# Patient Record
Sex: FEMALE | Race: WHITE | Employment: FULL TIME | URBAN - METROPOLITAN AREA
[De-identification: names, ages, dates, MRNs, and addresses within clinical notes are randomized per-mention and may not be internally consistent; named-entity substitution may affect disease eponyms.]

---

## 2017-01-01 ENCOUNTER — PRE VISIT (OUTPATIENT)
Dept: UROLOGY | Facility: CLINIC | Age: 62
End: 2017-01-01

## 2017-01-01 ENCOUNTER — HOSPITAL ENCOUNTER (EMERGENCY)
Facility: CLINIC | Age: 62
Discharge: HOME | End: 2017-06-14
Attending: EMERGENCY MEDICINE | Admitting: EMERGENCY MEDICINE
Payer: COMMERCIAL

## 2017-01-01 ENCOUNTER — APPOINTMENT (OUTPATIENT)
Dept: GENERAL RADIOLOGY | Facility: CLINIC | Age: 62
DRG: 389 | End: 2017-01-01
Attending: OBSTETRICS & GYNECOLOGY
Payer: MEDICARE

## 2017-01-01 ENCOUNTER — HOSPITAL ENCOUNTER (OUTPATIENT)
Facility: AMBULATORY SURGERY CENTER | Age: 62
End: 2017-02-27
Attending: UROLOGY

## 2017-01-01 ENCOUNTER — ONCOLOGY VISIT (OUTPATIENT)
Dept: ONCOLOGY | Facility: CLINIC | Age: 62
End: 2017-01-01
Attending: OBSTETRICS & GYNECOLOGY
Payer: COMMERCIAL

## 2017-01-01 ENCOUNTER — TELEPHONE (OUTPATIENT)
Dept: UROLOGY | Facility: CLINIC | Age: 62
End: 2017-01-01

## 2017-01-01 ENCOUNTER — ONCOLOGY VISIT (OUTPATIENT)
Dept: ONCOLOGY | Facility: CLINIC | Age: 62
End: 2017-01-01
Payer: COMMERCIAL

## 2017-01-01 ENCOUNTER — CARE COORDINATION (OUTPATIENT)
Dept: UROLOGY | Facility: CLINIC | Age: 62
End: 2017-01-01

## 2017-01-01 ENCOUNTER — TELEPHONE (OUTPATIENT)
Dept: EMERGENCY MEDICINE | Facility: CLINIC | Age: 62
End: 2017-01-01

## 2017-01-01 ENCOUNTER — OFFICE VISIT (OUTPATIENT)
Dept: ANESTHESIOLOGY | Facility: CLINIC | Age: 62
End: 2017-01-01

## 2017-01-01 ENCOUNTER — TELEPHONE (OUTPATIENT)
Dept: ONCOLOGY | Facility: CLINIC | Age: 62
End: 2017-01-01

## 2017-01-01 ENCOUNTER — CARE COORDINATION (OUTPATIENT)
Dept: ONCOLOGY | Facility: CLINIC | Age: 62
End: 2017-01-01

## 2017-01-01 ENCOUNTER — APPOINTMENT (OUTPATIENT)
Dept: MRI IMAGING | Facility: CLINIC | Age: 62
DRG: 394 | End: 2017-01-01
Payer: MEDICARE

## 2017-01-01 ENCOUNTER — CARE COORDINATION (OUTPATIENT)
Dept: CARE COORDINATION | Facility: CLINIC | Age: 62
End: 2017-01-01

## 2017-01-01 ENCOUNTER — CARE COORDINATION (OUTPATIENT)
Dept: CARDIOLOGY | Facility: CLINIC | Age: 62
End: 2017-01-01

## 2017-01-01 ENCOUNTER — HOSPITAL ENCOUNTER (OUTPATIENT)
Facility: AMBULATORY SURGERY CENTER | Age: 62
End: 2017-04-20
Attending: ANESTHESIOLOGY

## 2017-01-01 ENCOUNTER — OFFICE VISIT (OUTPATIENT)
Dept: NEPHROLOGY | Facility: CLINIC | Age: 62
End: 2017-01-01
Attending: INTERNAL MEDICINE
Payer: COMMERCIAL

## 2017-01-01 ENCOUNTER — APPOINTMENT (OUTPATIENT)
Dept: GENERAL RADIOLOGY | Facility: CLINIC | Age: 62
DRG: 374 | End: 2017-01-01
Attending: INTERNAL MEDICINE
Payer: MEDICARE

## 2017-01-01 ENCOUNTER — HOSPITAL ENCOUNTER (OUTPATIENT)
Facility: AMBULATORY SURGERY CENTER | Age: 62
End: 2017-05-18
Attending: ANESTHESIOLOGY

## 2017-01-01 ENCOUNTER — ONCOLOGY VISIT (OUTPATIENT)
Dept: ONCOLOGY | Facility: CLINIC | Age: 62
End: 2017-01-01
Attending: NURSE PRACTITIONER
Payer: COMMERCIAL

## 2017-01-01 ENCOUNTER — INFUSION THERAPY VISIT (OUTPATIENT)
Dept: ONCOLOGY | Facility: CLINIC | Age: 62
DRG: 389 | End: 2017-01-01
Attending: OBSTETRICS & GYNECOLOGY
Payer: MEDICARE

## 2017-01-01 ENCOUNTER — APPOINTMENT (OUTPATIENT)
Dept: LAB | Facility: CLINIC | Age: 62
End: 2017-01-01
Attending: OBSTETRICS & GYNECOLOGY
Payer: COMMERCIAL

## 2017-01-01 ENCOUNTER — ANESTHESIA EVENT (OUTPATIENT)
Dept: SURGERY | Facility: AMBULATORY SURGERY CENTER | Age: 62
End: 2017-01-01

## 2017-01-01 ENCOUNTER — APPOINTMENT (OUTPATIENT)
Dept: GENERAL RADIOLOGY | Facility: CLINIC | Age: 62
DRG: 374 | End: 2017-01-01
Payer: MEDICARE

## 2017-01-01 ENCOUNTER — TRANSFERRED RECORDS (OUTPATIENT)
Dept: HEALTH INFORMATION MANAGEMENT | Facility: CLINIC | Age: 62
End: 2017-01-01

## 2017-01-01 ENCOUNTER — TELEPHONE (OUTPATIENT)
Dept: PALLIATIVE CARE | Facility: CLINIC | Age: 62
End: 2017-01-01

## 2017-01-01 ENCOUNTER — RESEARCH ENCOUNTER (OUTPATIENT)
Dept: ONCOLOGY | Facility: CLINIC | Age: 62
End: 2017-01-01

## 2017-01-01 ENCOUNTER — INFUSION THERAPY VISIT (OUTPATIENT)
Dept: INFUSION THERAPY | Facility: CLINIC | Age: 62
End: 2017-01-01
Payer: COMMERCIAL

## 2017-01-01 ENCOUNTER — APPOINTMENT (OUTPATIENT)
Dept: CT IMAGING | Facility: CLINIC | Age: 62
DRG: 394 | End: 2017-01-01
Payer: MEDICARE

## 2017-01-01 ENCOUNTER — ALLIED HEALTH/NURSE VISIT (OUTPATIENT)
Dept: ONCOLOGY | Facility: CLINIC | Age: 62
End: 2017-01-01
Payer: COMMERCIAL

## 2017-01-01 ENCOUNTER — ANESTHESIA (OUTPATIENT)
Dept: SURGERY | Facility: AMBULATORY SURGERY CENTER | Age: 62
End: 2017-01-01

## 2017-01-01 ENCOUNTER — APPOINTMENT (OUTPATIENT)
Dept: LAB | Facility: CLINIC | Age: 62
DRG: 389 | End: 2017-01-01
Attending: OBSTETRICS & GYNECOLOGY
Payer: MEDICARE

## 2017-01-01 ENCOUNTER — TELEPHONE (OUTPATIENT)
Dept: OTHER | Facility: CLINIC | Age: 62
End: 2017-01-01

## 2017-01-01 ENCOUNTER — SURGERY (OUTPATIENT)
Age: 62
End: 2017-01-01

## 2017-01-01 ENCOUNTER — APPOINTMENT (OUTPATIENT)
Dept: CT IMAGING | Facility: CLINIC | Age: 62
End: 2017-01-01
Attending: EMERGENCY MEDICINE
Payer: COMMERCIAL

## 2017-01-01 ENCOUNTER — TELEPHONE (OUTPATIENT)
Dept: NURSING | Facility: CLINIC | Age: 62
End: 2017-01-01

## 2017-01-01 ENCOUNTER — HOSPITAL ENCOUNTER (INPATIENT)
Facility: CLINIC | Age: 62
LOS: 2 days | Discharge: HOME | DRG: 394 | End: 2017-01-29
Attending: EMERGENCY MEDICINE | Admitting: OBSTETRICS & GYNECOLOGY
Payer: MEDICARE

## 2017-01-01 ENCOUNTER — HOSPITAL ENCOUNTER (INPATIENT)
Facility: CLINIC | Age: 62
LOS: 5 days | Discharge: HOME | DRG: 389 | End: 2017-01-15
Attending: OBSTETRICS & GYNECOLOGY | Admitting: OBSTETRICS & GYNECOLOGY
Payer: MEDICARE

## 2017-01-01 ENCOUNTER — HOSPITAL ENCOUNTER (OUTPATIENT)
Facility: AMBULATORY SURGERY CENTER | Age: 62
End: 2017-06-12
Attending: UROLOGY

## 2017-01-01 ENCOUNTER — TELEPHONE (OUTPATIENT)
Dept: ANESTHESIOLOGY | Facility: CLINIC | Age: 62
End: 2017-01-01

## 2017-01-01 ENCOUNTER — HOSPITAL ENCOUNTER (OUTPATIENT)
Dept: EDUCATION SERVICES | Facility: CLINIC | Age: 62
Discharge: HOME | End: 2017-06-18
Attending: OBSTETRICS & GYNECOLOGY | Admitting: OBSTETRICS & GYNECOLOGY
Payer: COMMERCIAL

## 2017-01-01 ENCOUNTER — MYC REFILL (OUTPATIENT)
Dept: ONCOLOGY | Facility: CLINIC | Age: 62
End: 2017-01-01

## 2017-01-01 ENCOUNTER — ANESTHESIA (OUTPATIENT)
Dept: SURGERY | Facility: CLINIC | Age: 62
DRG: 374 | End: 2017-01-01
Payer: MEDICARE

## 2017-01-01 ENCOUNTER — PRE VISIT (OUTPATIENT)
Dept: ANESTHESIOLOGY | Facility: CLINIC | Age: 62
End: 2017-01-01

## 2017-01-01 ENCOUNTER — APPOINTMENT (OUTPATIENT)
Dept: CT IMAGING | Facility: CLINIC | Age: 62
DRG: 374 | End: 2017-01-01
Attending: NURSE PRACTITIONER
Payer: MEDICARE

## 2017-01-01 ENCOUNTER — DOCUMENTATION ONLY (OUTPATIENT)
Dept: OTHER | Facility: CLINIC | Age: 62
End: 2017-01-01

## 2017-01-01 ENCOUNTER — APPOINTMENT (OUTPATIENT)
Dept: ULTRASOUND IMAGING | Facility: CLINIC | Age: 62
DRG: 394 | End: 2017-01-01
Attending: EMERGENCY MEDICINE
Payer: MEDICARE

## 2017-01-01 ENCOUNTER — ALLIED HEALTH/NURSE VISIT (OUTPATIENT)
Dept: ONCOLOGY | Facility: CLINIC | Age: 62
End: 2017-01-01

## 2017-01-01 ENCOUNTER — ALLIED HEALTH/NURSE VISIT (OUTPATIENT)
Dept: UROLOGY | Facility: CLINIC | Age: 62
End: 2017-01-01

## 2017-01-01 ENCOUNTER — MYC MEDICAL ADVICE (OUTPATIENT)
Dept: ANESTHESIOLOGY | Facility: CLINIC | Age: 62
End: 2017-01-01

## 2017-01-01 ENCOUNTER — ANESTHESIA EVENT (OUTPATIENT)
Dept: SURGERY | Facility: CLINIC | Age: 62
DRG: 374 | End: 2017-01-01
Payer: MEDICARE

## 2017-01-01 ENCOUNTER — RADIANT APPOINTMENT (OUTPATIENT)
Dept: GENERAL RADIOLOGY | Facility: CLINIC | Age: 62
End: 2017-01-01
Attending: NURSE PRACTITIONER
Payer: COMMERCIAL

## 2017-01-01 ENCOUNTER — APPOINTMENT (OUTPATIENT)
Dept: GENERAL RADIOLOGY | Facility: CLINIC | Age: 62
DRG: 374 | End: 2017-01-01
Attending: OBSTETRICS & GYNECOLOGY
Payer: MEDICARE

## 2017-01-01 ENCOUNTER — APPOINTMENT (OUTPATIENT)
Dept: INTERVENTIONAL RADIOLOGY/VASCULAR | Facility: CLINIC | Age: 62
End: 2017-01-01
Payer: COMMERCIAL

## 2017-01-01 ENCOUNTER — HOSPITAL ENCOUNTER (OUTPATIENT)
Facility: AMBULATORY SURGERY CENTER | Age: 62
End: 2017-07-24
Attending: UROLOGY

## 2017-01-01 ENCOUNTER — APPOINTMENT (OUTPATIENT)
Dept: ULTRASOUND IMAGING | Facility: CLINIC | Age: 62
DRG: 374 | End: 2017-01-01
Payer: MEDICARE

## 2017-01-01 ENCOUNTER — OFFICE VISIT (OUTPATIENT)
Dept: UROLOGY | Facility: CLINIC | Age: 62
End: 2017-01-01

## 2017-01-01 ENCOUNTER — NURSE TRIAGE (OUTPATIENT)
Dept: NURSING | Facility: CLINIC | Age: 62
End: 2017-01-01

## 2017-01-01 ENCOUNTER — APPOINTMENT (OUTPATIENT)
Dept: GENERAL RADIOLOGY | Facility: CLINIC | Age: 62
End: 2017-01-01
Attending: EMERGENCY MEDICINE
Payer: COMMERCIAL

## 2017-01-01 ENCOUNTER — HOSPITAL ENCOUNTER (INPATIENT)
Dept: EDUCATION SERVICES | Facility: CLINIC | Age: 62
DRG: 374 | End: 2017-08-11
Payer: MEDICARE

## 2017-01-01 ENCOUNTER — HOSPITAL ENCOUNTER (INPATIENT)
Facility: CLINIC | Age: 62
LOS: 3 days | Discharge: HOME | DRG: 389 | End: 2017-03-03
Attending: OBSTETRICS & GYNECOLOGY | Admitting: OBSTETRICS & GYNECOLOGY
Payer: MEDICARE

## 2017-01-01 ENCOUNTER — DOCUMENTATION ONLY (OUTPATIENT)
Dept: PHYSICAL THERAPY | Facility: CLINIC | Age: 62
End: 2017-01-01

## 2017-01-01 ENCOUNTER — OFFICE VISIT (OUTPATIENT)
Dept: UROLOGY | Facility: CLINIC | Age: 62
End: 2017-01-01
Payer: COMMERCIAL

## 2017-01-01 ENCOUNTER — INFUSION THERAPY VISIT (OUTPATIENT)
Dept: INFUSION THERAPY | Facility: CLINIC | Age: 62
End: 2017-01-01
Attending: NURSE PRACTITIONER
Payer: COMMERCIAL

## 2017-01-01 ENCOUNTER — HOSPITAL ENCOUNTER (INPATIENT)
Facility: CLINIC | Age: 62
LOS: 10 days | Discharge: HOSPICE | DRG: 374 | End: 2017-08-11
Attending: EMERGENCY MEDICINE | Admitting: OBSTETRICS & GYNECOLOGY
Payer: MEDICARE

## 2017-01-01 ENCOUNTER — HOSPITAL ENCOUNTER (EMERGENCY)
Facility: CLINIC | Age: 62
Discharge: HOME | End: 2017-03-15
Attending: EMERGENCY MEDICINE | Admitting: EMERGENCY MEDICINE
Payer: COMMERCIAL

## 2017-01-01 VITALS
SYSTOLIC BLOOD PRESSURE: 134 MMHG | WEIGHT: 115.8 LBS | HEART RATE: 95 BPM | TEMPERATURE: 99.1 F | RESPIRATION RATE: 18 BRPM | DIASTOLIC BLOOD PRESSURE: 77 MMHG | OXYGEN SATURATION: 97 % | BODY MASS INDEX: 18.7 KG/M2

## 2017-01-01 VITALS
TEMPERATURE: 97.1 F | DIASTOLIC BLOOD PRESSURE: 65 MMHG | WEIGHT: 119.7 LBS | BODY MASS INDEX: 19.24 KG/M2 | OXYGEN SATURATION: 97 % | HEART RATE: 108 BPM | HEIGHT: 66 IN | RESPIRATION RATE: 16 BRPM | SYSTOLIC BLOOD PRESSURE: 124 MMHG

## 2017-01-01 VITALS
WEIGHT: 126.3 LBS | DIASTOLIC BLOOD PRESSURE: 72 MMHG | SYSTOLIC BLOOD PRESSURE: 136 MMHG | OXYGEN SATURATION: 100 % | TEMPERATURE: 98.9 F | HEART RATE: 90 BPM | BODY MASS INDEX: 20.39 KG/M2 | RESPIRATION RATE: 16 BRPM

## 2017-01-01 VITALS
RESPIRATION RATE: 18 BRPM | TEMPERATURE: 98.1 F | SYSTOLIC BLOOD PRESSURE: 146 MMHG | WEIGHT: 125.5 LBS | BODY MASS INDEX: 20.27 KG/M2 | DIASTOLIC BLOOD PRESSURE: 84 MMHG | HEART RATE: 101 BPM | OXYGEN SATURATION: 99 %

## 2017-01-01 VITALS
HEART RATE: 91 BPM | TEMPERATURE: 99 F | RESPIRATION RATE: 18 BRPM | DIASTOLIC BLOOD PRESSURE: 68 MMHG | SYSTOLIC BLOOD PRESSURE: 114 MMHG | OXYGEN SATURATION: 93 %

## 2017-01-01 VITALS
DIASTOLIC BLOOD PRESSURE: 64 MMHG | SYSTOLIC BLOOD PRESSURE: 158 MMHG | OXYGEN SATURATION: 100 % | HEART RATE: 101 BPM | BODY MASS INDEX: 20.72 KG/M2 | TEMPERATURE: 98 F | HEIGHT: 66 IN | WEIGHT: 128.9 LBS | RESPIRATION RATE: 16 BRPM

## 2017-01-01 VITALS
DIASTOLIC BLOOD PRESSURE: 65 MMHG | RESPIRATION RATE: 16 BRPM | BODY MASS INDEX: 19.04 KG/M2 | WEIGHT: 118.5 LBS | HEART RATE: 89 BPM | OXYGEN SATURATION: 94 % | TEMPERATURE: 97.7 F | HEIGHT: 66 IN | SYSTOLIC BLOOD PRESSURE: 125 MMHG

## 2017-01-01 VITALS
DIASTOLIC BLOOD PRESSURE: 68 MMHG | WEIGHT: 130.29 LBS | RESPIRATION RATE: 16 BRPM | HEART RATE: 95 BPM | OXYGEN SATURATION: 100 % | BODY MASS INDEX: 20.94 KG/M2 | SYSTOLIC BLOOD PRESSURE: 135 MMHG | TEMPERATURE: 98.4 F | HEIGHT: 66 IN

## 2017-01-01 VITALS
BODY MASS INDEX: 20.17 KG/M2 | DIASTOLIC BLOOD PRESSURE: 92 MMHG | TEMPERATURE: 98.2 F | HEIGHT: 66 IN | RESPIRATION RATE: 16 BRPM | SYSTOLIC BLOOD PRESSURE: 150 MMHG | WEIGHT: 125.5 LBS | OXYGEN SATURATION: 100 %

## 2017-01-01 VITALS
RESPIRATION RATE: 18 BRPM | DIASTOLIC BLOOD PRESSURE: 85 MMHG | HEIGHT: 66 IN | OXYGEN SATURATION: 97 % | TEMPERATURE: 98.6 F | HEART RATE: 105 BPM | SYSTOLIC BLOOD PRESSURE: 142 MMHG | WEIGHT: 110 LBS | BODY MASS INDEX: 17.68 KG/M2

## 2017-01-01 VITALS — SYSTOLIC BLOOD PRESSURE: 120 MMHG | DIASTOLIC BLOOD PRESSURE: 63 MMHG | HEART RATE: 109 BPM

## 2017-01-01 VITALS
HEART RATE: 107 BPM | BODY MASS INDEX: 18.63 KG/M2 | HEIGHT: 66 IN | DIASTOLIC BLOOD PRESSURE: 84 MMHG | WEIGHT: 115.9 LBS | OXYGEN SATURATION: 100 % | TEMPERATURE: 98.8 F | SYSTOLIC BLOOD PRESSURE: 130 MMHG

## 2017-01-01 VITALS
DIASTOLIC BLOOD PRESSURE: 83 MMHG | BODY MASS INDEX: 20.03 KG/M2 | HEIGHT: 66 IN | SYSTOLIC BLOOD PRESSURE: 147 MMHG | HEART RATE: 88 BPM | OXYGEN SATURATION: 98 % | RESPIRATION RATE: 16 BRPM | WEIGHT: 124.6 LBS | TEMPERATURE: 98.2 F

## 2017-01-01 VITALS
TEMPERATURE: 98.5 F | HEART RATE: 89 BPM | BODY MASS INDEX: 19.06 KG/M2 | DIASTOLIC BLOOD PRESSURE: 66 MMHG | SYSTOLIC BLOOD PRESSURE: 115 MMHG | HEIGHT: 66 IN | RESPIRATION RATE: 16 BRPM | WEIGHT: 118.6 LBS | OXYGEN SATURATION: 100 %

## 2017-01-01 VITALS
BODY MASS INDEX: 18.32 KG/M2 | HEART RATE: 95 BPM | WEIGHT: 114 LBS | DIASTOLIC BLOOD PRESSURE: 85 MMHG | HEIGHT: 66 IN | TEMPERATURE: 98.6 F | SYSTOLIC BLOOD PRESSURE: 155 MMHG | RESPIRATION RATE: 16 BRPM | OXYGEN SATURATION: 100 %

## 2017-01-01 VITALS
BODY MASS INDEX: 18.48 KG/M2 | WEIGHT: 115 LBS | TEMPERATURE: 98.6 F | OXYGEN SATURATION: 99 % | HEIGHT: 66 IN | DIASTOLIC BLOOD PRESSURE: 80 MMHG | HEART RATE: 100 BPM | RESPIRATION RATE: 18 BRPM | SYSTOLIC BLOOD PRESSURE: 141 MMHG

## 2017-01-01 VITALS
OXYGEN SATURATION: 97 % | TEMPERATURE: 99.6 F | RESPIRATION RATE: 16 BRPM | DIASTOLIC BLOOD PRESSURE: 55 MMHG | SYSTOLIC BLOOD PRESSURE: 118 MMHG | HEART RATE: 115 BPM

## 2017-01-01 VITALS
DIASTOLIC BLOOD PRESSURE: 63 MMHG | HEART RATE: 104 BPM | SYSTOLIC BLOOD PRESSURE: 123 MMHG | OXYGEN SATURATION: 100 % | TEMPERATURE: 98.9 F | BODY MASS INDEX: 18.67 KG/M2 | RESPIRATION RATE: 16 BRPM | WEIGHT: 116.18 LBS | HEIGHT: 66 IN

## 2017-01-01 VITALS
SYSTOLIC BLOOD PRESSURE: 146 MMHG | DIASTOLIC BLOOD PRESSURE: 84 MMHG | HEART RATE: 101 BPM | RESPIRATION RATE: 18 BRPM | WEIGHT: 125.5 LBS | OXYGEN SATURATION: 99 % | BODY MASS INDEX: 20.27 KG/M2 | TEMPERATURE: 98.1 F

## 2017-01-01 VITALS
BODY MASS INDEX: 18.63 KG/M2 | TEMPERATURE: 97.4 F | DIASTOLIC BLOOD PRESSURE: 74 MMHG | WEIGHT: 115.9 LBS | HEIGHT: 66 IN | SYSTOLIC BLOOD PRESSURE: 136 MMHG | RESPIRATION RATE: 14 BRPM | OXYGEN SATURATION: 100 %

## 2017-01-01 VITALS
SYSTOLIC BLOOD PRESSURE: 135 MMHG | OXYGEN SATURATION: 98 % | RESPIRATION RATE: 18 BRPM | DIASTOLIC BLOOD PRESSURE: 65 MMHG | TEMPERATURE: 99.8 F

## 2017-01-01 VITALS
HEIGHT: 66 IN | DIASTOLIC BLOOD PRESSURE: 70 MMHG | HEART RATE: 112 BPM | BODY MASS INDEX: 20.63 KG/M2 | TEMPERATURE: 98.5 F | WEIGHT: 128.4 LBS | SYSTOLIC BLOOD PRESSURE: 125 MMHG | OXYGEN SATURATION: 100 % | RESPIRATION RATE: 16 BRPM

## 2017-01-01 VITALS
OXYGEN SATURATION: 97 % | DIASTOLIC BLOOD PRESSURE: 72 MMHG | RESPIRATION RATE: 16 BRPM | TEMPERATURE: 97.5 F | HEART RATE: 95 BPM | SYSTOLIC BLOOD PRESSURE: 125 MMHG

## 2017-01-01 VITALS
DIASTOLIC BLOOD PRESSURE: 75 MMHG | HEIGHT: 66 IN | HEART RATE: 97 BPM | SYSTOLIC BLOOD PRESSURE: 138 MMHG | BODY MASS INDEX: 18.77 KG/M2 | WEIGHT: 116.8 LBS

## 2017-01-01 VITALS
HEART RATE: 93 BPM | RESPIRATION RATE: 18 BRPM | SYSTOLIC BLOOD PRESSURE: 125 MMHG | DIASTOLIC BLOOD PRESSURE: 68 MMHG | TEMPERATURE: 99.2 F

## 2017-01-01 VITALS — SYSTOLIC BLOOD PRESSURE: 115 MMHG | HEART RATE: 113 BPM | DIASTOLIC BLOOD PRESSURE: 39 MMHG

## 2017-01-01 VITALS
HEIGHT: 66 IN | HEART RATE: 100 BPM | SYSTOLIC BLOOD PRESSURE: 121 MMHG | BODY MASS INDEX: 18.96 KG/M2 | OXYGEN SATURATION: 100 % | WEIGHT: 118 LBS | DIASTOLIC BLOOD PRESSURE: 70 MMHG

## 2017-01-01 VITALS
HEART RATE: 106 BPM | HEIGHT: 66 IN | DIASTOLIC BLOOD PRESSURE: 68 MMHG | OXYGEN SATURATION: 97 % | BODY MASS INDEX: 17.36 KG/M2 | TEMPERATURE: 98.9 F | WEIGHT: 108 LBS | SYSTOLIC BLOOD PRESSURE: 119 MMHG | RESPIRATION RATE: 20 BRPM

## 2017-01-01 VITALS
RESPIRATION RATE: 20 BRPM | HEIGHT: 66 IN | OXYGEN SATURATION: 99 % | DIASTOLIC BLOOD PRESSURE: 84 MMHG | HEART RATE: 109 BPM | SYSTOLIC BLOOD PRESSURE: 141 MMHG | TEMPERATURE: 98.4 F | BODY MASS INDEX: 18.48 KG/M2 | WEIGHT: 115 LBS

## 2017-01-01 VITALS
HEART RATE: 98 BPM | OXYGEN SATURATION: 99 % | BODY MASS INDEX: 18.8 KG/M2 | HEIGHT: 66 IN | SYSTOLIC BLOOD PRESSURE: 124 MMHG | RESPIRATION RATE: 20 BRPM | DIASTOLIC BLOOD PRESSURE: 71 MMHG | TEMPERATURE: 98.5 F | WEIGHT: 117 LBS

## 2017-01-01 VITALS
DIASTOLIC BLOOD PRESSURE: 74 MMHG | HEART RATE: 93 BPM | RESPIRATION RATE: 18 BRPM | SYSTOLIC BLOOD PRESSURE: 133 MMHG | TEMPERATURE: 99.3 F

## 2017-01-01 VITALS
WEIGHT: 118.3 LBS | SYSTOLIC BLOOD PRESSURE: 129 MMHG | DIASTOLIC BLOOD PRESSURE: 68 MMHG | OXYGEN SATURATION: 98 % | RESPIRATION RATE: 16 BRPM | HEART RATE: 93 BPM | BODY MASS INDEX: 19.1 KG/M2 | TEMPERATURE: 98.9 F

## 2017-01-01 VITALS
BODY MASS INDEX: 18.89 KG/M2 | DIASTOLIC BLOOD PRESSURE: 74 MMHG | WEIGHT: 117 LBS | OXYGEN SATURATION: 100 % | TEMPERATURE: 96.4 F | SYSTOLIC BLOOD PRESSURE: 136 MMHG | RESPIRATION RATE: 14 BRPM

## 2017-01-01 VITALS
DIASTOLIC BLOOD PRESSURE: 87 MMHG | OXYGEN SATURATION: 99 % | SYSTOLIC BLOOD PRESSURE: 147 MMHG | HEART RATE: 115 BPM | TEMPERATURE: 98.1 F

## 2017-01-01 VITALS
BODY MASS INDEX: 20.67 KG/M2 | SYSTOLIC BLOOD PRESSURE: 141 MMHG | DIASTOLIC BLOOD PRESSURE: 89 MMHG | WEIGHT: 128.6 LBS | HEART RATE: 98 BPM | HEIGHT: 66 IN

## 2017-01-01 VITALS
HEART RATE: 106 BPM | BODY MASS INDEX: 18.48 KG/M2 | HEIGHT: 66 IN | SYSTOLIC BLOOD PRESSURE: 113 MMHG | OXYGEN SATURATION: 100 % | TEMPERATURE: 98.7 F | DIASTOLIC BLOOD PRESSURE: 67 MMHG | RESPIRATION RATE: 18 BRPM | WEIGHT: 115 LBS

## 2017-01-01 VITALS
DIASTOLIC BLOOD PRESSURE: 74 MMHG | SYSTOLIC BLOOD PRESSURE: 133 MMHG | RESPIRATION RATE: 18 BRPM | HEART RATE: 93 BPM | TEMPERATURE: 99.3 F

## 2017-01-01 VITALS
TEMPERATURE: 98.6 F | DIASTOLIC BLOOD PRESSURE: 76 MMHG | RESPIRATION RATE: 14 BRPM | HEART RATE: 108 BPM | SYSTOLIC BLOOD PRESSURE: 133 MMHG | OXYGEN SATURATION: 100 %

## 2017-01-01 DIAGNOSIS — T45.1X5A CHEMOTHERAPY-INDUCED NEUTROPENIA (H): ICD-10-CM

## 2017-01-01 DIAGNOSIS — G62.0 CHEMOTHERAPY-INDUCED PERIPHERAL NEUROPATHY (H): ICD-10-CM

## 2017-01-01 DIAGNOSIS — D50.8 OTHER IRON DEFICIENCY ANEMIA: ICD-10-CM

## 2017-01-01 DIAGNOSIS — D50.8 OTHER IRON DEFICIENCY ANEMIA: Primary | ICD-10-CM

## 2017-01-01 DIAGNOSIS — C48.2 PRIMARY PERITONEAL ADENOCARCINOMA (H): ICD-10-CM

## 2017-01-01 DIAGNOSIS — Z85.43 HISTORY OF OVARIAN CANCER: ICD-10-CM

## 2017-01-01 DIAGNOSIS — C56.2 OVARIAN CANCER, LEFT (H): ICD-10-CM

## 2017-01-01 DIAGNOSIS — E86.0 DEHYDRATION: Primary | ICD-10-CM

## 2017-01-01 DIAGNOSIS — C56.1 OVARIAN CANCER, RIGHT (H): ICD-10-CM

## 2017-01-01 DIAGNOSIS — E83.42 HYPOMAGNESEMIA: ICD-10-CM

## 2017-01-01 DIAGNOSIS — G89.3 CANCER ASSOCIATED PAIN: ICD-10-CM

## 2017-01-01 DIAGNOSIS — D64.89 ANEMIA DUE TO OTHER CAUSE: ICD-10-CM

## 2017-01-01 DIAGNOSIS — N12 PYELONEPHRITIS: Primary | ICD-10-CM

## 2017-01-01 DIAGNOSIS — C56.9 MALIGNANT NEOPLASM OF OVARY, UNSPECIFIED LATERALITY (H): ICD-10-CM

## 2017-01-01 DIAGNOSIS — N13.30 HYDRONEPHROSIS, UNSPECIFIED HYDRONEPHROSIS TYPE: Primary | ICD-10-CM

## 2017-01-01 DIAGNOSIS — G89.3 CANCER ASSOCIATED PAIN: Primary | ICD-10-CM

## 2017-01-01 DIAGNOSIS — D70.1 CHEMOTHERAPY-INDUCED NEUTROPENIA (H): ICD-10-CM

## 2017-01-01 DIAGNOSIS — C56.2 OVARIAN CANCER, LEFT (H): Primary | ICD-10-CM

## 2017-01-01 DIAGNOSIS — D64.89 ANEMIA DUE TO OTHER CAUSE: Primary | ICD-10-CM

## 2017-01-01 DIAGNOSIS — R35.0 URINARY FREQUENCY: Primary | ICD-10-CM

## 2017-01-01 DIAGNOSIS — C56.1 OVARIAN CANCER, RIGHT (H): Primary | ICD-10-CM

## 2017-01-01 DIAGNOSIS — T78.40XD SENSITIVITY TO MEDICATION, SUBSEQUENT ENCOUNTER: ICD-10-CM

## 2017-01-01 DIAGNOSIS — R00.2 HEART PALPITATIONS: ICD-10-CM

## 2017-01-01 DIAGNOSIS — Z79.899 ENCOUNTER FOR LONG-TERM CURRENT USE OF MEDICATION: ICD-10-CM

## 2017-01-01 DIAGNOSIS — R00.2 PALPITATIONS: ICD-10-CM

## 2017-01-01 DIAGNOSIS — R30.0 DYSURIA: Primary | ICD-10-CM

## 2017-01-01 DIAGNOSIS — T45.1X5A ANEMIA ASSOCIATED WITH CHEMOTHERAPY: ICD-10-CM

## 2017-01-01 DIAGNOSIS — R50.9 FEBRILE: ICD-10-CM

## 2017-01-01 DIAGNOSIS — F43.22 ADJUSTMENT DISORDER WITH ANXIOUS MOOD: ICD-10-CM

## 2017-01-01 DIAGNOSIS — G89.3 CANCER RELATED PAIN: ICD-10-CM

## 2017-01-01 DIAGNOSIS — Z71.81 SPIRITUAL OR RELIGIOUS COUNSELING: Primary | ICD-10-CM

## 2017-01-01 DIAGNOSIS — R10.84 ABDOMINAL PAIN, GENERALIZED: Primary | ICD-10-CM

## 2017-01-01 DIAGNOSIS — R53.0 NEOPLASTIC MALIGNANT RELATED FATIGUE: Primary | ICD-10-CM

## 2017-01-01 DIAGNOSIS — Z71.9 VISIT FOR COUNSELING: Primary | ICD-10-CM

## 2017-01-01 DIAGNOSIS — Z51.11 ENCOUNTER FOR ANTINEOPLASTIC CHEMOTHERAPY: ICD-10-CM

## 2017-01-01 DIAGNOSIS — N13.30 HYDRONEPHROSIS: ICD-10-CM

## 2017-01-01 DIAGNOSIS — N39.0 URINARY TRACT INFECTION: Primary | ICD-10-CM

## 2017-01-01 DIAGNOSIS — D70.1 CHEMOTHERAPY-INDUCED NEUTROPENIA (H): Primary | ICD-10-CM

## 2017-01-01 DIAGNOSIS — R10.9 FLANK PAIN: Primary | ICD-10-CM

## 2017-01-01 DIAGNOSIS — R50.9 TEMPERATURE ELEVATED: Primary | ICD-10-CM

## 2017-01-01 DIAGNOSIS — R82.90 ABNORMAL URINE FINDINGS: ICD-10-CM

## 2017-01-01 DIAGNOSIS — E87.6 HYPOKALEMIA: ICD-10-CM

## 2017-01-01 DIAGNOSIS — C48.2 PRIMARY PERITONEAL ADENOCARCINOMA (H): Primary | ICD-10-CM

## 2017-01-01 DIAGNOSIS — K59.00 CONSTIPATION, UNSPECIFIED CONSTIPATION TYPE: ICD-10-CM

## 2017-01-01 DIAGNOSIS — D64.9 ANEMIA: ICD-10-CM

## 2017-01-01 DIAGNOSIS — R10.9 VISCERAL ABDOMINAL PAIN: ICD-10-CM

## 2017-01-01 DIAGNOSIS — K56.609 SMALL BOWEL OBSTRUCTION (H): Primary | ICD-10-CM

## 2017-01-01 DIAGNOSIS — R10.9 FLANK PAIN: ICD-10-CM

## 2017-01-01 DIAGNOSIS — D64.81 ANEMIA ASSOCIATED WITH CHEMOTHERAPY: ICD-10-CM

## 2017-01-01 DIAGNOSIS — R35.0 URINARY FREQUENCY: ICD-10-CM

## 2017-01-01 DIAGNOSIS — R00.2 HEART PALPITATIONS: Primary | ICD-10-CM

## 2017-01-01 DIAGNOSIS — R10.9 RIGHT FLANK PAIN: ICD-10-CM

## 2017-01-01 DIAGNOSIS — R80.9 PROTEIN IN URINE: ICD-10-CM

## 2017-01-01 DIAGNOSIS — T45.1X5A CHEMOTHERAPY-INDUCED NEUTROPENIA (H): Primary | ICD-10-CM

## 2017-01-01 DIAGNOSIS — K92.2 GASTROINTESTINAL HEMORRHAGE, UNSPECIFIED GASTROINTESTINAL HEMORRHAGE TYPE: ICD-10-CM

## 2017-01-01 DIAGNOSIS — T83.84XS PAIN DUE TO URETERAL STENT, SEQUELA: Primary | ICD-10-CM

## 2017-01-01 DIAGNOSIS — R10.84 ABDOMINAL PAIN, GENERALIZED: ICD-10-CM

## 2017-01-01 DIAGNOSIS — R42 DIZZINESS: ICD-10-CM

## 2017-01-01 DIAGNOSIS — T83.84XD PAIN DUE TO URETERAL STENT, SUBSEQUENT ENCOUNTER: Primary | ICD-10-CM

## 2017-01-01 DIAGNOSIS — C56.9 MALIGNANT NEOPLASM OF OVARY (H): ICD-10-CM

## 2017-01-01 DIAGNOSIS — R63.0 LOSS OF APPETITE: ICD-10-CM

## 2017-01-01 DIAGNOSIS — K59.09 OTHER CONSTIPATION: ICD-10-CM

## 2017-01-01 DIAGNOSIS — R11.0 NAUSEA: ICD-10-CM

## 2017-01-01 DIAGNOSIS — I95.1 ORTHOSTATIC HYPOTENSION: ICD-10-CM

## 2017-01-01 DIAGNOSIS — T45.1X5A CHEMOTHERAPY-INDUCED PERIPHERAL NEUROPATHY (H): ICD-10-CM

## 2017-01-01 DIAGNOSIS — G89.3 CANCER RELATED PAIN: Primary | ICD-10-CM

## 2017-01-01 DIAGNOSIS — R11.2 NAUSEA WITH VOMITING: ICD-10-CM

## 2017-01-01 DIAGNOSIS — T83.84XS PAIN DUE TO URETERAL STENT, SEQUELA: ICD-10-CM

## 2017-01-01 DIAGNOSIS — R80.9 PROTEIN IN URINE: Primary | ICD-10-CM

## 2017-01-01 DIAGNOSIS — N13.1 HYDRONEPHROSIS DUE TO OBSTRUCTION OF URETER: Primary | ICD-10-CM

## 2017-01-01 DIAGNOSIS — N20.0 KIDNEY STONES: Primary | ICD-10-CM

## 2017-01-01 DIAGNOSIS — Z00.6 PATIENT IN CANCER RELATED RESEARCH STUDY: ICD-10-CM

## 2017-01-01 DIAGNOSIS — E86.0 DEHYDRATION: ICD-10-CM

## 2017-01-01 DIAGNOSIS — N13.30 HYDRONEPHROSIS, LEFT: ICD-10-CM

## 2017-01-01 DIAGNOSIS — Z71.89 ACP (ADVANCE CARE PLANNING): Chronic | ICD-10-CM

## 2017-01-01 DIAGNOSIS — R82.90 ABNORMAL FINDING ON URINALYSIS: ICD-10-CM

## 2017-01-01 DIAGNOSIS — R30.0 DYSURIA: ICD-10-CM

## 2017-01-01 DIAGNOSIS — N13.30 HYDRONEPHROSIS, UNSPECIFIED HYDRONEPHROSIS TYPE: ICD-10-CM

## 2017-01-01 LAB
ABO + RH BLD: NORMAL
ALBUMIN SERPL-MCNC: 2.6 G/DL (ref 3.4–5)
ALBUMIN SERPL-MCNC: 3.1 G/DL (ref 3.4–5)
ALBUMIN SERPL-MCNC: 3.4 G/DL (ref 3.4–5)
ALBUMIN SERPL-MCNC: 3.5 G/DL (ref 3.4–5)
ALBUMIN SERPL-MCNC: 3.6 G/DL (ref 3.4–5)
ALBUMIN SERPL-MCNC: 3.7 G/DL (ref 3.4–5)
ALBUMIN SERPL-MCNC: 3.8 G/DL (ref 3.4–5)
ALBUMIN SERPL-MCNC: 3.9 G/DL (ref 3.4–5)
ALBUMIN UR-MCNC: 100 MG/DL
ALBUMIN UR-MCNC: 30 MG/DL
ALBUMIN UR-MCNC: >499 MG/DL
ALBUMIN UR-MCNC: ABNORMAL MG/DL
ALBUMIN UR-MCNC: NEGATIVE MG/DL
ALP SERPL-CCNC: 43 U/L (ref 40–150)
ALP SERPL-CCNC: 45 U/L (ref 40–150)
ALP SERPL-CCNC: 48 U/L (ref 40–150)
ALP SERPL-CCNC: 52 U/L (ref 40–150)
ALP SERPL-CCNC: 53 U/L (ref 40–150)
ALP SERPL-CCNC: 54 U/L (ref 40–150)
ALP SERPL-CCNC: 56 U/L (ref 40–150)
ALP SERPL-CCNC: 58 U/L (ref 40–150)
ALP SERPL-CCNC: 58 U/L (ref 40–150)
ALP SERPL-CCNC: 62 U/L (ref 40–150)
ALP SERPL-CCNC: 63 U/L (ref 40–150)
ALP SERPL-CCNC: 65 U/L (ref 40–150)
ALT SERPL W P-5'-P-CCNC: 10 U/L (ref 0–50)
ALT SERPL W P-5'-P-CCNC: 12 U/L (ref 0–50)
ALT SERPL W P-5'-P-CCNC: 13 U/L (ref 0–50)
ALT SERPL W P-5'-P-CCNC: 13 U/L (ref 0–50)
ALT SERPL W P-5'-P-CCNC: 14 U/L (ref 0–50)
ALT SERPL W P-5'-P-CCNC: 16 U/L (ref 0–50)
ALT SERPL W P-5'-P-CCNC: 17 U/L (ref 0–50)
ALT SERPL W P-5'-P-CCNC: 17 U/L (ref 0–50)
ALT SERPL W P-5'-P-CCNC: 18 U/L (ref 0–50)
ALT SERPL W P-5'-P-CCNC: 21 U/L (ref 0–50)
ALT SERPL W P-5'-P-CCNC: 25 U/L (ref 0–50)
ALT SERPL W P-5'-P-CCNC: 38 U/L (ref 0–50)
AMYLASE SERPL-CCNC: 34 U/L (ref 30–110)
AMYLASE SERPL-CCNC: 43 U/L (ref 30–110)
AMYLASE SERPL-CCNC: 48 U/L (ref 30–110)
AMYLASE SERPL-CCNC: 52 U/L (ref 30–110)
AMYLASE SERPL-CCNC: 55 U/L (ref 30–110)
AMYLASE SERPL-CCNC: 58 U/L (ref 30–110)
ANION GAP SERPL CALCULATED.3IONS-SCNC: 10 MMOL/L (ref 3–14)
ANION GAP SERPL CALCULATED.3IONS-SCNC: 11 MMOL/L (ref 3–14)
ANION GAP SERPL CALCULATED.3IONS-SCNC: 12 MMOL/L (ref 3–14)
ANION GAP SERPL CALCULATED.3IONS-SCNC: 14 MMOL/L (ref 3–14)
ANION GAP SERPL CALCULATED.3IONS-SCNC: 5 MMOL/L (ref 3–14)
ANION GAP SERPL CALCULATED.3IONS-SCNC: 7 MMOL/L (ref 3–14)
ANION GAP SERPL CALCULATED.3IONS-SCNC: 8 MMOL/L (ref 3–14)
ANION GAP SERPL CALCULATED.3IONS-SCNC: 9 MMOL/L (ref 3–14)
APPEARANCE UR: ABNORMAL
APPEARANCE UR: CLEAR
APTT PPP: 25 SEC (ref 22–37)
APTT PPP: 26 SEC (ref 22–37)
APTT PPP: 39 SEC (ref 22–37)
APTT PPP: 40 SEC (ref 22–37)
AST SERPL W P-5'-P-CCNC: 10 U/L (ref 0–45)
AST SERPL W P-5'-P-CCNC: 13 U/L (ref 0–45)
AST SERPL W P-5'-P-CCNC: 14 U/L (ref 0–45)
AST SERPL W P-5'-P-CCNC: 15 U/L (ref 0–45)
AST SERPL W P-5'-P-CCNC: 16 U/L (ref 0–45)
AST SERPL W P-5'-P-CCNC: 17 U/L (ref 0–45)
AST SERPL W P-5'-P-CCNC: 23 U/L (ref 0–45)
AST SERPL W P-5'-P-CCNC: 8 U/L (ref 0–45)
AST SERPL W P-5'-P-CCNC: 9 U/L (ref 0–45)
BACTERIA #/AREA URNS HPF: ABNORMAL /HPF
BACTERIA SPEC CULT: ABNORMAL
BACTERIA SPEC CULT: NO GROWTH
BACTERIA SPEC CULT: NORMAL
BASOPHILS # BLD AUTO: 0 10*3/UL
BASOPHILS # BLD AUTO: 0 10E9/L (ref 0–0.2)
BASOPHILS NFR BLD AUTO: 0.1 %
BASOPHILS NFR BLD AUTO: 0.2 %
BASOPHILS NFR BLD AUTO: 0.3 %
BASOPHILS NFR BLD AUTO: 0.4 %
BASOPHILS NFR BLD AUTO: 0.5 %
BASOPHILS NFR BLD AUTO: 0.7 %
BASOPHILS NFR BLD AUTO: 0.9 %
BASOPHILS NFR BLD AUTO: 1.4 %
BASOPHILS NFR BLD AUTO: ABNORMAL %
BILIRUB DIRECT SERPL-MCNC: 0.1 MG/DL (ref 0–0.2)
BILIRUB SERPL-MCNC: 0.2 MG/DL (ref 0.2–1.3)
BILIRUB SERPL-MCNC: 0.3 MG/DL (ref 0.2–1.3)
BILIRUB SERPL-MCNC: 0.4 MG/DL (ref 0.2–1.3)
BILIRUB SERPL-MCNC: 0.7 MG/DL (ref 0.2–1.3)
BILIRUB SERPL-MCNC: 0.9 MG/DL (ref 0.2–1.3)
BILIRUB UR QL STRIP: ABNORMAL
BILIRUB UR QL STRIP: NEGATIVE
BLD GP AB SCN SERPL QL: NORMAL
BLD PROD TYP BPU: NORMAL
BLD UNIT ID BPU: 0
BLOOD BANK CMNT PATIENT-IMP: NORMAL
BLOOD PRODUCT CODE: NORMAL
BPU ID: NORMAL
BUN SERPL-MCNC: 12 MG/DL (ref 7–30)
BUN SERPL-MCNC: 13 MG/DL (ref 7–30)
BUN SERPL-MCNC: 14 MG/DL (ref 7–30)
BUN SERPL-MCNC: 15 MG/DL (ref 7–30)
BUN SERPL-MCNC: 16 MG/DL (ref 7–30)
BUN SERPL-MCNC: 17 MG/DL (ref 7–30)
BUN SERPL-MCNC: 18 MG/DL (ref 7–30)
BUN SERPL-MCNC: 20 MG/DL (ref 7–30)
BUN SERPL-MCNC: 21 MG/DL (ref 7–30)
BUN SERPL-MCNC: 22 MG/DL (ref 7–30)
BUN SERPL-MCNC: 22 MG/DL (ref 7–30)
BUN SERPL-MCNC: 23 MG/DL (ref 7–30)
BUN SERPL-MCNC: 25 MG/DL (ref 7–30)
BUN SERPL-MCNC: 28 MG/DL (ref 7–30)
BUN SERPL-MCNC: 35 MG/DL (ref 7–30)
CALCIUM SERPL-MCNC: 8.1 MG/DL (ref 8.5–10.1)
CALCIUM SERPL-MCNC: 8.3 MG/DL (ref 8.5–10.1)
CALCIUM SERPL-MCNC: 8.4 MG/DL (ref 8.5–10.1)
CALCIUM SERPL-MCNC: 8.6 MG/DL (ref 8.5–10.1)
CALCIUM SERPL-MCNC: 8.8 MG/DL (ref 8.5–10.1)
CALCIUM SERPL-MCNC: 8.9 MG/DL (ref 8.5–10.1)
CALCIUM SERPL-MCNC: 9.1 MG/DL (ref 8.5–10.1)
CALCIUM SERPL-MCNC: 9.1 MG/DL (ref 8.5–10.1)
CALCIUM SERPL-MCNC: 9.2 MG/DL (ref 8.5–10.1)
CALCIUM SERPL-MCNC: 9.3 MG/DL (ref 8.5–10.1)
CALCIUM SERPL-MCNC: 9.3 MG/DL (ref 8.5–10.1)
CALCIUM SERPL-MCNC: 9.4 MG/DL (ref 8.5–10.1)
CALCIUM SERPL-MCNC: 9.5 MG/DL (ref 8.5–10.1)
CALCIUM SERPL-MCNC: 9.6 MG/DL (ref 8.5–10.1)
CANCER AG125 SERPL-ACNC: 165 U/ML (ref 0–30)
CANCER AG125 SERPL-ACNC: 243 U/ML (ref 0–30)
CANCER AG125 SERPL-ACNC: 285 U/ML (ref 0–30)
CANCER AG125 SERPL-ACNC: 381 U/ML (ref 0–30)
CANCER AG125 SERPL-ACNC: 498 U/ML (ref 0–30)
CANCER AG125 SERPL-ACNC: 743 U/ML (ref 0–30)
CANCER AG125 SERPL-ACNC: 831 U/ML (ref 0–30)
CHLORIDE SERPL-SCNC: 100 MMOL/L (ref 94–109)
CHLORIDE SERPL-SCNC: 102 MMOL/L (ref 94–109)
CHLORIDE SERPL-SCNC: 102 MMOL/L (ref 94–109)
CHLORIDE SERPL-SCNC: 103 MMOL/L (ref 94–109)
CHLORIDE SERPL-SCNC: 104 MMOL/L (ref 94–109)
CHLORIDE SERPL-SCNC: 104 MMOL/L (ref 94–109)
CHLORIDE SERPL-SCNC: 105 MMOL/L (ref 94–109)
CHLORIDE SERPL-SCNC: 106 MMOL/L (ref 94–109)
CHLORIDE SERPL-SCNC: 106 MMOL/L (ref 94–109)
CHLORIDE SERPL-SCNC: 108 MMOL/L (ref 94–109)
CHLORIDE SERPL-SCNC: 110 MMOL/L (ref 94–109)
CHLORIDE SERPL-SCNC: 110 MMOL/L (ref 94–109)
CHLORIDE SERPL-SCNC: 111 MMOL/L (ref 94–109)
CHLORIDE SERPL-SCNC: 93 MMOL/L (ref 94–109)
CHLORIDE SERPL-SCNC: 95 MMOL/L (ref 94–109)
CHLORIDE SERPL-SCNC: 97 MMOL/L (ref 94–109)
CHLORIDE SERPL-SCNC: 98 MMOL/L (ref 94–109)
CHLORIDE SERPL-SCNC: 99 MMOL/L (ref 94–109)
CHLORIDE SERPL-SCNC: 99 MMOL/L (ref 94–109)
CO2 BLDCOV-SCNC: 26 MMOL/L (ref 21–28)
CO2 SERPL-SCNC: 22 MMOL/L (ref 20–32)
CO2 SERPL-SCNC: 22 MMOL/L (ref 20–32)
CO2 SERPL-SCNC: 24 MMOL/L (ref 20–32)
CO2 SERPL-SCNC: 26 MMOL/L (ref 20–32)
CO2 SERPL-SCNC: 27 MMOL/L (ref 20–32)
CO2 SERPL-SCNC: 28 MMOL/L (ref 20–32)
CO2 SERPL-SCNC: 29 MMOL/L (ref 20–32)
CO2 SERPL-SCNC: 30 MMOL/L (ref 20–32)
CO2 SERPL-SCNC: 30 MMOL/L (ref 20–32)
COLONOSCOPY: NORMAL
COLOR UR AUTO: ABNORMAL
COLOR UR AUTO: YELLOW
COPATH REPORT: NORMAL
COPATH REPORT: NORMAL
CREAT SERPL-MCNC: 0.59 MG/DL (ref 0.52–1.04)
CREAT SERPL-MCNC: 0.6 MG/DL (ref 0.52–1.04)
CREAT SERPL-MCNC: 0.6 MG/DL (ref 0.52–1.04)
CREAT SERPL-MCNC: 0.62 MG/DL (ref 0.52–1.04)
CREAT SERPL-MCNC: 0.64 MG/DL (ref 0.52–1.04)
CREAT SERPL-MCNC: 0.65 MG/DL (ref 0.52–1.04)
CREAT SERPL-MCNC: 0.67 MG/DL (ref 0.52–1.04)
CREAT SERPL-MCNC: 0.73 MG/DL (ref 0.52–1.04)
CREAT SERPL-MCNC: 0.74 MG/DL (ref 0.52–1.04)
CREAT SERPL-MCNC: 0.75 MG/DL (ref 0.52–1.04)
CREAT SERPL-MCNC: 0.75 MG/DL (ref 0.52–1.04)
CREAT SERPL-MCNC: 0.77 MG/DL (ref 0.52–1.04)
CREAT SERPL-MCNC: 0.78 MG/DL (ref 0.52–1.04)
CREAT SERPL-MCNC: 0.79 MG/DL (ref 0.52–1.04)
CREAT SERPL-MCNC: 0.81 MG/DL (ref 0.52–1.04)
CREAT SERPL-MCNC: 0.82 MG/DL (ref 0.52–1.04)
CREAT SERPL-MCNC: 0.82 MG/DL (ref 0.52–1.04)
CREAT SERPL-MCNC: 0.83 MG/DL (ref 0.52–1.04)
CREAT SERPL-MCNC: 0.84 MG/DL (ref 0.52–1.04)
CREAT SERPL-MCNC: 0.86 MG/DL (ref 0.52–1.04)
CREAT SERPL-MCNC: 0.92 MG/DL (ref 0.52–1.04)
CREAT SERPL-MCNC: 0.92 MG/DL (ref 0.52–1.04)
CREAT SERPL-MCNC: 0.94 MG/DL (ref 0.52–1.04)
CREAT SERPL-MCNC: 0.95 MG/DL (ref 0.52–1.04)
CREAT SERPL-MCNC: 0.96 MG/DL (ref 0.52–1.04)
CREAT SERPL-MCNC: 1.04 MG/DL (ref 0.52–1.04)
CREAT SERPL-MCNC: 1.08 MG/DL (ref 0.52–1.04)
CREAT UR-MCNC: 59 MG/DL
CRP SERPL-MCNC: 33 MG/L (ref 0–8)
DEPRECATED CALCIDIOL+CALCIFEROL SERPL-MC: 39 UG/L (ref 20–75)
DIFFERENTIAL METHOD BLD: ABNORMAL
EOSINOPHIL # BLD AUTO: 0 10E9/L (ref 0–0.7)
EOSINOPHIL # BLD AUTO: 0.1 10*3/UL
EOSINOPHIL # BLD AUTO: 0.1 10E9/L (ref 0–0.7)
EOSINOPHIL NFR BLD AUTO: 0 %
EOSINOPHIL NFR BLD AUTO: 0.1 %
EOSINOPHIL NFR BLD AUTO: 0.1 %
EOSINOPHIL NFR BLD AUTO: 0.2 %
EOSINOPHIL NFR BLD AUTO: 0.3 %
EOSINOPHIL NFR BLD AUTO: 0.4 %
EOSINOPHIL NFR BLD AUTO: 0.5 %
EOSINOPHIL NFR BLD AUTO: 0.7 %
EOSINOPHIL NFR BLD AUTO: 0.8 %
EOSINOPHIL NFR BLD AUTO: 0.8 %
EOSINOPHIL NFR BLD AUTO: 0.9 %
EOSINOPHIL NFR BLD AUTO: 1 %
EOSINOPHIL NFR BLD AUTO: 1.2 %
EOSINOPHIL NFR BLD AUTO: 1.2 %
EOSINOPHIL NFR BLD AUTO: 1.3 %
EOSINOPHIL NFR BLD AUTO: 1.3 %
EOSINOPHIL NFR BLD AUTO: 1.4 %
EOSINOPHIL NFR BLD AUTO: 1.5 %
EOSINOPHIL NFR BLD AUTO: ABNORMAL %
ERYTHROCYTE [DISTWIDTH] IN BLOOD BY AUTOMATED COUNT: 13.6 % (ref 10–15)
ERYTHROCYTE [DISTWIDTH] IN BLOOD BY AUTOMATED COUNT: 13.6 % (ref 10–15)
ERYTHROCYTE [DISTWIDTH] IN BLOOD BY AUTOMATED COUNT: 14 % (ref 10–15)
ERYTHROCYTE [DISTWIDTH] IN BLOOD BY AUTOMATED COUNT: 14.2 % (ref 10–15)
ERYTHROCYTE [DISTWIDTH] IN BLOOD BY AUTOMATED COUNT: 14.2 % (ref 10–15)
ERYTHROCYTE [DISTWIDTH] IN BLOOD BY AUTOMATED COUNT: 14.7 % (ref 10–15)
ERYTHROCYTE [DISTWIDTH] IN BLOOD BY AUTOMATED COUNT: 14.8 % (ref 10–15)
ERYTHROCYTE [DISTWIDTH] IN BLOOD BY AUTOMATED COUNT: 15 % (ref 10–15)
ERYTHROCYTE [DISTWIDTH] IN BLOOD BY AUTOMATED COUNT: 15.6 % (ref 10–15)
ERYTHROCYTE [DISTWIDTH] IN BLOOD BY AUTOMATED COUNT: 16 % (ref 10–15)
ERYTHROCYTE [DISTWIDTH] IN BLOOD BY AUTOMATED COUNT: 16 % (ref 11.7–14.7)
ERYTHROCYTE [DISTWIDTH] IN BLOOD BY AUTOMATED COUNT: 16.2 % (ref 10–15)
ERYTHROCYTE [DISTWIDTH] IN BLOOD BY AUTOMATED COUNT: 16.3 % (ref 10–15)
ERYTHROCYTE [DISTWIDTH] IN BLOOD BY AUTOMATED COUNT: 16.4 % (ref 10–15)
ERYTHROCYTE [DISTWIDTH] IN BLOOD BY AUTOMATED COUNT: 16.4 % (ref 10–15)
ERYTHROCYTE [DISTWIDTH] IN BLOOD BY AUTOMATED COUNT: 16.6 % (ref 10–15)
ERYTHROCYTE [DISTWIDTH] IN BLOOD BY AUTOMATED COUNT: 16.6 % (ref 10–15)
ERYTHROCYTE [DISTWIDTH] IN BLOOD BY AUTOMATED COUNT: 16.8 % (ref 10–15)
ERYTHROCYTE [DISTWIDTH] IN BLOOD BY AUTOMATED COUNT: 16.9 % (ref 10–15)
ERYTHROCYTE [DISTWIDTH] IN BLOOD BY AUTOMATED COUNT: 17 % (ref 10–15)
ERYTHROCYTE [DISTWIDTH] IN BLOOD BY AUTOMATED COUNT: 17.2 % (ref 10–15)
ERYTHROCYTE [DISTWIDTH] IN BLOOD BY AUTOMATED COUNT: 17.5 % (ref 10–15)
ERYTHROCYTE [DISTWIDTH] IN BLOOD BY AUTOMATED COUNT: 17.5 % (ref 10–15)
ERYTHROCYTE [DISTWIDTH] IN BLOOD BY AUTOMATED COUNT: 17.6 % (ref 10–15)
ERYTHROCYTE [DISTWIDTH] IN BLOOD BY AUTOMATED COUNT: 20.3 % (ref 10–15)
ERYTHROCYTE [DISTWIDTH] IN BLOOD BY AUTOMATED COUNT: ABNORMAL %
FERRITIN SERPL-MCNC: 16 NG/ML (ref 8–252)
GFR SERPL CREATININE-BSD FRML MDRD: 51 ML/MIN/1.7M2
GFR SERPL CREATININE-BSD FRML MDRD: 54 ML/MIN/1.7M2
GFR SERPL CREATININE-BSD FRML MDRD: 59 ML/MIN/1.7M2
GFR SERPL CREATININE-BSD FRML MDRD: 59 ML/MIN/1.7M2
GFR SERPL CREATININE-BSD FRML MDRD: 60 ML/MIN/1.7M2
GFR SERPL CREATININE-BSD FRML MDRD: 62 ML/MIN/1.7M2
GFR SERPL CREATININE-BSD FRML MDRD: 62 ML/MIN/1.7M2
GFR SERPL CREATININE-BSD FRML MDRD: 67 ML/MIN/1.7M2
GFR SERPL CREATININE-BSD FRML MDRD: 68 ML/MIN/1.7M2
GFR SERPL CREATININE-BSD FRML MDRD: 69 ML/MIN/1.7M2
GFR SERPL CREATININE-BSD FRML MDRD: 71 ML/MIN/1.7M2
GFR SERPL CREATININE-BSD FRML MDRD: 74 ML/MIN/1.7M2
GFR SERPL CREATININE-BSD FRML MDRD: 74 ML/MIN/1.7M2
GFR SERPL CREATININE-BSD FRML MDRD: 76 ML/MIN/1.7M2
GFR SERPL CREATININE-BSD FRML MDRD: 79 ML/MIN/1.7M2
GFR SERPL CREATININE-BSD FRML MDRD: 80 ML/MIN/1.7M2
GFR SERPL CREATININE-BSD FRML MDRD: 89 ML/MIN/1.7M2
GFR SERPL CREATININE-BSD FRML MDRD: ABNORMAL ML/MIN/1.7M2
GGT SERPL-CCNC: 14 U/L (ref 0–40)
GGT SERPL-CCNC: 16 U/L (ref 0–40)
GGT SERPL-CCNC: 17 U/L (ref 0–40)
GGT SERPL-CCNC: 18 U/L (ref 0–40)
GGT SERPL-CCNC: 19 U/L (ref 0–40)
GGT SERPL-CCNC: 25 U/L (ref 0–40)
GLUCOSE BLDC GLUCOMTR-MCNC: 104 MG/DL (ref 70–99)
GLUCOSE BLDC GLUCOMTR-MCNC: 127 MG/DL (ref 70–99)
GLUCOSE BLDC GLUCOMTR-MCNC: 71 MG/DL (ref 70–99)
GLUCOSE BLDC GLUCOMTR-MCNC: 87 MG/DL (ref 70–99)
GLUCOSE SERPL-MCNC: 102 MG/DL (ref 70–99)
GLUCOSE SERPL-MCNC: 104 MG/DL (ref 70–99)
GLUCOSE SERPL-MCNC: 104 MG/DL (ref 70–99)
GLUCOSE SERPL-MCNC: 105 MG/DL (ref 70–99)
GLUCOSE SERPL-MCNC: 109 MG/DL (ref 70–99)
GLUCOSE SERPL-MCNC: 109 MG/DL (ref 70–99)
GLUCOSE SERPL-MCNC: 110 MG/DL (ref 70–99)
GLUCOSE SERPL-MCNC: 112 MG/DL (ref 70–99)
GLUCOSE SERPL-MCNC: 113 MG/DL (ref 70–99)
GLUCOSE SERPL-MCNC: 115 MG/DL (ref 70–99)
GLUCOSE SERPL-MCNC: 119 MG/DL (ref 70–99)
GLUCOSE SERPL-MCNC: 120 MG/DL (ref 70–99)
GLUCOSE SERPL-MCNC: 123 MG/DL (ref 70–99)
GLUCOSE SERPL-MCNC: 67 MG/DL (ref 70–99)
GLUCOSE SERPL-MCNC: 68 MG/DL (ref 70–99)
GLUCOSE SERPL-MCNC: 76 MG/DL (ref 70–99)
GLUCOSE SERPL-MCNC: 76 MG/DL (ref 70–99)
GLUCOSE SERPL-MCNC: 88 MG/DL (ref 70–99)
GLUCOSE SERPL-MCNC: 89 MG/DL (ref 70–99)
GLUCOSE SERPL-MCNC: 93 MG/DL (ref 70–99)
GLUCOSE SERPL-MCNC: 94 MG/DL (ref 70–99)
GLUCOSE SERPL-MCNC: 95 MG/DL (ref 70–99)
GLUCOSE SERPL-MCNC: 96 MG/DL (ref 70–99)
GLUCOSE SERPL-MCNC: 99 MG/DL (ref 70–99)
GLUCOSE UR STRIP-MCNC: ABNORMAL MG/DL
GLUCOSE UR STRIP-MCNC: NEGATIVE MG/DL
HCT VFR BLD AUTO: 21.3 % (ref 35–47)
HCT VFR BLD AUTO: 22.2 % (ref 35–47)
HCT VFR BLD AUTO: 23.6 % (ref 35–47)
HCT VFR BLD AUTO: 23.6 % (ref 35–47)
HCT VFR BLD AUTO: 24.8 % (ref 35–47)
HCT VFR BLD AUTO: 25.7 % (ref 35–47)
HCT VFR BLD AUTO: 25.7 % (ref 35–47)
HCT VFR BLD AUTO: 26 %
HCT VFR BLD AUTO: 26.4 % (ref 35–47)
HCT VFR BLD AUTO: 26.9 % (ref 35–47)
HCT VFR BLD AUTO: 27 % (ref 35–47)
HCT VFR BLD AUTO: 27.5 % (ref 35–47)
HCT VFR BLD AUTO: 28 % (ref 35–47)
HCT VFR BLD AUTO: 28 % (ref 35–47)
HCT VFR BLD AUTO: 28.2 % (ref 35–47)
HCT VFR BLD AUTO: 28.2 % (ref 35–47)
HCT VFR BLD AUTO: 28.5 % (ref 35–47)
HCT VFR BLD AUTO: 29 % (ref 35–47)
HCT VFR BLD AUTO: 29.1 % (ref 35–47)
HCT VFR BLD AUTO: 29.6 % (ref 35–47)
HCT VFR BLD AUTO: 30 % (ref 35–47)
HCT VFR BLD AUTO: 30.7 % (ref 35–47)
HCT VFR BLD AUTO: 31 % (ref 35–47)
HCT VFR BLD AUTO: 31.7 % (ref 35–47)
HCT VFR BLD AUTO: 34.4 % (ref 35–47)
HCT VFR BLD AUTO: 34.6 % (ref 35–47)
HCT VFR BLD AUTO: 34.7 %
HCT VFR BLD AUTO: 34.7 % (ref 35–47)
HCT VFR BLD AUTO: 34.8 % (ref 35–47)
HCT VFR BLD AUTO: 36.5 % (ref 35–47)
HEMOGLOBIN: 11.3 G/DL (ref 11.7–15.7)
HEMOGLOBIN: 8.5 G/DL (ref 11.7–15.7)
HGB BLD-MCNC: 10.3 G/DL (ref 11.7–15.7)
HGB BLD-MCNC: 10.3 G/DL (ref 11.7–15.7)
HGB BLD-MCNC: 11.1 G/DL (ref 11.7–15.7)
HGB BLD-MCNC: 11.4 G/DL (ref 11.7–15.7)
HGB BLD-MCNC: 11.4 G/DL (ref 11.7–15.7)
HGB BLD-MCNC: 11.5 G/DL (ref 11.7–15.7)
HGB BLD-MCNC: 11.6 G/DL (ref 11.7–15.7)
HGB BLD-MCNC: 7.2 G/DL (ref 11.7–15.7)
HGB BLD-MCNC: 7.4 G/DL (ref 11.7–15.7)
HGB BLD-MCNC: 7.6 G/DL (ref 11.7–15.7)
HGB BLD-MCNC: 7.7 G/DL (ref 11.7–15.7)
HGB BLD-MCNC: 8.2 G/DL (ref 11.7–15.7)
HGB BLD-MCNC: 8.2 G/DL (ref 11.7–15.7)
HGB BLD-MCNC: 8.3 G/DL (ref 11.7–15.7)
HGB BLD-MCNC: 8.3 G/DL (ref 11.7–15.7)
HGB BLD-MCNC: 8.4 G/DL (ref 11.7–15.7)
HGB BLD-MCNC: 8.6 G/DL (ref 11.7–15.7)
HGB BLD-MCNC: 9 G/DL (ref 11.7–15.7)
HGB BLD-MCNC: 9.1 G/DL (ref 11.7–15.7)
HGB BLD-MCNC: 9.5 G/DL (ref 11.7–15.7)
HGB BLD-MCNC: 9.8 G/DL (ref 11.7–15.7)
HGB BLD-MCNC: 9.8 G/DL (ref 11.7–15.7)
HGB BLD-MCNC: 9.9 G/DL (ref 11.7–15.7)
HGB UR QL STRIP: ABNORMAL
IMM GRANULOCYTES # BLD: 0 10E9/L (ref 0–0.4)
IMM GRANULOCYTES # BLD: 0.1 10E9/L (ref 0–0.4)
IMM GRANULOCYTES NFR BLD: 0.1 %
IMM GRANULOCYTES NFR BLD: 0.2 %
IMM GRANULOCYTES NFR BLD: 0.3 %
IMM GRANULOCYTES NFR BLD: 0.4 %
IMM GRANULOCYTES NFR BLD: 0.6 %
IMM GRANULOCYTES NFR BLD: 0.7 %
INR PPP: 0.87 (ref 0.86–1.14)
INR PPP: 0.9 (ref 0.86–1.14)
INR PPP: 0.98 (ref 0.86–1.14)
INR PPP: 0.98 (ref 0.86–1.14)
INR PPP: 0.99 (ref 0.86–1.14)
INR PPP: 1.02 (ref 0.86–1.14)
INR PPP: 1.12 (ref 0.86–1.14)
INR PPP: 1.14 (ref 0.86–1.14)
INTERPRETATION ECG - MUSE: NORMAL
IRON SATN MFR SERPL: 17 % (ref 15–46)
IRON SERPL-MCNC: 58 UG/DL (ref 35–180)
KETONES UR STRIP-MCNC: 20 MG/DL
KETONES UR STRIP-MCNC: 40 MG/DL
KETONES UR STRIP-MCNC: 40 MG/DL
KETONES UR STRIP-MCNC: ABNORMAL MG/DL
KETONES UR STRIP-MCNC: NEGATIVE MG/DL
LACTATE BLD-SCNC: 0.6 MMOL/L (ref 0.7–2.1)
LACTATE BLD-SCNC: 0.6 MMOL/L (ref 0.7–2.1)
LACTATE BLD-SCNC: 0.7 MMOL/L (ref 0.7–2.1)
LACTATE BLD-SCNC: 1.2 MMOL/L (ref 0.7–2.1)
LACTATE BLD-SCNC: 1.2 MMOL/L (ref 0.7–2.1)
LDH SERPL L TO P-CCNC: 168 U/L (ref 81–234)
LDH SERPL L TO P-CCNC: 190 U/L (ref 81–234)
LDH SERPL L TO P-CCNC: 215 U/L (ref 81–234)
LDH SERPL L TO P-CCNC: 223 U/L (ref 81–234)
LDH SERPL L TO P-CCNC: 224 U/L (ref 81–234)
LDH SERPL L TO P-CCNC: 233 U/L (ref 81–234)
LEUKOCYTE ESTERASE UR QL STRIP: ABNORMAL
LIPASE SERPL-CCNC: 105 U/L (ref 73–393)
LIPASE SERPL-CCNC: 148 U/L (ref 73–393)
LIPASE SERPL-CCNC: 80 U/L (ref 73–393)
LYMPHOCYTES # BLD AUTO: 0.4 10E9/L (ref 0.8–5.3)
LYMPHOCYTES # BLD AUTO: 0.6 10E9/L (ref 0.8–5.3)
LYMPHOCYTES # BLD AUTO: 0.7 10E9/L (ref 0.8–5.3)
LYMPHOCYTES # BLD AUTO: 0.8 10*3/UL
LYMPHOCYTES # BLD AUTO: 0.8 10E9/L (ref 0.8–5.3)
LYMPHOCYTES # BLD AUTO: 0.9 10E9/L (ref 0.8–5.3)
LYMPHOCYTES # BLD AUTO: 1 10E9/L (ref 0.8–5.3)
LYMPHOCYTES # BLD AUTO: 1.1 10E9/L (ref 0.8–5.3)
LYMPHOCYTES # BLD AUTO: 1.2 10E9/L (ref 0.8–5.3)
LYMPHOCYTES NFR BLD AUTO: 10.3 %
LYMPHOCYTES NFR BLD AUTO: 10.8 %
LYMPHOCYTES NFR BLD AUTO: 11.8 %
LYMPHOCYTES NFR BLD AUTO: 11.9 %
LYMPHOCYTES NFR BLD AUTO: 12.7 %
LYMPHOCYTES NFR BLD AUTO: 12.8 %
LYMPHOCYTES NFR BLD AUTO: 12.9 %
LYMPHOCYTES NFR BLD AUTO: 13.5 %
LYMPHOCYTES NFR BLD AUTO: 13.7 %
LYMPHOCYTES NFR BLD AUTO: 14 %
LYMPHOCYTES NFR BLD AUTO: 14.7 %
LYMPHOCYTES NFR BLD AUTO: 15.1 %
LYMPHOCYTES NFR BLD AUTO: 15.3 %
LYMPHOCYTES NFR BLD AUTO: 15.8 %
LYMPHOCYTES NFR BLD AUTO: 17.3 %
LYMPHOCYTES NFR BLD AUTO: 17.6 %
LYMPHOCYTES NFR BLD AUTO: 20.1 %
LYMPHOCYTES NFR BLD AUTO: 27.8 %
LYMPHOCYTES NFR BLD AUTO: 4.7 %
LYMPHOCYTES NFR BLD AUTO: 6.5 %
LYMPHOCYTES NFR BLD AUTO: 7.4 %
LYMPHOCYTES NFR BLD AUTO: 8.2 %
LYMPHOCYTES NFR BLD AUTO: 8.2 %
LYMPHOCYTES NFR BLD AUTO: 8.3 %
LYMPHOCYTES NFR BLD AUTO: ABNORMAL %
Lab: ABNORMAL
Lab: ABNORMAL
Lab: NORMAL
MAGNESIUM SERPL-MCNC: 1.5 MG/DL (ref 1.6–2.3)
MAGNESIUM SERPL-MCNC: 1.6 MG/DL (ref 1.6–2.3)
MAGNESIUM SERPL-MCNC: 1.7 MG/DL (ref 1.6–2.3)
MAGNESIUM SERPL-MCNC: 1.8 MG/DL (ref 1.6–2.3)
MAGNESIUM SERPL-MCNC: 1.9 MG/DL (ref 1.6–2.3)
MAGNESIUM SERPL-MCNC: 2 MG/DL (ref 1.6–2.3)
MAGNESIUM SERPL-MCNC: 2.1 MG/DL (ref 1.6–2.3)
MAGNESIUM SERPL-MCNC: 2.2 MG/DL (ref 1.6–2.3)
MCH RBC QN AUTO: 28.5 PG (ref 26.5–33)
MCH RBC QN AUTO: 29.2 PG (ref 26.5–33)
MCH RBC QN AUTO: 29.3 PG (ref 26.5–33)
MCH RBC QN AUTO: 29.6 PG (ref 26.5–33)
MCH RBC QN AUTO: 29.6 PG (ref 26.5–33)
MCH RBC QN AUTO: 29.7 PG (ref 26.5–33)
MCH RBC QN AUTO: 29.7 PG (ref 26.5–33)
MCH RBC QN AUTO: 30.4 PG (ref 26.5–33)
MCH RBC QN AUTO: 30.5 PG (ref 26.5–33)
MCH RBC QN AUTO: 30.5 PG (ref 26.5–33)
MCH RBC QN AUTO: 30.7 PG (ref 26.5–33)
MCH RBC QN AUTO: 32.1 PG (ref 26.5–33)
MCH RBC QN AUTO: 32.2 PG (ref 26.5–33)
MCH RBC QN AUTO: 32.2 PG (ref 26.5–33)
MCH RBC QN AUTO: 32.6 PG (ref 26.5–33)
MCH RBC QN AUTO: 32.9 PG (ref 26.5–33)
MCH RBC QN AUTO: 33 PG (ref 26.5–33)
MCH RBC QN AUTO: 33 PG (ref 26.5–33)
MCH RBC QN AUTO: 33.3 PG
MCH RBC QN AUTO: 33.6 PG (ref 26.5–33)
MCH RBC QN AUTO: 33.6 PG (ref 26.5–33)
MCH RBC QN AUTO: 33.9 PG (ref 26.5–33)
MCH RBC QN AUTO: 34 PG (ref 26.5–33)
MCH RBC QN AUTO: 34.4 PG (ref 26.5–33)
MCH RBC QN AUTO: 34.6 PG (ref 26.5–33)
MCH RBC QN AUTO: 34.6 PG (ref 26.5–33)
MCH RBC QN AUTO: 35 PG (ref 26.5–33)
MCH RBC QN AUTO: 35.5 PG (ref 26.5–33)
MCH RBC QN AUTO: 35.6 PG (ref 26.5–33)
MCH RBC QN AUTO: ABNORMAL PG
MCHC RBC AUTO-ENTMCNC: 25.9 G/DL (ref 31.5–36.5)
MCHC RBC AUTO-ENTMCNC: 30.6 G/DL (ref 31.5–36.5)
MCHC RBC AUTO-ENTMCNC: 31.6 G/DL (ref 31.5–36.5)
MCHC RBC AUTO-ENTMCNC: 31.8 G/DL (ref 31.5–36.5)
MCHC RBC AUTO-ENTMCNC: 31.9 G/DL (ref 31.5–36.5)
MCHC RBC AUTO-ENTMCNC: 32.1 G/DL (ref 31.5–36.5)
MCHC RBC AUTO-ENTMCNC: 32.2 G/DL (ref 31.5–36.5)
MCHC RBC AUTO-ENTMCNC: 32.3 G/DL (ref 31.5–36.5)
MCHC RBC AUTO-ENTMCNC: 32.6 G/DL
MCHC RBC AUTO-ENTMCNC: 32.6 G/DL (ref 31.5–36.5)
MCHC RBC AUTO-ENTMCNC: 32.6 G/DL (ref 31.5–36.5)
MCHC RBC AUTO-ENTMCNC: 32.7 G/DL (ref 31.5–36.5)
MCHC RBC AUTO-ENTMCNC: 32.8 G/DL (ref 31.5–36.5)
MCHC RBC AUTO-ENTMCNC: 32.9 G/DL (ref 31.5–36.5)
MCHC RBC AUTO-ENTMCNC: 33.1 G/DL (ref 31.5–36.5)
MCHC RBC AUTO-ENTMCNC: 33.1 G/DL (ref 31.5–36.5)
MCHC RBC AUTO-ENTMCNC: 33.3 G/DL (ref 31.5–36.5)
MCHC RBC AUTO-ENTMCNC: 33.4 G/DL (ref 31.5–36.5)
MCHC RBC AUTO-ENTMCNC: 33.4 G/DL (ref 31.5–36.5)
MCHC RBC AUTO-ENTMCNC: 33.5 G/DL (ref 31.5–36.5)
MCHC RBC AUTO-ENTMCNC: 33.6 G/DL (ref 31.5–36.5)
MCHC RBC AUTO-ENTMCNC: 33.7 G/DL (ref 31.5–36.5)
MCHC RBC AUTO-ENTMCNC: 33.8 G/DL (ref 31.5–36.5)
MCHC RBC AUTO-ENTMCNC: 33.8 G/DL (ref 31.5–36.5)
MCHC RBC AUTO-ENTMCNC: 34.3 G/DL (ref 31.5–36.5)
MCHC RBC AUTO-ENTMCNC: ABNORMAL G/DL
MCV RBC AUTO: 100 FL (ref 78–100)
MCV RBC AUTO: 100 FL (ref 78–100)
MCV RBC AUTO: 101 FL (ref 78–100)
MCV RBC AUTO: 101 FL (ref 78–100)
MCV RBC AUTO: 102 FL (ref 78–100)
MCV RBC AUTO: 102.4 FL (ref 80.2–98.8)
MCV RBC AUTO: 103 FL (ref 78–100)
MCV RBC AUTO: 103 FL (ref 78–100)
MCV RBC AUTO: 104 FL (ref 78–100)
MCV RBC AUTO: 105 FL (ref 78–100)
MCV RBC AUTO: 105 FL (ref 78–100)
MCV RBC AUTO: 106 FL (ref 78–100)
MCV RBC AUTO: 106 FL (ref 78–100)
MCV RBC AUTO: 107 FL (ref 78–100)
MCV RBC AUTO: 118 FL (ref 78–100)
MCV RBC AUTO: 90 FL (ref 78–100)
MCV RBC AUTO: 91 FL (ref 78–100)
MCV RBC AUTO: 92 FL (ref 78–100)
MCV RBC AUTO: 93 FL (ref 78–100)
MCV RBC AUTO: 93 FL (ref 78–100)
MCV RBC AUTO: 94 FL (ref 78–100)
MCV RBC AUTO: 95 FL (ref 78–100)
MCV RBC AUTO: 96 FL (ref 78–100)
MCV RBC AUTO: 97 FL (ref 78–100)
MCV RBC AUTO: 97 FL (ref 78–100)
MCV RBC AUTO: ABNORMAL FL
MICRO REPORT STATUS: ABNORMAL
MICRO REPORT STATUS: NORMAL
MICROORGANISM SPEC CULT: ABNORMAL
MONOCYTES # BLD AUTO: 0.2 10E9/L (ref 0–1.3)
MONOCYTES # BLD AUTO: 0.2 10E9/L (ref 0–1.3)
MONOCYTES # BLD AUTO: 0.3 10E9/L (ref 0–1.3)
MONOCYTES # BLD AUTO: 0.4 10E9/L (ref 0–1.3)
MONOCYTES # BLD AUTO: 0.5 10E9/L (ref 0–1.3)
MONOCYTES # BLD AUTO: 0.6 10*3/UL
MONOCYTES # BLD AUTO: 0.6 10E9/L (ref 0–1.3)
MONOCYTES # BLD AUTO: 0.6 10E9/L (ref 0–1.3)
MONOCYTES # BLD AUTO: 0.7 10E9/L (ref 0–1.3)
MONOCYTES # BLD AUTO: 0.8 10E9/L (ref 0–1.3)
MONOCYTES # BLD AUTO: 0.8 10E9/L (ref 0–1.3)
MONOCYTES # BLD AUTO: 0.9 10E9/L (ref 0–1.3)
MONOCYTES # BLD AUTO: 1.1 10E9/L (ref 0–1.3)
MONOCYTES # BLD AUTO: 1.1 10E9/L (ref 0–1.3)
MONOCYTES # BLD AUTO: 1.2 10E9/L (ref 0–1.3)
MONOCYTES NFR BLD AUTO: 10 %
MONOCYTES NFR BLD AUTO: 10.1 %
MONOCYTES NFR BLD AUTO: 10.1 %
MONOCYTES NFR BLD AUTO: 11 %
MONOCYTES NFR BLD AUTO: 11.3 %
MONOCYTES NFR BLD AUTO: 11.6 %
MONOCYTES NFR BLD AUTO: 11.6 %
MONOCYTES NFR BLD AUTO: 11.8 %
MONOCYTES NFR BLD AUTO: 12.2 %
MONOCYTES NFR BLD AUTO: 12.8 %
MONOCYTES NFR BLD AUTO: 13.2 %
MONOCYTES NFR BLD AUTO: 16.8 %
MONOCYTES NFR BLD AUTO: 2.7 %
MONOCYTES NFR BLD AUTO: 21 %
MONOCYTES NFR BLD AUTO: 3.8 %
MONOCYTES NFR BLD AUTO: 4.4 %
MONOCYTES NFR BLD AUTO: 6.7 %
MONOCYTES NFR BLD AUTO: 7.3 %
MONOCYTES NFR BLD AUTO: 8.2 %
MONOCYTES NFR BLD AUTO: 8.3 %
MONOCYTES NFR BLD AUTO: 8.4 %
MONOCYTES NFR BLD AUTO: 8.8 %
MONOCYTES NFR BLD AUTO: 8.9 %
MONOCYTES NFR BLD AUTO: 9.2 %
MONOCYTES NFR BLD AUTO: ABNORMAL %
MUCOUS THREADS #/AREA URNS LPF: PRESENT /LPF
NEUTROPHILS # BLD AUTO: 1.4 10E9/L (ref 1.6–8.3)
NEUTROPHILS # BLD AUTO: 10.5 10E9/L (ref 1.6–8.3)
NEUTROPHILS # BLD AUTO: 11.1 10E9/L (ref 1.6–8.3)
NEUTROPHILS # BLD AUTO: 2.9 10E9/L (ref 1.6–8.3)
NEUTROPHILS # BLD AUTO: 3 10E9/L (ref 1.6–8.3)
NEUTROPHILS # BLD AUTO: 3.1 10*3/UL
NEUTROPHILS # BLD AUTO: 3.3 10E9/L (ref 1.6–8.3)
NEUTROPHILS # BLD AUTO: 3.5 10*3/UL
NEUTROPHILS # BLD AUTO: 3.6 10E9/L (ref 1.6–8.3)
NEUTROPHILS # BLD AUTO: 3.9 10E9/L (ref 1.6–8.3)
NEUTROPHILS # BLD AUTO: 4 10E9/L (ref 1.6–8.3)
NEUTROPHILS # BLD AUTO: 4.1 10E9/L (ref 1.6–8.3)
NEUTROPHILS # BLD AUTO: 4.1 10E9/L (ref 1.6–8.3)
NEUTROPHILS # BLD AUTO: 4.2 10E9/L (ref 1.6–8.3)
NEUTROPHILS # BLD AUTO: 4.3 10E9/L (ref 1.6–8.3)
NEUTROPHILS # BLD AUTO: 4.8 10E9/L (ref 1.6–8.3)
NEUTROPHILS # BLD AUTO: 4.9 10E9/L (ref 1.6–8.3)
NEUTROPHILS # BLD AUTO: 5.1 10E9/L (ref 1.6–8.3)
NEUTROPHILS # BLD AUTO: 5.1 10E9/L (ref 1.6–8.3)
NEUTROPHILS # BLD AUTO: 5.6 10E9/L (ref 1.6–8.3)
NEUTROPHILS # BLD AUTO: 5.9 10E9/L (ref 1.6–8.3)
NEUTROPHILS # BLD AUTO: 6.2 10E9/L (ref 1.6–8.3)
NEUTROPHILS # BLD AUTO: 6.4 10E9/L (ref 1.6–8.3)
NEUTROPHILS # BLD AUTO: 6.5 10E9/L (ref 1.6–8.3)
NEUTROPHILS # BLD AUTO: 9.5 10E9/L (ref 1.6–8.3)
NEUTROPHILS NFR BLD AUTO: 48.1 %
NEUTROPHILS NFR BLD AUTO: 65.9 %
NEUTROPHILS NFR BLD AUTO: 67.4 %
NEUTROPHILS NFR BLD AUTO: 67.7 %
NEUTROPHILS NFR BLD AUTO: 70.5 %
NEUTROPHILS NFR BLD AUTO: 71.3 %
NEUTROPHILS NFR BLD AUTO: 73.8 %
NEUTROPHILS NFR BLD AUTO: 74.2 %
NEUTROPHILS NFR BLD AUTO: 74.3 %
NEUTROPHILS NFR BLD AUTO: 74.7 %
NEUTROPHILS NFR BLD AUTO: 75.6 %
NEUTROPHILS NFR BLD AUTO: 75.6 %
NEUTROPHILS NFR BLD AUTO: 76.8 %
NEUTROPHILS NFR BLD AUTO: 77 %
NEUTROPHILS NFR BLD AUTO: 77.3 %
NEUTROPHILS NFR BLD AUTO: 77.4 %
NEUTROPHILS NFR BLD AUTO: 78.2 %
NEUTROPHILS NFR BLD AUTO: 79.5 %
NEUTROPHILS NFR BLD AUTO: 82.9 %
NEUTROPHILS NFR BLD AUTO: 83.7 %
NEUTROPHILS NFR BLD AUTO: 85 %
NEUTROPHILS NFR BLD AUTO: 86.4 %
NEUTROPHILS NFR BLD AUTO: 86.6 %
NEUTROPHILS NFR BLD AUTO: 88.6 %
NEUTROPHILS NFR BLD AUTO: ABNORMAL %
NITRATE UR QL: ABNORMAL
NITRATE UR QL: NEGATIVE
NON-SQ EPI CELLS #/AREA URNS LPF: ABNORMAL /LPF
NRBC # BLD AUTO: 0 10*3/UL
NRBC BLD AUTO-RTO: 0 /100
NUM BPU REQUESTED: 1
NUM BPU REQUESTED: 1
PCO2 BLDV: 42 MM HG (ref 40–50)
PH BLDV: 7.4 PH (ref 7.32–7.43)
PH UR STRIP: 5 PH (ref 5–7)
PH UR STRIP: 5 PH (ref 5–7)
PH UR STRIP: 6 PH (ref 5–7)
PH UR STRIP: 6 PH (ref 5–7)
PH UR STRIP: 6.5 PH (ref 5–7)
PH UR STRIP: 6.5 PH (ref 5–7)
PH UR STRIP: 7 PH (ref 5–7)
PH UR STRIP: 7.5 PH (ref 5–7)
PH UR STRIP: ABNORMAL PH (ref 5–7)
PHOSPHATE SERPL-MCNC: 3.5 MG/DL (ref 2.5–4.5)
PHOSPHATE SERPL-MCNC: 3.8 MG/DL (ref 2.5–4.5)
PLATELET # BLD AUTO: 126 10E9/L (ref 150–450)
PLATELET # BLD AUTO: 167 10E9/L (ref 150–450)
PLATELET # BLD AUTO: 177 10E9/L (ref 150–450)
PLATELET # BLD AUTO: 179 10E9/L (ref 150–450)
PLATELET # BLD AUTO: 207 10E9/L (ref 150–450)
PLATELET # BLD AUTO: 216 10E9/L (ref 150–450)
PLATELET # BLD AUTO: 239 10E9/L (ref 150–450)
PLATELET # BLD AUTO: 264 10E9/L (ref 150–450)
PLATELET # BLD AUTO: 268 10E9/L (ref 150–450)
PLATELET # BLD AUTO: 272 10E9/L (ref 150–450)
PLATELET # BLD AUTO: 276 10E9/L (ref 150–450)
PLATELET # BLD AUTO: 284 10E9/L (ref 150–450)
PLATELET # BLD AUTO: 287 10E9/L (ref 150–450)
PLATELET # BLD AUTO: 303 10E9/L (ref 150–450)
PLATELET # BLD AUTO: 314 10E9/L (ref 150–450)
PLATELET # BLD AUTO: 328 10E9/L (ref 150–450)
PLATELET # BLD AUTO: 339 10E9/L (ref 150–450)
PLATELET # BLD AUTO: 345 10E9/L (ref 150–450)
PLATELET # BLD AUTO: 350 10E9/L (ref 150–450)
PLATELET # BLD AUTO: 415 10E9/L (ref 150–450)
PLATELET # BLD AUTO: 419 10E9/L (ref 150–450)
PLATELET # BLD AUTO: 425 10E9/L (ref 150–450)
PLATELET # BLD AUTO: 448 10E9/L (ref 150–450)
PLATELET # BLD AUTO: 449 10E9/L (ref 150–450)
PLATELET # BLD AUTO: 464 10E9/L (ref 150–450)
PLATELET # BLD AUTO: 469 10E9/L (ref 150–450)
PLATELET # BLD AUTO: 471 10E9/L (ref 150–450)
PLATELET # BLD AUTO: 556 10E9/L (ref 150–450)
PLATELET COUNT - QUEST: 286 10^9/L (ref 150–450)
PLATELET COUNT - QUEST: 416 10^9/L (ref 150–450)
PMV BLD: ABNORMAL FL
PO2 BLDV: 34 MM HG (ref 25–47)
POTASSIUM SERPL-SCNC: 2.9 MMOL/L (ref 3.4–5.3)
POTASSIUM SERPL-SCNC: 2.9 MMOL/L (ref 3.4–5.3)
POTASSIUM SERPL-SCNC: 3 MMOL/L (ref 3.4–5.3)
POTASSIUM SERPL-SCNC: 3.2 MMOL/L (ref 3.4–5.3)
POTASSIUM SERPL-SCNC: 3.2 MMOL/L (ref 3.4–5.3)
POTASSIUM SERPL-SCNC: 3.3 MMOL/L (ref 3.4–5.3)
POTASSIUM SERPL-SCNC: 3.4 MMOL/L (ref 3.4–5.3)
POTASSIUM SERPL-SCNC: 3.5 MMOL/L (ref 3.4–5.3)
POTASSIUM SERPL-SCNC: 3.6 MMOL/L (ref 3.4–5.3)
POTASSIUM SERPL-SCNC: 3.7 MMOL/L (ref 3.4–5.3)
POTASSIUM SERPL-SCNC: 3.8 MMOL/L (ref 3.4–5.3)
POTASSIUM SERPL-SCNC: 3.8 MMOL/L (ref 3.4–5.3)
POTASSIUM SERPL-SCNC: 3.9 MMOL/L (ref 3.4–5.3)
POTASSIUM SERPL-SCNC: 3.9 MMOL/L (ref 3.4–5.3)
POTASSIUM SERPL-SCNC: 4 MMOL/L (ref 3.4–5.3)
POTASSIUM SERPL-SCNC: 4.2 MMOL/L (ref 3.4–5.3)
PROCALCITONIN SERPL-MCNC: 0.26 NG/ML
PROT SERPL-MCNC: 6.2 G/DL (ref 6.8–8.8)
PROT SERPL-MCNC: 6.6 G/DL (ref 6.8–8.8)
PROT SERPL-MCNC: 6.6 G/DL (ref 6.8–8.8)
PROT SERPL-MCNC: 6.7 G/DL (ref 6.8–8.8)
PROT SERPL-MCNC: 6.8 G/DL (ref 6.8–8.8)
PROT SERPL-MCNC: 6.9 G/DL (ref 6.8–8.8)
PROT SERPL-MCNC: 7 G/DL (ref 6.8–8.8)
PROT SERPL-MCNC: 7 G/DL (ref 6.8–8.8)
PROT SERPL-MCNC: 7.1 G/DL (ref 6.8–8.8)
PROT SERPL-MCNC: 7.2 G/DL (ref 6.8–8.8)
PROT SERPL-MCNC: 7.3 G/DL (ref 6.8–8.8)
PROT UR-MCNC: 0.52 G/L
PROT/CREAT 24H UR: 0.88 G/G CR (ref 0–0.2)
PTH-INTACT SERPL-MCNC: 22 PG/ML (ref 12–72)
RBC # BLD AUTO: 2.08 10E12/L (ref 3.8–5.2)
RBC # BLD AUTO: 2.08 10E12/L (ref 3.8–5.2)
RBC # BLD AUTO: 2.36 10E12/L (ref 3.8–5.2)
RBC # BLD AUTO: 2.55 10E12/L (ref 3.8–5.2)
RBC # BLD AUTO: 2.55 10E12/L (ref 3.8–5.2)
RBC # BLD AUTO: 2.56 10E12/L (ref 3.8–5.2)
RBC # BLD AUTO: 2.62 10E12/L (ref 3.8–5.2)
RBC # BLD AUTO: 2.69 10E12/L (ref 3.8–5.2)
RBC # BLD AUTO: 2.7 10E12/L (ref 3.8–5.2)
RBC # BLD AUTO: 2.76 10E12/L (ref 3.8–5.2)
RBC # BLD AUTO: 2.82 10E12/L (ref 3.8–5.2)
RBC # BLD AUTO: 2.83 10E12/L (ref 3.8–5.2)
RBC # BLD AUTO: 2.83 10E12/L (ref 3.8–5.2)
RBC # BLD AUTO: 2.92 10E12/L (ref 3.8–5.2)
RBC # BLD AUTO: 2.98 10E12/L (ref 3.8–5.2)
RBC # BLD AUTO: 3.03 10E12/L (ref 3.8–5.2)
RBC # BLD AUTO: 3.04 10E12/L (ref 3.8–5.2)
RBC # BLD AUTO: 3.07 10E12/L (ref 3.8–5.2)
RBC # BLD AUTO: 3.07 10E12/L (ref 3.8–5.2)
RBC # BLD AUTO: 3.08 10E12/L (ref 3.8–5.2)
RBC # BLD AUTO: 3.12 10E12/L (ref 3.8–5.2)
RBC # BLD AUTO: 3.12 10E12/L (ref 3.8–5.2)
RBC # BLD AUTO: 3.27 10E12/L (ref 3.8–5.2)
RBC # BLD AUTO: 3.29 10E12/L (ref 3.8–5.2)
RBC # BLD AUTO: 3.33 10E12/L (ref 3.8–5.2)
RBC # BLD AUTO: 3.35 10E12/L (ref 3.8–5.2)
RBC # BLD AUTO: 3.39 10^12/L (ref 3.75–5.13)
RBC # BLD AUTO: 3.45 10E12/L (ref 3.8–5.2)
RBC # BLD AUTO: 3.45 10E12/L (ref 3.8–5.2)
RBC # BLD AUTO: ABNORMAL 10^12/L
RBC #/AREA URNS AUTO: 13 /HPF (ref 0–2)
RBC #/AREA URNS AUTO: 152 /HPF (ref 0–2)
RBC #/AREA URNS AUTO: 175 /HPF (ref 0–2)
RBC #/AREA URNS AUTO: 19 /HPF (ref 0–2)
RBC #/AREA URNS AUTO: 27 /HPF (ref 0–2)
RBC #/AREA URNS AUTO: 62 /HPF (ref 0–2)
RBC #/AREA URNS AUTO: 64 /HPF (ref 0–2)
RBC #/AREA URNS AUTO: 7 /HPF (ref 0–2)
RBC #/AREA URNS AUTO: 72 /HPF (ref 0–2)
RBC #/AREA URNS AUTO: 72 /HPF (ref 0–2)
RBC #/AREA URNS AUTO: >100 /HPF (ref 0–2)
RBC #/AREA URNS AUTO: >182 /HPF (ref 0–2)
SAO2 % BLDV FROM PO2: 65 %
SODIUM SERPL-SCNC: 131 MMOL/L (ref 133–144)
SODIUM SERPL-SCNC: 132 MMOL/L (ref 133–144)
SODIUM SERPL-SCNC: 133 MMOL/L (ref 133–144)
SODIUM SERPL-SCNC: 134 MMOL/L (ref 133–144)
SODIUM SERPL-SCNC: 135 MMOL/L (ref 133–144)
SODIUM SERPL-SCNC: 135 MMOL/L (ref 133–144)
SODIUM SERPL-SCNC: 136 MMOL/L (ref 133–144)
SODIUM SERPL-SCNC: 137 MMOL/L (ref 133–144)
SODIUM SERPL-SCNC: 138 MMOL/L (ref 133–144)
SODIUM SERPL-SCNC: 139 MMOL/L (ref 133–144)
SODIUM SERPL-SCNC: 140 MMOL/L (ref 133–144)
SODIUM SERPL-SCNC: 141 MMOL/L (ref 133–144)
SODIUM SERPL-SCNC: 142 MMOL/L (ref 133–144)
SODIUM SERPL-SCNC: 143 MMOL/L (ref 133–144)
SODIUM SERPL-SCNC: 144 MMOL/L (ref 133–144)
SODIUM SERPL-SCNC: 144 MMOL/L (ref 133–144)
SP GR UR STRIP: 1.01 (ref 1–1.03)
SP GR UR STRIP: 1.02 (ref 1–1.03)
SP GR UR STRIP: ABNORMAL (ref 1–1.03)
SPECIMEN EXP DATE BLD: NORMAL
SPECIMEN SOURCE: ABNORMAL
SPECIMEN SOURCE: NORMAL
SQUAMOUS #/AREA URNS AUTO: <1 /HPF (ref 0–1)
TIBC SERPL-MCNC: 348 UG/DL (ref 240–430)
TRANS CELLS #/AREA URNS HPF: <1 /HPF (ref 0–1)
TRANS CELLS #/AREA URNS HPF: ABNORMAL /HPF
TRANSFUSION STATUS PATIENT QL: NORMAL
TSH SERPL DL<=0.005 MIU/L-ACNC: 0.48 MU/L (ref 0.4–4)
UPPER GI ENDOSCOPY: NORMAL
URN SPEC COLLECT METH UR: ABNORMAL
UROBILINOGEN UR STRIP-MCNC: 0 MG/DL (ref 0–2)
UROBILINOGEN UR STRIP-MCNC: ABNORMAL MG/DL (ref 0–2)
UROBILINOGEN UR STRIP-MCNC: NORMAL MG/DL (ref 0–2)
VIT B12 SERPL-MCNC: 380 PG/ML (ref 193–986)
VIT B12 SERPL-MCNC: 398 PG/ML (ref 193–986)
WBC # BLD AUTO: 10.1 10E9/L (ref 4–11)
WBC # BLD AUTO: 10.7 10E9/L (ref 4–11)
WBC # BLD AUTO: 12.4 10E9/L (ref 4–11)
WBC # BLD AUTO: 12.7 10E9/L (ref 4–11)
WBC # BLD AUTO: 13.4 10E9/L (ref 4–11)
WBC # BLD AUTO: 2.9 10E9/L (ref 4–11)
WBC # BLD AUTO: 4.2 10E9/L (ref 4–11)
WBC # BLD AUTO: 4.5 10E9/L (ref 4–11)
WBC # BLD AUTO: 4.5 10^9/L
WBC # BLD AUTO: 4.8 10^9/L
WBC # BLD AUTO: 5.1 10E9/L (ref 4–11)
WBC # BLD AUTO: 5.3 10E9/L (ref 4–11)
WBC # BLD AUTO: 5.4 10E9/L (ref 4–11)
WBC # BLD AUTO: 5.4 10E9/L (ref 4–11)
WBC # BLD AUTO: 5.5 10E9/L (ref 4–11)
WBC # BLD AUTO: 5.8 10E9/L (ref 4–11)
WBC # BLD AUTO: 5.9 10E9/L (ref 4–11)
WBC # BLD AUTO: 6.2 10E9/L (ref 4–11)
WBC # BLD AUTO: 6.5 10E9/L (ref 4–11)
WBC # BLD AUTO: 6.6 10E9/L (ref 4–11)
WBC # BLD AUTO: 6.6 10E9/L (ref 4–11)
WBC # BLD AUTO: 6.9 10E9/L (ref 4–11)
WBC # BLD AUTO: 7 10E9/L (ref 4–11)
WBC # BLD AUTO: 7.1 10E9/L (ref 4–11)
WBC # BLD AUTO: 7.5 10E9/L (ref 4–11)
WBC # BLD AUTO: 7.7 10E9/L (ref 4–11)
WBC # BLD AUTO: 7.8 10E9/L (ref 4–11)
WBC # BLD AUTO: 8.2 10E9/L (ref 4–11)
WBC #/AREA URNS AUTO: 11 /HPF (ref 0–2)
WBC #/AREA URNS AUTO: 12 /HPF (ref 0–2)
WBC #/AREA URNS AUTO: 14 /HPF (ref 0–2)
WBC #/AREA URNS AUTO: 15 /HPF (ref 0–2)
WBC #/AREA URNS AUTO: 18 /HPF (ref 0–2)
WBC #/AREA URNS AUTO: 21 /HPF (ref 0–2)
WBC #/AREA URNS AUTO: 25 /HPF (ref 0–2)
WBC #/AREA URNS AUTO: 46 /HPF (ref 0–2)
WBC #/AREA URNS AUTO: 60 /HPF (ref 0–2)
WBC #/AREA URNS AUTO: 82 /HPF (ref 0–2)
WBC #/AREA URNS AUTO: 85 /HPF (ref 0–2)
WBC #/AREA URNS AUTO: 98 /HPF (ref 0–2)
WBC #/AREA URNS AUTO: >182 /HPF (ref 0–2)
WBC #/AREA URNS AUTO: ABNORMAL /HPF (ref 0–2)

## 2017-01-01 PROCEDURE — 25000128 H RX IP 250 OP 636: Performed by: STUDENT IN AN ORGANIZED HEALTH CARE EDUCATION/TRAINING PROGRAM

## 2017-01-01 PROCEDURE — 36592 COLLECT BLOOD FROM PICC: CPT | Performed by: STUDENT IN AN ORGANIZED HEALTH CARE EDUCATION/TRAINING PROGRAM

## 2017-01-01 PROCEDURE — A9270 NON-COVERED ITEM OR SERVICE: HCPCS | Mod: GY | Performed by: EMERGENCY MEDICINE

## 2017-01-01 PROCEDURE — 25800025 ZZH RX 258: Performed by: STUDENT IN AN ORGANIZED HEALTH CARE EDUCATION/TRAINING PROGRAM

## 2017-01-01 PROCEDURE — 99214 OFFICE O/P EST MOD 30 MIN: CPT | Mod: ZP | Performed by: OBSTETRICS & GYNECOLOGY

## 2017-01-01 PROCEDURE — 36592 COLLECT BLOOD FROM PICC: CPT | Performed by: OBSTETRICS & GYNECOLOGY

## 2017-01-01 PROCEDURE — 85610 PROTHROMBIN TIME: CPT | Performed by: PHYSICIAN ASSISTANT

## 2017-01-01 PROCEDURE — 25000128 H RX IP 250 OP 636: Performed by: OBSTETRICS & GYNECOLOGY

## 2017-01-01 PROCEDURE — 85025 COMPLETE CBC W/AUTO DIFF WBC: CPT | Performed by: OBSTETRICS & GYNECOLOGY

## 2017-01-01 PROCEDURE — 99207 ZZC NO CHARGE LOS: CPT

## 2017-01-01 PROCEDURE — 25000125 ZZHC RX 250: Performed by: OBSTETRICS & GYNECOLOGY

## 2017-01-01 PROCEDURE — 93010 ELECTROCARDIOGRAM REPORT: CPT | Performed by: INTERNAL MEDICINE

## 2017-01-01 PROCEDURE — 12000008 ZZH R&B INTERMEDIATE UMMC

## 2017-01-01 PROCEDURE — 99212 OFFICE O/P EST SF 10 MIN: CPT | Mod: ZF

## 2017-01-01 PROCEDURE — 25000132 ZZH RX MED GY IP 250 OP 250 PS 637: Mod: GY | Performed by: STUDENT IN AN ORGANIZED HEALTH CARE EDUCATION/TRAINING PROGRAM

## 2017-01-01 PROCEDURE — 99357 ZZC PROLONGED SERV,INPATIENT,EA ADD 1/2: CPT | Performed by: NURSE PRACTITIONER

## 2017-01-01 PROCEDURE — 99152 MOD SED SAME PHYS/QHP 5/>YRS: CPT

## 2017-01-01 PROCEDURE — 25000132 ZZH RX MED GY IP 250 OP 250 PS 637: Performed by: STUDENT IN AN ORGANIZED HEALTH CARE EDUCATION/TRAINING PROGRAM

## 2017-01-01 PROCEDURE — 99233 SBSQ HOSP IP/OBS HIGH 50: CPT | Mod: GC | Performed by: OBSTETRICS & GYNECOLOGY

## 2017-01-01 PROCEDURE — 99356 ZZC PROLONGED SERV,INPATIENT,1ST HR: CPT | Performed by: NURSE PRACTITIONER

## 2017-01-01 PROCEDURE — 96375 TX/PRO/DX INJ NEW DRUG ADDON: CPT | Performed by: INTERNAL MEDICINE

## 2017-01-01 PROCEDURE — 27210912 ZZH NEEDLE CR8

## 2017-01-01 PROCEDURE — 25000128 H RX IP 250 OP 636: Performed by: NURSE PRACTITIONER

## 2017-01-01 PROCEDURE — 80053 COMPREHEN METABOLIC PANEL: CPT | Performed by: OBSTETRICS & GYNECOLOGY

## 2017-01-01 PROCEDURE — 25000125 ZZHC RX 250: Performed by: STUDENT IN AN ORGANIZED HEALTH CARE EDUCATION/TRAINING PROGRAM

## 2017-01-01 PROCEDURE — 86304 IMMUNOASSAY TUMOR CA 125: CPT | Performed by: NURSE PRACTITIONER

## 2017-01-01 PROCEDURE — 25000132 ZZH RX MED GY IP 250 OP 250 PS 637: Mod: GY | Performed by: OBSTETRICS & GYNECOLOGY

## 2017-01-01 PROCEDURE — 36591 DRAW BLOOD OFF VENOUS DEVICE: CPT

## 2017-01-01 PROCEDURE — 80048 BASIC METABOLIC PNL TOTAL CA: CPT | Performed by: OBSTETRICS & GYNECOLOGY

## 2017-01-01 PROCEDURE — 84132 ASSAY OF SERUM POTASSIUM: CPT | Performed by: OBSTETRICS & GYNECOLOGY

## 2017-01-01 PROCEDURE — 99215 OFFICE O/P EST HI 40 MIN: CPT | Mod: ZP | Performed by: OBSTETRICS & GYNECOLOGY

## 2017-01-01 PROCEDURE — 85025 COMPLETE CBC W/AUTO DIFF WBC: CPT | Performed by: NURSE PRACTITIONER

## 2017-01-01 PROCEDURE — 96367 TX/PROPH/DG ADDL SEQ IV INF: CPT | Performed by: INTERNAL MEDICINE

## 2017-01-01 PROCEDURE — 83615 LACTATE (LD) (LDH) ENZYME: CPT | Performed by: OBSTETRICS & GYNECOLOGY

## 2017-01-01 PROCEDURE — 99207 ZZC NO CHARGE NURSE ONLY: CPT

## 2017-01-01 PROCEDURE — 99221 1ST HOSP IP/OBS SF/LOW 40: CPT | Mod: AI | Performed by: OBSTETRICS & GYNECOLOGY

## 2017-01-01 PROCEDURE — S0028 INJECTION, FAMOTIDINE, 20 MG: HCPCS | Performed by: OBSTETRICS & GYNECOLOGY

## 2017-01-01 PROCEDURE — 82977 ASSAY OF GGT: CPT | Performed by: OBSTETRICS & GYNECOLOGY

## 2017-01-01 PROCEDURE — 99212 OFFICE O/P EST SF 10 MIN: CPT

## 2017-01-01 PROCEDURE — 25000132 ZZH RX MED GY IP 250 OP 250 PS 637: Performed by: OBSTETRICS & GYNECOLOGY

## 2017-01-01 PROCEDURE — 25000128 H RX IP 250 OP 636: Performed by: EMERGENCY MEDICINE

## 2017-01-01 PROCEDURE — 99285 EMERGENCY DEPT VISIT HI MDM: CPT | Mod: Z6 | Performed by: EMERGENCY MEDICINE

## 2017-01-01 PROCEDURE — 36415 COLL VENOUS BLD VENIPUNCTURE: CPT

## 2017-01-01 PROCEDURE — 0W9G3ZZ DRAINAGE OF PERITONEAL CAVITY, PERCUTANEOUS APPROACH: ICD-10-PCS | Performed by: OBSTETRICS & GYNECOLOGY

## 2017-01-01 PROCEDURE — 87040 BLOOD CULTURE FOR BACTERIA: CPT | Mod: 59 | Performed by: OBSTETRICS & GYNECOLOGY

## 2017-01-01 PROCEDURE — 25000132 ZZH RX MED GY IP 250 OP 250 PS 637: Performed by: NURSE PRACTITIONER

## 2017-01-01 PROCEDURE — 74020 XR ABDOMEN 2 VW: CPT

## 2017-01-01 PROCEDURE — 85730 THROMBOPLASTIN TIME PARTIAL: CPT | Performed by: EMERGENCY MEDICINE

## 2017-01-01 PROCEDURE — 36000053 ZZH SURGERY LEVEL 2 EA 15 ADDTL MIN - UMMC: Performed by: INTERNAL MEDICINE

## 2017-01-01 PROCEDURE — 80076 HEPATIC FUNCTION PANEL: CPT | Performed by: EMERGENCY MEDICINE

## 2017-01-01 PROCEDURE — 99212 OFFICE O/P EST SF 10 MIN: CPT | Mod: ZF,25

## 2017-01-01 PROCEDURE — 25000132 ZZH RX MED GY IP 250 OP 250 PS 637: Performed by: EMERGENCY MEDICINE

## 2017-01-01 PROCEDURE — 27210995 ZZH RX 272: Performed by: INTERNAL MEDICINE

## 2017-01-01 PROCEDURE — 99233 SBSQ HOSP IP/OBS HIGH 50: CPT | Performed by: NURSE PRACTITIONER

## 2017-01-01 PROCEDURE — 71000015 ZZH RECOVERY PHASE 1 LEVEL 2 EA ADDTL HR: Performed by: INTERNAL MEDICINE

## 2017-01-01 PROCEDURE — 25000125 ZZHC RX 250

## 2017-01-01 PROCEDURE — 36000051 ZZH SURGERY LEVEL 2 1ST 30 MIN - UMMC: Performed by: INTERNAL MEDICINE

## 2017-01-01 PROCEDURE — S0028 INJECTION, FAMOTIDINE, 20 MG: HCPCS | Performed by: INTERNAL MEDICINE

## 2017-01-01 PROCEDURE — 85027 COMPLETE CBC AUTOMATED: CPT | Performed by: OBSTETRICS & GYNECOLOGY

## 2017-01-01 PROCEDURE — 85018 HEMOGLOBIN: CPT | Performed by: NURSE PRACTITIONER

## 2017-01-01 PROCEDURE — 80048 BASIC METABOLIC PNL TOTAL CA: CPT | Performed by: STUDENT IN AN ORGANIZED HEALTH CARE EDUCATION/TRAINING PROGRAM

## 2017-01-01 PROCEDURE — 83540 ASSAY OF IRON: CPT | Performed by: NURSE PRACTITIONER

## 2017-01-01 PROCEDURE — P9016 RBC LEUKOCYTES REDUCED: HCPCS | Performed by: OBSTETRICS & GYNECOLOGY

## 2017-01-01 PROCEDURE — 83550 IRON BINDING TEST: CPT | Performed by: NURSE PRACTITIONER

## 2017-01-01 PROCEDURE — 25000128 H RX IP 250 OP 636

## 2017-01-01 PROCEDURE — 25000128 H RX IP 250 OP 636: Performed by: RADIOLOGY

## 2017-01-01 PROCEDURE — 27210995 ZZH RX 272: Performed by: RADIOLOGY

## 2017-01-01 PROCEDURE — A9270 NON-COVERED ITEM OR SERVICE: HCPCS | Mod: GY | Performed by: STUDENT IN AN ORGANIZED HEALTH CARE EDUCATION/TRAINING PROGRAM

## 2017-01-01 PROCEDURE — 36430 TRANSFUSION BLD/BLD COMPNT: CPT | Performed by: OBSTETRICS & GYNECOLOGY

## 2017-01-01 PROCEDURE — 93971 EXTREMITY STUDY: CPT | Mod: RT

## 2017-01-01 PROCEDURE — 96366 THER/PROPH/DIAG IV INF ADDON: CPT

## 2017-01-01 PROCEDURE — 85610 PROTHROMBIN TIME: CPT | Performed by: STUDENT IN AN ORGANIZED HEALTH CARE EDUCATION/TRAINING PROGRAM

## 2017-01-01 PROCEDURE — 81001 URINALYSIS AUTO W/SCOPE: CPT | Performed by: OBSTETRICS & GYNECOLOGY

## 2017-01-01 PROCEDURE — 25000125 ZZHC RX 250: Mod: ZF | Performed by: OBSTETRICS & GYNECOLOGY

## 2017-01-01 PROCEDURE — 36415 COLL VENOUS BLD VENIPUNCTURE: CPT | Performed by: EMERGENCY MEDICINE

## 2017-01-01 PROCEDURE — 74176 CT ABD & PELVIS W/O CONTRAST: CPT

## 2017-01-01 PROCEDURE — 99232 SBSQ HOSP IP/OBS MODERATE 35: CPT | Mod: GC | Performed by: OBSTETRICS & GYNECOLOGY

## 2017-01-01 PROCEDURE — 96375 TX/PRO/DX INJ NEW DRUG ADDON: CPT | Performed by: EMERGENCY MEDICINE

## 2017-01-01 PROCEDURE — 87088 URINE BACTERIA CULTURE: CPT

## 2017-01-01 PROCEDURE — 82150 ASSAY OF AMYLASE: CPT | Performed by: OBSTETRICS & GYNECOLOGY

## 2017-01-01 PROCEDURE — 85025 COMPLETE CBC W/AUTO DIFF WBC: CPT | Performed by: EMERGENCY MEDICINE

## 2017-01-01 PROCEDURE — 12000001 ZZH R&B MED SURG/OB UMMC

## 2017-01-01 PROCEDURE — 88305 TISSUE EXAM BY PATHOLOGIST: CPT | Performed by: INTERNAL MEDICINE

## 2017-01-01 PROCEDURE — 99231 SBSQ HOSP IP/OBS SF/LOW 25: CPT | Mod: GC | Performed by: OBSTETRICS & GYNECOLOGY

## 2017-01-01 PROCEDURE — 84132 ASSAY OF SERUM POTASSIUM: CPT | Performed by: NURSE PRACTITIONER

## 2017-01-01 PROCEDURE — 99207 ZZC CDG-CORRECTLY CODED, REVIEWED AND AGREE: CPT | Performed by: NURSE PRACTITIONER

## 2017-01-01 PROCEDURE — 86140 C-REACTIVE PROTEIN: CPT | Performed by: EMERGENCY MEDICINE

## 2017-01-01 PROCEDURE — 00000146 ZZHCL STATISTIC GLUCOSE BY METER IP

## 2017-01-01 PROCEDURE — 80048 BASIC METABOLIC PNL TOTAL CA: CPT | Performed by: NURSE PRACTITIONER

## 2017-01-01 PROCEDURE — 25000128 H RX IP 250 OP 636: Performed by: ANESTHESIOLOGY

## 2017-01-01 PROCEDURE — A9270 NON-COVERED ITEM OR SERVICE: HCPCS | Mod: GY | Performed by: OBSTETRICS & GYNECOLOGY

## 2017-01-01 PROCEDURE — 99213 OFFICE O/P EST LOW 20 MIN: CPT | Performed by: UROLOGY

## 2017-01-01 PROCEDURE — 86304 IMMUNOASSAY TUMOR CA 125: CPT | Performed by: OBSTETRICS & GYNECOLOGY

## 2017-01-01 PROCEDURE — 85730 THROMBOPLASTIN TIME PARTIAL: CPT | Performed by: OBSTETRICS & GYNECOLOGY

## 2017-01-01 PROCEDURE — 27210794 ZZH OR GENERAL SUPPLY STERILE: Performed by: INTERNAL MEDICINE

## 2017-01-01 PROCEDURE — 83735 ASSAY OF MAGNESIUM: CPT | Performed by: OBSTETRICS & GYNECOLOGY

## 2017-01-01 PROCEDURE — 99214 OFFICE O/P EST MOD 30 MIN: CPT | Mod: 25 | Performed by: NURSE PRACTITIONER

## 2017-01-01 PROCEDURE — 82565 ASSAY OF CREATININE: CPT | Performed by: OBSTETRICS & GYNECOLOGY

## 2017-01-01 PROCEDURE — 99285 EMERGENCY DEPT VISIT HI MDM: CPT | Mod: 25

## 2017-01-01 PROCEDURE — 71000014 ZZH RECOVERY PHASE 1 LEVEL 2 FIRST HR: Performed by: INTERNAL MEDICINE

## 2017-01-01 PROCEDURE — 82306 VITAMIN D 25 HYDROXY: CPT | Performed by: NURSE PRACTITIONER

## 2017-01-01 PROCEDURE — 25800025 ZZH RX 258: Performed by: OBSTETRICS & GYNECOLOGY

## 2017-01-01 PROCEDURE — 80053 COMPREHEN METABOLIC PANEL: CPT | Performed by: NURSE PRACTITIONER

## 2017-01-01 PROCEDURE — 84100 ASSAY OF PHOSPHORUS: CPT | Performed by: OBSTETRICS & GYNECOLOGY

## 2017-01-01 PROCEDURE — 83735 ASSAY OF MAGNESIUM: CPT | Performed by: STUDENT IN AN ORGANIZED HEALTH CARE EDUCATION/TRAINING PROGRAM

## 2017-01-01 PROCEDURE — 85610 PROTHROMBIN TIME: CPT | Performed by: OBSTETRICS & GYNECOLOGY

## 2017-01-01 PROCEDURE — 85018 HEMOGLOBIN: CPT | Performed by: STUDENT IN AN ORGANIZED HEALTH CARE EDUCATION/TRAINING PROGRAM

## 2017-01-01 PROCEDURE — 25000132 ZZH RX MED GY IP 250 OP 250 PS 637: Mod: GY | Performed by: EMERGENCY MEDICINE

## 2017-01-01 PROCEDURE — 87086 URINE CULTURE/COLONY COUNT: CPT | Performed by: UROLOGY

## 2017-01-01 PROCEDURE — 87088 URINE BACTERIA CULTURE: CPT | Performed by: EMERGENCY MEDICINE

## 2017-01-01 PROCEDURE — 84100 ASSAY OF PHOSPHORUS: CPT | Performed by: NURSE PRACTITIONER

## 2017-01-01 PROCEDURE — 25000125 ZZHC RX 250: Performed by: NURSE ANESTHETIST, CERTIFIED REGISTERED

## 2017-01-01 PROCEDURE — 40000164 ZZH STATISTIC PHYSICIAN TLC VISIT: Performed by: INTERNAL MEDICINE

## 2017-01-01 PROCEDURE — 81001 URINALYSIS AUTO W/SCOPE: CPT | Mod: 91 | Performed by: RADIOLOGY

## 2017-01-01 PROCEDURE — 96375 TX/PRO/DX INJ NEW DRUG ADDON: CPT | Performed by: NURSE PRACTITIONER

## 2017-01-01 PROCEDURE — G0378 HOSPITAL OBSERVATION PER HR: HCPCS

## 2017-01-01 PROCEDURE — 96365 THER/PROPH/DIAG IV INF INIT: CPT

## 2017-01-01 PROCEDURE — 83605 ASSAY OF LACTIC ACID: CPT | Performed by: EMERGENCY MEDICINE

## 2017-01-01 PROCEDURE — 83690 ASSAY OF LIPASE: CPT | Performed by: EMERGENCY MEDICINE

## 2017-01-01 PROCEDURE — 86900 BLOOD TYPING SEROLOGIC ABO: CPT | Performed by: NURSE PRACTITIONER

## 2017-01-01 PROCEDURE — C1769 GUIDE WIRE: HCPCS

## 2017-01-01 PROCEDURE — 25500064 ZZH RX 255 OP 636: Performed by: OBSTETRICS & GYNECOLOGY

## 2017-01-01 PROCEDURE — 86901 BLOOD TYPING SEROLOGIC RH(D): CPT | Performed by: OBSTETRICS & GYNECOLOGY

## 2017-01-01 PROCEDURE — 36430 TRANSFUSION BLD/BLD COMPNT: CPT | Performed by: INTERNAL MEDICINE

## 2017-01-01 PROCEDURE — 0DBN8ZX EXCISION OF SIGMOID COLON, VIA NATURAL OR ARTIFICIAL OPENING ENDOSCOPIC, DIAGNOSTIC: ICD-10-PCS | Performed by: INTERNAL MEDICINE

## 2017-01-01 PROCEDURE — 87086 URINE CULTURE/COLONY COUNT: CPT | Performed by: OBSTETRICS & GYNECOLOGY

## 2017-01-01 PROCEDURE — 74000 XR ABDOMEN PORT F1 VW: CPT

## 2017-01-01 PROCEDURE — 99214 OFFICE O/P EST MOD 30 MIN: CPT | Performed by: NURSE PRACTITIONER

## 2017-01-01 PROCEDURE — 84443 ASSAY THYROID STIM HORMONE: CPT | Performed by: OBSTETRICS & GYNECOLOGY

## 2017-01-01 PROCEDURE — 81001 URINALYSIS AUTO W/SCOPE: CPT | Performed by: EMERGENCY MEDICINE

## 2017-01-01 PROCEDURE — 84156 ASSAY OF PROTEIN URINE: CPT | Performed by: OBSTETRICS & GYNECOLOGY

## 2017-01-01 PROCEDURE — 96413 CHEMO IV INFUSION 1 HR: CPT | Performed by: INTERNAL MEDICINE

## 2017-01-01 PROCEDURE — 99212 OFFICE O/P EST SF 10 MIN: CPT | Mod: ZP | Performed by: NURSE PRACTITIONER

## 2017-01-01 PROCEDURE — 85610 PROTHROMBIN TIME: CPT | Performed by: EMERGENCY MEDICINE

## 2017-01-01 PROCEDURE — C9399 UNCLASSIFIED DRUGS OR BIOLOG: HCPCS | Performed by: NURSE ANESTHETIST, CERTIFIED REGISTERED

## 2017-01-01 PROCEDURE — 25000125 ZZHC RX 250: Performed by: NURSE PRACTITIONER

## 2017-01-01 PROCEDURE — 37000009 ZZH ANESTHESIA TECHNICAL FEE, EACH ADDTL 15 MIN: Performed by: INTERNAL MEDICINE

## 2017-01-01 PROCEDURE — 88172 CYTP DX EVAL FNA 1ST EA SITE: CPT | Performed by: OBSTETRICS & GYNECOLOGY

## 2017-01-01 PROCEDURE — 87186 SC STD MICRODIL/AGAR DIL: CPT

## 2017-01-01 PROCEDURE — 96361 HYDRATE IV INFUSION ADD-ON: CPT

## 2017-01-01 PROCEDURE — 86900 BLOOD TYPING SEROLOGIC ABO: CPT | Performed by: INTERNAL MEDICINE

## 2017-01-01 PROCEDURE — C1729 CATH, DRAINAGE: HCPCS

## 2017-01-01 PROCEDURE — 45380 COLONOSCOPY AND BIOPSY: CPT | Performed by: INTERNAL MEDICINE

## 2017-01-01 PROCEDURE — 86900 BLOOD TYPING SEROLOGIC ABO: CPT | Performed by: OBSTETRICS & GYNECOLOGY

## 2017-01-01 PROCEDURE — 25000128 H RX IP 250 OP 636: Mod: ZF | Performed by: NURSE PRACTITIONER

## 2017-01-01 PROCEDURE — 83970 ASSAY OF PARATHORMONE: CPT | Performed by: NURSE PRACTITIONER

## 2017-01-01 PROCEDURE — 36592 COLLECT BLOOD FROM PICC: CPT | Performed by: NURSE PRACTITIONER

## 2017-01-01 PROCEDURE — 25000128 H RX IP 250 OP 636: Performed by: NURSE ANESTHETIST, CERTIFIED REGISTERED

## 2017-01-01 PROCEDURE — 99212 OFFICE O/P EST SF 10 MIN: CPT | Mod: 25

## 2017-01-01 PROCEDURE — 93005 ELECTROCARDIOGRAM TRACING: CPT

## 2017-01-01 PROCEDURE — P9016 RBC LEUKOCYTES REDUCED: HCPCS | Performed by: INTERNAL MEDICINE

## 2017-01-01 PROCEDURE — 0D9670Z DRAINAGE OF STOMACH WITH DRAINAGE DEVICE, VIA NATURAL OR ARTIFICIAL OPENING: ICD-10-PCS | Performed by: OBSTETRICS & GYNECOLOGY

## 2017-01-01 PROCEDURE — 99285 EMERGENCY DEPT VISIT HI MDM: CPT | Mod: 25 | Performed by: EMERGENCY MEDICINE

## 2017-01-01 PROCEDURE — 25000125 ZZHC RX 250: Performed by: INTERNAL MEDICINE

## 2017-01-01 PROCEDURE — 99207 ZZC CDG-CODE CATEGORY CHANGED: CPT | Performed by: INTERNAL MEDICINE

## 2017-01-01 PROCEDURE — 86850 RBC ANTIBODY SCREEN: CPT | Performed by: INTERNAL MEDICINE

## 2017-01-01 PROCEDURE — 96361 HYDRATE IV INFUSION ADD-ON: CPT | Performed by: EMERGENCY MEDICINE

## 2017-01-01 PROCEDURE — 87086 URINE CULTURE/COLONY COUNT: CPT

## 2017-01-01 PROCEDURE — 81003 URINALYSIS AUTO W/O SCOPE: CPT | Performed by: OBSTETRICS & GYNECOLOGY

## 2017-01-01 PROCEDURE — 25000128 H RX IP 250 OP 636: Mod: ZF | Performed by: OBSTETRICS & GYNECOLOGY

## 2017-01-01 PROCEDURE — 99214 OFFICE O/P EST MOD 30 MIN: CPT | Mod: 25 | Performed by: OBSTETRICS & GYNECOLOGY

## 2017-01-01 PROCEDURE — 84145 PROCALCITONIN (PCT): CPT | Performed by: EMERGENCY MEDICINE

## 2017-01-01 PROCEDURE — 96367 TX/PROPH/DG ADDL SEQ IV INF: CPT | Performed by: OBSTETRICS & GYNECOLOGY

## 2017-01-01 PROCEDURE — 86850 RBC ANTIBODY SCREEN: CPT | Performed by: NURSE PRACTITIONER

## 2017-01-01 PROCEDURE — 00000155 ZZHCL STATISTIC H-CELL BLOCK W/STAIN: Performed by: OBSTETRICS & GYNECOLOGY

## 2017-01-01 PROCEDURE — 40000940 XR ABDOMEN PORT F1 VW

## 2017-01-01 PROCEDURE — 50432 PLMT NEPHROSTOMY CATHETER: CPT | Mod: RT

## 2017-01-01 PROCEDURE — 40000986 XR CHEST PORT 1 VW

## 2017-01-01 PROCEDURE — 96413 CHEMO IV INFUSION 1 HR: CPT | Performed by: OBSTETRICS & GYNECOLOGY

## 2017-01-01 PROCEDURE — S0077 INJECTION, CLINDAMYCIN PHOSP: HCPCS | Performed by: NURSE PRACTITIONER

## 2017-01-01 PROCEDURE — 36415 COLL VENOUS BLD VENIPUNCTURE: CPT | Performed by: OBSTETRICS & GYNECOLOGY

## 2017-01-01 PROCEDURE — 86923 COMPATIBILITY TEST ELECTRIC: CPT | Performed by: OBSTETRICS & GYNECOLOGY

## 2017-01-01 PROCEDURE — 43235 EGD DIAGNOSTIC BRUSH WASH: CPT | Performed by: INTERNAL MEDICINE

## 2017-01-01 PROCEDURE — 80053 COMPREHEN METABOLIC PANEL: CPT | Performed by: EMERGENCY MEDICINE

## 2017-01-01 PROCEDURE — 96374 THER/PROPH/DIAG INJ IV PUSH: CPT

## 2017-01-01 PROCEDURE — S0028 INJECTION, FAMOTIDINE, 20 MG: HCPCS | Performed by: NURSE PRACTITIONER

## 2017-01-01 PROCEDURE — 99222 1ST HOSP IP/OBS MODERATE 55: CPT | Mod: AI | Performed by: OBSTETRICS & GYNECOLOGY

## 2017-01-01 PROCEDURE — 96375 TX/PRO/DX INJ NEW DRUG ADDON: CPT | Mod: 59 | Performed by: INTERNAL MEDICINE

## 2017-01-01 PROCEDURE — 25800025 ZZH RX 258: Performed by: NURSE PRACTITIONER

## 2017-01-01 PROCEDURE — 87186 SC STD MICRODIL/AGAR DIL: CPT | Performed by: EMERGENCY MEDICINE

## 2017-01-01 PROCEDURE — 82728 ASSAY OF FERRITIN: CPT | Performed by: NURSE PRACTITIONER

## 2017-01-01 PROCEDURE — 83605 ASSAY OF LACTIC ACID: CPT | Performed by: OBSTETRICS & GYNECOLOGY

## 2017-01-01 PROCEDURE — 87077 CULTURE AEROBIC IDENTIFY: CPT | Performed by: EMERGENCY MEDICINE

## 2017-01-01 PROCEDURE — 25000125 ZZHC RX 250: Performed by: RADIOLOGY

## 2017-01-01 PROCEDURE — 86901 BLOOD TYPING SEROLOGIC RH(D): CPT | Performed by: NURSE PRACTITIONER

## 2017-01-01 PROCEDURE — 40000277 XR SURGERY CARM FLUORO LESS THAN 5 MIN W STILLS: Mod: TC

## 2017-01-01 PROCEDURE — 25000566 ZZH SEVOFLURANE, EA 15 MIN: Performed by: INTERNAL MEDICINE

## 2017-01-01 PROCEDURE — 88173 CYTOPATH EVAL FNA REPORT: CPT | Performed by: OBSTETRICS & GYNECOLOGY

## 2017-01-01 PROCEDURE — 36415 COLL VENOUS BLD VENIPUNCTURE: CPT | Performed by: NURSE PRACTITIONER

## 2017-01-01 PROCEDURE — 96375 TX/PRO/DX INJ NEW DRUG ADDON: CPT | Mod: 59 | Performed by: OBSTETRICS & GYNECOLOGY

## 2017-01-01 PROCEDURE — 85027 COMPLETE CBC AUTOMATED: CPT | Performed by: EMERGENCY MEDICINE

## 2017-01-01 PROCEDURE — 86850 RBC ANTIBODY SCREEN: CPT | Performed by: OBSTETRICS & GYNECOLOGY

## 2017-01-01 PROCEDURE — 82607 VITAMIN B-12: CPT | Performed by: NURSE PRACTITIONER

## 2017-01-01 PROCEDURE — 86901 BLOOD TYPING SEROLOGIC RH(D): CPT | Performed by: INTERNAL MEDICINE

## 2017-01-01 PROCEDURE — 93010 ELECTROCARDIOGRAM REPORT: CPT | Mod: 76 | Performed by: INTERNAL MEDICINE

## 2017-01-01 PROCEDURE — 87800 DETECT AGNT MULT DNA DIREC: CPT | Performed by: EMERGENCY MEDICINE

## 2017-01-01 PROCEDURE — 27210732 ZZH ACCESSORY CR1

## 2017-01-01 PROCEDURE — 99284 EMERGENCY DEPT VISIT MOD MDM: CPT | Mod: 25 | Performed by: EMERGENCY MEDICINE

## 2017-01-01 PROCEDURE — 99223 1ST HOSP IP/OBS HIGH 75: CPT | Performed by: NURSE PRACTITIONER

## 2017-01-01 PROCEDURE — 96368 THER/DIAG CONCURRENT INF: CPT | Performed by: OBSTETRICS & GYNECOLOGY

## 2017-01-01 PROCEDURE — 96376 TX/PRO/DX INJ SAME DRUG ADON: CPT | Performed by: EMERGENCY MEDICINE

## 2017-01-01 PROCEDURE — 27210905 ZZH KIT CR7

## 2017-01-01 PROCEDURE — 83735 ASSAY OF MAGNESIUM: CPT | Performed by: NURSE PRACTITIONER

## 2017-01-01 PROCEDURE — 36430 TRANSFUSION BLD/BLD COMPNT: CPT

## 2017-01-01 PROCEDURE — 83605 ASSAY OF LACTIC ACID: CPT

## 2017-01-01 PROCEDURE — 82803 BLOOD GASES ANY COMBINATION: CPT

## 2017-01-01 PROCEDURE — 85027 COMPLETE CBC AUTOMATED: CPT | Performed by: STUDENT IN AN ORGANIZED HEALTH CARE EDUCATION/TRAINING PROGRAM

## 2017-01-01 PROCEDURE — 36415 COLL VENOUS BLD VENIPUNCTURE: CPT | Performed by: INTERNAL MEDICINE

## 2017-01-01 PROCEDURE — 96374 THER/PROPH/DIAG INJ IV PUSH: CPT | Performed by: EMERGENCY MEDICINE

## 2017-01-01 PROCEDURE — 25000125 ZZHC RX 250: Performed by: EMERGENCY MEDICINE

## 2017-01-01 PROCEDURE — 49082 ABD PARACENTESIS: CPT | Performed by: STUDENT IN AN ORGANIZED HEALTH CARE EDUCATION/TRAINING PROGRAM

## 2017-01-01 PROCEDURE — 87086 URINE CULTURE/COLONY COUNT: CPT | Performed by: EMERGENCY MEDICINE

## 2017-01-01 PROCEDURE — 96375 TX/PRO/DX INJ NEW DRUG ADDON: CPT

## 2017-01-01 PROCEDURE — 96360 HYDRATION IV INFUSION INIT: CPT

## 2017-01-01 PROCEDURE — 81001 URINALYSIS AUTO W/SCOPE: CPT | Performed by: UROLOGY

## 2017-01-01 PROCEDURE — 80048 BASIC METABOLIC PNL TOTAL CA: CPT | Performed by: EMERGENCY MEDICINE

## 2017-01-01 PROCEDURE — G0500 MOD SEDAT ENDO SERVICE >5YRS: HCPCS | Performed by: INTERNAL MEDICINE

## 2017-01-01 PROCEDURE — 88342 IMHCHEM/IMCYTCHM 1ST ANTB: CPT | Performed by: OBSTETRICS & GYNECOLOGY

## 2017-01-01 PROCEDURE — 36592 COLLECT BLOOD FROM PICC: CPT | Performed by: PHYSICIAN ASSISTANT

## 2017-01-01 PROCEDURE — 96413 CHEMO IV INFUSION 1 HR: CPT | Performed by: NURSE PRACTITIONER

## 2017-01-01 PROCEDURE — 88305 TISSUE EXAM BY PATHOLOGIST: CPT | Performed by: OBSTETRICS & GYNECOLOGY

## 2017-01-01 PROCEDURE — 99215 OFFICE O/P EST HI 40 MIN: CPT | Performed by: HOSPITALIST

## 2017-01-01 PROCEDURE — 40000170 ZZH STATISTIC PRE-PROCEDURE ASSESSMENT II: Performed by: INTERNAL MEDICINE

## 2017-01-01 PROCEDURE — 96365 THER/PROPH/DIAG IV INF INIT: CPT | Performed by: EMERGENCY MEDICINE

## 2017-01-01 PROCEDURE — 37000008 ZZH ANESTHESIA TECHNICAL FEE, 1ST 30 MIN: Performed by: INTERNAL MEDICINE

## 2017-01-01 PROCEDURE — 96367 TX/PROPH/DG ADDL SEQ IV INF: CPT | Performed by: NURSE PRACTITIONER

## 2017-01-01 PROCEDURE — 99207 ZZC NO CHARGE LOS: CPT | Performed by: INTERNAL MEDICINE

## 2017-01-01 PROCEDURE — 87040 BLOOD CULTURE FOR BACTERIA: CPT | Performed by: EMERGENCY MEDICINE

## 2017-01-01 PROCEDURE — 0DH63UZ INSERTION OF FEEDING DEVICE INTO STOMACH, PERCUTANEOUS APPROACH: ICD-10-PCS | Performed by: INTERNAL MEDICINE

## 2017-01-01 PROCEDURE — 45378 DIAGNOSTIC COLONOSCOPY: CPT | Performed by: INTERNAL MEDICINE

## 2017-01-01 PROCEDURE — 83735 ASSAY OF MAGNESIUM: CPT | Performed by: EMERGENCY MEDICINE

## 2017-01-01 PROCEDURE — 99222 1ST HOSP IP/OBS MODERATE 55: CPT | Performed by: INTERNAL MEDICINE

## 2017-01-01 PROCEDURE — A9585 GADOBUTROL INJECTION: HCPCS | Performed by: OBSTETRICS & GYNECOLOGY

## 2017-01-01 PROCEDURE — 70553 MRI BRAIN STEM W/O & W/DYE: CPT

## 2017-01-01 PROCEDURE — 96375 TX/PRO/DX INJ NEW DRUG ADDON: CPT | Performed by: OBSTETRICS & GYNECOLOGY

## 2017-01-01 DEVICE — STENT URETERAL DBL PIGTAIL INLAY 6FRX26CM 778626
Type: IMPLANTABLE DEVICE | Status: NON-FUNCTIONAL
Removed: 2017-06-12

## 2017-01-01 DEVICE — IMPLANTABLE DEVICE: Type: IMPLANTABLE DEVICE | Site: URETER | Status: FUNCTIONAL

## 2017-01-01 DEVICE — STENT URETERAL DBL PIGTAIL INLAY 6FRX28CM 778628
Type: IMPLANTABLE DEVICE | Status: NON-FUNCTIONAL
Removed: 2017-06-12

## 2017-01-01 RX ORDER — SODIUM CHLORIDE, SODIUM LACTATE, POTASSIUM CHLORIDE, CALCIUM CHLORIDE 600; 310; 30; 20 MG/100ML; MG/100ML; MG/100ML; MG/100ML
INJECTION, SOLUTION INTRAVENOUS CONTINUOUS
Status: DISCONTINUED | OUTPATIENT
Start: 2017-01-01 | End: 2017-01-01 | Stop reason: HOSPADM

## 2017-01-01 RX ORDER — DIPHENHYDRAMINE HCL 25 MG
25 CAPSULE ORAL ONCE
Status: CANCELLED
Start: 2017-01-01

## 2017-01-01 RX ORDER — DIPHENHYDRAMINE HYDROCHLORIDE 50 MG/ML
50 INJECTION INTRAMUSCULAR; INTRAVENOUS
Status: CANCELLED
Start: 2017-01-01

## 2017-01-01 RX ORDER — ONDANSETRON 2 MG/ML
8 INJECTION INTRAMUSCULAR; INTRAVENOUS EVERY 6 HOURS PRN
Status: CANCELLED
Start: 2017-01-01

## 2017-01-01 RX ORDER — AMOXICILLIN 250 MG
3 CAPSULE ORAL 2 TIMES DAILY
Status: DISCONTINUED | OUTPATIENT
Start: 2017-01-01 | End: 2017-01-01

## 2017-01-01 RX ORDER — MEPERIDINE HYDROCHLORIDE 50 MG/ML
25 INJECTION INTRAMUSCULAR; INTRAVENOUS; SUBCUTANEOUS EVERY 30 MIN PRN
Status: CANCELLED | OUTPATIENT
Start: 2017-01-01

## 2017-01-01 RX ORDER — SODIUM CHLORIDE 9 MG/ML
1000 INJECTION, SOLUTION INTRAVENOUS CONTINUOUS PRN
Status: CANCELLED
Start: 2017-01-01

## 2017-01-01 RX ORDER — LORAZEPAM 1 MG/1
1 TABLET ORAL EVERY 6 HOURS PRN
Qty: 30 TABLET | Refills: 2 | Status: SHIPPED | OUTPATIENT
Start: 2017-01-01 | End: 2017-09-25

## 2017-01-01 RX ORDER — MORPHINE SULFATE 4 MG/ML
5-10 INJECTION, SOLUTION INTRAMUSCULAR; INTRAVENOUS
Status: DISCONTINUED | OUTPATIENT
Start: 2017-01-01 | End: 2017-01-01

## 2017-01-01 RX ORDER — HYDROMORPHONE HYDROCHLORIDE 1 MG/ML
.3-.5 INJECTION, SOLUTION INTRAMUSCULAR; INTRAVENOUS; SUBCUTANEOUS EVERY 4 HOURS PRN
Status: CANCELLED
Start: 2017-01-01

## 2017-01-01 RX ORDER — CLINDAMYCIN PHOSPHATE 900 MG/50ML
900 INJECTION, SOLUTION INTRAVENOUS SEE ADMIN INSTRUCTIONS
Status: DISCONTINUED | OUTPATIENT
Start: 2017-01-01 | End: 2017-01-01 | Stop reason: HOSPADM

## 2017-01-01 RX ORDER — EPINEPHRINE 1 MG/ML
0.3 INJECTION INTRAMUSCULAR; INTRAVENOUS; SUBCUTANEOUS EVERY 5 MIN PRN
Status: CANCELLED | OUTPATIENT
Start: 2017-01-01

## 2017-01-01 RX ORDER — POTASSIUM CHLORIDE 750 MG/1
20-40 TABLET, EXTENDED RELEASE ORAL
Status: DISCONTINUED | OUTPATIENT
Start: 2017-01-01 | End: 2017-01-01 | Stop reason: HOSPADM

## 2017-01-01 RX ORDER — HEPARIN SODIUM (PORCINE) LOCK FLUSH IV SOLN 100 UNIT/ML 100 UNIT/ML
SOLUTION INTRAVENOUS
Status: COMPLETED
Start: 2017-01-01 | End: 2017-01-01

## 2017-01-01 RX ORDER — NALOXONE HYDROCHLORIDE 0.4 MG/ML
.1-.4 INJECTION, SOLUTION INTRAMUSCULAR; INTRAVENOUS; SUBCUTANEOUS
Status: DISCONTINUED | OUTPATIENT
Start: 2017-01-01 | End: 2017-01-01 | Stop reason: HOSPADM

## 2017-01-01 RX ORDER — HEPARIN SODIUM (PORCINE) LOCK FLUSH IV SOLN 100 UNIT/ML 100 UNIT/ML
5 SOLUTION INTRAVENOUS
Status: DISCONTINUED | OUTPATIENT
Start: 2017-01-01 | End: 2017-01-01 | Stop reason: HOSPADM

## 2017-01-01 RX ORDER — MEPERIDINE HYDROCHLORIDE 25 MG/ML
25 INJECTION INTRAMUSCULAR; INTRAVENOUS; SUBCUTANEOUS EVERY 30 MIN PRN
Status: CANCELLED | OUTPATIENT
Start: 2017-01-01

## 2017-01-01 RX ORDER — MORPHINE SULFATE 2 MG/ML
2-4 INJECTION, SOLUTION INTRAMUSCULAR; INTRAVENOUS EVERY 5 MIN PRN
Status: DISCONTINUED | OUTPATIENT
Start: 2017-01-01 | End: 2017-01-01 | Stop reason: HOSPADM

## 2017-01-01 RX ORDER — SENNOSIDES 8.6 MG
4 TABLET ORAL 2 TIMES DAILY
Status: DISCONTINUED | OUTPATIENT
Start: 2017-01-01 | End: 2017-01-01

## 2017-01-01 RX ORDER — FENTANYL CITRATE 50 UG/ML
25-50 INJECTION, SOLUTION INTRAMUSCULAR; INTRAVENOUS
Status: CANCELLED | OUTPATIENT
Start: 2017-01-01

## 2017-01-01 RX ORDER — ONDANSETRON 2 MG/ML
INJECTION INTRAMUSCULAR; INTRAVENOUS PRN
Status: DISCONTINUED | OUTPATIENT
Start: 2017-01-01 | End: 2017-01-01

## 2017-01-01 RX ORDER — DIPHENHYDRAMINE HCL 25 MG
25 CAPSULE ORAL ONCE
Status: COMPLETED | OUTPATIENT
Start: 2017-01-01 | End: 2017-01-01

## 2017-01-01 RX ORDER — PROPOFOL 10 MG/ML
INJECTION, EMULSION INTRAVENOUS PRN
Status: DISCONTINUED | OUTPATIENT
Start: 2017-01-01 | End: 2017-01-01

## 2017-01-01 RX ORDER — LABETALOL HYDROCHLORIDE 5 MG/ML
10 INJECTION, SOLUTION INTRAVENOUS ONCE
Status: DISCONTINUED | OUTPATIENT
Start: 2017-01-01 | End: 2017-01-01

## 2017-01-01 RX ORDER — LORAZEPAM 2 MG/ML
1 INJECTION INTRAMUSCULAR EVERY 6 HOURS PRN
Status: CANCELLED
Start: 2017-01-01

## 2017-01-01 RX ORDER — HEPARIN SODIUM,PORCINE 10 UNIT/ML
5-10 VIAL (ML) INTRAVENOUS EVERY 24 HOURS
Status: DISCONTINUED | OUTPATIENT
Start: 2017-01-01 | End: 2017-01-01 | Stop reason: HOSPADM

## 2017-01-01 RX ORDER — LIDOCAINE 40 MG/G
CREAM TOPICAL
Status: DISCONTINUED | OUTPATIENT
Start: 2017-01-01 | End: 2017-01-01 | Stop reason: HOSPADM

## 2017-01-01 RX ORDER — HEPARIN SODIUM (PORCINE) LOCK FLUSH IV SOLN 100 UNIT/ML 100 UNIT/ML
5 SOLUTION INTRAVENOUS ONCE
Status: COMPLETED | OUTPATIENT
Start: 2017-01-01 | End: 2017-01-01

## 2017-01-01 RX ORDER — ONDANSETRON 8 MG/1
8 TABLET, ORALLY DISINTEGRATING ORAL EVERY 8 HOURS PRN
COMMUNITY

## 2017-01-01 RX ORDER — SODIUM CHLORIDE, SODIUM LACTATE, POTASSIUM CHLORIDE, CALCIUM CHLORIDE 600; 310; 30; 20 MG/100ML; MG/100ML; MG/100ML; MG/100ML
INJECTION, SOLUTION INTRAVENOUS CONTINUOUS
Status: CANCELLED | OUTPATIENT
Start: 2017-01-01

## 2017-01-01 RX ORDER — METHADONE HYDROCHLORIDE 5 MG/5ML
2.5 SOLUTION ORAL AT BEDTIME
Status: DISCONTINUED | OUTPATIENT
Start: 2017-01-01 | End: 2017-01-01

## 2017-01-01 RX ORDER — HYDROMORPHONE HYDROCHLORIDE 2 MG/1
2-4 TABLET ORAL EVERY 4 HOURS PRN
Status: DISCONTINUED | OUTPATIENT
Start: 2017-01-01 | End: 2017-01-01 | Stop reason: HOSPADM

## 2017-01-01 RX ORDER — HEPARIN SODIUM (PORCINE) LOCK FLUSH IV SOLN 100 UNIT/ML 100 UNIT/ML
5 SOLUTION INTRAVENOUS
Status: CANCELLED | OUTPATIENT
Start: 2017-01-01

## 2017-01-01 RX ORDER — ONDANSETRON 4 MG/1
4 TABLET, ORALLY DISINTEGRATING ORAL EVERY 30 MIN PRN
Status: DISCONTINUED | OUTPATIENT
Start: 2017-01-01 | End: 2017-01-01 | Stop reason: HOSPADM

## 2017-01-01 RX ORDER — POTASSIUM CHLORIDE 1.5 G/1.58G
40 POWDER, FOR SOLUTION ORAL ONCE
Status: COMPLETED | OUTPATIENT
Start: 2017-01-01 | End: 2017-01-01

## 2017-01-01 RX ORDER — ONDANSETRON 4 MG/1
4 TABLET, ORALLY DISINTEGRATING ORAL
COMMUNITY
Start: 2017-01-01 | End: 2017-01-01

## 2017-01-01 RX ORDER — POTASSIUM CL/LIDO/0.9 % NACL 10MEQ/0.1L
10 INTRAVENOUS SOLUTION, PIGGYBACK (ML) INTRAVENOUS
Status: DISCONTINUED | OUTPATIENT
Start: 2017-01-01 | End: 2017-01-01 | Stop reason: HOSPADM

## 2017-01-01 RX ORDER — MORPHINE SULFATE 20 MG/ML
7.5-15 SOLUTION ORAL
Status: DISCONTINUED | OUTPATIENT
Start: 2017-01-01 | End: 2017-01-01 | Stop reason: HOSPADM

## 2017-01-01 RX ORDER — METHYLPREDNISOLONE SODIUM SUCCINATE 125 MG/2ML
125 INJECTION, POWDER, LYOPHILIZED, FOR SOLUTION INTRAMUSCULAR; INTRAVENOUS
Status: CANCELLED
Start: 2017-01-01

## 2017-01-01 RX ORDER — ACETAMINOPHEN 500 MG
1000 TABLET ORAL EVERY 6 HOURS PRN
Status: DISCONTINUED | OUTPATIENT
Start: 2017-01-01 | End: 2017-01-01

## 2017-01-01 RX ORDER — ACETAMINOPHEN 325 MG/1
650 TABLET ORAL EVERY 4 HOURS PRN
Status: DISCONTINUED | OUTPATIENT
Start: 2017-01-01 | End: 2017-01-01 | Stop reason: HOSPADM

## 2017-01-01 RX ORDER — ONDANSETRON 2 MG/ML
4 INJECTION INTRAMUSCULAR; INTRAVENOUS EVERY 30 MIN PRN
Status: DISCONTINUED | OUTPATIENT
Start: 2017-01-01 | End: 2017-01-01 | Stop reason: HOSPADM

## 2017-01-01 RX ORDER — AMOXICILLIN 250 MG
2-3 CAPSULE ORAL 2 TIMES DAILY
Status: DISCONTINUED | OUTPATIENT
Start: 2017-01-01 | End: 2017-01-01 | Stop reason: HOSPADM

## 2017-01-01 RX ORDER — TOLTERODINE 4 MG/1
4 CAPSULE, EXTENDED RELEASE ORAL DAILY
Status: DISCONTINUED | OUTPATIENT
Start: 2017-01-01 | End: 2017-01-01 | Stop reason: HOSPADM

## 2017-01-01 RX ORDER — MAGNESIUM SULFATE HEPTAHYDRATE 40 MG/ML
4 INJECTION, SOLUTION INTRAVENOUS EVERY 4 HOURS PRN
Status: CANCELLED | OUTPATIENT
Start: 2017-01-01

## 2017-01-01 RX ORDER — ALBUTEROL SULFATE 0.83 MG/ML
2.5 SOLUTION RESPIRATORY (INHALATION)
Status: CANCELLED | OUTPATIENT
Start: 2017-01-01

## 2017-01-01 RX ORDER — PALONOSETRON 0.05 MG/ML
0.25 INJECTION, SOLUTION INTRAVENOUS ONCE
Status: COMPLETED | OUTPATIENT
Start: 2017-01-01 | End: 2017-01-01

## 2017-01-01 RX ORDER — CLINDAMYCIN PHOSPHATE 900 MG/50ML
900 INJECTION, SOLUTION INTRAVENOUS SEE ADMIN INSTRUCTIONS
Status: CANCELLED | OUTPATIENT
Start: 2017-01-01

## 2017-01-01 RX ORDER — LORAZEPAM 2 MG/ML
1 INJECTION INTRAMUSCULAR EVERY 4 HOURS PRN
Status: DISCONTINUED | OUTPATIENT
Start: 2017-01-01 | End: 2017-01-01 | Stop reason: HOSPADM

## 2017-01-01 RX ORDER — AMOXICILLIN 250 MG
2 CAPSULE ORAL 2 TIMES DAILY
Status: DISCONTINUED | OUTPATIENT
Start: 2017-01-01 | End: 2017-01-01

## 2017-01-01 RX ORDER — SIMETHICONE 80 MG
80 TABLET,CHEWABLE ORAL 4 TIMES DAILY PRN
Status: DISCONTINUED | OUTPATIENT
Start: 2017-01-01 | End: 2017-01-01 | Stop reason: HOSPADM

## 2017-01-01 RX ORDER — ALBUTEROL SULFATE 90 UG/1
1-2 AEROSOL, METERED RESPIRATORY (INHALATION)
Status: CANCELLED
Start: 2017-01-01

## 2017-01-01 RX ORDER — AMOXICILLIN 250 MG
1-2 CAPSULE ORAL 2 TIMES DAILY
Status: DISCONTINUED | OUTPATIENT
Start: 2017-01-01 | End: 2017-01-01 | Stop reason: HOSPADM

## 2017-01-01 RX ORDER — HYDROMORPHONE HYDROCHLORIDE 4 MG/1
4 TABLET ORAL EVERY 4 HOURS PRN
Qty: 35 TABLET | Refills: 0 | Status: SHIPPED | OUTPATIENT
Start: 2017-01-01 | End: 2017-01-01

## 2017-01-01 RX ORDER — NALOXONE HYDROCHLORIDE 0.4 MG/ML
.1-.4 INJECTION, SOLUTION INTRAMUSCULAR; INTRAVENOUS; SUBCUTANEOUS
Status: CANCELLED | OUTPATIENT
Start: 2017-01-01 | End: 2017-01-01

## 2017-01-01 RX ORDER — HYDROMORPHONE HYDROCHLORIDE 1 MG/ML
.3-.5 INJECTION, SOLUTION INTRAMUSCULAR; INTRAVENOUS; SUBCUTANEOUS EVERY 4 HOURS PRN
Status: DISCONTINUED | OUTPATIENT
Start: 2017-01-01 | End: 2017-01-01 | Stop reason: HOSPADM

## 2017-01-01 RX ORDER — IBUPROFEN 400 MG/1
400 TABLET, FILM COATED ORAL EVERY 6 HOURS PRN
Status: DISCONTINUED | OUTPATIENT
Start: 2017-01-01 | End: 2017-01-01 | Stop reason: HOSPADM

## 2017-01-01 RX ORDER — SODIUM CHLORIDE 9 MG/ML
1000 INJECTION, SOLUTION INTRAVENOUS CONTINUOUS
Status: DISCONTINUED | OUTPATIENT
Start: 2017-01-01 | End: 2017-01-01 | Stop reason: HOSPADM

## 2017-01-01 RX ORDER — TOLTERODINE 4 MG/1
4 CAPSULE, EXTENDED RELEASE ORAL DAILY
Status: DISCONTINUED | OUTPATIENT
Start: 2017-01-01 | End: 2017-01-01

## 2017-01-01 RX ORDER — BISACODYL 10 MG
10 SUPPOSITORY, RECTAL RECTAL DAILY
Status: DISCONTINUED | OUTPATIENT
Start: 2017-01-01 | End: 2017-01-01 | Stop reason: HOSPADM

## 2017-01-01 RX ORDER — GADOBUTROL 604.72 MG/ML
7.5 INJECTION INTRAVENOUS ONCE
Status: COMPLETED | OUTPATIENT
Start: 2017-01-01 | End: 2017-01-01

## 2017-01-01 RX ORDER — ESMOLOL HYDROCHLORIDE 10 MG/ML
INJECTION INTRAVENOUS PRN
Status: DISCONTINUED | OUTPATIENT
Start: 2017-01-01 | End: 2017-01-01

## 2017-01-01 RX ORDER — LIDOCAINE HYDROCHLORIDE 20 MG/ML
INJECTION, SOLUTION INFILTRATION; PERINEURAL PRN
Status: DISCONTINUED | OUTPATIENT
Start: 2017-01-01 | End: 2017-01-01

## 2017-01-01 RX ORDER — KETAMINE HYDROCHLORIDE 10 MG/ML
INJECTION, SOLUTION INTRAMUSCULAR; INTRAVENOUS PRN
Status: DISCONTINUED | OUTPATIENT
Start: 2017-01-01 | End: 2017-01-01

## 2017-01-01 RX ORDER — ACETAMINOPHEN 500 MG
1000 TABLET ORAL EVERY 8 HOURS
Status: DISCONTINUED | OUTPATIENT
Start: 2017-01-01 | End: 2017-01-01 | Stop reason: HOSPADM

## 2017-01-01 RX ORDER — AMLODIPINE BESYLATE 2.5 MG/1
2.5 TABLET ORAL DAILY
Status: DISCONTINUED | OUTPATIENT
Start: 2017-01-01 | End: 2017-01-01 | Stop reason: HOSPADM

## 2017-01-01 RX ORDER — MORPHINE SULFATE 4 MG/ML
5 INJECTION, SOLUTION INTRAMUSCULAR; INTRAVENOUS
Status: DISCONTINUED | OUTPATIENT
Start: 2017-01-01 | End: 2017-01-01

## 2017-01-01 RX ORDER — ACETAMINOPHEN 325 MG/1
975 TABLET ORAL ONCE
Status: DISCONTINUED | OUTPATIENT
Start: 2017-01-01 | End: 2017-01-01 | Stop reason: HOSPADM

## 2017-01-01 RX ORDER — PROCHLORPERAZINE 25 MG
25 SUPPOSITORY, RECTAL RECTAL EVERY 12 HOURS PRN
Status: DISCONTINUED | OUTPATIENT
Start: 2017-01-01 | End: 2017-01-01 | Stop reason: HOSPADM

## 2017-01-01 RX ORDER — FENTANYL CITRATE 50 UG/ML
50-100 INJECTION, SOLUTION INTRAMUSCULAR; INTRAVENOUS
Status: COMPLETED | OUTPATIENT
Start: 2017-01-01 | End: 2017-01-01

## 2017-01-01 RX ORDER — TAMSULOSIN HYDROCHLORIDE 0.4 MG/1
0.4 CAPSULE ORAL DAILY
Status: DISCONTINUED | OUTPATIENT
Start: 2017-01-01 | End: 2017-01-01

## 2017-01-01 RX ORDER — METHYLPHENIDATE HYDROCHLORIDE 5 MG/1
5 TABLET ORAL 2 TIMES DAILY
Qty: 60 TABLET | Refills: 0 | Status: SHIPPED | OUTPATIENT
Start: 2017-01-01

## 2017-01-01 RX ORDER — PROPOFOL 10 MG/ML
INJECTION, EMULSION INTRAVENOUS CONTINUOUS PRN
Status: DISCONTINUED | OUTPATIENT
Start: 2017-01-01 | End: 2017-01-01

## 2017-01-01 RX ORDER — LORATADINE 10 MG/1
10 TABLET ORAL DAILY
Status: DISCONTINUED | OUTPATIENT
Start: 2017-01-01 | End: 2017-01-01 | Stop reason: HOSPADM

## 2017-01-01 RX ORDER — ONDANSETRON 2 MG/ML
4 INJECTION INTRAMUSCULAR; INTRAVENOUS EVERY 30 MIN PRN
Status: CANCELLED | OUTPATIENT
Start: 2017-01-01

## 2017-01-01 RX ORDER — ACETAMINOPHEN 325 MG/1
650 TABLET ORAL EVERY 6 HOURS
Status: DISCONTINUED | OUTPATIENT
Start: 2017-01-01 | End: 2017-01-01

## 2017-01-01 RX ORDER — MEPERIDINE HYDROCHLORIDE 25 MG/ML
12.5 INJECTION INTRAMUSCULAR; INTRAVENOUS; SUBCUTANEOUS
Status: DISCONTINUED | OUTPATIENT
Start: 2017-01-01 | End: 2017-01-01 | Stop reason: HOSPADM

## 2017-01-01 RX ORDER — ONDANSETRON 4 MG/1
4 TABLET, ORALLY DISINTEGRATING ORAL EVERY 6 HOURS PRN
Status: DISCONTINUED | OUTPATIENT
Start: 2017-01-01 | End: 2017-01-01 | Stop reason: HOSPADM

## 2017-01-01 RX ORDER — CYCLOSPORINE 0.5 MG/ML
1 EMULSION OPHTHALMIC 2 TIMES DAILY
Status: DISCONTINUED | OUTPATIENT
Start: 2017-01-01 | End: 2017-01-01 | Stop reason: HOSPADM

## 2017-01-01 RX ORDER — POLYETHYLENE GLYCOL 3350 17 G/17G
17 POWDER, FOR SOLUTION ORAL DAILY
Status: DISCONTINUED | OUTPATIENT
Start: 2017-01-01 | End: 2017-01-01

## 2017-01-01 RX ORDER — MORPHINE SULFATE 20 MG/ML
7.5-15 SOLUTION ORAL
Qty: 45 ML | Refills: 0 | Status: SHIPPED | OUTPATIENT
Start: 2017-01-01

## 2017-01-01 RX ORDER — GABAPENTIN 300 MG/1
300 CAPSULE ORAL AT BEDTIME
Qty: 30 CAPSULE | Refills: 0 | Status: SHIPPED | OUTPATIENT
Start: 2017-01-01 | End: 2017-01-01

## 2017-01-01 RX ORDER — EPINEPHRINE 0.3 MG/.3ML
0.3 INJECTION SUBCUTANEOUS EVERY 5 MIN PRN
Status: CANCELLED | OUTPATIENT
Start: 2017-01-01

## 2017-01-01 RX ORDER — ONDANSETRON 2 MG/ML
4 INJECTION INTRAMUSCULAR; INTRAVENOUS ONCE
Status: COMPLETED | OUTPATIENT
Start: 2017-01-01 | End: 2017-01-01

## 2017-01-01 RX ORDER — PALONOSETRON 0.05 MG/ML
0.25 INJECTION, SOLUTION INTRAVENOUS ONCE
Status: CANCELLED
Start: 2017-01-01 | End: 2017-01-01

## 2017-01-01 RX ORDER — TAMSULOSIN HYDROCHLORIDE 0.4 MG/1
0.4 CAPSULE ORAL DAILY
Status: DISCONTINUED | OUTPATIENT
Start: 2017-01-01 | End: 2017-01-01 | Stop reason: HOSPADM

## 2017-01-01 RX ORDER — CLINDAMYCIN PHOSPHATE 900 MG/50ML
900 INJECTION, SOLUTION INTRAVENOUS
Status: CANCELLED | OUTPATIENT
Start: 2017-01-01

## 2017-01-01 RX ORDER — LIDOCAINE HYDROCHLORIDE 10 MG/ML
INJECTION, SOLUTION EPIDURAL; INFILTRATION; INTRACAUDAL; PERINEURAL PRN
Status: DISCONTINUED | OUTPATIENT
Start: 2017-01-01 | End: 2017-01-01 | Stop reason: HOSPADM

## 2017-01-01 RX ORDER — KETOROLAC TROMETHAMINE 30 MG/ML
30 INJECTION, SOLUTION INTRAMUSCULAR; INTRAVENOUS ONCE
Status: COMPLETED | OUTPATIENT
Start: 2017-01-01 | End: 2017-01-01

## 2017-01-01 RX ORDER — SODIUM CHLORIDE, SODIUM LACTATE, POTASSIUM CHLORIDE, CALCIUM CHLORIDE 600; 310; 30; 20 MG/100ML; MG/100ML; MG/100ML; MG/100ML
500 INJECTION, SOLUTION INTRAVENOUS CONTINUOUS
Status: DISCONTINUED | OUTPATIENT
Start: 2017-01-01 | End: 2017-01-01 | Stop reason: HOSPADM

## 2017-01-01 RX ORDER — ONDANSETRON 4 MG/1
4 TABLET, ORALLY DISINTEGRATING ORAL EVERY 30 MIN PRN
Status: CANCELLED | OUTPATIENT
Start: 2017-01-01

## 2017-01-01 RX ORDER — MAGNESIUM CARB/ALUMINUM HYDROX 105-160MG
30 TABLET,CHEWABLE ORAL ONCE
Status: DISCONTINUED | OUTPATIENT
Start: 2017-01-01 | End: 2017-01-01 | Stop reason: HOSPADM

## 2017-01-01 RX ORDER — FENTANYL CITRATE 50 UG/ML
25-50 INJECTION, SOLUTION INTRAMUSCULAR; INTRAVENOUS
Status: DISCONTINUED | OUTPATIENT
Start: 2017-01-01 | End: 2017-01-01 | Stop reason: HOSPADM

## 2017-01-01 RX ORDER — POTASSIUM CHLORIDE 7.45 MG/ML
10 INJECTION INTRAVENOUS
Status: DISCONTINUED | OUTPATIENT
Start: 2017-01-01 | End: 2017-01-01 | Stop reason: HOSPADM

## 2017-01-01 RX ORDER — GRANULES FOR ORAL 3 G/1
3 POWDER ORAL ONCE
Qty: 1 PACKET | Refills: 0 | Status: SHIPPED | OUTPATIENT
Start: 2017-01-01 | End: 2017-01-01

## 2017-01-01 RX ORDER — ACETAMINOPHEN 325 MG/1
650 TABLET ORAL ONCE
Status: CANCELLED | OUTPATIENT
Start: 2017-01-01 | End: 2017-01-01

## 2017-01-01 RX ORDER — PROCHLORPERAZINE MALEATE 5 MG
5-10 TABLET ORAL EVERY 6 HOURS PRN
Status: DISCONTINUED | OUTPATIENT
Start: 2017-01-01 | End: 2017-01-01 | Stop reason: HOSPADM

## 2017-01-01 RX ORDER — OXYCODONE HYDROCHLORIDE 5 MG/1
5-10 TABLET ORAL
Qty: 15 TABLET | Refills: 0 | Status: SHIPPED | OUTPATIENT
Start: 2017-01-01 | End: 2017-01-01

## 2017-01-01 RX ORDER — TOLTERODINE 4 MG/1
4 CAPSULE, EXTENDED RELEASE ORAL ONCE
Status: DISCONTINUED | OUTPATIENT
Start: 2017-01-01 | End: 2017-01-01 | Stop reason: HOSPADM

## 2017-01-01 RX ORDER — FENTANYL CITRATE 50 UG/ML
INJECTION, SOLUTION INTRAMUSCULAR; INTRAVENOUS PRN
Status: DISCONTINUED | OUTPATIENT
Start: 2017-01-01 | End: 2017-01-01 | Stop reason: HOSPADM

## 2017-01-01 RX ORDER — CLINDAMYCIN PHOSPHATE 900 MG/50ML
900 INJECTION, SOLUTION INTRAVENOUS
Status: DISCONTINUED | OUTPATIENT
Start: 2017-01-01 | End: 2017-01-01 | Stop reason: HOSPADM

## 2017-01-01 RX ORDER — METHYLPHENIDATE HYDROCHLORIDE 5 MG/1
TABLET ORAL
Qty: 30 TABLET | Refills: 0 | Status: SHIPPED | OUTPATIENT
Start: 2017-01-01 | End: 2017-01-01

## 2017-01-01 RX ORDER — OXYCODONE HYDROCHLORIDE 5 MG/1
5 TABLET ORAL EVERY 8 HOURS PRN
Status: DISCONTINUED | OUTPATIENT
Start: 2017-01-01 | End: 2017-01-01

## 2017-01-01 RX ORDER — MAGNESIUM CARB/ALUMINUM HYDROX 105-160MG
296 TABLET,CHEWABLE ORAL ONCE
Status: COMPLETED | OUTPATIENT
Start: 2017-01-01 | End: 2017-01-01

## 2017-01-01 RX ORDER — GABAPENTIN 300 MG/1
300 CAPSULE ORAL ONCE
Status: COMPLETED | OUTPATIENT
Start: 2017-01-01 | End: 2017-01-01

## 2017-01-01 RX ORDER — FENTANYL CITRATE 50 UG/ML
25-50 INJECTION, SOLUTION INTRAMUSCULAR; INTRAVENOUS EVERY 5 MIN PRN
Status: DISCONTINUED | OUTPATIENT
Start: 2017-01-01 | End: 2017-01-01 | Stop reason: HOSPADM

## 2017-01-01 RX ORDER — MORPHINE SULFATE 15 MG/1
15 TABLET, FILM COATED, EXTENDED RELEASE ORAL EVERY 12 HOURS
Qty: 60 TABLET | Refills: 0 | Status: SHIPPED | OUTPATIENT
Start: 2017-01-01 | End: 2017-01-01

## 2017-01-01 RX ORDER — ONDANSETRON 8 MG/1
8 TABLET, ORALLY DISINTEGRATING ORAL EVERY 8 HOURS PRN
Qty: 60 TABLET | Refills: 3 | Status: SHIPPED | OUTPATIENT
Start: 2017-01-01 | End: 2017-01-01

## 2017-01-01 RX ORDER — HYDROMORPHONE HCL/0.9% NACL/PF 0.2MG/0.2
.2-.4 SYRINGE (ML) INTRAVENOUS
Status: DISCONTINUED | OUTPATIENT
Start: 2017-01-01 | End: 2017-01-01

## 2017-01-01 RX ORDER — ONDANSETRON 8 MG/1
8 TABLET, FILM COATED ORAL EVERY 8 HOURS PRN
Status: DISCONTINUED | OUTPATIENT
Start: 2017-01-01 | End: 2017-01-01 | Stop reason: HOSPADM

## 2017-01-01 RX ORDER — OXYCODONE HYDROCHLORIDE 5 MG/1
10 TABLET ORAL ONCE
Status: DISCONTINUED | OUTPATIENT
Start: 2017-01-01 | End: 2017-01-01 | Stop reason: HOSPADM

## 2017-01-01 RX ORDER — OXYCODONE HYDROCHLORIDE 5 MG/1
5 TABLET ORAL ONCE
Status: DISCONTINUED | OUTPATIENT
Start: 2017-01-01 | End: 2017-01-01 | Stop reason: HOSPADM

## 2017-01-01 RX ORDER — IBUPROFEN 600 MG/1
600 TABLET, FILM COATED ORAL EVERY 6 HOURS
Qty: 30 TABLET | Refills: 0 | Status: SHIPPED | OUTPATIENT
Start: 2017-01-01

## 2017-01-01 RX ORDER — LORAZEPAM 2 MG/ML
0.5 INJECTION INTRAMUSCULAR EVERY 6 HOURS PRN
Status: DISCONTINUED | OUTPATIENT
Start: 2017-01-01 | End: 2017-01-01

## 2017-01-01 RX ORDER — METOCLOPRAMIDE 10 MG/1
10 TABLET ORAL EVERY 6 HOURS PRN
Status: DISCONTINUED | OUTPATIENT
Start: 2017-01-01 | End: 2017-01-01 | Stop reason: HOSPADM

## 2017-01-01 RX ORDER — PROMETHAZINE HYDROCHLORIDE 25 MG/1
25 SUPPOSITORY RECTAL
COMMUNITY
Start: 2017-01-01 | End: 2017-01-01

## 2017-01-01 RX ORDER — LIDOCAINE 40 MG/G
CREAM TOPICAL
Status: DISCONTINUED | OUTPATIENT
Start: 2017-01-01 | End: 2017-01-01

## 2017-01-01 RX ORDER — ACETAMINOPHEN 325 MG/1
975 TABLET ORAL ONCE
Status: COMPLETED | OUTPATIENT
Start: 2017-01-01 | End: 2017-01-01

## 2017-01-01 RX ORDER — BISACODYL 10 MG
10 SUPPOSITORY, RECTAL RECTAL DAILY PRN
Status: DISCONTINUED | OUTPATIENT
Start: 2017-01-01 | End: 2017-01-01 | Stop reason: HOSPADM

## 2017-01-01 RX ORDER — IOPAMIDOL 408 MG/ML
INJECTION, SOLUTION INTRAVASCULAR PRN
Status: DISCONTINUED | OUTPATIENT
Start: 2017-01-01 | End: 2017-01-01 | Stop reason: HOSPADM

## 2017-01-01 RX ORDER — FAMOTIDINE 20 MG/1
20 TABLET, FILM COATED ORAL 2 TIMES DAILY
Status: DISCONTINUED | OUTPATIENT
Start: 2017-01-01 | End: 2017-01-01 | Stop reason: HOSPADM

## 2017-01-01 RX ORDER — LORAZEPAM 0.5 MG/1
0.5 TABLET ORAL EVERY 4 HOURS PRN
Status: DISCONTINUED | OUTPATIENT
Start: 2017-01-01 | End: 2017-01-01 | Stop reason: HOSPADM

## 2017-01-01 RX ORDER — IODIXANOL 320 MG/ML
50 INJECTION, SOLUTION INTRAVASCULAR ONCE
Status: COMPLETED | OUTPATIENT
Start: 2017-01-01 | End: 2017-01-01

## 2017-01-01 RX ORDER — IBUPROFEN 600 MG/1
600 TABLET, FILM COATED ORAL EVERY 6 HOURS
Qty: 30 TABLET | Refills: 0 | Status: SHIPPED | OUTPATIENT
Start: 2017-01-01 | End: 2017-01-01

## 2017-01-01 RX ORDER — CITALOPRAM HYDROBROMIDE 10 MG/1
10 TABLET ORAL DAILY
Qty: 90 TABLET | Refills: 1 | Status: SHIPPED | OUTPATIENT
Start: 2017-01-01 | End: 2017-01-01

## 2017-01-01 RX ORDER — POTASSIUM CHLORIDE 29.8 MG/ML
20 INJECTION INTRAVENOUS
Status: DISCONTINUED | OUTPATIENT
Start: 2017-01-01 | End: 2017-01-01 | Stop reason: HOSPADM

## 2017-01-01 RX ORDER — ACETAMINOPHEN 325 MG/1
650 TABLET ORAL ONCE
Status: DISCONTINUED | OUTPATIENT
Start: 2017-01-01 | End: 2017-01-01 | Stop reason: HOSPADM

## 2017-01-01 RX ORDER — GABAPENTIN 100 MG/1
CAPSULE ORAL
Qty: 90 CAPSULE | Refills: 1 | Status: ON HOLD | OUTPATIENT
Start: 2017-01-01 | End: 2017-01-01

## 2017-01-01 RX ORDER — DEXAMETHASONE SODIUM PHOSPHATE 4 MG/ML
4 INJECTION, SOLUTION INTRA-ARTICULAR; INTRALESIONAL; INTRAMUSCULAR; INTRAVENOUS; SOFT TISSUE EVERY 10 MIN PRN
Status: DISCONTINUED | OUTPATIENT
Start: 2017-01-01 | End: 2017-01-01 | Stop reason: HOSPADM

## 2017-01-01 RX ORDER — BUPIVACAINE HYDROCHLORIDE 2.5 MG/ML
INJECTION, SOLUTION EPIDURAL; INFILTRATION; INTRACAUDAL PRN
Status: DISCONTINUED | OUTPATIENT
Start: 2017-01-01 | End: 2017-01-01 | Stop reason: HOSPADM

## 2017-01-01 RX ORDER — NITROFURANTOIN 25; 75 MG/1; MG/1
100 CAPSULE ORAL 2 TIMES DAILY
Qty: 20 CAPSULE | Refills: 0 | Status: SHIPPED | OUTPATIENT
Start: 2017-01-01 | End: 2017-01-01

## 2017-01-01 RX ORDER — POTASSIUM CHLORIDE 7.45 MG/ML
10 INJECTION INTRAVENOUS
Status: CANCELLED | OUTPATIENT
Start: 2017-01-01

## 2017-01-01 RX ORDER — ONDANSETRON 2 MG/ML
4 INJECTION INTRAMUSCULAR; INTRAVENOUS EVERY 6 HOURS PRN
Status: DISCONTINUED | OUTPATIENT
Start: 2017-01-01 | End: 2017-01-01 | Stop reason: HOSPADM

## 2017-01-01 RX ORDER — TRAMADOL HYDROCHLORIDE 50 MG/1
50-100 TABLET ORAL EVERY 6 HOURS PRN
Status: DISCONTINUED | OUTPATIENT
Start: 2017-01-01 | End: 2017-01-01 | Stop reason: HOSPADM

## 2017-01-01 RX ORDER — DEXTROSE MONOHYDRATE 25 G/50ML
25-50 INJECTION, SOLUTION INTRAVENOUS
Status: DISCONTINUED | OUTPATIENT
Start: 2017-01-01 | End: 2017-01-01 | Stop reason: HOSPADM

## 2017-01-01 RX ORDER — DIAZEPAM 10 MG/2ML
5-10 INJECTION, SOLUTION INTRAMUSCULAR; INTRAVENOUS EVERY 4 HOURS PRN
Status: DISCONTINUED | OUTPATIENT
Start: 2017-01-01 | End: 2017-01-01

## 2017-01-01 RX ORDER — SCOLOPAMINE TRANSDERMAL SYSTEM 1 MG/1
1 PATCH, EXTENDED RELEASE TRANSDERMAL ONCE
Status: COMPLETED | OUTPATIENT
Start: 2017-01-01 | End: 2017-01-01

## 2017-01-01 RX ORDER — BUPRENORPHINE 5 UG/H
1 PATCH TRANSDERMAL
Qty: 4 PATCH | Refills: 3 | Status: ON HOLD | OUTPATIENT
Start: 2017-01-01 | End: 2017-01-01

## 2017-01-01 RX ORDER — SODIUM CHLORIDE 9 MG/ML
INJECTION, SOLUTION INTRAVENOUS CONTINUOUS
Status: DISCONTINUED | OUTPATIENT
Start: 2017-01-01 | End: 2017-01-01

## 2017-01-01 RX ORDER — METHYLPREDNISOLONE 16 MG/1
32 TABLET ORAL
COMMUNITY
End: 2017-01-01

## 2017-01-01 RX ORDER — NICOTINE POLACRILEX 4 MG
15-30 LOZENGE BUCCAL
Status: DISCONTINUED | OUTPATIENT
Start: 2017-01-01 | End: 2017-01-01 | Stop reason: HOSPADM

## 2017-01-01 RX ORDER — TRAMADOL HYDROCHLORIDE 50 MG/1
25-50 TABLET ORAL
COMMUNITY
End: 2017-01-01

## 2017-01-01 RX ORDER — ACETAMINOPHEN 500 MG
500 TABLET ORAL EVERY 6 HOURS PRN
Status: DISCONTINUED | OUTPATIENT
Start: 2017-01-01 | End: 2017-01-01

## 2017-01-01 RX ORDER — HEPARIN SODIUM,PORCINE 10 UNIT/ML
5-10 VIAL (ML) INTRAVENOUS
Status: DISCONTINUED | OUTPATIENT
Start: 2017-01-01 | End: 2017-01-01 | Stop reason: HOSPADM

## 2017-01-01 RX ORDER — FENTANYL CITRATE 50 UG/ML
50 INJECTION, SOLUTION INTRAMUSCULAR; INTRAVENOUS ONCE
Status: DISCONTINUED | OUTPATIENT
Start: 2017-01-01 | End: 2017-01-01 | Stop reason: HOSPADM

## 2017-01-01 RX ORDER — HYDROMORPHONE HYDROCHLORIDE 4 MG/1
4 TABLET ORAL
Status: DISCONTINUED | OUTPATIENT
Start: 2017-01-01 | End: 2017-01-01

## 2017-01-01 RX ORDER — CEFPODOXIME PROXETIL 200 MG/1
200 TABLET, FILM COATED ORAL 2 TIMES DAILY
Qty: 14 TABLET | Refills: 0 | Status: SHIPPED | OUTPATIENT
Start: 2017-01-01 | End: 2017-01-01

## 2017-01-01 RX ORDER — FLUMAZENIL 0.1 MG/ML
0.2 INJECTION, SOLUTION INTRAVENOUS
Status: DISCONTINUED | OUTPATIENT
Start: 2017-01-01 | End: 2017-01-01 | Stop reason: HOSPADM

## 2017-01-01 RX ORDER — POTASSIUM CHLORIDE 29.8 MG/ML
20 INJECTION INTRAVENOUS
Status: CANCELLED | OUTPATIENT
Start: 2017-01-01

## 2017-01-01 RX ORDER — POTASSIUM CHLORIDE 1.5 G/1.58G
20-40 POWDER, FOR SOLUTION ORAL
Status: DISCONTINUED | OUTPATIENT
Start: 2017-01-01 | End: 2017-01-01 | Stop reason: HOSPADM

## 2017-01-01 RX ORDER — MEGESTROL ACETATE 40 MG/ML
400 SUSPENSION ORAL 2 TIMES DAILY
Qty: 600 ML | Refills: 1 | Status: SHIPPED | OUTPATIENT
Start: 2017-01-01 | End: 2017-01-01

## 2017-01-01 RX ORDER — ACETAMINOPHEN 500 MG
1000 TABLET ORAL 3 TIMES DAILY
Status: DISCONTINUED | OUTPATIENT
Start: 2017-01-01 | End: 2017-01-01

## 2017-01-01 RX ORDER — KETOROLAC TROMETHAMINE 30 MG/ML
30 INJECTION, SOLUTION INTRAMUSCULAR; INTRAVENOUS EVERY 6 HOURS PRN
Status: DISCONTINUED | OUTPATIENT
Start: 2017-01-01 | End: 2017-01-01 | Stop reason: HOSPADM

## 2017-01-01 RX ORDER — METHADONE HYDROCHLORIDE 5 MG/5ML
2.5 SOLUTION ORAL 2 TIMES DAILY
Status: DISCONTINUED | OUTPATIENT
Start: 2017-01-01 | End: 2017-01-01 | Stop reason: HOSPADM

## 2017-01-01 RX ORDER — PROMETHAZINE HYDROCHLORIDE 25 MG/1
25 SUPPOSITORY RECTAL EVERY 4 HOURS PRN
Status: DISCONTINUED | OUTPATIENT
Start: 2017-01-01 | End: 2017-01-01 | Stop reason: HOSPADM

## 2017-01-01 RX ORDER — NALOXONE HYDROCHLORIDE 0.4 MG/ML
.1-.4 INJECTION, SOLUTION INTRAMUSCULAR; INTRAVENOUS; SUBCUTANEOUS
Status: DISCONTINUED | OUTPATIENT
Start: 2017-01-01 | End: 2017-01-01

## 2017-01-01 RX ORDER — LORATADINE 10 MG/1
10 TABLET ORAL ONCE
Status: COMPLETED | OUTPATIENT
Start: 2017-01-01 | End: 2017-01-01

## 2017-01-01 RX ORDER — DEXAMETHASONE SODIUM PHOSPHATE 4 MG/ML
INJECTION, SOLUTION INTRA-ARTICULAR; INTRALESIONAL; INTRAMUSCULAR; INTRAVENOUS; SOFT TISSUE PRN
Status: DISCONTINUED | OUTPATIENT
Start: 2017-01-01 | End: 2017-01-01

## 2017-01-01 RX ORDER — ONDANSETRON 8 MG/1
8 TABLET, ORALLY DISINTEGRATING ORAL EVERY 8 HOURS PRN
Qty: 30 TABLET | Refills: 3 | Status: SHIPPED | OUTPATIENT
Start: 2017-01-01 | End: 2017-01-01

## 2017-01-01 RX ORDER — POLYETHYLENE GLYCOL 3350 17 G/17G
17 POWDER, FOR SOLUTION ORAL 2 TIMES DAILY
Status: DISCONTINUED | OUTPATIENT
Start: 2017-01-01 | End: 2017-01-01

## 2017-01-01 RX ORDER — OXYCODONE HYDROCHLORIDE 5 MG/1
5-10 TABLET ORAL
Status: ON HOLD | COMMUNITY
End: 2017-01-01

## 2017-01-01 RX ORDER — LEVOFLOXACIN 500 MG/1
500 TABLET, FILM COATED ORAL
Status: ON HOLD | COMMUNITY
Start: 2017-01-01 | End: 2017-01-01

## 2017-01-01 RX ORDER — KETOROLAC TROMETHAMINE 30 MG/ML
15 INJECTION, SOLUTION INTRAMUSCULAR; INTRAVENOUS EVERY 6 HOURS PRN
Status: DISCONTINUED | OUTPATIENT
Start: 2017-01-01 | End: 2017-01-01

## 2017-01-01 RX ORDER — DEXAMETHASONE SODIUM PHOSPHATE 4 MG/ML
2 INJECTION, SOLUTION INTRA-ARTICULAR; INTRALESIONAL; INTRAMUSCULAR; INTRAVENOUS; SOFT TISSUE DAILY
Status: DISCONTINUED | OUTPATIENT
Start: 2017-01-01 | End: 2017-01-01 | Stop reason: HOSPADM

## 2017-01-01 RX ORDER — ALBUTEROL SULFATE 0.83 MG/ML
2.5 SOLUTION RESPIRATORY (INHALATION) EVERY 4 HOURS PRN
Status: DISCONTINUED | OUTPATIENT
Start: 2017-01-01 | End: 2017-01-01 | Stop reason: HOSPADM

## 2017-01-01 RX ORDER — LORAZEPAM 0.5 MG/1
0.5 TABLET ORAL EVERY 4 HOURS PRN
Qty: 30 TABLET | Refills: 3 | Status: SHIPPED | OUTPATIENT
Start: 2017-01-01 | End: 2017-01-01

## 2017-01-01 RX ORDER — ONDANSETRON 8 MG/1
8 TABLET, ORALLY DISINTEGRATING ORAL EVERY 8 HOURS PRN
Status: DISCONTINUED | OUTPATIENT
Start: 2017-01-01 | End: 2017-01-01

## 2017-01-01 RX ORDER — CIPROFLOXACIN 500 MG/1
500 TABLET, FILM COATED ORAL
COMMUNITY
End: 2017-01-01

## 2017-01-01 RX ORDER — FENTANYL CITRATE 50 UG/ML
INJECTION, SOLUTION INTRAMUSCULAR; INTRAVENOUS PRN
Status: DISCONTINUED | OUTPATIENT
Start: 2017-01-01 | End: 2017-01-01

## 2017-01-01 RX ORDER — OXYCODONE HYDROCHLORIDE 5 MG/1
5-10 TABLET ORAL
Status: DISCONTINUED | OUTPATIENT
Start: 2017-01-01 | End: 2017-01-01

## 2017-01-01 RX ORDER — CEFAZOLIN SODIUM 1 G/3ML
INJECTION, POWDER, FOR SOLUTION INTRAMUSCULAR; INTRAVENOUS PRN
Status: DISCONTINUED | OUTPATIENT
Start: 2017-01-01 | End: 2017-01-01

## 2017-01-01 RX ORDER — ACETAMINOPHEN 10 MG/ML
1000 INJECTION, SOLUTION INTRAVENOUS ONCE
Status: COMPLETED | OUTPATIENT
Start: 2017-01-01 | End: 2017-01-01

## 2017-01-01 RX ORDER — BUPRENORPHINE 5 UG/H
1 PATCH TRANSDERMAL
Qty: 4 PATCH | Refills: 1 | Status: SHIPPED | OUTPATIENT
Start: 2017-01-01 | End: 2017-01-01

## 2017-01-01 RX ORDER — PROCHLORPERAZINE MALEATE 10 MG
10 TABLET ORAL EVERY 6 HOURS PRN
Qty: 30 TABLET | Refills: 3 | Status: SHIPPED | OUTPATIENT
Start: 2017-01-01 | End: 2017-01-01

## 2017-01-01 RX ORDER — DEXAMETHASONE SODIUM PHOSPHATE 10 MG/ML
INJECTION, SOLUTION INTRAMUSCULAR; INTRAVENOUS PRN
Status: DISCONTINUED | OUTPATIENT
Start: 2017-01-01 | End: 2017-01-01 | Stop reason: HOSPADM

## 2017-01-01 RX ORDER — MORPHINE SULFATE 4 MG/ML
2.5-5 INJECTION, SOLUTION INTRAMUSCULAR; INTRAVENOUS
Status: DISCONTINUED | OUTPATIENT
Start: 2017-01-01 | End: 2017-01-01 | Stop reason: HOSPADM

## 2017-01-01 RX ORDER — HEPARIN SODIUM (PORCINE) LOCK FLUSH IV SOLN 100 UNIT/ML 100 UNIT/ML
500 SOLUTION INTRAVENOUS EVERY 8 HOURS
Status: DISCONTINUED | OUTPATIENT
Start: 2017-01-01 | End: 2017-01-01 | Stop reason: HOSPADM

## 2017-01-01 RX ORDER — LORAZEPAM 1 MG/1
1 TABLET ORAL EVERY 6 HOURS PRN
Status: DISCONTINUED | OUTPATIENT
Start: 2017-01-01 | End: 2017-01-01

## 2017-01-01 RX ORDER — CEFTRIAXONE 1 G/1
1 INJECTION, POWDER, FOR SOLUTION INTRAMUSCULAR; INTRAVENOUS ONCE
Status: COMPLETED | OUTPATIENT
Start: 2017-01-01 | End: 2017-01-01

## 2017-01-01 RX ORDER — GABAPENTIN 300 MG/1
300 CAPSULE ORAL AT BEDTIME
Qty: 30 CAPSULE | Refills: 0 | Status: SHIPPED | OUTPATIENT
Start: 2017-01-01

## 2017-01-01 RX ORDER — POTASSIUM CHLORIDE 20MEQ/15ML
40 LIQUID (ML) ORAL ONCE
Status: COMPLETED | OUTPATIENT
Start: 2017-01-01 | End: 2017-01-01

## 2017-01-01 RX ORDER — MAGNESIUM HYDROXIDE 1200 MG/15ML
LIQUID ORAL PRN
Status: DISCONTINUED | OUTPATIENT
Start: 2017-01-01 | End: 2017-01-01 | Stop reason: HOSPADM

## 2017-01-01 RX ORDER — HYDROMORPHONE HYDROCHLORIDE 2 MG/1
2-4 TABLET ORAL
Status: DISCONTINUED | OUTPATIENT
Start: 2017-01-01 | End: 2017-01-01

## 2017-01-01 RX ORDER — OXYCODONE HYDROCHLORIDE 5 MG/1
5-10 TABLET ORAL
Qty: 30 TABLET | Refills: 0 | Status: ON HOLD | OUTPATIENT
Start: 2017-01-01 | End: 2017-01-01

## 2017-01-01 RX ORDER — KETOROLAC TROMETHAMINE 30 MG/ML
15 INJECTION, SOLUTION INTRAMUSCULAR; INTRAVENOUS ONCE
Status: COMPLETED | OUTPATIENT
Start: 2017-01-01 | End: 2017-01-01

## 2017-01-01 RX ORDER — SODIUM CHLORIDE, SODIUM LACTATE, POTASSIUM CHLORIDE, CALCIUM CHLORIDE 600; 310; 30; 20 MG/100ML; MG/100ML; MG/100ML; MG/100ML
INJECTION, SOLUTION INTRAVENOUS CONTINUOUS
Status: DISCONTINUED | OUTPATIENT
Start: 2017-01-01 | End: 2017-01-01

## 2017-01-01 RX ORDER — OXYCODONE HYDROCHLORIDE 5 MG/1
5-10 TABLET ORAL
Status: DISCONTINUED | OUTPATIENT
Start: 2017-01-01 | End: 2017-01-01 | Stop reason: HOSPADM

## 2017-01-01 RX ORDER — METHYLPHENIDATE HYDROCHLORIDE 5 MG/1
5 TABLET ORAL 2 TIMES DAILY
Qty: 60 TABLET | Refills: 0 | Status: SHIPPED | OUTPATIENT
Start: 2017-01-01 | End: 2017-01-01

## 2017-01-01 RX ORDER — DEXAMETHASONE SODIUM PHOSPHATE 4 MG/ML
2 INJECTION, SOLUTION INTRA-ARTICULAR; INTRALESIONAL; INTRAMUSCULAR; INTRAVENOUS; SOFT TISSUE ONCE
Status: COMPLETED | OUTPATIENT
Start: 2017-01-01 | End: 2017-01-01

## 2017-01-01 RX ORDER — MORPHINE SULFATE 10 MG/5ML
1 SOLUTION ORAL
Qty: 100 ML | Refills: 0 | Status: ON HOLD | OUTPATIENT
Start: 2017-01-01 | End: 2017-01-01

## 2017-01-01 RX ORDER — POLYETHYLENE GLYCOL 3350 17 G/17G
17 POWDER, FOR SOLUTION ORAL 2 TIMES DAILY
Status: DISCONTINUED | OUTPATIENT
Start: 2017-01-01 | End: 2017-01-01 | Stop reason: HOSPADM

## 2017-01-01 RX ORDER — BISACODYL 10 MG
10 SUPPOSITORY, RECTAL RECTAL ONCE
Status: DISCONTINUED | OUTPATIENT
Start: 2017-01-01 | End: 2017-01-01

## 2017-01-01 RX ORDER — ACETAMINOPHEN 10 MG/ML
1000 INJECTION, SOLUTION INTRAVENOUS EVERY 6 HOURS PRN
Status: DISCONTINUED | OUTPATIENT
Start: 2017-01-01 | End: 2017-01-01

## 2017-01-01 RX ORDER — SODIUM CHLORIDE, SODIUM LACTATE, POTASSIUM CHLORIDE, CALCIUM CHLORIDE 600; 310; 30; 20 MG/100ML; MG/100ML; MG/100ML; MG/100ML
INJECTION, SOLUTION INTRAVENOUS CONTINUOUS PRN
Status: DISCONTINUED | OUTPATIENT
Start: 2017-01-01 | End: 2017-01-01

## 2017-01-01 RX ORDER — PROCHLORPERAZINE MALEATE 10 MG
10 TABLET ORAL EVERY 6 HOURS PRN
Qty: 30 TABLET | Refills: 2 | Status: SHIPPED | OUTPATIENT
Start: 2017-01-01 | End: 2017-01-01

## 2017-01-01 RX ORDER — METHADONE HYDROCHLORIDE 5 MG/5ML
2.5 SOLUTION ORAL 2 TIMES DAILY
Qty: 100 ML | Refills: 0 | Status: SHIPPED | OUTPATIENT
Start: 2017-01-01

## 2017-01-01 RX ORDER — DIAZEPAM 10 MG/2ML
5-10 INJECTION, SOLUTION INTRAMUSCULAR; INTRAVENOUS EVERY 4 HOURS PRN
Status: DISCONTINUED | OUTPATIENT
Start: 2017-01-01 | End: 2017-01-01 | Stop reason: HOSPADM

## 2017-01-01 RX ORDER — ONDANSETRON 2 MG/ML
INJECTION INTRAMUSCULAR; INTRAVENOUS
Status: DISCONTINUED
Start: 2017-01-01 | End: 2017-01-01 | Stop reason: WASHOUT

## 2017-01-01 RX ORDER — FERROUS SULFATE 325(65) MG
325 TABLET ORAL
COMMUNITY
End: 2017-01-01

## 2017-01-01 RX ORDER — METOCLOPRAMIDE HYDROCHLORIDE 5 MG/ML
10 INJECTION INTRAMUSCULAR; INTRAVENOUS EVERY 6 HOURS PRN
Status: DISCONTINUED | OUTPATIENT
Start: 2017-01-01 | End: 2017-01-01 | Stop reason: HOSPADM

## 2017-01-01 RX ORDER — ACETAMINOPHEN 500 MG
1000 TABLET ORAL 3 TIMES DAILY
Qty: 100 TABLET | Refills: 0 | Status: ON HOLD | COMMUNITY
Start: 2017-01-01 | End: 2017-01-01

## 2017-01-01 RX ORDER — SODIUM CHLORIDE 9 MG/ML
INJECTION, SOLUTION INTRAVENOUS CONTINUOUS
Status: DISCONTINUED | OUTPATIENT
Start: 2017-01-01 | End: 2017-01-01 | Stop reason: HOSPADM

## 2017-01-01 RX ORDER — HYDROMORPHONE HYDROCHLORIDE 1 MG/ML
.3-.5 INJECTION, SOLUTION INTRAMUSCULAR; INTRAVENOUS; SUBCUTANEOUS
Status: DISCONTINUED | OUTPATIENT
Start: 2017-01-01 | End: 2017-01-01

## 2017-01-01 RX ORDER — HYDRALAZINE HYDROCHLORIDE 20 MG/ML
5 INJECTION INTRAMUSCULAR; INTRAVENOUS ONCE
Status: COMPLETED | OUTPATIENT
Start: 2017-01-01 | End: 2017-01-01

## 2017-01-01 RX ORDER — HYDROMORPHONE HYDROCHLORIDE 1 MG/ML
0.3 INJECTION, SOLUTION INTRAMUSCULAR; INTRAVENOUS; SUBCUTANEOUS
Status: DISCONTINUED | OUTPATIENT
Start: 2017-01-01 | End: 2017-01-01

## 2017-01-01 RX ORDER — HYDROMORPHONE HYDROCHLORIDE 2 MG/1
2 TABLET ORAL
Status: ON HOLD | COMMUNITY
Start: 2017-01-01 | End: 2017-01-01

## 2017-01-01 RX ORDER — CLINDAMYCIN PHOSPHATE 900 MG/50ML
900 INJECTION, SOLUTION INTRAVENOUS
Status: COMPLETED | OUTPATIENT
Start: 2017-01-01 | End: 2017-01-01

## 2017-01-01 RX ORDER — POTASSIUM CHLORIDE 750 MG/1
20-40 TABLET, EXTENDED RELEASE ORAL
Status: CANCELLED | OUTPATIENT
Start: 2017-01-01

## 2017-01-01 RX ORDER — LORAZEPAM 2 MG/ML
1 INJECTION INTRAMUSCULAR
Status: DISCONTINUED | OUTPATIENT
Start: 2017-01-01 | End: 2017-01-01

## 2017-01-01 RX ORDER — METHYLPHENIDATE HYDROCHLORIDE 5 MG/1
5 TABLET ORAL 2 TIMES DAILY
Status: DISCONTINUED | OUTPATIENT
Start: 2017-01-01 | End: 2017-01-01 | Stop reason: HOSPADM

## 2017-01-01 RX ORDER — AMLODIPINE BESYLATE 2.5 MG/1
2.5 TABLET ORAL DAILY
Qty: 30 TABLET | Status: ON HOLD | COMMUNITY
Start: 2017-01-01 | End: 2017-01-01

## 2017-01-01 RX ORDER — GABAPENTIN 300 MG/1
300 CAPSULE ORAL ONCE
Status: DISCONTINUED | OUTPATIENT
Start: 2017-01-01 | End: 2017-01-01 | Stop reason: HOSPADM

## 2017-01-01 RX ORDER — BISACODYL 10 MG
10 SUPPOSITORY, RECTAL RECTAL DAILY PRN
Qty: 10 SUPPOSITORY | Refills: 0 | Status: SHIPPED | OUTPATIENT
Start: 2017-01-01 | End: 2017-01-01

## 2017-01-01 RX ORDER — ONDANSETRON 8 MG/1
8 TABLET, ORALLY DISINTEGRATING ORAL EVERY 8 HOURS PRN
Qty: 90 TABLET | Refills: 3 | Status: SHIPPED | OUTPATIENT
Start: 2017-01-01 | End: 2017-01-01

## 2017-01-01 RX ORDER — GABAPENTIN 100 MG/1
100 CAPSULE ORAL 3 TIMES DAILY
Status: DISCONTINUED | OUTPATIENT
Start: 2017-01-01 | End: 2017-01-01

## 2017-01-01 RX ORDER — PROCHLORPERAZINE MALEATE 5 MG
10 TABLET ORAL EVERY 6 HOURS PRN
Status: DISCONTINUED | OUTPATIENT
Start: 2017-01-01 | End: 2017-01-01 | Stop reason: HOSPADM

## 2017-01-01 RX ORDER — MORPHINE SULFATE 10 MG/5ML
2-4 SOLUTION ORAL
Qty: 100 ML | Refills: 0 | COMMUNITY
Start: 2017-01-01 | End: 2017-01-01

## 2017-01-01 RX ORDER — POTASSIUM CHLORIDE 1.5 G/1.58G
20-40 POWDER, FOR SOLUTION ORAL
Status: CANCELLED | OUTPATIENT
Start: 2017-01-01

## 2017-01-01 RX ORDER — BUPRENORPHINE 5 UG/H
1 PATCH TRANSDERMAL
Status: DISCONTINUED | OUTPATIENT
Start: 2017-01-01 | End: 2017-01-01

## 2017-01-01 RX ORDER — DRONABINOL 2.5 MG/1
2.5-5 CAPSULE ORAL
COMMUNITY
End: 2017-01-01

## 2017-01-01 RX ORDER — FENTANYL CITRATE 50 UG/ML
50 INJECTION, SOLUTION INTRAMUSCULAR; INTRAVENOUS ONCE
Status: COMPLETED | OUTPATIENT
Start: 2017-01-01 | End: 2017-01-01

## 2017-01-01 RX ORDER — DEXAMETHASONE 2 MG/1
2 TABLET ORAL
Status: DISCONTINUED | OUTPATIENT
Start: 2017-01-01 | End: 2017-01-01

## 2017-01-01 RX ORDER — POLYETHYLENE GLYCOL 3350 17 G/17G
17 POWDER, FOR SOLUTION ORAL DAILY PRN
Status: DISCONTINUED | OUTPATIENT
Start: 2017-01-01 | End: 2017-01-01

## 2017-01-01 RX ORDER — TOLTERODINE 4 MG/1
CAPSULE, EXTENDED RELEASE ORAL
Qty: 30 CAPSULE | Refills: 3 | Status: SHIPPED | OUTPATIENT
Start: 2017-01-01

## 2017-01-01 RX ORDER — IBUPROFEN 600 MG/1
600 TABLET, FILM COATED ORAL EVERY 6 HOURS
Status: DISCONTINUED | OUTPATIENT
Start: 2017-01-01 | End: 2017-01-01 | Stop reason: HOSPADM

## 2017-01-01 RX ORDER — MAGNESIUM SULFATE HEPTAHYDRATE 40 MG/ML
4 INJECTION, SOLUTION INTRAVENOUS EVERY 4 HOURS PRN
Status: DISCONTINUED | OUTPATIENT
Start: 2017-01-01 | End: 2017-01-01 | Stop reason: HOSPADM

## 2017-01-01 RX ORDER — LIDOCAINE HYDROCHLORIDE 10 MG/ML
INJECTION, SOLUTION EPIDURAL; INFILTRATION; INTRACAUDAL; PERINEURAL
Status: COMPLETED
Start: 2017-01-01 | End: 2017-01-01

## 2017-01-01 RX ORDER — LORAZEPAM 1 MG/1
1 TABLET ORAL EVERY 6 HOURS PRN
Qty: 30 TABLET | Refills: 3 | OUTPATIENT
Start: 2017-01-01

## 2017-01-01 RX ORDER — ACETAMINOPHEN 500 MG
1000 TABLET ORAL EVERY 6 HOURS PRN
Status: DISCONTINUED | OUTPATIENT
Start: 2017-01-01 | End: 2017-01-01 | Stop reason: HOSPADM

## 2017-01-01 RX ORDER — LORAZEPAM 2 MG/ML
0.5 INJECTION INTRAMUSCULAR ONCE
Status: COMPLETED | OUTPATIENT
Start: 2017-01-01 | End: 2017-01-01

## 2017-01-01 RX ORDER — GABAPENTIN 300 MG/1
300 CAPSULE ORAL AT BEDTIME
Status: DISCONTINUED | OUTPATIENT
Start: 2017-01-01 | End: 2017-01-01 | Stop reason: HOSPADM

## 2017-01-01 RX ADMIN — CYCLOSPORINE 1 DROP: 0.5 EMULSION OPHTHALMIC at 08:18

## 2017-01-01 RX ADMIN — BENZOCAINE: 220 SPRAY, METERED PERIODONTAL at 17:11

## 2017-01-01 RX ADMIN — Medication 40 MEQ: at 02:05

## 2017-01-01 RX ADMIN — SODIUM CHLORIDE: 9 INJECTION, SOLUTION INTRAVENOUS at 22:48

## 2017-01-01 RX ADMIN — Medication 2.5 MG: at 09:20

## 2017-01-01 RX ADMIN — FENTANYL CITRATE 25 MCG: 50 INJECTION, SOLUTION INTRAMUSCULAR; INTRAVENOUS at 12:18

## 2017-01-01 RX ADMIN — PROPOFOL 30 MG: 10 INJECTION, EMULSION INTRAVENOUS at 12:18

## 2017-01-01 RX ADMIN — MORPHINE SULFATE 2.5 MG: 4 INJECTION, SOLUTION INTRAMUSCULAR; INTRAVENOUS at 05:08

## 2017-01-01 RX ADMIN — ONDANSETRON 4 MG: 2 INJECTION INTRAMUSCULAR; INTRAVENOUS at 12:32

## 2017-01-01 RX ADMIN — ONDANSETRON 4 MG: 2 INJECTION INTRAMUSCULAR; INTRAVENOUS at 15:26

## 2017-01-01 RX ADMIN — OXYCODONE HYDROCHLORIDE 5 MG: 5 TABLET ORAL at 20:10

## 2017-01-01 RX ADMIN — SENNOSIDES AND DOCUSATE SODIUM 3 TABLET: 8.6; 5 TABLET ORAL at 08:21

## 2017-01-01 RX ADMIN — ENOXAPARIN SODIUM 40 MG: 40 INJECTION SUBCUTANEOUS at 20:46

## 2017-01-01 RX ADMIN — POTASSIUM CHLORIDE 20 MEQ: 29.8 INJECTION, SOLUTION INTRAVENOUS at 01:28

## 2017-01-01 RX ADMIN — ACETAMINOPHEN 1000 MG: 500 TABLET, FILM COATED ORAL at 09:23

## 2017-01-01 RX ADMIN — MORPHINE SULFATE 2.5 MG: 4 INJECTION, SOLUTION INTRAMUSCULAR; INTRAVENOUS at 05:44

## 2017-01-01 RX ADMIN — HYDRALAZINE HYDROCHLORIDE 5 MG: 20 INJECTION INTRAMUSCULAR; INTRAVENOUS at 23:56

## 2017-01-01 RX ADMIN — BENZOCAINE: 220 SPRAY, METERED PERIODONTAL at 08:45

## 2017-01-01 RX ADMIN — SODIUM CHLORIDE 1000 ML: 9 INJECTION, SOLUTION INTRAVENOUS at 14:39

## 2017-01-01 RX ADMIN — AMLODIPINE BESYLATE 2.5 MG: 2.5 TABLET ORAL at 20:36

## 2017-01-01 RX ADMIN — PROCHLORPERAZINE EDISYLATE 5 MG: 5 INJECTION INTRAMUSCULAR; INTRAVENOUS at 14:10

## 2017-01-01 RX ADMIN — SENNOSIDES AND DOCUSATE SODIUM 2 TABLET: 8.6; 5 TABLET ORAL at 07:57

## 2017-01-01 RX ADMIN — SODIUM CHLORIDE, PRESERVATIVE FREE 5 ML: 5 INJECTION INTRAVENOUS at 20:55

## 2017-01-01 RX ADMIN — LORAZEPAM 0.5 MG: 2 INJECTION INTRAMUSCULAR; INTRAVENOUS at 16:28

## 2017-01-01 RX ADMIN — CYCLOSPORINE 1 DROP: 0.5 EMULSION OPHTHALMIC at 21:05

## 2017-01-01 RX ADMIN — CYCLOSPORINE 1 DROP: 0.5 EMULSION OPHTHALMIC at 09:32

## 2017-01-01 RX ADMIN — Medication 250 ML: at 09:34

## 2017-01-01 RX ADMIN — Medication 1 LOZENGE: at 19:11

## 2017-01-01 RX ADMIN — LORATADINE 10 MG: 10 TABLET ORAL at 22:04

## 2017-01-01 RX ADMIN — TAMSULOSIN HYDROCHLORIDE 0.4 MG: 0.4 CAPSULE ORAL at 19:37

## 2017-01-01 RX ADMIN — SODIUM CHLORIDE: 9 INJECTION, SOLUTION INTRAVENOUS at 16:27

## 2017-01-01 RX ADMIN — CYCLOSPORINE 1 DROP: 0.5 EMULSION OPHTHALMIC at 09:24

## 2017-01-01 RX ADMIN — PROPOFOL 200 MCG/KG/MIN: 10 INJECTION, EMULSION INTRAVENOUS at 13:57

## 2017-01-01 RX ADMIN — MORPHINE SULFATE 5 MG: 4 INJECTION, SOLUTION INTRAMUSCULAR; INTRAVENOUS at 11:24

## 2017-01-01 RX ADMIN — OXYCODONE HYDROCHLORIDE 5 MG: 5 TABLET ORAL at 02:26

## 2017-01-01 RX ADMIN — Medication 50 MG: at 02:25

## 2017-01-01 RX ADMIN — SODIUM CHLORIDE, POTASSIUM CHLORIDE, SODIUM LACTATE AND CALCIUM CHLORIDE: 600; 310; 30; 20 INJECTION, SOLUTION INTRAVENOUS at 05:41

## 2017-01-01 RX ADMIN — Medication 1 LOZENGE: at 18:09

## 2017-01-01 RX ADMIN — KETAMINE HYDROCHLORIDE 15 MG: 10 INJECTION, SOLUTION INTRAMUSCULAR; INTRAVENOUS at 10:10

## 2017-01-01 RX ADMIN — HEPARIN SODIUM (PORCINE) LOCK FLUSH IV SOLN 100 UNIT/ML 5 ML: 100 SOLUTION at 13:28

## 2017-01-01 RX ADMIN — PROPOFOL 100 MCG/KG/MIN: 10 INJECTION, EMULSION INTRAVENOUS at 09:52

## 2017-01-01 RX ADMIN — HYDROMORPHONE HYDROCHLORIDE 0.2 MG: 10 INJECTION, SOLUTION INTRAMUSCULAR; INTRAVENOUS; SUBCUTANEOUS at 00:41

## 2017-01-01 RX ADMIN — PROCHLORPERAZINE EDISYLATE 10 MG: 5 INJECTION INTRAMUSCULAR; INTRAVENOUS at 08:20

## 2017-01-01 RX ADMIN — SENNOSIDES 4 TABLET: 8.6 TABLET, FILM COATED ORAL at 21:04

## 2017-01-01 RX ADMIN — PROCHLORPERAZINE EDISYLATE 10 MG: 5 INJECTION INTRAMUSCULAR; INTRAVENOUS at 11:53

## 2017-01-01 RX ADMIN — SODIUM CHLORIDE: 9 INJECTION, SOLUTION INTRAVENOUS at 06:30

## 2017-01-01 RX ADMIN — FENTANYL CITRATE 25 MCG: 50 INJECTION, SOLUTION INTRAMUSCULAR; INTRAVENOUS at 12:47

## 2017-01-01 RX ADMIN — RANITIDINE HYDROCHLORIDE 50 MG: 25 INJECTION INTRAMUSCULAR; INTRAVENOUS at 20:00

## 2017-01-01 RX ADMIN — LIDOCAINE HYDROCHLORIDE 40 MG: 20 INJECTION, SOLUTION INFILTRATION; PERINEURAL at 10:41

## 2017-01-01 RX ADMIN — PROCHLORPERAZINE EDISYLATE 10 MG: 5 INJECTION INTRAMUSCULAR; INTRAVENOUS at 19:36

## 2017-01-01 RX ADMIN — HYDROMORPHONE HYDROCHLORIDE 0.3 MG: 10 INJECTION, SOLUTION INTRAMUSCULAR; INTRAVENOUS; SUBCUTANEOUS at 02:08

## 2017-01-01 RX ADMIN — SODIUM CHLORIDE, PRESERVATIVE FREE 5 ML: 5 INJECTION INTRAVENOUS at 07:49

## 2017-01-01 RX ADMIN — CYCLOSPORINE 1 DROP: 0.5 EMULSION OPHTHALMIC at 20:08

## 2017-01-01 RX ADMIN — BUPIVACAINE HYDROCHLORIDE 14 ML: 2.5 INJECTION, SOLUTION EPIDURAL; INFILTRATION; INTRACAUDAL at 10:49

## 2017-01-01 RX ADMIN — SODIUM CHLORIDE 1000 ML: 9 INJECTION, SOLUTION INTRAVENOUS at 15:54

## 2017-01-01 RX ADMIN — TOPICAL ANESTHETIC 2 EACH: 200 SPRAY DENTAL; PERIODONTAL at 06:38

## 2017-01-01 RX ADMIN — ACETAMINOPHEN 1000 MG: 10 INJECTION, SOLUTION INTRAVENOUS at 02:08

## 2017-01-01 RX ADMIN — ROCURONIUM BROMIDE 25 MG: 10 INJECTION INTRAVENOUS at 10:40

## 2017-01-01 RX ADMIN — FAMOTIDINE 20 MG: 10 INJECTION, SOLUTION INTRAVENOUS at 22:08

## 2017-01-01 RX ADMIN — HEPARIN SODIUM (PORCINE) LOCK FLUSH IV SOLN 100 UNIT/ML 5 ML: 100 SOLUTION at 13:32

## 2017-01-01 RX ADMIN — Medication 100 MG: at 09:13

## 2017-01-01 RX ADMIN — SODIUM CHLORIDE: 9 INJECTION, SOLUTION INTRAVENOUS at 13:30

## 2017-01-01 RX ADMIN — Medication 1 LOZENGE: at 21:59

## 2017-01-01 RX ADMIN — ACETAMINOPHEN 325 MG: 325 SOLUTION ORAL at 20:11

## 2017-01-01 RX ADMIN — SODIUM CHLORIDE, PRESERVATIVE FREE 5 ML: 5 INJECTION INTRAVENOUS at 09:21

## 2017-01-01 RX ADMIN — METOCLOPRAMIDE 10 MG: 5 INJECTION, SOLUTION INTRAMUSCULAR; INTRAVENOUS at 00:35

## 2017-01-01 RX ADMIN — ACETAMINOPHEN TAB 500 MG 1000 MG: 500 TAB at 15:29

## 2017-01-01 RX ADMIN — Medication 25 MG: at 10:13

## 2017-01-01 RX ADMIN — SODIUM CHLORIDE, PRESERVATIVE FREE 5 ML: 5 INJECTION INTRAVENOUS at 20:25

## 2017-01-01 RX ADMIN — TAMSULOSIN HYDROCHLORIDE 0.4 MG: 0.4 CAPSULE ORAL at 19:20

## 2017-01-01 RX ADMIN — ACETAMINOPHEN 1000 MG: 10 INJECTION, SOLUTION INTRAVENOUS at 06:30

## 2017-01-01 RX ADMIN — DEXTROSE AND SODIUM CHLORIDE: 5; 900 INJECTION, SOLUTION INTRAVENOUS at 06:53

## 2017-01-01 RX ADMIN — SUGAMMADEX 120 MG: 100 INJECTION, SOLUTION INTRAVENOUS at 11:23

## 2017-01-01 RX ADMIN — AMLODIPINE BESYLATE 2.5 MG: 2.5 TABLET ORAL at 20:52

## 2017-01-01 RX ADMIN — Medication 2.5 MG: at 08:03

## 2017-01-01 RX ADMIN — SENNOSIDES 4 TABLET: 8.6 TABLET, FILM COATED ORAL at 20:04

## 2017-01-01 RX ADMIN — AMLODIPINE BESYLATE 2.5 MG: 2.5 TABLET ORAL at 20:05

## 2017-01-01 RX ADMIN — TOLTERODINE 4 MG: 4 CAPSULE, EXTENDED RELEASE ORAL at 20:05

## 2017-01-01 RX ADMIN — ONDANSETRON 4 MG: 2 INJECTION INTRAMUSCULAR; INTRAVENOUS at 15:13

## 2017-01-01 RX ADMIN — AMLODIPINE BESYLATE 2.5 MG: 2.5 TABLET ORAL at 20:57

## 2017-01-01 RX ADMIN — MORPHINE SULFATE 2.5 MG: 4 INJECTION, SOLUTION INTRAMUSCULAR; INTRAVENOUS at 12:14

## 2017-01-01 RX ADMIN — Medication 2.5 MG: at 08:19

## 2017-01-01 RX ADMIN — POTASSIUM CHLORIDE 20 MEQ: 400 INJECTION, SOLUTION INTRAVENOUS at 17:32

## 2017-01-01 RX ADMIN — ACETAMINOPHEN 1000 MG: 500 TABLET, FILM COATED ORAL at 14:04

## 2017-01-01 RX ADMIN — ACETAMINOPHEN 1000 MG: 500 TABLET, FILM COATED ORAL at 17:55

## 2017-01-01 RX ADMIN — PROCHLORPERAZINE EDISYLATE 10 MG: 5 INJECTION INTRAMUSCULAR; INTRAVENOUS at 04:10

## 2017-01-01 RX ADMIN — LORAZEPAM 1 MG: 2 INJECTION INTRAMUSCULAR; INTRAVENOUS at 21:28

## 2017-01-01 RX ADMIN — MORPHINE SULFATE 2.5 MG: 4 INJECTION, SOLUTION INTRAMUSCULAR; INTRAVENOUS at 17:42

## 2017-01-01 RX ADMIN — Medication 1 LOZENGE: at 11:03

## 2017-01-01 RX ADMIN — BENZOCAINE 1 ML: 220 SPRAY, METERED PERIODONTAL at 14:51

## 2017-01-01 RX ADMIN — CYCLOSPORINE 1 DROP: 0.5 EMULSION OPHTHALMIC at 20:52

## 2017-01-01 RX ADMIN — HYDROMORPHONE HYDROCHLORIDE 2 MG: 2 TABLET ORAL at 05:33

## 2017-01-01 RX ADMIN — CYCLOSPORINE 1 DROP: 0.5 EMULSION OPHTHALMIC at 19:20

## 2017-01-01 RX ADMIN — LORAZEPAM 1 MG: 1 TABLET ORAL at 20:55

## 2017-01-01 RX ADMIN — ENOXAPARIN SODIUM 40 MG: 40 INJECTION SUBCUTANEOUS at 19:11

## 2017-01-01 RX ADMIN — Medication 1 LOZENGE: at 15:41

## 2017-01-01 RX ADMIN — Medication 25 MG: at 14:36

## 2017-01-01 RX ADMIN — LORAZEPAM 0.5 MG: 0.5 TABLET ORAL at 21:29

## 2017-01-01 RX ADMIN — METHYLPHENIDATE HYDROCHLORIDE 5 MG: 5 TABLET ORAL at 08:19

## 2017-01-01 RX ADMIN — GABAPENTIN 300 MG: 300 CAPSULE ORAL at 21:27

## 2017-01-01 RX ADMIN — BENZOCAINE: 220 SPRAY, METERED PERIODONTAL at 08:55

## 2017-01-01 RX ADMIN — DEXAMETHASONE SODIUM PHOSPHATE 2 MG: 4 INJECTION, SOLUTION INTRAMUSCULAR; INTRAVENOUS at 10:19

## 2017-01-01 RX ADMIN — ONDANSETRON 4 MG: 2 INJECTION INTRAMUSCULAR; INTRAVENOUS at 10:07

## 2017-01-01 RX ADMIN — SODIUM CHLORIDE, PRESERVATIVE FREE 5 ML: 5 INJECTION INTRAVENOUS at 11:18

## 2017-01-01 RX ADMIN — SODIUM CHLORIDE, POTASSIUM CHLORIDE, SODIUM LACTATE AND CALCIUM CHLORIDE: 600; 310; 30; 20 INJECTION, SOLUTION INTRAVENOUS at 14:50

## 2017-01-01 RX ADMIN — HYDROMORPHONE HYDROCHLORIDE 0.2 MG: 10 INJECTION, SOLUTION INTRAMUSCULAR; INTRAVENOUS; SUBCUTANEOUS at 19:36

## 2017-01-01 RX ADMIN — ACETAMINOPHEN 1000 MG: 500 TABLET, FILM COATED ORAL at 19:37

## 2017-01-01 RX ADMIN — SENNOSIDES AND DOCUSATE SODIUM 3 TABLET: 8.6; 5 TABLET ORAL at 11:30

## 2017-01-01 RX ADMIN — FAMOTIDINE 20 MG: 10 INJECTION, SOLUTION INTRAVENOUS at 09:20

## 2017-01-01 RX ADMIN — CYCLOSPORINE 1 DROP: 0.5 EMULSION OPHTHALMIC at 08:10

## 2017-01-01 RX ADMIN — PROPOFOL 30 MG: 10 INJECTION, EMULSION INTRAVENOUS at 10:10

## 2017-01-01 RX ADMIN — MORPHINE SULFATE 5 MG: 4 INJECTION, SOLUTION INTRAMUSCULAR; INTRAVENOUS at 23:33

## 2017-01-01 RX ADMIN — FAMOTIDINE 20 MG: 10 INJECTION, SOLUTION INTRAVENOUS at 21:58

## 2017-01-01 RX ADMIN — LIDOCAINE HYDROCHLORIDE 5 ML: 10 INJECTION, SOLUTION EPIDURAL; INFILTRATION; INTRACAUDAL; PERINEURAL at 09:21

## 2017-01-01 RX ADMIN — SODIUM CHLORIDE 1000 ML: 9 INJECTION, SOLUTION INTRAVENOUS at 12:05

## 2017-01-01 RX ADMIN — Medication 1 LOZENGE: at 09:38

## 2017-01-01 RX ADMIN — Medication 2.5 MG: at 08:20

## 2017-01-01 RX ADMIN — POTASSIUM CHLORIDE 10 MEQ: 7.46 INJECTION, SOLUTION INTRAVENOUS at 10:12

## 2017-01-01 RX ADMIN — PROPOFOL 40 MG: 10 INJECTION, EMULSION INTRAVENOUS at 10:34

## 2017-01-01 RX ADMIN — Medication 2.5 MG: at 22:23

## 2017-01-01 RX ADMIN — SODIUM CHLORIDE, PRESERVATIVE FREE 5 ML: 5 INJECTION INTRAVENOUS at 09:32

## 2017-01-01 RX ADMIN — ONDANSETRON 4 MG: 2 INJECTION INTRAMUSCULAR; INTRAVENOUS at 17:56

## 2017-01-01 RX ADMIN — FAMOTIDINE 20 MG: 10 INJECTION INTRAVENOUS at 10:01

## 2017-01-01 RX ADMIN — POTASSIUM CHLORIDE 10 MEQ: 7.46 INJECTION, SOLUTION INTRAVENOUS at 08:44

## 2017-01-01 RX ADMIN — KETOROLAC TROMETHAMINE 30 MG: 30 INJECTION, SOLUTION INTRAMUSCULAR at 16:22

## 2017-01-01 RX ADMIN — FAMOTIDINE 20 MG: 10 INJECTION INTRAVENOUS at 19:21

## 2017-01-01 RX ADMIN — BENZOCAINE: 220 SPRAY, METERED PERIODONTAL at 22:06

## 2017-01-01 RX ADMIN — TAMSULOSIN HYDROCHLORIDE 0.4 MG: 0.4 CAPSULE ORAL at 21:05

## 2017-01-01 RX ADMIN — SENNOSIDES AND DOCUSATE SODIUM 2 TABLET: 8.6; 5 TABLET ORAL at 08:52

## 2017-01-01 RX ADMIN — MAGNESIUM CITRATE 296 ML: 1.75 LIQUID ORAL at 13:57

## 2017-01-01 RX ADMIN — GABAPENTIN 300 MG: 300 CAPSULE ORAL at 21:26

## 2017-01-01 RX ADMIN — SODIUM CHLORIDE 1000 ML: 9 INJECTION, SOLUTION INTRAVENOUS at 15:23

## 2017-01-01 RX ADMIN — ENOXAPARIN SODIUM 40 MG: 40 INJECTION SUBCUTANEOUS at 21:58

## 2017-01-01 RX ADMIN — PROCHLORPERAZINE EDISYLATE 10 MG: 5 INJECTION INTRAMUSCULAR; INTRAVENOUS at 15:49

## 2017-01-01 RX ADMIN — Medication 2.5 MG: at 12:30

## 2017-01-01 RX ADMIN — SODIUM CHLORIDE, PRESERVATIVE FREE 5 ML: 5 INJECTION INTRAVENOUS at 21:12

## 2017-01-01 RX ADMIN — TAMSULOSIN HYDROCHLORIDE 0.4 MG: 0.4 CAPSULE ORAL at 07:51

## 2017-01-01 RX ADMIN — SODIUM CHLORIDE, PRESERVATIVE FREE 20 ML: 5 INJECTION INTRAVENOUS at 22:41

## 2017-01-01 RX ADMIN — LORAZEPAM 1 MG: 2 INJECTION INTRAMUSCULAR; INTRAVENOUS at 17:53

## 2017-01-01 RX ADMIN — Medication 1 LOZENGE: at 05:52

## 2017-01-01 RX ADMIN — PROCHLORPERAZINE EDISYLATE 5 MG: 5 INJECTION INTRAMUSCULAR; INTRAVENOUS at 16:31

## 2017-01-01 RX ADMIN — LORAZEPAM 0.5 MG: 0.5 TABLET ORAL at 00:12

## 2017-01-01 RX ADMIN — CYCLOSPORINE 1 DROP: 0.5 EMULSION OPHTHALMIC at 20:00

## 2017-01-01 RX ADMIN — CLINDAMYCIN PHOSPHATE 900 MG: 900 INJECTION, SOLUTION INTRAVENOUS at 14:00

## 2017-01-01 RX ADMIN — RANITIDINE 75 MG: 75 TABLET, FILM COATED ORAL at 20:11

## 2017-01-01 RX ADMIN — KETAMINE HYDROCHLORIDE 25 MG: 10 INJECTION, SOLUTION INTRAMUSCULAR; INTRAVENOUS at 10:02

## 2017-01-01 RX ADMIN — Medication 2.5 MG: at 09:04

## 2017-01-01 RX ADMIN — LIDOCAINE HYDROCHLORIDE 8 ML: 10 INJECTION, SOLUTION EPIDURAL; INFILTRATION; INTRACAUDAL; PERINEURAL at 12:33

## 2017-01-01 RX ADMIN — HYDROMORPHONE HYDROCHLORIDE 4 MG: 2 TABLET ORAL at 17:36

## 2017-01-01 RX ADMIN — CYCLOSPORINE 1 DROP: 0.5 EMULSION OPHTHALMIC at 20:02

## 2017-01-01 RX ADMIN — CYCLOSPORINE 1 DROP: 0.5 EMULSION OPHTHALMIC at 08:20

## 2017-01-01 RX ADMIN — HEPARIN SODIUM (PORCINE) LOCK FLUSH IV SOLN 100 UNIT/ML 5 ML: 100 SOLUTION at 11:10

## 2017-01-01 RX ADMIN — Medication 2.5 MG: at 21:27

## 2017-01-01 RX ADMIN — MORPHINE SULFATE 5 MG: 4 INJECTION, SOLUTION INTRAMUSCULAR; INTRAVENOUS at 19:27

## 2017-01-01 RX ADMIN — TOLTERODINE 4 MG: 4 CAPSULE, EXTENDED RELEASE ORAL at 20:11

## 2017-01-01 RX ADMIN — FAMOTIDINE 20 MG: 10 INJECTION INTRAVENOUS at 19:05

## 2017-01-01 RX ADMIN — ONDANSETRON 4 MG: 4 TABLET, ORALLY DISINTEGRATING ORAL at 11:06

## 2017-01-01 RX ADMIN — RANITIDINE 75 MG: 75 TABLET, FILM COATED ORAL at 20:02

## 2017-01-01 RX ADMIN — PROCHLORPERAZINE EDISYLATE 5 MG: 5 INJECTION INTRAMUSCULAR; INTRAVENOUS at 23:46

## 2017-01-01 RX ADMIN — SODIUM CHLORIDE: 9 INJECTION, SOLUTION INTRAVENOUS at 01:39

## 2017-01-01 RX ADMIN — DEXAMETHASONE SODIUM PHOSPHATE 10 MG: 10 INJECTION, SOLUTION INTRAMUSCULAR; INTRAVENOUS at 10:50

## 2017-01-01 RX ADMIN — BENZOCAINE 1 ML: 220 SPRAY, METERED PERIODONTAL at 11:00

## 2017-01-01 RX ADMIN — SODIUM CHLORIDE, POTASSIUM CHLORIDE, SODIUM LACTATE AND CALCIUM CHLORIDE 1000 ML: 600; 310; 30; 20 INJECTION, SOLUTION INTRAVENOUS at 13:33

## 2017-01-01 RX ADMIN — SODIUM CHLORIDE, PRESERVATIVE FREE 5 ML: 5 INJECTION INTRAVENOUS at 20:22

## 2017-01-01 RX ADMIN — BENZOCAINE 1 SPRAY: 220 SPRAY, METERED PERIODONTAL at 07:27

## 2017-01-01 RX ADMIN — PROCHLORPERAZINE EDISYLATE 5 MG: 5 INJECTION INTRAMUSCULAR; INTRAVENOUS at 11:38

## 2017-01-01 RX ADMIN — LIDOCAINE HYDROCHLORIDE 1 ML: 10 INJECTION, SOLUTION EPIDURAL; INFILTRATION; INTRACAUDAL; PERINEURAL at 09:29

## 2017-01-01 RX ADMIN — SODIUM CHLORIDE, SODIUM LACTATE, POTASSIUM CHLORIDE, CALCIUM CHLORIDE: 600; 310; 30; 20 INJECTION, SOLUTION INTRAVENOUS at 10:31

## 2017-01-01 RX ADMIN — POTASSIUM CHLORIDE 10 MEQ: 7.46 INJECTION, SOLUTION INTRAVENOUS at 12:23

## 2017-01-01 RX ADMIN — POTASSIUM CHLORIDE 10 MEQ: 7.46 INJECTION, SOLUTION INTRAVENOUS at 12:45

## 2017-01-01 RX ADMIN — LIDOCAINE HYDROCHLORIDE 4 ML: 10 INJECTION, SOLUTION EPIDURAL; INFILTRATION; INTRACAUDAL; PERINEURAL at 10:45

## 2017-01-01 RX ADMIN — HYDROMORPHONE HYDROCHLORIDE 0.2 MG: 10 INJECTION, SOLUTION INTRAMUSCULAR; INTRAVENOUS; SUBCUTANEOUS at 01:08

## 2017-01-01 RX ADMIN — FENTANYL CITRATE 50 MCG: 50 INJECTION INTRAMUSCULAR; INTRAVENOUS at 12:33

## 2017-01-01 RX ADMIN — POTASSIUM CHLORIDE 20 MEQ: 29.8 INJECTION, SOLUTION INTRAVENOUS at 03:04

## 2017-01-01 RX ADMIN — ONDANSETRON 4 MG: 2 INJECTION INTRAMUSCULAR; INTRAVENOUS at 10:31

## 2017-01-01 RX ADMIN — FENTANYL CITRATE 50 MCG: 50 INJECTION INTRAMUSCULAR; INTRAVENOUS at 12:20

## 2017-01-01 RX ADMIN — LORAZEPAM 0.5 MG: 2 INJECTION INTRAMUSCULAR; INTRAVENOUS at 22:58

## 2017-01-01 RX ADMIN — PROCHLORPERAZINE EDISYLATE 5 MG: 5 INJECTION INTRAMUSCULAR; INTRAVENOUS at 13:38

## 2017-01-01 RX ADMIN — CYCLOSPORINE 1 DROP: 0.5 EMULSION OPHTHALMIC at 19:04

## 2017-01-01 RX ADMIN — Medication 250 ML: at 14:43

## 2017-01-01 RX ADMIN — PANTOPRAZOLE SODIUM 40 MG: 40 INJECTION, POWDER, FOR SOLUTION INTRAVENOUS at 07:59

## 2017-01-01 RX ADMIN — SODIUM CHLORIDE, PRESERVATIVE FREE 5 ML: 5 INJECTION INTRAVENOUS at 17:32

## 2017-01-01 RX ADMIN — AMLODIPINE BESYLATE 2.5 MG: 2.5 TABLET ORAL at 08:18

## 2017-01-01 RX ADMIN — PROPOFOL 80 MG: 10 INJECTION, EMULSION INTRAVENOUS at 10:40

## 2017-01-01 RX ADMIN — AMLODIPINE BESYLATE 2.5 MG: 2.5 TABLET ORAL at 20:14

## 2017-01-01 RX ADMIN — LORAZEPAM 1 MG: 2 INJECTION INTRAMUSCULAR; INTRAVENOUS at 06:31

## 2017-01-01 RX ADMIN — Medication 1 LOZENGE: at 14:53

## 2017-01-01 RX ADMIN — SODIUM CHLORIDE: 9 INJECTION, SOLUTION INTRAVENOUS at 18:44

## 2017-01-01 RX ADMIN — SENNOSIDES 4 TABLET: 8.6 TABLET, FILM COATED ORAL at 08:30

## 2017-01-01 RX ADMIN — Medication 2.5 MG: at 14:28

## 2017-01-01 RX ADMIN — Medication 30 MG: at 13:54

## 2017-01-01 RX ADMIN — Medication 250 ML: at 10:13

## 2017-01-01 RX ADMIN — CYCLOSPORINE 1 DROP: 0.5 EMULSION OPHTHALMIC at 21:27

## 2017-01-01 RX ADMIN — BENZOCAINE 1 ML: 220 SPRAY, METERED PERIODONTAL at 22:10

## 2017-01-01 RX ADMIN — PROCHLORPERAZINE EDISYLATE 5 MG: 5 INJECTION INTRAMUSCULAR; INTRAVENOUS at 05:40

## 2017-01-01 RX ADMIN — ENOXAPARIN SODIUM 40 MG: 40 INJECTION SUBCUTANEOUS at 20:18

## 2017-01-01 RX ADMIN — LORAZEPAM 1 MG: 2 INJECTION INTRAMUSCULAR; INTRAVENOUS at 23:09

## 2017-01-01 RX ADMIN — MORPHINE SULFATE 15 MG: 100 SOLUTION ORAL at 20:30

## 2017-01-01 RX ADMIN — TOLTERODINE 4 MG: 4 CAPSULE, EXTENDED RELEASE ORAL at 07:52

## 2017-01-01 RX ADMIN — BENZOCAINE: 220 SPRAY, METERED PERIODONTAL at 10:12

## 2017-01-01 RX ADMIN — Medication 25 MG: at 14:39

## 2017-01-01 RX ADMIN — DEXTROSE AND SODIUM CHLORIDE: 5; 900 INJECTION, SOLUTION INTRAVENOUS at 08:52

## 2017-01-01 RX ADMIN — POTASSIUM CHLORIDE 20 MEQ: 29.8 INJECTION, SOLUTION INTRAVENOUS at 12:03

## 2017-01-01 RX ADMIN — HEPARIN SODIUM (PORCINE) LOCK FLUSH IV SOLN 100 UNIT/ML 5 ML: 100 SOLUTION at 09:28

## 2017-01-01 RX ADMIN — DIAZEPAM 5 MG: 5 INJECTION, SOLUTION INTRAMUSCULAR; INTRAVENOUS at 14:09

## 2017-01-01 RX ADMIN — BENZOCAINE: 220 SPRAY, METERED PERIODONTAL at 05:53

## 2017-01-01 RX ADMIN — PROCHLORPERAZINE EDISYLATE 5 MG: 5 INJECTION INTRAMUSCULAR; INTRAVENOUS at 12:14

## 2017-01-01 RX ADMIN — ACETAMINOPHEN 650 MG: 325 TABLET, FILM COATED ORAL at 12:21

## 2017-01-01 RX ADMIN — BENZOCAINE: 220 SPRAY, METERED PERIODONTAL at 18:09

## 2017-01-01 RX ADMIN — CYCLOSPORINE 1 DROP: 0.5 EMULSION OPHTHALMIC at 08:52

## 2017-01-01 RX ADMIN — ACETAMINOPHEN TAB 500 MG 1000 MG: 500 TAB at 07:57

## 2017-01-01 RX ADMIN — AMLODIPINE BESYLATE 2.5 MG: 2.5 TABLET ORAL at 02:29

## 2017-01-01 RX ADMIN — LORATADINE 10 MG: 10 TABLET ORAL at 21:58

## 2017-01-01 RX ADMIN — LIDOCAINE HYDROCHLORIDE 40 MG: 20 INJECTION, SOLUTION INFILTRATION; PERINEURAL at 13:54

## 2017-01-01 RX ADMIN — PROCHLORPERAZINE EDISYLATE 10 MG: 5 INJECTION INTRAMUSCULAR; INTRAVENOUS at 00:04

## 2017-01-01 RX ADMIN — LORAZEPAM 1 MG: 2 INJECTION INTRAMUSCULAR; INTRAVENOUS at 22:58

## 2017-01-01 RX ADMIN — HYDROMORPHONE HYDROCHLORIDE 0.5 MG: 10 INJECTION, SOLUTION INTRAMUSCULAR; INTRAVENOUS; SUBCUTANEOUS at 09:14

## 2017-01-01 RX ADMIN — POTASSIUM CHLORIDE 10 MEQ: 7.46 INJECTION, SOLUTION INTRAVENOUS at 11:04

## 2017-01-01 RX ADMIN — PROCHLORPERAZINE EDISYLATE 5 MG: 5 INJECTION INTRAMUSCULAR; INTRAVENOUS at 21:04

## 2017-01-01 RX ADMIN — PROCHLORPERAZINE EDISYLATE 5 MG: 5 INJECTION INTRAMUSCULAR; INTRAVENOUS at 10:44

## 2017-01-01 RX ADMIN — POTASSIUM CHLORIDE 10 MEQ: 7.46 INJECTION, SOLUTION INTRAVENOUS at 11:24

## 2017-01-01 RX ADMIN — ONDANSETRON 4 MG: 2 INJECTION INTRAMUSCULAR; INTRAVENOUS at 12:05

## 2017-01-01 RX ADMIN — TOPICAL ANESTHETIC 1 EACH: 200 SPRAY DENTAL; PERIODONTAL at 05:29

## 2017-01-01 RX ADMIN — SODIUM CHLORIDE 500 ML: 9 INJECTION, SOLUTION INTRAVENOUS at 07:00

## 2017-01-01 RX ADMIN — HYDROMORPHONE HYDROCHLORIDE 1 MG: 1 INJECTION, SOLUTION INTRAMUSCULAR; INTRAVENOUS; SUBCUTANEOUS at 14:38

## 2017-01-01 RX ADMIN — DOCUSATE SODIUM 286 ML: 50 LIQUID ORAL at 12:31

## 2017-01-01 RX ADMIN — POTASSIUM CHLORIDE 10 MEQ: 7.46 INJECTION, SOLUTION INTRAVENOUS at 12:29

## 2017-01-01 RX ADMIN — PROCHLORPERAZINE EDISYLATE 5 MG: 5 INJECTION INTRAMUSCULAR; INTRAVENOUS at 07:18

## 2017-01-01 RX ADMIN — SODIUM CHLORIDE, POTASSIUM CHLORIDE, SODIUM LACTATE AND CALCIUM CHLORIDE 125 ML: 600; 310; 30; 20 INJECTION, SOLUTION INTRAVENOUS at 21:05

## 2017-01-01 RX ADMIN — POLYETHYLENE GLYCOL 3350 17 G: 17 POWDER, FOR SOLUTION ORAL at 18:11

## 2017-01-01 RX ADMIN — MENTHOL 1 PATCH: 205.5 PATCH TOPICAL at 20:21

## 2017-01-01 RX ADMIN — PROPOFOL 20 MG: 10 INJECTION, EMULSION INTRAVENOUS at 12:13

## 2017-01-01 RX ADMIN — FAMOTIDINE 20 MG: 20 TABLET ORAL at 09:26

## 2017-01-01 RX ADMIN — CYCLOSPORINE 1 DROP: 0.5 EMULSION OPHTHALMIC at 08:03

## 2017-01-01 RX ADMIN — ENOXAPARIN SODIUM 40 MG: 40 INJECTION SUBCUTANEOUS at 20:00

## 2017-01-01 RX ADMIN — MORPHINE SULFATE 2.5 MG: 4 INJECTION, SOLUTION INTRAMUSCULAR; INTRAVENOUS at 01:42

## 2017-01-01 RX ADMIN — CYCLOSPORINE 1 DROP: 0.5 EMULSION OPHTHALMIC at 10:04

## 2017-01-01 RX ADMIN — ACETAMINOPHEN 1000 MG: 500 TABLET, FILM COATED ORAL at 18:12

## 2017-01-01 RX ADMIN — BENZOCAINE: 220 SPRAY, METERED PERIODONTAL at 01:17

## 2017-01-01 RX ADMIN — MORPHINE SULFATE 2.5 MG: 4 INJECTION, SOLUTION INTRAMUSCULAR; INTRAVENOUS at 18:30

## 2017-01-01 RX ADMIN — MORPHINE SULFATE 15 MG: 100 SOLUTION ORAL at 16:23

## 2017-01-01 RX ADMIN — ACETAMINOPHEN 1000 MG: 500 TABLET, FILM COATED ORAL at 13:47

## 2017-01-01 RX ADMIN — FENTANYL CITRATE 50 MCG: 50 INJECTION INTRAMUSCULAR; INTRAVENOUS at 10:01

## 2017-01-01 RX ADMIN — Medication 1 LOZENGE: at 04:37

## 2017-01-01 RX ADMIN — Medication 10 MG: at 14:18

## 2017-01-01 RX ADMIN — POLYETHYLENE GLYCOL 3350 17 G: 17 POWDER, FOR SOLUTION ORAL at 21:05

## 2017-01-01 RX ADMIN — SODIUM CHLORIDE, PRESERVATIVE FREE 5 ML: 5 INJECTION INTRAVENOUS at 09:35

## 2017-01-01 RX ADMIN — PROCHLORPERAZINE EDISYLATE 5 MG: 5 INJECTION INTRAMUSCULAR; INTRAVENOUS at 19:32

## 2017-01-01 RX ADMIN — SODIUM CHLORIDE, PRESERVATIVE FREE 5 ML: 5 INJECTION INTRAVENOUS at 21:33

## 2017-01-01 RX ADMIN — SODIUM CHLORIDE, PRESERVATIVE FREE 5 ML: 5 INJECTION INTRAVENOUS at 09:09

## 2017-01-01 RX ADMIN — ONDANSETRON 4 MG: 2 INJECTION INTRAMUSCULAR; INTRAVENOUS at 10:44

## 2017-01-01 RX ADMIN — ONDANSETRON 4 MG: 2 INJECTION INTRAMUSCULAR; INTRAVENOUS at 14:03

## 2017-01-01 RX ADMIN — ACETAMINOPHEN TAB 500 MG 1000 MG: 500 TAB at 07:51

## 2017-01-01 RX ADMIN — ESMOLOL HYDROCHLORIDE 20 MG: 10 INJECTION, SOLUTION INTRAVENOUS at 11:34

## 2017-01-01 RX ADMIN — SODIUM CHLORIDE, SODIUM LACTATE, POTASSIUM CHLORIDE, CALCIUM CHLORIDE: 600; 310; 30; 20 INJECTION, SOLUTION INTRAVENOUS at 09:47

## 2017-01-01 RX ADMIN — CEFTRIAXONE 1 G: 1 INJECTION, POWDER, FOR SOLUTION INTRAMUSCULAR; INTRAVENOUS at 09:25

## 2017-01-01 RX ADMIN — SODIUM CHLORIDE, POTASSIUM CHLORIDE, SODIUM LACTATE AND CALCIUM CHLORIDE: 600; 310; 30; 20 INJECTION, SOLUTION INTRAVENOUS at 10:39

## 2017-01-01 RX ADMIN — DEXTROSE AND SODIUM CHLORIDE: 5; 900 INJECTION, SOLUTION INTRAVENOUS at 09:31

## 2017-01-01 RX ADMIN — FENTANYL CITRATE 50 MCG: 50 INJECTION INTRAMUSCULAR; INTRAVENOUS at 12:05

## 2017-01-01 RX ADMIN — PROPOFOL 50 MG: 10 INJECTION, EMULSION INTRAVENOUS at 13:59

## 2017-01-01 RX ADMIN — GABAPENTIN 300 MG: 300 CAPSULE ORAL at 22:23

## 2017-01-01 RX ADMIN — LORAZEPAM 1 MG: 2 INJECTION INTRAMUSCULAR; INTRAVENOUS at 02:33

## 2017-01-01 RX ADMIN — HYDROMORPHONE HYDROCHLORIDE 0.5 MG: 10 INJECTION, SOLUTION INTRAMUSCULAR; INTRAVENOUS; SUBCUTANEOUS at 22:55

## 2017-01-01 RX ADMIN — SCOLOPAMINE TRANSDERMAL SYSTEM 1 PATCH: 1 PATCH, EXTENDED RELEASE TRANSDERMAL at 10:50

## 2017-01-01 RX ADMIN — LORAZEPAM 0.5 MG: 2 INJECTION INTRAMUSCULAR; INTRAVENOUS at 23:53

## 2017-01-01 RX ADMIN — CYCLOSPORINE 1 DROP: 0.5 EMULSION OPHTHALMIC at 20:14

## 2017-01-01 RX ADMIN — CYCLOSPORINE 1 DROP: 0.5 EMULSION OPHTHALMIC at 19:56

## 2017-01-01 RX ADMIN — CLINDAMYCIN PHOSPHATE 900 MG: 18 INJECTION, SOLUTION INTRAVENOUS at 12:05

## 2017-01-01 RX ADMIN — PROPOFOL 100 MCG/KG/MIN: 10 INJECTION, EMULSION INTRAVENOUS at 12:08

## 2017-01-01 RX ADMIN — PROPOFOL 150 MG: 10 INJECTION, EMULSION INTRAVENOUS at 13:54

## 2017-01-01 RX ADMIN — Medication 1 LOZENGE: at 00:06

## 2017-01-01 RX ADMIN — RANITIDINE 75 MG: 75 TABLET, FILM COATED ORAL at 21:05

## 2017-01-01 RX ADMIN — HEPARIN SODIUM (PORCINE) LOCK FLUSH IV SOLN 100 UNIT/ML 5 ML: 100 SOLUTION at 11:30

## 2017-01-01 RX ADMIN — SODIUM CHLORIDE: 9 INJECTION, SOLUTION INTRAVENOUS at 17:50

## 2017-01-01 RX ADMIN — SODIUM CHLORIDE: 9 INJECTION, SOLUTION INTRAVENOUS at 06:35

## 2017-01-01 RX ADMIN — Medication 2.5 MG: at 21:18

## 2017-01-01 RX ADMIN — Medication 2.5 MG: at 20:46

## 2017-01-01 RX ADMIN — ACETAMINOPHEN 1000 MG: 500 TABLET, FILM COATED ORAL at 09:04

## 2017-01-01 RX ADMIN — PROCHLORPERAZINE EDISYLATE 5 MG: 5 INJECTION INTRAMUSCULAR; INTRAVENOUS at 18:30

## 2017-01-01 RX ADMIN — ACETAMINOPHEN 1000 MG: 500 TABLET, FILM COATED ORAL at 08:21

## 2017-01-01 RX ADMIN — LIDOCAINE HYDROCHLORIDE 9 ML: 10 INJECTION, SOLUTION EPIDURAL; INFILTRATION; INTRACAUDAL; PERINEURAL at 11:02

## 2017-01-01 RX ADMIN — POTASSIUM CHLORIDE 10 MEQ: 7.46 INJECTION, SOLUTION INTRAVENOUS at 07:33

## 2017-01-01 RX ADMIN — SODIUM CHLORIDE, POTASSIUM CHLORIDE, SODIUM LACTATE AND CALCIUM CHLORIDE: 600; 310; 30; 20 INJECTION, SOLUTION INTRAVENOUS at 00:00

## 2017-01-01 RX ADMIN — DEXAMETHASONE SODIUM PHOSPHATE 2 MG: 4 INJECTION, SOLUTION INTRAMUSCULAR; INTRAVENOUS at 07:58

## 2017-01-01 RX ADMIN — ACETAMINOPHEN 975 MG: 325 TABLET ORAL at 12:53

## 2017-01-01 RX ADMIN — FAMOTIDINE 20 MG: 10 INJECTION, SOLUTION INTRAVENOUS at 22:10

## 2017-01-01 RX ADMIN — LORAZEPAM 1 MG: 2 INJECTION INTRAMUSCULAR; INTRAVENOUS at 02:02

## 2017-01-01 RX ADMIN — MORPHINE SULFATE 4 MG: 4 INJECTION, SOLUTION INTRAMUSCULAR; INTRAVENOUS at 13:58

## 2017-01-01 RX ADMIN — Medication 250 ML: at 09:58

## 2017-01-01 RX ADMIN — HYDROMORPHONE HYDROCHLORIDE 1 MG: 1 INJECTION, SOLUTION INTRAMUSCULAR; INTRAVENOUS; SUBCUTANEOUS at 13:53

## 2017-01-01 RX ADMIN — Medication 2.5 MG: at 08:49

## 2017-01-01 RX ADMIN — IODIXANOL 10 ML: 320 INJECTION, SOLUTION INTRAVASCULAR at 12:26

## 2017-01-01 RX ADMIN — SODIUM CHLORIDE, SODIUM LACTATE, POTASSIUM CHLORIDE, CALCIUM CHLORIDE 500 ML: 600; 310; 30; 20 INJECTION, SOLUTION INTRAVENOUS at 10:25

## 2017-01-01 RX ADMIN — MAGNESIUM HYDROXIDE 30 ML: 400 SUSPENSION ORAL at 13:56

## 2017-01-01 RX ADMIN — ONDANSETRON 4 MG: 2 INJECTION INTRAMUSCULAR; INTRAVENOUS at 12:08

## 2017-01-01 RX ADMIN — POTASSIUM CHLORIDE 10 MEQ: 7.46 INJECTION, SOLUTION INTRAVENOUS at 13:45

## 2017-01-01 RX ADMIN — TAMSULOSIN HYDROCHLORIDE 0.4 MG: 0.4 CAPSULE ORAL at 20:11

## 2017-01-01 RX ADMIN — Medication 2.5 MG: at 08:14

## 2017-01-01 RX ADMIN — CYCLOSPORINE 1 DROP: 0.5 EMULSION OPHTHALMIC at 20:57

## 2017-01-01 RX ADMIN — GABAPENTIN 300 MG: 300 CAPSULE ORAL at 10:49

## 2017-01-01 RX ADMIN — Medication 2.5 MG: at 21:59

## 2017-01-01 RX ADMIN — SODIUM CHLORIDE: 9 INJECTION, SOLUTION INTRAVENOUS at 19:18

## 2017-01-01 RX ADMIN — TOPICAL ANESTHETIC 2 EACH: 200 SPRAY DENTAL; PERIODONTAL at 22:27

## 2017-01-01 RX ADMIN — POLYETHYLENE GLYCOL 3350 17 G: 17 POWDER, FOR SOLUTION ORAL at 09:19

## 2017-01-01 RX ADMIN — ACETAMINOPHEN 650 MG: 325 SOLUTION ORAL at 17:41

## 2017-01-01 RX ADMIN — AMLODIPINE BESYLATE 2.5 MG: 2.5 TABLET ORAL at 21:18

## 2017-01-01 RX ADMIN — ENOXAPARIN SODIUM 40 MG: 40 INJECTION SUBCUTANEOUS at 08:14

## 2017-01-01 RX ADMIN — BENZOCAINE 1 ML: 220 SPRAY, METERED PERIODONTAL at 19:08

## 2017-01-01 RX ADMIN — Medication 1 MG: at 17:20

## 2017-01-01 RX ADMIN — MORPHINE SULFATE 5 MG: 4 INJECTION, SOLUTION INTRAMUSCULAR; INTRAVENOUS at 02:48

## 2017-01-01 RX ADMIN — POTASSIUM CHLORIDE 10 MEQ: 7.46 INJECTION, SOLUTION INTRAVENOUS at 09:11

## 2017-01-01 RX ADMIN — HYDROMORPHONE HYDROCHLORIDE 2 MG: 2 TABLET ORAL at 11:18

## 2017-01-01 RX ADMIN — Medication 2.5 MG: at 20:52

## 2017-01-01 RX ADMIN — MORPHINE SULFATE 2.5 MG: 4 INJECTION, SOLUTION INTRAMUSCULAR; INTRAVENOUS at 07:03

## 2017-01-01 RX ADMIN — PALONOSETRON 0.25 MG: 0.05 INJECTION, SOLUTION INTRAVENOUS at 10:23

## 2017-01-01 RX ADMIN — CYCLOSPORINE 1 DROP: 0.5 EMULSION OPHTHALMIC at 08:13

## 2017-01-01 RX ADMIN — BUPIVACAINE HYDROCHLORIDE 19 ML: 2.5 INJECTION, SOLUTION EPIDURAL; INFILTRATION; INTRACAUDAL at 11:01

## 2017-01-01 RX ADMIN — PANTOPRAZOLE SODIUM 40 MG: 40 INJECTION, POWDER, FOR SOLUTION INTRAVENOUS at 08:45

## 2017-01-01 RX ADMIN — CYCLOSPORINE 1 DROP: 0.5 EMULSION OPHTHALMIC at 08:19

## 2017-01-01 RX ADMIN — TOPICAL ANESTHETIC 2 EACH: 200 SPRAY DENTAL; PERIODONTAL at 10:32

## 2017-01-01 RX ADMIN — BENZOCAINE: 220 SPRAY, METERED PERIODONTAL at 13:44

## 2017-01-01 RX ADMIN — LIDOCAINE HYDROCHLORIDE 100 MG: 20 INJECTION, SOLUTION INFILTRATION; PERINEURAL at 12:05

## 2017-01-01 RX ADMIN — MIDAZOLAM 1 MG: 1 INJECTION INTRAMUSCULAR; INTRAVENOUS at 12:32

## 2017-01-01 RX ADMIN — BENZOCAINE: 220 SPRAY, METERED PERIODONTAL at 19:11

## 2017-01-01 RX ADMIN — PROCHLORPERAZINE EDISYLATE 5 MG: 5 INJECTION INTRAMUSCULAR; INTRAVENOUS at 08:11

## 2017-01-01 RX ADMIN — DEXAMETHASONE SODIUM PHOSPHATE 4 MG: 4 INJECTION, SOLUTION INTRA-ARTICULAR; INTRALESIONAL; INTRAMUSCULAR; INTRAVENOUS; SOFT TISSUE at 12:08

## 2017-01-01 RX ADMIN — POLYETHYLENE GLYCOL 3350 17 G: 17 POWDER, FOR SOLUTION ORAL at 08:16

## 2017-01-01 RX ADMIN — KETAMINE HCL-NACL SOLN PREF SY 50 MG/5ML-0.9% (10MG/ML) 10 MG: 10 SOLUTION PREFILLED SYRINGE at 10:36

## 2017-01-01 RX ADMIN — Medication 1 MG: at 10:44

## 2017-01-01 RX ADMIN — TOPICAL ANESTHETIC 2 EACH: 200 SPRAY DENTAL; PERIODONTAL at 13:52

## 2017-01-01 RX ADMIN — MIDAZOLAM 1 MG: 1 INJECTION INTRAMUSCULAR; INTRAVENOUS at 12:04

## 2017-01-01 RX ADMIN — LORAZEPAM 1 MG: 1 TABLET ORAL at 21:26

## 2017-01-01 RX ADMIN — TOLTERODINE 4 MG: 4 CAPSULE, EXTENDED RELEASE ORAL at 07:57

## 2017-01-01 RX ADMIN — METHYLPHENIDATE HYDROCHLORIDE 5 MG: 5 TABLET ORAL at 09:21

## 2017-01-01 RX ADMIN — FAMOTIDINE 20 MG: 10 INJECTION, SOLUTION INTRAVENOUS at 12:03

## 2017-01-01 RX ADMIN — MORPHINE SULFATE 2.5 MG: 4 INJECTION, SOLUTION INTRAMUSCULAR; INTRAVENOUS at 18:34

## 2017-01-01 RX ADMIN — Medication 1 MG: at 21:26

## 2017-01-01 RX ADMIN — POTASSIUM CHLORIDE 20 MEQ: 29.8 INJECTION, SOLUTION INTRAVENOUS at 08:40

## 2017-01-01 RX ADMIN — BENZOCAINE: 220 SPRAY, METERED PERIODONTAL at 22:53

## 2017-01-01 RX ADMIN — RANITIDINE HYDROCHLORIDE 50 MG: 25 INJECTION INTRAMUSCULAR; INTRAVENOUS at 08:02

## 2017-01-01 RX ADMIN — DEXAMETHASONE SODIUM PHOSPHATE 2 MG: 4 INJECTION, SOLUTION INTRAMUSCULAR; INTRAVENOUS at 07:59

## 2017-01-01 RX ADMIN — CYCLOSPORINE 1 DROP: 0.5 EMULSION OPHTHALMIC at 19:36

## 2017-01-01 RX ADMIN — Medication 2.5 MG: at 20:12

## 2017-01-01 RX ADMIN — TOLTERODINE 4 MG: 4 CAPSULE, EXTENDED RELEASE ORAL at 21:03

## 2017-01-01 RX ADMIN — SODIUM CHLORIDE, PRESERVATIVE FREE 5 ML: 5 INJECTION INTRAVENOUS at 21:06

## 2017-01-01 RX ADMIN — SODIUM CHLORIDE: 9 INJECTION, SOLUTION INTRAVENOUS at 12:28

## 2017-01-01 RX ADMIN — SODIUM CHLORIDE, POTASSIUM CHLORIDE, SODIUM LACTATE AND CALCIUM CHLORIDE 100 ML: 600; 310; 30; 20 INJECTION, SOLUTION INTRAVENOUS at 04:00

## 2017-01-01 RX ADMIN — SODIUM CHLORIDE, SODIUM LACTATE, POTASSIUM CHLORIDE, CALCIUM CHLORIDE: 600; 310; 30; 20 INJECTION, SOLUTION INTRAVENOUS at 11:30

## 2017-01-01 RX ADMIN — POTASSIUM CHLORIDE 40 MEQ: 1.5 POWDER, FOR SOLUTION ORAL at 10:01

## 2017-01-01 RX ADMIN — ACETAMINOPHEN 500 MG: 500 TABLET, FILM COATED ORAL at 04:42

## 2017-01-01 RX ADMIN — PALONOSETRON 0.25 MG: 0.05 INJECTION, SOLUTION INTRAVENOUS at 10:32

## 2017-01-01 RX ADMIN — HYDROMORPHONE HYDROCHLORIDE 1 MG: 1 INJECTION, SOLUTION INTRAMUSCULAR; INTRAVENOUS; SUBCUTANEOUS at 07:40

## 2017-01-01 RX ADMIN — METHYLNALTREXONE BROMIDE 8 MG: 12 INJECTION, SOLUTION SUBCUTANEOUS at 14:09

## 2017-01-01 RX ADMIN — DEXTROSE AND SODIUM CHLORIDE: 5; 900 INJECTION, SOLUTION INTRAVENOUS at 15:40

## 2017-01-01 RX ADMIN — ENOXAPARIN SODIUM 40 MG: 40 INJECTION SUBCUTANEOUS at 21:19

## 2017-01-01 RX ADMIN — LORATADINE 10 MG: 10 TABLET ORAL at 21:30

## 2017-01-01 RX ADMIN — Medication: at 18:09

## 2017-01-01 RX ADMIN — PROPOFOL 50 MG: 10 INJECTION, EMULSION INTRAVENOUS at 12:08

## 2017-01-01 RX ADMIN — LORAZEPAM 0.5 MG: 0.5 TABLET ORAL at 06:07

## 2017-01-01 RX ADMIN — SODIUM CHLORIDE, PRESERVATIVE FREE 5 ML: 5 INJECTION INTRAVENOUS at 22:08

## 2017-01-01 RX ADMIN — Medication 1 LOZENGE: at 16:54

## 2017-01-01 RX ADMIN — TOPICAL ANESTHETIC 2 EACH: 200 SPRAY DENTAL; PERIODONTAL at 20:59

## 2017-01-01 RX ADMIN — HYDROMORPHONE HYDROCHLORIDE 1 MG: 1 INJECTION, SOLUTION INTRAMUSCULAR; INTRAVENOUS; SUBCUTANEOUS at 10:15

## 2017-01-01 RX ADMIN — AMLODIPINE BESYLATE 2.5 MG: 2.5 TABLET ORAL at 09:07

## 2017-01-01 RX ADMIN — CEFAZOLIN 2 G: 1 INJECTION, POWDER, FOR SOLUTION INTRAMUSCULAR; INTRAVENOUS at 10:48

## 2017-01-01 RX ADMIN — MORPHINE SULFATE 2.5 MG: 4 INJECTION, SOLUTION INTRAMUSCULAR; INTRAVENOUS at 21:33

## 2017-01-01 RX ADMIN — Medication 1 LOZENGE: at 15:35

## 2017-01-01 RX ADMIN — Medication 25 MG: at 09:35

## 2017-01-01 RX ADMIN — LORAZEPAM 1 MG: 2 INJECTION INTRAMUSCULAR; INTRAVENOUS at 08:52

## 2017-01-01 RX ADMIN — AMLODIPINE BESYLATE 2.5 MG: 2.5 TABLET ORAL at 07:37

## 2017-01-01 RX ADMIN — PROCHLORPERAZINE EDISYLATE 10 MG: 5 INJECTION INTRAMUSCULAR; INTRAVENOUS at 10:17

## 2017-01-01 RX ADMIN — CLINDAMYCIN PHOSPHATE 900 MG: 900 INJECTION, SOLUTION INTRAVENOUS at 09:47

## 2017-01-01 RX ADMIN — ACETAMINOPHEN 1000 MG: 10 INJECTION, SOLUTION INTRAVENOUS at 13:44

## 2017-01-01 RX ADMIN — Medication 2.5 MG: at 12:45

## 2017-01-01 RX ADMIN — ENOXAPARIN SODIUM 40 MG: 40 INJECTION SUBCUTANEOUS at 19:04

## 2017-01-01 RX ADMIN — RANITIDINE 75 MG: 75 TABLET, FILM COATED ORAL at 19:37

## 2017-01-01 RX ADMIN — POTASSIUM CHLORIDE 10 MEQ: 7.46 INJECTION, SOLUTION INTRAVENOUS at 10:30

## 2017-01-01 RX ADMIN — POTASSIUM CHLORIDE 10 MEQ: 7.46 INJECTION, SOLUTION INTRAVENOUS at 12:04

## 2017-01-01 RX ADMIN — SODIUM CHLORIDE: 9 INJECTION, SOLUTION INTRAVENOUS at 02:52

## 2017-01-01 RX ADMIN — GABAPENTIN 100 MG: 100 CAPSULE ORAL at 20:11

## 2017-01-01 RX ADMIN — HYDROMORPHONE HYDROCHLORIDE 1 MG: 1 INJECTION, SOLUTION INTRAMUSCULAR; INTRAVENOUS; SUBCUTANEOUS at 21:37

## 2017-01-01 RX ADMIN — ENOXAPARIN SODIUM 40 MG: 40 INJECTION SUBCUTANEOUS at 22:04

## 2017-01-01 RX ADMIN — BENZOCAINE: 220 SPRAY, METERED PERIODONTAL at 16:54

## 2017-01-01 RX ADMIN — ENOXAPARIN SODIUM 40 MG: 40 INJECTION SUBCUTANEOUS at 20:06

## 2017-01-01 RX ADMIN — SODIUM CHLORIDE, PRESERVATIVE FREE 5 ML: 5 INJECTION INTRAVENOUS at 10:51

## 2017-01-01 RX ADMIN — POLYETHYLENE GLYCOL 3350, SODIUM SULFATE ANHYDROUS, SODIUM BICARBONATE, SODIUM CHLORIDE, POTASSIUM CHLORIDE 4000 ML: 236; 22.74; 6.74; 5.86; 2.97 POWDER, FOR SOLUTION ORAL at 18:36

## 2017-01-01 RX ADMIN — TOLTERODINE 4 MG: 4 CAPSULE, EXTENDED RELEASE ORAL at 21:04

## 2017-01-01 RX ADMIN — MAGNESIUM CITRATE 296 ML: 1.75 LIQUID ORAL at 18:10

## 2017-01-01 RX ADMIN — SODIUM CHLORIDE, PRESERVATIVE FREE 500 UNITS: 5 INJECTION INTRAVENOUS at 18:28

## 2017-01-01 RX ADMIN — SODIUM CHLORIDE, PRESERVATIVE FREE 5 ML: 5 INJECTION INTRAVENOUS at 13:40

## 2017-01-01 RX ADMIN — DEXTROSE AND SODIUM CHLORIDE: 5; 900 INJECTION, SOLUTION INTRAVENOUS at 14:05

## 2017-01-01 RX ADMIN — Medication 1 LOZENGE: at 17:11

## 2017-01-01 RX ADMIN — ONDANSETRON 4 MG: 2 INJECTION INTRAMUSCULAR; INTRAVENOUS at 20:12

## 2017-01-01 RX ADMIN — KETAMINE HCL-NACL SOLN PREF SY 50 MG/5ML-0.9% (10MG/ML) 10 MG: 10 SOLUTION PREFILLED SYRINGE at 10:40

## 2017-01-01 RX ADMIN — ACETAMINOPHEN 1000 MG: 325 SOLUTION ORAL at 10:11

## 2017-01-01 RX ADMIN — TAMSULOSIN HYDROCHLORIDE 0.4 MG: 0.4 CAPSULE ORAL at 07:56

## 2017-01-01 RX ADMIN — HEPARIN SODIUM (PORCINE) LOCK FLUSH IV SOLN 100 UNIT/ML 5 ML: 100 SOLUTION at 12:20

## 2017-01-01 RX ADMIN — RANITIDINE 75 MG: 75 TABLET, FILM COATED ORAL at 20:57

## 2017-01-01 RX ADMIN — SODIUM CHLORIDE 2000 ML: 9 INJECTION, SOLUTION INTRAVENOUS at 08:43

## 2017-01-01 RX ADMIN — METHYLNALTREXONE BROMIDE 8 MG: 12 INJECTION, SOLUTION SUBCUTANEOUS at 09:07

## 2017-01-01 RX ADMIN — POTASSIUM CHLORIDE 20 MEQ: 29.8 INJECTION, SOLUTION INTRAVENOUS at 07:03

## 2017-01-01 RX ADMIN — SENNOSIDES AND DOCUSATE SODIUM 3 TABLET: 8.6; 5 TABLET ORAL at 09:04

## 2017-01-01 RX ADMIN — FAMOTIDINE 20 MG: 10 INJECTION, SOLUTION INTRAVENOUS at 09:38

## 2017-01-01 RX ADMIN — Medication 1 LOZENGE: at 14:18

## 2017-01-01 RX ADMIN — Medication 25 MG: at 09:58

## 2017-01-01 RX ADMIN — SODIUM CHLORIDE, PRESERVATIVE FREE 5 ML: 5 INJECTION INTRAVENOUS at 12:32

## 2017-01-01 RX ADMIN — DEXTROSE AND SODIUM CHLORIDE: 5; 900 INJECTION, SOLUTION INTRAVENOUS at 20:30

## 2017-01-01 RX ADMIN — ACETAMINOPHEN 1000 MG: 500 TABLET, FILM COATED ORAL at 15:09

## 2017-01-01 RX ADMIN — Medication 1 LOZENGE: at 10:12

## 2017-01-01 RX ADMIN — SODIUM CHLORIDE: 9 INJECTION, SOLUTION INTRAVENOUS at 22:54

## 2017-01-01 RX ADMIN — LORATADINE 10 MG: 10 TABLET ORAL at 22:08

## 2017-01-01 RX ADMIN — POLYETHYLENE GLYCOL 3350, SODIUM SULFATE ANHYDROUS, SODIUM BICARBONATE, SODIUM CHLORIDE, POTASSIUM CHLORIDE 2000 ML: 236; 22.74; 6.74; 5.86; 2.97 POWDER, FOR SOLUTION ORAL at 09:01

## 2017-01-01 RX ADMIN — ENOXAPARIN SODIUM 40 MG: 40 INJECTION SUBCUTANEOUS at 22:10

## 2017-01-01 RX ADMIN — ACETAMINOPHEN TAB 500 MG 1000 MG: 500 TAB at 02:55

## 2017-01-01 RX ADMIN — MORPHINE SULFATE 5 MG: 4 INJECTION, SOLUTION INTRAMUSCULAR; INTRAVENOUS at 11:20

## 2017-01-01 RX ADMIN — HEPARIN SODIUM (PORCINE) LOCK FLUSH IV SOLN 100 UNIT/ML 5 ML: 100 SOLUTION at 16:29

## 2017-01-01 RX ADMIN — HEPARIN SODIUM (PORCINE) LOCK FLUSH IV SOLN 100 UNIT/ML 5 ML: 100 SOLUTION at 08:27

## 2017-01-01 RX ADMIN — SODIUM CHLORIDE: 9 INJECTION, SOLUTION INTRAVENOUS at 10:15

## 2017-01-01 RX ADMIN — DEXTROSE AND SODIUM CHLORIDE: 5; 900 INJECTION, SOLUTION INTRAVENOUS at 07:04

## 2017-01-01 RX ADMIN — POTASSIUM CHLORIDE 20 MEQ: 400 INJECTION, SOLUTION INTRAVENOUS at 19:10

## 2017-01-01 RX ADMIN — TOLTERODINE 4 MG: 4 CAPSULE, EXTENDED RELEASE ORAL at 19:37

## 2017-01-01 RX ADMIN — RANITIDINE HYDROCHLORIDE 50 MG: 25 INJECTION INTRAMUSCULAR; INTRAVENOUS at 10:39

## 2017-01-01 RX ADMIN — MORPHINE SULFATE 5 MG: 4 INJECTION, SOLUTION INTRAMUSCULAR; INTRAVENOUS at 05:41

## 2017-01-01 RX ADMIN — KETOROLAC TROMETHAMINE 15 MG: 30 INJECTION, SOLUTION INTRAMUSCULAR at 19:56

## 2017-01-01 RX ADMIN — ACETAMINOPHEN 1000 MG: 500 TABLET, FILM COATED ORAL at 21:04

## 2017-01-01 RX ADMIN — Medication 1 LOZENGE: at 19:16

## 2017-01-01 RX ADMIN — TAMSULOSIN HYDROCHLORIDE 0.4 MG: 0.4 CAPSULE ORAL at 21:03

## 2017-01-01 RX ADMIN — METHYLPHENIDATE HYDROCHLORIDE 5 MG: 5 TABLET ORAL at 12:14

## 2017-01-01 RX ADMIN — SODIUM CHLORIDE, POTASSIUM CHLORIDE, SODIUM LACTATE AND CALCIUM CHLORIDE: 600; 310; 30; 20 INJECTION, SOLUTION INTRAVENOUS at 21:31

## 2017-01-01 RX ADMIN — RANITIDINE HYDROCHLORIDE 50 MG: 25 INJECTION INTRAMUSCULAR; INTRAVENOUS at 09:11

## 2017-01-01 RX ADMIN — LORAZEPAM 0.5 MG: 0.5 TABLET ORAL at 17:55

## 2017-01-01 RX ADMIN — SODIUM CHLORIDE, PRESERVATIVE FREE 5 ML: 5 INJECTION INTRAVENOUS at 15:46

## 2017-01-01 RX ADMIN — ENOXAPARIN SODIUM 40 MG: 40 INJECTION SUBCUTANEOUS at 21:03

## 2017-01-01 RX ADMIN — SODIUM CHLORIDE, PRESERVATIVE FREE 5 ML: 5 INJECTION INTRAVENOUS at 20:40

## 2017-01-01 RX ADMIN — MORPHINE SULFATE 2.5 MG: 4 INJECTION, SOLUTION INTRAMUSCULAR; INTRAVENOUS at 22:03

## 2017-01-01 RX ADMIN — BENZOCAINE: 220 SPRAY, METERED PERIODONTAL at 14:18

## 2017-01-01 RX ADMIN — Medication 250 ML: at 14:37

## 2017-01-01 RX ADMIN — METHYLPHENIDATE HYDROCHLORIDE 5 MG: 5 TABLET ORAL at 13:42

## 2017-01-01 RX ADMIN — FENTANYL CITRATE 50 MCG: 50 INJECTION INTRAMUSCULAR; INTRAVENOUS at 11:38

## 2017-01-01 RX ADMIN — HYDROMORPHONE HYDROCHLORIDE 1 MG: 1 INJECTION, SOLUTION INTRAMUSCULAR; INTRAVENOUS; SUBCUTANEOUS at 16:10

## 2017-01-01 RX ADMIN — SIMETHICONE 133 MG: 63.3; 3.7 SOLUTION/ DROPS ORAL at 11:06

## 2017-01-01 RX ADMIN — FENTANYL CITRATE 50 MCG: 50 INJECTION, SOLUTION INTRAMUSCULAR; INTRAVENOUS at 10:50

## 2017-01-01 RX ADMIN — CYCLOSPORINE 1 DROP: 0.5 EMULSION OPHTHALMIC at 09:21

## 2017-01-01 RX ADMIN — TOLTERODINE 4 MG: 4 CAPSULE, EXTENDED RELEASE ORAL at 09:32

## 2017-01-01 RX ADMIN — CYCLOSPORINE 1 DROP: 0.5 EMULSION OPHTHALMIC at 09:07

## 2017-01-01 RX ADMIN — AMLODIPINE BESYLATE 2.5 MG: 2.5 TABLET ORAL at 21:05

## 2017-01-01 RX ADMIN — TAMSULOSIN HYDROCHLORIDE 0.4 MG: 0.4 CAPSULE ORAL at 20:03

## 2017-01-01 RX ADMIN — SODIUM CHLORIDE 500 ML: 9 INJECTION, SOLUTION INTRAVENOUS at 00:00

## 2017-01-01 RX ADMIN — GADOBUTROL 7.5 ML: 604.72 INJECTION INTRAVENOUS at 15:12

## 2017-01-01 RX ADMIN — HYDROMORPHONE HYDROCHLORIDE 0.5 MG: 10 INJECTION, SOLUTION INTRAMUSCULAR; INTRAVENOUS; SUBCUTANEOUS at 22:53

## 2017-01-01 RX ADMIN — RANITIDINE 75 MG: 75 TABLET, FILM COATED ORAL at 20:04

## 2017-01-01 RX ADMIN — KETAMINE HYDROCHLORIDE 25 MG: 10 INJECTION, SOLUTION INTRAMUSCULAR; INTRAVENOUS at 13:53

## 2017-01-01 RX ADMIN — PROCHLORPERAZINE EDISYLATE 5 MG: 5 INJECTION INTRAMUSCULAR; INTRAVENOUS at 22:59

## 2017-01-01 RX ADMIN — CLINDAMYCIN PHOSPHATE 900 MG: 900 INJECTION, SOLUTION INTRAVENOUS at 12:03

## 2017-01-01 RX ADMIN — POTASSIUM CHLORIDE 10 MEQ: 7.46 INJECTION, SOLUTION INTRAVENOUS at 10:25

## 2017-01-01 RX ADMIN — LORAZEPAM 1 MG: 2 INJECTION INTRAMUSCULAR; INTRAVENOUS at 18:26

## 2017-01-01 RX ADMIN — LORATADINE 10 MG: 10 TABLET ORAL at 20:31

## 2017-01-01 RX ADMIN — ENOXAPARIN SODIUM 40 MG: 40 INJECTION SUBCUTANEOUS at 19:21

## 2017-01-01 RX ADMIN — Medication 1 MG: at 21:27

## 2017-01-01 RX ADMIN — ACETAMINOPHEN 1000 MG: 500 TABLET, FILM COATED ORAL at 08:49

## 2017-01-01 RX ADMIN — TAMSULOSIN HYDROCHLORIDE 0.4 MG: 0.4 CAPSULE ORAL at 09:31

## 2017-01-01 RX ADMIN — SODIUM CHLORIDE, SODIUM LACTATE, POTASSIUM CHLORIDE, CALCIUM CHLORIDE: 600; 310; 30; 20 INJECTION, SOLUTION INTRAVENOUS at 13:49

## 2017-01-01 RX ADMIN — GABAPENTIN 300 MG: 300 CAPSULE ORAL at 20:48

## 2017-01-01 RX ADMIN — SODIUM CHLORIDE, PRESERVATIVE FREE 5 ML: 5 INJECTION INTRAVENOUS at 09:51

## 2017-01-01 RX ADMIN — RANITIDINE HYDROCHLORIDE 50 MG: 25 INJECTION INTRAMUSCULAR; INTRAVENOUS at 21:15

## 2017-01-01 RX ADMIN — Medication 1 LOZENGE: at 08:20

## 2017-01-01 RX ADMIN — WATER 100 ML: 100 IRRIGANT IRRIGATION at 11:07

## 2017-01-01 RX ADMIN — ACETAMINOPHEN 1000 MG: 500 TABLET, FILM COATED ORAL at 00:08

## 2017-01-01 RX ADMIN — ACETAMINOPHEN 1000 MG: 500 TABLET, FILM COATED ORAL at 04:25

## 2017-01-01 RX ADMIN — DEXTROSE AND SODIUM CHLORIDE: 5; 900 INJECTION, SOLUTION INTRAVENOUS at 21:13

## 2017-01-01 RX ADMIN — HYDROMORPHONE HYDROCHLORIDE 2 MG: 2 TABLET ORAL at 15:24

## 2017-01-01 RX ADMIN — MAGNESIUM HYDROXIDE 30 ML: 400 SUSPENSION ORAL at 18:53

## 2017-01-01 RX ADMIN — ACETAMINOPHEN 1000 MG: 325 SOLUTION ORAL at 15:41

## 2017-01-01 RX ADMIN — HEPARIN SODIUM (PORCINE) LOCK FLUSH IV SOLN 100 UNIT/ML 5 ML: 100 SOLUTION at 17:27

## 2017-01-01 RX ADMIN — MORPHINE SULFATE 5 MG: 4 INJECTION, SOLUTION INTRAMUSCULAR; INTRAVENOUS at 18:06

## 2017-01-01 RX ADMIN — POTASSIUM CHLORIDE: 149 INJECTION, SOLUTION, CONCENTRATE INTRAVENOUS at 13:21

## 2017-01-01 RX ADMIN — ACETAMINOPHEN 1000 MG: 500 TABLET, FILM COATED ORAL at 12:18

## 2017-01-01 RX ADMIN — POLYETHYLENE GLYCOL 3350 17 G: 17 POWDER, FOR SOLUTION ORAL at 09:06

## 2017-01-01 RX ADMIN — Medication 2.5 MG: at 08:24

## 2017-01-01 RX ADMIN — Medication 2.5 MG: at 21:26

## 2017-01-01 RX ADMIN — ENOXAPARIN SODIUM 40 MG: 40 INJECTION SUBCUTANEOUS at 21:30

## 2017-01-01 RX ADMIN — ESMOLOL HYDROCHLORIDE 20 MG: 10 INJECTION, SOLUTION INTRAVENOUS at 11:40

## 2017-01-01 RX ADMIN — MORPHINE SULFATE 2.5 MG: 4 INJECTION, SOLUTION INTRAMUSCULAR; INTRAVENOUS at 13:39

## 2017-01-01 RX ADMIN — MORPHINE SULFATE 5 MG: 4 INJECTION, SOLUTION INTRAMUSCULAR; INTRAVENOUS at 16:31

## 2017-01-01 RX ADMIN — HYDROMORPHONE HYDROCHLORIDE 0.3 MG: 1 INJECTION, SOLUTION INTRAMUSCULAR; INTRAVENOUS; SUBCUTANEOUS at 16:23

## 2017-01-01 RX ADMIN — SODIUM CHLORIDE, POTASSIUM CHLORIDE, SODIUM LACTATE AND CALCIUM CHLORIDE: 600; 310; 30; 20 INJECTION, SOLUTION INTRAVENOUS at 14:03

## 2017-01-01 RX ADMIN — SENNOSIDES AND DOCUSATE SODIUM 3 TABLET: 8.6; 5 TABLET ORAL at 20:54

## 2017-01-01 RX ADMIN — FAMOTIDINE 20 MG: 10 INJECTION, SOLUTION INTRAVENOUS at 09:08

## 2017-01-01 RX ADMIN — HYDROMORPHONE HYDROCHLORIDE 0.2 MG: 10 INJECTION, SOLUTION INTRAMUSCULAR; INTRAVENOUS; SUBCUTANEOUS at 23:05

## 2017-01-01 RX ADMIN — SODIUM CHLORIDE: 9 INJECTION, SOLUTION INTRAVENOUS at 22:04

## 2017-01-01 RX ADMIN — POTASSIUM CHLORIDE 10 MEQ: 7.46 INJECTION, SOLUTION INTRAVENOUS at 11:30

## 2017-01-01 RX ADMIN — ONDANSETRON 4 MG: 2 INJECTION INTRAMUSCULAR; INTRAVENOUS at 23:05

## 2017-01-01 RX ADMIN — ONDANSETRON 4 MG: 2 INJECTION INTRAMUSCULAR; INTRAVENOUS at 08:43

## 2017-01-01 RX ADMIN — PROCHLORPERAZINE EDISYLATE 5 MG: 5 INJECTION INTRAMUSCULAR; INTRAVENOUS at 05:13

## 2017-01-01 RX ADMIN — POLYETHYLENE GLYCOL 3350 17 G: 17 POWDER, FOR SOLUTION ORAL at 20:54

## 2017-01-01 RX ADMIN — DEXAMETHASONE SODIUM PHOSPHATE 2 MG: 4 INJECTION, SOLUTION INTRAMUSCULAR; INTRAVENOUS at 07:48

## 2017-01-01 RX ADMIN — PROCHLORPERAZINE EDISYLATE 5 MG: 5 INJECTION INTRAMUSCULAR; INTRAVENOUS at 20:15

## 2017-01-01 RX ADMIN — Medication 2.5 MG: at 20:00

## 2017-01-01 RX ADMIN — ONDANSETRON 4 MG: 2 INJECTION INTRAMUSCULAR; INTRAVENOUS at 02:21

## 2017-01-01 RX ADMIN — POTASSIUM CHLORIDE 20 MEQ: 29.8 INJECTION, SOLUTION INTRAVENOUS at 10:59

## 2017-01-01 RX ADMIN — CYCLOSPORINE 1 DROP: 0.5 EMULSION OPHTHALMIC at 08:34

## 2017-01-01 RX ADMIN — CYCLOSPORINE 1 DROP: 0.5 EMULSION OPHTHALMIC at 09:14

## 2017-01-01 RX ADMIN — PANTOPRAZOLE SODIUM 40 MG: 40 INJECTION, POWDER, FOR SOLUTION INTRAVENOUS at 10:19

## 2017-01-01 RX ADMIN — SODIUM CHLORIDE, PRESERVATIVE FREE 5 ML: 5 INJECTION INTRAVENOUS at 16:49

## 2017-01-01 RX ADMIN — FAMOTIDINE 20 MG: 10 INJECTION INTRAVENOUS at 07:06

## 2017-01-01 RX ADMIN — ACETAMINOPHEN 1000 MG: 500 TABLET, FILM COATED ORAL at 20:03

## 2017-01-01 RX ADMIN — HYDROMORPHONE HYDROCHLORIDE 2 MG: 2 TABLET ORAL at 11:05

## 2017-01-01 RX ADMIN — ENOXAPARIN SODIUM 40 MG: 40 INJECTION SUBCUTANEOUS at 19:56

## 2017-01-01 RX ADMIN — PROPOFOL 50 MG: 10 INJECTION, EMULSION INTRAVENOUS at 09:52

## 2017-01-01 RX ADMIN — MORPHINE SULFATE 5 MG: 4 INJECTION, SOLUTION INTRAMUSCULAR; INTRAVENOUS at 20:46

## 2017-01-01 RX ADMIN — AMLODIPINE BESYLATE 2.5 MG: 2.5 TABLET ORAL at 20:15

## 2017-01-01 RX ADMIN — DEXTROSE AND SODIUM CHLORIDE: 5; 900 INJECTION, SOLUTION INTRAVENOUS at 05:08

## 2017-01-01 RX ADMIN — TAMSULOSIN HYDROCHLORIDE 0.4 MG: 0.4 CAPSULE ORAL at 20:02

## 2017-01-01 RX ADMIN — MORPHINE SULFATE 2.5 MG: 4 INJECTION, SOLUTION INTRAMUSCULAR; INTRAVENOUS at 09:23

## 2017-01-01 RX ADMIN — Medication 1 LOZENGE: at 22:10

## 2017-01-01 RX ADMIN — DEXAMETHASONE SODIUM PHOSPHATE 10 MG: 10 INJECTION, SOLUTION INTRAMUSCULAR; INTRAVENOUS at 10:46

## 2017-01-01 RX ADMIN — DEXAMETHASONE SODIUM PHOSPHATE 4 MG: 4 INJECTION, SOLUTION INTRA-ARTICULAR; INTRALESIONAL; INTRAMUSCULAR; INTRAVENOUS; SOFT TISSUE at 10:44

## 2017-01-01 RX ADMIN — POLYETHYLENE GLYCOL 3350 17 G: 17 POWDER, FOR SOLUTION ORAL at 08:19

## 2017-01-01 RX ADMIN — FAMOTIDINE 20 MG: 10 INJECTION, SOLUTION INTRAVENOUS at 21:30

## 2017-01-01 RX ADMIN — AMLODIPINE BESYLATE 2.5 MG: 2.5 TABLET ORAL at 20:02

## 2017-01-01 RX ADMIN — DEXAMETHASONE SODIUM PHOSPHATE 4 MG: 4 INJECTION, SOLUTION INTRA-ARTICULAR; INTRALESIONAL; INTRAMUSCULAR; INTRAVENOUS; SOFT TISSUE at 14:03

## 2017-01-01 RX ADMIN — TOLTERODINE 4 MG: 4 CAPSULE, EXTENDED RELEASE ORAL at 19:20

## 2017-01-01 RX ADMIN — HYDROMORPHONE HYDROCHLORIDE 2 MG: 2 TABLET ORAL at 13:54

## 2017-01-01 RX ADMIN — KETAMINE HCL-NACL SOLN PREF SY 50 MG/5ML-0.9% (10MG/ML) 20 MG: 10 SOLUTION PREFILLED SYRINGE at 11:03

## 2017-01-01 RX ADMIN — MIDAZOLAM HYDROCHLORIDE 0.5 MG: 1 INJECTION, SOLUTION INTRAMUSCULAR; INTRAVENOUS at 10:34

## 2017-01-01 RX ADMIN — SODIUM CHLORIDE, PRESERVATIVE FREE 5 ML: 5 INJECTION INTRAVENOUS at 19:45

## 2017-01-01 RX ADMIN — HYDROMORPHONE HYDROCHLORIDE 4 MG: 2 TABLET ORAL at 20:05

## 2017-01-01 RX ADMIN — ONDANSETRON 8 MG: 8 TABLET, ORALLY DISINTEGRATING ORAL at 08:01

## 2017-01-01 RX ADMIN — SODIUM CHLORIDE, POTASSIUM CHLORIDE, SODIUM LACTATE AND CALCIUM CHLORIDE 250 ML: 600; 310; 30; 20 INJECTION, SOLUTION INTRAVENOUS at 22:59

## 2017-01-01 RX ADMIN — FENTANYL CITRATE 25 MCG: 50 INJECTION, SOLUTION INTRAMUSCULAR; INTRAVENOUS at 10:40

## 2017-01-01 RX ADMIN — Medication 100 ML: at 09:48

## 2017-01-01 RX ADMIN — ACETAMINOPHEN 1000 MG: 10 INJECTION, SOLUTION INTRAVENOUS at 09:38

## 2017-01-01 RX ADMIN — Medication 25 MG: at 09:40

## 2017-01-01 RX ADMIN — SODIUM CHLORIDE, PRESERVATIVE FREE 5 ML: 5 INJECTION INTRAVENOUS at 15:42

## 2017-01-01 RX ADMIN — PANTOPRAZOLE SODIUM 40 MG: 40 INJECTION, POWDER, FOR SOLUTION INTRAVENOUS at 08:53

## 2017-01-01 RX ADMIN — CYCLOSPORINE 1 DROP: 0.5 EMULSION OPHTHALMIC at 20:10

## 2017-01-01 RX ADMIN — POLYETHYLENE GLYCOL 3350 17 G: 17 POWDER, FOR SOLUTION ORAL at 20:00

## 2017-01-01 RX ADMIN — ENOXAPARIN SODIUM 40 MG: 40 INJECTION SUBCUTANEOUS at 21:05

## 2017-01-01 RX ADMIN — FENTANYL CITRATE 100 MCG: 50 INJECTION INTRAMUSCULAR; INTRAVENOUS at 08:43

## 2017-01-01 RX ADMIN — DICLOFENAC SODIUM 4 G: 10 GEL TOPICAL at 10:21

## 2017-01-01 RX ADMIN — MORPHINE SULFATE 2.5 MG: 4 INJECTION, SOLUTION INTRAMUSCULAR; INTRAVENOUS at 21:51

## 2017-01-01 RX ADMIN — PROPOFOL 30 MG: 10 INJECTION, EMULSION INTRAVENOUS at 13:20

## 2017-01-01 RX ADMIN — FENTANYL CITRATE 50 MCG: 50 INJECTION, SOLUTION INTRAMUSCULAR; INTRAVENOUS at 12:04

## 2017-01-01 RX ADMIN — PROCHLORPERAZINE EDISYLATE 5 MG: 5 INJECTION INTRAMUSCULAR; INTRAVENOUS at 13:29

## 2017-01-01 RX ADMIN — Medication 1 LOZENGE: at 20:22

## 2017-01-01 RX ADMIN — METHYLPHENIDATE HYDROCHLORIDE 5 MG: 5 TABLET ORAL at 08:03

## 2017-01-01 RX ADMIN — POLYETHYLENE GLYCOL 3350 17 G: 17 POWDER, FOR SOLUTION ORAL at 08:18

## 2017-01-01 RX ADMIN — POLYETHYLENE GLYCOL 3350 17 G: 17 POWDER, FOR SOLUTION ORAL at 20:03

## 2017-01-01 RX ADMIN — Medication 1 LOZENGE: at 18:25

## 2017-01-01 RX ADMIN — CYCLOSPORINE 1 DROP: 0.5 EMULSION OPHTHALMIC at 09:02

## 2017-01-01 RX ADMIN — HEPARIN SODIUM (PORCINE) LOCK FLUSH IV SOLN 100 UNIT/ML 5 ML: 100 SOLUTION at 20:25

## 2017-01-01 RX ADMIN — CYCLOSPORINE 1 DROP: 0.5 EMULSION OPHTHALMIC at 20:12

## 2017-01-01 RX ADMIN — ONDANSETRON 4 MG: 2 INJECTION INTRAMUSCULAR; INTRAVENOUS at 06:22

## 2017-01-01 RX ADMIN — TOPICAL ANESTHETIC 2 EACH: 200 SPRAY DENTAL; PERIODONTAL at 08:52

## 2017-01-01 RX ADMIN — RANITIDINE HYDROCHLORIDE 50 MG: 25 INJECTION INTRAMUSCULAR; INTRAVENOUS at 08:24

## 2017-01-01 RX ADMIN — FENTANYL CITRATE 25 MCG: 50 INJECTION, SOLUTION INTRAMUSCULAR; INTRAVENOUS at 14:18

## 2017-01-01 RX ADMIN — BENZOCAINE: 220 SPRAY, METERED PERIODONTAL at 14:22

## 2017-01-01 RX ADMIN — Medication 2.5 MG: at 08:11

## 2017-01-01 ASSESSMENT — ENCOUNTER SYMPTOMS
CONSTIPATION: 1
SHORTNESS OF BREATH: 0
POLYPHAGIA: 0
WEIGHT LOSS: 1
NERVOUS/ANXIOUS: 0
WEAKNESS: 0
RECTAL BLEEDING: 0
MUSCLE WEAKNESS: 1
SHORTNESS OF BREATH: 0
HEMATURIA: 0
DIARRHEA: 0
DIAPHORESIS: 0
BOWEL INCONTINENCE: 1
NAUSEA: 1
COUGH: 0
ALTERED TEMPERATURE REGULATION: 0
SWOLLEN GLANDS: 0
PALPITATIONS: 1
INCREASED ENERGY: 0
SHORTNESS OF BREATH: 0
HALLUCINATIONS: 0
NIGHT SWEATS: 0
INCREASED ENERGY: 0
ORTHOPNEA: 0
FEVER: 0
SYNCOPE: 0
HYPERTENSION: 1
HOT FLASHES: 0
HEMATURIA: 1
ALTERED TEMPERATURE REGULATION: 0
RECTAL PAIN: 0
LEG PAIN: 0
DYSURIA: 0
PANIC: 0
CONFUSION: 0
VOMITING: 0
EXERCISE INTOLERANCE: 0
SYNCOPE: 0
BLOATING: 0
TACHYCARDIA: 1
NAUSEA: 1
ABDOMINAL PAIN: 1
CHILLS: 0
HEADACHES: 0
SHORTNESS OF BREATH: 0
BOWEL INCONTINENCE: 0
BACK PAIN: 1
CONSTIPATION: 1
COUGH: 0
LIGHT-HEADEDNESS: 0
INCREASED ENERGY: 0
COUGH: 0
SWOLLEN GLANDS: 0
POSTURAL DYSPNEA: 0
BOWEL INCONTINENCE: 1
WEIGHT GAIN: 0
FLANK PAIN: 1
BRUISES/BLEEDS EASILY: 0
POLYPHAGIA: 0
SEIZURES: 0
WEIGHT GAIN: 0
COLOR CHANGE: 0
DYSURIA: 0
SEIZURES: 0
HEMATURIA: 1
BLOOD IN STOOL: 0
LOSS OF CONSCIOUSNESS: 0
CHILLS: 0
SLEEP DISTURBANCES DUE TO BREATHING: 0
WEIGHT LOSS: 0
SEIZURES: 0
CHILLS: 0
NAUSEA: 1
LIGHT-HEADEDNESS: 0
BLOOD IN STOOL: 1
FLANK PAIN: 1
CONSTIPATION: 1
HEADACHES: 0
DYSURIA: 0
DECREASED LIBIDO: 0
LOSS OF CONSCIOUSNESS: 0
BLOATING: 0
LEG SWELLING: 0
HEARTBURN: 0
BRUISES/BLEEDS EASILY: 0
PANIC: 0
NAUSEA: 1
BACK PAIN: 0
WEIGHT GAIN: 0
HYPERTENSION: 1
INSOMNIA: 1
EYE REDNESS: 0
ABDOMINAL PAIN: 1
COUGH DISTURBING SLEEP: 0
BLOATING: 1
FATIGUE: 1
NECK PAIN: 0
HALLUCINATIONS: 0
HYPOTENSION: 0
HEARTBURN: 0
DOUBLE VISION: 0
POLYPHAGIA: 0
STIFFNESS: 0
NIGHT SWEATS: 0
ABDOMINAL DISTENTION: 1
RECTAL BLEEDING: 0
DIARRHEA: 0
DYSPHORIC MOOD: 0
SWOLLEN GLANDS: 0
HYPOTENSION: 0
ORTHOPNEA: 0
WHEEZING: 0
PARALYSIS: 0
TINGLING: 0
INCREASED ENERGY: 0
FATIGUE: 1
SORE THROAT: 0
JAUNDICE: 0
FLANK PAIN: 1
MEMORY LOSS: 1
ABDOMINAL DISTENTION: 1
ARTHRALGIAS: 0
MUSCLE CRAMPS: 0
ORTHOPNEA: 0
MYALGIAS: 0
CONSTIPATION: 1
VOMITING: 0
RECTAL BLEEDING: 0
DIZZINESS: 0
ALTERED TEMPERATURE REGULATION: 0
CONSTIPATION: 1
RECTAL PAIN: 0
NAUSEA: 1
ADENOPATHY: 0
EYE WATERING: 0
ALTERED TEMPERATURE REGULATION: 0
DIFFICULTY URINATING: 0
FATIGUE: 1
HEARTBURN: 0
LEG SWELLING: 0
LEG SWELLING: 0
BOWEL INCONTINENCE: 0
PARALYSIS: 0
CHILLS: 0
COLOR CHANGE: 0
DEPRESSION: 0
HOARSE VOICE: 0
BOWEL INCONTINENCE: 0
NAUSEA: 1
HOT FLASHES: 0
CHILLS: 0
ABDOMINAL PAIN: 0
RECTAL BLEEDING: 0
HALLUCINATIONS: 0
POLYDIPSIA: 0
SYNCOPE: 0
LIGHT-HEADEDNESS: 1
RECTAL PAIN: 0
RECTAL BLEEDING: 0
CHILLS: 0
DECREASED CONCENTRATION: 0
DIFFICULTY URINATING: 0
DIFFICULTY URINATING: 0
HEMATURIA: 0
VOICE CHANGE: 0
HEADACHES: 0
TACHYCARDIA: 1
EYE REDNESS: 0
BRUISES/BLEEDS EASILY: 0
POLYDIPSIA: 0
HEARTBURN: 0
HYPERTENSION: 1
TACHYCARDIA: 1
WEIGHT GAIN: 0
VOMITING: 0
DYSURIA: 0
DECREASED APPETITE: 0
DIFFICULTY URINATING: 0
DIARRHEA: 0
NAUSEA: 1
NIGHT SWEATS: 0
SWOLLEN GLANDS: 0
MEMORY LOSS: 1
DIARRHEA: 0
NAUSEA: 1
LOSS OF CONSCIOUSNESS: 0
FLANK PAIN: 1
SNORES LOUDLY: 0
SYNCOPE: 0
SLEEP DISTURBANCES DUE TO BREATHING: 0
HEMATURIA: 0
DIZZINESS: 1
CONSTIPATION: 1
HEARTBURN: 0
WEIGHT GAIN: 0
HYPOTENSION: 0
DECREASED APPETITE: 0
EYE PAIN: 0
RECTAL BLEEDING: 0
RECTAL BLEEDING: 0
TINGLING: 0
HALLUCINATIONS: 0
FLANK PAIN: 1
HEMOPTYSIS: 0
RESPIRATORY PAIN: 0
FLANK PAIN: 1
WEIGHT LOSS: 0
PALPITATIONS: 1
BLOOD IN STOOL: 0
VOMITING: 0
FLANK PAIN: 1
NIGHT SWEATS: 0
PALPITATIONS: 1
PARALYSIS: 0
CONSTIPATION: 1
BACK PAIN: 0
DYSURIA: 0
ABDOMINAL PAIN: 1
DISTURBANCES IN COORDINATION: 0
DYSURIA: 0
ABDOMINAL PAIN: 0
LEG SWELLING: 0
VOMITING: 1
CHILLS: 0
EYE REDNESS: 0
POLYPHAGIA: 0
EXERCISE INTOLERANCE: 1
RECTAL BLEEDING: 0
DECREASED APPETITE: 0
MUSCLE WEAKNESS: 0
BLOOD IN STOOL: 0
LIGHT-HEADEDNESS: 0
DIFFICULTY URINATING: 0
DECREASED CONCENTRATION: 0
FEVER: 1
DIARRHEA: 0
HEMATURIA: 1
WEIGHT GAIN: 0
HEMATURIA: 0
BLOATING: 1
NAUSEA: 1
ABDOMINAL PAIN: 1
FREQUENCY: 0
FEVER: 0
DIZZINESS: 0
HEADACHES: 0
CONSTIPATION: 1
NECK PAIN: 0
POLYDIPSIA: 0
POLYDIPSIA: 0
BLOOD IN STOOL: 0
CLAUDICATION: 0
BLOOD IN STOOL: 0
DYSURIA: 0
LEG SWELLING: 0
WEIGHT LOSS: 1
LIGHT-HEADEDNESS: 1
BREAST MASS: 0
VOMITING: 0
SLEEP DISTURBANCES DUE TO BREATHING: 0
DIFFICULTY URINATING: 0
HYPERTENSION: 1
BLOOD IN STOOL: 0
TACHYCARDIA: 1
LIGHT-HEADEDNESS: 1
DYSURIA: 0
APPETITE CHANGE: 1
TREMORS: 1
POOR WOUND HEALING: 0
NECK STIFFNESS: 0
BOWEL INCONTINENCE: 0
BRUISES/BLEEDS EASILY: 0
VOMITING: 0
EYE PAIN: 0
DECREASED APPETITE: 1
VOMITING: 1
DYSURIA: 0
DIFFICULTY URINATING: 0
TINGLING: 1
JOINT SWELLING: 0
JOINT SWELLING: 0
INSOMNIA: 1
HEMATURIA: 1
EXERCISE INTOLERANCE: 1
POLYDIPSIA: 0
DYSPNEA ON EXERTION: 0
DIARRHEA: 0
HEARTBURN: 0
PALPITATIONS: 0
ABDOMINAL PAIN: 1
BLOATING: 1
SMELL DISTURBANCE: 0
DIARRHEA: 0
LEG SWELLING: 0
POLYDIPSIA: 0
DYSURIA: 0
MEMORY LOSS: 1
PALPITATIONS: 1
SYNCOPE: 0
FEVER: 0
RECTAL PAIN: 0
POLYPHAGIA: 0
DECREASED APPETITE: 1
ALTERED TEMPERATURE REGULATION: 0
NIGHT SWEATS: 0
BLOATING: 0
ORTHOPNEA: 0
JAUNDICE: 0
VOMITING: 0
WEIGHT LOSS: 1
TACHYCARDIA: 1
HEMATURIA: 0
LEG PAIN: 0
HYPERTENSION: 1
NUMBNESS: 0
SPEECH CHANGE: 0
TASTE DISTURBANCE: 0
SYNCOPE: 0
CHILLS: 0
NUMBNESS: 0
BOWEL INCONTINENCE: 1
HEARTBURN: 0
JAUNDICE: 0
STIFFNESS: 0
BLOATING: 0
NECK MASS: 0
ABDOMINAL PAIN: 1
VOMITING: 0
BRUISES/BLEEDS EASILY: 0
FREQUENCY: 0
WEIGHT LOSS: 0
BOWEL INCONTINENCE: 0
FATIGUE: 0
WEAKNESS: 0
RECTAL PAIN: 0
TACHYCARDIA: 1
FEVER: 0
WEIGHT LOSS: 1
WEAKNESS: 0
RECTAL PAIN: 0
ARTHRALGIAS: 0
LEG PAIN: 0
FREQUENCY: 0
CLAUDICATION: 0
NECK PAIN: 0
LIGHT-HEADEDNESS: 1
ALTERED TEMPERATURE REGULATION: 1
CONSTIPATION: 1
ORTHOPNEA: 0
NERVOUS/ANXIOUS: 0
WEAKNESS: 0
BREAST PAIN: 0
JAUNDICE: 0
DIFFICULTY URINATING: 0
POLYDIPSIA: 0
FEVER: 0
EXERCISE INTOLERANCE: 1
NUMBNESS: 0
DIZZINESS: 1
NECK STIFFNESS: 0
HEARTBURN: 0
WEIGHT GAIN: 0
JAUNDICE: 0
POLYPHAGIA: 0
ABDOMINAL PAIN: 1
NIGHT SWEATS: 0
JAUNDICE: 0
ORTHOPNEA: 0
FLANK PAIN: 1
FLANK PAIN: 1
DIAPHORESIS: 0
HEADACHES: 0
EXERCISE INTOLERANCE: 0
SLEEP DISTURBANCES DUE TO BREATHING: 0
BACK PAIN: 1
NAUSEA: 1
DISTURBANCES IN COORDINATION: 0
CLAUDICATION: 0
HALLUCINATIONS: 0
JAUNDICE: 0
FATIGUE: 1
EXERCISE INTOLERANCE: 1
SINUS PAIN: 0
DECREASED APPETITE: 0
HEARTBURN: 0
CONFUSION: 0
SLEEP DISTURBANCES DUE TO BREATHING: 0
LEG PAIN: 0
DECREASED LIBIDO: 0
FEVER: 0
LEG PAIN: 0
SLEEP DISTURBANCES DUE TO BREATHING: 0
HYPOTENSION: 0
BLOOD IN STOOL: 0
NIGHT SWEATS: 0
SKIN CHANGES: 0
PALPITATIONS: 1
JAUNDICE: 0
FEVER: 0
FATIGUE: 1
CLAUDICATION: 0
ABDOMINAL PAIN: 1
JAUNDICE: 0
POLYPHAGIA: 0
SPUTUM PRODUCTION: 0
SHORTNESS OF BREATH: 0
VOMITING: 1
NUMBNESS: 0
NERVOUS/ANXIOUS: 0
NAIL CHANGES: 0
TREMORS: 0
CONSTIPATION: 1
INCREASED ENERGY: 0
BLOOD IN STOOL: 0
HYPOTENSION: 0
SORE THROAT: 0
EYE IRRITATION: 0
ARTHRALGIAS: 0
TROUBLE SWALLOWING: 0
INCREASED ENERGY: 0
DECREASED APPETITE: 1
HYPOTENSION: 0
PALPITATIONS: 1
ABDOMINAL PAIN: 1
NAUSEA: 1
FEVER: 0
FLANK PAIN: 1
BLOOD IN STOOL: 0
HEMATURIA: 1
FEVER: 0
COLOR CHANGE: 0
ABDOMINAL PAIN: 0
BOWEL INCONTINENCE: 0
LEG PAIN: 0
DEPRESSION: 0
HALLUCINATIONS: 0
MUSCLE CRAMPS: 0
DISTURBANCES IN COORDINATION: 0
DIARRHEA: 0
RECTAL BLEEDING: 0
POLYDIPSIA: 0
FEVER: 0
DYSURIA: 0
SPEECH CHANGE: 0
TROUBLE SWALLOWING: 0
CHILLS: 0
BLOATING: 0
FATIGUE: 1
DIFFICULTY URINATING: 0
BLOOD IN STOOL: 0
BLOATING: 1
CHILLS: 0
MYALGIAS: 0
SINUS CONGESTION: 0
ALTERED TEMPERATURE REGULATION: 0
RECTAL PAIN: 0
DIARRHEA: 0
HALLUCINATIONS: 0
RECTAL PAIN: 0
TREMORS: 0
HEADACHES: 0
ABDOMINAL PAIN: 1
DIARRHEA: 0
ARTHRALGIAS: 0
HYPERTENSION: 1
INCREASED ENERGY: 0
SPEECH CHANGE: 0
CONSTIPATION: 1
RECTAL PAIN: 0
DIFFICULTY URINATING: 0
CLAUDICATION: 0
CLAUDICATION: 0

## 2017-01-01 ASSESSMENT — PAIN DESCRIPTION - DESCRIPTORS
DESCRIPTORS: ACHING
DESCRIPTORS: ACHING
DESCRIPTORS: SHARP;ACHING
DESCRIPTORS: ACHING;CONSTANT
DESCRIPTORS: SORE
DESCRIPTORS: SHARP;ACHING
DESCRIPTORS: ACHING
DESCRIPTORS: RADIATING;SHARP;SHOOTING
DESCRIPTORS: SORE
DESCRIPTORS: ACHING;CONSTANT
DESCRIPTORS: ACHING;CONSTANT
DESCRIPTORS: ACHING
DESCRIPTORS: CONSTANT;ACHING
DESCRIPTORS: ACHING
DESCRIPTORS: ACHING
DESCRIPTORS: SHARP;ACHING
DESCRIPTORS: ACHING
DESCRIPTORS: SHARP;ACHING
DESCRIPTORS: ACHING
DESCRIPTORS: SORE
DESCRIPTORS: CONSTANT;SHARP
DESCRIPTORS: ACHING;CONSTANT
DESCRIPTORS: CONSTANT;CRAMPING;DISCOMFORT
DESCRIPTORS: SHARP
DESCRIPTORS: SORE
DESCRIPTORS: ACHING
DESCRIPTORS: ACHING
DESCRIPTORS: ACHING;CRAMPING
DESCRIPTORS: ACHING
DESCRIPTORS: SHARP
DESCRIPTORS: SHARP;ACHING;CONSTANT
DESCRIPTORS: ACHING
DESCRIPTORS: RADIATING;SHARP
DESCRIPTORS: DISCOMFORT
DESCRIPTORS: ACHING
DESCRIPTORS: ACHING
DESCRIPTORS: SORE
DESCRIPTORS: ACHING
DESCRIPTORS: SORE
DESCRIPTORS: SHARP;ACHING
DESCRIPTORS: SORE
DESCRIPTORS: ACHING;SHARP;CONSTANT
DESCRIPTORS: SHARP;CONSTANT
DESCRIPTORS: ACHING
DESCRIPTORS: SHARP;ACHING
DESCRIPTORS: DISCOMFORT;PRESSURE
DESCRIPTORS: DISCOMFORT
DESCRIPTORS: CRAMPING;DISCOMFORT;SHARP
DESCRIPTORS: SHARP;SHOOTING
DESCRIPTORS: ACHING;CONSTANT
DESCRIPTORS: ACHING;CONSTANT
DESCRIPTORS: SHARP
DESCRIPTORS: ACHING;SHARP
DESCRIPTORS: SORE
DESCRIPTORS: SHARP;ACHING
DESCRIPTORS: SORE
DESCRIPTORS: ACHING
DESCRIPTORS: ACHING
DESCRIPTORS: SHARP;RADIATING;SHOOTING
DESCRIPTORS: ACHING;CRAMPING
DESCRIPTORS: ACHING;CONSTANT
DESCRIPTORS: ACHING
DESCRIPTORS: ACHING
DESCRIPTORS: CONSTANT;SHARP
DESCRIPTORS: CONSTANT
DESCRIPTORS: ACHING;CRAMPING
DESCRIPTORS: SHARP
DESCRIPTORS: SHARP
DESCRIPTORS: ACHING
DESCRIPTORS: SHARP
DESCRIPTORS: ACHING
DESCRIPTORS: DISCOMFORT

## 2017-01-01 ASSESSMENT — ACTIVITIES OF DAILY LIVING (ADL)
FALL_HISTORY_WITHIN_LAST_SIX_MONTHS: NO
BATHING: 0-->INDEPENDENT
RETIRED_EATING: 0-->INDEPENDENT
RETIRED_COMMUNICATION: 0-->UNDERSTANDS/COMMUNICATES WITHOUT DIFFICULTY
RETIRED_COMMUNICATION: 0-->UNDERSTANDS/COMMUNICATES WITHOUT DIFFICULTY
AMBULATION: 0-->INDEPENDENT
TRANSFERRING: 0-->INDEPENDENT
BATHING: 0-->INDEPENDENT
SWALLOWING: 0-->SWALLOWS FOODS/LIQUIDS WITHOUT DIFFICULTY
DRESS: 0-->INDEPENDENT
COGNITION: 0 - NO COGNITION ISSUES REPORTED
FALL_HISTORY_WITHIN_LAST_SIX_MONTHS: NO
COGNITION: 0 - NO COGNITION ISSUES REPORTED
TOILETING: 0-->INDEPENDENT
SWALLOWING: 0-->SWALLOWS FOODS/LIQUIDS WITHOUT DIFFICULTY
DRESS: 0-->INDEPENDENT
TRANSFERRING: 0-->INDEPENDENT
RETIRED_EATING: 0-->INDEPENDENT
TOILETING: 0-->INDEPENDENT
AMBULATION: 0-->INDEPENDENT

## 2017-01-01 ASSESSMENT — PAIN SCALES - GENERAL
PAINLEVEL: MODERATE PAIN (4)
PAINLEVEL: MODERATE PAIN (5)
PAINLEVEL: MODERATE PAIN (4)
PAINLEVEL: MODERATE PAIN (4)
PAINLEVEL: MODERATE PAIN (5)
PAINLEVEL: SEVERE PAIN (6)
PAINLEVEL: MILD PAIN (3)
PAINLEVEL: NO PAIN (0)
PAINLEVEL: MODERATE PAIN (5)
PAINLEVEL: MODERATE PAIN (4)
PAINLEVEL: SEVERE PAIN (7)
PAINLEVEL: SEVERE PAIN (6)
PAINLEVEL: SEVERE PAIN (7)
PAINLEVEL: SEVERE PAIN (6)
PAINLEVEL: NO PAIN (0)

## 2017-01-01 ASSESSMENT — ANXIETY QUESTIONNAIRES
GAD7 TOTAL SCORE: 2
GAD7 TOTAL SCORE: 1
7. FEELING AFRAID AS IF SOMETHING AWFUL MIGHT HAPPEN: 0 = NOT AT ALL
GAD7 TOTAL SCORE: 2
GAD7 TOTAL SCORE: 1
7. FEELING AFRAID AS IF SOMETHING AWFUL MIGHT HAPPEN: 0 = NOT AT ALL
GAD7 TOTAL SCORE: 2
GAD7 TOTAL SCORE: 1

## 2017-01-05 NOTE — PROGRESS NOTES
Gyn Oncology Follow-up Visit    Date of Visit: 01/05/2017    CC: Margaux Guzmán is a 61 year old female with a history of primary peritoneal cancer presenting today for disease management and start of Tesaro trial.    HPI:    Marshall comes to the clinic feeling well today.  Feeling ready to start chemo today.  Eating and drinking well; no difficulty with bladder or bowel.  No fevers, chills, sore throat, cough, chest pain, or SOB.  Has been taking steroids to help with left flank pain after seeing Palliative; thinks it is helping.   Questions regarding chronic clot in right jugular vein - was told she should have intermittent ultrasounds to confirm stability.      Brief Oncology History:  The patient is a 58 year old  female who initially presented for evaluation of pelvic and abdominal pain. On exam, she had mild right adnexal tenderness and slightly enlarged right ovary freely mobile and smooth. This prompted an abdominal ultrasound, which was normal, and a pelvic US that showed anechoic right ovarian cyst measuring 4.3 x 3.8 x 4.0 cm. Her  was elevated at 74 on 10/8/13. We reviewed the possible diagnosis including ovarian cancer versus benign ovarian cyst. Approach to surgery, alternatives to surgery and plan for possible extended open surgical staging based on the operative findings was discussed. In light of the size of the ovary we are offering an initial approach with minimally invasive surgery. After discussion of risks and benefits, consent was obtained.  11/7/13 Diagnostic laparoscopy converted to exploratory laparotomy, bilateral salpingo-oophorectomy, total abdominal hysterectomy, omentectomy, staging biopsies, bilateral pelvic and periaortic lymph node dissection and washings. Stage IIIB  12/4/13: Cycle #1 IV/IP  1/2/14: Cycle #2 IV/IP PCP.  - 14  1/23/14: Cycle 3 IV/IP.  - 7  2/11/14:  - 7. CT C/A/P Impression:  1. Multiple bilateral pulmonary nodules as described in full  report measuring up to 3 mm along the right major fissure. These are indeterminate given lack of comparison and followup is recommended.  2. No definite evidence for recurrent or metastatic disease in the abdomen or the pelvis. There is a rounded hypodense focus abutting the left external iliac artery and the adjacent sigmoid colon which measures 1 cm, this could represent a fluid-filled sigmoid diverticulum or a hypodense node, further attention to this area on subsequent examinations is recommended.  3. There is a 7 mm nonobstructing stone in the inferior collecting system of the right kidney.  2/12/14-3/26/14: Cycle #4-6 IV/IP CA-125 7, 7, 7.  4/21/14: CT C/A/P Impression:   1. Unchanged indeterminate pulmonary nodules. Recommend continued surveillance.   2. No definite evidence of metastatic ovarian cancer in the abdomen or pelvis. Small amount of subtle increased thickening and fluid is noted along the right lymph node dissection, nonspecific, but possibly a tiny developing lymphocele.   3. 6 mm nonobstructing stone in the right kidney.  Plan surveillance for ovarian cancer every 3 months with physical and .   Indeterminate pulmonary nodule: These were present before the surgery and there was not change with the chemotherapy. Highly unlikely to be a metastatic disease. Will repeat a CT in 3 months to see any change     5/22/14:  6. JOSEMANUEL.  5/17/14; ED visit [facility]. CT abd/pel noted possible chronic partial small bowel obstruction. Colonscopy scheduled for June 6, 2014 locally. Abdominal pain started 5/17/14 and noted pressure without bowel movement and went to ED that night due to increasing pain. In patient 2 day hospital with clear liquids/IV. Mild nausea without vomiting. BM subsequently occurred and pt was discharged. No pain since. Continues with fullness in abdomen. Diet is bland for the most part.  8/25/14:  -5. Notes increased abdominal girth and bloating x  2-3 weeks with urine urgency. Worried about recurrence. Is just starting to return to normal exercise and activities. No constipation, diarrhea, pain, vaginal or rectal bleeding, cough or dyspnea. CT to follow indeterminate pulmonary nodules today.   CT cap IMPRESSION:   1. No CT scan findings of the chest, abdomen, or pelvis to indicate metastatic disease.  2. 2-5 mm pulmonary nodules, stable since 2/11/2014.  3. Nonobstructing 6 mm stone in the right kidney.  12/3/14:  8.. Pt started zoloft for anxiety. Worries about cancer recurrence.   3/2/15: CA 34  3/5/15: CT C/A/P: Impression:  1. Multiple new small peritoneal and retroperitoneal nodules are suspicious for metastases. Suspicious new left common iliac and central small bowel mesenteric nodes. No abdominal ascites.  2. Multiple pulmonary nodules are stable with no new nodules.  3. 9 mm nonobstructing right lower pole renal calculus.    5/20/15: CT c/a/p showed nodularity throughout the abdomen and pelvis worrisome for malignancy which has increased in size     5/28/15: CT abdomen and pelvis showed stable inumerabble peritoneal nodules scattered throughout the abdomen and pelvis consistent with metastases.     8/4/15 (approximatly): Left ureteral stent was placed.    8/12/15:  from [place] [id number]    8/18/15: CT chest Impression showed slight increase in mild, subtle nodularity along the diaphragm which is nonspecific but may represent metastatic disease.   8/18/15: CT abdomen and pelvis Impression showed interval progression of peritoneal metastatic disease.     8/25/15:  on 3/2/15 of 34 prompted CT c/a/p, which showed multiple new small peritoneal and retroperitoneal nodules are suspicious for metastases. Suspicious new left common iliac and central small bowel mesenteric nodes. She participated in [facility] study involving treatment with on clinical trial with vaccine [id number]. She is here today because the [place] trail failed and the pt  "pregressed. Her most recent CT c/a/p on 8/25 showed progression of peritoneal metastatic disease. And her most recent  from [place] on 8/12/15 was 303.    Plan: Carbo/Doxil x 6 cycles.with imaging after 3 cycles.     9/3/15: Cycle #1 Carbo/Doxil.  244.  9/24/15: right IJ thrombus; started on Lovenox.   9/26/15: Present to [place] ED. \"presented to the ED this morning with what appears to be a mild drug rash after administering her lovenox this morning. This writer discussed situation with Gyn Onc Fellow Jeanne Moon as well as [facility] Heme/Onc service. Per Heme, a rash of this nature is extremely uncommon with administration of Lovenox. Given the mild nature of this rash and acute need for anticoagulation, recommended the patient continue with Lovenox injections and treat with Benadryl as needed. Advised patient be given precautions to return to the ED immediately if she develops reactive respiratory symptoms. Alternatively patient could be switched to alternative formulation of low molecular weight heparin if she was strongly resistant to continuation of Lovenox.\"    10/1/15: Cycle #2 Carbo/Doxil.  175.    10/28/15: gyn onc visit: pt complains of worsening constipation and tenderness around her right ribs. She is taking senna for her constipation with minimal improvement. She admits that the swelling around her neck after her blood clot has decreased. She also admits to feeling a nodules on her left abdomen. She states her appetite is good but she has not been eating fruits and vegetables. She denies any chemo side effects besides fatigue.     10/28/15:  158  11/23/2015:  133  CT c/a/p  IMPRESSION: In this patient with a clinical history of ovarian cancer  there is mixed response to therapy:   1. Stable to slightly decreased peritoneal nodularity since  8/18/2015.  2. No significant change regarding the large subdiaphragmatic  peritoneal deposit since 9/24/2015.  3. Enlarging soft tissue nodule " overlying the abdominal musculature  concerning for metastasis since March of 2015.   4. Unchanged, indeterminate hypodensity near the gallbladder fossa  since 8/18/2015, however progressively increased in size since  3/5/2015.  5. Bilateral nephroureteral stents. No significant hydronephrosis.  6. Bilateral pulmonary nodules. Continued followup recommended.    12/23/15: Cycle 5 carboplatin/Doxil/Avastin.  96. To receive Avastin today despite proteinuria per Dr. Aguilar and nephrology.  1/21/16: Cycle 6 carboplatin/Doxil/Avastin, delayed one week secondary to neutropenia.  62.  1/28/16:  63.    2/2/16: Patient had right upper extremity doppler u/s done in [place] due to swollen glands and pain. 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 Pt is currently on lovenox.    2/2/16: US soft tissue neck  Conclusion:  1. Morphologically normal not pathologically enlarged two right carotid chain lymph nodes.  2. Chronic right jugular vein thrombosis, described in detail on  venous doppler exam.     2/2/16: Thyroid Ultrasound  Right lobe: 5.5 x 1.9 x 1.2 cm  Left lobe: 5.0 x 1.5 . 0.9 cm  Both lobes have normal background echotexture.    Conclusion:  1. 3 benign - appearing subcentimeter hypoechoic right mid thyroid nodules.  2. Borderline thyromegaly.    2/2/16: Right upper extremity venous duplex doppler evaluation  Conclusion:  1.) chronic non compressible echogenic right jugular vein thrombus, now 4.3 cm in length.     2/22/16:   IMPRESSION:    1. Ovarian cancer with peritoneal carcinomatosis.  2. Given the increased sensitivity of PET/CT, comparison with prior CT examinations is difficult. In general the abdominal/pelvic metastatic lesions appear to be decreased in size.  3. Persistent right internal jugular DVT, as evidenced by 5 cm filling  defect with inferior border at the central  venous catheter.  4. Bilateral hydronephrosis without overt caliectasis. Some distal  migration of the bilateral double-J ureteral stents, although the  proximal coiled portions continue to be in the renal pelves.    2/24/16: C7 carboplatin/Doxil/Avastin.  56.  3/9/16: Hgb 6.6, worked up for transfusion reaction (negative). Bleed possibly secondary to   3/23/16: C8 carboplatin/Doxil/Avastin.  44.  4/2016: Hospitalized in [place] x2 weeks for hematuria leading to anemia after ureteral stent exchange  4/20/16: C9D1 carboplatin/Doxil/Avastin. Deferred one week secondary to acute UTI.  35.  4/28/16: C9D1 deferred due to continued bacteruria and ANC 1.3.  5/4/16: C9D1 carboplatin/Doxil/Avastin. Breast lump noted, diagnostic mammogram ordered.  6/1/16: C10D1 carboplatin/Doxil/Avastin.  50.  6/28/16: C11D1 carboplatin/Doxil/Avastin. Avastin held due to bleeding. Carbo/Doxil deferred one week secondary to neutropenia.  43.  7/7/16: C12D1 carboplatin/Doxil. Avastin held due to bleeding.  7/18/16: Bilateral ureteral stent exchange  7/20/16-7/29/16: Hospitalized with urosepsis and blood loss anemia, started on Zosyn and discharged on amoxicillin  9/29/16: C1D1 Gemzar.  85.  10/21/16: C2D1 Gemzar.  106.   11/11/16: C3D1 Gemzar.  pending.    12/12/16: CT CAP  IMPRESSION: In this patient with a history of metastatic ovarian  cancer:  1. Progression of disease as evidenced by a slowly enlarging  mesenteric and omental soft tissue foci and slowly enlarging  periesophageal and pericardiophrenic lymph nodes, as detailed above.  2. Stable appearance of bilateral ureteral stents without  hydronephrosis.  3. Patient was premedicated for a pre-existing IV contrast allergy.  Despite this, she had itchiness on her face poststudy. She should no  longer receive IV contrast in the future.         Date Value Ref Range Status   12/15/2016 145* 0 - 30 U/mL Final     Comment:     Assay  Method:  Chemiluminescence using Siemens Centaur XP   12/12/2016 132* 0 - 30 U/mL Final     Comment:     Assay Method:  Chemiluminescence using Siemens Centaur XP   11/11/2016 111* 0 - 30 U/mL Final     Comment:     Assay Method:  Chemiluminescence using Siemens Centaur XP   10/21/2016 106* 0 - 30 U/mL Final     Comment:     Assay Method:  Chemiluminescence using Siemens Centaur XP   09/29/2016 85* 0 - 30 U/mL Final     Comment:     Assay Method:  Chemiluminescence using Siemens Centaur XP   09/15/2016 70* 0 - 30 U/mL Final     Comment:     Assay Method:  Chemiluminescence using Siemens Centaur XP   08/12/2016 38* 0 - 30 U/mL Final     Comment:     Assay Method:  Chemiluminescence using Siemens Centaur XP   06/28/2016 43* 0 - 30 U/mL Final   06/01/2016 50* 0 - 30 U/mL Final   05/12/2016 48* 0 - 30 U/mL Final     Answers for HPI/ROS submitted by the patient on 1/3/2017   General Symptoms: No  Skin Symptoms: No  HENT Symptoms: No  EYE SYMPTOMS: No  HEART SYMPTOMS: No  LUNG SYMPTOMS: No  INTESTINAL SYMPTOMS: Yes  URINARY SYMPTOMS: Yes  GYNECOLOGIC SYMPTOMS: No  BREAST SYMPTOMS: No  SKELETAL SYMPTOMS: No  BLOOD SYMPTOMS: No  NERVOUS SYSTEM SYMPTOMS: No  MENTAL HEALTH SYMPTOMS: No  Heart burn or indigestion: No  Nausea: Yes  Vomiting: No  Abdominal pain: Yes  Bloating: No  Constipation: Yes  Diarrhea: No  Blood in stool: No  Black stools: No  Rectal or Anal pain: No  Fecal incontinence: No  Rectal bleeding: No  Yellowing of skin or eyes: No  Vomit with blood: No  Change in stools: No  Hemorrhoids: No  Trouble holding urine or incontinence: No  Pain or burning: No  Trouble starting or stopping: No  Increased frequency of urination: No  Blood in urine: No  Decreased frequency of urination: No  Frequent nighttime urination: No  Flank pain: Yes  Difficulty emptying bladder: No          Past Medical History   Diagnosis Date     Hyperlipidemia      Osteoarthritis      Osteopenia      Polymyalgia rheumatica (H)      Ovarian  cancer (H)      Primary cancer of peritoneum (H)      Hydronephrosis      Jugular vein thrombosis, right      Persistent right internal jugular DVT     Livedo annularis      History of blood transfusion      MULTIPLE     PONV (postoperative nausea and vomiting)      Anemia      Past Surgical History   Procedure Laterality Date     Appendectomy        section       Open reduction internal fixation toe(s)  9/3/13     Fifth digit, left foot, with screw fixation      Lymph node biopsy       Left posterior chain, beign     Breast surgery       biopsy negative     Laparoscopic salpingo-oophorectomy  2013     Procedure: LAPAROSCOPIC SALPINGO-OOPHORECTOMY;  Diagnostic Laparoscopy, Exploratory Laparotomy, Bilateral Salpingo-Oophorectomy,  Hysterectomy, Omentectomy, Pelvic Washings, Peritoneal staging and biopsies, Pelvic and Periaortic Lymph node Dissection;  Surgeon: Cheri Aguilar MD;  Location: [facility] OR     Hysterectomy total abdominal, bilateral salpingo-oophorectomy, node dissection, combined  2013     Procedure: COMBINED HYSTERECTOMY TOTAL ABDOMINAL, SALPINGO-OOPHORECTOMY, NODE DISSECTION;;  Surgeon: Cheri Aguilar MD;  Location: [facility] OR     Laparotomy, staging, combined  2013     Procedure: COMBINED LAPAROTOMY, STAGING;;  Surgeon: Cheri Aguilar MD;  Location: [facility] OR     Remove port peritoneal  2014     Procedure: REMOVE PORT PERITONEAL;  Surgeon: Cheri Aguilar MD;  Location: [facility] OR     Combined cystoscopy, retrogrades, exchange stent ureter(s) Bilateral 2016     Procedure: COMBINED CYSTOSCOPY, RETROGRADES, EXCHANGE STENT URETER(S);  Surgeon: Carmela Alvarez MD;  Location: [facility] OR     Combined cystoscopy, retrogrades, exchange stent ureter(s)  2016     @ [facility]     Combined cystoscopy, retrogrades, exchange stent ureter(s) Bilateral 10/17/2016     Procedure: COMBINED CYSTOSCOPY, RETROGRADES, EXCHANGE STENT URETER(S);  Surgeon: Carmela Alvarez  MD Naila;  Location: [facility] OR     Combined cystoscopy, retrogrades, exchange stent ureter(s) Bilateral 12/7/2016     Procedure: COMBINED CYSTOSCOPY, RETROGRADES, EXCHANGE STENT URETER(S);  Surgeon: Carmela Alvarez MD;  Location: [facility] OR     Family History   Problem Relation Age of Onset     Arthritis Father      DIABETES       Uncle     Hypertension Mother      Hypertension Sister      Myocardial Infarction Father      secondary to anesthesia     HEART DISEASE       unknown valve replacement     Colon Cancer Father      Other - See Comments Mother      cognitive decline     Bladder Cancer Sister      Skin Cancer Brother      Skin Cancer Mother      Social History     Social History     Marital Status:      Spouse Name: N/A     Number of Children: N/A     Years of Education: N/A     Social History Main Topics     Smoking status: Former Smoker     Smokeless tobacco: Never Used      Comment: Quit over 20 years ago     Alcohol Use: No     Drug Use: No     Sexual Activity: No     Other Topics Concern     Not on file     Social History Narrative         Current Outpatient Prescriptions   Medication     enoxaparin (LOVENOX) 40 MG/0.4ML injection     dexamethasone (DECADRON) 4 MG tablet     tamsulosin (FLOMAX) 0.4 MG capsule     LORazepam (ATIVAN) 1 MG tablet     tolterodine (DETROL LA) 4 MG 24 hr capsule     ACETAMINOPHEN PO     ferrous sulfate (IRON) 325 (65 FE) MG tablet     Multiple Vitamins-Minerals (ONE DAILY MULTIVITAMIN WOMEN) TABS     traMADol (ULTRAM) 50 MG tablet     polyethylene glycol (MIRALAX/GLYCOLAX) powder     cycloSPORINE (RESTASIS) 0.05 % ophthalmic emulsion     Ranitidine HCl (ZANTAC PO)     MELATONIN PO     EPINEPHrine (EPIPEN) 0.3 MG/0.3ML injection     senna-docusate (SENOKOT-S;PERICOLACE) 8.6-50 MG per tablet     Pyridoxine HCl (VITAMIN B6 PO)     LORazepam (ATIVAN) 0.5 MG tablet     prochlorperazine (COMPAZINE) 10 MG tablet     study - niraparib (IDS# [id number]) 100 mg capsule      "ondansetron (ZOFRAN) 8 MG tablet     prochlorperazine (COMPAZINE) 10 MG tablet     No current facility-administered medications for this visit.        Allergies   Allergen Reactions     Contrast Dye Itching and Swelling     Itching/tingling of mouth and nose with sensation of swelling after receiving IV contrast for CT.  This occurred despite steroid premedication. Therefore the patient should not receive intravenous contrast in the future.      Benadryl Allergy Other (See Comments)     Stay awake, restless, \"uncomfortable\", \"feel like I need to run away\"      Enoxaparin Sodium Other (See Comments)     Pt is taking this now.     Amoxicillin Rash     Diphenhydramine Anxiety     Pollen Extract Rash     Sulfa Drugs Rash       Physical Exam:    /68 mmHg  Pulse 95  Temp(Src) 98.4  F (36.9  C) (Oral)  Resp 16  Ht 1.676 m (5' 5.98\")  Wt 59.1 kg (130 lb 4.7 oz)  BMI 21.04 kg/m2  SpO2 100%    General: Alert non-toxic appearing female in no acute distress  HEENT: Normocephalic, atraumatic;  PERRLA; no scleral icterus; oropharynx pink without lesions; neck supple without lymphadenopathy  Pulmonary: Lungs clear to auscultation, no increased work of breathing noted  Cardiac: regular rate and rhythm, S1S2, grade II murmur, consistent with prior exams     Performance Status -0    Assessment/Plan:  1) Primary peritoneal cancer: Start consent for Tesaro trial (parp-inhibitor)-niraparib today. Eligibility reviewed again and plan to proceed.    2) Reviewed signs and symptoms for when she should contact the clinic or seek additional care, including but not limited to fever, chills, inability to keep down food or fluids, nausea and vomiting not controlled with antiemetics, and diarrhea leading to dehydration.     3) Nausea: Related to pain vs chemotherapy vs disease. Continue with Zofran and Ativan as this is helpful for her.    4) Genetic counseling: Has been done, negative for mutations in the following: BRCA1, BRCA2, " EPCAM, MLH1, MSH2, MSH6, PMS2, PTEN and TP53.     5) Health maintenance issues discussed include to follow up with PCP for non-gynecologic issues.  Will order neck ultrasound to assess for stability of right jugular vein clot. Continue on prophylactic lovenox at 40 SC daily due to history of prior bleeding on higher dose.     6) Marshall verbalizes understanding of and agreement with plan.    Of a 30 minute appointment, more than 50% was spent in counseling the patient.      Jia Mccollum MD    Department of Ob/Gyn and Women's Health  Division of Gynecologic Oncology  [facility]  [phone]      I have reviewed the above note and agree with the scribe's notation as written.  Of a 30 minute appointment, more than 50% was spent in counseling the patient.      I, Yanet Martinez MD, am serving as a scribe to document services personally performed by Jia Mccollum MD, based upon my observations and the provider's statements to me. All documentation has been reviewed by the aforementioned doctor prior to being entered into the official medical record.

## 2017-01-05 NOTE — TELEPHONE ENCOUNTER
Attempted to naty patient to check in regarding medication changes, but was unable to reach her as her phone wouldn't ring. Will try her again tomorrow.

## 2017-01-05 NOTE — PROGRESS NOTES
Patient was seen today for C1D1 for the Tesaro/Quadra Niraparib study.  Patient has no new complaints at this time.  Patient was dispensed bottle #[id number] along with study diary.  Patient had all study procedures done per protocol.  All upcoming appointments made per protocol.  All questions answered.  Patient voiced understanding on how to take medication and document on diary.      Aurelia Worley RN     Pager [phone]

## 2017-01-05 NOTE — NURSING NOTE
"Margaux Guzmán is a 61 year old female who presents for:  Chief Complaint   Patient presents with     Port Draw     Labs drawn by RN from port. VS taken.      Oncology Clinic Visit     Ovarian cancer, right and left        Initial Vitals:  /68 mmHg  Pulse 95  Temp(Src) 98.4  F (36.9  C) (Oral)  Resp 16  Ht 1.676 m (5' 5.98\")  Wt 59.1 kg (130 lb 4.7 oz)  BMI 21.04 kg/m2  SpO2 100% Estimated body mass index is 21.04 kg/(m^2) as calculated from the following:    Height as of this encounter: 1.676 m (5' 5.98\").    Weight as of this encounter: 59.1 kg (130 lb 4.7 oz).. Body surface area is 1.66 meters squared. BP completed using cuff size: regular  Moderate Pain (5) No LMP recorded. Patient has had a hysterectomy. Allergies and medications reviewed.     Medications: MEDICATION REFILLS NEEDED TODAY.  Pharmacy name entered into [employer]:    [id number] - [place], [place] - [address] AT [facility] [address]  [id number] - [place] - [address]  [facility] [place], [place] [address] [facility] [place], [place] [id number]  [place], [place] - [address] [place] [address] [id number] - [place], [place] - [address]    Comments: Lovenox    7 minutes for nursing intake (face to face time)   Angie Peña LPN          "

## 2017-01-05 NOTE — NURSING NOTE
Chief Complaint   Patient presents with     Port Draw     Labs drawn by RN from port. VS taken.      ETELVINA PERRY RN

## 2017-01-05 NOTE — Clinical Note
1/5/2017       RE: Margaux Guzmán  [id number] [facility] [address]     Dear Colleague,    Thank you for referring your patient, Margaux Guzmán, to the [facility]. Please see a copy of my visit note below.    Gyn Oncology Follow-up Visit    Date of Visit: 01/05/2017    CC: Margaux Guzmán is a 61 year old female with a history of primary peritoneal cancer presenting today for disease management and start of Tesaro trial.    HPI:    Marshall comes to the clinic feeling well today.  Feeling ready to start chemo today.  Eating and drinking well; no difficulty with bladder or bowel.  No fevers, chills, sore throat, cough, chest pain, or SOB.  Has been taking steroids to help with left flank pain after seeing Palliative; thinks it is helping.   Questions regarding chronic clot in right jugular vein - was told she should have intermittent ultrasounds to confirm stability.      Brief Oncology History:  The patient is a 58 year old  female who initially presented for evaluation of pelvic and abdominal pain. On exam, she had mild right adnexal tenderness and slightly enlarged right ovary freely mobile and smooth. This prompted an abdominal ultrasound, which was normal, and a pelvic US that showed anechoic right ovarian cyst measuring 4.3 x 3.8 x 4.0 cm. Her  was elevated at 74 on 10/8/13. We reviewed the possible diagnosis including ovarian cancer versus benign ovarian cyst. Approach to surgery, alternatives to surgery and plan for possible extended open surgical staging based on the operative findings was discussed. In light of the size of the ovary we are offering an initial approach with minimally invasive surgery. After discussion of risks and benefits, consent was obtained.  11/7/13 Diagnostic laparoscopy converted to exploratory laparotomy, bilateral salpingo-oophorectomy, total abdominal hysterectomy, omentectomy, staging biopsies, bilateral pelvic and periaortic lymph node  dissection and washings. Stage IIIB  12/4/13: Cycle #1 IV/IP  1/2/14: Cycle #2 IV/IP PCP.  - 14  1/23/14: Cycle 3 IV/IP.  - 7  2/11/14:  - 7. CT C/A/P Impression:  1. Multiple bilateral pulmonary nodules as described in full report measuring up to 3 mm along the right major fissure. These are indeterminate given lack of comparison and followup is recommended.  2. No definite evidence for recurrent or metastatic disease in the abdomen or the pelvis. There is a rounded hypodense focus abutting the left external iliac artery and the adjacent sigmoid colon which measures 1 cm, this could represent a fluid-filled sigmoid diverticulum or a hypodense node, further attention to this area on subsequent examinations is recommended.  3. There is a 7 mm nonobstructing stone in the inferior collecting system of the right kidney.  2/12/14-3/26/14: Cycle #4-6 IV/IP CA-125 7, 7, 7.  4/21/14: CT C/A/P Impression:   1. Unchanged indeterminate pulmonary nodules. Recommend continued surveillance.   2. No definite evidence of metastatic ovarian cancer in the abdomen or pelvis. Small amount of subtle increased thickening and fluid is noted along the right lymph node dissection, nonspecific, but possibly a tiny developing lymphocele.   3. 6 mm nonobstructing stone in the right kidney.  Plan surveillance for ovarian cancer every 3 months with physical and .   Indeterminate pulmonary nodule: These were present before the surgery and there was not change with the chemotherapy. Highly unlikely to be a metastatic disease. Will repeat a CT in 3 months to see any change     5/22/14:  6. JOSEMANUEL.  5/17/14; ED visit [facility]. CT abd/pel noted possible chronic partial small bowel obstruction. Colonscopy scheduled for June 6, 2014 locally. Abdominal pain started 5/17/14 and noted pressure without bowel movement and went to ED that night due to increasing pain. In patient 2 day hospital with clear  liquids/IV. Mild nausea without vomiting. BM subsequently occurred and pt was discharged. No pain since. Continues with fullness in abdomen. Diet is bland for the most part.  8/25/14:  -5. Notes increased abdominal girth and bloating x 2-3 weeks with urine urgency. Worried about recurrence. Is just starting to return to normal exercise and activities. No constipation, diarrhea, pain, vaginal or rectal bleeding, cough or dyspnea. CT to follow indeterminate pulmonary nodules today.   CT cap IMPRESSION:   1. No CT scan findings of the chest, abdomen, or pelvis to indicate metastatic disease.  2. 2-5 mm pulmonary nodules, stable since 2/11/2014.  3. Nonobstructing 6 mm stone in the right kidney.  12/3/14:  8.. Pt started zoloft for anxiety. Worries about cancer recurrence.   3/2/15: CA 34  3/5/15: CT C/A/P: Impression:  1. Multiple new small peritoneal and retroperitoneal nodules are suspicious for metastases. Suspicious new left common iliac and central small bowel mesenteric nodes. No abdominal ascites.  2. Multiple pulmonary nodules are stable with no new nodules.  3. 9 mm nonobstructing right lower pole renal calculus.    5/20/15: CT c/a/p showed nodularity throughout the abdomen and pelvis worrisome for malignancy which has increased in size     5/28/15: CT abdomen and pelvis showed stable inumerabble peritoneal nodules scattered throughout the abdomen and pelvis consistent with metastases.     8/4/15 (approximatly): Left ureteral stent was placed.    8/12/15:  from [place] [id number]    8/18/15: CT chest Impression showed slight increase in mild, subtle nodularity along the diaphragm which is nonspecific but may represent metastatic disease.   8/18/15: CT abdomen and pelvis Impression showed interval progression of peritoneal metastatic disease.     8/25/15:  on 3/2/15 of 34 prompted CT c/a/p, which showed multiple new small peritoneal and retroperitoneal nodules are suspicious for metastases.  "Suspicious new left common iliac and central small bowel mesenteric nodes. She participated in [facility] study involving treatment with on clinical trial with vaccine MR--[id number]. She is here today because the [place] trail failed and the pt pregressed. Her most recent CT c/a/p on 8/25 showed progression of peritoneal metastatic disease. And her most recent  from [place] on 8/12/15 was 303.    Plan: Carbo/Doxil x 6 cycles.with imaging after 3 cycles.     9/3/15: Cycle #1 Carbo/Doxil.  244.  9/24/15: right IJ thrombus; started on Lovenox.   9/26/15: Present to [place] ED. \"presented to the ED this morning with what appears to be a mild drug rash after administering her lovenox this morning. This writer discussed situation with Gyn Onc Fellow Jeanne Moon as well as [facility] Heme/Onc service. Per Heme, a rash of this nature is extremely uncommon with administration of Lovenox. Given the mild nature of this rash and acute need for anticoagulation, recommended the patient continue with Lovenox injections and treat with Benadryl as needed. Advised patient be given precautions to return to the ED immediately if she develops reactive respiratory symptoms. Alternatively patient could be switched to alternative formulation of low molecular weight heparin if she was strongly resistant to continuation of Lovenox.\"    10/1/15: Cycle #2 Carbo/Doxil.  175.    10/28/15: gyn onc visit: pt complains of worsening constipation and tenderness around her right ribs. She is taking senna for her constipation with minimal improvement. She admits that the swelling around her neck after her blood clot has decreased. She also admits to feeling a nodules on her left abdomen. She states her appetite is good but she has not been eating fruits and vegetables. She denies any chemo side effects besides fatigue.     10/28/15:  158  11/23/2015:  133  CT c/a/p  IMPRESSION: In this patient with a clinical history of ovarian " cancer  there is mixed response to therapy:   1. Stable to slightly decreased peritoneal nodularity since  8/18/2015.  2. No significant change regarding the large subdiaphragmatic  peritoneal deposit since 9/24/2015.  3. Enlarging soft tissue nodule overlying the abdominal musculature  concerning for metastasis since March of 2015.   4. Unchanged, indeterminate hypodensity near the gallbladder fossa  since 8/18/2015, however progressively increased in size since  3/5/2015.  5. Bilateral nephroureteral stents. No significant hydronephrosis.  6. Bilateral pulmonary nodules. Continued followup recommended.    12/23/15: Cycle 5 carboplatin/Doxil/Avastin.  96. To receive Avastin today despite proteinuria per Dr. Aguilar and nephrology.  1/21/16: Cycle 6 carboplatin/Doxil/Avastin, delayed one week secondary to neutropenia.  62.  1/28/16:  63.    2/2/16: Patient had right upper extremity doppler u/s done in [place] due to swollen glands and pain. 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 Pt is currently on lovenox.    2/2/16: US soft tissue neck  Conclusion:  1. Morphologically normal not pathologically enlarged two right carotid chain lymph nodes.  2. Chronic right jugular vein thrombosis, described in detail on  venous doppler exam.     2/2/16: Thyroid Ultrasound  Right lobe: 5.5 x 1.9 x 1.2 cm  Left lobe: 5.0 x 1.5 . 0.9 cm  Both lobes have normal background echotexture.    Conclusion:  1. 3 benign - appearing subcentimeter hypoechoic right mid thyroid nodules.  2. Borderline thyromegaly.    2/2/16: Right upper extremity venous duplex doppler evaluation  Conclusion:  1.) chronic non compressible echogenic right jugular vein thrombus, now 4.3 cm in length.     2/22/16:   IMPRESSION:    1. Ovarian cancer with peritoneal carcinomatosis.  2. Given the increased sensitivity of PET/CT,  comparison with prior CT examinations is difficult. In general the abdominal/pelvic metastatic lesions appear to be decreased in size.  3. Persistent right internal jugular DVT, as evidenced by 5 cm filling  defect with inferior border at the central venous catheter.  4. Bilateral hydronephrosis without overt caliectasis. Some distal  migration of the bilateral double-J ureteral stents, although the  proximal coiled portions continue to be in the renal pelves.    2/24/16: C7 carboplatin/Doxil/Avastin.  56.  3/9/16: Hgb 6.6, worked up for transfusion reaction (negative). Bleed possibly secondary to   3/23/16: C8 carboplatin/Doxil/Avastin.  44.  4/2016: Hospitalized in [place] x2 weeks for hematuria leading to anemia after ureteral stent exchange  4/20/16: C9D1 carboplatin/Doxil/Avastin. Deferred one week secondary to acute UTI.  35.  4/28/16: C9D1 deferred due to continued bacteruria and ANC 1.3.  5/4/16: C9D1 carboplatin/Doxil/Avastin. Breast lump noted, diagnostic mammogram ordered.  6/1/16: C10D1 carboplatin/Doxil/Avastin.  50.  6/28/16: C11D1 carboplatin/Doxil/Avastin. Avastin held due to bleeding. Carbo/Doxil deferred one week secondary to neutropenia.  43.  7/7/16: C12D1 carboplatin/Doxil. Avastin held due to bleeding.  7/18/16: Bilateral ureteral stent exchange  7/20/16-7/29/16: Hospitalized with urosepsis and blood loss anemia, started on Zosyn and discharged on amoxicillin  9/29/16: C1D1 Gemzar.  85.  10/21/16: C2D1 Gemzar.  106.   11/11/16: C3D1 Gemzar.  pending.    12/12/16: CT CAP  IMPRESSION: In this patient with a history of metastatic ovarian  cancer:  1. Progression of disease as evidenced by a slowly enlarging  mesenteric and omental soft tissue foci and slowly enlarging  periesophageal and pericardiophrenic lymph nodes, as detailed above.  2. Stable appearance of bilateral ureteral stents without  hydronephrosis.  3. Patient was premedicated for a  pre-existing IV contrast allergy.  Despite this, she had itchiness on her face poststudy. She should no  longer receive IV contrast in the future.         Date Value Ref Range Status   12/15/2016 145* 0 - 30 U/mL Final     Comment:     Assay Method:  Chemiluminescence using Siemens Centaur XP   12/12/2016 132* 0 - 30 U/mL Final     Comment:     Assay Method:  Chemiluminescence using Siemens Centaur XP   11/11/2016 111* 0 - 30 U/mL Final     Comment:     Assay Method:  Chemiluminescence using Siemens Centaur XP   10/21/2016 106* 0 - 30 U/mL Final     Comment:     Assay Method:  Chemiluminescence using Siemens Centaur XP   09/29/2016 85* 0 - 30 U/mL Final     Comment:     Assay Method:  Chemiluminescence using Siemens Centaur XP   09/15/2016 70* 0 - 30 U/mL Final     Comment:     Assay Method:  Chemiluminescence using Siemens Centaur XP   08/12/2016 38* 0 - 30 U/mL Final     Comment:     Assay Method:  Chemiluminescence using Siemens Centaur XP   06/28/2016 43* 0 - 30 U/mL Final   06/01/2016 50* 0 - 30 U/mL Final   05/12/2016 48* 0 - 30 U/mL Final     Answers for HPI/ROS submitted by the patient on 1/3/2017   General Symptoms: No  Skin Symptoms: No  HENT Symptoms: No  EYE SYMPTOMS: No  HEART SYMPTOMS: No  LUNG SYMPTOMS: No  INTESTINAL SYMPTOMS: Yes  URINARY SYMPTOMS: Yes  GYNECOLOGIC SYMPTOMS: No  BREAST SYMPTOMS: No  SKELETAL SYMPTOMS: No  BLOOD SYMPTOMS: No  NERVOUS SYSTEM SYMPTOMS: No  MENTAL HEALTH SYMPTOMS: No  Heart burn or indigestion: No  Nausea: Yes  Vomiting: No  Abdominal pain: Yes  Bloating: No  Constipation: Yes  Diarrhea: No  Blood in stool: No  Black stools: No  Rectal or Anal pain: No  Fecal incontinence: No  Rectal bleeding: No  Yellowing of skin or eyes: No  Vomit with blood: No  Change in stools: No  Hemorrhoids: No  Trouble holding urine or incontinence: No  Pain or burning: No  Trouble starting or stopping: No  Increased frequency of urination: No  Blood in urine: No  Decreased frequency of  urination: No  Frequent nighttime urination: No  Flank pain: Yes  Difficulty emptying bladder: No          Past Medical History   Diagnosis Date     Hyperlipidemia      Osteoarthritis      Osteopenia      Polymyalgia rheumatica (H)      Ovarian cancer (H)      Primary cancer of peritoneum (H)      Hydronephrosis      Jugular vein thrombosis, right      Persistent right internal jugular DVT     Livedo annularis      History of blood transfusion      MULTIPLE     PONV (postoperative nausea and vomiting)      Anemia      Past Surgical History   Procedure Laterality Date     Appendectomy        section       Open reduction internal fixation toe(s)  9/3/13     Fifth digit, left foot, with screw fixation      Lymph node biopsy       Left posterior chain, beign     Breast surgery       biopsy negative     Laparoscopic salpingo-oophorectomy  2013     Procedure: LAPAROSCOPIC SALPINGO-OOPHORECTOMY;  Diagnostic Laparoscopy, Exploratory Laparotomy, Bilateral Salpingo-Oophorectomy,  Hysterectomy, Omentectomy, Pelvic Washings, Peritoneal staging and biopsies, Pelvic and Periaortic Lymph node Dissection;  Surgeon: Cheri Aguilar MD;  Location: [facility] OR     Hysterectomy total abdominal, bilateral salpingo-oophorectomy, node dissection, combined  2013     Procedure: COMBINED HYSTERECTOMY TOTAL ABDOMINAL, SALPINGO-OOPHORECTOMY, NODE DISSECTION;;  Surgeon: Cheri Aguilar MD;  Location: [facility] OR     Laparotomy, staging, combined  2013     Procedure: COMBINED LAPAROTOMY, STAGING;;  Surgeon: Cheri Aguilar MD;  Location: [facility] OR     Remove port peritoneal  2014     Procedure: REMOVE PORT PERITONEAL;  Surgeon: Cheri Aguilar MD;  Location: [facility] OR     Combined cystoscopy, retrogrades, exchange stent ureter(s) Bilateral 2016     Procedure: COMBINED CYSTOSCOPY, RETROGRADES, EXCHANGE STENT URETER(S);  Surgeon: Carmela Alvarez MD;  Location: [facility] OR     Combined cystoscopy, retrogrades,  exchange stent ureter(s)  4/2016     @ [facility]     Combined cystoscopy, retrogrades, exchange stent ureter(s) Bilateral 10/17/2016     Procedure: COMBINED CYSTOSCOPY, RETROGRADES, EXCHANGE STENT URETER(S);  Surgeon: Carmela Alvarez MD;  Location: [facility] OR     Combined cystoscopy, retrogrades, exchange stent ureter(s) Bilateral 12/7/2016     Procedure: COMBINED CYSTOSCOPY, RETROGRADES, EXCHANGE STENT URETER(S);  Surgeon: Carmela Alvarez MD;  Location: [facility] OR     Family History   Problem Relation Age of Onset     Arthritis Father      DIABETES       Uncle     Hypertension Mother      Hypertension Sister      Myocardial Infarction Father      secondary to anesthesia     HEART DISEASE       unknown valve replacement     Colon Cancer Father      Other - See Comments Mother      cognitive decline     Bladder Cancer Sister      Skin Cancer Brother      Skin Cancer Mother      Social History     Social History     Marital Status:      Spouse Name: N/A     Number of Children: N/A     Years of Education: N/A     Social History Main Topics     Smoking status: Former Smoker     Smokeless tobacco: Never Used      Comment: Quit over 20 years ago     Alcohol Use: No     Drug Use: No     Sexual Activity: No     Other Topics Concern     Not on file     Social History Narrative         Current Outpatient Prescriptions   Medication     enoxaparin (LOVENOX) 40 MG/0.4ML injection     dexamethasone (DECADRON) 4 MG tablet     tamsulosin (FLOMAX) 0.4 MG capsule     LORazepam (ATIVAN) 1 MG tablet     tolterodine (DETROL LA) 4 MG 24 hr capsule     ACETAMINOPHEN PO     ferrous sulfate (IRON) 325 (65 FE) MG tablet     Multiple Vitamins-Minerals (ONE DAILY MULTIVITAMIN WOMEN) TABS     traMADol (ULTRAM) 50 MG tablet     polyethylene glycol (MIRALAX/GLYCOLAX) powder     cycloSPORINE (RESTASIS) 0.05 % ophthalmic emulsion     Ranitidine HCl (ZANTAC PO)     MELATONIN PO     EPINEPHrine (EPIPEN) 0.3 MG/0.3ML  "injection     senna-docusate (SENOKOT-S;PERICOLACE) 8.6-50 MG per tablet     Pyridoxine HCl (VITAMIN B6 PO)     LORazepam (ATIVAN) 0.5 MG tablet     prochlorperazine (COMPAZINE) 10 MG tablet     study - niraparib (IDS# 4759) 100 mg capsule     ondansetron (ZOFRAN) 8 MG tablet     prochlorperazine (COMPAZINE) 10 MG tablet     No current facility-administered medications for this visit.        Allergies   Allergen Reactions     Contrast Dye Itching and Swelling     Itching/tingling of mouth and nose with sensation of swelling after receiving IV contrast for CT.  This occurred despite steroid premedication. Therefore the patient should not receive intravenous contrast in the future.      Benadryl Allergy Other (See Comments)     Stay awake, restless, \"uncomfortable\", \"feel like I need to run away\"      Enoxaparin Sodium Other (See Comments)     Pt is taking this now.     Amoxicillin Rash     Diphenhydramine Anxiety     Pollen Extract Rash     Sulfa Drugs Rash       Physical Exam:    /68 mmHg  Pulse 95  Temp(Src) 98.4  F (36.9  C) (Oral)  Resp 16  Ht 1.676 m (5' 5.98\")  Wt 59.1 kg (130 lb 4.7 oz)  BMI 21.04 kg/m2  SpO2 100%    General: Alert non-toxic appearing female in no acute distress  HEENT: Normocephalic, atraumatic;  PERRLA; no scleral icterus; oropharynx pink without lesions; neck supple without lymphadenopathy  Pulmonary: Lungs clear to auscultation, no increased work of breathing noted  Cardiac: regular rate and rhythm, S1S2, grade II murmur, consistent with prior exams     Performance Status -0    Assessment/Plan:  1) Primary peritoneal cancer: Start consent for Tesaro trial (parp-inhibitor)-niraparib today. Eligibility reviewed again and plan to proceed.    2) Reviewed signs and symptoms for when she should contact the clinic or seek additional care, including but not limited to fever, chills, inability to keep down food or fluids, nausea and vomiting not controlled with antiemetics, and " diarrhea leading to dehydration.     3) Nausea: Related to pain vs chemotherapy vs disease. Continue with Zofran and Ativan as this is helpful for her.    4) Genetic counseling: Has been done, negative for mutations in the following: BRCA1, BRCA2, EPCAM, MLH1, MSH2, MSH6, PMS2, PTEN and TP53.     5) Health maintenance issues discussed include to follow up with PCP for non-gynecologic issues.  Will order neck ultrasound to assess for stability of right jugular vein clot. Continue on prophylactic lovenox at 40 SC daily due to history of prior bleeding on higher dose.     6) Marshall verbalizes understanding of and agreement with plan.    Of a 30 minute appointment, more than 50% was spent in counseling the patient.      Jia Mccollum MD    Department of Ob/Gyn and Women's Health  Division of Gynecologic Oncology  [facility]  [phone]      I have reviewed the above note and agree with the scribe's notation as written.  Of a 30 minute appointment, more than 50% was spent in counseling the patient.      I, Yanet Martinez MD, am serving as a scribe to document services personally performed by Jia Mccollum MD, based upon my observations and the provider's statements to me. All documentation has been reviewed by the aforementioned doctor prior to being entered into the official medical record.     Again, thank you for allowing me to participate in the care of your patient.      Sincerely,    Jia Mccollum MD

## 2017-01-06 NOTE — TELEPHONE ENCOUNTER
Called patient and advised of below recommendations. She was okay with this plan and will call me when she gets her refill of dexamethasone for taper instructions.

## 2017-01-06 NOTE — TELEPHONE ENCOUNTER
Discussed with MD. Kimball to lorazepam qHS for help with sleep and to continue dexamethasone 2-4mg daily. Patient will need a taper of dexamethasone after 3 weeks and will ask patient to call when she runs out of what she has before she goes to  the refill that is on file to advise about tapering schedule.

## 2017-01-06 NOTE — TELEPHONE ENCOUNTER
"Called patient to check in regarding steroid. She tells me that for the first 3 days she tried 4mg at a time it went really well. She states that her pain was so improved she was able to go back to yoga and be more active. She reports that unfortunately the medication cause insomnia even when she took it first thing is the AM so she backed down to 2mg. When she went down to 2mg she reports her pain was still better then before, but did come back more. She tells me that she is not sure if the pain \"shot back up\" because of the decreased dose or because of doing more activity. She states she did re-start 4mg again. She states that as long as she is resting or doing less activity her pain is much improved. She also reports that she started a new trial med for Dr. Mccollum and she has read that it also causes insomnia - which she notes she has suffered from even before the dexamethasone.     She tells me that overall she would like to continue the 4mg dexamethasone at this time and asks if it would be okay for her to try taking lorazepam consistently at bedtime for help sleeping.  "

## 2017-01-10 PROBLEM — K56.609 SMALL BOWEL OBSTRUCTION (H): Status: ACTIVE | Noted: 2017-01-01

## 2017-01-10 NOTE — PROGRESS NOTES
Gynecology Oncology Progress Note  01/11/2017    HD# 2 admitted with SBO    Disease: Recurrent Stage IIIB peritoneal cancer      24 hour events:   - Admitted for above stated reason  - NGT placed, underwent multiple XRays to confirm position.    - Episode of emesis x1 of 100 cc  - Holding Tesaro   - Hypertension overnight requiring 5 mg IV hydralazine x1    Subjective: Patient is feeling improved since last NGT adjustment.  Denies current abdominal pain.  Denies flatus passage.  Voiding spontaneously. Ambulating without issue.    Objective: HR 100s  Filed Vitals:    01/10/17 2250 01/10/17 2325 01/11/17 0026 01/11/17 0434   BP: 187/95 173/77 154/84 151/73   Temp:    98.8  F (37.1  C)   TempSrc:    Oral   Resp:    18   SpO2: 98%   95%       General: NAD, appears comfortable in bed  CV: RRR, no m/r/g  Resp: CTAB, no wheezes  Abdomen: soft, non-tender, mildly distended, well healed incision noted, hyperactive bowel sounds noted  Extremities: warm, well-perfused, nontender, no edema    24 hour: IN - 1375 ml IVF // OUT - 1360 ml UOP, 200 ml NGT  Since MN: IN - 100 ml IVF // OUT - 400 ml UOP, 1000 ml NGT    Lines/Drains: NGT, right port-a-cath    New labs/imaging-  CBC, BMP, Mag, Phos    Assessment: Margaux Guzmán is a 61 year old with recurrent primary peritoneal cancer, on Tesaro trial who is HD#2 admitted as [facility]ANCO from OSH for small bowel obstruction.     Active Problem list:  1. Recurrent peritoneal cancer   2. Small bowel obstruction  3. Bilateral hydronephrosis s/p ureteral stents    Plan:    Disease: Recurrent primary peritoneal cancer, currently on Tesaro trial (holding due to acute issues).   FEN: NPO, NGT in place. NS @ 75 cc/hr.   Pain: PRN IV dilaudid.  Heme: Chronic right internal jugular vein DVT, stable on recent imaging in 1/2017.  No acute issues.   CV: No issues.   Resp: No issues.   GI: SBO with NGT in place.  S/p multiple AXRs with malpositioning of NGT.  Most recently pulled back, encouraged by  increasing output.  May need XR this morning to confirm.  Consider clamp trial with decreasing output.  PPI. PRN zofran, compazine.   : Bilateral ureteral stents in place for hydronephrosis. On decadron per palliative, currently being tapered (4 >2 mg). Home flomax, detrol held, plan to restart when tolerating PO intake.   MSK: No issues.   Neuro/Psych: Anxiety- home ativan ordered.   Endocrine: No issues.   ID: Leukocytosis to 16 on admission, likely reactive/due to SBO.  Afebrile.   PPx: SCDs, PPI, lovenox.  Disposition: Pending improvement in SBO.     Nicole Blanca MD  OBGYN PGY-2  Pager [phone]      Provider Disclosure:   I agree with above History, Review of Systems, Physical exam and Plan. I have reviewed the content of the documentation and have edited it as needed. I have personally performed the services documented here and the documentation accurately represents those services and the decisions I have made.     Electronically signed by:   Dayan Marcano MD   Gynecologic Oncology   [facility]

## 2017-01-10 NOTE — DISCHARGE SUMMARY
Gynecologic Oncology Discharge Summary    Margaux Guzmán  [id number]    Admit Date: 1/10/2017  Discharge Date: 1/15/2017  Admitting Provider: Dr Marcano  Discharge Provider: Dr. Marcano    Admission Dx:   -Abdominal pain, nausea/vomiting  -Small bowel obstruction   -Recurrent Stage IIIB peritoneal cancer  -Bilateral hydronephrosis, ureteral stents in place  -Chronic stent/flank pain    Discharge Dx:  - Same,     Patient Active Problem List   Diagnosis     Primary peritoneal adenocarcinoma (H)     Recurrent cold sores     Pulmonary nodules     Encounter for long-term current use of medication     DVT (deep venous thrombosis) (H)     Family history of peritoneal cancer     Protein in urine     Ovarian cancer, left (H)     Ovarian cancer, right (H)     Chemotherapy-induced neutropenia (H)     Anemia     Pain due to ureteral stent (H)     Sterile pyuria     Pyelonephritis     Small bowel obstruction (H)       Procedures: NGT placement, NGT removal    Prior to Admission Medications:  Prescriptions prior to admission   Medication Sig Dispense Refill Last Dose     enoxaparin (LOVENOX) 40 MG/0.4ML injection Inject 0.4 mLs (40 mg) Subcutaneous daily 30 Syringe 3 1/10/2017 at Unknown time     LORazepam (ATIVAN) 0.5 MG tablet Take 1 tablet (0.5 mg) by mouth every 4 hours as needed (Anxiety, Nausea/Vomiting or Sleep) 30 tablet 3 1/9/2017 at Unknown time     prochlorperazine (COMPAZINE) 10 MG tablet Take 1 tablet (10 mg) by mouth every 6 hours as needed (Nausea/Vomiting) 30 tablet 3 1/9/2017 at Unknown time     study - niraparib (IDS# 4759) 100 mg capsule Take 3 capsules (300 mg) by mouth every morning Take at the same time each day, preferably morning. Swallow whole and do NOT chew capsules. Food and water is permissible. First dose will be administered on site. 84 capsule 0 1/9/2017 at Unknown time     dexamethasone (DECADRON) 4 MG tablet Take 1 tablet (4 mg) by mouth daily (with breakfast) 14 tablet 1 1/10/2017 at Unknown  time     tamsulosin (FLOMAX) 0.4 MG capsule Take 1 capsule (0.4 mg) by mouth daily 60 capsule 6 1/9/2017 at Unknown time     LORazepam (ATIVAN) 1 MG tablet Take 1 tablet (1 mg) by mouth every 6 hours as needed (nausea/vomiting, anxiety or sleep) 30 tablet 3 1/9/2017 at Unknown time     tolterodine (DETROL LA) 4 MG 24 hr capsule Take 1 capsule (4 mg) by mouth daily 30 capsule 3 1/9/2017 at Unknown time     ondansetron (ZOFRAN) 8 MG tablet Take 1 tablet (8 mg) by mouth every 8 hours as needed for nausea 30 tablet 2 1/10/2017 at Unknown time     ACETAMINOPHEN PO Take 500 mg by mouth every 6 hours as needed for pain   Past Week at Unknown time     ferrous sulfate (IRON) 325 (65 FE) MG tablet Take 325 mg by mouth 2 times daily   1/9/2017 at Unknown time     Multiple Vitamins-Minerals (ONE DAILY MULTIVITAMIN WOMEN) TABS Take 1 tablet by mouth every morning    1/9/2017 at Unknown time     traMADol (ULTRAM) 50 MG tablet Take 0.5-1 tablets (25-50 mg) by mouth every 6 hours as needed for moderate pain (Patient taking differently: Take 50 mg by mouth every 6 hours as needed for moderate pain ) 60 tablet 1 Past Month at Unknown time     polyethylene glycol (MIRALAX/GLYCOLAX) powder Take 1 capful by mouth daily as needed    Past Week at Unknown time     cycloSPORINE (RESTASIS) 0.05 % ophthalmic emulsion Place 1 drop into both eyes 2 times daily    1/9/2017 at Unknown time     Ranitidine HCl (ZANTAC PO) Take 75 mg by mouth daily    1/9/2017 at Unknown time     MELATONIN PO Take 1 mg by mouth At Bedtime    Past Week at Unknown time     EPINEPHrine (EPIPEN) 0.3 MG/0.3ML injection Inject 0.3 mLs (0.3 mg) into the muscle once as needed for anaphylaxis 1 each 0 Past Month at Unknown time     senna-docusate (SENOKOT-S;PERICOLACE) 8.6-50 MG per tablet Take 1-2 tablets by mouth 2 times daily 7 tablet  1/9/2017 at Unknown time     Pyridoxine HCl (VITAMIN B6 PO) Take 1 tablet by mouth At Bedtime    1/9/2017 at Unknown time      prochlorperazine (COMPAZINE) 10 MG tablet Take 1 tablet (10 mg) by mouth every 6 hours as needed (nausea/vomiting) 30 tablet 2 1/9/2017 at Unknown time       Discharge Medications:     Review of your medicines      CONTINUE these medicines which may have CHANGED, or have new prescriptions. If we are uncertain of the size of tablets/capsules you have at home, strength may be listed as something that might have changed.       Dose / Directions    traMADol 50 MG tablet   Commonly known as:  ULTRAM   This may have changed:  how much to take   Used for:  Flank pain        Dose:  25-50 mg   Take 0.5-1 tablets (25-50 mg) by mouth every 6 hours as needed for moderate pain   Quantity:  60 tablet   Refills:  1         CONTINUE these medicines which have NOT CHANGED       Dose / Directions    ACETAMINOPHEN PO        Dose:  500 mg   Take 500 mg by mouth every 6 hours as needed for pain   Refills:  0       cycloSPORINE 0.05 % ophthalmic emulsion   Commonly known as:  RESTASIS        Dose:  1 drop   Place 1 drop into both eyes 2 times daily   Refills:  0       dexamethasone 4 MG tablet   Commonly known as:  DECADRON   Used for:  Cancer associated pain        Dose:  4 mg   Take 1 tablet (4 mg) by mouth daily (with breakfast)   Quantity:  14 tablet   Refills:  1       enoxaparin 40 MG/0.4ML injection   Commonly known as:  LOVENOX   Indication:  0.4 ML   Used for:  Ovarian cancer, left (H), Ovarian cancer, right (H)        Dose:  40 mg   Inject 0.4 mLs (40 mg) Subcutaneous daily   Quantity:  30 Syringe   Refills:  3       EPINEPHrine 0.3 MG/0.3ML injection   Used for:  Preventative health care, Primary peritoneal adenocarcinoma (H)        Dose:  0.3 mg   Inject 0.3 mLs (0.3 mg) into the muscle once as needed for anaphylaxis   Quantity:  1 each   Refills:  0       ferrous sulfate 325 (65 FE) MG tablet   Commonly known as:  IRON        Dose:  325 mg   Take 325 mg by mouth 2 times daily   Refills:  0       * LORazepam 1 MG tablet    Commonly known as:  ATIVAN   Used for:  Primary peritoneal adenocarcinoma (H)        Dose:  1 mg   Take 1 tablet (1 mg) by mouth every 6 hours as needed (nausea/vomiting, anxiety or sleep)   Quantity:  30 tablet   Refills:  3       * LORazepam 0.5 MG tablet   Commonly known as:  ATIVAN   Used for:  Anemia due to other cause, Chemotherapy-induced neutropenia (H), Ovarian cancer, left (H), Ovarian cancer, right (H), Primary peritoneal adenocarcinoma (H)        Dose:  0.5 mg   Take 1 tablet (0.5 mg) by mouth every 4 hours as needed (Anxiety, Nausea/Vomiting or Sleep)   Quantity:  30 tablet   Refills:  3       MELATONIN PO        Dose:  1 mg   Take 1 mg by mouth At Bedtime   Refills:  0       ondansetron 8 MG tablet   Commonly known as:  ZOFRAN   Used for:  Nausea, Primary peritoneal adenocarcinoma (H)        Dose:  8 mg   Take 1 tablet (8 mg) by mouth every 8 hours as needed for nausea   Quantity:  30 tablet   Refills:  2       ONE DAILY MULTIVITAMIN WOMEN Tabs        Dose:  1 tablet   Take 1 tablet by mouth every morning   Refills:  0       polyethylene glycol powder   Commonly known as:  MIRALAX/GLYCOLAX        Dose:  1 capful   Take 1 capful by mouth daily as needed   Refills:  0       * prochlorperazine 10 MG tablet   Commonly known as:  COMPAZINE   Used for:  Primary peritoneal adenocarcinoma (H)        Dose:  10 mg   Take 1 tablet (10 mg) by mouth every 6 hours as needed (nausea/vomiting)   Quantity:  30 tablet   Refills:  2       * prochlorperazine 10 MG tablet   Commonly known as:  COMPAZINE   Used for:  Anemia due to other cause, Chemotherapy-induced neutropenia (H), Ovarian cancer, left (H), Ovarian cancer, right (H), Primary peritoneal adenocarcinoma (H)        Dose:  10 mg   Take 1 tablet (10 mg) by mouth every 6 hours as needed (Nausea/Vomiting)   Quantity:  30 tablet   Refills:  3       senna-docusate 8.6-50 MG per tablet   Commonly known as:  SENOKOT-S;PERICOLACE   Used for:  Primary peritoneal  adenocarcinoma (H)        Dose:  1-2 tablet   Take 1-2 tablets by mouth 2 times daily   Quantity:  7 tablet   Refills:  0       study - niraparib 100 mg capsule   Commonly known as:  IDS# [id number]   Used for:  Anemia due to other cause, Chemotherapy-induced neutropenia (H), Ovarian cancer, left (H), Ovarian cancer, right (H), Primary peritoneal adenocarcinoma (H)        Dose:  300 mg   Take 3 capsules (300 mg) by mouth every morning Take at the same time each day, preferably morning. Swallow whole and do NOT chew capsules. Food and water is permissible. First dose will be administered on site.   Quantity:  84 capsule   Refills:  0       tamsulosin 0.4 MG capsule   Commonly known as:  FLOMAX   Used for:  Primary peritoneal adenocarcinoma (H)        Dose:  0.4 mg   Take 1 capsule (0.4 mg) by mouth daily   Quantity:  60 capsule   Refills:  6       tolterodine 4 MG 24 hr capsule   Commonly known as:  DETROL LA   Used for:  Hydronephrosis, unspecified hydronephrosis type        Dose:  4 mg   Take 1 capsule (4 mg) by mouth daily   Quantity:  30 capsule   Refills:  3       VITAMIN B6 PO        Dose:  1 tablet   Take 1 tablet by mouth At Bedtime   Refills:  0       ZANTAC PO        Dose:  75 mg   Take 75 mg by mouth daily   Refills:  0       * Notice:  This list has 4 medication(s) that are the same as other medications prescribed for you. Read the directions carefully, and ask your doctor or other care provider to review them with you.        Consultations:   Palliative medicine, nutrition    Brief History of Illness:  Margaux Guzmán is a 61 year old female with recurrent Stage IIIB primary peritoneal cancer currently on Tesaro trial. She presented to outside hospital given small bowel obstruction. She had 4 days of worsening abdominal pain with associated nausea and vomiting. She estimates that she has vomited a total of 3-4 times with last time being on admission with a large amount. She has associated nausea. CT shows  multiple dilated loops of bowel with transition point at outside hospital. She states that she has had loose stools the day prior to admit after using laxatives due to constipation. She rates the pain at 7 out of 10. This pain is different than the pain she has due to her stents that she had in her L flank over her right flank. She otherwise denies any fever, chills, chest pain, shortness of breath.    Hospital Course:  Dz:   - Recurrent Stage IIIB peritoneal cancer currently Tesaro study. Her study drug was held during her admission with plans to discuss further care plans with Dr. Mccollum at follow-up appointment on 1/19/17.  FEN:   - She was maintained on IVF until HD#4, when her diet was slowly advanced.  By discharge, she was tolerating a regular diet without nausea and vomiting and able to maintain her hydration without IVF supplementation.  Pain:   - Her pain was initially controlled on PRN IV dilaudid. Once tolerating PO pain meds, she was transitioned to a PO pain regimen. Palliative medicine was consulted due to chronic flank/stent pain and recommended PO tramadol, decadron taper, and PO dilaudid.  Her pain was well controlled on this and she was discharged home with these medications.  CV:   - She was noted to be hypertensive during this admission. She was mildly tachycardic on HD#2, and an EKG was wnl. Her vital signs were stable while in house and she had no acute CV issues. She was instructed to follow-up with her PCP regarding hypertension.  PULM:   - She has no history of pulmonary issues. Her O2 sats were greater than 94% on RA. She had no acute pulmonary issues while in house.  HEME:   - Her Hgb was stable during admission.  She had no other acute heme issues while in house.  GI:   - She was made NPO on admission and a NGT was placed. On HD#4, her NGT was removed and her diet was advanced to clear liquids and then advanced slowly as tolerated.  At the time of discharge, she was tolerating a regular  diet without nausea and vomiting.  She will be discharged with a bowel regimen to prevent constipation in the postoperative period.  She had no acute GI issues while in house.  :    -  The patient was voiding spontaneously without difficulty.  The patient has a history of bilateral hydronephrosis with bilateral ureteral stents in place.  Her home flomax and detrol were resumed when the patient began tolerating PO intake. She had no acute  issues while in house.  ID:   - On HD#2, patient was evaluated for SIRS given her WBC of 13.4 and mild tachycardia. Her WBCs however were improved from 16 on 1/10/17 at OSH. A lactate was 0.6. The patient was AF during her hospitalization.   ENDO:   - TSH for evaluation of tachycardia was 0.48, wnl.   PSYCH/NEURO:   - no issues  PPX:    -  She was given SCDs, PPI and lovenox during her hospital course.  She tolerated these prophylactic interventions without incident.  They were discontinued at the time of her discharge.      Discharge Instructions and Follow up:  Ms. Margaux Guzmán was discharged from the hospital with follow up for recurrent peritoneal cancer.    Discharge Diet: Regular  Discharge Activity: Activity as tolerated  Discharge Follow up: Follow-up with primary care provider in one week for hypertension,  1/19/17 with Dr Mccollum    Discharge Disposition:  Discharged to home    Discharge Staff: Dr. Krys Aguirre MD  OB/GYN PGY2  1/15/2017         Provider Disclosure:   I agree with above History, Review of Systems, Physical exam and Plan. I have reviewed the content of the documentation and have edited it as needed. I have personally performed the services documented here and the documentation accurately represents those services and the decisions I have made.     Electronically signed by:   Dayan Marcano MD   Gynecologic Oncology   [facility]

## 2017-01-10 NOTE — TELEPHONE ENCOUNTER
Call Type: Triage Call    Presenting Problem: Margaux says she is on a oncology clinical trial  that started 4 days ago. Now she has severe nausea and flank pain.  Pain is worse starting tonight. Pain is worse on the left, but is  spreading. BP is 158/89. She took a Oxycodone about 15 minutes ago.  She doesn't know if she should go in to the ER or if this is to be  expected with the trial medication. On call paged for Dr. Jia Mccollum at 0020 to call patient at [phone]  Triage Note:  Guideline Title: Cancer, Diagnosed - Adult  Recommended Disposition: Call Provider within 4 Hours  Original Inclination: Wanted to speak with a nurse  Override Disposition:  Intended Action: Call PCP/HCP  Physician Contacted: No  New onset unbearable pain ?  YES  Signs of dehydration ? NO  New or worsening signs and symptoms that may indicate shock ? NO  Sudden onset of severe breathing difficulty ? NO  Unconscious now or within last 6 hours ? NO  Vomiting red, bloody or coffee-ground material, more than streaks of blood or  scant amount (not following nosebleed within past day) ? NO  Chest pain or pressure that may radiate OR be associated with shortness of breath,  nausea or vomiting, diaphoresis, palpitations, lasting 5 minute or more now or  within last hour. ? NO  New neurologic signs, including weakness, numbness, altered mental status,  coordination difficulties, difficulty speaking of any duration that have not been  previously evaluated. ? NO  Bloody diarrhea AND has not been evaluated ? NO  New or worsening breathing problems that have not been evaluated OR without a  treatment plan ? NO  Physician Instructions:  Care Advice: See another provider immediately if unable to talk with or see  your provider within this timeframe. Consider the closest urgent care or ED  if an office or clinic is not available. Another adult should drive.  Rest with your feet up or lie down as much as possible. Cover to keep warm.  Also  consider taking a nonprescription pain or fever lowering medication  following package information.  You might need to take a second dose before  you get some relief.

## 2017-01-10 NOTE — H&P
[facility] History and Physical    Margaux Guzmán MRN# [id number]   Age: 61 year old YOB: 1955     Date of Admission:  1/10/2017    Primary care provider: Sophia Garcia             Chief Complaint:   Abdominal pain, vomiting         History of Present Illness:   Margaux Guzmán is a 61 year old female with primary peritoneal cancer (please see history below) on Tesaro trial who presents as BLANCO from outside hospital given small bowel obstruction. She has had 4 days of worsening abdominal pain with associated nausea and vomiting last night. She estimates that she has vomited a total of 3-4 times with last time being on admission here with a large amount. She has associated nausea. CT shows multiple dilated loops of bowel with transition point at outside hospital. Please see read from scan below.     She states that she has had loose stools the day prior to admit after using laxatives due to constipation. She rates the pain at 7 out of 10. This pain is different than the pain she has due to her stents that she had in her L flank over her right flank. She otherwise denies any fever, chills, chest pain, shortness of breath. She denies remembering when she last passed flatus.              Cancer Treatment History:   Brief Oncology History:  The patient is a 58 year old  female who initially presented for evaluation of pelvic and abdominal pain. On exam, she had mild right adnexal tenderness and slightly enlarged right ovary freely mobile and smooth. This prompted an abdominal ultrasound, which was normal, and a pelvic US that showed anechoic right ovarian cyst measuring 4.3 x 3.8 x 4.0 cm. Her  was elevated at 74 on 10/8/13. We reviewed the possible diagnosis including ovarian cancer versus benign ovarian cyst. Approach to surgery, alternatives to surgery and plan for possible extended open surgical staging based on the operative findings was discussed. In light of the size of  the ovary we are offering an initial approach with minimally invasive surgery. After discussion of risks and benefits, consent was obtained.  11/7/13 Diagnostic laparoscopy converted to exploratory laparotomy, bilateral salpingo-oophorectomy, total abdominal hysterectomy, omentectomy, staging biopsies, bilateral pelvic and periaortic lymph node dissection and washings. Stage IIIB  12/4/13: Cycle #1 IV/IP  1/2/14: Cycle #2 IV/IP PCP.  - 14  1/23/14: Cycle 3 IV/IP.  - 7  2/11/14:  - 7. CT C/A/P Impression:  1. Multiple bilateral pulmonary nodules as described in full report measuring up to 3 mm along the right major fissure. These are indeterminate given lack of comparison and followup is recommended.  2. No definite evidence for recurrent or metastatic disease in the abdomen or the pelvis. There is a rounded hypodense focus abutting the left external iliac artery and the adjacent sigmoid colon which measures 1 cm, this could represent a fluid-filled sigmoid diverticulum or a hypodense node, further attention to this area on subsequent examinations is recommended.  3. There is a 7 mm nonobstructing stone in the inferior collecting system of the right kidney.  2/12/14-3/26/14: Cycle #4-6 IV/IP CA-125 7, 7, 7.  4/21/14: CT C/A/P Impression:   1. Unchanged indeterminate pulmonary nodules. Recommend continued surveillance.   2. No definite evidence of metastatic ovarian cancer in the abdomen or pelvis. Small amount of subtle increased thickening and fluid is noted along the right lymph node dissection, nonspecific, but possibly a tiny developing lymphocele.   3. 6 mm nonobstructing stone in the right kidney.  Plan surveillance for ovarian cancer every 3 months with physical and .   Indeterminate pulmonary nodule: These were present before the surgery and there was not change with the chemotherapy. Highly unlikely to be a metastatic disease. Will repeat a CT in 3 months to see any change     5/22/14:   6. JOSEMANUEL.  5/17/14; ED visit [facility]. CT abd/pel noted possible chronic partial small bowel obstruction. Colonscopy scheduled for June 6, 2014 locally. Abdominal pain started 5/17/14 and noted pressure without bowel movement and went to ED that night due to increasing pain. In patient 2 day hospital with clear liquids/IV. Mild nausea without vomiting. BM subsequently occurred and pt was discharged. No pain since. Continues with fullness in abdomen. Diet is bland for the most part.  8/25/14:  -5. Notes increased abdominal girth and bloating x 2-3 weeks with urine urgency. Worried about recurrence. Is just starting to return to normal exercise and activities. No constipation, diarrhea, pain, vaginal or rectal bleeding, cough or dyspnea. CT to follow indeterminate pulmonary nodules today.   CT cap IMPRESSION:   1. No CT scan findings of the chest, abdomen, or pelvis to indicate metastatic disease.  2. 2-5 mm pulmonary nodules, stable since 2/11/2014.  3. Nonobstructing 6 mm stone in the right kidney.  12/3/14:  8.. Pt started zoloft for anxiety. Worries about cancer recurrence.   3/2/15: CA 34  3/5/15: CT C/A/P: Impression:  1. Multiple new small peritoneal and retroperitoneal nodules are suspicious for metastases. Suspicious new left common iliac and central small bowel mesenteric nodes. No abdominal ascites.  2. Multiple pulmonary nodules are stable with no new nodules.  3. 9 mm nonobstructing right lower pole renal calculus.    5/20/15: CT c/a/p showed nodularity throughout the abdomen and pelvis worrisome for malignancy which has increased in size     5/28/15: CT abdomen and pelvis showed stable inumerabble peritoneal nodules scattered throughout the abdomen and pelvis consistent with metastases.     8/4/15 (approximatly): Left ureteral stent was placed.    8/12/15:  from [place] [id number]    8/18/15: CT chest Impression showed slight increase in mild, subtle nodularity  "along the diaphragm which is nonspecific but may represent metastatic disease.   8/18/15: CT abdomen and pelvis Impression showed interval progression of peritoneal metastatic disease.     8/25/15:  on 3/2/15 of 34 prompted CT c/a/p, which showed multiple new small peritoneal and retroperitoneal nodules are suspicious for metastases. Suspicious new left common iliac and central small bowel mesenteric nodes. She participated in [facility] study involving treatment with on clinical trial with vaccine [id number]. She is here today because the [place] trail failed and the pt pregressed. Her most recent CT c/a/p on 8/25 showed progression of peritoneal metastatic disease. And her most recent  from [place] on 8/12/15 was 303.    Plan: Carbo/Doxil x 6 cycles.with imaging after 3 cycles.     9/3/15: Cycle #1 Carbo/Doxil.  244.  9/24/15: right IJ thrombus; started on Lovenox.   9/26/15: Present to [place] ED. \"presented to the ED this morning with what appears to be a mild drug rash after administering her lovenox this morning. This writer discussed situation with Gyn Onc Fellow Jeanne Moon as well as [facility] Heme/Onc service. Per Heme, a rash of this nature is extremely uncommon with administration of Lovenox. Given the mild nature of this rash and acute need for anticoagulation, recommended the patient continue with Lovenox injections and treat with Benadryl as needed. Advised patient be given precautions to return to the ED immediately if she develops reactive respiratory symptoms. Alternatively patient could be switched to alternative formulation of low molecular weight heparin if she was strongly resistant to continuation of Lovenox.\"    10/1/15: Cycle #2 Carbo/Doxil.  175.    10/28/15: gyn onc visit: pt complains of worsening constipation and tenderness around her right ribs. She is taking senna for her constipation with minimal improvement. She admits that the swelling around her neck after her " blood clot has decreased. She also admits to feeling a nodules on her left abdomen. She states her appetite is good but she has not been eating fruits and vegetables. She denies any chemo side effects besides fatigue.     10/28/15:  158  11/23/2015:  133  CT c/a/p  IMPRESSION: In this patient with a clinical history of ovarian cancer  there is mixed response to therapy:   1. Stable to slightly decreased peritoneal nodularity since  8/18/2015.  2. No significant change regarding the large subdiaphragmatic  peritoneal deposit since 9/24/2015.  3. Enlarging soft tissue nodule overlying the abdominal musculature  concerning for metastasis since March of 2015.   4. Unchanged, indeterminate hypodensity near the gallbladder fossa  since 8/18/2015, however progressively increased in size since  3/5/2015.  5. Bilateral nephroureteral stents. No significant hydronephrosis.  6. Bilateral pulmonary nodules. Continued followup recommended.    12/23/15: Cycle 5 carboplatin/Doxil/Avastin.  96. To receive Avastin today despite proteinuria per Dr. Aguilar and nephrology.  1/21/16: Cycle 6 carboplatin/Doxil/Avastin, delayed one week secondary to neutropenia.  62.  1/28/16:  63.    2/2/16: Patient had right upper extremity doppler u/s done in [place] due to swollen glands and pain. 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 Pt is currently on lovenox.    2/2/16: US soft tissue neck  Conclusion:  1. Morphologically normal not pathologically enlarged two right carotid chain lymph nodes.  2. Chronic right jugular vein thrombosis, described in detail on  venous doppler exam.     2/2/16: Thyroid Ultrasound  Right lobe: 5.5 x 1.9 x 1.2 cm  Left lobe: 5.0 x 1.5 . 0.9 cm  Both lobes have normal background echotexture.    Conclusion:  1. 3 benign - appearing subcentimeter hypoechoic right mid  thyroid nodules.  2. Borderline thyromegaly.    2/2/16: Right upper extremity venous duplex doppler evaluation  Conclusion:  1.) chronic non compressible echogenic right jugular vein thrombus, now 4.3 cm in length.     2/22/16:   IMPRESSION:    1. Ovarian cancer with peritoneal carcinomatosis.  2. Given the increased sensitivity of PET/CT, comparison with prior CT examinations is difficult. In general the abdominal/pelvic metastatic lesions appear to be decreased in size.  3. Persistent right internal jugular DVT, as evidenced by 5 cm filling  defect with inferior border at the central venous catheter.  4. Bilateral hydronephrosis without overt caliectasis. Some distal  migration of the bilateral double-J ureteral stents, although the  proximal coiled portions continue to be in the renal pelves.    2/24/16: C7 carboplatin/Doxil/Avastin.  56.  3/9/16: Hgb 6.6, worked up for transfusion reaction (negative). Bleed possibly secondary to   3/23/16: C8 carboplatin/Doxil/Avastin.  44.  4/2016: Hospitalized in [place] x2 weeks for hematuria leading to anemia after ureteral stent exchange  4/20/16: C9D1 carboplatin/Doxil/Avastin. Deferred one week secondary to acute UTI.  35.  4/28/16: C9D1 deferred due to continued bacteruria and ANC 1.3.  5/4/16: C9D1 carboplatin/Doxil/Avastin. Breast lump noted, diagnostic mammogram ordered.  6/1/16: C10D1 carboplatin/Doxil/Avastin.  50.  6/28/16: C11D1 carboplatin/Doxil/Avastin. Avastin held due to bleeding. Carbo/Doxil deferred one week secondary to neutropenia.  43.  7/7/16: C12D1 carboplatin/Doxil. Avastin held due to bleeding.  7/18/16: Bilateral ureteral stent exchange  7/20/16-7/29/16: Hospitalized with urosepsis and blood loss anemia, started on Zosyn and discharged on amoxicillin  9/29/16: C1D1 Gemzar.  85.  10/21/16: C2D1 Gemzar.  106.    11/11/16: C3D1 Gemzar.  111    12/12/16: CT CAP  IMPRESSION: In this patient with a history  of metastatic ovarian  cancer:  1. Progression of disease as evidenced by a slowly enlarging  mesenteric and omental soft tissue foci and slowly enlarging  periesophageal and pericardiophrenic lymph nodes, as detailed above.  2. Stable appearance of bilateral ureteral stents without  hydronephrosis.  3. Patient was premedicated for a pre-existing IV contrast allergy.  Despite this, she had itchiness on her face poststudy. She should no  longer receive IV contrast in the future.     16:  165  16: Tesaro trial (parp-inhibitor)-niraparib trial    1/10/16: Small bowel obstruction seen on CT- admitted for conservative management         Past Medical History:     Past Medical History   Diagnosis Date     Hyperlipidemia      Osteoarthritis      Osteopenia      Polymyalgia rheumatica (H)      Ovarian cancer (H)      Primary cancer of peritoneum (H)      Hydronephrosis      Jugular vein thrombosis, right      Persistent right internal jugular DVT     Livedo annularis      History of blood transfusion      MULTIPLE     PONV (postoperative nausea and vomiting)      Anemia             Past Surgical History:      Past Surgical History   Procedure Laterality Date     Appendectomy        section       Open reduction internal fixation toe(s)  9/3/13     Fifth digit, left foot, with screw fixation      Lymph node biopsy       Left posterior chain, beign     Breast surgery       biopsy negative     Laparoscopic salpingo-oophorectomy  2013     Procedure: LAPAROSCOPIC SALPINGO-OOPHORECTOMY;  Diagnostic Laparoscopy, Exploratory Laparotomy, Bilateral Salpingo-Oophorectomy,  Hysterectomy, Omentectomy, Pelvic Washings, Peritoneal staging and biopsies, Pelvic and Periaortic Lymph node Dissection;  Surgeon: Cheri Aguilar MD;  Location: [facility] OR     Hysterectomy total abdominal, bilateral salpingo-oophorectomy, node dissection, combined  2013     Procedure: COMBINED HYSTERECTOMY TOTAL ABDOMINAL,  SALPINGO-OOPHORECTOMY, NODE DISSECTION;;  Surgeon: Cheri Aguilar MD;  Location: [facility] OR     Laparotomy, staging, combined  11/7/2013     Procedure: COMBINED LAPAROTOMY, STAGING;;  Surgeon: Cheri Aguilar MD;  Location: [facility] OR     Remove port peritoneal  5/5/2014     Procedure: REMOVE PORT PERITONEAL;  Surgeon: Cheri Aguilar MD;  Location: [facility] OR     Combined cystoscopy, retrogrades, exchange stent ureter(s) Bilateral 7/18/2016     Procedure: COMBINED CYSTOSCOPY, RETROGRADES, EXCHANGE STENT URETER(S);  Surgeon: Carmela Alvarez MD;  Location: [facility] OR     Combined cystoscopy, retrogrades, exchange stent ureter(s)  4/2016     @ [facility]     Combined cystoscopy, retrogrades, exchange stent ureter(s) Bilateral 10/17/2016     Procedure: COMBINED CYSTOSCOPY, RETROGRADES, EXCHANGE STENT URETER(S);  Surgeon: Carmela Alvarez MD;  Location: [facility] OR     Combined cystoscopy, retrogrades, exchange stent ureter(s) Bilateral 12/7/2016     Procedure: COMBINED CYSTOSCOPY, RETROGRADES, EXCHANGE STENT URETER(S);  Surgeon: Carmela Alvarez MD;  Location: [facility] OR            Social History:     Social History   Substance Use Topics     Smoking status: Former Smoker     Smokeless tobacco: Former User      Comment: Quit over 20 years ago     Alcohol Use: No            Family History:     Family History   Problem Relation Age of Onset     Arthritis Father      DIABETES       Uncle     Hypertension Mother      Hypertension Sister      Myocardial Infarction Father      secondary to anesthesia     HEART DISEASE       unknown valve replacement     Colon Cancer Father      Other - See Comments Mother      cognitive decline     Bladder Cancer Sister      Skin Cancer Brother      Skin Cancer Mother             Immunizations:     Immunization History   Administered Date(s) Administered     Influenza Vaccine IM 3yrs+ 4 Valent IIV4 10/28/2015            Allergies:     Allergies   Allergen Reactions     Contrast Dye  "Itching and Swelling     Itching/tingling of mouth and nose with sensation of swelling after receiving IV contrast for CT.  This occurred despite steroid premedication. Therefore the patient should not receive intravenous contrast in the future.      Benadryl Allergy Other (See Comments)     Stay awake, restless, \"uncomfortable\", \"feel like I need to run away\"      Enoxaparin Sodium Other (See Comments)     Pt is taking this now.     Amoxicillin Rash     Diphenhydramine Anxiety     Pollen Extract Rash     Sulfa Drugs Rash            Medications:     No current facility-administered medications for this encounter.     Current Outpatient Prescriptions   Medication Sig     enoxaparin (LOVENOX) 40 MG/0.4ML injection Inject 0.4 mLs (40 mg) Subcutaneous daily     LORazepam (ATIVAN) 0.5 MG tablet Take 1 tablet (0.5 mg) by mouth every 4 hours as needed (Anxiety, Nausea/Vomiting or Sleep)     prochlorperazine (COMPAZINE) 10 MG tablet Take 1 tablet (10 mg) by mouth every 6 hours as needed (Nausea/Vomiting)     study - niraparib (IDS# 4759) 100 mg capsule Take 3 capsules (300 mg) by mouth every morning Take at the same time each day, preferably morning. Swallow whole and do NOT chew capsules. Food and water is permissible. First dose will be administered on site.     dexamethasone (DECADRON) 4 MG tablet Take 1 tablet (4 mg) by mouth daily (with breakfast)     tamsulosin (FLOMAX) 0.4 MG capsule Take 1 capsule (0.4 mg) by mouth daily     LORazepam (ATIVAN) 1 MG tablet Take 1 tablet (1 mg) by mouth every 6 hours as needed (nausea/vomiting, anxiety or sleep)     tolterodine (DETROL LA) 4 MG 24 hr capsule Take 1 capsule (4 mg) by mouth daily     ondansetron (ZOFRAN) 8 MG tablet Take 1 tablet (8 mg) by mouth every 8 hours as needed for nausea     ACETAMINOPHEN PO Take 500 mg by mouth every 6 hours as needed for pain     ferrous sulfate (IRON) 325 (65 FE) MG tablet Take 325 mg by mouth 2 times daily     Multiple Vitamins-Minerals " (ONE DAILY MULTIVITAMIN WOMEN) TABS Take 1 tablet by mouth every morning      traMADol (ULTRAM) 50 MG tablet Take 0.5-1 tablets (25-50 mg) by mouth every 6 hours as needed for moderate pain (Patient taking differently: Take 50 mg by mouth every 6 hours as needed for moderate pain )     polyethylene glycol (MIRALAX/GLYCOLAX) powder Take 1 capful by mouth daily as needed      cycloSPORINE (RESTASIS) 0.05 % ophthalmic emulsion Place 1 drop into both eyes 2 times daily      Ranitidine HCl (ZANTAC PO) Take 75 mg by mouth daily      MELATONIN PO Take 1 mg by mouth At Bedtime      EPINEPHrine (EPIPEN) 0.3 MG/0.3ML injection Inject 0.3 mLs (0.3 mg) into the muscle once as needed for anaphylaxis     senna-docusate (SENOKOT-S;PERICOLACE) 8.6-50 MG per tablet Take 1-2 tablets by mouth 2 times daily     Pyridoxine HCl (VITAMIN B6 PO) Take 1 tablet by mouth At Bedtime      prochlorperazine (COMPAZINE) 10 MG tablet Take 1 tablet (10 mg) by mouth every 6 hours as needed (nausea/vomiting)            Review of Systems:   See HPI         Physical Exam:   O:  Filed Vitals:    01/10/17 0725   BP: 149/56   Temp: 97.2  F (36.2  C)   TempSrc: Oral   Resp: 18   SpO2: 97%     Gen:  Appears mildly uncomfortable with emesis bag clutched in hand  CV:  RRR, no m/g/r appreciated  Pulm:  CTAB, no wheezes appreciated  Abd:  Soft, appropriately tender, mildly distended and full to palpation in the lower left quadrant, bowel sounds active  Extremities: Non-tender, no edema    CT abdomen and pelvis without contrast- 1/10 at OSH  Multiple dilated fluid-filled small bowel loops with decompressed distal small bowel loops, consistent with obstruction. Transition point is suspected in the pelvis. No pneumatosis or free intraperitoneal gas. Bilateral ureteral stents appear appropriately positioned. Mild dilation of b/l renal collecting systems L>R.    Labs:- Please see Care Everywhere for full lab values  Na 133, Cr 1.07  Albumin 4.4  ALT, AST wnl    WBC:  16   Hgb 12.5  Platelet 357          Assessment and Plan:   Assessment:   Margaux Guzmán is a 61 year old female with primary peritoneal cancer with recurrence on Tesaro trial who presents as BLANCO from outside hospital given small bowel obstruction. She has had 4 days of worsening abdominal pain with associated nausea and vomiting.     Plan:  Dz: Recurrent primary peritoneal cancer- holding her Tesaro clinical drug at the moment due to her nausea and vomiting. Will reassess need for tomorrow  FEN: NPO with IVF NS 100ml/hr. NGT in place  Pain: IV dilaudid bumps  Heme: Hgb 12.5- no acute issues. 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 Patient asymptomatic. Nothing further to do.   CV: NI  Pulm: NI  GI: NGT placed for conservative management of SBO. Zofran and compazine ordered  : Bilateral ureteral stents in place. Patient was started on decadron 4mg to be tapered today to 2mg for pain due to her stents. Will start at tapered dose 2mg today. Holding her flomax and detrol as well given her NPO status. Can add when patient is tolerating PO  ID: Leukocytosis of 16 most likely due to SBO  Endocrine: NI  Psych/Neuro: Ativan 0.5mg IV Q6 PRN   PPX: Lovenox 40mg daily, PPI while NGT is in place  Dispo:  Pending improvement with conservative management for her SBO. Will obtain AM BMP, CBC, Mag, and Phos    Tarah Shaun Mcclain MD  1/10/2017 6:53 AM       Provider Disclosure:   I agree with above History, Review of Systems, Physical exam and Plan. I have reviewed the content of the documentation and have edited it as needed. I have personally performed the services documented here and the documentation accurately represents those services and the decisions I have made.     Electronically signed by:   Dayan Marcano MD   Gynecologic Oncology   [facility]

## 2017-01-10 NOTE — IP AVS SNAPSHOT
MRN:[id number]                      After Visit Summary   1/10/2017    Margaux Guzmán    MRN: [id number]           Thank you!     Thank you for choosing [facility] for your care. Our goal is always to provide you with excellent care. Hearing back from our patients is one way we can continue to improve our services. Please take a few minutes to complete the written survey that you may receive in the mail after you visit with us. Thank you!        Patient Information     Date Of Birth          1955        About your hospital stay     You were admitted on:  January 10, 2017 You last received care in the:  [facility] [address] [facility]    You were discharged on:  January 15, 2017        Reason for your hospital stay       You were in the hospital for management of a small bowel obstruction.                  Who to Call     For medical emergencies, please call 911.  For non-urgent questions about your medical care, please call your primary care provider or clinic, [phone]          Attending Provider     Provider    Dayan Marcano MD       Primary Care Provider Office Phone # Fax #    Sophia Garcia PA-C [phone]       [facility] MEDICAL [facility] [address] [place] [address]         When to contact your care team       GENERAL  PATIENT INSTRUCTIONS      FOLLOW-UP:    Call Doctor if you have:  Temperature greater than 100.4  Persistent nausea and vomiting  Severe uncontrolled pain  Redness, tenderness, or signs of infection (pain, swelling, redness, odor or green/yellow discharge around the site)  Difficulty breathing, headache or visual disturbances  Hives  Persistent dizziness or light-headedness  Extreme fatigue  Any other questions or concerns you may have after discharge    In an emergency, call 911 or go to an Emergency Department at a nearby hospital         DIET:  There are no dietary restrictions.  You may eat any foods that you can tolerate.  It is a good idea to eat  a high fiber diet and take in plenty of fluids to prevent constipation.  If you do become constipated you may want to take a mild laxative or take ducolax tablets on a daily basis until your bowel habits are regular.  Constipation can be very uncomfortable, along with straining.      MEDICATIONS:  Try to take narcotic medications and anti-inflammatory medications, such as tylenol, ibuprofen, naprosyn, etc., with food.  This will minimize stomach upset from the medication.  Should you develop nausea and vomiting from the pain medication, or develop a rash, please discontinue the medication and contact your physician.  You should not drive, make important decisions, or operate machinery when taking narcotic pain medication.    OTHER:  For continued constipation. The patient should continue to take stool softeners (for example, Senokot-S) and consume adequate amounts of water.  If the patient remains constipated or unable to pass stool, please try one or all of the following measures:  1.  Milk of Magnesia 30cc twice a day as needed by mouth  2.  Metamucil 2 tablespoons in 12 ounces of fluid  3.  Dulcolax oral or suppositories  4.  Prunes or prune juice  5.  Miralax daily    QUESTIONS:  Please feel free to call your physician or the hospital  if you have any questions, and they will be glad to assist you.                  After Care Instructions     Diet       Follow this diet upon discharge: Regular Diet Adult                  Follow-up Appointments     Adult [facility] Follow-up and recommended labs and tests       Follow-up with Dr. Mccollum on 1/19 at 3:00 pm.    Follow up with primary care provider, Sophia Garcia, within 7 days for hypertension follow up.     Appointments on [place] and/or [facility] (with [facility] or [facility] provider or service). Call [phone] if you haven't heard regarding these appointments within 7 days of discharge.                  Your next 10 appointments already scheduled      Jan 19, 2017  2:30 PM   [facility] Draw with  [facility]RAW   [facility] ([facility])    [address]  2nd Floor  [place] [facility] [address]   [phone]            Jan 19, 2017  3:00 PM   (Arrive by 2:45 PM)   Return Visit with Jia Mccollum MD   [facility] ([facility])    [address]  2nd Floor  [place] [facility] [address]   [phone]            Jan 20, 2017 11:15 AM   (Arrive by 11:00 AM)   Return Visit with Carmela Alvarez MD   [facility] ([facility])    [address]  4th Floor  [place] [facility] [address]   [phone]            Jan 26, 2017  9:30 AM   [facility] Lab Draw with  [facility] DRAW   [facility] Draw ([facility])    [address]  2nd [facility] [address]   [phone]            Feb 02, 2017  9:15 AM   [facility] Lab Draw with  [facility] LAB DRAW   [facility] Draw ([facility])    [address]  [address]nd [facility] [address]   [phone]            Feb 02, 2017  9:40 AM   (Arrive by 9:25 AM)   Return Visit with Jia Mccollum MD   [facility] ([facility])    [address]  2nd Floor  [place] [address]   [phone]            Apr 12, 2017 10:00 AM   Lab with [facility]   [facility] Lab ([facility])    [address] [place] [address]  1st Floor  [facility] [address]   [phone]            Apr 12, 2017 10:20 AM   (Arrive by 9:50 AM)   Return Visit with Mackenzie Ca MD   [facility] Nephrology ([facility])    [address] [place] [address]  3rd Floor  [facility] [address]   [phone]              Pending Results     No orders found from 1/9/2017 to 1/11/2017.            Admission  Information        Provider Department Dept Phone    1/10/2017 Dayan Marcano MD [facility] [phone]      Your Vitals Were     Blood Pressure Pulse Temperature Respirations Weight Pulse Oximetry    136/72 mmHg 90 98.9  F (37.2  C) (Oral) 16 57.289 kg (126 lb 4.8 oz) 100%      MyChart Information     [facility] gives you secure access to your electronic health record. If you see a primary care provider, you can also send messages to your care team and make appointments. If you have questions, please call your primary care clinic.  If you do not have a primary care provider, please call [phone] and they will assist you.        Care EveryWhere ID     This is your Care EveryWhere ID. This could be used by other organizations to access your [facility] medical records  [id number]           Review of your medicines      CONTINUE these medicines which may have CHANGED, or have new prescriptions. If we are uncertain of the size of tablets/capsules you have at home, strength may be listed as something that might have changed.        Dose / Directions    traMADol 50 MG tablet   Commonly known as:  ULTRAM   This may have changed:  how much to take   Used for:  Flank pain        Dose:  25-50 mg   Take 0.5-1 tablets (25-50 mg) by mouth every 6 hours as needed for moderate pain   Quantity:  60 tablet   Refills:  1         CONTINUE these medicines which have NOT CHANGED        Dose / Directions    ACETAMINOPHEN PO        Dose:  500 mg   Take 500 mg by mouth every 6 hours as needed for pain   Refills:  0       cycloSPORINE 0.05 % ophthalmic emulsion   Commonly known as:  RESTASIS        Dose:  1 drop   Place 1 drop into both eyes 2 times daily   Refills:  0       dexamethasone 4 MG tablet   Commonly known as:  DECADRON   Used for:  Cancer associated pain        Dose:  4 mg   Take 1 tablet (4 mg) by mouth daily (with breakfast)   Quantity:  14 tablet   Refills:  1       enoxaparin 40 MG/0.4ML injection   Commonly known as:  LOVENOX    Indication:  0.4 ML   Used for:  Ovarian cancer, left (H), Ovarian cancer, right (H)        Dose:  40 mg   Inject 0.4 mLs (40 mg) Subcutaneous daily   Quantity:  30 Syringe   Refills:  3       EPINEPHrine 0.3 MG/0.3ML injection   Used for:  Preventative health care, Primary peritoneal adenocarcinoma (H)        Dose:  0.3 mg   Inject 0.3 mLs (0.3 mg) into the muscle once as needed for anaphylaxis   Quantity:  1 each   Refills:  0       ferrous sulfate 325 (65 FE) MG tablet   Commonly known as:  IRON        Dose:  325 mg   Take 325 mg by mouth 2 times daily   Refills:  0       * LORazepam 1 MG tablet   Commonly known as:  ATIVAN   Used for:  Primary peritoneal adenocarcinoma (H)        Dose:  1 mg   Take 1 tablet (1 mg) by mouth every 6 hours as needed (nausea/vomiting, anxiety or sleep)   Quantity:  30 tablet   Refills:  3       * LORazepam 0.5 MG tablet   Commonly known as:  ATIVAN   Used for:  Anemia due to other cause, Chemotherapy-induced neutropenia (H), Ovarian cancer, left (H), Ovarian cancer, right (H), Primary peritoneal adenocarcinoma (H)        Dose:  0.5 mg   Take 1 tablet (0.5 mg) by mouth every 4 hours as needed (Anxiety, Nausea/Vomiting or Sleep)   Quantity:  30 tablet   Refills:  3       MELATONIN PO        Dose:  1 mg   Take 1 mg by mouth At Bedtime   Refills:  0       ondansetron 8 MG tablet   Commonly known as:  ZOFRAN   Used for:  Nausea, Primary peritoneal adenocarcinoma (H)        Dose:  8 mg   Take 1 tablet (8 mg) by mouth every 8 hours as needed for nausea   Quantity:  30 tablet   Refills:  2       ONE DAILY MULTIVITAMIN WOMEN Tabs        Dose:  1 tablet   Take 1 tablet by mouth every morning   Refills:  0       polyethylene glycol powder   Commonly known as:  MIRALAX/GLYCOLAX        Dose:  1 capful   Take 1 capful by mouth daily as needed   Refills:  0       * prochlorperazine 10 MG tablet   Commonly known as:  COMPAZINE   Used for:  Primary peritoneal adenocarcinoma (H)        Dose:  10 mg    Take 1 tablet (10 mg) by mouth every 6 hours as needed (nausea/vomiting)   Quantity:  30 tablet   Refills:  2       * prochlorperazine 10 MG tablet   Commonly known as:  COMPAZINE   Used for:  Anemia due to other cause, Chemotherapy-induced neutropenia (H), Ovarian cancer, left (H), Ovarian cancer, right (H), Primary peritoneal adenocarcinoma (H)        Dose:  10 mg   Take 1 tablet (10 mg) by mouth every 6 hours as needed (Nausea/Vomiting)   Quantity:  30 tablet   Refills:  3       senna-docusate 8.6-50 MG per tablet   Commonly known as:  SENOKOT-S;PERICOLACE   Used for:  Primary peritoneal adenocarcinoma (H)        Dose:  1-2 tablet   Take 1-2 tablets by mouth 2 times daily   Quantity:  7 tablet   Refills:  0       study - niraparib 100 mg capsule   Commonly known as:  IDS# 4759   Used for:  Anemia due to other cause, Chemotherapy-induced neutropenia (H), Ovarian cancer, left (H), Ovarian cancer, right (H), Primary peritoneal adenocarcinoma (H)        Dose:  300 mg   Take 3 capsules (300 mg) by mouth every morning Take at the same time each day, preferably morning. Swallow whole and do NOT chew capsules. Food and water is permissible. First dose will be administered on site.   Quantity:  84 capsule   Refills:  0       tamsulosin 0.4 MG capsule   Commonly known as:  FLOMAX   Used for:  Primary peritoneal adenocarcinoma (H)        Dose:  0.4 mg   Take 1 capsule (0.4 mg) by mouth daily   Quantity:  60 capsule   Refills:  6       tolterodine 4 MG 24 hr capsule   Commonly known as:  DETROL LA   Used for:  Hydronephrosis, unspecified hydronephrosis type        Dose:  4 mg   Take 1 capsule (4 mg) by mouth daily   Quantity:  30 capsule   Refills:  3       VITAMIN B6 PO        Dose:  1 tablet   Take 1 tablet by mouth At Bedtime   Refills:  0       ZANTAC PO        Dose:  75 mg   Take 75 mg by mouth daily   Refills:  0       * Notice:  This list has 4 medication(s) that are the same as other medications prescribed for you.  Read the directions carefully, and ask your doctor or other care provider to review them with you.             Protect others around you: Learn how to safely use, store and throw away your medicines at www.disposemymeds.org.             Medication List: This is a list of all your medications and when to take them. Check marks below indicate your daily home schedule. Keep this list as a reference.      Medications           Morning Afternoon Evening Bedtime As Needed    ACETAMINOPHEN PO   Take 500 mg by mouth every 6 hours as needed for pain   Last time this was given:  1,000 mg on 1/15/2017  7:51 AM                                   cycloSPORINE 0.05 % ophthalmic emulsion   Commonly known as:  RESTASIS   Place 1 drop into both eyes 2 times daily                                      dexamethasone 4 MG tablet   Commonly known as:  DECADRON   Take 1 tablet (4 mg) by mouth daily (with breakfast)                                   enoxaparin 40 MG/0.4ML injection   Commonly known as:  LOVENOX   Inject 0.4 mLs (40 mg) Subcutaneous daily   Last time this was given:  40 mg on 1/14/2017 10:10 PM                                   EPINEPHrine 0.3 MG/0.3ML injection   Inject 0.3 mLs (0.3 mg) into the muscle once as needed for anaphylaxis                                   ferrous sulfate 325 (65 FE) MG tablet   Commonly known as:  IRON   Take 325 mg by mouth 2 times daily                                      * LORazepam 1 MG tablet   Commonly known as:  ATIVAN   Take 1 tablet (1 mg) by mouth every 6 hours as needed (nausea/vomiting, anxiety or sleep)   Last time this was given:  0.5 mg on 1/15/2017 12:12 AM                                   * LORazepam 0.5 MG tablet   Commonly known as:  ATIVAN   Take 1 tablet (0.5 mg) by mouth every 4 hours as needed (Anxiety, Nausea/Vomiting or Sleep)   Last time this was given:  0.5 mg on 1/15/2017 12:12 AM                                   MELATONIN PO   Take 1 mg by mouth At Bedtime                                    ondansetron 8 MG tablet   Commonly known as:  ZOFRAN   Take 1 tablet (8 mg) by mouth every 8 hours as needed for nausea                                   ONE DAILY MULTIVITAMIN WOMEN Tabs   Take 1 tablet by mouth every morning                                   polyethylene glycol powder   Commonly known as:  MIRALAX/GLYCOLAX   Take 1 capful by mouth daily as needed                                   * prochlorperazine 10 MG tablet   Commonly known as:  COMPAZINE   Take 1 tablet (10 mg) by mouth every 6 hours as needed (nausea/vomiting)                                   * prochlorperazine 10 MG tablet   Commonly known as:  COMPAZINE   Take 1 tablet (10 mg) by mouth every 6 hours as needed (Nausea/Vomiting)                                   senna-docusate 8.6-50 MG per tablet   Commonly known as:  SENOKOT-S;PERICOLACE   Take 1-2 tablets by mouth 2 times daily   Last time this was given:  3 tablets on 1/14/2017  8:54 PM                                      study - niraparib 100 mg capsule   Commonly known as:  IDS# 4759   Take 3 capsules (300 mg) by mouth every morning Take at the same time each day, preferably morning. Swallow whole and do NOT chew capsules. Food and water is permissible. First dose will be administered on site.                                   tamsulosin 0.4 MG capsule   Commonly known as:  FLOMAX   Take 1 capsule (0.4 mg) by mouth daily   Last time this was given:  0.4 mg on 1/15/2017  7:51 AM                                   tolterodine 4 MG 24 hr capsule   Commonly known as:  DETROL LA   Take 1 capsule (4 mg) by mouth daily   Last time this was given:  4 mg on 1/15/2017  7:52 AM                                   traMADol 50 MG tablet   Commonly known as:  ULTRAM   Take 0.5-1 tablets (25-50 mg) by mouth every 6 hours as needed for moderate pain                                   VITAMIN B6 PO   Take 1 tablet by mouth At Bedtime                                   ZANTAC PO    Take 75 mg by mouth daily                                   * Notice:  This list has 4 medication(s) that are the same as other medications prescribed for you. Read the directions carefully, and ask your doctor or other care provider to review them with you.

## 2017-01-10 NOTE — IP AVS SNAPSHOT
[address] [address]    Phone:  [phone]                                       After Visit Summary   1/10/2017    Margaux Guzmán    MRN: [id number]           After Visit Summary Signature Page     I have received my discharge instructions, and my questions have been answered. I have discussed any challenges I see with this plan with the nurse or doctor.    ..........................................................................................................................................  Patient/Patient Representative Signature      ..........................................................................................................................................  Patient Representative Print Name and Relationship to Patient    ..................................................               ................................................  Date                                            Time    ..........................................................................................................................................  Reviewed by Signature/Title    ...................................................              ..............................................  Date                                                            Time

## 2017-01-10 NOTE — PLAN OF CARE
Problem: Goal Outcome Summary  Goal: Goal Outcome Summary  Outcome: Therapy, progress toward functional goals is gradual  Pt. Admitted from ED @ 0730. Distended abd., Hypo BS, - flatus, last BM 2 days ago with abd pain diff. From flank pain due to stents. Pt. Had 600cc. Emesis upon arriving on floor. NG placed without spontaneous return of gastric juices. Xray showed NG in need of repositioning. Repeat Xray done @ 1330,awaiting results. Still no return. Pt states that other then discomfort from the tube, she feels much better. Cepastat roman. And Hurricaine spray for discomfort with some relief. IV fluids per PC. 1 L bolus given for low output. Weak, up with assist. On fall precautions.

## 2017-01-10 NOTE — TELEPHONE ENCOUNTER
Called patient at home phone number after she called RN line.  Patient has primary peritoneal cancer and started Tesaro trial 4 days ago.  She called today with concerning regarding worsening left flank pain radiating into lower abdominal pain and back. She notes that the pain is similar to flank pain that she has had due to stents in the past.  Stents are currently in place bilaterally. She has been taking roxicodone which somewhat helps, but the pain is unbearable. Pain has been slowly worsening over 4 days, currently in a 7/10.  ROS is positive for nausea and had one episode of NBNB emesis today.  Denies fevers and chills, has been monitoring temperature.  Reports frequent urinary, episode of incontinence 4 days ago.   Denies chest pain, SOB, diarrhea, constipation, hematuria. She sounds uncomfortable over the phone.  Her HR is in the 90s on home blood pressure cuff. BPs are at higher than baseline 150s to 170s, whereas she normally runs in the 130s.      The patient googled these symptoms and wants to know if they are normal with the trial medication.  I recommended that she come to the ED to be evaluated given worsening pain and my inability to fully evaluate her over the phone.  The patient expressed concerns regarding transportation, as she lives 1.5 hours away and does not have a ride.  Discussed calling an ambulance given concerning symptoms, will likely be evaluated in local hospital.  Instructed patient that they can contact [facility] for possible transfer if needed.  Patient was also instructed to call Dr. Mccollum's clinic to update staff regarding outcome of visit.  I informed patient that I will send this documentation to Dr. Mccollum as well.     Nicole Blanca MD   OB/GYN PGY-2

## 2017-01-11 NOTE — PROGRESS NOTES
"Gyn/Onc Interim Progress Note:     S: Patient seen at bedside for PM rounds.  She notes feeling significantly improved since NGT was placed.  Denies abdominal pain and nausea.  No passage of flatus, with last bowel movement yesterday.      O:   Vital signs:  Temp: 99.2  F (37.3  C) Temp src: Oral BP: 170/85 mmHg   Heart Rate: 103 Resp: 16 SpO2: 96 % O2 Device: None (Room air)        Estimated body mass index is 21.04 kg/(m^2) as calculated from the following:    Height as of 1/5/17: 1.676 m (5' 5.98\").    Weight as of 1/5/17: 59.1 kg (130 lb 4.7 oz).      Gen: A&O, NAD  Abd: soft, non-tender, mildly distended, +BS noted  Ext: warm, well-perfused, non-tender     NGT minimal output, about 10 cc bilious material in canister    A/P: Margaux Guzmán is a 61 year old with recurrent primary peritoneal cancer, HD#1 admitted SBO.   - Continue management with NGT to LIS, IVFs.  Already noting symptomatic improvement. Has had multiple XRays to confirm placement and was most recently pulled back.  Consider repeat XR to confirm location if output does not increase.     To be discussed with Dr. Patricia, Gyn/Onc fellow    Nicole Blanca MD   OB/GYN PGY-2  "

## 2017-01-11 NOTE — PLAN OF CARE
Problem: Goal Outcome Summary  Goal: Goal Outcome Summary  Outcome: No Change  Slightly hypertensive in 160s/80s, below notify parameters. OVSS. Pain managed with prn IV Dilaudid and Tylenol (now discontinued). NG to LIS with brown output. Benzocaine spray given with relief. Denied nausea. Bowel sounds present. No gas, no stool. Pt complaining of gum discomfort - oragel ordered. Voiding spontaneously. Up with SBA. IV fluids through the chest port. Continue with plan of care.     Addendum: Triggered SIRS protocol. VS done q30min. Awaiting lactic acid. Pt reported feeling heart palpitations (not new, but increasing in frequency today). MD notified - EKG ordered.

## 2017-01-11 NOTE — PROGRESS NOTES
Brief Progress Note    S: Patient feeling better regarding pain however starting to feel generalized malaise, which she attributes to not eating.     O:  /74 mmHg  Temp(Src) 99.3  F (37.4  C) (Oral)  Resp 17  SpO2 96%    Lactate 0.6  TSH 0.48      A/P:  Patient triggered SIRS criteria due to pulse and WBCs. She is afebrile with improving pain. Stable pulse since yesterday.  Lactate ordered per protocol, normal. EKG normal and TSH wnl. AM CBC and BMP pending. Will continue to monitor vitals.     Lou Coleman  01/11/2017  4:53 PM

## 2017-01-11 NOTE — PROGRESS NOTES
Was notified by Dr. Mcclain that the patient was admitted to the [facility] this morning for small bowel obstruction.  Patient is currently enrolled in the Tesaro/Quadra Niraparib study.  Dr. Mccollum was notified and will be in contact with Dr. Mcclain with dosing instructions.  Per Dr. Mccollum study drug will be held at this time.  EMELI report filled out and will be faxed to [facility].  Patient status will continue to be monitored.      Aurelia Worley RN     Pager [phone]

## 2017-01-11 NOTE — PROVIDER NOTIFICATION
Notified Resident at 2250 PM regarding change in condition and changes in vital signs.      Spoke with: Nicole Blanca    Orders were obtained.    Comments: Labetalol 10mg ordered for elevated BP

## 2017-01-11 NOTE — PLAN OF CARE
Problem: Goal Outcome Summary  Goal: Goal Outcome Summary  Outcome: No Change  BP elevated and tachy, MDs aware, labetalol ordered. NG to LIS, Lozenge and hurricane spray for comfort. Dilaudid x1 for pain. Up with SBA. Adequate urine output. NPO. Denies gas and BM. Port infusing NS @100cc/hr. Emesis x1. Continue to monitor.

## 2017-01-11 NOTE — PLAN OF CARE
Problem: Goal Outcome Summary  Goal: Goal Outcome Summary  Outcome: No Change  Abd xray done 2x to verify NG placement, NG ultimately pulled back to 69cm, 1L out from [id number] with pt reporting she's feeling much better. Not passing gas, no BM. IV tylenol given for sinus HA, Dilaudid for flank pain. Voided 1x, decent amount. Hypertensive, BP decreased after 1x dose of hyralazine. Up with SBA.  PLAN: Monitor ROBF, another AXR this morning to verify NG placement.

## 2017-01-11 NOTE — PROGRESS NOTES
Gynecology Oncology Progress Note  01/12/2017      HD#3 admitted with SBO    Disease: Recurrent Stage IIIB peritoneal cancer      24 hour events:   - Continued NGT output (2800 cc/24 hr)  - Triggered SIRS protocol due to mild tachycardia and leukocytosis, lactic acid normal and EKG with sinus tachycardia  - Hypertension improved    Subjective: Patient is feeling well, hungry this morning.  Denies significant abdominal pain, but has baseline flank pain  Notes nausea yesterday after walking, but denies any this morning.  Has not passed flatus, but feels close.  Voiding spontaneously. Ambulated hallways multiple times yesterday.  Denies chest pain, SOB, calf tenderness.    Objective:   Filed Vitals:    01/11/17 1519 01/11/17 2000 01/12/17 0034 01/12/17 0354   BP: 159/74 157/70 152/79 147/76   Pulse:   104 101   Temp: 99.3  F (37.4  C) 99.6  F (37.6  C) 99  F (37.2  C) 99.3  F (37.4  C)   TempSrc: Oral Oral Oral Oral   Resp: 17 18 18 18   SpO2: 96% 98% 97% 96%       General: NAD, appears comfortable in bed  CV: RRR, grade 2 systolic murmur  Resp: CTAB, no wheezes, port-a-cath c/d/i  Abdomen: soft, non-tender, minimally distended, well healed incision noted, high pitched tinkling bowel sounds noted  Extremities: warm, well-perfused, nontender, no edema    24 hour: IN - 2194 ml IVF // OUT - 1550 ml UOP, 2800 ml NGT  Since MN: IN - 30 ml IVF // OUT - 275 ml  ml NGT    Lines/Drains: NGT, right port-a-cath    New labs/imaging-  Results for LILIANA MCKEON (MRN [id number]) as of 1/12/2017 05:44   Ref. Range 1/12/2017 04:41   Sodium Latest Ref Range: 133-144 mmol/L 140   Potassium Latest Ref Range: 3.4-5.3 mmol/L 3.4   Chloride Latest Ref Range:  mmol/L 102   Carbon Dioxide Latest Ref Range: 20-32 mmol/L 26   Urea Nitrogen Latest Ref Range: 7-30 mg/dL 17   Creatinine Latest Ref Range: 0.52-1.04 mg/dL 0.84   GFR Estimate Latest Ref Range: >60 mL/min/1.7m2 68   GFR Estimate If Black Latest Ref Range: >60  mL/min/1.7m2 83   Calcium Latest Ref Range: 8.5-10.1 mg/dL 8.3 (L)   Anion Gap Latest Ref Range: 3-14 mmol/L 12   Magnesium Latest Ref Range: 1.6-2.3 mg/dL 1.8   Glucose Latest Ref Range: 70-99 mg/dL 76   WBC Latest Ref Range: 4.0-11.0 10e9/L 12.7 (H)   Hemoglobin Latest Ref Range: 11.7-15.7 g/dL 11.1 (L)   Hematocrit Latest Ref Range: 35.0-47.0 % 34.6 (L)   Platelet Count Latest Ref Range: 150-450 10e9/L 216   RBC Count Latest Ref Range: 3.8-5.2 10e12/L 3.27 (L)   MCV Latest Ref Range:  fl 106 (H)   MCH Latest Ref Range: 26.5-33.0 pg 33.9 (H)   MCHC Latest Ref Range: 31.5-36.5 g/dL 32.1   RDW Latest Ref Range: 10.0-15.0 % 14.0       Assessment: Margaux Guzmán is a 61 year old with recurrent primary peritoneal cancer, on Tesaro trial who is HD#3 admitted as BLANCO from OSH for small bowel obstruction.     Active Problem list:  1. Recurrent peritoneal cancer    2. Small bowel obstruction  3. Bilateral hydronephrosis s/p ureteral stents    Plan:    Disease: Recurrent primary peritoneal cancer, currently on Tesaro trial (holding due to acute issues).    FEN: NPO, NGT in place. NS @ 75 cc/hr.    Pain: PRN IV dilaudid.  Heme: Chronic right internal jugular vein DVT, stable on recent imaging in 1/2017.  No acute issues.    CV: Hypertension, will follow-up as outpatient for likely chronic issue.   Resp: No issues.    GI: SBO with NGT in place.  Continued output, will do clamp trials when output decreases. IV PPI, home pepcid. PRN zofran, compazine.    : Bilateral ureteral stents in place for hydronephrosis. On decadron per palliative, currently being tapered (4 >2 mg). Home flomax, detrol held, plan to restart when tolerating PO intake.    MSK: No issues.    Neuro/Psych: Anxiety- home ativan ordered.    Endocrine: No issues.    ID: Leukocytosis, likely reactive/due to SBO and improving.  Lactic acid wnl. Afebrile.    PPx: SCDs, PPI, lovenox.  Disposition: Pending improvement in SBO.     Nicole Blanca MD  OBGYN  PGY-2  Pager [phone]    Provider Disclosure:   I agree with above History, Review of Systems, Physical exam and Plan. I have reviewed the content of the documentation and have edited it as needed. I have personally performed the services documented here and the documentation accurately represents those services and the decisions I have made.     Electronically signed by:   Dayan Marcano MD   Gynecologic Oncology   [facility]

## 2017-01-11 NOTE — PLAN OF CARE
Problem: Goal Outcome Summary  Goal: Goal Outcome Summary  Outcome: No Change  BP elevated and tachy, MDs aware. Denies pain and nausea. NG to LIS, Lozenge and hurricane spray for comfort. Up with SBA. Adequate urine output. NPO. Denies gas and BM. Port infusing NS @100cc/hr. Continue to monitor.

## 2017-01-12 NOTE — PROGRESS NOTES
"Gyn/Onc Interim Progress Note:     Patient seen at bedside for PM rounds.  She notes feeling well without significant abdominal pain or nausea.  No flatus passage, but walked several times in the hallways to help.  Voiding spontaneously.     Vital signs:  Temp: 99.6  F (37.6  C) Temp src: Oral BP: 157/70 mmHg   Heart Rate: 99 Resp: 18 SpO2: 98 % O2 Device: None (Room air)        Estimated body mass index is 21.04 kg/(m^2) as calculated from the following:    Height as of 1/5/17: 1.676 m (5' 5.98\").    Weight as of 1/5/17: 59.1 kg (130 lb 4.7 oz).    Gen: A&O, NAD  Abd: soft, mildly distended, non-tender  Ext: warm, no edema     cc/8 hr // 2800 cc/24 hr    A/P: Margaux Guzmán is a 61 year old with recurrent primary peritoneal cancer, HD#1 admitted SBO.  Continue management with NGT to LIS, IVFs.     Nicole Blanca MD   OB/GYN PGY-2       "

## 2017-01-12 NOTE — PROGRESS NOTES
Gynecology Oncology Progress Note  01/13/2017    HD#4 admitted with small bowel obstruction    Disease: Recurrent Stage IIIB peritoneal cancer      24 hour events:   - S/p palliative care consult who recommended 2 mg decadron, PRN tramadol solution, and lower dose of PRN IV dilaudid for pain control  - Code status changed to DNR/DNI  - Triggered sepsis protocol due to leukocytosis and tachycardia to 102, lactate normal  - NGT with continued output    Subjective: Patient is feeling okay, but notes that her spirits are lower than yesterday.  Denies abdominal pain.  Chronic flank pain improved last night after tramadol.   Passed flatus once yesterday.  Voiding spontaneously. Ambulating in hallways.  Denies SOB, chest pain.    Objective:   Filed Vitals:    01/12/17 2250 01/12/17 2256 01/12/17 2319 01/13/17 0348   BP: 152/78 141/71 152/74 160/79   Pulse:    97   Temp: 99.2  F (37.3  C)  99.2  F (37.3  C) 98.8  F (37.1  C)   TempSrc: Oral  Oral Oral   Resp: 16  16 16   Weight:       SpO2: 98% 99% 97% 97%       General: NAD, appears comfortable in bed  CV: RRR, grade 2 systolic murmur  Resp: CTAB, no wheezes, port-a-cath c/d/i  Abdomen: soft, non-tender, minimally distended, well healed incision noted, high pitched tinkling bowel sounds noted  Extremities: warm, well-perfused, nontender, no edema    24 hour: IN - 1830 ml IVF // OUT - 1125 ml UOP, 0 ml stool, 1000 ml NGT   Since MN: not recorded    Lines/Drains: NGT, right port-a-cath    New labs/imaging-  AM BMP    Assessment: Margaux Guzmán is a 61 year old with recurrent primary peritoneal cancer, on Tesaro trial who is HD#4 admitted as BLANCO from OSH for small bowel obstruction.     Active Problem list:  1. Recurrent peritoneal cancer    2. Small bowel obstruction  3. Bilateral hydronephrosis s/p ureteral stents    Plan:    Disease: Recurrent primary peritoneal cancer, currently on Tesaro trial (holding due to acute issues).    FEN: NPO, NGT in place. NS @ 75 cc/hr.     Pain: Daily decadron and PRN IV dilaudid, tramadol per palliative recs.  Heme: Chronic right internal jugular vein DVT, stable on recent imaging in 1/2017.  No acute issues.    CV: Hypertension, improved.  Will follow-up as outpatient for likely chronic issue.    Resp: No issues.    GI: SBO with NGT in place.  Continued output, will do clamp trials today given overall decreased output, resolution of symptoms.  Plan for clamp trial x24 with with every 6 hour residuals. IV PPI, home pepcid. PRN zofran, compazine.    : Bilateral ureteral stents in place for hydronephrosis. On decadron per palliative, currently being tapered (4 >2 mg). Home flomax, detrol held, plan to restart when tolerating PO intake.    MSK: No issues.    Neuro/Psych: Anxiety- home ativan ordered, will change to PO when tolerating.    Endocrine: No issues.    ID: Leukocytosis, likely reactive/due to SBO and improving. Afebrile, no acute issues.    PPx: SCDs, PPI, lovenox.  Consults: Palliative care  Disposition: Pending improvement in SBO.     Nicole Blanca MD  OBGYN PGY-2  Pager [phone]    Provider Disclosure:   I agree with above History, Review of Systems, Physical exam and Plan. I have reviewed the content of the documentation and have edited it as needed. I have personally performed the services documented here and the documentation accurately represents those services and the decisions I have made.     Electronically signed by:   Hector Sapp MD   Gynecologic Oncology   [facility]

## 2017-01-12 NOTE — PLAN OF CARE
"Problem: Goal Outcome Summary  Goal: Goal Outcome Summary  Outcome: Therapy, progress toward functional goals is gradual  AVSS. NG output 250cc overnight, denies nausea, abdomen non-distended/flat, + bowel sounds, no gas or BM yet but pt feels \"it is close\". NPO. Throat spray and lozenges for throat discomfort. Has chronic L flank and back pain but declined dilaudid, states it gives her heart palpitations. Voided 1x, adequate amount. Up with SBA.  PLAN: await ROBF        "

## 2017-01-12 NOTE — CONSULTS
[facility]  Palliative Care Consultation         Margaux Guzmán MRN# [id number]   Age: 61 year old YOB: 1955   Date of Admission: 1/10/2017    Reason for consult: Pain management       Requesting physician/service: Gyn Onc       Recommendations        Pain Control:  - Recommend trial of tramadol solution  mg q6 hours prn (she has tried tramadol 50mg dose in past without side effects but with inadequate pain control so want to try option for higher range)  - Recommend decreasing dose of prn hydromorphone to 0.3 mg IV q3 hours prn  - Restart dexamethasone 2 mg daily starting tomorrow AM (can be given IV if still not taking PO well); may need to go back up to 4mg  - pt interested in nonpharm strategies for symptom coping; will ask SW team to see her for this. Will also ask for massage therapy while here (though wont be available until next tuesday)    Bowel obstruction:  Ongoing NG tube decompression  Once resolving, would need to re-start aggressive bowle regimen. Per CT imaging she does have large stool burden despite taking senna 1 BID and miralax daily. Likely will need to increase to BID miralax and senna 2-3 tabs BID once able to tolerate.     Goals of Care: see goals section below for discussion  Changed Code Status from FULL to DNR/DNI per patient request.  Will ask Palliative Care SW to help patient update Healthcare Directive    POLST Not discussed but should complete before discharge    Thank you for allowing us to participate in the care of Ms. Guzmán. Please do not hesitate to contact with questions.     Will continue to follow.            Disease Process/es & Symptoms     Peritoneal Carcinomatosis  Small Bowel Obstruction with nausea and vomiting  Hydronephrosis with bilateral ureteral stents causing pain     Support/Coping   (We typically ask each of our patients the following questions:)    Are you Lutheran? Spiritual? Not so much? Considers  "herself spiritual but not Synagogue, sees Nature as spiritual place  What are your concerns, medical and non-medical, that are not being addressed? Pain   Who are your \"go-to\" people when you need support? Friends, Nature    Plan for psychosocial/spiritual support/follow-up from palliative team: Spiritual Care offered but declined by patient.      Decision-Making & Goals of Care     Discussion/counseling today about prognosis/goals of care/decisions:   Discussed disease understanding - she knows she has a terminal illness and today she expressed concern about what this bowel obstruction means. She worries now that she is \"in the last 3-6 months\" of life because she recalls being told that once you start having bowel obstructions that they keep happening and it means time is short. She says she is not sure why she got the obstruction but worries it is the cancer and would like to talk more with the gyn/onc team about that. She is preparing mentally for short prognosis. We did not talk today in detail about what might happen or treatment options if the bowel obstruction recurs.     Discussed goals of care at the end of life. Marshall would very much like to spend as much time in nature surrounded by trees, birds, etc and would not want to spend her life incapacitated in a hospital at the end of life.  She has concerns about where she will get her care adn who will help her once she is unable to care for herself because she is very private and keeps to herself and does not have much of a support network.     We talked abotu code status adn pt was very clear she wanted to be DNR/DNI and in fact she thought that's what she already was and wanted us to change the code status order (right away), which is what we did.     Patient has a completed health care directive available in the chart (Y/N): yes but would like to update it  Health care agent (only if patient has an available, complete HCD): daughter Mayela  Code Status: Do " not resusitate / Do not intubate     Patient has decision-making capacity (Y/N): Yes    Coordination of Care     Findings & plan of care discussed with: primary team Gyn Onc  Follow-up plan from palliative team: Will continue to follow  Thank you for involving us in the patient's care.       Patient seen and discussed with Dr. Elen Barrera.    Semaj Caputo MD  Internal Medicine PGY-3  [phone]    Patient seen and evaluated with Dr. Caputo.   Agree with assessment and recommendations.    Total time spent was 60 minutes,  >50% of time was spent counseling and/or coordination of care regarding symptoms, goals and support.    Elen Barrera  Palliative Care   Pager [phone]          Chief Complaint     Pain          History of Present Illness     History obtained from: Patient, Chart, and Medical Team    Margaux Guzmán is a 61 year old woman with past medical history significant for primary peritoneal cancer currently on a Clinical Trial medication, and hydronephrosis with bilateral ureteral stents who presented with abdominal pain, nausea, and vomiting found to have a small bowel obstruction. She reports that over the past month or so she had been more constipated and had been escalating her bowel regimen at home to try and combat constipation.     On admission, a nasogastric tube was placed, she was made NPO, and started on fluids in an effort to treat SBO conservatively. Per Ms. Guzmán, the NG tube was almost immediately helpful in reducing the amount of nausea and abdominal pain she was experiencing. Unfortunately, with the relief of her symptoms from her SBO, her chronic pain and discomfort from her left stent became more prominent.     She was recently seen by Palliative Care on 12/28/2016 and prescribed dexamethasone 4 mg daily for pain. She states that this was helpful for the first three days but quickly lost its effectiveness. She also began to experience other side effects including feeling  "jittery so she decreased the dose to 2 mg. Unfortunately that caused the pain to increase again and she returned to the prescribed dose of 4 mg prior to admission here though now is interested in backing off again to 2mg.     Ms. Guzmán reports she is very sensitive to medications. In the past she has tried oxycodone for pain but it did not alleviate the pain and made her drowsy. She reports the IV hydromorphone is effective at pain relief but causes her to be too drowsy; she has not tried PO dilaudid. Ibuprofen has worked in the past but due to her anticoagulated status will not take NSAIDs;  Celecoxib was offered but pt reluctant to take this as well. She has tried tramadol in past but only tried 50mg dose and reports not benefit (but also no side effects). She does not recall trying morphine in past.     Further discussion centered around her understanding that \"a bowel obstruction means 3-6 more months\" and the potential implications of worsening cancer. Discussed her belief that life is a natural cycle that through birth and death will continue to go on. Stated she found solace in nature and nature walks and at the end of her life being comfortable and able to watch the birds outside her window would be preferable to a prolonged decline in a hospital.           Past Medical History:   Past Medical History   Diagnosis Date     Hyperlipidemia      Osteoarthritis      Osteopenia      Polymyalgia rheumatica (H)      Ovarian cancer (H)      Primary cancer of peritoneum (H)      Hydronephrosis      Jugular vein thrombosis, right      Persistent right internal jugular DVT     Livedo annularis      History of blood transfusion      MULTIPLE     PONV (postoperative nausea and vomiting)      Anemia               Past Surgical History:   I have reviewed this patient's past surgical history            Social/Spiritual History:     Living situation:Lives alone  Family system: Has daughter who is HCD  Functional status " (needs help with ADLs or IADLs): Independent  Activities/interests: Nature walks, watching birds  History of substance use/abuse: None            Family History:   Family History   Problem Relation Age of Onset     Arthritis Father      DIABETES       Uncle     Hypertension Mother      Hypertension Sister      Myocardial Infarction Father      secondary to anesthesia     HEART DISEASE       unknown valve replacement     Colon Cancer Father      Other - See Comments Mother      cognitive decline     Bladder Cancer Sister      Skin Cancer Brother      Skin Cancer Mother      Family history  and reviewed           Allergies:   All allergies reviewed and addressed           Medications:   I have reviewed this patient's medication profile and medications during this hospitalization  Current Facility-Administered Medications   Medication     acetaminophen (OFIRMEV) infusion 1,000 mg     sodium chloride (PF) 0.9% PF flush 3 mL     benzocaine (ORAJEL MAXIMUM STRENGTH) 20 % gel     loratadine (CLARITIN) tablet 10 mg     famotidine (PEPCID) injection 20 mg     enoxaparin (LOVENOX) injection 40 mg     naloxone (NARCAN) injection 0.1-0.4 mg     0.9% sodium chloride infusion     ondansetron (ZOFRAN-ODT) ODT tab 4 mg    Or     ondansetron (ZOFRAN) injection 4 mg     prochlorperazine (COMPAZINE) injection 5-10 mg    Or     prochlorperazine (COMPAZINE) tablet 5-10 mg    Or     prochlorperazine (COMPAZINE) Suppository 25 mg     pantoprazole (PROTONIX) 40 mg IV push injection     LORazepam (ATIVAN) injection 0.5 mg     benzocaine-menthol (CHLORASEPTIC) 6-10 MG lozenge 1 lozenge     benzocaine (HURRICAINE/TOPEX) 20 % spray              Review of Systems:   The comprehensive review of systems is negative other than noted here and in the HPI.    Palliative Symptom Review (0=no symptom/no concern, 1=mild, 2=moderate, 3=severe):      Pain: 2      Fatigue: 0      Nausea: 1      Constipation: 2      Diarrhea: 0      Depressive Symptoms: 0       Anxiety: 0      Drowsiness: 0      Poor Appetite: 1      Shortness of Breath: 0      Insomnia: 0      Other: 0      Overall (0 good/no concerns, 3 very poor):  1       Physical Exam:  General: AAOx3, NAD  HEENT: Oral mucosa moist and non-erythematous, PERRLA, EOM intact, NG tube in place  CV: RRR, normal S1S2, no murmur, clicks, rubs  Resp: Clear to auscultation bilaterally, no wheezes, rhonchi, unable to elicit tenderness over left flank  Abd: Soft, non-tender, BS+  Extremities: Radial and pedal pulses intact and symmetric, no pedal edema  Neuro: No lateralizing symptoms or focal neurologic deficits  Neuropsych: Alert and oriented, pleasant, mood and affect appropriate for conversation           Data Reviewed:     CMP  Recent Labs  Lab 01/12/17  0441 01/11/17  0617    136   POTASSIUM 3.4 3.5   CHLORIDE 102 100   CO2 26 28   ANIONGAP 12 9   GLC 76 95   BUN 17 18   CR 0.84 0.86   GFRESTIMATED 68 67   GFRESTBLACK 83 81   PURVI 8.3* 8.8   MAG 1.8 2.0   PHOS  --  3.5     CBC  Recent Labs  Lab 01/12/17  0441 01/11/17  0617   WBC 12.7* 13.4*   RBC 3.27* 3.45*   HGB 11.1* 11.6*   HCT 34.6* 36.5   * 106*   MCH 33.9* 33.6*   MCHC 32.1 31.8   RDW 14.0 14.2    272

## 2017-01-12 NOTE — PLAN OF CARE
Problem: Goal Outcome Summary  Goal: Goal Outcome Summary  Outcome: Therapy, progress toward functional goals as expected  AVSS. Flank pain somewhat managed with IV Tylenol and hot packs. NG to LIS. Intermittent nausea managed with Compazine. Voiding spontaneously. Bowel sounds active. Pt reports that she passed gas once this afternoon. Ambulating frequently in hallway. Showered. Continue with plan of care - awaiting return of bowel function

## 2017-01-12 NOTE — PLAN OF CARE
Problem: Goal Outcome Summary  Goal: Goal Outcome Summary  Outcome: No Change  VSS ex continues low grade temps. Pt ambulates independently in hallways. Pt has c/o intermittent abdominal pain; dilaudid effective; reports pain as controlled. Pt also had hurricane spray x2 for throat discomfort and a lozenge. Denies gas but feels like its getting close. Voiding adequate amounts. NG to LIS with large output. Port has NS at 75mL/hr. No PIV.  PLAN: continue pain and symptom management.

## 2017-01-13 NOTE — PROGRESS NOTES
CLINICAL NUTRITION SERVICES  Reason for Assessment: Pt family request  Comments: Patient with recurrent ovarian cancer with small bowel obstruction. Wondering about diet at home after resolution. Please  on low residue diet  Diet History:  Patient has no history of low fiber/residue diet education.   Nutrition Diagnosis:  Food- and nutrition-related knowledge deficit r/t no previous knowledge of low fiber diet as evidenced by patient report  Interventions:  Provided instruction on low fiber diet. Discussed each food group and foods to eat and avoid. Answered patient's questions regarding diet.   Provided handouts : Low Fiber Nutrition Therapy  Goals:   Patient will verbalize at least 5 low fiber foods acceptable on diet and 5 high fiber foods to avoid.  Follow-up:    Patient to ask any further nutrition-related questions before discharge.  In addition, pt may request outpatient RD appointment.  Priscilla Taylor RD, LD  Unit pager: [phone]

## 2017-01-13 NOTE — PLAN OF CARE
Problem: Bowel Obstruction (Adult)  Goal: Signs and Symptoms of Listed Potential Problems Will be Absent or Manageable (Bowel Obstruction)  Signs and symptoms of listed potential problems will be absent or manageable by discharge/transition of care (reference Bowel Obstruction (Adult) CPG).   Outcome: No Change  Baseline L flank/back pain managed with 50mg Tramadol 1x and IV tylenol 1x. NG to LIS, 200cc out overnight. NPO, denies nausea, tolerating clamping after PO med. Abdomen flat, soft, hyper bowel sounds, no gas or BM yet. Voided 1x, brown cloudy urine. Up with SBA. Feeling frustrated with slow ROBF.

## 2017-01-13 NOTE — PROGRESS NOTES
"Palliative Care Inpatient Clinical Social Work Assessment    Patient Information:  Marshall is a 61 year old female diagnosed with and receiving treatment for Peritoneal Carcinomatosis, admitted to the hospital for treatment of a small bowel obstruction. Palliative care is following for pain management and advance care planning. I met with Marshall this afternoon in her room.       Goals/Decision Making/Advance Care Planning   Preferences:  Marshall has been thinking quite a bit about her current living situation.  [place] and [place] the wildlife, solitude and nature available in this setting. She does worry having some sort of crisis/accident at home and not having anyone around to help. At this point, she feels [place]. She will continue to consider other options for the future.     Marshall clearly expressed several times that she would not want CPR/intubation/other life prolonging measures. She expressed her understanding that her cancer will likely continue to progress and that she will eventually die from this illness. She has completed a health care directive, yet she expressed some concern that this directive (which she completed using a computer program) does not specifically address her thoughts/feelings about CPR an intubation. She would like her daughter, Mayela to be appointed as her health care agent, and has another daughter who she is estranged from who she would not like to be included in any medical decision making on her behalf.      Documents: Given these preferences, I encouraged her to complete the [employer] [place] health care directive, which more clearly address preference re: CPR/intubation. I also advised that she clearly state in her new directive that she only wants daughter Mayela involved in communicating medical decisions on her behalf and would not want other family members involved. Provided a " "copy of the [employer] [place] health care directive. Marshall says she feels comfrortable completing on her own. Offered to arrange notary visit if/when desired while she remains in the hospital.      Marshall asked if I could confirm her code status. She was pleased that her wishes are accurately documented as DNR/DNI in her electronic medical record. Facilitated completion of POLST form.  Provider signature obtained.  Original returned to patient.  Copy placed on paper chart.  Copy placed to be sent to [employer] to be scanned into electronic medical record.  Marshall requested 5 additional copies, which were provided.      Concerns/ Decision Making Issues:  Complicated family dynamics with daughter in the setting of serious illness (see above).         Sources of Information   Patient:  JOJO Olivares    Family:       Staff:  JOJO Barrera, Palliative Care    Chart Review:  X   Other:       Intervention (Check all that apply)    x   Assessment of palliative specific issues      x   Introduction of Palliative clinical social work interventions       Adjustment to illness counseling    x   Advanced care planning        Attended/participated in care conference        Behavioral interventions for symptom management    x   Facilitation of processing of thoughts/feelings        Family communication facilitated        Grief counseling    x   Goals of care discussion/facilitation        Life legacy work        Life review facilitation     x   Psychoeducation        Re-framing        Resource referral        Other:       Comments:  I met with Marshall this afternoon in her room. The above interventions were provided. She was engaged, verbal, and receptive. Her affect was bright and she reported her mood was \"good.\"       Plan:  I will continue with assessment and intervention as indicated. Plan to follow up regarding advance care planning next week    EDGARD Vivar, [facility]  Palliative Care Clinical Social Work " Fellow  Pager: [phone]

## 2017-01-13 NOTE — PLAN OF CARE
Problem: Goal Outcome Summary  Goal: Goal Outcome Summary  Outcome: No Change  VSS. C/o's pain mostly in throat, lozenges and throat spray given with some relief. NGT to LIS with large amount of green/brown output. Hypo BS, not passing flatus. Adeq uop. Ambulated in hallways.   P: continue to  Monitor ROBF, pain and gen status and continue with POC.    Addendum 11:19 PM  Pt trigered the sepsis protocol, HR in the 100s, wbc 12.7 today. MD notified.

## 2017-01-13 NOTE — PLAN OF CARE
Problem: Goal Outcome Summary  Goal: Goal Outcome Summary  Outcome: No Change  AVSS. NPO. NG clamp trial started at 8am. Potassium 2.8 this morning - K+ replaced per protocol, K+ recheck scheduled for 16:00. BG 68 with morning labs - hypoglycemia protocol orders placed. Pt received scheduled Dexamethasone and IVF changed to D5NS. Port infusing with good blood return. Bowel sounds active, no flatus or stool yet. Voiding spontaneously. Up independently, ambulating in the halls frequently. Reported some flank pain, declined pain meds. Continue with plan of care - monitor K+ and blood sugars, awaiting ROBF     Addendum: NG residual 15ml after 6.5 hours

## 2017-01-13 NOTE — CONSULTS
[facility]  Palliative Care Daily Progress Note        Recommendations, Patient/Family Counseling & Coordination     Recommendations:  Consider scheduled acetaminophen 1000mg TID once tolerating PO medications.   Continue tramadol solution  mg q6 hours prn pain  Increase bowel regimen once she has return of bowel function, recommend miralax BID and senna 2-3 tablets BID    Brief Discussion:  Discussed with Marshall the possibility of recurrent bowel obstructions in the future and how she may deal with them. Briefly mentioned that if SBOs become a common occurrence, G-tubes can be placed in an effort to manage the obstruction without coming to the hospital. Marshall appeared interested in this and stated that it certainly sounded better than frequent NG tube placements.     Expressed hope that this bowel obstruction was the result of her prior surgeries and adhesions and that this may not be a harbinger of worsening disease and a more limited life span.     POLST should be considered prior to discharge.      Thank you for the opportunity to continue to participate in the care of this patient and family.  Please feel free to contact on-call palliative provider with any emergent needs.  We can be reached via team pager [phone] (answered 8-4:30 Monday-Friday); after-hours answering service ([phone]); Main Pall[facility] - [facility]: [phone].      Patient seen and discussed with Dr. Elen Caputo MD  Internal Medicine PGY-3  [phone]    Patient seen and evaluated with Dr. Caputo.   Agree with assessment and recommendations.    Total time spent was 35 minutes,  >50% of time was spent counseling and/or coordination of care regarding symptoms, support, goals.    Elen Barrera  Palliative Care   Pager [phone]        Assessment      1) Diagnoses & symptoms:        Peritoneal Carcinomatosis  Small Bowel Obstruction with nausea and vomiting  Hydronephrosis with  bilateral ureteral stents causing pain      2)  Psychosocial/Spiritual Needs:   Ongoing. Considers herself a very spiritual person who finds janny in nature and the natural cycle of living things. Interested in updating her HCD although contemplating changing her HC agent. Likely will have daughter Mayela continue to be health care agent. Pending hospital course will ask SW and/or Massage therapy to offer additional support and pain coping strategies.          Further Assessment/Intervention required by palliative team:  Coordinators for health care directive. If still in hospital will ask Massage Therapy to visit.          Interval History:   No acute events overnight. Has not yet had bowel movement and not passed gas yet today. States pain was relatively well controlled overnight with acetaminophen only. Denies any significant nausea or vomiting with NG tube in place. Feels as though her bowels will come back soon as she feels very hungry.              Review of Systems:   Palliative Symptom Review (0=no symptom/no concern, 1=mild, 2=moderate, 3=severe):      Pain: 1      Fatigue: 0      Nausea: 0      Constipation: 3      Diarrhea: 0      Depressive Symptoms: 0      Anxiety: 0      Drowsiness: 0      Poor Appetite: 0      Shortness of Breath: 0      Insomnia: 0      Other: 0      Overall (0 good/no concerns, 3 very poor):  1            Medications:   I have reviewed this patient's medication profile and medications given in past 24 hours.    Of note:  Acetaminophen 1000 mg IV q6 hours prn  Tramadol  mg suspension q6 hours prn      Physical Exam:  General: AAOx3, NAD  HEENT: Oral mucosa moist and non-erythematous, PERRLA, EOM intact, NG in place  CV: RRR, normal S1S2, no murmur, clicks, rubs, no CVA tenderness  Resp: Clear to auscultation bilaterally, no wheezes, rhonchi  Abd: Soft, non-tender, BS+, no masses appreciated  Extremities: Radial and pedal pulses intact and symmetric, no pedal edema  Neuro: No  lateralizing symptoms or focal neurologic deficits             Data Reviewed:   CMP  Recent Labs  Lab 01/13/17  0525 01/12/17  0441 01/11/17  0617    140 136   POTASSIUM 2.9* 3.4 3.5   CHLORIDE 103 102 100   CO2 24 26 28   ANIONGAP 12 12 9   GLC 68* 76 95   BUN 16 17 18   CR 0.79 0.84 0.86   GFRESTIMATED 74 68 67   GFRESTBLACK 89 83 81   PURVI 8.3* 8.3* 8.8   MAG  --  1.8 2.0   PHOS  --   --  3.5     CBC  Recent Labs  Lab 01/12/17  0441 01/11/17  0617   WBC 12.7* 13.4*   RBC 3.27* 3.45*   HGB 11.1* 11.6*   HCT 34.6* 36.5   * 106*   MCH 33.9* 33.6*   MCHC 32.1 31.8   RDW 14.0 14.2    272     INRNo lab results found in last 7 days.

## 2017-01-14 NOTE — PROGRESS NOTES
Gynecology Oncology Progress Note  01/14/2017    HD#5 admitted with small bowel obstruction    Disease: Recurrent Stage IIIB peritoneal cancer    24 hour events:   - S/p palliative care consult who recommended 2 mg decadron, PRN tramadol solution, and lower dose of PRN IV dilaudid for pain control  - NGT removed, patient successfully advanced to full liquids    Subjective: Patient is feeling well this morning. Got some rest overnight. Tolerated liquids last night for dinner without nausea or vomiting. Had a bowel movement and continues to pass flatus. Denies difficulty ambulating or voiding. Has some flank pain which is chronic.     Objective:   Filed Vitals:    01/13/17 1548 01/13/17 1943 01/13/17 2214 01/14/17 0341   BP: 147/71 133/61 139/62 141/73   Pulse: 98 87 86 86   Temp: 98  F (36.7  C) 98.5  F (36.9  C) 98.6  F (37  C) 98.4  F (36.9  C)   TempSrc: Oral Oral Oral Oral   Resp: 18 20 20 20   Weight:       SpO2: 100% 98% 98% 98%       General: NAD, comfortably resting  CV: RRR, grade 2 systolic murmur  Resp: CTAB, no wheezes, port-a-cath c/d/i  Abdomen: soft, non-tender, minimally distended in LUQ, normal bowel sounds, well healed incision noted  Extremities: warm, well-perfused, nontender, no edema    24 hour: IN - 1357 ml IVF, 30 ml PO // OUT - 1400 ml UOP, 200 ml NGT  Since MN: not recorded    Lines/Drains: right port-a-cath    New labs/imaging-  None    Assessment: Margaux Guzmán is a 61 year old with recurrent primary peritoneal cancer, on Tesaro trial who is HD#5 admitted as BLANCO from OSH for small bowel obstruction. Now tolerating full liquids.     Active Problem list:  1. Recurrent peritoneal cancer    2. Small bowel obstruction  3. Bilateral hydronephrosis s/p ureteral stents    Plan:    Disease: Recurrent primary peritoneal cancer, currently on Tesaro trial (holding due to acute issues).    FEN: NG removed, tolerating full liquids. IV fluids discontinued.    Pain: Daily decadron, scheduled  acetaminophen, PRN tramadol per palliative recs.  Heme: Chronic right internal jugular vein DVT, stable on recent imaging in 1/2017.  No acute issues.    CV: Hypertension, improved.  Will follow-up as outpatient for likely chronic issue.    Resp: No issues.    GI: SBO resolving, tolerating full liquids.  Continue home pepcid. PRN zofran, compazine.  Palliative recommendations bowel regimen of senna-s BID and miralax BID.   : Bilateral ureteral stents in place for hydronephrosis. On decadron per palliative, currently being tapered (4 >2 mg). Home flomax, detrol held, plan to restart today as she is tolerating PO intake.    MSK: No issues.    Neuro/Psych: Anxiety- home ativan ordered  Endocrine: No issues.    ID: Leukocytosis, likely reactive/due to SBO and improving. Afebrile, no acute issues.    PPx: SCDs, PPI, lovenox.  Consults: Palliative care  Disposition: Pending improvement in SBO.     Geno Vergara MD  OBGYN PGY-3  Pager [phone]    Provider Disclosure:   I agree with above History, Review of Systems, Physical exam and Plan. I have reviewed the content of the documentation and have edited it as needed. I have personally performed the services documented here and the documentation accurately represents those services and the decisions I have made.     Electronically signed by:   Dayan Marcano MD   Gynecologic Oncology   [facility]

## 2017-01-14 NOTE — PLAN OF CARE
Problem: Goal Outcome Summary  Goal: Goal Outcome Summary  Outcome: Improving  [id number]  Afebrile, VSS. Denied pain, nausea/vomiting. Up ad mack in room and halls. NG pulled per MD order. Tolerated clear liquid diet for dinner, now advanced to full liquid. Voiding without difficulty. Passing gas, LBM 1/8.  Potassium recheck came back at 3.8. No acute events. Notify MD with any concerns and or changes in pt's condition. Continue with plan of care.

## 2017-01-14 NOTE — CONSULTS
"Palliative Cross Cover Note  1/14/2017      Called by RN that patient has poorly controlled pain in b/l flanks. Abd pain has improved markedly with resolution of SBO.   She has had difficulties with pain control 2/2 becoming very \"loopy\" with opioids including oxycodone (little effect on pain), tramadol (good effect on pain, causes marked drowsiness, marissa at 100mg), dilaudid (good effect on pain, drowsy/loopy). Pain control was better with 4mg dexamethasone, but that interfered with her sleep even when taken early in am, no doing reasonably well with 2mg daily. She can't take NSAIDs due to bleeding risk.    Margaux lives by herself, [place] location. Her pain has interfered with her ability to go grocery shopping etc. She is appropriately concerned about her ability to drive while taking pain meds and thus would like to limit their use.     While still admitted she wants to be able to get up and walk as much as possible to support bowel movements. We discuss the option of a fentanyl patch. Considering the doses of opioids she has been given recently, I am certain a 12mcg/hr Fentanyl patch would be safe. However, it might cause drowsiness, which could last up to 8-12 hours. She does not want to risk that at this time.     Current meds:  Tramadol 50-100mg q6h prn  Dexamethasone 2mg daily  Acetaminophen 1000mg tid  Dilaudid 0.3mg IV prn    Recommendations:  - discontinue IV dilaudid  - start dilaudid 0.5-1mg po (use 1mg/ml solution to facilitate dosing)  - keep tramadol available      Gricelda Grullon MD  Palliative Medicine  Pager [phone]    "

## 2017-01-14 NOTE — PLAN OF CARE
Problem: Goal Outcome Summary  Goal: Goal Outcome Summary  Outcome: No Change  VSS. Afebrile. Pt. denies pain and nausea. Pt. c/o constipation, miralax given. Pt. had one BM overnight. Pt. states constipation is resolved. Voiding spontaneously. Pt. tolerating full liquid diet. Up independently in room. Pt. slept comfortably between cares. No acute events overnight. Will continue POC and notify MD with changes.

## 2017-01-14 NOTE — PROGRESS NOTES
Gynecology Oncology Progress Note  01/15/2017    HD#6 admitted with small bowel obstruction    Disease: Recurrent Stage IIIB peritoneal cancer    24 hour events:   - S/p palliative care consult who recommended 2 mg decadron, PRN tramadol tabs, PRN PO dilaudid for pain control  - tolerated mechanical soft diet, advanced to regular    Subjective: Doing well this AM. One small BM yesterday.  Tolerated regular diet this AM. Still having chronic flank pain, however reports she does not like taking narcotic medications that make her drowsy.  Has been ambulation frequently, no issues.    Objective:   Filed Vitals:    01/14/17 0341 01/14/17 0700 01/14/17 0809 01/14/17 1116   BP: 141/73 163/82 147/75 145/57   Pulse: 86 92 90    Temp: 98.4  F (36.9  C) 99.3  F (37.4  C)  97.7  F (36.5  C)   TempSrc: Oral Oral  Oral   Resp: 20 18  18   Weight:   55.792 kg (123 lb)    SpO2: 98% 99%  96%       General: no acute distress, comfortably resting  CV: regular rate and rhythm  Resp: CTAB, port-a-cath c/d/i  Abdomen: soft, non-tender, minimally distended,   Extremities: warm, well-perfused, no edema    24 hour: IN -  720 ml PO // OUT - 825 ml UOP,  Since MN: 240 mL IN // 600 mL urine out    Lines/Drains: right port-a-cath    Assessment: Margaux Guzmán is a 61 year old with recurrent primary peritoneal cancer, on Tesaro trial who is HD#6 admitted as BLANCO from OSH for small bowel obstruction. Now tolerating mechanical soft diet.     Active Problem list:  1. Recurrent peritoneal cancer    2. Small bowel obstruction  3. Bilateral hydronephrosis s/p ureteral stents    Plan:    Disease: Recurrent primary peritoneal cancer, currently on Tesaro trial (holding due to acute issues).      FEN: s/p NG tube, tolerating mechanical soft diet, IV fluids discontinued.      Pain: Daily decadron, scheduled acetaminophen, PRN tramadol and PO dilaudid per palliative recs.    Heme: Chronic right internal jugular vein DVT, stable on recent imaging in  1/2017.  No acute issues.     CV: Hypertension, improved.  Will follow-up as outpatient for likely chronic issue.     Resp: No issues.      GI: SBO resolving, tolerating regular diet this AM.  Continue home pepcid. PRN zofran, compazine.  Palliative recommendations bowel regimen of senna-s BID and miralax BID.     : Bilateral ureteral stents in place for hydronephrosis. On decadron per palliative, currently being tapered (4 >2 mg). On home flomax and detrol.     MSK: No issues.      Neuro/Psych: Anxiety- home ativan ordered    Endocrine: No issues.      ID: Leukocytosis, likely reactive/due to SBO and improving. Afebrile, no acute issues.     PPx: SCDs, lovenox.    Consults: Palliative care, nutrition    Disposition: Pending improvement in SBO > likely home today     Angie Aguirre MD  OBGYN PGY-2  Pager [phone]    HUDSON, Maki Patricia, agree with the resident's note, findings, assessment and plan. Ms. Marion is doing remarkably well this morning and desires home. She continues to have frequent, small bowel movements and flatus. She still feels somewhat 'constipated', but much improved. She doesn't want to advance the bowel regimen b/c she has a long drive home and is worried about incontinence. She is off Tesaro trial drug currently and will f/u with Dr. Mccollum on Thursday about restarting vs other options. Precautions given. Call with worsening symptoms or problems. Patient to be discharged this morning.  Discussed with Dr. Krys Patricia MD  Gynecologic Oncology Fellow  1/15/2017 11:05 AM

## 2017-01-14 NOTE — PLAN OF CARE
"Problem: Goal Outcome Summary  Goal: Goal Outcome Summary  Outcome: Improving  Temp max 99.3. OVSS. Denied nausea. C/O bilateral flank pain, partially relieved with scheduled tylenol, 100 mg prn Tramadol x 1 and 1 mg po prn dilaudid x 1 (pt feels the tylenol doesn't help with the pain and the tramadol helps with the pain but makes her feel \"loopy\". Up ad mack in room and halls. Fair po intake, tolerated full liquid and mechanical soft diet. Voiding without difficulty. LBM 1/13, passing flatus. No acute events. Notify MD with any concerns and or changes in pt's condition. Continue with plan of care.        "

## 2017-01-15 NOTE — PROGRESS NOTES
Discharge to Home:    D: Orders for discharge and outpatient medications written. Pt verbalized readiness to discharge to home this afternoon.  I: Port heparin locked and de-accessed per protocol. Home medications and return to clinic schedule reviewed with patient. Discharge instructions and parameters for calling Health Care Provider reviewed. Patient ambulated off of the unit at 1400.   A: Patient verbalized understanding of all discharge paperwork and discharge medications and was ready for discharge.   P: Patient to follow up as scheduled/documented on discharge paperwork.

## 2017-01-15 NOTE — PLAN OF CARE
Problem: Goal Outcome Summary  Goal: Goal Outcome Summary  Outcome: No Change  VSS. Afebrile. Pt. c/o bilateral flank pain. Pt. denied pain medication overnight, scheduled tylenol given x1 for some relief. Fair appetite. Scheduled senna and miralax given, pt. reported having one small BM in the evening but nothing overnight. Voiding spontaneously. Up independently ambulating in halls. Ativan given for sleep. Pt. slept comfortably between cares. No acute events overnight. Will continue POC and notify MD with changes.

## 2017-01-16 NOTE — PROGRESS NOTES
"[facility]  \"Hello, my name is Tameka Neil , and I am calling from the [facility].  I want to check in and see how you are doing, after leaving the hospital.  You may also receive a call from your Care Coordinator (care team), but I want to make sure you don t have any urgent needs.  I have a couple questions to review with you:     Post-Discharge Outreach                                                    Margaux Guzmán is a 61 year old female     Admit Date: 1/10/2017  Discharge Date: 1/15/2017  Admitting Provider: Dr Marcano  Discharge Provider: Dr. Marcano    Admission Dx:   -Abdominal pain, nausea/vomiting  -Small bowel obstruction    -Recurrent Stage IIIB peritoneal cancer  -Bilateral hydronephrosis, ureteral stents in place  -Chronic stent/flank pain    Discharge Dx:  - Same,       Patient Active Problem List    Diagnosis       Primary peritoneal adenocarcinoma (H)       Recurrent cold sores       Pulmonary nodules       Encounter for long-term current use of medication       DVT (deep venous thrombosis) (H)       Family history of peritoneal cancer       Protein in urine       Ovarian cancer, left (H)       Ovarian cancer, right (H)       Chemotherapy-induced neutropenia (H)       Anemia       Pain due to ureteral stent (H)       Sterile pyuria       Pyelonephritis       Small bowel obstruction (H)                Follow-up Appointments      Adult [facility] Follow-up and recommended labs and tests        Follow-up with Dr. Mccollum on 1/19 at 3:00 pm.     Follow up with primary care provider, Sophia Garcia, within 7 days for hypertension follow up.       Appointments on [place] and/or [facility] (with [facility] or [facility] provider or service). Call [phone] if you haven't heard regarding these appointments within 7 days of discharge.                       Your next 10 appointments already scheduled      Jan 19, 2017  2:30 PM   [employer] Lab Draw with  " [facility]   [facility] ([facility])      [address]  2nd Floor  [place] [address]   [phone]               Jan 19, 2017  3:00 PM   (Arrive by 2:45 PM)   Return Visit with Jia Mccollum MD   [facility] ([facility])      [address] [place] [address]  [address] [facility] [address]   [phone]               Jan 20, 2017 11:15 AM   (Arrive by 11:00 AM)   Return Visit with Carmela Alvarez MD   [facility] ([facility])      [address] [place] [address]  4th Floor  [place] [address]   [phone]               Jan 26, 2017  9:30 AM   [facility] with  [facility]   [facility] ([facility])      [address] [place] [address]  [address] [facility] [address]   [phone]               Feb 02, 2017  9:15 AM   [facility] with  [facility]   [facility] ([facility])      [address] [place] [address]  [address] [facility] [address]   [phone]               Feb 02, 2017  9:40 AM   (Arrive by 9:25 AM)   Return Visit with Jia Mccollum MD   [facility] ([facility])      [address] [place] [address]  [address] [facility] [address]   [phone]               Apr 12, 2017 10:00 AM   Lab with  LAB   [facility] ([facility])      [address] [place] [address]  1st Floor  [place] [address]   [phone]               Apr 12, 2017 10:20 AM   (Arrive by 9:50 AM)   Return Visit with Mackenzie Ca MD   [facility] ([facility])                Care Team:    Patient Care Team       Relationship Specialty Notifications Start End    Sophia Garcia PA-C PCP - General Physician Assistant  10/14/13     Phone: [phone]  Fax: [phone]         [facility] [address] [facility] [address]    Jessica Galvin, RN Continuity Care Coordinator Gyn-Onc Admissions 3/12/15     Comment:  [phone]    Phone: [phone] Pager: [phone]         [facility] Care Coordinator [id number]    Jessica Galvin, RN Continuity Care Coordinator Oncology Admissions 8/26/15     Phone: [phone] Pager: [phone]         [facility] Care Coordinator [id number]    Lizet Truong APRN CNP   Nurse Practitioner - Women's Health  9/28/15     Phone: [phone] Fax: [phone]         [facility] [address] [facility] [address] [facility] [address]    Derrek Gamble MD MD Nephrology  12/16/15     Phone: [phone] Fax: [phone]          [facility] [address] [facility] [address]    Patricia Murcia APRN CNP Nurse Practitioner Nurse Practitioner  1/21/16     Comment:  referred pt to ID    Phone: [phone] Fax: [phone]         [facility] [address] [facility] [address]    Elissa Wheeler MD MD Infectious Diseases  4/29/16     Phone: [phone] Fax: [phone]         [facility] [address] [facility] [facility] [address]    Carmela Alvarez MD MD Urology  5/20/16     Phone: [phone] Fax: [phone]         [facility] [address] [facility] [address]    Keira Rolle, RN Registered Nurse Urology  5/20/16     Sophia Garcia PA-C Referring Physician Physician Assistant  5/20/16     Phone: [phone] Fax: [phone]         [facility] [address] [facility] [address]            Transition of Care Review                                                      Did you have a surgery or procedure during your hospital visit? Yes   If yes, do you have any of the following:     Signs of infection:  No    Pain:  Yes     Pain Scale (0-10) 4/10     Location: flank    Wound/incision concerns? NA    Do you have all of your medications/refills?   Yes    Are you having any side effects or questions about your medication(s)? No    Do you have any new or worsening symptoms?  No    Do you have any future appointments scheduled?   yes             Plan                                                      Thanks for your time.  Your Care Coordinator will follow-up within the next couple days.  In the meantime if you have questions, concerns or problems call your care team.        Tameka Neil

## 2017-01-17 NOTE — TELEPHONE ENCOUNTER
Received VM from patient requesting a taper schedule of her dexamethasone. She states that she does not want to continue on dexamethasone AND tramadol at this time as she feels dexamethasone is increasing her blood pressure and increasing palpations. She states she has been taking both 4mg and 2mg of dexamethasone at a time for a while now and would like specific direction on how to taper off. She states she would rather deal with pain instead of dealing with the dexamethasone.     Will route to MD for instruction.

## 2017-01-18 NOTE — TELEPHONE ENCOUNTER
Left message for her to call us back for instructions.      Ideally would have her decrease to 2mg dexamethasone dosing for 4 days and then stop.      If she is interested in a trial of gabapentin, would recommend starting 300mg at night to mitigate drowsiness.  We could send this prescription to her local pharmacy if she wanted to trial this medicine.

## 2017-01-19 NOTE — NURSING NOTE
Chief Complaint   Patient presents with     Blood Draw     Labs Drawn      Labs drawn peripherally     Lauren Schoen, RN

## 2017-01-19 NOTE — Clinical Note
1/19/2017       RE: Margaux Guzmán  [id number] [facility] [address]     Dear Colleague,    Thank you for referring your patient, Margaux Guzmán, to the [facility]. Please see a copy of my visit note below.    Gyn Oncology Follow-up Visit    Date of Visit: 01/19/2017    CC: Margaux Guzmán is a 61 year old female with a history of primary peritoneal cancer presenting today for disease management and follow up of recent hospitalization for bowel obstructions issues managed conservatively.    HPI:    Marshall comes to the clinic feeling well today. Feeling ready to restart therapy today.  No fevers, chills, sore throat, cough, chest pain, or SOB.  She finally passed a stool after having some severe constipation which provided extreme relief. Her consitpation has been fairly consistent but she had not had a BM for what she thinks was a week. She has been trying to be more on top of her bowel movements. She is going to start drinking Miralax every day in order to have a bowel movement at least once a day. She is otherwise feeling well and is in good spirits. No fevers, chills, sore throat, cough, chest pain, or SOB.        Brief Oncology History:  The patient is a 58 year old  female who initially presented for evaluation of pelvic and abdominal pain. On exam, she had mild right adnexal tenderness and slightly enlarged right ovary freely mobile and smooth. This prompted an abdominal ultrasound, which was normal, and a pelvic US that showed anechoic right ovarian cyst measuring 4.3 x 3.8 x 4.0 cm. Her  was elevated at 74 on 10/8/13. We reviewed the possible diagnosis including ovarian cancer versus benign ovarian cyst. Approach to surgery, alternatives to surgery and plan for possible extended open surgical staging based on the operative findings was discussed. In light of the size of the ovary we are offering an initial approach with minimally invasive surgery. After discussion  of risks and benefits, consent was obtained.  11/7/13 Diagnostic laparoscopy converted to exploratory laparotomy, bilateral salpingo-oophorectomy, total abdominal hysterectomy, omentectomy, staging biopsies, bilateral pelvic and periaortic lymph node dissection and washings. Stage IIIB  12/4/13: Cycle #1 IV/IP  1/2/14: Cycle #2 IV/IP PCP.  - 14  1/23/14: Cycle 3 IV/IP.  - 7  2/11/14:  - 7. CT C/A/P Impression:  1. Multiple bilateral pulmonary nodules as described in full report measuring up to 3 mm along the right major fissure. These are indeterminate given lack of comparison and followup is recommended.  2. No definite evidence for recurrent or metastatic disease in the abdomen or the pelvis. There is a rounded hypodense focus abutting the left external iliac artery and the adjacent sigmoid colon which measures 1 cm, this could represent a fluid-filled sigmoid diverticulum or a hypodense node, further attention to this area on subsequent examinations is recommended.  3. There is a 7 mm nonobstructing stone in the inferior collecting system of the right kidney.  2/12/14-3/26/14: Cycle #4-6 IV/IP CA-125 7, 7, 7.  4/21/14: CT C/A/P Impression:   1. Unchanged indeterminate pulmonary nodules. Recommend continued surveillance.   2. No definite evidence of metastatic ovarian cancer in the abdomen or pelvis. Small amount of subtle increased thickening and fluid is noted along the right lymph node dissection, nonspecific, but possibly a tiny developing lymphocele.   3. 6 mm nonobstructing stone in the right kidney.  Plan surveillance for ovarian cancer every 3 months with physical and .   Indeterminate pulmonary nodule: These were present before the surgery and there was not change with the chemotherapy. Highly unlikely to be a metastatic disease. Will repeat a CT in 3 months to see any change     5/22/14:  6. JOSEMANUEL.  5/17/14; ED visit [facility]. CT abd/pel noted  possible chronic partial small bowel obstruction. Colonscopy scheduled for June 6, 2014 locally. Abdominal pain started 5/17/14 and noted pressure without bowel movement and went to ED that night due to increasing pain. In patient 2 day hospital with clear liquids/IV. Mild nausea without vomiting. BM subsequently occurred and pt was discharged. No pain since. Continues with fullness in abdomen. Diet is bland for the most part.  8/25/14:  -5. Notes increased abdominal girth and bloating x 2-3 weeks with urine urgency. Worried about recurrence. Is just starting to return to normal exercise and activities. No constipation, diarrhea, pain, vaginal or rectal bleeding, cough or dyspnea. CT to follow indeterminate pulmonary nodules today.   CT cap IMPRESSION:   1. No CT scan findings of the chest, abdomen, or pelvis to indicate metastatic disease.  2. 2-5 mm pulmonary nodules, stable since 2/11/2014.  3. Nonobstructing 6 mm stone in the right kidney.  12/3/14:  8.. Pt started zoloft for anxiety. Worries about cancer recurrence.   3/2/15: CA 34  3/5/15: CT C/A/P: Impression:  1. Multiple new small peritoneal and retroperitoneal nodules are suspicious for metastases. Suspicious new left common iliac and central small bowel mesenteric nodes. No abdominal ascites.  2. Multiple pulmonary nodules are stable with no new nodules.  3. 9 mm nonobstructing right lower pole renal calculus.    5/20/15: CT c/a/p showed nodularity throughout the abdomen and pelvis worrisome for malignancy which has increased in size     5/28/15: CT abdomen and pelvis showed stable inumerabble peritoneal nodules scattered throughout the abdomen and pelvis consistent with metastases.     8/4/15 (approximatly): Left ureteral stent was placed.    8/12/15:  from [place] 303    8/18/15: CT chest Impression showed slight increase in mild, subtle nodularity along the diaphragm which is nonspecific but may represent metastatic disease.   8/18/15: CT  "abdomen and pelvis Impression showed interval progression of peritoneal metastatic disease.     8/25/15:  on 3/2/15 of 34 prompted CT c/a/p, which showed multiple new small peritoneal and retroperitoneal nodules are suspicious for metastases. Suspicious new left common iliac and central small bowel mesenteric nodes. She participated in [facility] study involving treatment with on clinical trial with vaccine [id number]. She is here today because the [place] trail failed and the pt pregressed. Her most recent CT c/a/p on 8/25 showed progression of peritoneal metastatic disease. And her most recent  from [place] on 8/12/15 was 303.    Plan: Carbo/Doxil x 6 cycles.with imaging after 3 cycles.     9/3/15: Cycle #1 Carbo/Doxil.  244.  9/24/15: right IJ thrombus; started on Lovenox.   9/26/15: Present to [place] ED. \"presented to the ED this morning with what appears to be a mild drug rash after administering her lovenox this morning. This writer discussed situation with Gyn Onc Fellow Jeanne Moon as well as [facility] Heme/Onc service. Per Heme, a rash of this nature is extremely uncommon with administration of Lovenox. Given the mild nature of this rash and acute need for anticoagulation, recommended the patient continue with Lovenox injections and treat with Benadryl as needed. Advised patient be given precautions to return to the ED immediately if she develops reactive respiratory symptoms. Alternatively patient could be switched to alternative formulation of low molecular weight heparin if she was strongly resistant to continuation of Lovenox.\"    10/1/15: Cycle #2 Carbo/Doxil.  175.    10/28/15: gyn onc visit: pt complains of worsening constipation and tenderness around her right ribs. She is taking senna for her constipation with minimal improvement. She admits that the swelling around her neck after her blood clot has decreased. She also admits to feeling a nodules on her left abdomen. She states " her appetite is good but she has not been eating fruits and vegetables. She denies any chemo side effects besides fatigue.     10/28/15:  158  11/23/2015:  133  CT c/a/p  IMPRESSION: In this patient with a clinical history of ovarian cancer  there is mixed response to therapy:   1. Stable to slightly decreased peritoneal nodularity since  8/18/2015.  2. No significant change regarding the large subdiaphragmatic  peritoneal deposit since 9/24/2015.  3. Enlarging soft tissue nodule overlying the abdominal musculature  concerning for metastasis since March of 2015.   4. Unchanged, indeterminate hypodensity near the gallbladder fossa  since 8/18/2015, however progressively increased in size since  3/5/2015.  5. Bilateral nephroureteral stents. No significant hydronephrosis.  6. Bilateral pulmonary nodules. Continued followup recommended.    12/23/15: Cycle 5 carboplatin/Doxil/Avastin.  96. To receive Avastin today despite proteinuria per Dr. Aguilar and nephrology.  1/21/16: Cycle 6 carboplatin/Doxil/Avastin, delayed one week secondary to neutropenia.  62.  1/28/16:  63.    2/2/16: Patient had right upper extremity doppler u/s done in [place] due to swollen glands and pain. 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 Pt is currently on lovenox.    2/2/16: US soft tissue neck  Conclusion:  1. Morphologically normal not pathologically enlarged two right carotid chain lymph nodes.  2. Chronic right jugular vein thrombosis, described in detail on  venous doppler exam.     2/2/16: Thyroid Ultrasound  Right lobe: 5.5 x 1.9 x 1.2 cm  Left lobe: 5.0 x 1.5 . 0.9 cm  Both lobes have normal background echotexture.    Conclusion:  1. 3 benign - appearing subcentimeter hypoechoic right mid thyroid nodules.  2. Borderline thyromegaly.    2/2/16: Right upper extremity venous duplex  doppler evaluation  Conclusion:  1.) chronic non compressible echogenic right jugular vein thrombus, now 4.3 cm in length.     2/22/16:   IMPRESSION:    1. Ovarian cancer with peritoneal carcinomatosis.  2. Given the increased sensitivity of PET/CT, comparison with prior CT examinations is difficult. In general the abdominal/pelvic metastatic lesions appear to be decreased in size.  3. Persistent right internal jugular DVT, as evidenced by 5 cm filling  defect with inferior border at the central venous catheter.  4. Bilateral hydronephrosis without overt caliectasis. Some distal  migration of the bilateral double-J ureteral stents, although the  proximal coiled portions continue to be in the renal pelves.    2/24/16: C7 carboplatin/Doxil/Avastin.  56.  3/9/16: Hgb 6.6, worked up for transfusion reaction (negative). Bleed possibly secondary to   3/23/16: C8 carboplatin/Doxil/Avastin.  44.  4/2016: Hospitalized in [place] x2 weeks for hematuria leading to anemia after ureteral stent exchange  4/20/16: C9D1 carboplatin/Doxil/Avastin. Deferred one week secondary to acute UTI.  35.  4/28/16: C9D1 deferred due to continued bacteruria and ANC 1.3.  5/4/16: C9D1 carboplatin/Doxil/Avastin. Breast lump noted, diagnostic mammogram ordered.  6/1/16: C10D1 carboplatin/Doxil/Avastin.  50.  6/28/16: C11D1 carboplatin/Doxil/Avastin. Avastin held due to bleeding. Carbo/Doxil deferred one week secondary to neutropenia.  43.  7/7/16: C12D1 carboplatin/Doxil. Avastin held due to bleeding.  7/18/16: Bilateral ureteral stent exchange  7/20/16-7/29/16: Hospitalized with urosepsis and blood loss anemia, started on Zosyn and discharged on amoxicillin  9/29/16: C1D1 Gemzar.  85.  10/21/16: C2D1 Gemzar.  106.   11/11/16: C3D1 Gemzar.  pending.    12/12/16: CT CAP  IMPRESSION: In this patient with a history of metastatic ovarian  cancer:  1. Progression of disease as evidenced by a slowly  enlarging  mesenteric and omental soft tissue foci and slowly enlarging  periesophageal and pericardiophrenic lymph nodes, as detailed above.  2. Stable appearance of bilateral ureteral stents without  hydronephrosis.  3. Patient was premedicated for a pre-existing IV contrast allergy.  Despite this, she had itchiness on her face poststudy. She should no  longer receive IV contrast in the future.      CT AP 1/10/2017: multiple dilated fluid filled small bowel loops with decompressed distal small bowel loops, consistent with obstruction. Transition point is suspected in the pelvis. No pneumatosis or free intraperitoneal gas. Bilateral ureteral stents appear appropriately positioned. There is mild dilation of the bilateral renal collecting systems, left greater than right.       Date Value Ref Range Status   01/05/2017 165* 0 - 30 U/mL Final     Comment:     Assay Method:  Chemiluminescence using Siemens Centaur XP   12/15/2016 145* 0 - 30 U/mL Final     Comment:     Assay Method:  Chemiluminescence using Siemens Centaur XP   12/12/2016 132* 0 - 30 U/mL Final     Comment:     Assay Method:  Chemiluminescence using Siemens Centaur XP   11/11/2016 111* 0 - 30 U/mL Final     Comment:     Assay Method:  Chemiluminescence using Siemens Centaur XP   10/21/2016 106* 0 - 30 U/mL Final     Comment:     Assay Method:  Chemiluminescence using Siemens Centaur XP   09/29/2016 85* 0 - 30 U/mL Final     Comment:     Assay Method:  Chemiluminescence using Siemens Centaur XP   09/15/2016 70* 0 - 30 U/mL Final     Comment:     Assay Method:  Chemiluminescence using Siemens Centaur XP   08/12/2016 38* 0 - 30 U/mL Final     Comment:     Assay Method:  Chemiluminescence using Siemens Centaur XP   06/28/2016 43* 0 - 30 U/mL Final   06/01/2016 50* 0 - 30 U/mL Final     Answers for HPI/ROS submitted by the patient on 1/16/2017   General Symptoms: No  Skin Symptoms: No  HENT Symptoms: No  EYE SYMPTOMS: No  HEART SYMPTOMS: Yes  LUNG  SYMPTOMS: No  INTESTINAL SYMPTOMS: Yes  URINARY SYMPTOMS: Yes  GYNECOLOGIC SYMPTOMS: No  BREAST SYMPTOMS: No  SKELETAL SYMPTOMS: No  BLOOD SYMPTOMS: No  NERVOUS SYSTEM SYMPTOMS: Yes  MENTAL HEALTH SYMPTOMS: No  Chest pain or pressure: No  Fast or irregular heartbeat: Yes  Pain in legs with walking: No  Swelling in feet or ankles: No  Trouble breathing while lying down: No  Fingers or Toes appear blue: No  High blood pressure: Yes  Low blood pressure: No  Fainting: No  Murmurs: No  Chest pain on exertion: No  Chest pain at rest: No  Cramping pain in leg during exercise: No  Pacemaker: No  Varicose veins: No  Edema or swelling: No  Fast heart beat: Yes  Wake up at night with shortness of breath: No  Heart flutters: Yes  Light-headedness: No  Exercise intolerance: No  Heart burn or indigestion: No  Nausea: Yes  Vomiting: Yes  Abdominal pain: Yes  Bloating: Yes  Constipation: Yes  Diarrhea: No  Blood in stool: No  Black stools: No  Rectal or Anal pain: No  Fecal incontinence: Yes  Rectal bleeding: No  Yellowing of skin or eyes: No  Vomit with blood: No  Change in stools: Yes  Hemorrhoids: No  Trouble holding urine or incontinence: Yes  Pain or burning: No  Trouble starting or stopping: No  Increased frequency of urination: Yes  Blood in urine: No  Decreased frequency of urination: No  Frequent nighttime urination: No  Flank pain: Yes  Difficulty emptying bladder: No  Trouble with coordination: No  Dizziness or trouble with balance: No  Fainting or black-out spells: No  Memory loss: Yes  Headache: No  Seizures: No  Speech problems: No  Tingling: Yes  Tremor: Yes  Weakness: No  Difficulty walking: No  Paralysis: No  Numbness: No          Past Medical History   Diagnosis Date     Hyperlipidemia      Osteoarthritis      Osteopenia      Polymyalgia rheumatica (H)      Ovarian cancer (H)      Primary cancer of peritoneum (H)      Hydronephrosis      Jugular vein thrombosis, right      Persistent right internal jugular DVT      Livedo annularis      History of blood transfusion      MULTIPLE     PONV (postoperative nausea and vomiting)      Anemia      Past Surgical History   Procedure Laterality Date     Appendectomy        section       Open reduction internal fixation toe(s)  9/3/13     Fifth digit, left foot, with screw fixation      Lymph node biopsy       Left posterior chain, beign     Breast surgery       biopsy negative     Laparoscopic salpingo-oophorectomy  2013     Procedure: LAPAROSCOPIC SALPINGO-OOPHORECTOMY;  Diagnostic Laparoscopy, Exploratory Laparotomy, Bilateral Salpingo-Oophorectomy,  Hysterectomy, Omentectomy, Pelvic Washings, Peritoneal staging and biopsies, Pelvic and Periaortic Lymph node Dissection;  Surgeon: Cheri Aguilar MD;  Location: [facility] OR     Hysterectomy total abdominal, bilateral salpingo-oophorectomy, node dissection, combined  2013     Procedure: COMBINED HYSTERECTOMY TOTAL ABDOMINAL, SALPINGO-OOPHORECTOMY, NODE DISSECTION;;  Surgeon: Cheri Aguilar MD;  Location: [facility] OR     Laparotomy, staging, combined  2013     Procedure: COMBINED LAPAROTOMY, STAGING;;  Surgeon: Cheri Aguilar MD;  Location: [facility] OR     Remove port peritoneal  2014     Procedure: REMOVE PORT PERITONEAL;  Surgeon: Cheri Aguilar MD;  Location: [facility] OR     Combined cystoscopy, retrogrades, exchange stent ureter(s) Bilateral 2016     Procedure: COMBINED CYSTOSCOPY, RETROGRADES, EXCHANGE STENT URETER(S);  Surgeon: Carmela Alvarez MD;  Location: [facility] OR     Combined cystoscopy, retrogrades, exchange stent ureter(s)  2016     @ [facility]     Combined cystoscopy, retrogrades, exchange stent ureter(s) Bilateral 10/17/2016     Procedure: COMBINED CYSTOSCOPY, RETROGRADES, EXCHANGE STENT URETER(S);  Surgeon: Carmela Alvarez MD;  Location: [facility] OR     Combined cystoscopy, retrogrades, exchange stent ureter(s) Bilateral 2016     Procedure: COMBINED CYSTOSCOPY,  RETROGRADES, EXCHANGE STENT URETER(S);  Surgeon: Carmela Alvarez MD;  Location: [facility] OR     Family History   Problem Relation Age of Onset     Arthritis Father      DIABETES       Uncle     Hypertension Mother      Hypertension Sister      Myocardial Infarction Father      secondary to anesthesia     HEART DISEASE       unknown valve replacement     Colon Cancer Father      Other - See Comments Mother      cognitive decline     Bladder Cancer Sister      Skin Cancer Brother      Skin Cancer Mother      Social History     Social History     Marital Status:      Spouse Name: N/A     Number of Children: N/A     Years of Education: N/A     Social History Main Topics     Smoking status: Former Smoker     Smokeless tobacco: Never Used      Comment: Quit over 20 years ago     Alcohol Use: No     Drug Use: No     Sexual Activity: No     Other Topics Concern     Not on file     Social History Narrative         Current Outpatient Prescriptions   Medication     enoxaparin (LOVENOX) 40 MG/0.4ML injection     LORazepam (ATIVAN) 0.5 MG tablet     dexamethasone (DECADRON) 4 MG tablet     tamsulosin (FLOMAX) 0.4 MG capsule     LORazepam (ATIVAN) 1 MG tablet     tolterodine (DETROL LA) 4 MG 24 hr capsule     ACETAMINOPHEN PO     ferrous sulfate (IRON) 325 (65 FE) MG tablet     Multiple Vitamins-Minerals (ONE DAILY MULTIVITAMIN WOMEN) TABS     polyethylene glycol (MIRALAX/GLYCOLAX) powder     cycloSPORINE (RESTASIS) 0.05 % ophthalmic emulsion     Ranitidine HCl (ZANTAC PO)     MELATONIN PO     EPINEPHrine (EPIPEN) 0.3 MG/0.3ML injection     senna-docusate (SENOKOT-S;PERICOLACE) 8.6-50 MG per tablet     Pyridoxine HCl (VITAMIN B6 PO)     study - niraparib (IDS# 4759) 100 mg capsule     ondansetron (ZOFRAN) 8 MG tablet     prochlorperazine (COMPAZINE) 10 MG tablet     No current facility-administered medications for this visit.        Allergies   Allergen Reactions     Contrast Dye Itching and Swelling      "Itching/tingling of mouth and nose with sensation of swelling after receiving IV contrast for CT.  This occurred despite steroid premedication. Therefore the patient should not receive intravenous contrast in the future.      Benadryl Allergy Other (See Comments)     Stay awake, restless, \"uncomfortable\", \"feel like I need to run away\"      Enoxaparin Hives     3/23/16: Okay to receive heparin flushes in port, tolerates well. Patricia Cortez FNP-C     Enoxaparin Sodium Other (See Comments)     Pt is taking this now.     Amoxicillin Rash     Diphenhydramine Anxiety     No Clinical Screening - See Comments Rash     Pollen Extract Rash     Sulfa Drugs Rash       Physical Exam:    /76 mmHg  Pulse 108  Temp(Src) 98.6  F (37  C) (Oral)  Resp 14  SpO2 100%    General: Alert non-toxic appearing female in no acute distress  HEENT: Normocephalic, atraumatic;  PERRLA; no scleral icterus; oropharynx pink without lesions; neck supple without lymphadenopathy  Pulmonary: Lungs clear to auscultation, no increased work of breathing noted  Cardiac: regular rate and rhythm, S1S2, grade II murmur, consistent with prior exams  Gastrointestinal: Abdomen soft, non-tender. BS normal. No masses, organomegaly     Performance Status -0    Assessment/Plan:  1) Primary peritoneal cancer: Patient to restart (parp-inhibitor)-niraparib today and to resume at same dose as I do not believe partial SBO due to drug. Symptoms completely resolved . Patient to RTC to see me in 2 weeks.    2) Reviewed signs and symptoms for when she should contact the clinic or seek additional care, including but not limited to fever, chills, inability to keep down food or fluids, nausea and vomiting not controlled with antiemetics, and diarrhea leading to dehydration.     3) Nausea: Related to pain vs chemotherapy vs disease. Continue with Zofran and Ativan as this is helpful for her.      Constipation: Patient to regularly take Miralax to promote daily bowel " movements and relieve constipation.    4) Genetic counseling: Has been done, negative for mutations in the following: BRCA1, BRCA2, EPCAM, MLH1, MSH2, MSH6, PMS2, PTEN and TP53.     5) Health maintenance issues discussed include to follow up with PCP for non-gynecologic issues.  Continue on prophylactic lovenox at 40 SC daily due to history of prior bleeding on higher dose - 30 day supply ordered.      6) Marshall verbalizes understanding of and agreement with plan.    Of a 40 minute appointment, more than 50% was spent in counseling the patient.      Jia Mccollum MD    Department of Ob/Gyn and Women's Health  Division of Gynecologic Oncology  [facility]  [phone]      I have reviewed the above note and agree with the scribe's notation as written.        I, Kolton Spencer, am serving as a scribe to document services personally performed by Jia Mccollum MD, based upon my observations and the provider's statements to me. All documentation has been reviewed by the aforementioned doctor prior to being entered into the official medical record.

## 2017-01-19 NOTE — PROGRESS NOTES
Gyn Oncology Follow-up Visit    Date of Visit: 01/19/2017    CC: Margaux Guzmán is a 61 year old female with a history of primary peritoneal cancer presenting today for disease management and follow up of recent hospitalization for bowel obstructions issues managed conservatively.    HPI:    Marshall comes to the clinic feeling well today. Feeling ready to restart therapy today.  No fevers, chills, sore throat, cough, chest pain, or SOB.  She finally passed a stool after having some severe constipation which provided extreme relief. Her consitpation has been fairly consistent but she had not had a BM for what she thinks was a week. She has been trying to be more on top of her bowel movements. She is going to start drinking Miralax every day in order to have a bowel movement at least once a day. She is otherwise feeling well and is in good spirits. No fevers, chills, sore throat, cough, chest pain, or SOB.        Brief Oncology History:  The patient is a 58 year old  female who initially presented for evaluation of pelvic and abdominal pain. On exam, she had mild right adnexal tenderness and slightly enlarged right ovary freely mobile and smooth. This prompted an abdominal ultrasound, which was normal, and a pelvic US that showed anechoic right ovarian cyst measuring 4.3 x 3.8 x 4.0 cm. Her  was elevated at 74 on 10/8/13. We reviewed the possible diagnosis including ovarian cancer versus benign ovarian cyst. Approach to surgery, alternatives to surgery and plan for possible extended open surgical staging based on the operative findings was discussed. In light of the size of the ovary we are offering an initial approach with minimally invasive surgery. After discussion of risks and benefits, consent was obtained.  11/7/13 Diagnostic laparoscopy converted to exploratory laparotomy, bilateral salpingo-oophorectomy, total abdominal hysterectomy, omentectomy, staging biopsies, bilateral pelvic and periaortic lymph  node dissection and washings. Stage IIIB  12/4/13: Cycle #1 IV/IP  1/2/14: Cycle #2 IV/IP PCP.  - 14  1/23/14: Cycle 3 IV/IP.  - 7  2/11/14:  - 7. CT C/A/P Impression:  1. Multiple bilateral pulmonary nodules as described in full report measuring up to 3 mm along the right major fissure. These are indeterminate given lack of comparison and followup is recommended.  2. No definite evidence for recurrent or metastatic disease in the abdomen or the pelvis. There is a rounded hypodense focus abutting the left external iliac artery and the adjacent sigmoid colon which measures 1 cm, this could represent a fluid-filled sigmoid diverticulum or a hypodense node, further attention to this area on subsequent examinations is recommended.  3. There is a 7 mm nonobstructing stone in the inferior collecting system of the right kidney.  2/12/14-3/26/14: Cycle #4-6 IV/IP CA-125 7, 7, 7.  4/21/14: CT C/A/P Impression:   1. Unchanged indeterminate pulmonary nodules. Recommend continued surveillance.   2. No definite evidence of metastatic ovarian cancer in the abdomen or pelvis. Small amount of subtle increased thickening and fluid is noted along the right lymph node dissection, nonspecific, but possibly a tiny developing lymphocele.   3. 6 mm nonobstructing stone in the right kidney.  Plan surveillance for ovarian cancer every 3 months with physical and .   Indeterminate pulmonary nodule: These were present before the surgery and there was not change with the chemotherapy. Highly unlikely to be a metastatic disease. Will repeat a CT in 3 months to see any change     5/22/14:  6. JOSEMANUEL.  5/17/14; ED visit [facility]. CT abd/pel noted possible chronic partial small bowel obstruction. Colonscopy scheduled for June 6, 2014 locally. Abdominal pain started 5/17/14 and noted pressure without bowel movement and went to ED that night due to increasing pain. In patient 2 day hospital with  clear liquids/IV. Mild nausea without vomiting. BM subsequently occurred and pt was discharged. No pain since. Continues with fullness in abdomen. Diet is bland for the most part.  8/25/14:  -5. Notes increased abdominal girth and bloating x 2-3 weeks with urine urgency. Worried about recurrence. Is just starting to return to normal exercise and activities. No constipation, diarrhea, pain, vaginal or rectal bleeding, cough or dyspnea. CT to follow indeterminate pulmonary nodules today.   CT cap IMPRESSION:   1. No CT scan findings of the chest, abdomen, or pelvis to indicate metastatic disease.  2. 2-5 mm pulmonary nodules, stable since 2/11/2014.  3. Nonobstructing 6 mm stone in the right kidney.  12/3/14:  8.. Pt started zoloft for anxiety. Worries about cancer recurrence.   3/2/15: CA 34  3/5/15: CT C/A/P: Impression:  1. Multiple new small peritoneal and retroperitoneal nodules are suspicious for metastases. Suspicious new left common iliac and central small bowel mesenteric nodes. No abdominal ascites.  2. Multiple pulmonary nodules are stable with no new nodules.  3. 9 mm nonobstructing right lower pole renal calculus.    5/20/15: CT c/a/p showed nodularity throughout the abdomen and pelvis worrisome for malignancy which has increased in size     5/28/15: CT abdomen and pelvis showed stable inumerabble peritoneal nodules scattered throughout the abdomen and pelvis consistent with metastases.     8/4/15 (approximatly): Left ureteral stent was placed.    8/12/15:  from [place] [id number]    8/18/15: CT chest Impression showed slight increase in mild, subtle nodularity along the diaphragm which is nonspecific but may represent metastatic disease.   8/18/15: CT abdomen and pelvis Impression showed interval progression of peritoneal metastatic disease.     8/25/15:  on 3/2/15 of 34 prompted CT c/a/p, which showed multiple new small peritoneal and retroperitoneal nodules are suspicious for metastases.  "Suspicious new left common iliac and central small bowel mesenteric nodes. She participated in [facility] study involving treatment with on clinical trial with vaccine [id number]. She is here today because the [place] trail failed and the pt pregressed. Her most recent CT c/a/p on 8/25 showed progression of peritoneal metastatic disease. And her most recent  from [place] on 8/12/15 was 303.    Plan: Carbo/Doxil x 6 cycles.with imaging after 3 cycles.     9/3/15: Cycle #1 Carbo/Doxil.  244.  9/24/15: right IJ thrombus; started on Lovenox.   9/26/15: Present to [place] ED. \"presented to the ED this morning with what appears to be a mild drug rash after administering her lovenox this morning. This writer discussed situation with Gyn Onc Fellow Jeanne Moon as well as [facility] Heme/Onc service. Per Heme, a rash of this nature is extremely uncommon with administration of Lovenox. Given the mild nature of this rash and acute need for anticoagulation, recommended the patient continue with Lovenox injections and treat with Benadryl as needed. Advised patient be given precautions to return to the ED immediately if she develops reactive respiratory symptoms. Alternatively patient could be switched to alternative formulation of low molecular weight heparin if she was strongly resistant to continuation of Lovenox.\"    10/1/15: Cycle #2 Carbo/Doxil.  175.    10/28/15: gyn onc visit: pt complains of worsening constipation and tenderness around her right ribs. She is taking senna for her constipation with minimal improvement. She admits that the swelling around her neck after her blood clot has decreased. She also admits to feeling a nodules on her left abdomen. She states her appetite is good but she has not been eating fruits and vegetables. She denies any chemo side effects besides fatigue.     10/28/15:  158  11/23/2015:  133  CT c/a/p  IMPRESSION: In this patient with a clinical history of ovarian " cancer  there is mixed response to therapy:   1. Stable to slightly decreased peritoneal nodularity since  8/18/2015.  2. No significant change regarding the large subdiaphragmatic  peritoneal deposit since 9/24/2015.  3. Enlarging soft tissue nodule overlying the abdominal musculature  concerning for metastasis since March of 2015.   4. Unchanged, indeterminate hypodensity near the gallbladder fossa  since 8/18/2015, however progressively increased in size since  3/5/2015.  5. Bilateral nephroureteral stents. No significant hydronephrosis.  6. Bilateral pulmonary nodules. Continued followup recommended.    12/23/15: Cycle 5 carboplatin/Doxil/Avastin.  96. To receive Avastin today despite proteinuria per Dr. Aguilar and nephrology.  1/21/16: Cycle 6 carboplatin/Doxil/Avastin, delayed one week secondary to neutropenia.  62.  1/28/16:  63.    2/2/16: Patient had right upper extremity doppler u/s done in [place] due to swollen glands and pain. 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 Pt is currently on lovenox.    2/2/16: US soft tissue neck  Conclusion:  1. Morphologically normal not pathologically enlarged two right carotid chain lymph nodes.  2. Chronic right jugular vein thrombosis, described in detail on  venous doppler exam.     2/2/16: Thyroid Ultrasound  Right lobe: 5.5 x 1.9 x 1.2 cm  Left lobe: 5.0 x 1.5 . 0.9 cm  Both lobes have normal background echotexture.    Conclusion:  1. 3 benign - appearing subcentimeter hypoechoic right mid thyroid nodules.  2. Borderline thyromegaly.    2/2/16: Right upper extremity venous duplex doppler evaluation  Conclusion:  1.) chronic non compressible echogenic right jugular vein thrombus, now 4.3 cm in length.     2/22/16:   IMPRESSION:    1. Ovarian cancer with peritoneal carcinomatosis.  2. Given the increased sensitivity of PET/CT,  comparison with prior CT examinations is difficult. In general the abdominal/pelvic metastatic lesions appear to be decreased in size.  3. Persistent right internal jugular DVT, as evidenced by 5 cm filling  defect with inferior border at the central venous catheter.  4. Bilateral hydronephrosis without overt caliectasis. Some distal  migration of the bilateral double-J ureteral stents, although the  proximal coiled portions continue to be in the renal pelves.    2/24/16: C7 carboplatin/Doxil/Avastin.  56.  3/9/16: Hgb 6.6, worked up for transfusion reaction (negative). Bleed possibly secondary to   3/23/16: C8 carboplatin/Doxil/Avastin.  44.  4/2016: Hospitalized in [place] x2 weeks for hematuria leading to anemia after ureteral stent exchange  4/20/16: C9D1 carboplatin/Doxil/Avastin. Deferred one week secondary to acute UTI.  35.  4/28/16: C9D1 deferred due to continued bacteruria and ANC 1.3.  5/4/16: C9D1 carboplatin/Doxil/Avastin. Breast lump noted, diagnostic mammogram ordered.  6/1/16: C10D1 carboplatin/Doxil/Avastin.  50.  6/28/16: C11D1 carboplatin/Doxil/Avastin. Avastin held due to bleeding. Carbo/Doxil deferred one week secondary to neutropenia.  43.  7/7/16: C12D1 carboplatin/Doxil. Avastin held due to bleeding.  7/18/16: Bilateral ureteral stent exchange  7/20/16-7/29/16: Hospitalized with urosepsis and blood loss anemia, started on Zosyn and discharged on amoxicillin  9/29/16: C1D1 Gemzar.  85.  10/21/16: C2D1 Gemzar.  106.   11/11/16: C3D1 Gemzar.  pending.    12/12/16: CT CAP  IMPRESSION: In this patient with a history of metastatic ovarian  cancer:  1. Progression of disease as evidenced by a slowly enlarging  mesenteric and omental soft tissue foci and slowly enlarging  periesophageal and pericardiophrenic lymph nodes, as detailed above.  2. Stable appearance of bilateral ureteral stents without  hydronephrosis.  3. Patient was premedicated for a  pre-existing IV contrast allergy.  Despite this, she had itchiness on her face poststudy. She should no  longer receive IV contrast in the future.      CT AP 1/10/2017: multiple dilated fluid filled small bowel loops with decompressed distal small bowel loops, consistent with obstruction. Transition point is suspected in the pelvis. No pneumatosis or free intraperitoneal gas. Bilateral ureteral stents appear appropriately positioned. There is mild dilation of the bilateral renal collecting systems, left greater than right.       Date Value Ref Range Status   01/05/2017 165* 0 - 30 U/mL Final     Comment:     Assay Method:  Chemiluminescence using Siemens Centaur XP   12/15/2016 145* 0 - 30 U/mL Final     Comment:     Assay Method:  Chemiluminescence using Siemens Centaur XP   12/12/2016 132* 0 - 30 U/mL Final     Comment:     Assay Method:  Chemiluminescence using Siemens Centaur XP   11/11/2016 111* 0 - 30 U/mL Final     Comment:     Assay Method:  Chemiluminescence using Siemens Centaur XP   10/21/2016 106* 0 - 30 U/mL Final     Comment:     Assay Method:  Chemiluminescence using Siemens Centaur XP   09/29/2016 85* 0 - 30 U/mL Final     Comment:     Assay Method:  Chemiluminescence using Siemens Centaur XP   09/15/2016 70* 0 - 30 U/mL Final     Comment:     Assay Method:  Chemiluminescence using Siemens Centaur XP   08/12/2016 38* 0 - 30 U/mL Final     Comment:     Assay Method:  Chemiluminescence using Siemens Centaur XP   06/28/2016 43* 0 - 30 U/mL Final   06/01/2016 50* 0 - 30 U/mL Final     Answers for HPI/ROS submitted by the patient on 1/16/2017   General Symptoms: No  Skin Symptoms: No  HENT Symptoms: No  EYE SYMPTOMS: No  HEART SYMPTOMS: Yes  LUNG SYMPTOMS: No  INTESTINAL SYMPTOMS: Yes  URINARY SYMPTOMS: Yes  GYNECOLOGIC SYMPTOMS: No  BREAST SYMPTOMS: No  SKELETAL SYMPTOMS: No  BLOOD SYMPTOMS: No  NERVOUS SYSTEM SYMPTOMS: Yes  MENTAL HEALTH SYMPTOMS: No  Chest pain or pressure: No  Fast or irregular  heartbeat: Yes  Pain in legs with walking: No  Swelling in feet or ankles: No  Trouble breathing while lying down: No  Fingers or Toes appear blue: No  High blood pressure: Yes  Low blood pressure: No  Fainting: No  Murmurs: No  Chest pain on exertion: No  Chest pain at rest: No  Cramping pain in leg during exercise: No  Pacemaker: No  Varicose veins: No  Edema or swelling: No  Fast heart beat: Yes  Wake up at night with shortness of breath: No  Heart flutters: Yes  Light-headedness: No  Exercise intolerance: No  Heart burn or indigestion: No  Nausea: Yes  Vomiting: Yes  Abdominal pain: Yes  Bloating: Yes  Constipation: Yes  Diarrhea: No  Blood in stool: No  Black stools: No  Rectal or Anal pain: No  Fecal incontinence: Yes  Rectal bleeding: No  Yellowing of skin or eyes: No  Vomit with blood: No  Change in stools: Yes  Hemorrhoids: No  Trouble holding urine or incontinence: Yes  Pain or burning: No  Trouble starting or stopping: No  Increased frequency of urination: Yes  Blood in urine: No  Decreased frequency of urination: No  Frequent nighttime urination: No  Flank pain: Yes  Difficulty emptying bladder: No  Trouble with coordination: No  Dizziness or trouble with balance: No  Fainting or black-out spells: No  Memory loss: Yes  Headache: No  Seizures: No  Speech problems: No  Tingling: Yes  Tremor: Yes  Weakness: No  Difficulty walking: No  Paralysis: No  Numbness: No          Past Medical History   Diagnosis Date     Hyperlipidemia      Osteoarthritis      Osteopenia      Polymyalgia rheumatica (H)      Ovarian cancer (H)      Primary cancer of peritoneum (H)      Hydronephrosis      Jugular vein thrombosis, right      Persistent right internal jugular DVT     Livedo annularis      History of blood transfusion      MULTIPLE     PONV (postoperative nausea and vomiting)      Anemia      Past Surgical History   Procedure Laterality Date     Appendectomy        section       Open reduction internal  fixation toe(s)  9/3/13     Fifth digit, left foot, with screw fixation      Lymph node biopsy  2008     Left posterior chain, beign     Breast surgery       biopsy negative     Laparoscopic salpingo-oophorectomy  11/7/2013     Procedure: LAPAROSCOPIC SALPINGO-OOPHORECTOMY;  Diagnostic Laparoscopy, Exploratory Laparotomy, Bilateral Salpingo-Oophorectomy,  Hysterectomy, Omentectomy, Pelvic Washings, Peritoneal staging and biopsies, Pelvic and Periaortic Lymph node Dissection;  Surgeon: Cheri Aguilar MD;  Location: [facility] OR     Hysterectomy total abdominal, bilateral salpingo-oophorectomy, node dissection, combined  11/7/2013     Procedure: COMBINED HYSTERECTOMY TOTAL ABDOMINAL, SALPINGO-OOPHORECTOMY, NODE DISSECTION;;  Surgeon: Cheri Aguilar MD;  Location: [facility] OR     Laparotomy, staging, combined  11/7/2013     Procedure: COMBINED LAPAROTOMY, STAGING;;  Surgeon: Cheri Aguilar MD;  Location: [facility] OR     Remove port peritoneal  5/5/2014     Procedure: REMOVE PORT PERITONEAL;  Surgeon: Cheri Aguilar MD;  Location: [facility] OR     Combined cystoscopy, retrogrades, exchange stent ureter(s) Bilateral 7/18/2016     Procedure: COMBINED CYSTOSCOPY, RETROGRADES, EXCHANGE STENT URETER(S);  Surgeon: Carmela Alvarez MD;  Location: [facility] OR     Combined cystoscopy, retrogrades, exchange stent ureter(s)  4/2016     @ [facility]     Combined cystoscopy, retrogrades, exchange stent ureter(s) Bilateral 10/17/2016     Procedure: COMBINED CYSTOSCOPY, RETROGRADES, EXCHANGE STENT URETER(S);  Surgeon: Carmela Alvarez MD;  Location: [facility]     Combined cystoscopy, retrogrades, exchange stent ureter(s) Bilateral 12/7/2016     Procedure: COMBINED CYSTOSCOPY, RETROGRADES, EXCHANGE STENT URETER(S);  Surgeon: Carmela Alvarez MD;  [facility]     Family History   Problem Relation Age of Onset     Arthritis Father      DIABETES       Uncle     Hypertension Mother      Hypertension Sister      Myocardial  Infarction Father      secondary to anesthesia     HEART DISEASE       unknown valve replacement     Colon Cancer Father      Other - See Comments Mother      cognitive decline     Bladder Cancer Sister      Skin Cancer Brother      Skin Cancer Mother      Social History     Social History     Marital Status:      Spouse Name: N/A     Number of Children: N/A     Years of Education: N/A     Social History Main Topics     Smoking status: Former Smoker     Smokeless tobacco: Never Used      Comment: Quit over 20 years ago     Alcohol Use: No     Drug Use: No     Sexual Activity: No     Other Topics Concern     Not on file     Social History Narrative         Current Outpatient Prescriptions   Medication     enoxaparin (LOVENOX) 40 MG/0.4ML injection     LORazepam (ATIVAN) 0.5 MG tablet     dexamethasone (DECADRON) 4 MG tablet     tamsulosin (FLOMAX) 0.4 MG capsule     LORazepam (ATIVAN) 1 MG tablet     tolterodine (DETROL LA) 4 MG 24 hr capsule     ACETAMINOPHEN PO     ferrous sulfate (IRON) 325 (65 FE) MG tablet     Multiple Vitamins-Minerals (ONE DAILY MULTIVITAMIN WOMEN) TABS     polyethylene glycol (MIRALAX/GLYCOLAX) powder     cycloSPORINE (RESTASIS) 0.05 % ophthalmic emulsion     Ranitidine HCl (ZANTAC PO)     MELATONIN PO     EPINEPHrine (EPIPEN) 0.3 MG/0.3ML injection     senna-docusate (SENOKOT-S;PERICOLACE) 8.6-50 MG per tablet     Pyridoxine HCl (VITAMIN B6 PO)     study - niraparib (IDS# [id number]) 100 mg capsule     ondansetron (ZOFRAN) 8 MG tablet     prochlorperazine (COMPAZINE) 10 MG tablet     No current facility-administered medications for this visit.        Allergies   Allergen Reactions     Contrast Dye Itching and Swelling     Itching/tingling of mouth and nose with sensation of swelling after receiving IV contrast for CT.  This occurred despite steroid premedication. Therefore the patient should not receive intravenous contrast in the future.      Benadryl Allergy Other (See Comments)     Stay  "awake, restless, \"uncomfortable\", \"feel like I need to run away\"      Enoxaparin Hives     3/23/16: Okay to receive heparin flushes in port, tolerates well. Patricia Cortez FNP-C     Enoxaparin Sodium Other (See Comments)     Pt is taking this now.     Amoxicillin Rash     Diphenhydramine Anxiety     No Clinical Screening - See Comments Rash     Pollen Extract Rash     Sulfa Drugs Rash       Physical Exam:    /76 mmHg  Pulse 108  Temp(Src) 98.6  F (37  C) (Oral)  Resp 14  SpO2 100%    General: Alert non-toxic appearing female in no acute distress  HEENT: Normocephalic, atraumatic;  PERRLA; no scleral icterus; oropharynx pink without lesions; neck supple without lymphadenopathy  Pulmonary: Lungs clear to auscultation, no increased work of breathing noted  Cardiac: regular rate and rhythm, S1S2, grade II murmur, consistent with prior exams  Gastrointestinal: Abdomen soft, non-tender. BS normal. No masses, organomegaly     Performance Status -0    Assessment/Plan:  1) Primary peritoneal cancer: Patient to restart (parp-inhibitor)-niraparib today and to resume at same dose as I do not believe partial SBO due to drug. Symptoms completely resolved . Patient to RTC to see me in 2 weeks.    2) Reviewed signs and symptoms for when she should contact the clinic or seek additional care, including but not limited to fever, chills, inability to keep down food or fluids, nausea and vomiting not controlled with antiemetics, and diarrhea leading to dehydration.     3) Nausea: Related to pain vs chemotherapy vs disease. Continue with Zofran and Ativan as this is helpful for her.      Constipation: Patient to regularly take Miralax to promote daily bowel movements and relieve constipation.    4) Genetic counseling: Has been done, negative for mutations in the following: BRCA1, BRCA2, EPCAM, MLH1, MSH2, MSH6, PMS2, PTEN and TP53.     5) Health maintenance issues discussed include to follow up with PCP for non-gynecologic " issues.  Continue on prophylactic lovenox at 40 SC daily due to history of prior bleeding on higher dose - 30 day supply ordered.      6) Marshall verbalizes understanding of and agreement with plan.    Of a 40 minute appointment, more than 50% was spent in counseling the patient.      Jia Mccollum MD    Department of Ob/Gyn and Women's Health  Division of Gynecologic Oncology  [facility]  [phone]      I have reviewed the above note and agree with the scribe's notation as written.        I, Kolton Spencer, am serving as a scribe to document services personally performed by Jia Mccollum MD, based upon my observations and the provider's statements to me. All documentation has been reviewed by the aforementioned doctor prior to being entered into the official medical record.

## 2017-01-19 NOTE — NURSING NOTE
"Margaux Guzmán is a 61 year old female who presents for:  Chief Complaint   Patient presents with     Blood Draw     Labs Drawn      Oncology Clinic Visit     Return patient visit- Ovarian cancer, left (H)        Initial Vitals:  /76 mmHg  Pulse 108  Temp(Src) 98.6  F (37  C) (Oral)  Resp 14  SpO2 100% Estimated body mass index is 20.39 kg/(m^2) as calculated from the following:    Height as of 1/5/17: 1.676 m (5' 5.98\").    Weight as of 1/10/17: 57.289 kg (126 lb 4.8 oz).. There is no height or weight on file to calculate BSA. BP completed using cuff size: NA (Not Taken)  Data Unavailable No LMP recorded. Patient has had a hysterectomy. Allergies and medications reviewed.     Medications: Medication refills not needed today.  Pharmacy name entered into [employer]:    [id number] - [place], [place] - [address] AT [address] [facility] [address]  [id number] - [place] - [address]  [facility] - [place], [place] [address] [facility] [place], [place] - [phone]  [place], [place] - [address] [place] [address] [id number] - [place] - [address]    Comments: vitals done in lab    5 minutes for nursing intake (face to face time)   Ember Woodward CMA          "

## 2017-01-19 NOTE — PROGRESS NOTES
Patient here today for C1D15 for the Tesaro/Quadra Niraparib study, as well as follow up from hospitalization for small bowel obstruction.  Patient's small bowel obstruction resulted on 1/15/2017 after being discharged from the hospital.  Patient labs reviewed and Dr. Mccollum approved patient to resume study medication at previous dose of 300 mg daily.  Patient will resume medication tomorrow morning.  Patient has no new issues or complaints at this time.  Patient's next appointments made per protocol.      Aurelia Worley RN     Pager [phone]

## 2017-01-19 NOTE — TELEPHONE ENCOUNTER
Received call from patient returning Dr. Baker's message below. Advised of the below. She tells me that she has already been taking 2mg of dexamethasone for 2 days, today is day 3. So she will take 2mg today and tomorrow and then stop - she notes that she has plenty of the medication left at this time. Patient not interested in gabapentin at this time, but will call back if she changes her mind. She reports her pain is being well controlled with consistent acetaminophen use TID. Patient has palliative contact info for any arising questions/concerns/changes.

## 2017-01-20 NOTE — NURSING NOTE
"Chief Complaint   Patient presents with     RECHECK     Stent exchange follow up       Blood pressure 141/89, pulse 98, height 1.676 m (5' 5.98\"), weight 58.333 kg (128 lb 9.6 oz). Body mass index is 20.77 kg/(m^2).    Patient Active Problem List   Diagnosis     Primary peritoneal adenocarcinoma (H)     Recurrent cold sores     Pulmonary nodules     Encounter for long-term current use of medication     DVT (deep venous thrombosis) (H)     Family history of peritoneal cancer     Protein in urine     Ovarian cancer, left (H)     Ovarian cancer, right (H)     Chemotherapy-induced neutropenia (H)     Anemia     Pain due to ureteral stent (H)     Sterile pyuria     Pyelonephritis     Small bowel obstruction (H)       Allergies   Allergen Reactions     Contrast Dye Itching and Swelling     Itching/tingling of mouth and nose with sensation of swelling after receiving IV contrast for CT.  This occurred despite steroid premedication. Therefore the patient should not receive intravenous contrast in the future.      Benadryl Allergy Other (See Comments)     Stay awake, restless, \"uncomfortable\", \"feel like I need to run away\"      Enoxaparin Hives     3/23/16: Okay to receive heparin flushes in port, tolerates well. Patricia EDGARP-C     Enoxaparin Sodium Other (See Comments)     Pt is taking this now.     Amoxicillin Rash     Diphenhydramine Anxiety     No Clinical Screening - See Comments Rash     Pollen Extract Rash     Sulfa Drugs Rash       Current Outpatient Prescriptions   Medication Sig Dispense Refill     enoxaparin (LOVENOX) 40 MG/0.4ML injection Inject 0.4 mLs (40 mg) Subcutaneous daily 30 Syringe 3     LORazepam (ATIVAN) 0.5 MG tablet Take 1 tablet (0.5 mg) by mouth every 4 hours as needed (Anxiety, Nausea/Vomiting or Sleep) 30 tablet 3     study - niraparib (IDS# 4759) 100 mg capsule Take 3 capsules (300 mg) by mouth every morning Take at the same time each day, preferably morning. Swallow whole and do NOT chew " capsules. Food and water is permissible. First dose will be administered on site. 84 capsule 0     dexamethasone (DECADRON) 4 MG tablet Take 1 tablet (4 mg) by mouth daily (with breakfast) 14 tablet 1     tamsulosin (FLOMAX) 0.4 MG capsule Take 1 capsule (0.4 mg) by mouth daily 60 capsule 6     LORazepam (ATIVAN) 1 MG tablet Take 1 tablet (1 mg) by mouth every 6 hours as needed (nausea/vomiting, anxiety or sleep) 30 tablet 3     tolterodine (DETROL LA) 4 MG 24 hr capsule Take 1 capsule (4 mg) by mouth daily 30 capsule 3     ondansetron (ZOFRAN) 8 MG tablet Take 1 tablet (8 mg) by mouth every 8 hours as needed for nausea 30 tablet 2     ACETAMINOPHEN PO Take 500 mg by mouth every 6 hours as needed for pain       ferrous sulfate (IRON) 325 (65 FE) MG tablet Take 325 mg by mouth 2 times daily       Multiple Vitamins-Minerals (ONE DAILY MULTIVITAMIN WOMEN) TABS Take 1 tablet by mouth every morning        polyethylene glycol (MIRALAX/GLYCOLAX) powder Take 1 capful by mouth daily as needed        cycloSPORINE (RESTASIS) 0.05 % ophthalmic emulsion Place 1 drop into both eyes 2 times daily        Ranitidine HCl (ZANTAC PO) Take 75 mg by mouth daily        MELATONIN PO Take 1 mg by mouth At Bedtime        EPINEPHrine (EPIPEN) 0.3 MG/0.3ML injection Inject 0.3 mLs (0.3 mg) into the muscle once as needed for anaphylaxis 1 each 0     senna-docusate (SENOKOT-S;PERICOLACE) 8.6-50 MG per tablet Take 1-2 tablets by mouth 2 times daily 7 tablet      Pyridoxine HCl (VITAMIN B6 PO) Take 1 tablet by mouth At Bedtime        prochlorperazine (COMPAZINE) 10 MG tablet Take 1 tablet (10 mg) by mouth every 6 hours as needed (nausea/vomiting) 30 tablet 2       Social History   Substance Use Topics     Smoking status: Former Smoker     Smokeless tobacco: Former User      Comment: Quit over 20 years ago     Alcohol Use: EDWARD Valentine  1/20/2017  11:49 AM

## 2017-01-20 NOTE — Clinical Note
2017       RE: Margaux Guzmán  [address] [facility] [address]     Dear Colleague,    Thank you for referring your patient, Margaux Guzmán, to the [facility] UROLOGY AND INST FOR PROSTATE AND UROLOGIC CANCERS at [facility]. Please see a copy of my visit note below.    Office Visit Note      UROLOGIC DIAGNOSES:   Hydronephrosis     CURRENT INTERVENTIONS:   Ureteral stents     HISTORY:   Patient with history of primary peritoneal carcinoma found to have bilateral renal obstruction. Has had multiple stent exchanges, often complicated by hematuria.   Right with poor function. Left side shows some hydronephrosis and patient notes intermittent left flank pain that has improved with upsizing the stents to 8 Uz[place].     We discussed hydronephrosis, flank pain, UTIs, stent exchange.     PAST MEDICAL HISTORY:   Past Medical History   Diagnosis Date     Hyperlipidemia      Osteoarthritis      Osteopenia      Polymyalgia rheumatica (H)      Ovarian cancer (H)      Primary cancer of peritoneum (H)      Hydronephrosis      Jugular vein thrombosis, right      Persistent right internal jugular DVT     Livedo annularis      History of blood transfusion      MULTIPLE     PONV (postoperative nausea and vomiting)      Anemia        PAST SURGICAL HISTORY:   Past Surgical History   Procedure Laterality Date     Appendectomy        section       Open reduction internal fixation toe(s)  9/3/13     Fifth digit, left foot, with screw fixation      Lymph node biopsy       Left posterior chain, beign     Breast surgery       biopsy negative     Laparoscopic salpingo-oophorectomy  2013     Procedure: LAPAROSCOPIC SALPINGO-OOPHORECTOMY;  Diagnostic Laparoscopy, Exploratory Laparotomy, Bilateral Salpingo-Oophorectomy,  Hysterectomy, Omentectomy, Pelvic Washings, Peritoneal staging and biopsies, Pelvic and Periaortic Lymph node Dissection;  Surgeon: Cheri Aguilar,  MD;  Location: [facility] OR     Hysterectomy total abdominal, bilateral salpingo-oophorectomy, node dissection, combined  11/7/2013     Procedure: COMBINED HYSTERECTOMY TOTAL ABDOMINAL, SALPINGO-OOPHORECTOMY, NODE DISSECTION;;  Surgeon: Cheri Aguilar MD;  Location: [facility] OR     Laparotomy, staging, combined  11/7/2013     Procedure: COMBINED LAPAROTOMY, STAGING;;  Surgeon: Cheri Aguilar MD;  Location: [facility] OR     Remove port peritoneal  5/5/2014     Procedure: REMOVE PORT PERITONEAL;  Surgeon: Cheri Aguilar MD;  Location: [facility] OR     Combined cystoscopy, retrogrades, exchange stent ureter(s) Bilateral 7/18/2016     Procedure: COMBINED CYSTOSCOPY, RETROGRADES, EXCHANGE STENT URETER(S);  Surgeon: Carmela Alvarez MD;  Location: [facility] OR     Combined cystoscopy, retrogrades, exchange stent ureter(s)  4/2016     @ [facility]     Combined cystoscopy, retrogrades, exchange stent ureter(s) Bilateral 10/17/2016     Procedure: COMBINED CYSTOSCOPY, RETROGRADES, EXCHANGE STENT URETER(S);  Surgeon: Carmela Alvarez MD;  Location: [facility]     Combined cystoscopy, retrogrades, exchange stent ureter(s) Bilateral 12/7/2016     Procedure: COMBINED CYSTOSCOPY, RETROGRADES, EXCHANGE STENT URETER(S);  Surgeon: Carmela Alvarez MD;  Location: [facility]       FAMILY HISTORY:   Family History   Problem Relation Age of Onset     Arthritis Father      DIABETES       Uncle     Hypertension Mother      Hypertension Sister      Myocardial Infarction Father      secondary to anesthesia     HEART DISEASE       unknown valve replacement     Colon Cancer Father      Other - See Comments Mother      cognitive decline     Bladder Cancer Sister      Skin Cancer Brother      Skin Cancer Mother        SOCIAL HISTORY:   Social History   Substance Use Topics     Smoking status: Former Smoker     Smokeless tobacco: Former User      Comment: Quit over 20 years ago     Alcohol Use: No       Current Outpatient Prescriptions   Medication  "    enoxaparin (LOVENOX) 40 MG/0.4ML injection     LORazepam (ATIVAN) 0.5 MG tablet     study - niraparib (IDS# 4759) 100 mg capsule     dexamethasone (DECADRON) 4 MG tablet     tamsulosin (FLOMAX) 0.4 MG capsule     LORazepam (ATIVAN) 1 MG tablet     tolterodine (DETROL LA) 4 MG 24 hr capsule     ondansetron (ZOFRAN) 8 MG tablet     ACETAMINOPHEN PO     ferrous sulfate (IRON) 325 (65 FE) MG tablet     Multiple Vitamins-Minerals (ONE DAILY MULTIVITAMIN WOMEN) TABS     polyethylene glycol (MIRALAX/GLYCOLAX) powder     cycloSPORINE (RESTASIS) 0.05 % ophthalmic emulsion     Ranitidine HCl (ZANTAC PO)     MELATONIN PO     EPINEPHrine (EPIPEN) 0.3 MG/0.3ML injection     senna-docusate (SENOKOT-S;PERICOLACE) 8.6-50 MG per tablet     Pyridoxine HCl (VITAMIN B6 PO)     prochlorperazine (COMPAZINE) 10 MG tablet     No current facility-administered medications for this visit.         PHYSICAL EXAM:    /89 mmHg  Pulse 98  Ht 1.676 m (5' 5.98\")  Wt 58.333 kg (128 lb 9.6 oz)  BMI 20.77 kg/m2    HEENT: Normocephalic and atraumatic   Cardiac: Not done  Back/Flank: Not done  CNS/PNS: Not done  Respiratory: Normal non-labored breathing  Abdomen: Soft nontender and nondistended  Peripheral Vascular: Not done  Mental Status: Not done    Penis: Not done  Scrotal Skin: Not done  Testicles: Not done  Epididymis: Not done  Digital Rectal Exam:     Cystoscopy: Not done    Imaging: None    Urinalysis: [facility] RESULTS:  Recent Labs   Lab Test  10/28/16   1150   COLOR  Yellow   APPEARANCE  Slightly Cloudy   URINEGLC  Negative   URINEBILI  Negative   URINEKETONE  Negative   SG  1.018   UBLD  Large*   URINEPH  6.0   PROTEIN  100*   NITRITE  Negative   LEUKEST  Moderate*   RBCU  168*   WBCU  8*       PSA:     Post Void Residual:     Other labs: None today      IMPRESSION:  61 y/o F with bilateral hydronephrosis     PLAN:  Plan for stent exchange in 03/2017  Patient will call to confirm date in one month       Total Time: 15 minutes          "                        Total in Consultation: greater than 50%     Again, thank you for allowing me to participate in the care of your patient.      Sincerely,    Carmela Alvarez MD

## 2017-01-21 NOTE — PROGRESS NOTES
Office Visit Note      UROLOGIC DIAGNOSES:   Hydronephrosis     CURRENT INTERVENTIONS:   Ureteral stents     HISTORY:   Patient with history of primary peritoneal carcinoma found to have bilateral renal obstruction. Has had multiple stent exchanges, often complicated by hematuria.   Right with poor function. Left side shows some hydronephrosis and patient notes intermittent left flank pain that has improved with upsizing the stents to 8 [place]arwanda.     We discussed hydronephrosis, flank pain, UTIs, stent exchange.     PAST MEDICAL HISTORY:   Past Medical History   Diagnosis Date     Hyperlipidemia      Osteoarthritis      Osteopenia      Polymyalgia rheumatica (H)      Ovarian cancer (H)      Primary cancer of peritoneum (H)      Hydronephrosis      Jugular vein thrombosis, right      Persistent right internal jugular DVT     Livedo annularis      History of blood transfusion      MULTIPLE     PONV (postoperative nausea and vomiting)      Anemia        PAST SURGICAL HISTORY:   Past Surgical History   Procedure Laterality Date     Appendectomy        section       Open reduction internal fixation toe(s)  9/3/13     Fifth digit, left foot, with screw fixation      Lymph node biopsy       Left posterior chain, beign     Breast surgery       biopsy negative     Laparoscopic salpingo-oophorectomy  2013     Procedure: LAPAROSCOPIC SALPINGO-OOPHORECTOMY;  Diagnostic Laparoscopy, Exploratory Laparotomy, Bilateral Salpingo-Oophorectomy,  Hysterectomy, Omentectomy, Pelvic Washings, Peritoneal staging and biopsies, Pelvic and Periaortic Lymph node Dissection;  Surgeon: Cheri Aguilar MD;  Location: [facility]     Hysterectomy total abdominal, bilateral salpingo-oophorectomy, node dissection, combined  2013     Procedure: COMBINED HYSTERECTOMY TOTAL ABDOMINAL, SALPINGO-OOPHORECTOMY, NODE DISSECTION;;  Surgeon: Cheri Aguilar MD;  Location: [facility]     Laparotomy, staging, combined  2013      Procedure: COMBINED LAPAROTOMY, STAGING;;  Surgeon: Cheri Aguilar MD;  Location: [facility]     Remove port peritoneal  5/5/2014     Procedure: REMOVE PORT PERITONEAL;  Surgeon: Cheri Aguilar MD;  Location: [facility]     Combined cystoscopy, retrogrades, exchange stent ureter(s) Bilateral 7/18/2016     Procedure: COMBINED CYSTOSCOPY, RETROGRADES, EXCHANGE STENT URETER(S);  Surgeon: Carmela Alvarez MD;  Location: [facility]     Combined cystoscopy, retrogrades, exchange stent ureter(s)  4/2016     @ [facility]     Combined cystoscopy, retrogrades, exchange stent ureter(s) Bilateral 10/17/2016     Procedure: COMBINED CYSTOSCOPY, RETROGRADES, EXCHANGE STENT URETER(S);  Surgeon: Carmela Alvarez MD;  Location: [facility]     Combined cystoscopy, retrogrades, exchange stent ureter(s) Bilateral 12/7/2016     Procedure: COMBINED CYSTOSCOPY, RETROGRADES, EXCHANGE STENT URETER(S);  Surgeon: Carmela Alvarez MD;  [facility]       FAMILY HISTORY:   Family History   Problem Relation Age of Onset     Arthritis Father      DIABETES       Uncle     Hypertension Mother      Hypertension Sister      Myocardial Infarction Father      secondary to anesthesia     HEART DISEASE       unknown valve replacement     Colon Cancer Father      Other - See Comments Mother      cognitive decline     Bladder Cancer Sister      Skin Cancer Brother      Skin Cancer Mother        SOCIAL HISTORY:   Social History   Substance Use Topics     Smoking status: Former Smoker     Smokeless tobacco: Former User      Comment: Quit over 20 years ago     Alcohol Use: No       Current Outpatient Prescriptions   Medication     enoxaparin (LOVENOX) 40 MG/0.4ML injection     LORazepam (ATIVAN) 0.5 MG tablet     study - niraparib (IDS# 4759) 100 mg capsule     dexamethasone (DECADRON) 4 MG tablet     tamsulosin (FLOMAX) 0.4 MG capsule     LORazepam (ATIVAN) 1 MG tablet     tolterodine (DETROL LA) 4 MG 24 hr capsule     ondansetron  "(ZOFRAN) 8 MG tablet     ACETAMINOPHEN PO     ferrous sulfate (IRON) 325 (65 FE) MG tablet     Multiple Vitamins-Minerals (ONE DAILY MULTIVITAMIN WOMEN) TABS     polyethylene glycol (MIRALAX/GLYCOLAX) powder     cycloSPORINE (RESTASIS) 0.05 % ophthalmic emulsion     Ranitidine HCl (ZANTAC PO)     MELATONIN PO     EPINEPHrine (EPIPEN) 0.3 MG/0.3ML injection     senna-docusate (SENOKOT-S;PERICOLACE) 8.6-50 MG per tablet     Pyridoxine HCl (VITAMIN B6 PO)     prochlorperazine (COMPAZINE) 10 MG tablet     No current facility-administered medications for this visit.         PHYSICAL EXAM:    /89 mmHg  Pulse 98  Ht 1.676 m (5' 5.98\")  Wt 58.333 kg (128 lb 9.6 oz)  BMI 20.77 kg/m2    HEENT: Normocephalic and atraumatic   Cardiac: Not done  Back/Flank: Not done  CNS/PNS: Not done  Respiratory: Normal non-labored breathing  Abdomen: Soft nontender and nondistended  Peripheral Vascular: Not done  Mental Status: Not done    Penis: Not done  Scrotal Skin: Not done  Testicles: Not done  Epididymis: Not done  Digital Rectal Exam:     Cystoscopy: Not done    Imaging: None    Urinalysis: [facility] RESULTS:  Recent Labs   Lab Test  10/28/16   1150   COLOR  Yellow   APPEARANCE  Slightly Cloudy   URINEGLC  Negative   URINEBILI  Negative   URINEKETONE  Negative   SG  1.018   UBLD  Large*   URINEPH  6.0   PROTEIN  100*   NITRITE  Negative   LEUKEST  Moderate*   RBCU  168*   WBCU  8*       PSA:     Post Void Residual:     Other labs: None today      IMPRESSION:  61 y/o F with bilateral hydronephrosis     PLAN:  Plan for stent exchange in 03/2017  Patient will call to confirm date in one month       Total Time: 15 minutes                                 Total in Consultation: greater than 50%                         "

## 2017-01-23 PROBLEM — E86.0 DEHYDRATION: Status: ACTIVE | Noted: 2017-01-01

## 2017-01-23 PROBLEM — Z00.6 PATIENT IN CANCER RELATED RESEARCH STUDY: Status: ACTIVE | Noted: 2017-01-01

## 2017-01-23 NOTE — TELEPHONE ENCOUNTER
Patient called today wanting to report having increased heart rate and dizziness over the weekend.  Patient is on the Tesaro/Quadra Niraparib study.  Patient states that is also getting constipated again.  She is taking stool softeners as well as Miralax daily.  Patient is wondering if she should continue taking the study medication or come in for ECG.      Routed to Dr. Veda Worley, RN     Pager [phone]

## 2017-01-23 NOTE — TELEPHONE ENCOUNTER
Appointment made for patient to come into clinic today to see Ptaricia Murcia CNP at 1:20 pm.       Patient aware.

## 2017-01-23 NOTE — PROGRESS NOTES
Patient was seen today for AE's possibly related to the Tesaro/Quadra Niraparib study.  Patient c/o of dizziness, heart racing, constipation, and low blood pressure while standing.  Patient had labs drawn, ECG, orthostatic blood pressures, and IV fluids.  Orthostatic blood pressures showed a drop in blood pressures when patient was standing.  ECG was NCS.  Patricia Murcia CNP will review labs when available and update patient on results and if she should continue study medication.      Aurelia Worley RN     Pager [phone]

## 2017-01-23 NOTE — MR AVS SNAPSHOT
After Visit Summary   1/23/2017    Margaux Guzmán    MRN: [id number]           Patient Information     Date Of Birth          1955        Visit Information        Provider Department      1/23/2017 3:30 PM  42 ATC;  SPEC INFUSION [facility]        Today's Diagnoses     Dehydration    -  1     Ovarian cancer, left (H)         Ovarian cancer, right (H)         Heart palpitations         Dizziness           Care Instructions    Dear Margaux Guzmán    Thank you for choosing [facility] ([facility]) for your infusion.  The following information is a summary of our appointment as well as important reminders.      You received 1 liter of normal saline and we yashira labs today.     We look forward in seeing you on your next appointment here at [facility].  Please don t hesitate to call us at [phone] to reschedule any of your appointments or to speak with one of the [facility] registered nurses.  It was a pleasure taking care of you today.    Sincerely,  Kathryn Desai RN  [facility]  Special[facility]  [address] [place] [address]  [address]  Phone:  [phone]        Follow-ups after your visit        Your next 10 appointments already scheduled     Jan 26, 2017  9:30 AM   [facility] with  [facility]   [facility] ([place])    [address] [place] [id number] [phone]            Feb 02, 2017  9:15 AM   [facility] Draw with  [facility] DRAW   [facility] ([place])    [address] [place] [id number] [phone]            Feb 02, 2017  9:40 AM   (Arrive by 9:25 AM)   Return Visit with Jia Mccollum MD   [facility] ([place])    [facility]  [facility]  [address] [facility] [address] [phone]            Apr 12, 2017 10:00 AM   Lab with  LAB   [facility] ([facility])    [address] [facility]  [address] [facility] [address] [phone]            Apr 12, 2017 10:20 AM   (Arrive by 9:50 AM)   Return Visit with Mackenzie Ca MD   [facility] ([facility])    [address] [place] [address]  [address] [facility] [id number] [phone]              Future tests that were ordered for you today     Open Future Orders        Priority Expected Expires Ordered    Vitamin B12 Routine 1/23/2017 1/23/2018 1/23/2017            Who to contact     If you have questions or need follow up information about today's clinic visit or your schedule please contact [facility] SPECIALTY AND PROCEDURE directly at [phone].  Normal or non-critical lab and imaging results will be communicated to you by Epizymehart, letter or phone within 4 business days after the clinic has received the results. If you do not hear from us within 7 days, please contact the clinic through [facility] or phone. If you have a critical or abnormal lab result, we will notify you by phone as soon as possible.  Submit refill requests through blabfeed or call your pharmacy and they will forward the refill request to us. Please allow 3 business days for your refill to be completed.          Additional Information About Your Visit        Epizymehart Information     blabfeed gives you secure access to your electronic health record. If you see a primary care provider, you can also send messages to your care team and make appointments. If you have questions, please call your primary care clinic.  If you do not have a primary care provider, please call [phone] and they will assist you.        Care EveryWhere ID     This is your Care EveryWhere ID. This could be used by other organizations to access your  [place] medical records  [id number]        Your Vitals Were     Pulse                   113            Blood Pressure from Last 3 Encounters:   01/23/17 115/39   01/23/17 125/70   01/20/17 141/89    Weight from Last 3 Encounters:   01/23/17 58.242 kg (128 lb 6.4 oz)   01/20/17 58.333 kg (128 lb 9.6 oz)   01/15/17 57.289 kg (126 lb 4.8 oz)              We Performed the Following     BMP - Basic Metabolic Panel     CBC with platelets differential     Magnesium     Vitamin B12          Today's Medication Changes          These changes are accurate as of: 1/23/17  5:04 PM.  If you have any questions, ask your nurse or doctor.               Stop taking these medicines if you haven't already. Please contact your care team if you have questions.     ferrous sulfate 325 (65 FE) MG tablet   Commonly known as:  IRON   Stopped by:  Patricia Murcia APRN CNP                    Primary Care Provider Office Phone # Fax #    Sophia Garcia PA-C [phone]       [facility] MED[facility] [address] [place] [address]        Thank you!     Thank you for choosing [facility]ED[facility]  for your care. Our goal is always to provide you with excellent care. Hearing back from our patients is one way we can continue to improve our services. Please take a few minutes to complete the written survey that you may receive in the mail after your visit with us. Thank you!             Your Updated Medication List - Protect others around you: Learn how to safely use, store and throw away your medicines at www.disposemymeds.org.          This list is accurate as of: 1/23/17  5:04 PM.  Always use your most recent med list.                   Brand Name Dispense Instructions for use    ACETAMINOPHEN PO      Take 500 mg by mouth every 6 hours as needed for pain       cycloSPORINE 0.05 % ophthalmic emulsion    RESTASIS     Place 1 drop into both eyes 2 times daily       dexamethasone 4 MG  tablet    DECADRON    14 tablet    Take 1 tablet (4 mg) by mouth daily (with breakfast)       enoxaparin 40 MG/0.4ML injection    LOVENOX    30 Syringe    Inject 0.4 mLs (40 mg) Subcutaneous daily       EPINEPHrine 0.3 MG/0.3ML injection     1 each    Inject 0.3 mLs (0.3 mg) into the muscle once as needed for anaphylaxis       * LORazepam 1 MG tablet    ATIVAN    30 tablet    Take 1 tablet (1 mg) by mouth every 6 hours as needed (nausea/vomiting, anxiety or sleep)       * LORazepam 0.5 MG tablet    ATIVAN    30 tablet    Take 1 tablet (0.5 mg) by mouth every 4 hours as needed (Anxiety, Nausea/Vomiting or Sleep)       MELATONIN PO      Take 1 mg by mouth At Bedtime       ondansetron 8 MG tablet    ZOFRAN    30 tablet    Take 1 tablet (8 mg) by mouth every 8 hours as needed for nausea       ONE DAILY MULTIVITAMIN WOMEN Tabs      Take 1 tablet by mouth every morning       polyethylene glycol powder    MIRALAX/GLYCOLAX     Take 1 capful by mouth daily as needed       prochlorperazine 10 MG tablet    COMPAZINE    30 tablet    Take 1 tablet (10 mg) by mouth every 6 hours as needed (nausea/vomiting)       senna-docusate 8.6-50 MG per tablet    SENOKOT-S;PERICOLACE    7 tablet    Take 1-2 tablets by mouth 2 times daily       study - niraparib 100 mg capsule    IDS# [id number]    84 capsule    Take 3 capsules (300 mg) by mouth every morning Take at the same time each day, preferably morning. Swallow whole and do NOT chew capsules. Food and water is permissible. First dose will be administered on site.       tamsulosin 0.4 MG capsule    FLOMAX    60 capsule    Take 1 capsule (0.4 mg) by mouth daily       tolterodine 4 MG 24 hr capsule    DETROL LA    30 capsule    Take 1 capsule (4 mg) by mouth daily       VITAMIN B6 PO      Take 1 tablet by mouth At Bedtime       ZANTAC PO      Take 75 mg by mouth daily       * Notice:  This list has 2 medication(s) that are the same as other medications prescribed for you. Read the directions  carefully, and ask your doctor or other care provider to review them with you.

## 2017-01-23 NOTE — PROGRESS NOTES
"Gynecologic Oncology Follow-Up Visit  RE: Margaux Guzmán  MRN: [id number]  : 1955  Date of Visit: 2017    CC: Margaux Guzmán is a 61 year old  female with a history of primary peritoneal carcinoma on treatment with niraparib. She presents today for an acute visit regarding dizziness and heart palpitations.    HPI:  Marshall comes to the clinic feeling poorly and is accompanied by her daughter Elissa. She was restarted on niraparib after resolution of a small bowel obstruction on 17. She states she has been having heart palpitations and a fast heart rate since she began taking dexamethasone roughly three weeks ago. She titrated off dexamethasone with her last dose 17, however, she has still been having heart palpitations and a fast heart rate. She notices heart palpitations after taking Compazine. She has been feeling dizzy for the past 24 hours with it being much worse while standing. She also states she has taken her heart rate while sitting and standing and that it is higher while standing. She has not had any falls, but was unable to drive today due to dizziness. She feels she is drinking well and drank two large bottles of Gatorade yesterday which was somewhat helpful but then dizziness returned. She denies polydipsia or polyuria. Nausea which she associates with her stent pain but denies vomiting. Denies any asa bleeding but notes that she continues to have microscopic hematuria per her urinalyses and had more discomfort with her stents this past week- was seen by urology. Her constipation has resolved after taking Senna and Miralax- she notes that she may have taken too many laxatives as she had an episode of fecal incontinence- no rectal bleeding or melena. She does note that she \"overexerted\" herself the past two days caring for her sick dog and thinks this may have exacerbated her issues. Her daughter also notes that Marshall's lips appeared slightly blue at dinner yesterday " evening. She has not taken her niraparib yet today.    Oncology History:  The patient is a 58 year old  female who initially presented for evaluation of pelvic and abdominal pain. On exam, she had mild right adnexal tenderness and slightly enlarged right ovary freely mobile and smooth. This prompted an abdominal ultrasound, which was normal, and a pelvic US that showed anechoic right ovarian cyst measuring 4.3 x 3.8 x 4.0 cm. Her  was elevated at 74 on 10/8/13. We reviewed the possible diagnosis including ovarian cancer versus benign ovarian cyst. Approach to surgery, alternatives to surgery and plan for possible extended open surgical staging based on the operative findings was discussed. In light of the size of the ovary we are offering an initial approach with minimally invasive surgery. After discussion of risks and benefits, consent was obtained.  11/7/13 Diagnostic laparoscopy converted to exploratory laparotomy, bilateral salpingo-oophorectomy, total abdominal hysterectomy, omentectomy, staging biopsies, bilateral pelvic and periaortic lymph node dissection and washings. Stage IIIB  12/4/13: Cycle #1 IV/IP  1/2/14: Cycle #2 IV/IP PCP.  - 14  1/23/14: Cycle 3 IV/IP.  - 7  2/11/14:  - 7. CT C/A/P Impression:  1. Multiple bilateral pulmonary nodules as described in full report measuring up to 3 mm along the right major fissure. These are indeterminate given lack of comparison and followup is recommended.  2. No definite evidence for recurrent or metastatic disease in the abdomen or the pelvis. There is a rounded hypodense focus abutting the left external iliac artery and the adjacent sigmoid colon which measures 1 cm, this could represent a fluid-filled sigmoid diverticulum or a hypodense node, further attention to this area on subsequent examinations is recommended.  3. There is a 7 mm nonobstructing stone in the inferior collecting system of the right kidney.  2/12/14-3/26/14:  Cycle #4-6 IV/IP CA-125 7, 7, 7.  4/21/14: CT C/A/P Impression:   1. Unchanged indeterminate pulmonary nodules. Recommend continued surveillance.   2. No definite evidence of metastatic ovarian cancer in the abdomen or pelvis. Small amount of subtle increased thickening and fluid is noted along the right lymph node dissection, nonspecific, but possibly a tiny developing lymphocele.   3. 6 mm nonobstructing stone in the right kidney.  Plan surveillance for ovarian cancer every 3 months with physical and .   Indeterminate pulmonary nodule: These were present before the surgery and there was not change with the chemotherapy. Highly unlikely to be a metastatic disease. Will repeat a CT in 3 months to see any change     5/22/14:  6. JOSEMANUEL.  5/17/14; ED visit [facility]. CT abd/pel noted possible chronic partial small bowel obstruction. Colonscopy scheduled for June 6, 2014 locally. Abdominal pain started 5/17/14 and noted pressure without bowel movement and went to ED that night due to increasing pain. In patient 2 day hospital with clear liquids/IV. Mild nausea without vomiting. BM subsequently occurred and pt was discharged. No pain since. Continues with fullness in abdomen. Diet is bland for the most part.  8/25/14:  -5. Notes increased abdominal girth and bloating x 2-3 weeks with urine urgency. Worried about recurrence. Is just starting to return to normal exercise and activities. No constipation, diarrhea, pain, vaginal or rectal bleeding, cough or dyspnea. CT to follow indeterminate pulmonary nodules today.   CT cap IMPRESSION:   1. No CT scan findings of the chest, abdomen, or pelvis to indicate metastatic disease.  2. 2-5 mm pulmonary nodules, stable since 2/11/2014.  3. Nonobstructing 6 mm stone in the right kidney.  12/3/14:  8.. Pt started zoloft for anxiety. Worries about cancer recurrence.   3/2/15: CA 34  3/5/15: CT C/A/P: Impression:  1. Multiple new small  "peritoneal and retroperitoneal nodules are suspicious for metastases. Suspicious new left common iliac and central small bowel mesenteric nodes. No abdominal ascites.  2. Multiple pulmonary nodules are stable with no new nodules.  3. 9 mm nonobstructing right lower pole renal calculus.    5/20/15: CT c/a/p showed nodularity throughout the abdomen and pelvis worrisome for malignancy which has increased in size     5/28/15: CT abdomen and pelvis showed stable inumerabble peritoneal nodules scattered throughout the abdomen and pelvis consistent with metastases.     8/4/15 (approximatly): Left ureteral stent was placed.    8/12/15:  from [facility] 303    8/18/15: CT chest Impression showed slight increase in mild, subtle nodularity along the diaphragm which is nonspecific but may represent metastatic disease.   8/18/15: CT abdomen and pelvis Impression showed interval progression of peritoneal metastatic disease.     8/25/15:  on 3/2/15 of 34 prompted CT c/a/p, which showed multiple new small peritoneal and retroperitoneal nodules are suspicious for metastases. Suspicious new left common iliac and central small bowel mesenteric nodes. She participated in [facility] study involving treatment with on clinical trial with vaccine [id number]. She is here today because the [place] trail failed and the pt pregressed. Her most recent CT c/a/p on 8/25 showed progression of peritoneal metastatic disease. And her most recent  from [facility] on 8/12/15 was 303.    Plan: Carbo/Doxil x 6 cycles.with imaging after 3 cycles.     9/3/15: Cycle #1 Carbo/Doxil.  244.  9/24/15: right IJ thrombus; started on Lovenox.   9/26/15: Present to [facility]. \"presented to the ED this morning with what appears to be a mild drug rash after administering her lovenox this morning. This writer discussed situation with Gyn Onc Fellow Jeanne Moon as well as [facility] Heme/Onc service. Per Farzad, a rash of this nature is extremely uncommon with " "administration of Lovenox. Given the mild nature of this rash and acute need for anticoagulation, recommended the patient continue with Lovenox injections and treat with Benadryl as needed. Advised patient be given precautions to return to the ED immediately if she develops reactive respiratory symptoms. Alternatively patient could be switched to alternative formulation of low molecular weight heparin if she was strongly resistant to continuation of Lovenox.\"    10/1/15: Cycle #2 Carbo/Doxil.  175.    10/28/15: gyn onc visit: pt complains of worsening constipation and tenderness around her right ribs. She is taking senna for her constipation with minimal improvement. She admits that the swelling around her neck after her blood clot has decreased. She also admits to feeling a nodules on her left abdomen. She states her appetite is good but she has not been eating fruits and vegetables. She denies any chemo side effects besides fatigue.     10/28/15:  158  11/23/2015:  133  CT c/a/p  IMPRESSION: In this patient with a clinical history of ovarian cancer  there is mixed response to therapy:   1. Stable to slightly decreased peritoneal nodularity since  8/18/2015.  2. No significant change regarding the large subdiaphragmatic  peritoneal deposit since 9/24/2015.  3. Enlarging soft tissue nodule overlying the abdominal musculature  concerning for metastasis since March of 2015.   4. Unchanged, indeterminate hypodensity near the gallbladder fossa  since 8/18/2015, however progressively increased in size since  3/5/2015.  5. Bilateral nephroureteral stents. No significant hydronephrosis.  6. Bilateral pulmonary nodules. Continued followup recommended.    12/23/15: Cycle 5 carboplatin/Doxil/Avastin.  96. To receive Avastin today despite proteinuria per Dr. Aguilar and nephrology.  1/21/16: Cycle 6 carboplatin/Doxil/Avastin, delayed one week secondary to neutropenia.  62.  1/28/16:  " 63.    2/2/16: Patient had right upper extremity doppler u/s done in [place] due to swollen glands and pain. 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 Pt is currently on lovenox.    2/2/16: US soft tissue neck  Conclusion:  1. Morphologically normal not pathologically enlarged two right carotid chain lymph nodes.  2. Chronic right jugular vein thrombosis, described in detail on  venous doppler exam.     2/2/16: Thyroid Ultrasound  Right lobe: 5.5 x 1.9 x 1.2 cm  Left lobe: 5.0 x 1.5 . 0.9 cm  Both lobes have normal background echotexture.    Conclusion:  1. 3 benign - appearing subcentimeter hypoechoic right mid thyroid nodules.  2. Borderline thyromegaly.    2/2/16: Right upper extremity venous duplex doppler evaluation  Conclusion:  1.) chronic non compressible echogenic right jugular vein thrombus, now 4.3 cm in length.     2/22/16:   IMPRESSION:    1. Ovarian cancer with peritoneal carcinomatosis.  2. Given the increased sensitivity of PET/CT, comparison with prior CT examinations is difficult. In general the abdominal/pelvic metastatic lesions appear to be decreased in size.  3. Persistent right internal jugular DVT, as evidenced by 5 cm filling  defect with inferior border at the central venous catheter.  4. Bilateral hydronephrosis without overt caliectasis. Some distal  migration of the bilateral double-J ureteral stents, although the  proximal coiled portions continue to be in the renal pelves.    2/24/16: C7 carboplatin/Doxil/Avastin.  56.  3/9/16: Hgb 6.6, worked up for transfusion reaction (negative). Bleed possibly secondary to   3/23/16: C8 carboplatin/Doxil/Avastin.  44.  4/2016: Hospitalized in [place] x2 weeks for hematuria leading to anemia after ureteral stent exchange  4/20/16: C9D1 carboplatin/Doxil/Avastin. Deferred one week secondary to acute UTI.   35.  4/28/16: C9D1 deferred due to continued bacteruria and ANC 1.3.  5/4/16: C9D1 carboplatin/Doxil/Avastin. Breast lump noted, diagnostic mammogram ordered.  6/1/16: C10D1 carboplatin/Doxil/Avastin.  50.  6/28/16: C11D1 carboplatin/Doxil/Avastin. Avastin held due to bleeding. Carbo/Doxil deferred one week secondary to neutropenia.  43.  7/7/16: C12D1 carboplatin/Doxil. Avastin held due to bleeding.  7/18/16: Bilateral ureteral stent exchange  7/20/16-7/29/16: Hospitalized with urosepsis and blood loss anemia, started on Zosyn and discharged on amoxicillin  9/29/16: C1D1 Gemzar.  85.  10/21/16: C2D1 Gemzar.  106.    11/11/16: C3D1 Gemzar.  pending.    12/12/16: CT CAP  IMPRESSION: In this patient with a history of metastatic ovarian  cancer:  1. Progression of disease as evidenced by a slowly enlarging  mesenteric and omental soft tissue foci and slowly enlarging  periesophageal and pericardiophrenic lymph nodes, as detailed above.  2. Stable appearance of bilateral ureteral stents without  hydronephrosis.  3. Patient was premedicated for a pre-existing IV contrast allergy.  Despite this, she had itchiness on her face poststudy. She should no  longer receive IV contrast in the future.        1/10/17-1/15/17: Admitted for a small bowel obstruction, niraparib held  1/20/17: Resumed cycle 1 niraparib.        Date  Value  Ref Range  Status    12/15/2016  145*  0 - 30 U/mL  Final        Comment:        Assay Method:  Chemiluminescence using Siemens Centaur XP    12/12/2016  132*  0 - 30 U/mL  Final        Comment:        Assay Method:  Chemiluminescence using Siemens Centaur XP    11/11/2016  111*  0 - 30 U/mL  Final        Comment:        Assay Method:  Chemiluminescence using Siemens Centaur XP    10/21/2016  106*  0 - 30 U/mL  Final        Comment:        Assay Method:  Chemiluminescence using Siemens Centaur XP    09/29/2016  85*  0 - 30 U/mL  Final        Comment:        Assay  Method:  Chemiluminescence using Siemens Centaur XP    09/15/2016  70*  0 - 30 U/mL  Final        Comment:        Assay Method:  Chemiluminescence using Siemens Centaur XP    2016  38*  0 - 30 U/mL  Final        Comment:        Assay Method:  Chemiluminescence using Siemens Centaur XP    2016  43*  0 - 30 U/mL  Final    2016  50*  0 - 30 U/mL  Final    2016  48*  0 - 30 U/mL  Final                    Past Medical History   Diagnosis Date     Hyperlipidemia      Osteoarthritis      Osteopenia      Polymyalgia rheumatica (H)      Ovarian cancer (H)      Primary cancer of peritoneum (H)      Hydronephrosis      Jugular vein thrombosis, right      Persistent right internal jugular DVT     Livedo annularis      History of blood transfusion      MULTIPLE     PONV (postoperative nausea and vomiting)      Anemia      Past Surgical History   Procedure Laterality Date     Appendectomy        section       Open reduction internal fixation toe(s)  9/3/13     Fifth digit, left foot, with screw fixation      Lymph node biopsy       Left posterior chain, beign     Breast surgery       biopsy negative     Laparoscopic salpingo-oophorectomy  2013     Procedure: LAPAROSCOPIC SALPINGO-OOPHORECTOMY;  Diagnostic Laparoscopy, Exploratory Laparotomy, Bilateral Salpingo-Oophorectomy,  Hysterectomy, Omentectomy, Pelvic Washings, Peritoneal staging and biopsies, Pelvic and Periaortic Lymph node Dissection;  Surgeon: Cheri gAuilar MD;  Location: [facility] OR     Hysterectomy total abdominal, bilateral salpingo-oophorectomy, node dissection, combined  2013     Procedure: COMBINED HYSTERECTOMY TOTAL ABDOMINAL, SALPINGO-OOPHORECTOMY, NODE DISSECTION;;  Surgeon: Cheri Aguilar MD;  Location: [facility] OR     Laparotomy, staging, combined  2013     Procedure: COMBINED LAPAROTOMY, STAGING;;  Surgeon: Cheri Aguilar MD;  Location: [facility] OR     Remove port peritoneal  2014     Procedure: REMOVE  PORT PERITONEAL;  Surgeon: Cheri Aguilar MD;  Location: [facility] OR     Combined cystoscopy, retrogrades, exchange stent ureter(s) Bilateral 7/18/2016     Procedure: COMBINED CYSTOSCOPY, RETROGRADES, EXCHANGE STENT URETER(S);  Surgeon: Carmela Alvarez MD;  Location: [facility] OR     Combined cystoscopy, retrogrades, exchange stent ureter(s)  4/2016     @ [facility]     Combined cystoscopy, retrogrades, exchange stent ureter(s) Bilateral 10/17/2016     Procedure: COMBINED CYSTOSCOPY, RETROGRADES, EXCHANGE STENT URETER(S);  Surgeon: Carmela Alvarez MD;  Location: [facility] OR     Combined cystoscopy, retrogrades, exchange stent ureter(s) Bilateral 12/7/2016     Procedure: COMBINED CYSTOSCOPY, RETROGRADES, EXCHANGE STENT URETER(S);  Surgeon: Carmela Alvarez MD;  Location: [facility] OR     Family History   Problem Relation Age of Onset     Arthritis Father      DIABETES       Uncle     Hypertension Mother      Hypertension Sister      Myocardial Infarction Father      secondary to anesthesia     HEART DISEASE       unknown valve replacement     Colon Cancer Father      Other - See Comments Mother      cognitive decline     Bladder Cancer Sister      Skin Cancer Brother      Skin Cancer Mother      Social History     Social History     Marital Status:      Spouse Name: N/A     Number of Children: N/A     Years of Education: N/A     Social History Main Topics     Smoking status: Former Smoker     Smokeless tobacco: Former User      Comment: Quit over 20 years ago     Alcohol Use: No     Drug Use: No     Sexual Activity: No     Other Topics Concern     Not on file     Social History Narrative    .  Lives alone with her dog and cat.      Daughter or friend is available to help after surgeries/hospitalizations.      Has 2 children; both alive and well.    Parents are both alive in their 90's.     Retired business owner.       Current Outpatient Prescriptions   Medication     enoxaparin (LOVENOX)  "40 MG/0.4ML injection     LORazepam (ATIVAN) 0.5 MG tablet     study - niraparib (IDS# 4759) 100 mg capsule     dexamethasone (DECADRON) 4 MG tablet     tamsulosin (FLOMAX) 0.4 MG capsule     LORazepam (ATIVAN) 1 MG tablet     tolterodine (DETROL LA) 4 MG 24 hr capsule     ondansetron (ZOFRAN) 8 MG tablet     ACETAMINOPHEN PO     ferrous sulfate (IRON) 325 (65 FE) MG tablet     Multiple Vitamins-Minerals (ONE DAILY MULTIVITAMIN WOMEN) TABS     polyethylene glycol (MIRALAX/GLYCOLAX) powder     cycloSPORINE (RESTASIS) 0.05 % ophthalmic emulsion     Ranitidine HCl (ZANTAC PO)     MELATONIN PO     EPINEPHrine (EPIPEN) 0.3 MG/0.3ML injection     senna-docusate (SENOKOT-S;PERICOLACE) 8.6-50 MG per tablet     Pyridoxine HCl (VITAMIN B6 PO)     prochlorperazine (COMPAZINE) 10 MG tablet     No current facility-administered medications for this visit.        Allergies   Allergen Reactions     Contrast Dye Itching and Swelling     Itching/tingling of mouth and nose with sensation of swelling after receiving IV contrast for CT.  This occurred despite steroid premedication. Therefore the patient should not receive intravenous contrast in the future.      Benadryl Allergy Other (See Comments)     Stay awake, restless, \"uncomfortable\", \"feel like I need to run away\"      Enoxaparin Hives     3/23/16: Okay to receive heparin flushes in port, tolerates well. Patricia Cortez FNP-C     Enoxaparin Sodium Other (See Comments)     Pt is taking this now.     Amoxicillin Rash     Diphenhydramine Anxiety     No Clinical Screening - See Comments Rash     Pollen Extract Rash     Sulfa Drugs Rash       Review of Systems     Constitutional:  Negative for fever, chills, weight loss, weight gain, fatigue, decreased appetite, night sweats, recent stressors, height gain, height loss, post-operative complications, incisional pain, hallucinations, increased energy, hyperactivity and confused.   HENT:  Negative for ear pain, hearing loss, tinnitus, " nosebleeds, trouble swallowing, hoarse voice, mouth sores, sore throat, ear discharge, tooth pain, gum tenderness, taste disturbance, smell disturbance, hearing aid, bleeding gums, dry mouth, sinus pain, sinus congestion and neck mass.    Eyes:  Negative for double vision, pain, redness, eye pain, decreased vision, eye watering, eye bulging, eye dryness, flashing lights, spots, floaters, strabismus, tunnel vision, jaundice and eye irritation.   Respiratory:   Negative for cough, hemoptysis, sputum production, shortness of breath, wheezing, sleep disturbances due to breathing, snores loudly, respiratory pain, dyspnea on exertion, cough disturbing sleep and postural dyspnea.    Cardiovascular:  Positive for palpitations, hypertension, tachycardia, light-headedness and exercise intolerance. Negative for chest pain, dyspnea on exertion, orthopnea, claudication, leg swelling, fingers/toes turn blue, hypotension, syncope, history of heart murmur, chest pain on exertion, chest pain at rest, pacemaker, few scattered varicosities, leg pain, sleep disturbances due to breathing and edema.   Gastrointestinal:  Positive for nausea, vomiting, abdominal pain, constipation, bloating, hemorrhoids and bowel incontinence. Negative for heartburn, diarrhea, blood in stool, melena, rectal pain, jaundice, rectal bleeding, coffee ground emesis and change in stool.   Genitourinary:  Positive for bladder incontinence, hematuria and flank pain. Negative for dysuria, urgency, vaginal discharge, difficulty urinating, genital sores, dyspareunia, decreased libido, nocturia, voiding less frequently, arousal difficulty, abnormal vaginal bleeding, excessive menstruation, menstrual changes, hot flashes, vaginal dryness and postmenopausal bleeding.   Musculoskeletal:  Negative for myalgias, back pain, joint swelling, arthralgias, stiffness, muscle cramps, neck pain, bone pain, muscle weakness and fracture.   Skin:  Negative for nail changes, itching,  "poor wound healing, rash, hair changes, skin changes, acne, warts, poor wound healing, scarring, flaky skin, Raynaud's phenomenon, sensitivity to sunlight and skin thickening.   Neurological:  Positive for dizziness, light-headedness and memory loss. Negative for tingling, tremors, speech change, seizures, loss of consciousness, weakness, numbness, headaches, disturbances in coordination, difficulty walking and paralysis.   Endo/Heme:  Negative for anemia, swollen glands and bruises/bleeds easily.   Psychiatric/Behavioral:  Positive for memory loss. Negative for depression, hallucinations, decreased concentration, mood swings and panic attacks.    Breast:  Negative for breast discharge, breast mass, breast pain and nipple retraction.   Endocrine:  Negative for altered temperature regulation, polyphagia, polydipsia, unwanted hair growth and change in facial hair.            Physical Exam:    /67 mmHg  Pulse 126  Temp(Src) 98.5  F (36.9  C) (Oral)  Resp 16  Ht 1.676 m (5' 5.98\")  Wt 58.242 kg (128 lb 6.4 oz)  BMI 20.73 kg/m2  SpO2 100%     Orthostatic vitals:  1502 in lying position: , /70  1505 in sitting position: , /66  1508 in standing position: , BP 83/54- reported dizziness at this time    CONSTITUTIONAL: Alert non-toxic appearing female in no acute distress  HEAD: Normocephalic, atraumatic  EYES: PERRLA, no scleral icterus, conjunctivae without pallor  ENT: Oropharynx pink without lesions, no perioral cyanosis, no oral pallor  NECK: Neck supple without lymphadenopathy or masses  RESPIRATORY: Lungs clear to auscultation, respiratory effort unlabored  CV: Tachycardic with regular rhythm, S1S2, grade III systolic murmur (suspect hemic murmur), no clicks, rubs, or gallops; bilateral lower extremities without edema, dorsalis pedis pulses 2+ bilaterally  GASTROINTESTINAL: Normoactive bowel sounds x4 quadrants, abdomen soft, non-distended, and non-tender to palpation without " masses or organomegaly  GENITOURINARY: No CVA tenderness  LYMPHATIC: Cervical, supraclavicular, and inguinal lymph nodes without lymphadenopathy  NEUROLOGIC: Grossly intact, normal gait  MUSCULOSKELETAL: Moves all extremities, no obvious muscle wasting  SKIN: Pale, warm and dry, no rashes or lesions to unclothed skin, normal skin turgor  PSYCHIATRIC: Pleasant and interactive, affect bright, makes appropriate eye contact, thought process linear      Data:  EKG shows sinus tachycardia  CBC pending  BMP pending  Magnesium pending  Vitamin B12 pending    Assessment/Plan:  1.) Dizziness: Positive for orthostatic hypotension. Unclear etiology but may be due to dehydration, niraparib (not a know side effect), Compazine, or multiple other cardiac/neurogenic possibilities. To have CBC, magnesium, BMP, and vitamin B12 level (vitamin B12 deficiency may be linked to orthostatic hypotension). To receive 1L NS IV x1 in specialty infusion today for potential dehydration. To stop Compazine as she has had palpitations after taking this.   ADDENDUM: CBC results show a hemoglobin of 7.2 which could be causing her symptoms as she reports she becomes symptomatic with a hemoglobin of 9. This is a 4+ gram drop within four days and is considered a CTCAE grade three toxicity. To hold niraparib until further notice (will need dose reduction upon resumption of drug), to receive 2u PRBCs in [place] tomorrow for symptomatic anemia. Discussed resuming ferrous sulfate 325mg PO TID for anemia as long as she continues with a bowel regimen. Dr. Mccollum and Tamanna Worley, research RN updated.  2.) Patient verbalized understanding of and agreement with plan.    A total of 35 minutes face to face time spent with patient, over 50% of which was spent in counseling and coordination of care.    JUSTIN San, FNP-C  Division of Gynecologic Oncology  [facility]  Pager: [phone]

## 2017-01-23 NOTE — PROGRESS NOTES
Nursing Note  Margaux Guzmán presents today to [facility] for:   Chief Complaint   Patient presents with     Infusion     infusion     During today's [facility] appointment, orders from [facility] were completed.  Frequency: as needed    Note: patient started to feel dizziness and weakness last night.   Progress note:  Patient identification verified by name and date of birth.  Assessment completed.  Vitals recorded in Doc Flowsheets.  Patient was provided with education regarding infusion and possible side effects.  Patient verbalized understanding.      needed: Yes  Premedications: were not ordered.  Infusion Rates: infusion given over approximately 1 hour.  Labs: were drawn per orders.   Vascular access: port accessed today.  Treatment Conditions: non-applicable.  Patient tolerated infusion: well.      Discharge Plan:   Follow up plan of care with: primary medical director. Patient reports they are trying to coordinate a blood transfusion tomorrow in [facility]. Port stayed accessed. .  Discharge instructions were reviewed with patient.  Patient/representative verbalized understanding of discharge instructions and all questions answered.  Patient discharged from [facility] in stable condition.    Kathryn Desai RN    Administrations This Visit     0.9% sodium chloride BOLUS     Admin Date Action Dose Route Administered By             01/23/2017 New Bag 1000 mL Intravenous Kathryn Desai RN                          /39 mmHg  Pulse 113

## 2017-01-23 NOTE — PROGRESS NOTES
Quick Note:    To receive 2u PRBCs in [place] tomorrow, to stop niraparib until hemoglobin has recovered. To increase intake of salty foods and fluids.  Patricia BAZAN-JUAN PABLO  ______

## 2017-01-23 NOTE — Clinical Note
2017       RE: Margaux Guzmán  [id number] [facility] [address]     Dear Colleague,    Thank you for referring your patient, Margaux Guzmán, to the [facility]. Please see a copy of my visit note below.    Gynecologic Oncology Follow-Up Visit  RE: Margaux Guzmán  MRN: [id number]  : 1955  Date of Visit: 2017    CC: Margaux Guzmán is a 61 year old  female with a history of primary peritoneal carcinoma on treatment with niraparib. She presents today for an acute visit regarding dizziness and heart palpitations.    HPI:  Marshall comes to the clinic feeling poorly and is accompanied by her daughter Elissa. She was restarted on niraparib after resolution of a small bowel obstruction on 17. She states she has been having heart palpitations and a fast heart rate since she began taking dexamethasone roughly three weeks ago. She titrated off dexamethasone with her last dose 17, however, she has still been having heart palpitations and a fast heart rate. She notices heart palpitations after taking Compazine. She has been feeling dizzy for the past 24 hours with it being much worse while standing. She also states she has taken her heart rate while sitting and standing and that it is higher while standing. She has not had any falls, but was unable to drive today due to dizziness. She feels she is drinking well and drank two large bottles of Gatorade yesterday which was somewhat helpful but then dizziness returned. She denies polydipsia or polyuria. Nausea which she associates with her stent pain but denies vomiting. Denies any asa bleeding but notes that she continues to have microscopic hematuria per her urinalyses and had more discomfort with her stents this past week- was seen by urology. Her constipation has resolved after taking Senna and Miralax- she notes that she may have taken too many laxatives as she had an episode of fecal incontinence- no rectal  "bleeding or melena. She does note that she \"overexerted\" herself the past two days caring for her sick dog and thinks this may have exacerbated her issues. Her daughter also notes that Marshall's lips appeared slightly blue at dinner yesterday evening. She has not taken her niraparib yet today.    Oncology History:  The patient is a 58 year old  female who initially presented for evaluation of pelvic and abdominal pain. On exam, she had mild right adnexal tenderness and slightly enlarged right ovary freely mobile and smooth. This prompted an abdominal ultrasound, which was normal, and a pelvic US that showed anechoic right ovarian cyst measuring 4.3 x 3.8 x 4.0 cm. Her  was elevated at 74 on 10/8/13. We reviewed the possible diagnosis including ovarian cancer versus benign ovarian cyst. Approach to surgery, alternatives to surgery and plan for possible extended open surgical staging based on the operative findings was discussed. In light of the size of the ovary we are offering an initial approach with minimally invasive surgery. After discussion of risks and benefits, consent was obtained.  11/7/13 Diagnostic laparoscopy converted to exploratory laparotomy, bilateral salpingo-oophorectomy, total abdominal hysterectomy, omentectomy, staging biopsies, bilateral pelvic and periaortic lymph node dissection and washings. Stage IIIB  12/4/13: Cycle #1 IV/IP  1/2/14: Cycle #2 IV/IP PCP.  - 14  1/23/14: Cycle 3 IV/IP.  - 7  2/11/14:  - 7. CT C/A/P Impression:  1. Multiple bilateral pulmonary nodules as described in full report measuring up to 3 mm along the right major fissure. These are indeterminate given lack of comparison and followup is recommended.  2. No definite evidence for recurrent or metastatic disease in the abdomen or the pelvis. There is a rounded hypodense focus abutting the left external iliac artery and the adjacent sigmoid colon which measures 1 cm, this could represent a " fluid-filled sigmoid diverticulum or a hypodense node, further attention to this area on subsequent examinations is recommended.  3. There is a 7 mm nonobstructing stone in the inferior collecting system of the right kidney.  2/12/14-3/26/14: Cycle #4-6 IV/IP CA-125 7, 7, 7.  4/21/14: CT C/A/P Impression:   1. Unchanged indeterminate pulmonary nodules. Recommend continued surveillance.   2. No definite evidence of metastatic ovarian cancer in the abdomen or pelvis. Small amount of subtle increased thickening and fluid is noted along the right lymph node dissection, nonspecific, but possibly a tiny developing lymphocele.   3. 6 mm nonobstructing stone in the right kidney.  Plan surveillance for ovarian cancer every 3 months with physical and .   Indeterminate pulmonary nodule: These were present before the surgery and there was not change with the chemotherapy. Highly unlikely to be a metastatic disease. Will repeat a CT in 3 months to see any change     5/22/14:  6. JOSEMANUEL.  5/17/14; ED visit [facility]. CT abd/pel noted possible chronic partial small bowel obstruction. Colonscopy scheduled for June 6, 2014 locally. Abdominal pain started 5/17/14 and noted pressure without bowel movement and went to ED that night due to increasing pain. In patient 2 day hospital with clear liquids/IV. Mild nausea without vomiting. BM subsequently occurred and pt was discharged. No pain since. Continues with fullness in abdomen. Diet is bland for the most part.  8/25/14:  -5. Notes increased abdominal girth and bloating x 2-3 weeks with urine urgency. Worried about recurrence. Is just starting to return to normal exercise and activities. No constipation, diarrhea, pain, vaginal or rectal bleeding, cough or dyspnea. CT to follow indeterminate pulmonary nodules today.   CT cap IMPRESSION:   1. No CT scan findings of the chest, abdomen, or pelvis to indicate metastatic disease.  2. 2-5 mm pulmonary  "nodules, stable since 2/11/2014.  3. Nonobstructing 6 mm stone in the right kidney.  12/3/14:  8.. Pt started zoloft for anxiety. Worries about cancer recurrence.   3/2/15: CA 34  3/5/15: CT C/A/P: Impression:  1. Multiple new small peritoneal and retroperitoneal nodules are suspicious for metastases. Suspicious new left common iliac and central small bowel mesenteric nodes. No abdominal ascites.  2. Multiple pulmonary nodules are stable with no new nodules.  3. 9 mm nonobstructing right lower pole renal calculus.    5/20/15: CT c/a/p showed nodularity throughout the abdomen and pelvis worrisome for malignancy which has increased in size     5/28/15: CT abdomen and pelvis showed stable inumerabble peritoneal nodules scattered throughout the abdomen and pelvis consistent with metastases.     8/4/15 (approximatly): Left ureteral stent was placed.    8/12/15:  from [place] 303    8/18/15: CT chest Impression showed slight increase in mild, subtle nodularity along the diaphragm which is nonspecific but may represent metastatic disease.   8/18/15: CT abdomen and pelvis Impression showed interval progression of peritoneal metastatic disease.     8/25/15:  on 3/2/15 of 34 prompted CT c/a/p, which showed multiple new small peritoneal and retroperitoneal nodules are suspicious for metastases. Suspicious new left common iliac and central small bowel mesenteric nodes. She participated in [facility] study involving treatment with on clinical trial with vaccine [id number]. She is here today because the [place] trail failed and the pt pregressed. Her most recent CT c/a/p on 8/25 showed progression of peritoneal metastatic disease. And her most recent  from [place] on 8/12/15 was 303.    Plan: Carbo/Doxil x 6 cycles.with imaging after 3 cycles.     9/3/15: Cycle #1 Carbo/Doxil.  244.  9/24/15: right IJ thrombus; started on Lovenox.   9/26/15: Present to [place] ED. \"presented to the ED this morning with " "what appears to be a mild drug rash after administering her lovenox this morning. This writer discussed situation with Gyn Onc Fellow Jeanne Moon as well as [facility] Heme/Onc service. Per Heme, a rash of this nature is extremely uncommon with administration of Lovenox. Given the mild nature of this rash and acute need for anticoagulation, recommended the patient continue with Lovenox injections and treat with Benadryl as needed. Advised patient be given precautions to return to the ED immediately if she develops reactive respiratory symptoms. Alternatively patient could be switched to alternative formulation of low molecular weight heparin if she was strongly resistant to continuation of Lovenox.\"    10/1/15: Cycle #2 Carbo/Doxil.  175.    10/28/15: gyn onc visit: pt complains of worsening constipation and tenderness around her right ribs. She is taking senna for her constipation with minimal improvement. She admits that the swelling around her neck after her blood clot has decreased. She also admits to feeling a nodules on her left abdomen. She states her appetite is good but she has not been eating fruits and vegetables. She denies any chemo side effects besides fatigue.     10/28/15:  158  11/23/2015:  133  CT c/a/p  IMPRESSION: In this patient with a clinical history of ovarian cancer  there is mixed response to therapy:   1. Stable to slightly decreased peritoneal nodularity since  8/18/2015.  2. No significant change regarding the large subdiaphragmatic  peritoneal deposit since 9/24/2015.  3. Enlarging soft tissue nodule overlying the abdominal musculature  concerning for metastasis since March of 2015.   4. Unchanged, indeterminate hypodensity near the gallbladder fossa  since 8/18/2015, however progressively increased in size since  3/5/2015.  5. Bilateral nephroureteral stents. No significant hydronephrosis.  6. Bilateral pulmonary nodules. Continued followup recommended.    12/23/15: Cycle 5 " carboplatin/Doxil/Avastin.  96. To receive Avastin today despite proteinuria per Dr. Aguilar and nephrology.  1/21/16: Cycle 6 carboplatin/Doxil/Avastin, delayed one week secondary to neutropenia.  62.  1/28/16:  63.    2/2/16: Patient had right upper extremity doppler u/s done in [place] due to swollen glands and pain. 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 Pt is currently on lovenox.    2/2/16: US soft tissue neck  Conclusion:  1. Morphologically normal not pathologically enlarged two right carotid chain lymph nodes.  2. Chronic right jugular vein thrombosis, described in detail on  venous doppler exam.     2/2/16: Thyroid Ultrasound  Right lobe: 5.5 x 1.9 x 1.2 cm  Left lobe: 5.0 x 1.5 . 0.9 cm  Both lobes have normal background echotexture.    Conclusion:  1. 3 benign - appearing subcentimeter hypoechoic right mid thyroid nodules.  2. Borderline thyromegaly.    2/2/16: Right upper extremity venous duplex doppler evaluation  Conclusion:  1.) chronic non compressible echogenic right jugular vein thrombus, now 4.3 cm in length.     2/22/16:   IMPRESSION:    1. Ovarian cancer with peritoneal carcinomatosis.  2. Given the increased sensitivity of PET/CT, comparison with prior CT examinations is difficult. In general the abdominal/pelvic metastatic lesions appear to be decreased in size.  3. Persistent right internal jugular DVT, as evidenced by 5 cm filling  defect with inferior border at the central venous catheter.  4. Bilateral hydronephrosis without overt caliectasis. Some distal  migration of the bilateral double-J ureteral stents, although the  proximal coiled portions continue to be in the renal pelves.    2/24/16: C7 carboplatin/Doxil/Avastin.  56.  3/9/16: Hgb 6.6, worked up for transfusion reaction (negative). Bleed possibly secondary to   3/23/16: C8  carboplatin/Doxil/Avastin.  44.  4/2016: Hospitalized in [place] x2 weeks for hematuria leading to anemia after ureteral stent exchange  4/20/16: C9D1 carboplatin/Doxil/Avastin. Deferred one week secondary to acute UTI.  35.  4/28/16: C9D1 deferred due to continued bacteruria and ANC 1.3.  5/4/16: C9D1 carboplatin/Doxil/Avastin. Breast lump noted, diagnostic mammogram ordered.  6/1/16: C10D1 carboplatin/Doxil/Avastin.  50.  6/28/16: C11D1 carboplatin/Doxil/Avastin. Avastin held due to bleeding. Carbo/Doxil deferred one week secondary to neutropenia.  43.  7/7/16: C12D1 carboplatin/Doxil. Avastin held due to bleeding.  7/18/16: Bilateral ureteral stent exchange  7/20/16-7/29/16: Hospitalized with urosepsis and blood loss anemia, started on Zosyn and discharged on amoxicillin  9/29/16: C1D1 Gemzar.  85.  10/21/16: C2D1 Gemzar.  106.    11/11/16: C3D1 Gemzar.  pending.    12/12/16: CT CAP  IMPRESSION: In this patient with a history of metastatic ovarian  cancer:  1. Progression of disease as evidenced by a slowly enlarging  mesenteric and omental soft tissue foci and slowly enlarging  periesophageal and pericardiophrenic lymph nodes, as detailed above.  2. Stable appearance of bilateral ureteral stents without  hydronephrosis.  3. Patient was premedicated for a pre-existing IV contrast allergy.  Despite this, she had itchiness on her face poststudy. She should no  longer receive IV contrast in the future.        1/10/17-1/15/17: Admitted for a small bowel obstruction, niraparib held  1/20/17: Resumed cycle 1 niraparib.        Date  Value  Ref Range  Status    12/15/2016  145*  0 - 30 U/mL  Final        Comment:        Assay Method:  Chemiluminescence using Siemens Centaur XP    12/12/2016  132*  0 - 30 U/mL  Final        Comment:        Assay Method:  Chemiluminescence using Siemens Centaur XP    11/11/2016  111*  0 - 30 U/mL  Final        Comment:        Assay Method:   Chemiluminescence using Siemens Centaur XP    10/21/2016  106*  0 - 30 U/mL  Final        Comment:        Assay Method:  Chemiluminescence using Siemens Centaur XP    2016  85*  0 - 30 U/mL  Final        Comment:        Assay Method:  Chemiluminescence using Siemens Centaur XP    09/15/2016  70*  0 - 30 U/mL  Final        Comment:        Assay Method:  Chemiluminescence using Siemens Centaur XP    2016  38*  0 - 30 U/mL  Final        Comment:        Assay Method:  Chemiluminescence using Siemens Centaur XP    2016  43*  0 - 30 U/mL  Final    2016  50*  0 - 30 U/mL  Final    2016  48*  0 - 30 U/mL  Final                    Past Medical History   Diagnosis Date     Hyperlipidemia      Osteoarthritis      Osteopenia      Polymyalgia rheumatica (H)      Ovarian cancer (H)      Primary cancer of peritoneum (H)      Hydronephrosis      Jugular vein thrombosis, right      Persistent right internal jugular DVT     Livedo annularis      History of blood transfusion      MULTIPLE     PONV (postoperative nausea and vomiting)      Anemia      Past Surgical History   Procedure Laterality Date     Appendectomy        section       Open reduction internal fixation toe(s)  9/3/13     Fifth digit, left foot, with screw fixation      Lymph node biopsy       Left posterior chain, beign     Breast surgery       biopsy negative     Laparoscopic salpingo-oophorectomy  2013     Procedure: LAPAROSCOPIC SALPINGO-OOPHORECTOMY;  Diagnostic Laparoscopy, Exploratory Laparotomy, Bilateral Salpingo-Oophorectomy,  Hysterectomy, Omentectomy, Pelvic Washings, Peritoneal staging and biopsies, Pelvic and Periaortic Lymph node Dissection;  Surgeon: Cheri Aguilar MD;  Location: [facility] OR     Hysterectomy total abdominal, bilateral salpingo-oophorectomy, node dissection, combined  2013     Procedure: COMBINED HYSTERECTOMY TOTAL ABDOMINAL, SALPINGO-OOPHORECTOMY, NODE DISSECTION;;  Surgeon: Lauren  Cheri RUSH MD;  Location: [facility] OR     Laparotomy, staging, combined  11/7/2013     Procedure: COMBINED LAPAROTOMY, STAGING;;  Surgeon: Cheri Aguilar MD;  Location: [facility] OR     Remove port peritoneal  5/5/2014     Procedure: REMOVE PORT PERITONEAL;  Surgeon: Cheri Aguilar MD;  Location: [facility] OR     Combined cystoscopy, retrogrades, exchange stent ureter(s) Bilateral 7/18/2016     Procedure: COMBINED CYSTOSCOPY, RETROGRADES, EXCHANGE STENT URETER(S);  Surgeon: Carmela Alvarez MD;  Location: [facility] OR     Combined cystoscopy, retrogrades, exchange stent ureter(s)  4/2016     @ [facility]     Combined cystoscopy, retrogrades, exchange stent ureter(s) Bilateral 10/17/2016     Procedure: COMBINED CYSTOSCOPY, RETROGRADES, EXCHANGE STENT URETER(S);  Surgeon: Carmela Alvarez MD;  Location: [facility] OR     Combined cystoscopy, retrogrades, exchange stent ureter(s) Bilateral 12/7/2016     Procedure: COMBINED CYSTOSCOPY, RETROGRADES, EXCHANGE STENT URETER(S);  Surgeon: Carmela Alvarez MD;  Location: [facility] OR     Family History   Problem Relation Age of Onset     Arthritis Father      DIABETES       Uncle     Hypertension Mother      Hypertension Sister      Myocardial Infarction Father      secondary to anesthesia     HEART DISEASE       unknown valve replacement     Colon Cancer Father      Other - See Comments Mother      cognitive decline     Bladder Cancer Sister      Skin Cancer Brother      Skin Cancer Mother      Social History     Social History     Marital Status:      Spouse Name: N/A     Number of Children: N/A     Years of Education: N/A     Social History Main Topics     Smoking status: Former Smoker     Smokeless tobacco: Former User      Comment: Quit over 20 years ago     Alcohol Use: No     Drug Use: No     Sexual Activity: No     Other Topics Concern     Not on file     Social History Narrative    .  Lives alone with her dog and cat.      Daughter or friend is available  "to help after surgeries/hospitalizations.      Has 2 children; both alive and well.    Parents are both alive in their 90's.     Retired business owner.       Current Outpatient Prescriptions   Medication     enoxaparin (LOVENOX) 40 MG/0.4ML injection     LORazepam (ATIVAN) 0.5 MG tablet     study - niraparib (IDS# 4759) 100 mg capsule     dexamethasone (DECADRON) 4 MG tablet     tamsulosin (FLOMAX) 0.4 MG capsule     LORazepam (ATIVAN) 1 MG tablet     tolterodine (DETROL LA) 4 MG 24 hr capsule     ondansetron (ZOFRAN) 8 MG tablet     ACETAMINOPHEN PO     ferrous sulfate (IRON) 325 (65 FE) MG tablet     Multiple Vitamins-Minerals (ONE DAILY MULTIVITAMIN WOMEN) TABS     polyethylene glycol (MIRALAX/GLYCOLAX) powder     cycloSPORINE (RESTASIS) 0.05 % ophthalmic emulsion     Ranitidine HCl (ZANTAC PO)     MELATONIN PO     EPINEPHrine (EPIPEN) 0.3 MG/0.3ML injection     senna-docusate (SENOKOT-S;PERICOLACE) 8.6-50 MG per tablet     Pyridoxine HCl (VITAMIN B6 PO)     prochlorperazine (COMPAZINE) 10 MG tablet     No current facility-administered medications for this visit.        Allergies   Allergen Reactions     Contrast Dye Itching and Swelling     Itching/tingling of mouth and nose with sensation of swelling after receiving IV contrast for CT.  This occurred despite steroid premedication. Therefore the patient should not receive intravenous contrast in the future.      Benadryl Allergy Other (See Comments)     Stay awake, restless, \"uncomfortable\", \"feel like I need to run away\"      Enoxaparin Hives     3/23/16: Okay to receive heparin flushes in port, tolerates well. Patricia Cortez FNP-C     Enoxaparin Sodium Other (See Comments)     Pt is taking this now.     Amoxicillin Rash     Diphenhydramine Anxiety     No Clinical Screening - See Comments Rash     Pollen Extract Rash     Sulfa Drugs Rash       Review of Systems     Constitutional:  Negative for fever, chills, weight loss, weight gain, fatigue, decreased " appetite, night sweats, recent stressors, height gain, height loss, post-operative complications, incisional pain, hallucinations, increased energy, hyperactivity and confused.   HENT:  Negative for ear pain, hearing loss, tinnitus, nosebleeds, trouble swallowing, hoarse voice, mouth sores, sore throat, ear discharge, tooth pain, gum tenderness, taste disturbance, smell disturbance, hearing aid, bleeding gums, dry mouth, sinus pain, sinus congestion and neck mass.    Eyes:  Negative for double vision, pain, redness, eye pain, decreased vision, eye watering, eye bulging, eye dryness, flashing lights, spots, floaters, strabismus, tunnel vision, jaundice and eye irritation.   Respiratory:   Negative for cough, hemoptysis, sputum production, shortness of breath, wheezing, sleep disturbances due to breathing, snores loudly, respiratory pain, dyspnea on exertion, cough disturbing sleep and postural dyspnea.    Cardiovascular:  Positive for palpitations, hypertension, tachycardia, light-headedness and exercise intolerance. Negative for chest pain, dyspnea on exertion, orthopnea, claudication, leg swelling, fingers/toes turn blue, hypotension, syncope, history of heart murmur, chest pain on exertion, chest pain at rest, pacemaker, few scattered varicosities, leg pain, sleep disturbances due to breathing and edema.   Gastrointestinal:  Positive for nausea, vomiting, abdominal pain, constipation, bloating, hemorrhoids and bowel incontinence. Negative for heartburn, diarrhea, blood in stool, melena, rectal pain, jaundice, rectal bleeding, coffee ground emesis and change in stool.   Genitourinary:  Positive for bladder incontinence, hematuria and flank pain. Negative for dysuria, urgency, vaginal discharge, difficulty urinating, genital sores, dyspareunia, decreased libido, nocturia, voiding less frequently, arousal difficulty, abnormal vaginal bleeding, excessive menstruation, menstrual changes, hot flashes, vaginal dryness  "and postmenopausal bleeding.   Musculoskeletal:  Negative for myalgias, back pain, joint swelling, arthralgias, stiffness, muscle cramps, neck pain, bone pain, muscle weakness and fracture.   Skin:  Negative for nail changes, itching, poor wound healing, rash, hair changes, skin changes, acne, warts, poor wound healing, scarring, flaky skin, Raynaud's phenomenon, sensitivity to sunlight and skin thickening.   Neurological:  Positive for dizziness, light-headedness and memory loss. Negative for tingling, tremors, speech change, seizures, loss of consciousness, weakness, numbness, headaches, disturbances in coordination, difficulty walking and paralysis.   Endo/Heme:  Negative for anemia, swollen glands and bruises/bleeds easily.   Psychiatric/Behavioral:  Positive for memory loss. Negative for depression, hallucinations, decreased concentration, mood swings and panic attacks.    Breast:  Negative for breast discharge, breast mass, breast pain and nipple retraction.   Endocrine:  Negative for altered temperature regulation, polyphagia, polydipsia, unwanted hair growth and change in facial hair.            Physical Exam:    /67 mmHg  Pulse 126  Temp(Src) 98.5  F (36.9  C) (Oral)  Resp 16  Ht 1.676 m (5' 5.98\")  Wt 58.242 kg (128 lb 6.4 oz)  BMI 20.73 kg/m2  SpO2 100%     Orthostatic vitals:  1502 in lying position: , /70  1505 in sitting position: , /66  1508 in standing position: , BP 83/54- reported dizziness at this time    CONSTITUTIONAL: Alert non-toxic appearing female in no acute distress  HEAD: Normocephalic, atraumatic  EYES: PERRLA, no scleral icterus, conjunctivae without pallor  ENT: Oropharynx pink without lesions, no perioral cyanosis, no oral pallor  NECK: Neck supple without lymphadenopathy or masses  RESPIRATORY: Lungs clear to auscultation, respiratory effort unlabored  CV: Tachycardic with regular rhythm, S1S2, grade III systolic murmur (suspect hemic " murmur), no clicks, rubs, or gallops; bilateral lower extremities without edema, dorsalis pedis pulses 2+ bilaterally  GASTROINTESTINAL: Normoactive bowel sounds x4 quadrants, abdomen soft, non-distended, and non-tender to palpation without masses or organomegaly  GENITOURINARY: No CVA tenderness  LYMPHATIC: Cervical, supraclavicular, and inguinal lymph nodes without lymphadenopathy  NEUROLOGIC: Grossly intact, normal gait  MUSCULOSKELETAL: Moves all extremities, no obvious muscle wasting  SKIN: Pale, warm and dry, no rashes or lesions to unclothed skin, normal skin turgor  PSYCHIATRIC: Pleasant and interactive, affect bright, makes appropriate eye contact, thought process linear      Data:  EKG shows sinus tachycardia  CBC pending  BMP pending  Magnesium pending  Vitamin B12 pending    Assessment/Plan:  1.) Dizziness: Positive for orthostatic hypotension. Unclear etiology but may be due to dehydration, niraparib (not a know side effect), Compazine, or multiple other cardiac/neurogenic possibilities. To have CBC, magnesium, BMP, and vitamin B12 level (vitamin B12 deficiency may be linked to orthostatic hypotension). To receive 1L NS IV x1 in specialty infusion today for potential dehydration. To stop Compazine as she has had palpitations after taking this.   ADDENDUM: CBC results show a hemoglobin of 7.2 which could be causing her symptoms as she reports she becomes symptomatic with a hemoglobin of 9. This is a 4+ gram drop within four days and is considered a CTCAE grade three toxicity. To hold niraparib until further notice (will need dose reduction upon resumption of drug), to receive 2u PRBCs in [place] tomorrow for symptomatic anemia. Discussed resuming ferrous sulfate 325mg PO TID for anemia as long as she continues with a bowel regimen. Dr. Mccollum and Tamanna Worley, research RN updated.  2.) Patient verbalized understanding of and agreement with plan.    A total of 35 minutes face to face time spent with  patient, over 50% of which was spent in counseling and coordination of care.    JUSTIN San, FNP-C  Division of Gynecologic Oncology  [facility]  Pager: [phone]

## 2017-01-23 NOTE — NURSING NOTE
"Margaux Guzmán is a 61 year old female who presents for:  Chief Complaint   Patient presents with     Oncology Clinic Visit     return patient for follow up related to Ovarian cancer, left (H)        Initial Vitals:  /67 mmHg  Pulse 126  Temp(Src) 98.5  F (36.9  C) (Oral)  Resp 16  Ht 1.676 m (5' 5.98\")  Wt 58.242 kg (128 lb 6.4 oz)  BMI 20.73 kg/m2  SpO2 100% Estimated body mass index is 20.73 kg/(m^2) as calculated from the following:    Height as of this encounter: 1.676 m (5' 5.98\").    Weight as of this encounter: 58.242 kg (128 lb 6.4 oz).. Body surface area is 1.65 meters squared. BP completed using cuff size: regular  No Pain (0) No LMP recorded. Patient has had a hysterectomy. Allergies and medications reviewed.     Medications: Medication refills not needed today.  Pharmacy name entered into [employer]:    [id number] - [place], [place] - [address] AT [facility] [address]  [id number] - [place] - [address]  [facility] [place] [address] [facility] - [place], [place] - [address]  [place], [place] - [address] [place] [address] [facility]Y [id number] - [place], [place] - [address] [facility]    Comments: patient denied pain/discomfort.    5 minutes for nursing intake (face to face time)   Helio Yates CMA          "

## 2017-01-23 NOTE — Clinical Note
1/23/2017          To Whom It May Concern:    Please excuse Mayela Clark from work on 1/23 and 1/24/17 in order to assist her mother with medical cares. Please contact our clinic with questions or concerns- [phone].    Sincerely,        JUSTIN San, FNP-C  Family Nurse Practitioner  Gynecologic Oncology  [facility]  Pager: [phone]

## 2017-01-23 NOTE — PATIENT INSTRUCTIONS
Dear Margaux Guzmán    Thank you for choosing [facility] ([facility]) for your infusion.  The following information is a summary of our appointment as well as important reminders.      You received 1 liter of normal saline and we yashira labs today.     We look forward in seeing you on your next appointment here at [facility].  Please don t hesitate to call us at [phone] to reschedule any of your appointments or to speak with one of the [facility] registered nurses.  It was a pleasure taking care of you today.    Sincerely,  Kathryn Desai, RN  [facility]  [facility]  [address] [place] [address]  [address]  Phone:  [phone]

## 2017-01-25 NOTE — TELEPHONE ENCOUNTER
Spoke with patient regarding update on condition after blood transfusion on 1/24/2017.  Patient states that she feel much better today and will go to [facility] on 1/26/2017 for C1D22 lab draw for the Tesaro/Quadra Niraparib study.  Spoke with [facility] at [phone] and order for CBC was faxed to [phone] attention Dr. Knox per their request.  Requested that results be faxed to the clinic when ready.        Called and left patient a message to call back to let her know that the order was faxed.      Aurelia Worley RN     Pager [phone]

## 2017-01-26 NOTE — TELEPHONE ENCOUNTER
Patient called to report that she is at the [facility] ready to get her blood transfusion and she went to the restroom and found that she has blood in her stool.  The hospital wanted her to call us and report this finding.  Will route to Dr. Mccollum for any further recommendations.      Routed to Dr. Veda Worley, RN     Pager [phone]

## 2017-01-26 NOTE — PROGRESS NOTES
Type and Cross 2 units of PRBC's and [facility].   Non Radiated.   Dr Jia Mccollum    Called [facility] at [phone]  Faxed the above orders to [facility] today [phone].  They said they would try and get her in today.    PT notified of the above./  Jessica TIMMONS RN, OCN  Care Coordinator   Gynecologic Cancer   Office [phone]  Pager [phone]

## 2017-01-26 NOTE — TELEPHONE ENCOUNTER
Message left on home phone to call me re drop in hgb and in adequate rise with blood transfusion. Page  number given.  Jia Mccollum MD    Department of Ob/Gyn and Women's Health  Division of Gynecologic Oncology  [facility]  [phone]

## 2017-01-26 NOTE — PROGRESS NOTES
Spoke with patient today regarding hemoglobin of 7.5 and tear in eye as well as vitreous hemorrhage.  Patient is holding study medication at this time and is not sure that she would like to continue with treatment in the future.  Patient will have repeat labs on 1/27/2017.  EMELI reports filed out for both hemoglobin and eye tear.  Patient will continue to hold medication until AE's resolve and makes her treatment choice.  Will continue to monitor patient until then.  Order faxed to [facility] for repeat CBC on 1/27/2017.  Message left with patient to inform her of recommendations.      Aurelia Worley RN     Pager [phone]

## 2017-01-26 NOTE — TELEPHONE ENCOUNTER
----- Message from Nicki Foster RN sent at 1/26/2017  8:10 AM CST -----  Pt on a trial. She got blood Tuesday in [facility]  and now her hgb is 7.5 again. She will get blood again today. She is very concerned and would like a call please. [phone]

## 2017-01-26 NOTE — TELEPHONE ENCOUNTER
Dr. Mccollum called pt unable to leave message on voice mail cell [employer] but left message at home number, will attempt to call pt later

## 2017-01-26 NOTE — TELEPHONE ENCOUNTER
----- Message from Nicki Ghosh RN sent at 1/26/2017 10:06 AM CST -----  Regarding: FW: low hemoglobin  Contact: [phone]      ----- Message -----     From: Salud Shah     Sent: 1/26/2017   9:59 AM       To: Gyn Onc Nurses Team-  Subject: low hemoglobin                                   Pt daughter Mayela called regarding pt's hemoglobin being low after this mornings blood draw. She is wanting to know what they need to do next.   Please call her at [phone]    Thank you     Salud Shah  Intake representative  Call Center

## 2017-01-26 NOTE — TELEPHONE ENCOUNTER
35 minutes spent on phone regarding her anemia issues on the Tesaro trial. I explained in detail that anemia most likely due the niraparib. Patient told to continue to hold the drug. Still light headed. Had a retinal hemorrhage requiring eye laser and had post vitreal tear. Saw corneal specialist and a retinal laser specialist. [facility] eye care in [facility]. Sent to [facility] saw [facility] there. This was done yesterday. Wants to get transfusion in [place] today. Unsure what wants to do going forward in terms of the trial will hold drug until further notice.  Jia Mccollum MD    Department of Ob/Gyn and Women's Health  Division of Gynecologic Oncology  [facility]  [phone]

## 2017-01-26 NOTE — TELEPHONE ENCOUNTER
MD reviewed, pt to have colonoscopy  Spoke with patient primary care [facility] they will arrange mason colonoscopy asap and call pt   Pt notified of plan, advised pt if bleeding heavy go to ER

## 2017-01-27 PROBLEM — K62.5 RECTAL BLEEDING: Status: ACTIVE | Noted: 2017-01-01

## 2017-01-27 PROBLEM — R41.0 CONFUSION: Status: ACTIVE | Noted: 2017-01-01

## 2017-01-27 NOTE — PLAN OF CARE
Problem: Goal Outcome Summary  Goal: Goal Outcome Summary  Outcome: No Change  Observation goals:  Ambulating without dizziness: goal met  Stable Hgb: Goal not met, Dropped from 9.9 to 9.1 in five hours  No further rectal bleeding: Goal not met  Right leg swelling improved: Goal partially met, swelling has gone down.

## 2017-01-27 NOTE — ED NOTES
Patient BIBA from home for c/o right leg pain and swelling. Patient was at [facility] earlier today receiving a blood transfusion when leg pain started. Patient is concerned she has a DVT and called EMS after getting home from hospital. Patient is alert and oriented. VSS.

## 2017-01-27 NOTE — CONSULTS
Urology Consult     We were asked to see Margaux Guzmán at the request of Dr. Zimmerman for evaluation and treatment of right hydronephrosis.    HPI:  Margaux Guzmán is a 61 year old female with primary peritoneal cancer diagnosed 11/2013 s/p ex lap, hysterectomy with BSO (11/2013), she has had several rounds of systemic chemotherapy and is currently enrolled in a trial of niraparib.    She is known to urology for bilateral hydronephrosis managed with chronic indwelling stents since August 2015, last changed by Dr. Alvarez on 12/7/2016 (bilateral 8fr x 26cm). NM renogram from 9/2016 showed 19% function on the right kidney, 81% on the left.     She presented to the ER today due to right leg swelling and concern for DVT and hematochezia. She is currently planned to undergo colonoscopy tomorrow. She has required outpatient transfusions for acute anemia in the past weeks. CT scan at ER workup shows interval progression of peritoneal metastatic disease and worsening right hydronephrosis.    The patient states that she has had chronic bilateral flank pain but had not noticed any worsening flank pain on the right. She denies hematuria or dysuria. She feels that she is emptying her bladder.     Of note, patient states on interview today that she is considering stopping treatment for her cancer because logistics of transportation to [facility] have become challenging from her. She states she has difficulty driving because she had had worsening vision and feels that she is a burden on her family members due to her need for frequent transportation.      PAST MEDICAL HISTORY:  Past Medical History   Diagnosis Date     Hyperlipidemia      Osteoarthritis      Osteopenia      Polymyalgia rheumatica (H)      Ovarian cancer (H)      Primary cancer of peritoneum (H)      Hydronephrosis      Jugular vein thrombosis, right      Persistent right internal jugular DVT     Livedo annularis      History of blood transfusion      MULTIPLE      PONV (postoperative nausea and vomiting)      Anemia        PAST SURGICAL HISTORY:   Past Surgical History   Procedure Laterality Date     Appendectomy        section       Open reduction internal fixation toe(s)  9/3/13     Fifth digit, left foot, with screw fixation      Lymph node biopsy       Left posterior chain, beign     Breast surgery       biopsy negative     Laparoscopic salpingo-oophorectomy  2013     Procedure: LAPAROSCOPIC SALPINGO-OOPHORECTOMY;  Diagnostic Laparoscopy, Exploratory Laparotomy, Bilateral Salpingo-Oophorectomy,  Hysterectomy, Omentectomy, Pelvic Washings, Peritoneal staging and biopsies, Pelvic and Periaortic Lymph node Dissection;  Surgeon: Cheri Aguilar MD;  Location: [facility] OR     Hysterectomy total abdominal, bilateral salpingo-oophorectomy, node dissection, combined  2013     Procedure: COMBINED HYSTERECTOMY TOTAL ABDOMINAL, SALPINGO-OOPHORECTOMY, NODE DISSECTION;;  Surgeon: Cheri Aguilar MD;  Location: [facility] OR     Laparotomy, staging, combined  2013     Procedure: COMBINED LAPAROTOMY, STAGING;;  Surgeon: Cheri Aguilar MD;  Location: [facility] OR     Remove port peritoneal  2014     Procedure: REMOVE PORT PERITONEAL;  Surgeon: Cheri Aguilar MD;  Location: [facility] OR     Combined cystoscopy, retrogrades, exchange stent ureter(s) Bilateral 2016     Procedure: COMBINED CYSTOSCOPY, RETROGRADES, EXCHANGE STENT URETER(S);  Surgeon: Carmela Alvarez MD;  Location: [facility] OR     Combined cystoscopy, retrogrades, exchange stent ureter(s)  2016     @ [facility]     Combined cystoscopy, retrogrades, exchange stent ureter(s) Bilateral 10/17/2016     Procedure: COMBINED CYSTOSCOPY, RETROGRADES, EXCHANGE STENT URETER(S);  Surgeon: Carmela Alvarez MD;  Location: [facility] OR     Combined cystoscopy, retrogrades, exchange stent ureter(s) Bilateral 2016     Procedure: COMBINED CYSTOSCOPY, RETROGRADES, EXCHANGE STENT URETER(S);  Surgeon:  Carmela Alvarez MD;  Location:  OR       FAMILY HISTORY:  Family History   Problem Relation Age of Onset     Arthritis Father      DIABETES       Uncle     Hypertension Mother      Hypertension Sister      Myocardial Infarction Father      secondary to anesthesia     HEART DISEASE       unknown valve replacement     Colon Cancer Father      Other - See Comments Mother      cognitive decline     Bladder Cancer Sister      Skin Cancer Brother      Skin Cancer Mother        SOCIAL HISTORY:  Social History   Substance Use Topics     Smoking status: Former Smoker     Smokeless tobacco: Former User      Comment: Quit over 20 years ago     Alcohol Use: No       MEDICATIONS:   Current Facility-Administered Medications   Medication     lidocaine 1 % 1 mL     lidocaine (LMX4) kit     sodium chloride (PF) 0.9% PF flush 3 mL     sodium chloride (PF) 0.9% PF flush 3 mL     cycloSPORINE (RESTASIS) 0.05 % ophthalmic emulsion 1 drop     LORazepam (ATIVAN) tablet 0.5 mg     ondansetron (ZOFRAN) tablet 8 mg     prochlorperazine (COMPAZINE) tablet 10 mg     ranitidine (ZANTAC) tablet 75 mg     tamsulosin (FLOMAX) capsule 0.4 mg     tolterodine (DETROL LA) 24 hr capsule 4 mg     ondansetron (ZOFRAN-ODT) ODT tab 4 mg    Or     ondansetron (ZOFRAN) injection 4 mg     prochlorperazine (COMPAZINE) injection 5-10 mg    Or     prochlorperazine (COMPAZINE) tablet 5-10 mg    Or     prochlorperazine (COMPAZINE) Suppository 25 mg     acetaminophen (TYLENOL) tablet 1,000 mg     0.9% sodium chloride infusion     polyethylene glycol (GoLYTELY/NuLYTELY) suspension 4,000 mL       ALLERGIES:   Allergies   Allergen Reactions     Contrast Dye Itching and Swelling     Itching/tingling of mouth and nose with sensation of swelling after receiving IV contrast for CT.  This occurred despite steroid premedication. Therefore the patient should not receive intravenous contrast in the future.      Benadryl Allergy Other (See Comments)     Stay awake,  "restless, \"uncomfortable\", \"feel like I need to run away\"      Enoxaparin Hives     3/23/16: Okay to receive heparin flushes in port, tolerates well. Patricia Cortez FNP-C     Enoxaparin Sodium Other (See Comments)     Pt is taking this now.     Amoxicillin Rash     Diphenhydramine Anxiety     No Clinical Screening - See Comments Rash     Pollen Extract Rash     Sulfa Drugs Rash       REVIEW OF SYSTEMS:  See HPI for pertinent details.  Remainder of 10-point ROS negative.    PHYSICAL EXAM:   VS:  T: 99.4    HR: 91    BP: 121/63    RR: 16   GEN:  AOx3.  NAD.  Pleasant.  HEENT:  Sclerae anicteric.  Conjunctivae pink.  MMM..  CV:  Regular rate  LUNGS: Non-labored breathing.  BACK:  No midline or CVA tenderness.  ABD:  Soft.  NT.  ND.  No rebound or guarding.  No masses. Midline laparotomy scar well healed. Right lower quadrant appendectomy scar well healed.   :  Not performed.  EXT:  Warm, well perfused.  no edema.  SKIN:  Warm.  Dry.  No rashes.  NEURO:  CN grossly intact.  CHRISTIAN.    LABS:    Recent Labs  Lab 01/27/17  0530 01/27/17  0007 01/23/17  1600   WBC 5.1 6.5 7.0   HGB 9.1* 9.9* 7.2*    179 207       Recent Labs  Lab 01/27/17  0530 01/27/17  0007 01/23/17  1600    138 132*   POTASSIUM 3.9 3.3* 3.8   CHLORIDE 106 105 98   CO2 26 24 24   BUN 12 14 35*   CR 0.96 1.04 0.86   * 96 113*   PURVI 8.1* 8.6 8.4*   MAG  --   --  2.0     No lab results found in last 7 days.    Invalid input(s): URINEBLOOD    IMAGING:    IMPRESSION: This patient with a history of metastatic ovarian cancer:  1. No bowel obstruction. Oral contrast progressed to the colon.  Extensive colonic stool.  2. Severe right hydronephrosis, new from 12/12/2016, may represent  obstruction of the right ureteral stent. No left-sided hydronephrosis.  3. Slight interval progression of diffuse peritoneal metastatic  disease.  4. Mildly nodular areas of increased attenuation within the pelvic  bowel which may represent loculated malignant " ascites or peritoneal  implants similar in appearance to 2016.    Renogram 2016  Impression:  1. Normal functioning left kidney with no evidence of obstruction.  2. Decreased radiotracer uptake in the right kidney with no evidence  of obstruction consistent with decreased renal function.     3. The split functional measurements are: 80.9% function on the left  as compared to 19.1% function on the right.    ASSESSMENT:    Margaux Guzmán is a 61 year old female with bilateral hydronephrosis secondary to metastatic peritoneal cancer, she has been managed with indwelling stents since 2015. Her last stent exchange was 2016 and she currently has 8 fr x 26 fr bilateral ureteral stents.     I reviewed options for management includin) ongoing observation, as she is currently asymptomatic, no evidence of infection and stable creatinine (right kidney contributed 19% so unlikely that it will cause significant change in GFR/Creatinine).   2) Attempt exchange of ureteral stent and up sizing to dual stents.   3) Placement of percutaneous nephrostomy tube.     PLAN:  - At this time the patient elects for exchange of ureteral stents in the operating room. We discussed that due to progression of disease this may not be technically possible from extrinsic compression and that she may require PNT.   -In regards of timing of ureteral stent exchange - currently she is undergoing bowel prep for colonoscopy tomorrow, we discussed that she would need to be NPO per anesthesia guidelines prior to stent exchange in the OR. There is no current indication for emergent stent exchange. Ms. Guzmán expresses desire to have stent exchange accomplished during this hospitalization due to her difficulties obtaining transport to the [facility].  - We will discuss her case with Dr. Alvarez in the morning and notify the primary team of timing of ureteral stent exchange.   - Please notify urology immediately if patient becomes  febrile, has worsening right flank pain, worsening renal function, leukocytosis or SIRS criteria, as these would be indications to proceed emergently to the operating room.    Discussed with Dr. Apple,     Ines Quevedo      =======================================================  As the attending surgeon I, Omega Apple, reviewed the images and went by to see the patient but she was off the floor for a procedure. The plan was developed between me and the patient.    In summary, she has chronic right hydro which is incidentally noted to be worse over the course of 1 week on serial CT scans. She has a stent in place. That kidney has minimal function. Would not intervene. She can proceed with regular q3mo stent change.     Omega Apple MD

## 2017-01-27 NOTE — LETTER
Transition Communication Hand-off for Care Transitions to Next Level of Care Provider    Name: Margaux Guzmán  MRN #: [id number]  Primary Care Provider: JOELLE SIMON     Primary Clinic: [facility] [address] [facility] [address]     Reason for Hospitalization:  Gastrointestinal hemorrhage, unspecified gastrointestinal hemorrhage type [K92.2]  Admit Date/Time: 1/27/2017 12:05 AM  Discharge Date: 1/28  Payor Source: Payor: BLUE PLUS / Plan: BLUE PLUS [place] ADVANTAGE / Product Type: HMO /          Reason for Communication Hand-off Referral: No support or lacking a support system    Discharge Plan: Pt lives in [facility] in an isolated town without a lot of support  -Pt needs assistance with transportation or other support resources        Concern for non-adherence with plan of care:   Y/N n  Discharge Needs Assessment:      Already enrolled in Tele-monitoring program and name of program:  NA  Follow-up specialty is recommended: Yes    Follow-up plan:  Future Appointments  Date Time Provider Department Center   2/2/2017 9:15 AM  [facility]   2/2/2017 9:40 AM Jia Mccollum MD [facility]   4/12/2017 10:00 AM  LAB [facility]   4/12/2017 10:20 AM Mackenzie Ca MD [facility]       Any outstanding tests or procedures:            Key Recommendations:      [facility]  [facility], [facility]    AVS/Discharge Summary is the source of truth; this is a helpful guide for improved communication of patient story

## 2017-01-27 NOTE — CONSULTS
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 Hb drop from 11.5 -->7.2, current hb 9.1 after 4 units PRBC transfusion this week. Most likely slow lower GI bleed based on symptoms, may be hemorrhoids, malignancy, AVMs, unlikely diverticulosis as it will be more brisk bleeding. Her last colonoscopy around 12 yrs ago. Unlikely upper GI bleed, as no clear melena, normal BUN. She had recent SBO this month, which resolved with supportive care. LE swelling improving, USG doppler no DVT.     Recommendations:    Get CT abdomen w oral contrast only to look for any recurrent SBO. If negative will plan for colonoscopy.     Daily CBC, BMP,  transfuse for hb < 7.     Hold Lovenox.    Gastroenterology follow up recommendations: TBD      Thank you for involving us in this patient's care. Please do not hesitate to contact the GI service with any questions or concerns.     Pt care plan discussed with Dr. Richardson, GI staff physician.    Alysa Babb  GI fellow    ATTENDING ATTESTATION:    REFERRING PROVIDER: [facility]  DATE SEEN: 1/27/17      Patient was discussed, seen, and examined by me, Luis Richardson. The plan of care and pertinent data/imaging were also reviewed with the GI Consult team and GI Fellow as well. Agree with the joint assessment and plan as delineated above.    Please contact me with any further questions.    Luis Richardson MD    [facility] [place] - Department of Medicine  Division of Gastroenterology  [phone]           Chief Complaint:   Hematochezia.          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 She was recently admitted from 1/10 to 1/15 with SBO. She  was found to have hb of 7.2 around 3 days before admission and it was attributed to her drug and she got 2 units PRBC transfused in [place]. She denied any bleeding in stool before that, she did have some chronic dark brown stools which she attributed to iron pills. On Wednesday night, she felt dizzy and confused and so had her Hgb rechecked and per patient, was found to have a Hgb of 7.5 . She received additional 2u PRBC. Yesterday she had around 3-4 loose BM, dark brown covered with red blood. Her last BM was this morning and it dark brown with red blood, it was not witnessed by nursing staff.  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 Denied any NSAIDs.     Colonoscopy when she was 50 yo, benign polyps, never had EGD before.            Past Medical History:     Reviewed and edited as appropriate  Past Medical History   Diagnosis Date     Hyperlipidemia      Osteoarthritis      Osteopenia      Polymyalgia rheumatica (H)      Ovarian cancer (H)      Primary cancer of peritoneum (H)      Hydronephrosis      Jugular vein thrombosis, right      Persistent right internal jugular DVT     Livedo annularis      History of blood transfusion      MULTIPLE     PONV (postoperative nausea and vomiting)      Anemia             Past Surgical History:   Reviewed and edited as appropriate   Past Surgical History   Procedure Laterality Date     Appendectomy        section       Open reduction internal fixation toe(s)  9/3/13     Fifth digit, left foot, with screw fixation      Lymph node biopsy       Left posterior chain, beign     Breast surgery       biopsy negative     Laparoscopic salpingo-oophorectomy  2013     Procedure: LAPAROSCOPIC SALPINGO-OOPHORECTOMY;  Diagnostic Laparoscopy, Exploratory Laparotomy, Bilateral  Salpingo-Oophorectomy,  Hysterectomy, Omentectomy, Pelvic Washings, Peritoneal staging and biopsies, Pelvic and Periaortic Lymph node Dissection;  Surgeon: Cheri Aguilar MD;  Location: [facility] OR     Hysterectomy total abdominal, bilateral salpingo-oophorectomy, node dissection, combined  11/7/2013     Procedure: COMBINED HYSTERECTOMY TOTAL ABDOMINAL, SALPINGO-OOPHORECTOMY, NODE DISSECTION;;  Surgeon: Cheri Aguilar MD;  Location: [facility] OR     Laparotomy, staging, combined  11/7/2013     Procedure: COMBINED LAPAROTOMY, STAGING;;  Surgeon: Cheri Aguilar MD;  Location: [facility] OR     Remove port peritoneal  5/5/2014     Procedure: REMOVE PORT PERITONEAL;  Surgeon: Cheri Aguilar MD;  Location: [facility] OR     Combined cystoscopy, retrogrades, exchange stent ureter(s) Bilateral 7/18/2016     Procedure: COMBINED CYSTOSCOPY, RETROGRADES, EXCHANGE STENT URETER(S);  Surgeon: Carmela Alvarez MD;  Location: [facility] OR     Combined cystoscopy, retrogrades, exchange stent ureter(s)  4/2016     @ [facility]     Combined cystoscopy, retrogrades, exchange stent ureter(s) Bilateral 10/17/2016     Procedure: COMBINED CYSTOSCOPY, RETROGRADES, EXCHANGE STENT URETER(S);  Surgeon: Carmela Alvarez MD;  Location: [facility] OR     Combined cystoscopy, retrogrades, exchange stent ureter(s) Bilateral 12/7/2016     Procedure: COMBINED CYSTOSCOPY, RETROGRADES, EXCHANGE STENT URETER(S);  Surgeon: Carmela Alvarez MD;  Location: [facility] OR            Previous Endoscopy:   No results found for this or any previous visit.         Social History:   Reviewed and edited as appropriate  Social History     Social History     Marital Status:      Spouse Name: N/A     Number of Children: N/A     Years of Education: N/A     Occupational History     Not on file.     Social History Main Topics     Smoking status: Former Smoker     Smokeless tobacco: Former User      Comment: Quit over 20 years ago     Alcohol Use: No     Drug Use: No      "Sexual Activity: No     Other Topics Concern     Not on file     Social History Narrative    .  Lives alone with her dog and cat.      Daughter or friend is available to help after surgeries/hospitalizations.      Has 2 children; both alive and well.    Parents are both alive in their 90's.     Retired business owner.            Family History:   Reviewed and edited as appropriate  Family History   Problem Relation Age of Onset     Arthritis Father      DIABETES       Uncle     Hypertension Mother      Hypertension Sister      Myocardial Infarction Father      secondary to anesthesia     HEART DISEASE       unknown valve replacement     Colon Cancer Father      Other - See Comments Mother      cognitive decline     Bladder Cancer Sister      Skin Cancer Brother      Skin Cancer Mother            Allergies:   Reviewed and edited as appropriate     Allergies   Allergen Reactions     Contrast Dye Itching and Swelling     Itching/tingling of mouth and nose with sensation of swelling after receiving IV contrast for CT.  This occurred despite steroid premedication. Therefore the patient should not receive intravenous contrast in the future.      Benadryl Allergy Other (See Comments)     Stay awake, restless, \"uncomfortable\", \"feel like I need to run away\"      Enoxaparin Hives     3/23/16: Okay to receive heparin flushes in port, tolerates well. Patricia EDGARP-C     Enoxaparin Sodium Other (See Comments)     Pt is taking this now.     Amoxicillin Rash     Diphenhydramine Anxiety     No Clinical Screening - See Comments Rash     Pollen Extract Rash     Sulfa Drugs Rash            Medications:     Current Facility-Administered Medications   Medication     lidocaine 1 % 1 mL     lidocaine (LMX4) kit     sodium chloride (PF) 0.9% PF flush 3 mL     sodium chloride (PF) 0.9% PF flush 3 mL     cycloSPORINE (RESTASIS) 0.05 % ophthalmic emulsion 1 drop     LORazepam (ATIVAN) tablet 0.5 mg     ondansetron (ZOFRAN) " "tablet 8 mg     prochlorperazine (COMPAZINE) tablet 10 mg     ranitidine (ZANTAC) tablet 75 mg     tamsulosin (FLOMAX) capsule 0.4 mg     tolterodine (DETROL LA) 24 hr capsule 4 mg     ondansetron (ZOFRAN-ODT) ODT tab 4 mg    Or     ondansetron (ZOFRAN) injection 4 mg     prochlorperazine (COMPAZINE) injection 5-10 mg    Or     prochlorperazine (COMPAZINE) tablet 5-10 mg    Or     prochlorperazine (COMPAZINE) Suppository 25 mg     acetaminophen (TYLENOL) tablet 1,000 mg     0.9% sodium chloride infusion     gadobutrol (GADAVIST) injection 7.5 mL             Review of Systems:   A complete review of systems was performed and is negative except as noted in the HPI           Physical Exam:     /68 mmHg  Pulse 94  Temp(Src) 99.4  F (37.4  C) (Oral)  Resp 16  Ht 1.676 m (5' 6\")  Wt 58.469 kg (128 lb 14.4 oz)  BMI 20.82 kg/m2  SpO2 97%  Wt:   Wt Readings from Last 2 Encounters:   01/27/17 58.469 kg (128 lb 14.4 oz)   01/23/17 58.242 kg (128 lb 6.4 oz)        Constitutional: cooperative, not dyspneic/diaphoretic, no acute distress  Eyes: Sclera anicteric/ no pallor  Ears/nose/mouth/throat: Normal oropharynx without ulcers or exudate, mucus membranes moist,  CV: Normal S1, S2, no murmurs.  Respiratory: clear to auscultation b/l, no murmur  Abd: Nondistended, +bs, nontender, no peritoneal signs  Rectal exam: no hemorrhoids, dark brown stool on exam.   Skin: warm, perfused, no jaundice  Neuro: AAO x 3, no focal motor sensory deficits           Data:   Labs and imaging below were independently reviewed and interpreted    BMP  Recent Labs  Lab 01/27/17  0530 01/27/17  0007 01/23/17  1600    138 132*   POTASSIUM 3.9 3.3* 3.8   CHLORIDE 106 105 98   PURVI 8.1* 8.6 8.4*   CO2 26 24 24   BUN 12 14 35*   CR 0.96 1.04 0.86   * 96 113*     CBC  Recent Labs  Lab 01/27/17  0530 01/27/17  0007 01/23/17  1600   WBC 5.1 6.5 7.0   RBC 2.83* 3.08* 2.08*   HGB 9.1* 9.9* 7.2*   HCT 27.5* 29.6* 21.3*   MCV 97 96 102* "   MCH 32.2 32.1 34.6*   MCHC 33.1 33.4 33.8   RDW 17.5* 17.5* 13.6    179 207     INR  Recent Labs  Lab 01/27/17 0007   INR 0.99     LFTs  Recent Labs  Lab 01/27/17 0007   ALKPHOS 56   AST 23   ALT 38   BILITOTAL 0.7   PROTTOTAL 6.2*   ALBUMIN 3.4      PANCNo lab results found in last 7 days.    Imaging: reviewed

## 2017-01-27 NOTE — PLAN OF CARE
Problem: Goal Outcome Summary  Goal: Goal Outcome Summary  Outcome: Improving  VSS. Tyelonol given for headache w/ relief, otherwise declined intervention. Denies nausea. Pt had 1 bloody BM but flushed prior to nurse seeing. Educated on need to notify nurse when Pt has BM. Voiding spont. Port accessed, saline locked w/ good blood returned. Due to start maintenance fluids when returns from CT. Currently down stairs for MRI and CT w/ contrast. Up w/ SBA. Clear liquid diet. Up w/ SBA Distress screening started (see note). Continue to provided emotional support as needed. Continue w/ POC.

## 2017-01-27 NOTE — IP AVS SNAPSHOT
[address] [address]    Phone:  [phone]                                       After Visit Summary   1/27/2017    Margaux Guzmán    MRN: [id number]           After Visit Summary Signature Page     I have received my discharge instructions, and my questions have been answered. I have discussed any challenges I see with this plan with the nurse or doctor.    ..........................................................................................................................................  Patient/Patient Representative Signature      ..........................................................................................................................................  Patient Representative Print Name and Relationship to Patient    ..................................................               ................................................  Date                                            Time    ..........................................................................................................................................  Reviewed by Signature/Title    ...................................................              ..............................................  Date                                                            Time

## 2017-01-27 NOTE — ED PROVIDER NOTES
"    [place] EMERGENCY DEPARTMENT ([facility])  January 27, 2017  ED 9 12:20 AM   History     Chief Complaint   Patient presents with     Leg Pain     Right     The history is provided by the patient and medical records.     Margaux Guzmán is a 61 year old female who presents with concern for low hemoglobin, bright red stools, and new right leg pain. She has a history of ovarian cancer (diagnosed in 2013), followed by primary peritoneal carcinoma, complicated by bilateral renal obstruction status post stenting. She is on experimental chemotherapy. She has had problems with both clotting and bleeding issues, such as a completely clotted jugular vein for which she is on Lovenox. However she has bleeding issues from her ureteral stents with chronic hematuria. Three days ago she had routine labs showing that her hemoglobin plummeted from 11.5 to 7.4. She subsequently had a blood transfusion at [facility] as it was close to her home. The following day she had laser eye surgery and noticed feeling quite weak and confused after the procedure and was concerned for recurrence of low hemoglobin, as she had similar symptoms when she was anemic. She returned to clinic this morning and was found to have low hemoglobin to 7.5 again. She was transfused x 2 units at [facility] and during her transfusion she developed a \"charley horse\" spreading up her calf. Eventually this pain migrated into her leg and then into her hip.  She noticed her temperature was rising and her blood pressure was rising as well. Patient also noticed bright red bloody formed stools today while at [facility]. She was discharged despite feeling unwell. She went home and noticed her right lower extremity was quite swollen with a palpable lump on the back of her leg. She became concerned for DVT and called an ambulance to be brought to [facility]. She states that en route her right leg felt like it was wooden, and then  " spontaneously felt better. She is concerned that the clot is moving.  She is on 40 mg Lovenox daily.     Patient is followed by Dr. Mccollum of Gyn/Onc and Dr. Alvarez of Urology.      I have reviewed the Medications, Allergies, Past Medical and Surgical History, and Social History in the [employer] system.  PAST MEDICAL HISTORY:   Past Medical History   Diagnosis Date     Hyperlipidemia      Osteoarthritis      Osteopenia      Polymyalgia rheumatica (H)      Ovarian cancer (H)      Primary cancer of peritoneum (H)      Hydronephrosis      Jugular vein thrombosis, right      Persistent right internal jugular DVT     Livedo annularis      History of blood transfusion      MULTIPLE     PONV (postoperative nausea and vomiting)      Anemia        PAST SURGICAL HISTORY:   Past Surgical History   Procedure Laterality Date     Appendectomy        section       Open reduction internal fixation toe(s)  9/3/13     Fifth digit, left foot, with screw fixation      Lymph node biopsy       Left posterior chain, beign     Breast surgery       biopsy negative     Laparoscopic salpingo-oophorectomy  2013     Procedure: LAPAROSCOPIC SALPINGO-OOPHORECTOMY;  Diagnostic Laparoscopy, Exploratory Laparotomy, Bilateral Salpingo-Oophorectomy,  Hysterectomy, Omentectomy, Pelvic Washings, Peritoneal staging and biopsies, Pelvic and Periaortic Lymph node Dissection;  Surgeon: Cheri Aguilar MD;  Location: [facility] OR     Hysterectomy total abdominal, bilateral salpingo-oophorectomy, node dissection, combined  2013     Procedure: COMBINED HYSTERECTOMY TOTAL ABDOMINAL, SALPINGO-OOPHORECTOMY, NODE DISSECTION;;  Surgeon: Cheri Aguilar MD;  Location: [facility] OR     Laparotomy, staging, combined  2013     Procedure: COMBINED LAPAROTOMY, STAGING;;  Surgeon: Cheri Aguilar MD;  Location: [facility] OR     Remove port peritoneal  2014     Procedure: REMOVE PORT PERITONEAL;  Surgeon: Cheri Aguilar MD;  Location: [facility] OR     Combined  cystoscopy, retrogrades, exchange stent ureter(s) Bilateral 7/18/2016     Procedure: COMBINED CYSTOSCOPY, RETROGRADES, EXCHANGE STENT URETER(S);  Surgeon: Carmela Alvarez MD;  Location: [facility] OR     Combined cystoscopy, retrogrades, exchange stent ureter(s)  4/2016     @ [facility]     Combined cystoscopy, retrogrades, exchange stent ureter(s) Bilateral 10/17/2016     Procedure: COMBINED CYSTOSCOPY, RETROGRADES, EXCHANGE STENT URETER(S);  Surgeon: Carmela Alvarez MD;  Location: [facility] OR     Combined cystoscopy, retrogrades, exchange stent ureter(s) Bilateral 12/7/2016     Procedure: COMBINED CYSTOSCOPY, RETROGRADES, EXCHANGE STENT URETER(S);  Surgeon: Carmela Alvarez MD;  [facility]: [facility] OR       FAMILY HISTORY:   Family History   Problem Relation Age of Onset     Arthritis Father      DIABETES       Uncle     Hypertension Mother      Hypertension Sister      Myocardial Infarction Father      secondary to anesthesia     HEART DISEASE       unknown valve replacement     Colon Cancer Father      Other - See Comments Mother      cognitive decline     Bladder Cancer Sister      Skin Cancer Brother      Skin Cancer Mother        SOCIAL HISTORY:   Social History   Substance Use Topics     Smoking status: Former Smoker     Smokeless tobacco: Former User      Comment: Quit over 20 years ago     Alcohol Use: No       Current Discharge Medication List      CONTINUE these medications which have NOT CHANGED    Details   enoxaparin (LOVENOX) 40 MG/0.4ML injection Inject 0.4 mLs (40 mg) Subcutaneous daily  Qty: 30 Syringe, Refills: 3    Associated Diagnoses: Ovarian cancer, left (H); Ovarian cancer, right (H)      !! LORazepam (ATIVAN) 0.5 MG tablet Take 1 tablet (0.5 mg) by mouth every 4 hours as needed (Anxiety, Nausea/Vomiting or Sleep)  Qty: 30 tablet, Refills: 3    Associated Diagnoses: Anemia due to other cause; Chemotherapy-induced neutropenia (H); Ovarian cancer, left (H); Ovarian cancer,  right (H); Primary peritoneal adenocarcinoma (H)      study - niraparib (IDS# [id number]) 100 mg capsule Take 3 capsules (300 mg) by mouth every morning Take at the same time each day, preferably morning. Swallow whole and do NOT chew capsules. Food and water is permissible. First dose will be administered on site.  Qty: 84 capsule, Refills: 0    Associated Diagnoses: Anemia due to other cause; Chemotherapy-induced neutropenia (H); Ovarian cancer, left (H); Ovarian cancer, right (H); Primary peritoneal adenocarcinoma (H)      dexamethasone (DECADRON) 4 MG tablet Take 1 tablet (4 mg) by mouth daily (with breakfast)  Qty: 14 tablet, Refills: 1    Associated Diagnoses: Cancer associated pain      tamsulosin (FLOMAX) 0.4 MG capsule Take 1 capsule (0.4 mg) by mouth daily  Qty: 60 capsule, Refills: 6    Associated Diagnoses: Primary peritoneal adenocarcinoma (H)      !! LORazepam (ATIVAN) 1 MG tablet Take 1 tablet (1 mg) by mouth every 6 hours as needed (nausea/vomiting, anxiety or sleep)  Qty: 30 tablet, Refills: 3    Associated Diagnoses: Primary peritoneal adenocarcinoma (H)      tolterodine (DETROL LA) 4 MG 24 hr capsule Take 1 capsule (4 mg) by mouth daily  Qty: 30 capsule, Refills: 3    Associated Diagnoses: Hydronephrosis, unspecified hydronephrosis type      ondansetron (ZOFRAN) 8 MG tablet Take 1 tablet (8 mg) by mouth every 8 hours as needed for nausea  Qty: 30 tablet, Refills: 2    Associated Diagnoses: Nausea; Primary peritoneal adenocarcinoma (H)      ACETAMINOPHEN PO Take 500 mg by mouth every 6 hours as needed for pain      Multiple Vitamins-Minerals (ONE DAILY MULTIVITAMIN WOMEN) TABS Take 1 tablet by mouth every morning       polyethylene glycol (MIRALAX/GLYCOLAX) powder Take 1 capful by mouth daily as needed       cycloSPORINE (RESTASIS) 0.05 % ophthalmic emulsion Place 1 drop into both eyes 2 times daily       Ranitidine HCl (ZANTAC PO) Take 75 mg by mouth daily       MELATONIN PO Take 1 mg by mouth At  "Bedtime       EPINEPHrine (EPIPEN) 0.3 MG/0.3ML injection Inject 0.3 mLs (0.3 mg) into the muscle once as needed for anaphylaxis  Qty: 1 each, Refills: 0    Associated Diagnoses: Preventative health care; Primary peritoneal adenocarcinoma (H)      senna-docusate (SENOKOT-S;PERICOLACE) 8.6-50 MG per tablet Take 1-2 tablets by mouth 2 times daily  Qty: 7 tablet    Associated Diagnoses: Primary peritoneal adenocarcinoma (H)      Pyridoxine HCl (VITAMIN B6 PO) Take 1 tablet by mouth At Bedtime       prochlorperazine (COMPAZINE) 10 MG tablet Take 1 tablet (10 mg) by mouth every 6 hours as needed (nausea/vomiting)  Qty: 30 tablet, Refills: 2    Associated Diagnoses: Primary peritoneal adenocarcinoma (H)       !! - Potential duplicate medications found. Please discuss with provider.             Allergies   Allergen Reactions     Contrast Dye Itching and Swelling     Itching/tingling of mouth and nose with sensation of swelling after receiving IV contrast for CT.  This occurred despite steroid premedication. Therefore the patient should not receive intravenous contrast in the future.      Benadryl Allergy Other (See Comments)     Stay awake, restless, \"uncomfortable\", \"feel like I need to run away\"      Enoxaparin Hives     3/23/16: Okay to receive heparin flushes in port, tolerates well. Patricia EDGARP-C     Enoxaparin Sodium Other (See Comments)     Pt is taking this now.     Amoxicillin Rash     Diphenhydramine Anxiety     No Clinical Screening - See Comments Rash     Pollen Extract Rash     Sulfa Drugs Rash        Review of Systems   Constitutional: Negative for fever.   Respiratory: Negative for shortness of breath.    Cardiovascular: Positive for leg swelling (right leg). Negative for chest pain.   Gastrointestinal: Positive for blood in stool. Negative for abdominal pain.   All other systems reviewed and are negative.      Physical Exam   BP: 168/88 mmHg  Heart Rate: 112  Temp: 98  F (36.7  C)  Resp: 18  Height: " "167.6 cm (5' 6\")  Weight: 56.7 kg (125 lb)  SpO2: 100 %  Physical Exam  Vital Signs and Nursing Notes Reviewed.  General:  NAD  HEENT: Oropharynx clear.  No obvious signs of trauma.  PERRL.  EOMI  Neck: Supple.  No lymphadenopathy.  Cardiac: RRR.  No murmurs, rubs or gallops  Lungs: Clear bilaterally.  Normal respiratory rate.    Abdomen:  Soft, Nontender, no rebound or guarding.  Back: No CVA tenderness.  Skin:  No rash.  No diaphoresis  Extremities:  1+ edema of right leg.  No cyanosis, clubbing.  Neurological:  CN II-XII intact, Strength intact, Sensation intact, No pronator drift, Normal gait.  Pulse:  Symmetric in bilateral upper and lower extremities.    ED Course     Procedures             Critical Care time:  none        Results for orders placed or performed during the hospital encounter of 01/27/17   US Lower Extremity Venous Duplex Right    Narrative    1.  No evidence of right lower extremity deep venous thrombosis.  2.  Dampened waveforms in the right lower extremity, similar to  7/25/2016, though a more central occlusion cannot be excluded.     CBC with platelets   Result Value Ref Range    WBC 6.5 4.0 - 11.0 10e9/L    RBC Count 3.08 (L) 3.8 - 5.2 10e12/L    Hemoglobin 9.9 (L) 11.7 - 15.7 g/dL    Hematocrit 29.6 (L) 35.0 - 47.0 %    MCV 96 78 - 100 fl    MCH 32.1 26.5 - 33.0 pg    MCHC 33.4 31.5 - 36.5 g/dL    RDW 17.5 (H) 10.0 - 15.0 %    Platelet Count 179 150 - 450 10e9/L   Basic metabolic panel   Result Value Ref Range    Sodium 138 133 - 144 mmol/L    Potassium 3.3 (L) 3.4 - 5.3 mmol/L    Chloride 105 94 - 109 mmol/L    Carbon Dioxide 24 20 - 32 mmol/L    Anion Gap 9 3 - 14 mmol/L    Glucose 96 70 - 99 mg/dL    Urea Nitrogen 14 7 - 30 mg/dL    Creatinine 1.04 0.52 - 1.04 mg/dL    GFR Estimate 54 (L) >60 mL/min/1.7m2    GFR Estimate If Black 65 >60 mL/min/1.7m2    Calcium 8.6 8.5 - 10.1 mg/dL   INR   Result Value Ref Range    INR 0.99 0.86 - 1.14   Partial thromboplastin time   Result Value Ref " Range    PTT 39 (H) 22 - 37 sec   Hepatic panel   Result Value Ref Range    Bilirubin Direct 0.1 0.0 - 0.2 mg/dL    Bilirubin Total 0.7 0.2 - 1.3 mg/dL    Albumin 3.4 3.4 - 5.0 g/dL    Protein Total 6.2 (L) 6.8 - 8.8 g/dL    Alkaline Phosphatase 56 40 - 150 U/L    ALT 38 0 - 50 U/L    AST 23 0 - 45 U/L            Labs Ordered and Resulted from Time of ED Arrival Up to the Time of Departure from the ED   CBC WITH PLATELETS - Abnormal; Notable for the following:     RBC Count 3.08 (*)     Hemoglobin 9.9 (*)     Hematocrit 29.6 (*)     RDW 17.5 (*)     All other components within normal limits   BASIC METABOLIC PANEL - Abnormal; Notable for the following:     Potassium 3.3 (*)     GFR Estimate 54 (*)     All other components within normal limits   PARTIAL THROMBOPLASTIN TIME - Abnormal; Notable for the following:     PTT 39 (*)     All other components within normal limits   HEPATIC PANEL - Abnormal; Notable for the following:     Protein Total 6.2 (*)     All other components within normal limits   INR   PERIPHERAL IV CATHETER   IP ASSIGN PROVIDER TEAM TO TREATMENT TEAM       Assessments & Plan (with Medical Decision Making)  61 year old with history of metastatic ovarian cancer currently on chemo who presents with leg swelling and GI bleeding.  Right lower extremity ultrasound is negative for DVT.  Has had some rectal bleeding.  Hemoglobin is 9.9 but did receive a transfusion earlier today.  No continued bleeding here.  Ultrasound was negative for DVT.  Did have gyn/onc see the patient in the ER.  Patient will be admitted to their service.         I have reviewed the nursing notes.    I have reviewed the findings, diagnosis, plan and need for follow up with the patient.    Current Discharge Medication List          Final diagnoses:   Gastrointestinal hemorrhage, unspecified gastrointestinal hemorrhage type     I, Arin Mccoy, am serving as a trained medical scribe to document services personally performed by Mc  MD Reema, based on the provider's statements to me on January 27, 2017.  This document has been checked and approved by the attending provider.    I, Mc Benavidez MD, was physically present and have reviewed and verified the accuracy of this note documented by Arin Mccoy, medical scribe.    1/26/2017   [facility], [place], EMERGENCY DEPARTMENT      Semaj Benavidez MD  01/27/17 0431

## 2017-01-27 NOTE — CONSULTS
"Social Work: Assessment with Discharge Plan    Patient Name:  Margaux Guzmán  :  1955  Age:  61 year old  MRN:  [id number]  Risk/Complexity Score:     Completed assessment with:  Pt, medical team    Presenting Information   Reason for Referral:  Financial issues  Date of Intake:  2017  Referral Source:  Physician  Decision Maker:  pt  Alternate Decision Maker:  Pt's daughterMayela   Health Care Directive:  Copy in Chart  Living Situation:  House, pt lives in her own house, per pt, her home is very remote   Previous Functional Status:  Independent  Patient and family understanding of hospitalization:  Pt understands reason for hospitalization   Cultural/Language/Spiritual Considerations:  Pt speaks English  Adjustment to Illness:  Pt feeling overwhelmed and reports that she has been \"foggy\" recently (she's not sure if she has chemo brain, or it's caused by medications)     Physical Health  Reason for Admission:    1. 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 and dealing with her cancer, she has lost touch with friends  Gaps in support system:  Pt does have 24 hour care   Patient is caregiver to:  None     Provider Information   Primary Care Physician:  Aurelia Knox   [phone]   Clinic:  [facility] [address] [place] [address] / [facility] [address]      :  YANNI    Financial   Income Source:  " Pt reports that she is retired, has custodial account and receiving SSDI  Financial Concerns:  None identified   Insurance:    Payor/Plan Subscriber Name Rel Member # Group #   BLUE PLUS - BLUE PLUS* LILIANA MCKEON*  [id number]       [id number]       Discharge Plan   Patient and family discharge goal:  Discharge home  Barriers to discharge:  Pt's medical stability  Resources: SW provided pt with several support resources; unfortunately pt has tried [employer] for transportation and there is no one in her area that volunteers transport; SW will make referral to cancer clinic SW for more resources     Discharge Recommendations   Anticipated Disposition:  Home with services  Transportation Needs:  Family:  daughter  Name of Transportation Company and Phone:  -    [employer] [facility] for   [phone] or [phone]

## 2017-01-27 NOTE — PLAN OF CARE
Problem: Goal Outcome Summary  Goal: Goal Outcome Summary  Outcome: No Change  Observation Goals:  1) Ambulating without dizziness- Met, stable w/ ambulation  2) Stable Hgb- Not Met, bleeding still present  3) No further rectal bleeding- Not Met, bleeding still present  4) Right leg swelling improved- Not Met, swelling improved but still present

## 2017-01-27 NOTE — H&P
[facility] History and Physical    Margaux Guzmán MRN# [id number]   Age: 61 year old YOB: 1955     Date of Admission:  1/27/2017    Primary care provider: Sophia Garcia             Chief Complaint:   RLE edema         History of Present Illness:   Margaux Guzmán is a 61 year old female with primary peritoneal cancer (please see history below) on niraparib per the Tesaro trial who presents via ambulance given right lower extremity edema and two episodes of rectal bleeding. She was recently admitted from 1/10 to 1/15 for a small bowel obstruction that was managed with a NGT. She states that she re-started her trial drug niraparib on 01/20-01/22 after being off for a short time after healing from her SBO. Three days prior to today, 1/23, she had anemia with Hgb of 7.2 from 11.5 and received 2u PRBC in [place]. On Wednesday night, she felt dizzy and confused and so had her Hgb rechecked and per patient, was found to have a Hgb of 7.5 (no records found). She received an additional 2u PRBC the day prior to admission in [place]. During the transfusion, she felt a severe cramp in the bottom of her R foot that went up her leg in addition to right lower extremity swelling worse than the left. At this point, she was concerned enough and called an ambulance to take her to the emergency department here from [place], her home, as she is a primary patient of Dr. Mccollum. She also has a history of a R jugular vein occlusion for which she is taking lovenox and so she was worried about another blood clot. After more time since the transfusion, her R leg pain has improved and her swelling improved. She was also seen due to a vitreal hemorrhage in [place] on 1/26/17. She also notes two episodes of bright red blood mixed with stool after her last two bowel movement. No active bleeding. She notes a total of 3 loose stools yesterday. Denies any other sources of bleeding and notes that the bleeding  did not fill the toilet bowl. She denies any current nausea, vomiting, chest pain, shortness of breath, fever, chills, abdominal pain, constipation.    Patient was seen at an outside clinic 1/26/16 and was again found to have a hemoglobin of 7.5 and was transfused 2 units prbcs and sent to the ED. In the ED, labs showed an appropriate increase in Hgb to 9.9, which is an appropriate rise after 2u PRBC. Lower extremity ultrasound was negative for a DVT. She also received 40mEq of potassium orally. Of note, given rectal bleeding, Dr. Mccollum recommends that colonoscopy to be done as soon as possible.          Cancer Treatment History:   Brief Oncology History:  The patient is a 58 year old  female who initially presented for evaluation of pelvic and abdominal pain. On exam, she had mild right adnexal tenderness and slightly enlarged right ovary freely mobile and smooth. This prompted an abdominal ultrasound, which was normal, and a pelvic US that showed anechoic right ovarian cyst measuring 4.3 x 3.8 x 4.0 cm. Her  was elevated at 74 on 10/8/13. We reviewed the possible diagnosis including ovarian cancer versus benign ovarian cyst. Approach to surgery, alternatives to surgery and plan for possible extended open surgical staging based on the operative findings was discussed. In light of the size of the ovary we are offering an initial approach with minimally invasive surgery. After discussion of risks and benefits, consent was obtained.  11/7/13 Diagnostic laparoscopy converted to exploratory laparotomy, bilateral salpingo-oophorectomy, total abdominal hysterectomy, omentectomy, staging biopsies, bilateral pelvic and periaortic lymph node dissection and washings. Stage IIIB  12/4/13: Cycle #1 IV/IP  1/2/14: Cycle #2 IV/IP PCP.  - 14  1/23/14: Cycle 3 IV/IP.  - 7  2/11/14:  - 7. CT C/A/P Impression:  1. Multiple bilateral pulmonary nodules as described in full report measuring up to 3 mm along  the right major fissure. These are indeterminate given lack of comparison and followup is recommended.  2. No definite evidence for recurrent or metastatic disease in the abdomen or the pelvis. There is a rounded hypodense focus abutting the left external iliac artery and the adjacent sigmoid colon which measures 1 cm, this could represent a fluid-filled sigmoid diverticulum or a hypodense node, further attention to this area on subsequent examinations is recommended.  3. There is a 7 mm nonobstructing stone in the inferior collecting system of the right kidney.  2/12/14-3/26/14: Cycle #4-6 IV/IP CA-125 7, 7, 7.  4/21/14: CT C/A/P Impression:   1. Unchanged indeterminate pulmonary nodules. Recommend continued surveillance.   2. No definite evidence of metastatic ovarian cancer in the abdomen or pelvis. Small amount of subtle increased thickening and fluid is noted along the right lymph node dissection, nonspecific, but possibly a tiny developing lymphocele.   3. 6 mm nonobstructing stone in the right kidney.  Plan surveillance for ovarian cancer every 3 months with physical and .   Indeterminate pulmonary nodule: These were present before the surgery and there was not change with the chemotherapy. Highly unlikely to be a metastatic disease. Will repeat a CT in 3 months to see any change     5/22/14:  6. JOSEMANEUL.  5/17/14; ED visit [facility]. CT abd/pel noted possible chronic partial small bowel obstruction. Colonscopy scheduled for June 6, 2014 locally. Abdominal pain started 5/17/14 and noted pressure without bowel movement and went to ED that night due to increasing pain. In patient 2 day hospital with clear liquids/IV. Mild nausea without vomiting. BM subsequently occurred and pt was discharged. No pain since. Continues with fullness in abdomen. Diet is bland for the most part.  8/25/14:  -5. Notes increased abdominal girth and bloating x 2-3 weeks with urine urgency.  Worried about recurrence. Is just starting to return to normal exercise and activities. No constipation, diarrhea, pain, vaginal or rectal bleeding, cough or dyspnea. CT to follow indeterminate pulmonary nodules today.   CT cap IMPRESSION:   1. No CT scan findings of the chest, abdomen, or pelvis to indicate metastatic disease.  2. 2-5 mm pulmonary nodules, stable since 2/11/2014.  3. Nonobstructing 6 mm stone in the right kidney.  12/3/14:  8.. Pt started zoloft for anxiety. Worries about cancer recurrence.   3/2/15: CA 34  3/5/15: CT C/A/P: Impression:  1. Multiple new small peritoneal and retroperitoneal nodules are suspicious for metastases. Suspicious new left common iliac and central small bowel mesenteric nodes. No abdominal ascites.  2. Multiple pulmonary nodules are stable with no new nodules.  3. 9 mm nonobstructing right lower pole renal calculus.    5/20/15: CT c/a/p showed nodularity throughout the abdomen and pelvis worrisome for malignancy which has increased in size     5/28/15: CT abdomen and pelvis showed stable inumerabble peritoneal nodules scattered throughout the abdomen and pelvis consistent with metastases.     8/4/15 (approximatly): Left ureteral stent was placed.    8/12/15:  from [place] 303    8/18/15: CT chest Impression showed slight increase in mild, subtle nodularity along the diaphragm which is nonspecific but may represent metastatic disease.   8/18/15: CT abdomen and pelvis Impression showed interval progression of peritoneal metastatic disease.     8/25/15:  on 3/2/15 of 34 prompted CT c/a/p, which showed multiple new small peritoneal and retroperitoneal nodules are suspicious for metastases. Suspicious new left common iliac and central small bowel mesenteric nodes. She participated in [facility] study involving treatment with on clinical trial with vaccine [id number]. She is here today because the [place] trail failed and the pt pregressed. Her most recent CT  "c/a/p on 8/25 showed progression of peritoneal metastatic disease. And her most recent  from [place] on 8/12/15 was 303.    Plan: Carbo/Doxil x 6 cycles.with imaging after 3 cycles.     9/3/15: Cycle #1 Carbo/Doxil.  244.  9/24/15: right IJ thrombus; started on Lovenox.   9/26/15: Present to [facility]. \"presented to the ED this morning with what appears to be a mild drug rash after administering her lovenox this morning. This writer discussed situation with Gyn Onc Fellow Jeanne Moon as well as [facility] Heme/Onc service. Per Heme, a rash of this nature is extremely uncommon with administration of Lovenox. Given the mild nature of this rash and acute need for anticoagulation, recommended the patient continue with Lovenox injections and treat with Benadryl as needed. Advised patient be given precautions to return to the ED immediately if she develops reactive respiratory symptoms. Alternatively patient could be switched to alternative formulation of low molecular weight heparin if she was strongly resistant to continuation of Lovenox.\"    10/1/15: Cycle #2 Carbo/Doxil.  175.    10/28/15: gyn onc visit: pt complains of worsening constipation and tenderness around her right ribs. She is taking senna for her constipation with minimal improvement. She admits that the swelling around her neck after her blood clot has decreased. She also admits to feeling a nodules on her left abdomen. She states her appetite is good but she has not been eating fruits and vegetables. She denies any chemo side effects besides fatigue.     10/28/15:  158  11/23/2015:  133  CT c/a/p  IMPRESSION: In this patient with a clinical history of ovarian cancer  there is mixed response to therapy:   1. Stable to slightly decreased peritoneal nodularity since  8/18/2015.  2. No significant change regarding the large subdiaphragmatic  peritoneal deposit since 9/24/2015.  3. Enlarging soft tissue nodule overlying the abdominal " musculature  concerning for metastasis since March of 2015.   4. Unchanged, indeterminate hypodensity near the gallbladder fossa  since 8/18/2015, however progressively increased in size since  3/5/2015.  5. Bilateral nephroureteral stents. No significant hydronephrosis.  6. Bilateral pulmonary nodules. Continued followup recommended.    12/23/15: Cycle 5 carboplatin/Doxil/Avastin.  96. To receive Avastin today despite proteinuria per Dr. Aguilar and nephrology.  1/21/16: Cycle 6 carboplatin/Doxil/Avastin, delayed one week secondary to neutropenia.  62.  1/28/16:  63.    2/2/16: Patient had right upper extremity doppler u/s done in [place] due to swollen glands and pain. 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 Pt is currently on lovenox.    2/2/16: US soft tissue neck  Conclusion:  1. Morphologically normal not pathologically enlarged two right carotid chain lymph nodes.  2. Chronic right jugular vein thrombosis, described in detail on  venous doppler exam.     2/2/16: Thyroid Ultrasound  Right lobe: 5.5 x 1.9 x 1.2 cm  Left lobe: 5.0 x 1.5 . 0.9 cm  Both lobes have normal background echotexture.    Conclusion:  1. 3 benign - appearing subcentimeter hypoechoic right mid thyroid nodules.  2. Borderline thyromegaly.    2/2/16: Right upper extremity venous duplex doppler evaluation  Conclusion:  1.) chronic non compressible echogenic right jugular vein thrombus, now 4.3 cm in length.     2/22/16:   IMPRESSION:    1. Ovarian cancer with peritoneal carcinomatosis.  2. Given the increased sensitivity of PET/CT, comparison with prior CT examinations is difficult. In general the abdominal/pelvic metastatic lesions appear to be decreased in size.  3. Persistent right internal jugular DVT, as evidenced by 5 cm filling  defect with inferior border at the central venous catheter.  4.  Bilateral hydronephrosis without overt caliectasis. Some distal  migration of the bilateral double-J ureteral stents, although the  proximal coiled portions continue to be in the renal pelves.    2/24/16: C7 carboplatin/Doxil/Avastin.  56.  3/9/16: Hgb 6.6, worked up for transfusion reaction (negative). Bleed possibly secondary to   3/23/16: C8 carboplatin/Doxil/Avastin.  44.  4/2016: Hospitalized in [place] x2 weeks for hematuria leading to anemia after ureteral stent exchange  4/20/16: C9D1 carboplatin/Doxil/Avastin. Deferred one week secondary to acute UTI.  35.  4/28/16: C9D1 deferred due to continued bacteruria and ANC 1.3.  5/4/16: C9D1 carboplatin/Doxil/Avastin. Breast lump noted, diagnostic mammogram ordered.  6/1/16: C10D1 carboplatin/Doxil/Avastin.  50.  6/28/16: C11D1 carboplatin/Doxil/Avastin. Avastin held due to bleeding. Carbo/Doxil deferred one week secondary to neutropenia.  43.  7/7/16: C12D1 carboplatin/Doxil. Avastin held due to bleeding.  7/18/16: Bilateral ureteral stent exchange  7/20/16-7/29/16: Hospitalized with urosepsis and blood loss anemia, started on Zosyn and discharged on amoxicillin  9/29/16: C1D1 Gemzar.  85.  10/21/16: C2D1 Gemzar.  106.    11/11/16: C3D1 Gemzar.  111    12/12/16: CT CAP  IMPRESSION: In this patient with a history of metastatic ovarian  cancer:  1. Progression of disease as evidenced by a slowly enlarging  mesenteric and omental soft tissue foci and slowly enlarging  periesophageal and pericardiophrenic lymph nodes, as detailed above.  2. Stable appearance of bilateral ureteral stents without  hydronephrosis.  3. Patient was premedicated for a pre-existing IV contrast allergy.  Despite this, she had itchiness on her face poststudy. She should no  longer receive IV contrast in the future.     01/05/16:  165  01/05/16: Tesaro trial (parp-inhibitor)-niraparib trial.     1/10/76-1/15/17: Small bowel obstruction  seen on CT- admitted for conservative management. Niraparib held  17: Restarted niraparib for three days- discontinued to due anemia with Hgb 7  17: Received 2u PRBC as per Dr. Mccollum given anemia in [place]  17: Received 2u PRBC as per Dr. Mccollum given anemia in [place]. Transferred via ambulance to [place] ED for R leg swelling- neg for DVT           Past Medical History:     Past Medical History   Diagnosis Date     Hyperlipidemia      Osteoarthritis      Osteopenia      Polymyalgia rheumatica (H)      Ovarian cancer (H)      Primary cancer of peritoneum (H)      Hydronephrosis      Jugular vein thrombosis, right      Persistent right internal jugular DVT     Livedo annularis      History of blood transfusion      MULTIPLE     PONV (postoperative nausea and vomiting)      Anemia             Past Surgical History:      Past Surgical History   Procedure Laterality Date     Appendectomy        section       Open reduction internal fixation toe(s)  9/3/13     Fifth digit, left foot, with screw fixation      Lymph node biopsy       Left posterior chain, beign     Breast surgery       biopsy negative     Laparoscopic salpingo-oophorectomy  2013     Procedure: LAPAROSCOPIC SALPINGO-OOPHORECTOMY;  Diagnostic Laparoscopy, Exploratory Laparotomy, Bilateral Salpingo-Oophorectomy,  Hysterectomy, Omentectomy, Pelvic Washings, Peritoneal staging and biopsies, Pelvic and Periaortic Lymph node Dissection;  Surgeon: Cheri Aguilar MD;  Location: [facility] OR     Hysterectomy total abdominal, bilateral salpingo-oophorectomy, node dissection, combined  2013     Procedure: COMBINED HYSTERECTOMY TOTAL ABDOMINAL, SALPINGO-OOPHORECTOMY, NODE DISSECTION;;  Surgeon: Cheri Aguilar MD;  Location: [facility] OR     Laparotomy, staging, combined  2013     Procedure: COMBINED LAPAROTOMY, STAGING;;  Surgeon: Cheri Aguilar MD;  Location: [facility] OR     Remove port peritoneal  2014     Procedure:  REMOVE PORT PERITONEAL;  Surgeon: Cheri Aguilar MD;  Location: [facility] OR     Combined cystoscopy, retrogrades, exchange stent ureter(s) Bilateral 7/18/2016     Procedure: COMBINED CYSTOSCOPY, RETROGRADES, EXCHANGE STENT URETER(S);  Surgeon: Carmela Alvarez MD;  Location: [facility] OR     Combined cystoscopy, retrogrades, exchange stent ureter(s)  4/2016     @ [facility]     Combined cystoscopy, retrogrades, exchange stent ureter(s) Bilateral 10/17/2016     Procedure: COMBINED CYSTOSCOPY, RETROGRADES, EXCHANGE STENT URETER(S);  Surgeon: Carmela Alvarez MD;  Location: [facility] OR     Combined cystoscopy, retrogrades, exchange stent ureter(s) Bilateral 12/7/2016     Procedure: COMBINED CYSTOSCOPY, RETROGRADES, EXCHANGE STENT URETER(S);  Surgeon: Carmela Alvarez MD;  Location: [facility] OR            Social History:     Social History   Substance Use Topics     Smoking status: Former Smoker     Smokeless tobacco: Former User      Comment: Quit over 20 years ago     Alcohol Use: No            Family History:     Family History   Problem Relation Age of Onset     Arthritis Father      DIABETES       Uncle     Hypertension Mother      Hypertension Sister      Myocardial Infarction Father      secondary to anesthesia     HEART DISEASE       unknown valve replacement     Colon Cancer Father      Other - See Comments Mother      cognitive decline     Bladder Cancer Sister      Skin Cancer Brother      Skin Cancer Mother             Immunizations:     Immunization History   Administered Date(s) Administered     Influenza Vaccine IM 3yrs+ 4 Valent IIV4 10/28/2015            Allergies:     Allergies   Allergen Reactions     Contrast Dye Itching and Swelling     Itching/tingling of mouth and nose with sensation of swelling after receiving IV contrast for CT.  This occurred despite steroid premedication. Therefore the patient should not receive intravenous contrast in the future.      Benadryl Allergy Other (See  "Comments)     Stay awake, restless, \"uncomfortable\", \"feel like I need to run away\"      Enoxaparin Hives     3/23/16: Okay to receive heparin flushes in port, tolerates well. Patricia Cortez FNP-C     Enoxaparin Sodium Other (See Comments)     Pt is taking this now.     Amoxicillin Rash     Diphenhydramine Anxiety     No Clinical Screening - See Comments Rash     Pollen Extract Rash     Sulfa Drugs Rash            Medications:     Current Facility-Administered Medications   Medication     lidocaine 1 % 1 mL     lidocaine (LMX4) kit     sodium chloride (PF) 0.9% PF flush 3 mL     sodium chloride (PF) 0.9% PF flush 3 mL     Current Outpatient Prescriptions   Medication Sig     enoxaparin (LOVENOX) 40 MG/0.4ML injection Inject 0.4 mLs (40 mg) Subcutaneous daily     LORazepam (ATIVAN) 0.5 MG tablet Take 1 tablet (0.5 mg) by mouth every 4 hours as needed (Anxiety, Nausea/Vomiting or Sleep)     study - niraparib (IDS# 4759) 100 mg capsule Take 3 capsules (300 mg) by mouth every morning Take at the same time each day, preferably morning. Swallow whole and do NOT chew capsules. Food and water is permissible. First dose will be administered on site.     dexamethasone (DECADRON) 4 MG tablet Take 1 tablet (4 mg) by mouth daily (with breakfast)     tamsulosin (FLOMAX) 0.4 MG capsule Take 1 capsule (0.4 mg) by mouth daily     LORazepam (ATIVAN) 1 MG tablet Take 1 tablet (1 mg) by mouth every 6 hours as needed (nausea/vomiting, anxiety or sleep)     tolterodine (DETROL LA) 4 MG 24 hr capsule Take 1 capsule (4 mg) by mouth daily     ondansetron (ZOFRAN) 8 MG tablet Take 1 tablet (8 mg) by mouth every 8 hours as needed for nausea     ACETAMINOPHEN PO Take 500 mg by mouth every 6 hours as needed for pain     Multiple Vitamins-Minerals (ONE DAILY MULTIVITAMIN WOMEN) TABS Take 1 tablet by mouth every morning      polyethylene glycol (MIRALAX/GLYCOLAX) powder Take 1 capful by mouth daily as needed      cycloSPORINE (RESTASIS) 0.05 % " "ophthalmic emulsion Place 1 drop into both eyes 2 times daily      Ranitidine HCl (ZANTAC PO) Take 75 mg by mouth daily      MELATONIN PO Take 1 mg by mouth At Bedtime      EPINEPHrine (EPIPEN) 0.3 MG/0.3ML injection Inject 0.3 mLs (0.3 mg) into the muscle once as needed for anaphylaxis     senna-docusate (SENOKOT-S;PERICOLACE) 8.6-50 MG per tablet Take 1-2 tablets by mouth 2 times daily     Pyridoxine HCl (VITAMIN B6 PO) Take 1 tablet by mouth At Bedtime      prochlorperazine (COMPAZINE) 10 MG tablet Take 1 tablet (10 mg) by mouth every 6 hours as needed (nausea/vomiting)            Review of Systems:   She denies any current nausea, vomiting, chest pain, shortness of breath, fever, chills, abdominal pain, constipation, dysuria, dizziness. Positive for right leg swelling and blood in stool.         Physical Exam:   O:  Filed Vitals:    01/27/17 0000 01/27/17 0145 01/27/17 0200   BP: 168/88 134/68 134/74   Temp: 98  F (36.7  C)     TempSrc: Oral     Resp: 18     Height: 1.676 m (5' 6\")     Weight: 56.7 kg (125 lb)     SpO2: 100% 99% 98%     Gen:  Appears comfortable and sitting up in bed  Eyes:  No obvious vitreal hemorrhage  CV:  RRR, no m/g/r appreciated  Pulm:  CTAB, no wheezes appreciated  Abd:  Soft, appropriately tender, non-distended, bowel sounds active   Extremities: Non-tender, RLE slightly more edematous than the LLE but without erythema or pain or severe swelling. Patient ambulating without difficulty   Rectovaginal exam: Smooth, no nodules noted, no bleeding noted    Labs:    Results for orders placed or performed during the hospital encounter of 01/27/17   US Lower Extremity Venous Duplex Right    Narrative    1.  No evidence of right lower extremity deep venous thrombosis.  2.  Dampened waveforms in the right lower extremity, similar to  7/25/2016, though a more central occlusion cannot be excluded.     CBC with platelets   Result Value Ref Range    WBC 6.5 4.0 - 11.0 10e9/L    RBC Count 3.08 (L) 3.8 " - 5.2 10e12/L    Hemoglobin 9.9 (L) 11.7 - 15.7 g/dL    Hematocrit 29.6 (L) 35.0 - 47.0 %    MCV 96 78 - 100 fl    MCH 32.1 26.5 - 33.0 pg    MCHC 33.4 31.5 - 36.5 g/dL    RDW 17.5 (H) 10.0 - 15.0 %    Platelet Count 179 150 - 450 10e9/L   Basic metabolic panel   Result Value Ref Range    Sodium 138 133 - 144 mmol/L    Potassium 3.3 (L) 3.4 - 5.3 mmol/L    Chloride 105 94 - 109 mmol/L    Carbon Dioxide 24 20 - 32 mmol/L    Anion Gap 9 3 - 14 mmol/L    Glucose 96 70 - 99 mg/dL    Urea Nitrogen 14 7 - 30 mg/dL    Creatinine 1.04 0.52 - 1.04 mg/dL    GFR Estimate 54 (L) >60 mL/min/1.7m2    GFR Estimate If Black 65 >60 mL/min/1.7m2    Calcium 8.6 8.5 - 10.1 mg/dL   INR   Result Value Ref Range    INR 0.99 0.86 - 1.14   Partial thromboplastin time   Result Value Ref Range    PTT 39 (H) 22 - 37 sec   Hepatic panel   Result Value Ref Range    Bilirubin Direct 0.1 0.0 - 0.2 mg/dL    Bilirubin Total 0.7 0.2 - 1.3 mg/dL    Albumin 3.4 3.4 - 5.0 g/dL    Protein Total 6.2 (L) 6.8 - 8.8 g/dL    Alkaline Phosphatase 56 40 - 150 U/L    ALT 38 0 - 50 U/L    AST 23 0 - 45 U/L               Assessment and Plan:   Assessment:   Margaux Guzmán is a 61 year old female with primary peritoneal cancer with recurrence on Tesaro trial who presents with RLE edema after blood transfusion and rectal bleeding.      Plan:  Dz: Recurrent primary peritoneal cancer- continuing holding her niraparib during hospitalization.  FEN: Regular diet. Hypokalemia replaced  Pain:NI. Tylenol PRN  Heme: Acute blood loss anemia on chronic blood loss anemia due to rectal bleeding superimposed on chemotherapy-induced anemia. Plan for GI work-up. Will hold lovenox until bleeding with stools stop. 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 Patient asymptomatic. RLE edema improved and duplex negative for DVT.  CV: NI  Pulm: NI  GI:  Zofran and compazine ordered PRN. GI consult, possible colonoscopy  to evaluate rectal bleeding.   : Bilateral ureteral stents in place. Patient is s/p decadron taper for these stents without benefit. Ordering home flomax and detrol for stents  ID: No issues. Her loose stools are most likely due to an aggressive bowel regimen at home of miralax and senna-docusate. Will hold these medications and monitor.   Endocrine: NI  Psych/Neuro: Ativan 0.5mg PO Q6 PRN. Patient has significant anxiety and distress due to declining performance status and disease worries. Plan for palliative care consult either during admission or as an outpatient.  PPX: Lovenox 40mg daily held due to bleeding- last dose was 1/26 at 2030 prior to admission. Ambulation. SCDs in bed  Dispo:  Pending work-up of rectal bleeding.     Tarah Mcclain MD   OBGYN, PGY-3  1/27/2017 2:16 AM         I have seen and examined the patient.  I have reviewed and edited Dr. Mcclain's note above.  Patient also describes some increasing memory loss which is impacting her daily activities. Given her known rectal bleeding and advanced cancer, will get brain MRI to evaluate for brain metastases and/or bleeding.    Laura Ann  1/27/2017  12:00 PM

## 2017-01-27 NOTE — PROGRESS NOTES
Margaux Guzmán was screened for distress using the distress thermometer. Distress thermometer score 7    Concerns listed on the problem list were:  Practical: Yes  Lodging Transportation  Emotional: Yes  Anxiety Fear Sadness Worry/Nervousness  Spiritual: Yes  Little hope for the future  Family: Yes  Feeling like a burden to others  Physical: Yes Bowel changes Energy level/fatigue Memory changes/concentration Nausea Sleep  Informational: Yes Understanding my illness/treatment, Making treatment decisions and Knowing available resources    Risk factors include: History of depression or suicide attempt Severe comorbid illnesses   Family/caregiver conflicts Inadequate social support Living alone Limited access to medical care    Contributing health concerns:  has a past medical history of Hyperlipidemia; Osteoarthritis; Osteopenia; Polymyalgia rheumatica (H); Ovarian cancer (H); Primary cancer of peritoneum (H); Hydronephrosis; Jugular vein thrombosis, right; Livedo annularis; History of blood transfusion; PONV (postoperative nausea and vomiting); and Anemia. She also has no past medical history of Spider veins, Hernia, abdominal, Gout, Asymptomatic human immunodeficiency virus (HIV) infection status (H), Spina bifida (H), History of spinal cord injury, Palpitations, Parkinsons disease (H), STD (sexually transmitted disease), Mumps, Hydrocephalus, Congenital renal agenesis and dysgenesis, History of thrombophlebitis, Goiter, Tuberculosis, Cerebral palsy (H), Tethered cord (H), or Horseshoe kidney.    Interventions provided: Provide the pt with options to provide a sense of control. Listened and offered reassurance Offered informational resources  Referrals made to Social Work    RN assessment: approachable responsive interactive seeks support    Shared goals/interventions: encourage pt to express feelings and emotions  advocate for pt/family needs familiarize pt to medical equipment and healthcare environment  increase understanding and reduce misconceptions  prepare pt/family for upcoming treatment, test, and/or procedure reduce anxiety and stress associated with healthcare experiences facilitate processing of healthcare experience assist with transitions/change locate necessary resources follow up in 24 hour    Francoise Porter

## 2017-01-27 NOTE — DISCHARGE SUMMARY
Gynecologic Oncology Discharge Summary    Margaux Guzmán  [id number]    Admit Date: 1/27/2017  Discharge Date: 1/29/2017   Admitting Provider: Dr. Ann  Discharge Provider: Dr. Ann    Admission Dx:   -RLE edema  -Rectal bleeding    -Recurrent Stage IIIB peritoneal cancer  -Bilateral hydronephrosis, ureteral stents in place  -Chronic stent/flank pain    Discharge Dx:  -RLE edema  -Rectal bleeding, now improved   -Recurrent Stage IIIB peritoneal cancer  -Bilateral hydronephrosis, ureteral stents in place  -Chronic stent/flank pain    Patient Active Problem List   Diagnosis     Primary peritoneal adenocarcinoma (H)     Recurrent cold sores     Pulmonary nodules     Encounter for long-term current use of medication     DVT (deep venous thrombosis) (H)     Family history of peritoneal cancer     Protein in urine     Ovarian cancer, left (H)     Ovarian cancer, right (H)     Chemotherapy-induced neutropenia (H)     Anemia     Pain due to ureteral stent (H)     Sterile pyuria     Pyelonephritis     Small bowel obstruction (H)     Dehydration     Patient in cancer related research study       Procedures: Colonoscopy:  - Non-bleeding small external and internal hemorrhoids. These may be the etiology for the intermittent self-limited rectal outlet bleeding in the absence of overt bleeding on this exam.   - Endoscopically-normal mucosa was found in the rectum and in the distal sigmoid colon, mild diffuse melanosis coli.   - A partially-obstructing extrinsic compression was found in the distal sigmoid colon, with densely restricted colonic motion and tight angulation. Cannot rule out extrinsic mass in setting of known peritoneal carcinomatosis vs. post-surgical adhesion given extensive prior pelvic surgery. Mild focal mucosal friability and increased pit pattern was associated with this finding. Biopsies of the focal mucosal abnormality ere obtained to evaluate for eroding tumor in light of known peritoneal carcinomatosis.    - Colon otherwise normal on this limited-extent exam. No evidence of recent/active bleeding on this exam.   - No evidence of overt active colonic obstruction by CT scan or this exam, particularly as patient did toleratebowel preparation and intra-colonic CO2 insufflation. Visualized passage of light brown opaque liquid stool + effluent on this exam through the area of distal sigmoid compression.     Prior to Admission Medications:  Inject 0.4 mLs (40 mg) Subcutaneous daily  30 Syringe  3  1/10/2017 at Unknown time     LORazepam (ATIVAN) 0.5 MG tablet  Take 1 tablet (0.5 mg) by mouth every 4 hours as needed (Anxiety, Nausea/Vomiting or Sleep)  30 tablet  3  1/9/2017 at Unknown time    prochlorperazine (COMPAZINE) 10 MG tablet  Take 1 tablet (10 mg) by mouth every 6 hours as needed (Nausea/Vomiting)  30 tablet  3  1/9/2017 at Unknown time    study - niraparib (IDS# 4759) 100 mg capsule  Take 3 capsules (300 mg) by mouth every morning Take at the same time each day, preferably morning. Swallow whole and do NOT chew capsules. Food and water is permissible. First dose will be administered on site.  84 capsule  0  1/9/2017 at Unknown time    dexamethasone (DECADRON) 4 MG tablet  Take 1 tablet (4 mg) by mouth daily (with breakfast)  14 tablet  1  1/10/2017 at Unknown time    tamsulosin (FLOMAX) 0.4 MG capsule  Take 1 capsule (0.4 mg) by mouth daily  60 capsule  6  1/9/2017 at Unknown time    LORazepam (ATIVAN) 1 MG tablet  Take 1 tablet (1 mg) by mouth every 6 hours as needed (nausea/vomiting, anxiety or sleep)  30 tablet  3  1/9/2017 at Unknown time    tolterodine (DETROL LA) 4 MG 24 hr capsule  Take 1 capsule (4 mg) by mouth daily  30 capsule  3  1/9/2017 at Unknown time    ondansetron (ZOFRAN) 8 MG tablet  Take 1 tablet (8 mg) by mouth every 8 hours as needed for nausea  30 tablet  2  1/10/2017 at Unknown time    ACETAMINOPHEN PO  Take 500 mg by mouth every 6 hours as needed for pain      Past Week at Unknown time     ferrous sulfate (IRON) 325 (65 FE) MG tablet  Take 325 mg by mouth 2 times daily      1/9/2017 at Unknown time    Multiple Vitamins-Minerals (ONE DAILY MULTIVITAMIN WOMEN) TABS  Take 1 tablet by mouth every morning       1/9/2017 at Unknown time    traMADol (ULTRAM) 50 MG tablet  Take 0.5-1 tablets (25-50 mg) by mouth every 6 hours as needed for moderate pain (Patient taking differently: Take 50 mg by mouth every 6 hours as needed for moderate pain )  60 tablet  1  Past Month at Unknown time    polyethylene glycol (MIRALAX/GLYCOLAX) powder  Take 1 capful by mouth daily as needed       Past Week at Unknown time    cycloSPORINE (RESTASIS) 0.05 % ophthalmic emulsion  Place 1 drop into both eyes 2 times daily       1/9/2017 at Unknown time    Ranitidine HCl (ZANTAC PO)  Take 75 mg by mouth daily       1/9/2017 at Unknown time    MELATONIN PO  Take 1 mg by mouth At Bedtime       Past Week at Unknown time    EPINEPHrine (EPIPEN) 0.3 MG/0.3ML injection  Inject 0.3 mLs (0.3 mg) into the muscle once as needed for anaphylaxis  1 each  0  Past Month at Unknown time    senna-docusate (SENOKOT-S;PERICOLACE) 8.6-50 MG per tablet  Take 1-2 tablets by mouth 2 times daily  7 tablet    1/9/2017 at Unknown time    Pyridoxine HCl (VITAMIN B6 PO)  Take 1 tablet by mouth At Bedtime       1/9/2017 at Unknown time    prochlorperazine (COMPAZINE) 10 MG tablet  Take 1 tablet (10 mg) by mouth every 6 hours as needed (nausea/vomiting)  30 tablet  2  1/9/2017 at Unknown time                     Discharge Medications:  Resume all medications, HOLD study - niraparib (IDS# 4759) 100 mg capsule     Consultations:  GI, Psychiatry, Social work    Brief History of Illness:  Margaux Guzmán is a 61 year old female with primary peritoneal cancer (please see history below) on Tesaro trial who presents via ambulance given right lower extremity edema and two episodes of rectal bleeding. She was recently admitted from 1/10 to 1/15 for a small bowel  obstruction that was managed with a NGT. She states that she re-started her trial drug niraparib on 01/20-01/22 after being off for a short time after healing from her SBO. Three days prior to today, 1/23, she had anemia with Hgb of 7.2 from 11.5 and received 2u PRBC in [place]. On Wednesday night, she felt dizzy and confused and so had her Hgb rechecked and per patient, was found to have a Hgb of 7.5 (no records found). She received an additional 2u PRBC the day prior to admission in [place]. During the transfusion, she felt a severe cramp in the bottom of her R foot that went up her leg in addition to right lower extremity swelling worse than the left. At this point, she was concerned enough and called an ambulance to take her to the emergency department here from [place], her home, as she is a primary patient of Dr. Mccollum. She also has a history of a R jugular vein occlusion for which she is taking lovenox and so she was worried about another blood clot. After more time since the transfusion, her R leg pain has improved and her swelling improved. She was also seen due to a vitreal hemorrhage in [place] on 1/26/17. She also notes two episodes of bright red blood mixed with stool after her last two bowel movement. No active bleeding. She notes a total of 3 loose stools yesterday. Denies any other sources of bleeding and notes that the bleeding did not fill the toilet bowl. She denies any current nausea, vomiting, chest pain, shortness of breath, fever, chills, abdominal pain, constipation.      Hospital Course:   In the ED, she received a RLE duplex that was negative for DVT. She had labs including a Hgb of 9.9, which is an appropriate rise after 2u PRBC. She also received 40mEq of potassium orally. She was admitted for observation given rectal bleeding.    Dz:   -Recurrent primary peritoneal cancer- s/p surgery 2013 and 6 cycles IV/IP in 2014. 12 cycles carbo/doxil in 6060-2562 in 9787-5460. S/p 3 cycles  Gemzar. Now on niraparib Tesaro clinical drug but held since 1/22 given 4u PRBC this week  FEN:   - Regular diet. By discharge, she was tolerating a regular diet without nausea and vomiting and able to maintain her hydration without IVF supplementation.  Pain:   - No issues. Her pain was well controlled on this and she was discharged home with these medications.  CV:   -  She has no history of CV issues- did have hypertension previously.  Her vital signs were stable while in house and she had no acute CV issues.  PULM:   - She has no history of pulmonary issues.  She was initially given O2 supplementation in to maintain her O2 sats in the immediate postop period and was transitioned off of this without difficulty.  By discharge, her O2 sats were greater than 94% on RA.  She was encouraged to use her bedside IS while in house.  She had no acute pulmonary issues while in house.  HEME:   - Acute on chronic blood loss anemia with perhaps chemotherapy effect with niraparib and rectal bleeding. Hgb 9.9, which is appropriate rise after 2u PRBC. No rectal bleeding seen on exam. Held lovenox until bleeding with stools stop. 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 Patient asymptomatic. RLE edema improved and negative duplex. She had no other acute heme issues while in house. Hgb 9.9 on admit and stable at 9.1 at time of discharge.   GI:   - Loose stools most likely due to aggressive bowel regimen and large colonic stool burden. GI consulted for rectal bleeding. CT A/P revealed large colonic stool, no bowel obstruction. Colonoscopy revealed internal and external hemorrhioids, a partially obstructing extrinsic compression in the distal sigmoid colon with right angulation. Mild focal mucosa friability. Zofran and compazine ordered PRN.  PLAN to re-image with Gastrograffin enema if colonic obstruction or other obstruction is suspected.  :    -  Bilateral  ureteral stents in place. Patient was started on decadron for these stents and so will order for the morning at 2mg. Severe right hydronephrosis seen on CT scan and Urology consulted. Ordering home flomax and detrol as well.  She had no acute  issues while in house.  ID:   - The patient was AF during her hospitalization. No issues and no leukocytosis.     ENDO:   - no acute issues  PSYCH/NEURO:   - Patient noted increasing forgetfulness and feeling overwhelmed with progressing disease. For example, patient notes that she has not been cooking because she is afraid she will forget the oven is on and she is forgetting things like where she puts her keys. She has had some vision changes related to retinal hemhorage and need for corneal transplant. Brain MRI negative. Reports feelings of anxiety. Social work consult to assist with arranging additional support for patient.   PPX:    -  She was given SCDs, IS, PPI and lovenox during her hospital course.     Discharge Instructions and Follow up:  Ms. Margaux Guzmán was discharged from the hospital with follow up for treatment planning.    Discharge Diet: Regular  Discharge Activity: Activity as tolerated  Discharge Follow up: 2/2/17 Dr Mccollum    Discharge Disposition:  Discharged to home    Discharge Staff: MD Berkley Ann MD 1/29/2017 5:00 PM     I have reviewed and edited Dr. Art's Discharge Summary above.  Agree with summary of hospital course.    Laura Ann  2/16/2017  12:20 PM

## 2017-01-27 NOTE — PLAN OF CARE
Problem: Goal Outcome Summary  Goal: Goal Outcome Summary  Outcome: No Change  Arrived on unit ~0420. VSS. Pain controlled with Tylenol. Denies nausea. Ativan administered. Port accessed. Last BM was yesterday evening. Monitor for rectal bleeding. Pt sleeping in between cares. Continue to monitor.

## 2017-01-27 NOTE — PLAN OF CARE
Problem: Goal Outcome Summary  Goal: Goal Outcome Summary  Outcome: No Change  Observation Goals:  1) Ambulating without dizziness- Met, stable w/ ambulation  2) Stable Hgb- Not Met, bleeding still present  3) No further rectal bleeding- Not Met, bleeding still present  4) Right leg swelling improved- Not Met, swelling improved but still present.

## 2017-01-27 NOTE — IP AVS SNAPSHOT
MRN:[id number]                      After Visit Summary   1/27/2017    Margaux Guzmán    MRN: [id number]           Thank you!     Thank you for choosing [facility] for your care. Our goal is always to provide you with excellent care. Hearing back from our patients is one way we can continue to improve our services. Please take a few minutes to complete the written survey that you may receive in the mail after you visit with us. Thank you!        Patient Information     Date Of Birth          1955        About your hospital stay     You were admitted on:  January 27, 2017 You last received care in the:  [address] [place] [address]    You were discharged on:  January 29, 2017        Reason for your hospital stay       You were here for follow up of your rectal bleeding                  Who to Call     For medical emergencies, please call 911.  For non-urgent questions about your medical care, please call your primary care provider or clinic, [phone]  For questions related to your surgery, please call your surgery clinic        Attending Provider     Provider    Semaj Benavidez MD Teoh, Laura Chau MD       Primary Care Provider Office Phone # Fax #    Aurelia Knox [phone]       [facility] [address] [facility] [address]        After Care Instructions     Activity       Your activity upon discharge: activity as tolerated            Diet       Follow this diet upon discharge: Orders Placed This Encounter  Regular Diet Adult                  Follow-up Appointments     Adult [facility] Follow-up and recommended labs and tests       Follow up at your clinic visit with Dr. Mccollum on 2/2/2017    Appointments on [facility] and/or [facility] (with [facility] or [facility] provider or service). Call [phone] if you haven't heard regarding these appointments within 7 days of discharge.                  Your next 10 appointments already scheduled     Feb 02,  "2017  9:15 AM   [facility] with  [facility]   [facility] ([facility])    [address]  2nd [address]  [facility] [address]   [phone]            Feb 02, 2017  9:40 AM   (Arrive by 9:25 AM)   Return Visit with Jia Mccollum MD   [facility] ([facility])    [address]  2nd Floor  [facility] [address]   [phone]            Apr 12, 2017 10:00 AM   Lab with  LAB   [facility] Lab ([facility])    [address]  1st Floor  [facility] [address]   [phone]            Apr 12, 2017 10:20 AM   (Arrive by 9:50 AM)   Return Visit with Mackenzie Ca MD   [facility] Nephrology ([facility])    [address]  3rd Floor  [facility] [address]   [phone]              Pending Results     Date and Time Order Name Status Description    1/28/2017 1034 Surgical pathology exam In process             Statement of Approval     Ordered          01/29/17 1119  I have reviewed and agree with all the recommendations and orders detailed in this document.   EFFECTIVE NOW     Approved and electronically signed by:  Berkley Art MD             Admission Information        Provider Department Dept Phone    1/27/2017 Laura Ann MD  [facility] [phone]      Your Vitals Were     Blood Pressure Pulse Temperature    158/64 mmHg 101 98  F (36.7  C) (Oral)    Respirations Height Weight    16 1.676 m (5' 6\") 58.469 kg (128 lb 14.4 oz)    BMI (Body Mass Index) Pulse Oximetry       20.82 kg/m2 100%       MyChart Information     [employer] gives you secure access to your electronic health record. If you see a primary care provider, you can also send messages to your care team and make appointments. If you have questions, please call your primary care clinic.  If you do not have a primary care provider, please call [phone] and " they will assist you.        Care EveryWhere ID     This is your Care EveryWhere ID. This could be used by other organizations to access your [place] medical records  [id number]           Review of your medicines      CONTINUE these medicines which have NOT CHANGED        Dose / Directions    ACETAMINOPHEN PO        Dose:  500 mg   Take 500 mg by mouth every 6 hours as needed for pain   Refills:  0       cycloSPORINE 0.05 % ophthalmic emulsion   Commonly known as:  RESTASIS        Dose:  1 drop   Place 1 drop into both eyes 2 times daily   Refills:  0       dexamethasone 4 MG tablet   Commonly known as:  DECADRON   Used for:  Cancer associated pain        Dose:  4 mg   Take 1 tablet (4 mg) by mouth daily (with breakfast)   Quantity:  14 tablet   Refills:  1       enoxaparin 40 MG/0.4ML injection   Commonly known as:  LOVENOX   Indication:  0.4 ML   Used for:  Ovarian cancer, left (H), Ovarian cancer, right (H)        Dose:  40 mg   Inject 0.4 mLs (40 mg) Subcutaneous daily   Quantity:  30 Syringe   Refills:  3       EPINEPHrine 0.3 MG/0.3ML injection   Used for:  Preventative health care, Primary peritoneal adenocarcinoma (H)        Dose:  0.3 mg   Inject 0.3 mLs (0.3 mg) into the muscle once as needed for anaphylaxis   Quantity:  1 each   Refills:  0       * LORazepam 1 MG tablet   Commonly known as:  ATIVAN   Used for:  Primary peritoneal adenocarcinoma (H)        Dose:  1 mg   Take 1 tablet (1 mg) by mouth every 6 hours as needed (nausea/vomiting, anxiety or sleep)   Quantity:  30 tablet   Refills:  3       * LORazepam 0.5 MG tablet   Commonly known as:  ATIVAN   Used for:  Anemia due to other cause, Chemotherapy-induced neutropenia (H), Ovarian cancer, left (H), Ovarian cancer, right (H), Primary peritoneal adenocarcinoma (H)        Dose:  0.5 mg   Take 1 tablet (0.5 mg) by mouth every 4 hours as needed (Anxiety, Nausea/Vomiting or Sleep)   Quantity:  30 tablet   Refills:  3       MELATONIN PO        Dose:  1  mg   Take 1 mg by mouth At Bedtime   Refills:  0       ondansetron 8 MG tablet   Commonly known as:  ZOFRAN   Used for:  Nausea, Primary peritoneal adenocarcinoma (H)        Dose:  8 mg   Take 1 tablet (8 mg) by mouth every 8 hours as needed for nausea   Quantity:  30 tablet   Refills:  2       ONE DAILY MULTIVITAMIN WOMEN Tabs        Dose:  1 tablet   Take 1 tablet by mouth every morning   Refills:  0       polyethylene glycol powder   Commonly known as:  MIRALAX/GLYCOLAX        Dose:  1 capful   Take 1 capful by mouth daily as needed   Refills:  0       prochlorperazine 10 MG tablet   Commonly known as:  COMPAZINE   Used for:  Primary peritoneal adenocarcinoma (H)        Dose:  10 mg   Take 1 tablet (10 mg) by mouth every 6 hours as needed (nausea/vomiting)   Quantity:  30 tablet   Refills:  2       senna-docusate 8.6-50 MG per tablet   Commonly known as:  SENOKOT-S;PERICOLACE   Used for:  Primary peritoneal adenocarcinoma (H)        Dose:  1-2 tablet   Take 1-2 tablets by mouth 2 times daily   Quantity:  7 tablet   Refills:  0       study - niraparib 100 mg capsule   Commonly known as:  IDS# [id number]   Used for:  Anemia due to other cause, Chemotherapy-induced neutropenia (H), Ovarian cancer, left (H), Ovarian cancer, right (H), Primary peritoneal adenocarcinoma (H)        Dose:  300 mg   Take 3 capsules (300 mg) by mouth every morning Take at the same time each day, preferably morning. Swallow whole and do NOT chew capsules. Food and water is permissible. First dose will be administered on site.   Quantity:  84 capsule   Refills:  0       tamsulosin 0.4 MG capsule   Commonly known as:  FLOMAX   Used for:  Primary peritoneal adenocarcinoma (H)        Dose:  0.4 mg   Take 1 capsule (0.4 mg) by mouth daily   Quantity:  60 capsule   Refills:  6       tolterodine 4 MG 24 hr capsule   Commonly known as:  DETROL LA   Used for:  Hydronephrosis, unspecified hydronephrosis type        Dose:  4 mg   Take 1 capsule (4 mg) by  mouth daily   Quantity:  30 capsule   Refills:  3       VITAMIN B6 PO        Dose:  1 tablet   Take 1 tablet by mouth At Bedtime   Refills:  0       ZANTAC PO        Dose:  75 mg   Take 75 mg by mouth daily   Refills:  0       * Notice:  This list has 2 medication(s) that are the same as other medications prescribed for you. Read the directions carefully, and ask your doctor or other care provider to review them with you.             Protect others around you: Learn how to safely use, store and throw away your medicines at www.disposemymeds.org.             Medication List: This is a list of all your medications and when to take them. Check marks below indicate your daily home schedule. Keep this list as a reference.      Medications           Morning Afternoon Evening Bedtime As Needed    ACETAMINOPHEN PO   Take 500 mg by mouth every 6 hours as needed for pain   Last time this was given:  1,000 mg on 1/29/2017  4:25 AM                                cycloSPORINE 0.05 % ophthalmic emulsion   Commonly known as:  RESTASIS   Place 1 drop into both eyes 2 times daily   Last time this was given:  1 drop on 1/29/2017  8:13 AM                                dexamethasone 4 MG tablet   Commonly known as:  DECADRON   Take 1 tablet (4 mg) by mouth daily (with breakfast)                                enoxaparin 40 MG/0.4ML injection   Commonly known as:  LOVENOX   Inject 0.4 mLs (40 mg) Subcutaneous daily   Last time this was given:  40 mg on 1/29/2017  8:14 AM                                EPINEPHrine 0.3 MG/0.3ML injection   Inject 0.3 mLs (0.3 mg) into the muscle once as needed for anaphylaxis                                * LORazepam 1 MG tablet   Commonly known as:  ATIVAN   Take 1 tablet (1 mg) by mouth every 6 hours as needed (nausea/vomiting, anxiety or sleep)   Last time this was given:  0.5 mg on 1/28/2017  9:29 PM                                * LORazepam 0.5 MG tablet   Commonly known as:  ATIVAN   Take 1  tablet (0.5 mg) by mouth every 4 hours as needed (Anxiety, Nausea/Vomiting or Sleep)   Last time this was given:  0.5 mg on 1/28/2017  9:29 PM                                MELATONIN PO   Take 1 mg by mouth At Bedtime                                ondansetron 8 MG tablet   Commonly known as:  ZOFRAN   Take 1 tablet (8 mg) by mouth every 8 hours as needed for nausea                                ONE DAILY MULTIVITAMIN WOMEN Tabs   Take 1 tablet by mouth every morning                                polyethylene glycol powder   Commonly known as:  MIRALAX/GLYCOLAX   Take 1 capful by mouth daily as needed                                prochlorperazine 10 MG tablet   Commonly known as:  COMPAZINE   Take 1 tablet (10 mg) by mouth every 6 hours as needed (nausea/vomiting)                                senna-docusate 8.6-50 MG per tablet   Commonly known as:  SENOKOT-S;PERICOLACE   Take 1-2 tablets by mouth 2 times daily                                study - niraparib 100 mg capsule   Commonly known as:  IDS# 4759   Take 3 capsules (300 mg) by mouth every morning Take at the same time each day, preferably morning. Swallow whole and do NOT chew capsules. Food and water is permissible. First dose will be administered on site.                                tamsulosin 0.4 MG capsule   Commonly known as:  FLOMAX   Take 1 capsule (0.4 mg) by mouth daily   Last time this was given:  0.4 mg on 1/28/2017  7:37 PM                                tolterodine 4 MG 24 hr capsule   Commonly known as:  DETROL LA   Take 1 capsule (4 mg) by mouth daily   Last time this was given:  4 mg on 1/28/2017  7:37 PM                                VITAMIN B6 PO   Take 1 tablet by mouth At Bedtime                                ZANTAC PO   Take 75 mg by mouth daily                                * Notice:  This list has 2 medication(s) that are the same as other medications prescribed for you. Read the directions carefully, and ask your doctor  or other care provider to review them with you.

## 2017-01-28 NOTE — PLAN OF CARE
Problem: Goal Outcome Summary  Goal: Goal Outcome Summary  Outcome: No Change  VSS. Pt complains of sharp pain from bowel prep, Tylenol administered with some relief. More bowel prep ordered, awaiting from pharmacy. Nausea controlled with compazine x1. Pt had multiple BMs overnight d/t bowel prep. Up ad mack. IVMF @ 75cc/hr via port. Clear liquid diet. Plan for colonoscopy today. Pt anxious about plan of care. Continue to monitor.

## 2017-01-28 NOTE — PLAN OF CARE
Problem: Goal Outcome Summary  Goal: Goal Outcome Summary  Outcome: Improving  Patient had a bowel prep overnight, went for colonoscopy this morning. VSS upon return to . Family visiting this afternoon, back to a regular diet. No complaints of pain or nausea.

## 2017-01-28 NOTE — PROGRESS NOTES
Gynecologic Oncology Daily Progress Note  1/28/17    HD#2  Dz: Primary peritoneal cancer    24 hour events:   -admission for evaluation of RLE edema, and rectal bleeding  -GI consult who recommends colonoscopy  -urology consult due right hydronephrosis seen on CT today  -colonoscopy bowel prep  -right eye blurry vision  -Brain MRI  -lovenox held due to concern for GI bleed    Subjective: Patient reports that she is feeling ok this morning, but felt that the bowel prep was hard on her stomach.  She is having liquid stool but it is not yet clear.  She is willing to drink more prep.  She has had some nausea, but no vomiting. She is ambulating without dizziness.      Objective:  Filed Vitals:    01/27/17 0708 01/27/17 1551 01/27/17 2230 01/28/17 0740   BP: 135/68 121/63 146/68 145/75   Pulse: 94 91 100 101   Temp: 99.4  F (37.4  C) 99.4  F (37.4  C) 99.1  F (37.3  C) 98.5  F (36.9  C)   TempSrc: Oral Oral Oral Oral   Resp: 16 16 16 16   Height:       Weight:       SpO2: 97% 97% 97% 98%       Gen: awake, alert, answering questions appropriately, in no acute distress  Cardio: regular rate and rhythm  Resp: Lungs clear to auscultation bilaterally  Abdomen: Soft, appropriately tender, slightly distended, normoactive bowel sounds  Extremities: bilateral lower extremities with trace edema    I&O:  24h:  mL  // UOP 450mL, stool 1xmL  (patient reports 20x)  Since MN:  ml 4000ml PO// UOP -mL, stool 4x    CT abdomen & pelvis  1/27/17  IMPRESSION: This patient with a history of metastatic ovarian cancer:  1. No bowel obstruction. Oral contrast progressed to the colon.  Extensive colonic stool.  2. Severe right hydronephrosis, new from 12/12/2016, may represent  obstruction of the right ureteral stent. No left-sided hydronephrosis.  3. Slight interval progression of diffuse peritoneal metastatic  disease.  4. Mildly nodular areas of increased attenuation within the pelvic  bowel which may represent loculated  "malignant ascites or peritoneal  implants similar in appearance to 12/12/2016.    MRI 1/27/17  Impression: Normal brain MRI    1/27/17   Right lower extremity venous duplex  IMPRESSION:  1.  No evidence of right lower extremity deep venous thrombosis.  2.  Dampened waveforms in the right lower extremity, similar to  7/25/2016, though a more central occlusion cannot be excluded.    Labs:   am BMP ordered      Assessment: 61 year old HD#2 with primary peritoneal cancer with recurrence on Tesaro trial who presents with RLE edema after blood transfusion and rectal bleeding, currently undergoing bowel prep for colonoscopy.    Active Problem List:  1) recurrent primary peritoneal cancer  2) acute blood loss anemia  3) right IJ DVT  4) severe right hydronephrosis  5) retinal hemorrhage lasered  6)Concer for GI bleed    Plan:  Dz: Recurrent primary peritoneal cancer    FEN: Regular diet. K replacement protocol  Pain: Tylenol PRN  Heme: Acute blood loss anemia, s/p transfusion of 2u PRBC, hemoglobin now stable.  No further rectal bleeding seen on exam.Continue to holding lovenox until bleeding resolved. 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 Patient asymptomatic. RLE negative duplex  CV: NI  Pulm: NI  GI:  Zofran and compazine ordered PRN. CT abd/pelvis with extensive colonic stool and progression of disease  : Bilateral ureteral stents in place. CT 1/27 showed severe right hydronephrosis decadron 2mg daily. Continue  home flomax and detrol. S/p uro consult, rec: stent upsize Monday unless signs of infection in which case they would exchange over the weekend.  ID: NI- no leukocytosis.  Endocrine: NI  Psych/Neuro: Blurry vision S/p \"retinal hemorrhage laser on 1/25\" per patient.  Plan for opthalmology consult either inpatient or outpatient. Ativan 0.5mg PO Q6 PRN. Memory loss, confusion likely due to chemobrain with superimposed effects of anemia, Brain " MRI negative for metastases or bleeding.   PPX: Lovenox 40mg daily held due to bleeding risk. SCDs in bed. Ambulation  Consults: GI, Social   Dispo:  Pending management of rectal bleeding.      Jenise Diana MD  OB/GYN Resident PGY2    I have seen and examined the patient.  I have reviewed and edited Dr. Diana's note above.    Laura Ann  1/28/2017  8:48 AM

## 2017-01-28 NOTE — OR NURSING
Colonoscopy complete to sigmoid.  Bx's taken.  In patient.  Tolerated well.  Report called to floor.

## 2017-01-28 NOTE — PROGRESS NOTES
"UROLOGY BRIEF NOTE    S: patient in procedure, not seen or examined this morning.  O: /79 mmHg  Pulse 101  Temp(Src) 97.5  F (36.4  C) (Oral)  Resp 14  Ht 1.676 m (5' 6\")  Wt 58.469 kg (128 lb 14.4 oz)  BMI 20.82 kg/m2  SpO2 99%   CREATININE   Date Value Ref Range Status   01/27/2017 0.96 0.52 - 1.04 mg/dL Final   ]    A:  Margaux Guzmán is a 61 year old female with bilateral hydronephrosis secondary to metastatic peritoneal cancer, she has been managed with indwelling stents since August 2015. Her last stent exchange was 12/7/2016 and she currently has 8 fr x 26 fr bilateral ureteral stents.     P:   - Recommend no intervention at this time.   - Will schedule outpatient follow-up for routine stent change with Dr. Alvarez.   - Discussed plan with GYN ONC team this morning.  - Urology will sign off, please contact with questions.    Plan formulated by Ines Archuleta     "

## 2017-01-28 NOTE — PLAN OF CARE
"Problem: Goal Outcome Summary  Goal: Goal Outcome Summary  Outcome: Improving  VSS. Pt up ad mack. Pain controlled with oxycodone. MIV infusing through right sided port. Pt took a shower before initiation of bowel prep for colonoscopy tomorrow AM. Pt has stated she may wish to discontinue treatment due to logistical concerns and that she \"just wants to feel human again.\" Ativan given X1. Voids spont with adequate amounts. Pt had one small formed black BM. No further BM after initiation of bowel prep so far. Cont. POC.        "

## 2017-01-29 NOTE — PROGRESS NOTES
CLINICAL NUTRITION SERVICES  Reason for Assessment: Pt family request (recieved text page from RN).  Diet History:  Patient has a history of low fiber/residue diet education (last given on 1/13/17).    Nutrition Diagnosis:  None identified at this time.  Interventions:  Provided instruction on low fiber diet. Discussed each food group and foods to eat and avoid. Clarified fiber types (soluble vs insoluble). Answered patient's questions regarding diet. Text paged Gyn/Onc and spoke with Dr. Ann via phone to clarify appropriate diet pt should be following.  Provided handouts : Low Fiber Room Service Food List (pt already has a LRD handout).  Goals:   Patient will verbalize at least 5 low fiber foods acceptable on diet and 5 high fiber foods to avoid.  Follow-up:    Patient to ask any further nutrition-related questions before discharge.  In addition, pt may request outpatient RD appointment.  Stephen Hartmann RD, LD  6B Clinical Dietitian  Weekend Pager: [phone]

## 2017-01-29 NOTE — PLAN OF CARE
Problem: Goal Outcome Summary  Goal: Goal Outcome Summary  Outcome: No Change  VSS. Pain controlled with PRN Tylenol x1. Up ad mack. Not saving urine. Passing gas, no BM this shift. Continue to monitor.

## 2017-01-29 NOTE — DISCHARGE SUMMARY
Gynecologic Oncology Discharge Summary    Margaux Guzmán  [id number]    Admit Date: 1/27/2017  Discharge Date: ***  Admitting Provider: Dr. Ann  Discharge Provider: ***    Admission Dx:   1) recurrent primary peritoneal cancer  2) acute blood loss anemia  3) right IJ DVT  4) severe right hydronephrosis  5) retinal hemorrhage lasered  6) Concer for GI bleed    Discharge Dx:  -***    Patient Active Problem List   Diagnosis     Primary peritoneal adenocarcinoma (H)     Recurrent cold sores     Pulmonary nodules     Encounter for long-term current use of medication     DVT (deep venous thrombosis) (H)     Family history of peritoneal cancer     Protein in urine     Ovarian cancer, left (H)     Ovarian cancer, right (H)     Chemotherapy-induced neutropenia (H)     Anemia     Pain due to ureteral stent (H)     Sterile pyuria     Pyelonephritis     Small bowel obstruction (H)     Dehydration     Patient in cancer related research study     Rectal bleeding     Confusion       Procedures: Colonoscopy    Prior to Admission Medications:  Prescriptions prior to admission   Medication Sig Dispense Refill Last Dose     enoxaparin (LOVENOX) 40 MG/0.4ML injection Inject 0.4 mLs (40 mg) Subcutaneous daily 30 Syringe 3 1/26/2017 at 2030     study - niraparib (IDS# 4759) 100 mg capsule Take 3 capsules (300 mg) by mouth every morning Take at the same time each day, preferably morning. Swallow whole and do NOT chew capsules. Food and water is permissible. First dose will be administered on site. 84 capsule 0 Past Week at Unknown time     dexamethasone (DECADRON) 4 MG tablet Take 1 tablet (4 mg) by mouth daily (with breakfast) 14 tablet 1 Past Week at Unknown time     tamsulosin (FLOMAX) 0.4 MG capsule Take 1 capsule (0.4 mg) by mouth daily 60 capsule 6 1/27/2017 at 0930     LORazepam (ATIVAN) 1 MG tablet Take 1 tablet (1 mg) by mouth every 6 hours as needed (nausea/vomiting, anxiety or sleep) 30 tablet 3 1/27/2017 at 0400      tolterodine (DETROL LA) 4 MG 24 hr capsule Take 1 capsule (4 mg) by mouth daily 30 capsule 3 1/27/2017 at 0930     ACETAMINOPHEN PO Take 500 mg by mouth every 6 hours as needed for pain   1/27/2017 at 0400     Multiple Vitamins-Minerals (ONE DAILY MULTIVITAMIN WOMEN) TABS Take 1 tablet by mouth every morning    Past Week at Unknown time     polyethylene glycol (MIRALAX/GLYCOLAX) powder Take 1 capful by mouth daily as needed    Past Week at Unknown time     cycloSPORINE (RESTASIS) 0.05 % ophthalmic emulsion Place 1 drop into both eyes 2 times daily    1/27/2017 at 0930     Ranitidine HCl (ZANTAC PO) Take 75 mg by mouth daily    1/26/2017 at 2030     MELATONIN PO Take 1 mg by mouth At Bedtime    1/26/2017 at 2030     Pyridoxine HCl (VITAMIN B6 PO) Take 1 tablet by mouth At Bedtime    Past Week at Unknown time     prochlorperazine (COMPAZINE) 10 MG tablet Take 1 tablet (10 mg) by mouth every 6 hours as needed (nausea/vomiting) 30 tablet 2 Past Month at Unknown time     LORazepam (ATIVAN) 0.5 MG tablet Take 1 tablet (0.5 mg) by mouth every 4 hours as needed (Anxiety, Nausea/Vomiting or Sleep) 30 tablet 3 Taking     ondansetron (ZOFRAN) 8 MG tablet Take 1 tablet (8 mg) by mouth every 8 hours as needed for nausea 30 tablet 2 Unknown at Unknown time     EPINEPHrine (EPIPEN) 0.3 MG/0.3ML injection Inject 0.3 mLs (0.3 mg) into the muscle once as needed for anaphylaxis 1 each 0 Unknown at Unknown time     senna-docusate (SENOKOT-S;PERICOLACE) 8.6-50 MG per tablet Take 1-2 tablets by mouth 2 times daily 7 tablet  1/25/2017       Discharge Medications:  ***    Consultations:   GI, urology    Brief History of Illness:  Margaux Guzmán is a 61 year old female with primary peritoneal cancer (please see history below) on niraparib per the [place] trial who presents via ambulance given right lower extremity edema and two episodes of rectal bleeding. She was recently admitted from 1/10 to 1/15 for a small bowel obstruction that was  managed with a NGT. She states that she re-started her trial drug niraparib on 01/20-01/22 after being off for a short time after healing from her SBO. Three days prior to today, 1/23, she had anemia with Hgb of 7.2 from 11.5 and received 2u PRBC in [place]. On Wednesday night, she felt dizzy and confused and so had her Hgb rechecked and per patient, was found to have a Hgb of 7.5 (no records found). She received an additional 2u PRBC the day prior to admission in [place]. During the transfusion, she felt a severe cramp in the bottom of her R foot that went up her leg in addition to right lower extremity swelling worse than the left. At this point, she was concerned enough and called an ambulance to take her to the emergency department here from [place], her home, as she is a primary patient of Dr. Mccollum. She also has a history of a R jugular vein occlusion for which she is taking lovenox and so she was worried about another blood clot. After more time since the transfusion, her R leg pain has improved and her swelling improved. She was also seen due to a vitreal hemorrhage in [place] on 1/26/17. She also notes two episodes of bright red blood mixed with stool after her last two bowel movement. No active bleeding. She notes a total of 3 loose stools yesterday. Denies any other sources of bleeding and notes that the bleeding did not fill the toilet bowl. She denies any current nausea, vomiting, chest pain, shortness of breath, fever, chills, abdominal pain, constipation.    Patient was seen at an outside clinic 1/26/16 and was again found to have a hemoglobin of 7.5 and was transfused 2 units prbcs and sent to the ED. In the ED, labs showed an appropriate increase in Hgb to 9.9, which is an appropriate rise after 2u PRBC. Lower extremity ultrasound was negative for a DVT. She also received 40mEq of potassium orally.    Hospital Course:  Dz:   - Recurrent, progressive ovarian cancer. On Niraparib trial with  "progression seen on CT this admission.  FEN:   -She was given a clear liquid diet until the colonoscopy was completed, then she was given a regular diet.  Pain:   - She was given tylenol PRN, she had minimal pain.  CV:   - ***She has no history of CV issues.  Her vital signs were stable while in house and she had no acute CV issues.  PULM:   - ***She has no history of pulmonary issues.  She had no acute pulmonary issues while in house.  HEME:   - Acute blood loss anemia on chronic blood loss anemia due to rectal bleeding superimposed on chemotherapy-induced anemia. Her hemoglobin on admission was 7.2 and she was given 2 units pRBCs.  Her hemoglobin was stable at 9.9 at discharge.  GI:   - CT abd/pelvis with extensive colonic stool and progression of disease, colonoscopy with possible tumor erosion into bowel. Her rectal bleeding stopped soon after admission.  :    -  Bilateral ureteral stents in place. CT 1/27 showed severe right hydronephrosis. Continue  home flomax and detrol. S/p uro consult, changed recommendation to leaving stents in place. She had no acute  issues while in house.  ID:   - ***The patient was AF during her hospitalization.    ENDO:   - ***no issues  PSYCH/NEURO:   -Blurry vision S/p \"retinal hemorrhage laser on 1/25\" per patient. Will follow up with ophtho as outpatient. Memory loss, confusion likely due to chemobrain with superimposed effects of anemia, Brain MRI negative for metastases or bleeding.   PPX:    - *** She was given SCDs during her hospital course.  Lovenox was held during her hospitalization and ***.    Discharge Instructions and Follow up:  Ms. Margaux Guzmán was discharged from the hospital with follow up for further treatment planning.    Discharge Diet: Regular  Discharge Activity: Activity as tolerated  Discharge Follow up: ***    Discharge Disposition:  Discharged to home    Discharge Staff: ***    ***            "

## 2017-01-29 NOTE — PLAN OF CARE
Problem: Goal Outcome Summary  Goal: Goal Outcome Summary  Outcome: Adequate for Discharge Date Met:  01/29/17  Patient reviewed diet with dietician, RN reviewed DC orders with patient. Patient eating well, denies pain. Patient gave herself lovenox injection, has done at home previously.  Ready to DC when ride arrives.

## 2017-01-29 NOTE — PLAN OF CARE
Problem: Goal Outcome Summary  Goal: Goal Outcome Summary  Outcome: Improving  VSS. Pt up ad mack. Voids spont with adequate amounts. Pt continues to have loose stools after bowel prep from last night/morning. Tolerating regular diet MIV infusing through port. Ativan given at HOS. Possible D/C tomorrow. Cont POC.

## 2017-01-30 NOTE — PROGRESS NOTES
"[facility]  \"Hello, my name is Tameka Neil , and I am calling from the [facility].  I want to check in and see how you are doing, after leaving the hospital.  You may also receive a call from your Care Coordinator (care team), but I want to make sure you don t have any urgent needs.  I have a couple questions to review with you:     Post-Discharge Outreach                                                    Margaux Guzmán is a 61 year old female         Follow-up Appointments      Adult [facility] Follow-up and recommended labs and tests        Follow up at your clinic visit with Dr. Mccollum on 2/2/2017     Appointments on [place] and/or [facility] (with [facility] or [facility] provider or service). Call [phone] if you haven't heard regarding these appointments within 7 days of discharge.                       Your next 10 appointments already scheduled      Feb 02, 2017  9:15 AM   [facility] with  [facility]   [facility] ([facility])      [address] [place] [address]  [address] [facility] [address]   [phone]               Feb 02, 2017  9:40 AM   (Arrive by 9:25 AM)   Return Visit with Jia Mccollum MD   [facility] ([facility])      [address] [place] [address]   [phone]               Apr 12, 2017 10:00 AM   Lab with  LAB   [facility] ([facility])      [address] [place] [address]  [address] [facility] [address]   [phone]               Apr 12, 2017 10:20 AM   (Arrive by 9:50 AM)   Return Visit with Mackenzie Ca MD   [facility] ([facility])                Care Team:    Patient Care Team       Relationship Specialty Notifications Start End    Aurelia Knox PCP - General Family Practice  1/27/17     Phone: [phone] Fax: [phone]         ST " [facility] [address] [address]    Jessica Galvin, MK Continuity Care Coordinator Gyn-Onc Admissions 3/12/15     Comment:  [phone]    Phone: [phone] Pager: [phone]         [facility] Care Coordinator [id number]    Jesscia Galvin RN Continuity Care Coordinator Oncology Admissions 8/26/15     Phone: [phone] Pager: [phone]         [facility] Care Coordinator [id number]    Lizet Truong APRN CNP   Nurse Practitioner - Women's Health  9/28/15     Phone: [phone] Fax: [phone]         [facility] [address] [facility] [address] [facility] [address]    Derrek Gamble MD MD Nephrology  12/16/15     Phone: [phone] Fax: [phone]         [facility] [address] [facility] [address] [facility] [address]    Patricia Murcia APRN CNP Nurse Practitioner Nurse Practitioner  1/21/16     Comment:  referred pt to ID    Phone: [phone] Fax: [phone]         [facility] [address] [facility] [address] [facility] [address]    Elissa Wheeler MD MD Infectious Diseases  4/29/16     Phone: [phone] Fax: [phone]         [facility] [address] [facility] [address] [facility] [address]    Carmela Alvarez MD MD Urology  5/20/16     Phone: [phone] Fax: [phone]         [facility] [address] [facility] [address] [facility] [address]    Keira Rolle RN Registered Nurse Urology  5/20/16     Sophia Garcia PA-C Referring Physician Physician Assistant  5/20/16     Phone: [phone] Fax: [phone]         [facility] [address] [facility] [address]            Transition of Care Review                                                      Did you have a surgery or procedure during your hospital visit? Yes   If yes, do you have any of the following:     Signs of infection:  No    Pain:  Yes     Pain Scale (0-10) 2/10     Location: top of leg    Wound/incision concerns? NA    Do you have all of your medications/refills?  Yes    Are you having any  side effects or questions about your medication(s)? No    Do you have any new or worsening symptoms?  No    Do you have any future appointments scheduled?   Yes   2/2/17             Plan                                                      Thanks for your time.  Your Care Coordinator will follow-up within the next couple days.  In the meantime if you have questions, concerns or problems call your care team.        Tameka Neil

## 2017-01-30 NOTE — PROGRESS NOTES
"Care Coordinator Note  Dr Mccollum is requesting a CBC diff/plts be done on Janice tomorrow.  \" The only time I could do it is about 230 PM tomorrow.  I am having a friend take me to the eye .   I do not feel like my HGB is real low. I am not dizzy or having her bleeding.  I am just tired.   Arrangements made for blood draw at [facility] at 230pm.  Orders for standing orders were faxed [phone] phone [phone]  Pt verbalized back understanding of the above information discussed.   Jessica TIMMONS RN, OCN  Care Coordinator   Gynecologic Cancer   Office [phone]  Pager [phone] #[phone]     "

## 2017-01-31 NOTE — PROGRESS NOTES
Care Coordinator Note  Left message for pt regarding her blood counts.   HGB 10.7  WBC 3.5 ANC 2.3  Plt 207.      .Jessica TIMMONS RN, OCN  Care Coordinator   Gynecologic Cancer   Office [phone]  Pager [phone] #[phone]

## 2017-02-02 PROBLEM — F43.22 ADJUSTMENT DISORDER WITH ANXIOUS MOOD: Status: ACTIVE | Noted: 2017-01-01

## 2017-02-02 NOTE — LETTER
2/2/2017       RE: Margaux Guzmán  [id number] [facility] [address]     Dear Colleague,    Thank you for referring your patient, Margaux Guzmán, to the [facility]. Please see a copy of my visit note below.      Date of Visit: 02/02/2017    CC: Margaux Guzmán is a 60 year old  female with a history of primary peritoneal cancer presenting today for Cycle 2 Day 1 of Niraparib on Tesaro trial but drug on hold due to anemia.    HPI:  Marshall comes to the clinic feeling. She currently denies any fevers, chills, nausea, vomiting, bleeding, shortness of breath, dysuria, or chest pain.      Brief Oncology History:  The patient is a 58 year old  female who initially presented for evaluation of pelvic and abdominal pain. On exam, she had mild right adnexal tenderness and slightly enlarged right ovary freely mobile and smooth. This prompted an abdominal ultrasound, which was normal, and a pelvic US that showed anechoic right ovarian cyst measuring 4.3 x 3.8 x 4.0 cm. Her  was elevated at 74 on 10/8/13. We reviewed the possible diagnosis including ovarian cancer versus benign ovarian cyst. Approach to surgery, alternatives to surgery and plan for possible extended open surgical staging based on the operative findings was discussed. In light of the size of the ovary we are offering an initial approach with minimally invasive surgery. After discussion of risks and benefits, consent was obtained.  11/7/13 Diagnostic laparoscopy converted to exploratory laparotomy, bilateral salpingo-oophorectomy, total abdominal hysterectomy, omentectomy, staging biopsies, bilateral pelvic and periaortic lymph node dissection and washings. Stage IIIB  12/4/13: Cycle #1 IV/IP  1/2/14: Cycle #2 IV/IP PCP.  - 14  1/23/14: Cycle 3 IV/IP.  - 7  2/11/14:  - 7. CT C/A/P Impression:  1. Multiple bilateral pulmonary nodules as described in full report measuring up to 3 mm along the right major  fissure. These are indeterminate given lack of comparison and followup is recommended.  2. No definite evidence for recurrent or metastatic disease in the abdomen or the pelvis. There is a rounded hypodense focus abutting the left external iliac artery and the adjacent sigmoid colon which measures 1 cm, this could represent a fluid-filled sigmoid diverticulum or a hypodense node, further attention to this area on subsequent examinations is recommended.  3. There is a 7 mm nonobstructing stone in the inferior collecting system of the right kidney.  2/12/14-3/26/14: Cycle #4-6 IV/IP CA-125 7, 7, 7.  4/21/14: CT C/A/P Impression:   1. Unchanged indeterminate pulmonary nodules. Recommend continued surveillance.   2. No definite evidence of metastatic ovarian cancer in the abdomen or pelvis. Small amount of subtle increased thickening and fluid is noted along the right lymph node dissection, nonspecific, but possibly a tiny developing lymphocele.   3. 6 mm nonobstructing stone in the right kidney.  Plan surveillance for ovarian cancer every 3 months with physical and .   Indeterminate pulmonary nodule: These were present before the surgery and there was not change with the chemotherapy. Highly unlikely to be a metastatic disease. Will repeat a CT in 3 months to see any change     5/22/14:  6. JOSEMANUEL.  5/17/14; ED visit [facility]. CT abd/pel noted possible chronic partial small bowel obstruction. Colonscopy scheduled for June 6, 2014 locally. Abdominal pain started 5/17/14 and noted pressure without bowel movement and went to ED that night due to increasing pain. In patient 2 day hospital with clear liquids/IV. Mild nausea without vomiting. BM subsequently occurred and pt was discharged. No pain since. Continues with fullness in abdomen. Diet is bland for the most part.  8/25/14:  -5. Notes increased abdominal girth and bloating x 2-3 weeks with urine urgency. Worried about  recurrence. Is just starting to return to normal exercise and activities. No constipation, diarrhea, pain, vaginal or rectal bleeding, cough or dyspnea. CT to follow indeterminate pulmonary nodules today.   CT cap IMPRESSION:   1. No CT scan findings of the chest, abdomen, or pelvis to indicate metastatic disease.  2. 2-5 mm pulmonary nodules, stable since 2/11/2014.  3. Nonobstructing 6 mm stone in the right kidney.  12/3/14:  8.. Pt started zoloft for anxiety. Worries about cancer recurrence.   3/2/15: CA 34  3/5/15: CT C/A/P: Impression:  1. Multiple new small peritoneal and retroperitoneal nodules are suspicious for metastases. Suspicious new left common iliac and central small bowel mesenteric nodes. No abdominal ascites.  2. Multiple pulmonary nodules are stable with no new nodules.  3. 9 mm nonobstructing right lower pole renal calculus.    5/20/15: CT c/a/p showed nodularity throughout the abdomen and pelvis worrisome for malignancy which has increased in size     5/28/15: CT abdomen and pelvis showed stable inumerabble peritoneal nodules scattered throughout the abdomen and pelvis consistent with metastases.     8/4/15 (approximatly): Left ureteral stent was placed.    8/12/15:  from [place] 303    8/18/15: CT chest Impression showed slight increase in mild, subtle nodularity along the diaphragm which is nonspecific but may represent metastatic disease.   8/18/15: CT abdomen and pelvis Impression showed interval progression of peritoneal metastatic disease.     8/25/15:  on 3/2/15 of 34 prompted CT c/a/p, which showed multiple new small peritoneal and retroperitoneal nodules are suspicious for metastases. Suspicious new left common iliac and central small bowel mesenteric nodes. She [facility] involving treatment with on clinical trial with vaccine [id number]. She is here today because the [place] trail failed and the pt pregressed. Her most recent CT c/a/p on 8/25  "showed progression of peritoneal metastatic disease. And her most recent  from [place] on 8/12/15 was 303.    Plan: Carbo/Doxil x 6 cycles.with imaging after 3 cycles.     9/3/15: Cycle #1 Carbo/Doxil.  244.  9/24/15: right IJ thrombus; started on Lovenox.   9/26/15: Present to [place] ED. \"presented to the ED this morning with what appears to be a mild drug rash after administering her lovenox this morning. This writer discussed situation with Gyn Onc Fellow Jeanne Moon as well as [facility] Heme/Onc service. Per Heme, a rash of this nature is extremely uncommon with administration of Lovenox. Given the mild nature of this rash and acute need for anticoagulation, recommended the patient continue with Lovenox injections and treat with Benadryl as needed. Advised patient be given precautions to return to the ED immediately if she develops reactive respiratory symptoms. Alternatively patient could be switched to alternative formulation of low molecular weight heparin if she was strongly resistant to continuation of Lovenox.\"    10/1/15: Cycle #2 Carbo/Doxil.  175.    10/28/15: gyn onc visit: pt complains of worsening constipation and tenderness around her right ribs. She is taking senna for her constipation with minimal improvement. She admits that the swelling around her neck after her blood clot has decreased. She also admits to feeling a nodules on her left abdomen. She states her appetite is good but she has not been eating fruits and vegetables. She denies any chemo side effects besides fatigue.     10/28/15:  158  11/23/2015:  133  CT c/a/p  IMPRESSION: In this patient with a clinical history of ovarian cancer  there is mixed response to therapy:   1. Stable to slightly decreased peritoneal nodularity since  8/18/2015.  2. No significant change regarding the large subdiaphragmatic  peritoneal deposit since 9/24/2015.  3. Enlarging soft tissue nodule overlying the abdominal " musculature  concerning for metastasis since March of 2015.   4. Unchanged, indeterminate hypodensity near the gallbladder fossa  since 8/18/2015, however progressively increased in size since  3/5/2015.  5. Bilateral nephroureteral stents. No significant hydronephrosis.  6. Bilateral pulmonary nodules. Continued followup recommended.    12/23/15: Cycle 5 carboplatin/Doxil/Avastin.  96. To receive Avastin today despite proteinuria per Dr. Aguilar and nephrology.  1/21/16: Cycle 6 carboplatin/Doxil/Avastin, delayed one week secondary to neutropenia.  62.  1/28/16:  63.    2/2/16: Patient had right upper extremity doppler u/s done in [place] due to swollen glands and pain. 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 Pt is currently on lovenox.    2/2/16: US soft tissue neck  Conclusion:  1. Morphologically normal not pathologically enlarged two right carotid chain lymph nodes.  2. Chronic right jugular vein thrombosis, described in detail on  venous doppler exam.     2/2/16: Thyroid Ultrasound  Right lobe: 5.5 x 1.9 x 1.2 cm  Left lobe: 5.0 x 1.5 . 0.9 cm  Both lobes have normal background echotexture.    Conclusion:  1. 3 benign - appearing subcentimeter hypoechoic right mid thyroid nodules.  2. Borderline thyromegaly.    2/2/16: Right upper extremity venous duplex doppler evaluation  Conclusion:  1.) chronic non compressible echogenic right jugular vein thrombus, now 4.3 cm in length.     2/22/16:   IMPRESSION:    1. Ovarian cancer with peritoneal carcinomatosis.  2. Given the increased sensitivity of PET/CT, comparison with prior CT examinations is difficult. In general the abdominal/pelvic metastatic lesions appear to be decreased in size.  3. Persistent right internal jugular DVT, as evidenced by 5 cm filling  defect with inferior border at the central venous catheter.  4.  Bilateral hydronephrosis without overt caliectasis. Some distal  migration of the bilateral double-J ureteral stents, although the  proximal coiled portions continue to be in the renal pelves.    2/24/16: C7 carboplatin/Doxil/Avastin.  56.  3/9/16: Hgb 6.6, worked up for transfusion reaction (negative). Bleed possibly secondary to   3/23/16: C8 carboplatin/Doxil/Avastin.  44.  4/2016: Hospitalized in [place] x2 weeks for hematuria leading to anemia after ureteral stent exchange  4/20/16: C9D1 carboplatin/Doxil/Avastin. Deferred one week secondary to acute UTI.  35.  4/28/16: C9D1 deferred due to continued bacteruria and ANC 1.3.  5/4/16: C9D1 carboplatin/Doxil/Avastin. Breast lump noted, diagnostic mammogram ordered.  6/1/16: C10D1 carboplatin/Doxil/Avastin.  50.  6/28/16: C11D1 carboplatin/Doxil/Avastin. Avastin held due to bleeding. Carbo/Doxil deferred one week secondary to neutropenia.  43.  7/7/16: C12D1 carboplatin/Doxil. Avastin held due to bleeding.  7/18/16: Bilateral ureteral stent exchange  7/20/16-7/29/16: Hospitalized with urosepsis and blood loss anemia, started on Zosyn and discharged on amoxicillin  9/29/16: C1D1 Gemzar.  85.  10/21/16: C2D1 Gemzar.  106.   11/11/16: C3D1 Gemzar.  pending.    12/12/16: CT CAP  IMPRESSION: In this patient with a history of metastatic ovarian  cancer:  1. Progression of disease as evidenced by a slowly enlarging  mesenteric and omental soft tissue foci and slowly enlarging  periesophageal and pericardiophrenic lymph nodes, as detailed above.  2. Stable appearance of bilateral ureteral stents without  hydronephrosis.  3. Patient was premedicated for a pre-existing IV contrast allergy.  Despite this, she had itchiness on her face poststudy. She should no  longer receive IV contrast in the future.      1/5/2017-Started Tesaro Trial C1 Day 1, taken off due to bowel obstruction-drug held, restarted 1/19/2017 then anemia and  eye issues  17-Anemia with hgb down to 7.2 received 2 units blood, did not rise appropriately up to only 7.5. On 17.  17 admitted to hospital for anemia. Drug had been held. Received additional 2 units in the hospital. Colonoscopy, head imaging no obvious source of bleeding.   2017: hgb 10.7       Date Value Ref Range Status   2017 165* 0 - 30 U/mL Final     Comment:     Assay Method:  Chemiluminescence using Siemens Centaur XP   12/15/2016 145* 0 - 30 U/mL Final     Comment:     Assay Method:  Chemiluminescence using Siemens Centaur XP   2016 132* 0 - 30 U/mL Final     Comment:     Assay Method:  Chemiluminescence using Siemens Centaur XP   2016 111* 0 - 30 U/mL Final     Comment:     Assay Method:  Chemiluminescence using Siemens Centaur XP   10/21/2016 106* 0 - 30 U/mL Final     Comment:     Assay Method:  Chemiluminescence using Siemens Centaur XP   2016 85* 0 - 30 U/mL Final     Comment:     Assay Method:  Chemiluminescence using Siemens Centaur XP   09/15/2016 70* 0 - 30 U/mL Final     Comment:     Assay Method:  Chemiluminescence using Siemens Centaur XP   2016 38* 0 - 30 U/mL Final     Comment:     Assay Method:  Chemiluminescence using Siemens Centaur XP   2016 43* 0 - 30 U/mL Final   2016 50* 0 - 30 U/mL Final               Past Medical History   Diagnosis Date     Hyperlipidemia      Osteoarthritis      Osteopenia      Polymyalgia rheumatica (H)      Ovarian cancer (H)      Primary cancer of peritoneum (H)      Hydronephrosis      Jugular vein thrombosis, right      Persistent right internal jugular DVT     Livedo annularis      History of blood transfusion      MULTIPLE     PONV (postoperative nausea and vomiting)      Anemia      Past Surgical History   Procedure Laterality Date     Appendectomy        section       Open reduction internal fixation toe(s)  9/3/13     Fifth digit, left foot, with screw fixation      Lymph  node biopsy  2008     Left posterior chain, beign     Breast surgery       biopsy negative     Laparoscopic salpingo-oophorectomy  11/7/2013     Procedure: LAPAROSCOPIC SALPINGO-OOPHORECTOMY;  Diagnostic Laparoscopy, Exploratory Laparotomy, Bilateral Salpingo-Oophorectomy,  Hysterectomy, Omentectomy, Pelvic Washings, Peritoneal staging and biopsies, Pelvic and Periaortic Lymph node Dissection;  Surgeon: Cheri Aguilar MD;  Location: [facility] OR     Hysterectomy total abdominal, bilateral salpingo-oophorectomy, node dissection, combined  11/7/2013     Procedure: COMBINED HYSTERECTOMY TOTAL ABDOMINAL, SALPINGO-OOPHORECTOMY, NODE DISSECTION;;  Surgeon: Cheri Aguilar MD;  Location: [facility] OR     Laparotomy, staging, combined  11/7/2013     Procedure: COMBINED LAPAROTOMY, STAGING;;  Surgeon: Cheri Aguilar MD;  Location: [facility] OR     Remove port peritoneal  5/5/2014     Procedure: REMOVE PORT PERITONEAL;  Surgeon: Cheri Aguilar MD;  Location: [facility] OR     Combined cystoscopy, retrogrades, exchange stent ureter(s) Bilateral 7/18/2016     Procedure: COMBINED CYSTOSCOPY, RETROGRADES, EXCHANGE STENT URETER(S);  Surgeon: Carmela Alvarez MD;  Location: [facility] OR     Combined cystoscopy, retrogrades, exchange stent ureter(s)  4/2016     @ [facility]     Combined cystoscopy, retrogrades, exchange stent ureter(s) Bilateral 10/17/2016     Procedure: COMBINED CYSTOSCOPY, RETROGRADES, EXCHANGE STENT URETER(S);  Surgeon: Carmela Alvarez MD;  Location: [facility] OR     Combined cystoscopy, retrogrades, exchange stent ureter(s) Bilateral 12/7/2016     Procedure: COMBINED CYSTOSCOPY, RETROGRADES, EXCHANGE STENT URETER(S);  Surgeon: Carmela Alvarez MD;  [facility]: [facility] OR     Colonoscopy N/A 1/28/2017     Procedure: COLONOSCOPY;  Surgeon: Luis Richardson MD;  Location: [facility] GI     Esophagoscopy, gastroscopy, duodenoscopy (egd), combined N/A 1/28/2017     Procedure: COMBINED ESOPHAGOSCOPY, GASTROSCOPY, DUODENOSCOPY (EGD);   Surgeon: Luis Richardson MD;  Location:  GI     Colonoscopy N/A 1/28/2017     Procedure: COMBINED COLONOSCOPY, SINGLE OR MULTIPLE BIOPSY/POLYPECTOMY BY BIOPSY;  Surgeon: Luis Richardson MD;  Location:  GI     Family History   Problem Relation Age of Onset     Arthritis Father      DIABETES       Uncle     Hypertension Mother      Hypertension Sister      Myocardial Infarction Father      secondary to anesthesia     HEART DISEASE       unknown valve replacement     Colon Cancer Father      Other - See Comments Mother      cognitive decline     Bladder Cancer Sister      Skin Cancer Brother      Skin Cancer Mother      Social History     Social History     Marital Status:      Spouse Name: N/A     Number of Children: N/A     Years of Education: N/A     Social History Main Topics     Smoking status: Former Smoker     Smokeless tobacco: Never Used      Comment: Quit over 20 years ago     Alcohol Use: No     Drug Use: No     Sexual Activity: No     Other Topics Concern     Not on file     Social History Narrative         Current Outpatient Prescriptions   Medication     enoxaparin (LOVENOX) 40 MG/0.4ML injection     LORazepam (ATIVAN) 0.5 MG tablet     study - niraparib (IDS# 4759) 100 mg capsule     dexamethasone (DECADRON) 4 MG tablet     tamsulosin (FLOMAX) 0.4 MG capsule     LORazepam (ATIVAN) 1 MG tablet     tolterodine (DETROL LA) 4 MG 24 hr capsule     ondansetron (ZOFRAN) 8 MG tablet     ACETAMINOPHEN PO     Multiple Vitamins-Minerals (ONE DAILY MULTIVITAMIN WOMEN) TABS     polyethylene glycol (MIRALAX/GLYCOLAX) powder     cycloSPORINE (RESTASIS) 0.05 % ophthalmic emulsion     Ranitidine HCl (ZANTAC PO)     MELATONIN PO     EPINEPHrine (EPIPEN) 0.3 MG/0.3ML injection     senna-docusate (SENOKOT-S;PERICOLACE) 8.6-50 MG per tablet     Pyridoxine HCl (VITAMIN B6 PO)     prochlorperazine (COMPAZINE) 10 MG tablet     No current facility-administered medications for this visit.        Allergies   Allergen  "Reactions     Contrast Dye Itching and Swelling     Itching/tingling of mouth and nose with sensation of swelling after receiving IV contrast for CT.  This occurred despite steroid premedication. Therefore the patient should not receive intravenous contrast in the future.      Benadryl Allergy Other (See Comments)     Stay awake, restless, \"uncomfortable\", \"feel like I need to run away\"      Enoxaparin Hives     3/23/16: Okay to receive heparin flushes in port, tolerates well. Patricia Cortez FNP-C     Enoxaparin Sodium Other (See Comments)     Pt is taking this now.     Amoxicillin Rash     Diphenhydramine Anxiety     No Clinical Screening - See Comments Rash     Pollen Extract Rash     Sulfa Drugs Rash       Review of Systems      Answers for HPI/ROS submitted by the patient on 1/23/2017   General Symptoms: No  Skin Symptoms: No  HENT Symptoms: No  EYE SYMPTOMS: No  HEART SYMPTOMS: Yes  LUNG SYMPTOMS: No  INTESTINAL SYMPTOMS: Yes  URINARY SYMPTOMS: Yes  GYNECOLOGIC SYMPTOMS: No  BREAST SYMPTOMS: No  SKELETAL SYMPTOMS: No  BLOOD SYMPTOMS: No  NERVOUS SYSTEM SYMPTOMS: Yes  MENTAL HEALTH SYMPTOMS: Yes  Chest pain or pressure: No  Fast or irregular heartbeat: Yes  Pain in legs with walking: No  Swelling in feet or ankles: No  Trouble breathing while lying down: No  Fingers or Toes appear blue: No  High blood pressure: Yes  Low blood pressure: No  Fainting: No  Murmurs: No  Chest pain on exertion: No  Chest pain at rest: No  Cramping pain in leg during exercise: No  Pacemaker: No  Varicose veins: No  Edema or swelling: No  Fast heart beat: Yes  Wake up at night with shortness of breath: No  Heart flutters: Yes  Light-headedness: Yes  Exercise intolerance: Yes  Heart burn or indigestion: No  Nausea: Yes  Vomiting: Yes  Abdominal pain: Yes  Bloating: Yes  Constipation: Yes  Diarrhea: No  Blood in stool: No  Black stools: No  Rectal or Anal pain: No  Fecal incontinence: Yes  Rectal bleeding: No  Yellowing of skin or eyes: " No  Vomit with blood: No  Change in stools: No  Hemorrhoids: Yes  Trouble holding urine or incontinence: Yes  Pain or burning: No  Trouble starting or stopping: No  Increased frequency of urination: Yes  Blood in urine: Yes  Decreased frequency of urination: No  Frequent nighttime urination: No  Flank pain: Yes  Difficulty emptying bladder: No  Trouble with coordination: No  Dizziness or trouble with balance: Yes  Fainting or black-out spells: No  Memory loss: Yes  Headache: No  Seizures: No  Speech problems: No  Tingling: No  Tremor: No  Weakness: No  Difficulty walking: No  Paralysis: No  Numbness: No  Nervous or Anxious: No  Depression: No  Trouble sleeping: Yes  Trouble thinking or concentrating: No  Mood changes: No  Panic attacks: No            Physical Exam:    There were no vitals taken for this visit.    General: Alert non-toxic appearing female in no acute distress  HEENT: Normocephalic, atraumatic; PERRLA; no scleral icterus; oropharynx pink without lesions; neck supple without lymphadenopathy  Pulmonary: Lungs clear to auscultation, no increased work of breathing noted  Cardiac: Tachycardic, regular rhythm, S1S2, grade II murmur best auscultated over aorta; no clicks, rubs, or gallops; bilateral lower extremities without edema, dorsalis pedis pulses 2+ bilaterally  GI: Normoactive bowel sounds x4 quadrants, abdomen soft, non-distended, and non-tender to palpation without masses or organomegaly  Lymph: No cervical, supraclavicular, or inguinal lymphadenopathy  Neuro: Grossly intact, normal gait  Skin: Mottled appearing hyperpigmentation to left lower back, no apparent rash or lesions; appropriate color for race, skin warm  Psych: Pleasant and interactive, affect bright but appropriately tearful when discussing her emotions about the election, makes appropriate eye contact, thought process linear         Assessment/Plan:  1) Primary peritoneal cancer: OnTesaro trial (parp-inhibitor). Cycle 2 Day 1 due  today, niraparib on hold due to anemia-work did not find obvious etiology for anemia. Hgb has now risen appropriately. Eye changes also noted, saw eye doctor on Tuesday 1/31/17. 2) Reviewed signs and symptoms for when she should contact the clinic or seek additional care, including but not limited to fever, chills, inability to keep down food or fluids, nausea and vomiting not controlled with antiemetics, and diarrhea leading to dehydration.   3) Nausea: Related to pain vs chemotherapy vs disease. Continue with Zofran as this is helpful for her.  4) Genetic counseling: Has been done, negative for mutations in the following: BRCA1, BRCA2, EPCAM, MLH1, MSH2, MSH6, PMS2, PTEN and TP53.   5) Health maintenance issues discussed include to follow up with PCP for non-gynecologic issues   6) Marshall verbalizes understanding of and agreement with plan.    Of a 30 minute appointment, more than 50% was spent in counseling the patient.      Jia Mccollum MD    Department of Ob/Gyn and Women's Health  Division of Gynecologic Oncology  [facility]  [phone]    I have reviewed the above note and agree with the scribe's notation as written.    I, Kolton Spencer, am serving as a scribe to document services personally performed by Jia Mccollum MD, based upon my observations and the provider's statements to me. All documentation has been reviewed by the aforementioned doctor prior to being entered into the official medical record.                      Gyn Oncology Follow-up Visit    Date of Visit: 02/02/2017    CC: Margaux Guzmán is a 61 year old female with a history of primary peritoneal cancer presenting today for hospital follow-up, on Tesaro trial currently being held.    HPI:    Marshall comes to the clinic feeling well today. Having regular, soft BMs with daily miralax.  Eating low residue diet. No fevers, chills, dizziness, sore throat, cough, chest pain, or SOB.  She feels  significantly improved since Hgb is better.  Confusion occurs with drops in hemoglobin.  Continues to have flank pain and stent irration, especially at night.  During last visit with urology was discussing double lumen stents.   She would like to start medication for anxiety, as ativan makes her tired and is not controlling her symptoms.        Brief Oncology History:  The patient is a 58 year old  female who initially presented for evaluation of pelvic and abdominal pain. On exam, she had mild right adnexal tenderness and slightly enlarged right ovary freely mobile and smooth. This prompted an abdominal ultrasound, which was normal, and a pelvic US that showed anechoic right ovarian cyst measuring 4.3 x 3.8 x 4.0 cm. Her  was elevated at 74 on 10/8/13. We reviewed the possible diagnosis including ovarian cancer versus benign ovarian cyst. Approach to surgery, alternatives to surgery and plan for possible extended open surgical staging based on the operative findings was discussed. In light of the size of the ovary we are offering an initial approach with minimally invasive surgery. After discussion of risks and benefits, consent was obtained.  11/7/13 Diagnostic laparoscopy converted to exploratory laparotomy, bilateral salpingo-oophorectomy, total abdominal hysterectomy, omentectomy, staging biopsies, bilateral pelvic and periaortic lymph node dissection and washings. Stage IIIB  12/4/13: Cycle #1 IV/IP  1/2/14: Cycle #2 IV/IP PCP.  - 14  1/23/14: Cycle 3 IV/IP.  - 7  2/11/14:  - 7. CT C/A/P Impression:  1. Multiple bilateral pulmonary nodules as described in full report measuring up to 3 mm along the right major fissure. These are indeterminate given lack of comparison and followup is recommended.  2. No definite evidence for recurrent or metastatic disease in the abdomen or the pelvis. There is a rounded hypodense focus abutting the left external iliac artery and the adjacent sigmoid  colon which measures 1 cm, this could represent a fluid-filled sigmoid diverticulum or a hypodense node, further attention to this area on subsequent examinations is recommended.  3. There is a 7 mm nonobstructing stone in the inferior collecting system of the right kidney.  2/12/14-3/26/14: Cycle #4-6 IV/IP CA-125 7, 7, 7.  4/21/14: CT C/A/P Impression:   1. Unchanged indeterminate pulmonary nodules. Recommend continued surveillance.   2. No definite evidence of metastatic ovarian cancer in the abdomen or pelvis. Small amount of subtle increased thickening and fluid is noted along the right lymph node dissection, nonspecific, but possibly a tiny developing lymphocele.   3. 6 mm nonobstructing stone in the right kidney.  Plan surveillance for ovarian cancer every 3 months with physical and .   Indeterminate pulmonary nodule: These were present before the surgery and there was not change with the chemotherapy. Highly unlikely to be a metastatic disease. Will repeat a CT in 3 months to see any change     5/22/14:  6. JOSEMANUEL.  5/17/14; ED visit [facility]. CT abd/pel noted possible chronic partial small bowel obstruction. Colonscopy scheduled for June 6, 2014 locally. Abdominal pain started 5/17/14 and noted pressure without bowel movement and went to ED that night due to increasing pain. In patient 2 day hospital with clear liquids/IV. Mild nausea without vomiting. BM subsequently occurred and pt was discharged. No pain since. Continues with fullness in abdomen. Diet is bland for the most part.  8/25/14:  -5. Notes increased abdominal girth and bloating x 2-3 weeks with urine urgency. Worried about recurrence. Is just starting to return to normal exercise and activities. No constipation, diarrhea, pain, vaginal or rectal bleeding, cough or dyspnea. CT to follow indeterminate pulmonary nodules today.   CT cap IMPRESSION:   1. No CT scan findings of the chest, abdomen, or pelvis to  indicate metastatic disease.  2. 2-5 mm pulmonary nodules, stable since 2/11/2014.  3. Nonobstructing 6 mm stone in the right kidney.  12/3/14:  8.. Pt started zoloft for anxiety. Worries about cancer recurrence.   3/2/15: CA 34  3/5/15: CT C/A/P: Impression:  1. Multiple new small peritoneal and retroperitoneal nodules are suspicious for metastases. Suspicious new left common iliac and central small bowel mesenteric nodes. No abdominal ascites.  2. Multiple pulmonary nodules are stable with no new nodules.  3. 9 mm nonobstructing right lower pole renal calculus.    5/20/15: CT c/a/p showed nodularity throughout the abdomen and pelvis worrisome for malignancy which has increased in size     5/28/15: CT abdomen and pelvis showed stable inumerabble peritoneal nodules scattered throughout the abdomen and pelvis consistent with metastases.     8/4/15 (approximatly): Left ureteral stent was placed.    8/12/15:  from [place] 303    8/18/15: CT chest Impression showed slight increase in mild, subtle nodularity along the diaphragm which is nonspecific but may represent metastatic disease.   8/18/15: CT abdomen and pelvis Impression showed interval progression of peritoneal metastatic disease.     8/25/15:  on 3/2/15 of 34 prompted CT c/a/p, which showed multiple new small peritoneal and retroperitoneal nodules are suspicious for metastases. Suspicious new left common iliac and central small bowel mesenteric nodes. She participated in [facility] study involving treatment with on clinical trial with vaccine [id number]. She is here today because the [place] trail failed and the pt pregressed. Her most recent CT c/a/p on 8/25 showed progression of peritoneal metastatic disease. And her most recent  from [place] on 8/12/15 was 303.    Plan: Carbo/Doxil x 6 cycles.with imaging after 3 cycles.     9/3/15: Cycle #1 Carbo/Doxil.  244.  9/24/15: right IJ thrombus; started on Lovenox.   9/26/15: Present to  "[place] ED. \"presented to the ED this morning with what appears to be a mild drug rash after administering her lovenox this morning. This writer discussed situation with Gyn Onc Fellow Jeanne Moon as well as [facility] Heme/Onc service. Per Heme, a rash of this nature is extremely uncommon with administration of Lovenox. Given the mild nature of this rash and acute need for anticoagulation, recommended the patient continue with Lovenox injections and treat with Benadryl as needed. Advised patient be given precautions to return to the ED immediately if she develops reactive respiratory symptoms. Alternatively patient could be switched to alternative formulation of low molecular weight heparin if she was strongly resistant to continuation of Lovenox.\"    10/1/15: Cycle #2 Carbo/Doxil.  175.    10/28/15: gyn onc visit: pt complains of worsening constipation and tenderness around her right ribs. She is taking senna for her constipation with minimal improvement. She admits that the swelling around her neck after her blood clot has decreased. She also admits to feeling a nodules on her left abdomen. She states her appetite is good but she has not been eating fruits and vegetables. She denies any chemo side effects besides fatigue.     10/28/15:  158  11/23/2015:  133  CT c/a/p  IMPRESSION: In this patient with a clinical history of ovarian cancer  there is mixed response to therapy:   1. Stable to slightly decreased peritoneal nodularity since  8/18/2015.  2. No significant change regarding the large subdiaphragmatic  peritoneal deposit since 9/24/2015.  3. Enlarging soft tissue nodule overlying the abdominal musculature  concerning for metastasis since March of 2015.   4. Unchanged, indeterminate hypodensity near the gallbladder fossa  since 8/18/2015, however progressively increased in size since  3/5/2015.  5. Bilateral nephroureteral stents. No significant hydronephrosis.  6. Bilateral pulmonary nodules. " Continued followup recommended.    12/23/15: Cycle 5 carboplatin/Doxil/Avastin.  96. To receive Avastin today despite proteinuria per Dr. Aguilar and nephrology.  1/21/16: Cycle 6 carboplatin/Doxil/Avastin, delayed one week secondary to neutropenia.  62.  1/28/16:  63.    2/2/16: Patient had right upper extremity doppler u/s done in [place] due to swollen glands and pain. 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 Pt is currently on lovenox.    2/2/16: US soft tissue neck  Conclusion:  1. Morphologically normal not pathologically enlarged two right carotid chain lymph nodes.  2. Chronic right jugular vein thrombosis, described in detail on  venous doppler exam.     2/2/16: Thyroid Ultrasound  Right lobe: 5.5 x 1.9 x 1.2 cm  Left lobe: 5.0 x 1.5 . 0.9 cm  Both lobes have normal background echotexture.    Conclusion:  1. 3 benign - appearing subcentimeter hypoechoic right mid thyroid nodules.  2. Borderline thyromegaly.    2/2/16: Right upper extremity venous duplex doppler evaluation  Conclusion:  1.) chronic non compressible echogenic right jugular vein thrombus, now 4.3 cm in length.     2/22/16:   IMPRESSION:    1. Ovarian cancer with peritoneal carcinomatosis.  2. Given the increased sensitivity of PET/CT, comparison with prior CT examinations is difficult. In general the abdominal/pelvic metastatic lesions appear to be decreased in size.  3. Persistent right internal jugular DVT, as evidenced by 5 cm filling  defect with inferior border at the central venous catheter.  4. Bilateral hydronephrosis without overt caliectasis. Some distal  migration of the bilateral double-J ureteral stents, although the  proximal coiled portions continue to be in the renal pelves.    2/24/16: C7 carboplatin/Doxil/Avastin.  56.  3/9/16: Hgb 6.6, worked up for transfusion reaction  (negative). Bleed possibly secondary to   3/23/16: C8 carboplatin/Doxil/Avastin.  44.  4/2016: Hospitalized in [place] x2 weeks for hematuria leading to anemia after ureteral stent exchange  4/20/16: C9D1 carboplatin/Doxil/Avastin. Deferred one week secondary to acute UTI.  35.  4/28/16: C9D1 deferred due to continued bacteruria and ANC 1.3.  5/4/16: C9D1 carboplatin/Doxil/Avastin. Breast lump noted, diagnostic mammogram ordered.  6/1/16: C10D1 carboplatin/Doxil/Avastin.  50.  6/28/16: C11D1 carboplatin/Doxil/Avastin. Avastin held due to bleeding. Carbo/Doxil deferred one week secondary to neutropenia.  43.  7/7/16: C12D1 carboplatin/Doxil. Avastin held due to bleeding.  7/18/16: Bilateral ureteral stent exchange  7/20/16-7/29/16: Hospitalized with urosepsis and blood loss anemia, started on Zosyn and discharged on amoxicillin  9/29/16: C1D1 Gemzar.  85.  10/21/16: C2D1 Gemzar.  106.   11/11/16: C3D1 Gemzar.  pending.    12/12/16: CT CAP  IMPRESSION: In this patient with a history of metastatic ovarian  cancer:  1. Progression of disease as evidenced by a slowly enlarging  mesenteric and omental soft tissue foci and slowly enlarging  periesophageal and pericardiophrenic lymph nodes, as detailed above.  2. Stable appearance of bilateral ureteral stents without  hydronephrosis.  3. Patient was premedicated for a pre-existing IV contrast allergy.  Despite this, she had itchiness on her face poststudy. She should no  longer receive IV contrast in the future.      CT AP 1/10/2017: multiple dilated fluid filled small bowel loops with decompressed distal small bowel loops, consistent with obstruction. Transition point is suspected in the pelvis. No pneumatosis or free intraperitoneal gas. Bilateral ureteral stents appear appropriately positioned. There is mild dilation of the bilateral renal collecting systems, left greater than right.  1/23/17: admitted to clinic for dizziness  and heart palpitations   1/27/17: ED admission - leg swelling and GI bleeding     1/27/17: US lower extremity   IMPRESSION:  1.  No evidence of right lower extremity deep venous thrombosis.  2.  Dampened waveforms in the right lower extremity, similar to  7/25/2016, though a more central occlusion cannot be excluded.  1/27/17: MRI Brain  IMPRESSION:  Normal brain MRI  1/27/17: CT AP  IMPRESSION: This patient with a history of metastatic ovarian cancer:  1. No bowel obstruction. Oral contrast progressed to the colon.  Extensive colonic stool.  2. Severe right hydronephrosis, new from 12/12/2016, may represent  obstruction of the right ureteral stent. No left-sided hydronephrosis.  3. Slight interval progression of diffuse peritoneal metastatic  disease.  4. Mildly nodular areas of increased attenuation within the pelvic  bowel which may represent loculated malignant ascites or peritoneal  implants similar in appearance to 12/12/2016.      1/28/17: colonoscopy- benign        Date Value Ref Range Status   01/05/2017 165* 0 - 30 U/mL Final     Comment:     Assay Method:  Chemiluminescence using Siemens Centaur XP   12/15/2016 145* 0 - 30 U/mL Final     Comment:     Assay Method:  Chemiluminescence using Siemens Centaur XP   12/12/2016 132* 0 - 30 U/mL Final     Comment:     Assay Method:  Chemiluminescence using Siemens Centaur XP   11/11/2016 111* 0 - 30 U/mL Final     Comment:     Assay Method:  Chemiluminescence using Siemens Centaur XP   10/21/2016 106* 0 - 30 U/mL Final     Comment:     Assay Method:  Chemiluminescence using Siemens Centaur XP   09/29/2016 85* 0 - 30 U/mL Final     Comment:     Assay Method:  Chemiluminescence using Siemens Centaur XP   09/15/2016 70* 0 - 30 U/mL Final     Comment:     Assay Method:  Chemiluminescence using Siemens Centaur XP   08/12/2016 38* 0 - 30 U/mL Final     Comment:     Assay Method:  Chemiluminescence using Siemens Centaur XP   06/28/2016 43* 0 - 30 U/mL Final    06/01/2016 50* 0 - 30 U/mL Final     Answers for HPI/ROS submitted by the patient on 1/23/2017   General Symptoms: No  Skin Symptoms: No  HENT Symptoms: No  EYE SYMPTOMS: No  HEART SYMPTOMS: Yes  LUNG SYMPTOMS: No  INTESTINAL SYMPTOMS: Yes  URINARY SYMPTOMS: Yes  GYNECOLOGIC SYMPTOMS: No  BREAST SYMPTOMS: No  SKELETAL SYMPTOMS: No  BLOOD SYMPTOMS: No  NERVOUS SYSTEM SYMPTOMS: Yes  MENTAL HEALTH SYMPTOMS: Yes  Chest pain or pressure: No  Fast or irregular heartbeat: Yes  Pain in legs with walking: No  Swelling in feet or ankles: No  Trouble breathing while lying down: No  Fingers or Toes appear blue: No  High blood pressure: Yes  Low blood pressure: No  Fainting: No  Murmurs: No  Chest pain on exertion: No  Chest pain at rest: No  Cramping pain in leg during exercise: No  Pacemaker: No  Varicose veins: No  Edema or swelling: No  Fast heart beat: Yes  Wake up at night with shortness of breath: No  Heart flutters: Yes  Light-headedness: Yes  Exercise intolerance: Yes  Heart burn or indigestion: No  Nausea: Yes  Vomiting: Yes  Abdominal pain: Yes  Bloating: Yes  Constipation: Yes  Diarrhea: No  Blood in stool: No  Black stools: No  Rectal or Anal pain: No  Fecal incontinence: Yes  Rectal bleeding: No  Yellowing of skin or eyes: No  Vomit with blood: No  Change in stools: No  Hemorrhoids: Yes  Trouble holding urine or incontinence: Yes  Pain or burning: No  Trouble starting or stopping: No  Increased frequency of urination: Yes  Blood in urine: Yes  Decreased frequency of urination: No  Frequent nighttime urination: No  Flank pain: Yes  Difficulty emptying bladder: No  Trouble with coordination: No  Dizziness or trouble with balance: Yes  Fainting or black-out spells: No  Memory loss: Yes  Headache: No  Seizures: No  Speech problems: No  Tingling: No  Tremor: No  Weakness: No  Difficulty walking: No  Paralysis: No  Numbness: No  Nervous or Anxious: No  Depression: No  Trouble sleeping: Yes  Trouble thinking or  concentrating: No  Mood changes: No  Panic attacks: No    I have reviewed and addressed the patient's review of symptoms for today's visit.           Past Medical History   Diagnosis Date     Hyperlipidemia      Osteoarthritis      Osteopenia      Polymyalgia rheumatica (H)      Ovarian cancer (H)      Primary cancer of peritoneum (H)      Hydronephrosis      Jugular vein thrombosis, right      Persistent right internal jugular DVT     Livedo annularis      History of blood transfusion      MULTIPLE     PONV (postoperative nausea and vomiting)      Anemia      Past Surgical History   Procedure Laterality Date     Appendectomy        section       Open reduction internal fixation toe(s)  9/3/13     Fifth digit, left foot, with screw fixation      Lymph node biopsy       Left posterior chain, beign     Breast surgery       biopsy negative     Laparoscopic salpingo-oophorectomy  2013     Procedure: LAPAROSCOPIC SALPINGO-OOPHORECTOMY;  Diagnostic Laparoscopy, Exploratory Laparotomy, Bilateral Salpingo-Oophorectomy,  Hysterectomy, Omentectomy, Pelvic Washings, Peritoneal staging and biopsies, Pelvic and Periaortic Lymph node Dissection;  Surgeon: Cheri Aguilar MD;  Location: [facility] OR     Hysterectomy total abdominal, bilateral salpingo-oophorectomy, node dissection, combined  2013     Procedure: COMBINED HYSTERECTOMY TOTAL ABDOMINAL, SALPINGO-OOPHORECTOMY, NODE DISSECTION;;  Surgeon: Cheri Aguilar MD;  Location: [facility] OR     Laparotomy, staging, combined  2013     Procedure: COMBINED LAPAROTOMY, STAGING;;  Surgeon: Cheri Aguilar MD;  Location: [facility] OR     Remove port peritoneal  2014     Procedure: REMOVE PORT PERITONEAL;  Surgeon: Cheri Aguilar MD;  Location: [facility] OR     Combined cystoscopy, retrogrades, exchange stent ureter(s) Bilateral 2016     Procedure: COMBINED CYSTOSCOPY, RETROGRADES, EXCHANGE STENT URETER(S);  Surgeon: Carmela Alvarez MD;  Location: [facility]      Combined cystoscopy, retrogrades, exchange stent ureter(s)  4/2016     @ [facility]     Combined cystoscopy, retrogrades, exchange stent ureter(s) Bilateral 10/17/2016     Procedure: COMBINED CYSTOSCOPY, RETROGRADES, EXCHANGE STENT URETER(S);  Surgeon: Carmela Alvarez MD;  Location: [facility] OR     Combined cystoscopy, retrogrades, exchange stent ureter(s) Bilateral 12/7/2016     Procedure: COMBINED CYSTOSCOPY, RETROGRADES, EXCHANGE STENT URETER(S);  Surgeon: Carmela Alvarez MD;  [facility]     Colonoscopy N/A 1/28/2017     Procedure: COLONOSCOPY;  Surgeon: Luis Richardson MD;  Location: [facility]     Esophagoscopy, gastroscopy, duodenoscopy (egd), combined N/A 1/28/2017     Procedure: COMBINED ESOPHAGOSCOPY, GASTROSCOPY, DUODENOSCOPY (EGD);  Surgeon: Luis Richardson MD;  Location: [facility]     Colonoscopy N/A 1/28/2017     Procedure: COMBINED COLONOSCOPY, SINGLE OR MULTIPLE BIOPSY/POLYPECTOMY BY BIOPSY;  Surgeon: Luis Richardson MD;  Location: [facility]     Family History   Problem Relation Age of Onset     Arthritis Father      DIABETES       Uncle     Hypertension Mother      Hypertension Sister      Myocardial Infarction Father      secondary to anesthesia     HEART DISEASE       unknown valve replacement     Colon Cancer Father      Other - See Comments Mother      cognitive decline     Bladder Cancer Sister      Skin Cancer Brother      Skin Cancer Mother      Social History     Social History     Marital Status:      Spouse Name: N/A     Number of Children: N/A     Years of Education: N/A     Social History Main Topics     Smoking status: Former Smoker     Smokeless tobacco: Never Used      Comment: Quit over 20 years ago     Alcohol Use: No     Drug Use: No     Sexual Activity: No     Other Topics Concern     Not on file     Social History Narrative         Current Outpatient Prescriptions   Medication     citalopram (CELEXA) 10 MG tablet     enoxaparin (LOVENOX) 40 MG/0.4ML  "injection     LORazepam (ATIVAN) 0.5 MG tablet     study - niraparib (IDS# 4759) 100 mg capsule     tamsulosin (FLOMAX) 0.4 MG capsule     LORazepam (ATIVAN) 1 MG tablet     tolterodine (DETROL LA) 4 MG 24 hr capsule     ondansetron (ZOFRAN) 8 MG tablet     ACETAMINOPHEN PO     Multiple Vitamins-Minerals (ONE DAILY MULTIVITAMIN WOMEN) TABS     polyethylene glycol (MIRALAX/GLYCOLAX) powder     cycloSPORINE (RESTASIS) 0.05 % ophthalmic emulsion     Ranitidine HCl (ZANTAC PO)     MELATONIN PO     EPINEPHrine (EPIPEN) 0.3 MG/0.3ML injection     senna-docusate (SENOKOT-S;PERICOLACE) 8.6-50 MG per tablet     Pyridoxine HCl (VITAMIN B6 PO)     prochlorperazine (COMPAZINE) 10 MG tablet     No current facility-administered medications for this visit.        Allergies   Allergen Reactions     Contrast Dye Itching and Swelling     Itching/tingling of mouth and nose with sensation of swelling after receiving IV contrast for CT.  This occurred despite steroid premedication. Therefore the patient should not receive intravenous contrast in the future.      Benadryl Allergy Other (See Comments)     Stay awake, restless, \"uncomfortable\", \"feel like I need to run away\"      Enoxaparin Hives     3/23/16: Okay to receive heparin flushes in port, tolerates well. Patricia Cortez FNP-C     Enoxaparin Sodium Other (See Comments)     Pt is taking this now.     Amoxicillin Rash     Diphenhydramine Anxiety     No Clinical Screening - See Comments Rash     Pollen Extract Rash     Sulfa Drugs Rash       Physical Exam:    /84 mmHg  Pulse 101  Temp(Src) 98.1  F (36.7  C) (Oral)  Resp 18  Wt 56.926 kg (125 lb 8 oz)  SpO2 99%    General: Alert non-toxic appearing female in no acute distress  HEENT: Normocephalic, atraumatic;  PERRLA; no scleral icterus; oropharynx pink without lesions; neck supple without lymphadenopathy  Pulmonary: Lungs clear to auscultation, no increased work of breathing noted  Cardiac: regular rate and rhythm, S1S2, " grade II murmur, consistent with prior exams  Gastrointestinal: Abdomen soft, non-tender. BS normal. No masses, organomegaly     Performance Status -1    Assessment/Plan:  1) Primary peritoneal cancer: Patient to restart (parp-inhibitor)-niraparib today at reduced dose if Hgb is stable.     2) Reviewed signs and symptoms for when she should contact the clinic or seek additional care, including but not limited to fever, chills, inability to keep down food or fluids, nausea and vomiting not controlled with antiemetics, and diarrhea leading to dehydration.     3) Anemia, resolved s/p transfusion.  Likely related to chemo AE in combination with hemorrhoidal bleeding.  Plan for PRN transfusions in [place] when Hgb <8, as patient becomes very symptomatic.     4) Nausea: Related to pain vs chemotherapy vs disease. Controlled with antiemetics.      Constipation: Improved with daily miralax.  Instructed to monitor for signs of obstruction. Continue low residue diet.    5) Severe hydronephrosis, bilateral stents in place with ongoing irritation/flank pain: follow-up with urology ASAP regarding plan for possible stent exchange.     6) Anxiety: continue ativan, will started celexa.     7) Genetic counseling: Has been done, negative for mutations in the following: BRCA1, BRCA2, EPCAM, MLH1, MSH2, MSH6, PMS2, PTEN and TP53.     8) Health maintenance issues discussed include to follow up with PCP for non-gynecologic issues.  Continue on prophylactic lovenox at 40 SC daily due to history of prior bleeding on higher dose.     9) Marshall verbalizes understanding of and agreement with plan.    Of a 35 minute appointment, more than 50% was spent in counseling the patient.      Jia Mccollum MD    Department of Ob/Gyn and Women's Health  Division of Gynecologic Oncology  [facility]  [phone]    I saw and evaluated the patient with the resident.  I edited and reviewed the above note.      I served as a scribe for Dr. Mccollum, who formulated the treatment assessment and plan.     Nicole Blanca MD   OB/GYN PGY-2    Again, thank you for allowing me to participate in the care of your patient.      Sincerely,    Jia Mccollum MD

## 2017-02-02 NOTE — PROGRESS NOTES
Patient here today for C2D1 visit for the Tesaro/Quadra Niraparib study.  Study drug has been on hold due to anemia and patient unsure that she wanted to continue with study.  Patient spoke with Dr. Mccollum and decided to continue with the study.  Labs were reviewed and ok to restart study medication at at reduced dose of 200 mg daily.  Pre and post ECG's and PK blood samples collected per protocol.  Patient returned 72 pills from bottle #[id number].  Patient given new pill diary and bottle #[id number].  Patient forgot pill diary at home and will bring next visit.  Patient denies any new issues or concerns at this time.  Patient will have weekly repeat CBC done in [place] and results will be faxed to the clinic.  Next appointment made per protocol.       Aurelia Worley RN     Pager [phone]

## 2017-02-02 NOTE — PROGRESS NOTES
Gyn Oncology Follow-up Visit    Date of Visit: 02/02/2017    CC: Margaux Guzmán is a 61 year old female with a history of primary peritoneal cancer presenting today for hospital follow-up, on [place] trial currently being held.    HPI:    Marshall comes to the clinic feeling well today. Having regular, soft BMs with daily miralax.  Eating low residue diet. No fevers, chills, dizziness, sore throat, cough, chest pain, or SOB.  She feels significantly improved since Hgb is better.  Confusion occurs with drops in hemoglobin.  Continues to have flank pain and stent irration, especially at night.  During last visit with urology was discussing double lumen stents.   She would like to start medication for anxiety, as ativan makes her tired and is not controlling her symptoms.        Brief Oncology History:  The patient is a 58 year old  female who initially presented for evaluation of pelvic and abdominal pain. On exam, she had mild right adnexal tenderness and slightly enlarged right ovary freely mobile and smooth. This prompted an abdominal ultrasound, which was normal, and a pelvic US that showed anechoic right ovarian cyst measuring 4.3 x 3.8 x 4.0 cm. Her  was elevated at 74 on 10/8/13. We reviewed the possible diagnosis including ovarian cancer versus benign ovarian cyst. Approach to surgery, alternatives to surgery and plan for possible extended open surgical staging based on the operative findings was discussed. In light of the size of the ovary we are offering an initial approach with minimally invasive surgery. After discussion of risks and benefits, consent was obtained.  11/7/13 Diagnostic laparoscopy converted to exploratory laparotomy, bilateral salpingo-oophorectomy, total abdominal hysterectomy, omentectomy, staging biopsies, bilateral pelvic and periaortic lymph node dissection and washings. Stage IIIB  12/4/13: Cycle #1 IV/IP  1/2/14: Cycle #2 IV/IP PCP.  - 14  1/23/14: Cycle 3 IV/IP.   - 7  2/11/14:  - 7. CT C/A/P Impression:  1. Multiple bilateral pulmonary nodules as described in full report measuring up to 3 mm along the right major fissure. These are indeterminate given lack of comparison and followup is recommended.  2. No definite evidence for recurrent or metastatic disease in the abdomen or the pelvis. There is a rounded hypodense focus abutting the left external iliac artery and the adjacent sigmoid colon which measures 1 cm, this could represent a fluid-filled sigmoid diverticulum or a hypodense node, further attention to this area on subsequent examinations is recommended.  3. There is a 7 mm nonobstructing stone in the inferior collecting system of the right kidney.  2/12/14-3/26/14: Cycle #4-6 IV/IP CA-125 7, 7, 7.  4/21/14: CT C/A/P Impression:   1. Unchanged indeterminate pulmonary nodules. Recommend continued surveillance.   2. No definite evidence of metastatic ovarian cancer in the abdomen or pelvis. Small amount of subtle increased thickening and fluid is noted along the right lymph node dissection, nonspecific, but possibly a tiny developing lymphocele.   3. 6 mm nonobstructing stone in the right kidney.  Plan surveillance for ovarian cancer every 3 months with physical and .   Indeterminate pulmonary nodule: These were present before the surgery and there was not change with the chemotherapy. Highly unlikely to be a metastatic disease. Will repeat a CT in 3 months to see any change     5/22/14:  6. JOSEMANUEL.  5/17/14; ED visit [facility]. CT abd/pel noted possible chronic partial small bowel obstruction. Colonscopy scheduled for June 6, 2014 locally. Abdominal pain started 5/17/14 and noted pressure without bowel movement and went to ED that night due to increasing pain. In patient 2 day hospital with clear liquids/IV. Mild nausea without vomiting. BM subsequently occurred and pt was discharged. No pain since. Continues with fullness in  abdomen. Diet is bland for the most part.  8/25/14:  -5. Notes increased abdominal girth and bloating x 2-3 weeks with urine urgency. Worried about recurrence. Is just starting to return to normal exercise and activities. No constipation, diarrhea, pain, vaginal or rectal bleeding, cough or dyspnea. CT to follow indeterminate pulmonary nodules today.   CT cap IMPRESSION:   1. No CT scan findings of the chest, abdomen, or pelvis to indicate metastatic disease.  2. 2-5 mm pulmonary nodules, stable since 2/11/2014.  3. Nonobstructing 6 mm stone in the right kidney.  12/3/14:  8.. Pt started zoloft for anxiety. Worries about cancer recurrence.   3/2/15: CA 34  3/5/15: CT C/A/P: Impression:  1. Multiple new small peritoneal and retroperitoneal nodules are suspicious for metastases. Suspicious new left common iliac and central small bowel mesenteric nodes. No abdominal ascites.  2. Multiple pulmonary nodules are stable with no new nodules.  3. 9 mm nonobstructing right lower pole renal calculus.    5/20/15: CT c/a/p showed nodularity throughout the abdomen and pelvis worrisome for malignancy which has increased in size     5/28/15: CT abdomen and pelvis showed stable inumerabble peritoneal nodules scattered throughout the abdomen and pelvis consistent with metastases.     8/4/15 (approximatly): Left ureteral stent was placed.    8/12/15:  from [place] [id number]    8/18/15: CT chest Impression showed slight increase in mild, subtle nodularity along the diaphragm which is nonspecific but may represent metastatic disease.   8/18/15: CT abdomen and pelvis Impression showed interval progression of peritoneal metastatic disease.     8/25/15:  on 3/2/15 of 34 prompted CT c/a/p, which showed multiple new small peritoneal and retroperitoneal nodules are suspicious for metastases. Suspicious new left common iliac and central small bowel mesenteric nodes. She participated in [facility] study involving treatment with  "on clinical trial with vaccine [id number]. She is here today because the [place] trail failed and the pt pregressed. Her most recent CT c/a/p on 8/25 showed progression of peritoneal metastatic disease. And her most recent  from [place] on 8/12/15 was 303.    Plan: Carbo/Doxil x 6 cycles.with imaging after 3 cycles.     9/3/15: Cycle #1 Carbo/Doxil.  244.  9/24/15: right IJ thrombus; started on Lovenox.   9/26/15: Present to [place] ED. \"presented to the ED this morning with what appears to be a mild drug rash after administering her lovenox this morning. This writer discussed situation with Gyn Onc Fellow Jeanne Moon as well as [facility] Heme/Onc service. Per Heme, a rash of this nature is extremely uncommon with administration of Lovenox. Given the mild nature of this rash and acute need for anticoagulation, recommended the patient continue with Lovenox injections and treat with Benadryl as needed. Advised patient be given precautions to return to the ED immediately if she develops reactive respiratory symptoms. Alternatively patient could be switched to alternative formulation of low molecular weight heparin if she was strongly resistant to continuation of Lovenox.\"    10/1/15: Cycle #2 Carbo/Doxil.  175.    10/28/15: gyn onc visit: pt complains of worsening constipation and tenderness around her right ribs. She is taking senna for her constipation with minimal improvement. She admits that the swelling around her neck after her blood clot has decreased. She also admits to feeling a nodules on her left abdomen. She states her appetite is good but she has not been eating fruits and vegetables. She denies any chemo side effects besides fatigue.     10/28/15:  158  11/23/2015:  133  CT c/a/p  IMPRESSION: In this patient with a clinical history of ovarian cancer  there is mixed response to therapy:   1. Stable to slightly decreased peritoneal nodularity since  8/18/2015.  2. No significant " change regarding the large subdiaphragmatic  peritoneal deposit since 9/24/2015.  3. Enlarging soft tissue nodule overlying the abdominal musculature  concerning for metastasis since March of 2015.   4. Unchanged, indeterminate hypodensity near the gallbladder fossa  since 8/18/2015, however progressively increased in size since  3/5/2015.  5. Bilateral nephroureteral stents. No significant hydronephrosis.  6. Bilateral pulmonary nodules. Continued followup recommended.    12/23/15: Cycle 5 carboplatin/Doxil/Avastin.  96. To receive Avastin today despite proteinuria per Dr. Aguilar and nephrology.  1/21/16: Cycle 6 carboplatin/Doxil/Avastin, delayed one week secondary to neutropenia.  62.  1/28/16:  63.    2/2/16: Patient had right upper extremity doppler u/s done in [place] due to swollen glands and pain. 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 Pt is currently on lovenox.    2/2/16: US soft tissue neck  Conclusion:  1. Morphologically normal not pathologically enlarged two right carotid chain lymph nodes.  2. Chronic right jugular vein thrombosis, described in detail on  venous doppler exam.     2/2/16: Thyroid Ultrasound  Right lobe: 5.5 x 1.9 x 1.2 cm  Left lobe: 5.0 x 1.5 . 0.9 cm  Both lobes have normal background echotexture.    Conclusion:  1. 3 benign - appearing subcentimeter hypoechoic right mid thyroid nodules.  2. Borderline thyromegaly.    2/2/16: Right upper extremity venous duplex doppler evaluation  Conclusion:  1.) chronic non compressible echogenic right jugular vein thrombus, now 4.3 cm in length.     2/22/16:   IMPRESSION:    1. Ovarian cancer with peritoneal carcinomatosis.  2. Given the increased sensitivity of PET/CT, comparison with prior CT examinations is difficult. In general the abdominal/pelvic metastatic lesions appear to be decreased in  size.  3. Persistent right internal jugular DVT, as evidenced by 5 cm filling  defect with inferior border at the central venous catheter.  4. Bilateral hydronephrosis without overt caliectasis. Some distal  migration of the bilateral double-J ureteral stents, although the  proximal coiled portions continue to be in the renal pelves.    2/24/16: C7 carboplatin/Doxil/Avastin.  56.  3/9/16: Hgb 6.6, worked up for transfusion reaction (negative). Bleed possibly secondary to   3/23/16: C8 carboplatin/Doxil/Avastin.  44.  4/2016: Hospitalized in [place] x2 weeks for hematuria leading to anemia after ureteral stent exchange  4/20/16: C9D1 carboplatin/Doxil/Avastin. Deferred one week secondary to acute UTI.  35.  4/28/16: C9D1 deferred due to continued bacteruria and ANC 1.3.  5/4/16: C9D1 carboplatin/Doxil/Avastin. Breast lump noted, diagnostic mammogram ordered.  6/1/16: C10D1 carboplatin/Doxil/Avastin.  50.  6/28/16: C11D1 carboplatin/Doxil/Avastin. Avastin held due to bleeding. Carbo/Doxil deferred one week secondary to neutropenia.  43.  7/7/16: C12D1 carboplatin/Doxil. Avastin held due to bleeding.  7/18/16: Bilateral ureteral stent exchange  7/20/16-7/29/16: Hospitalized with urosepsis and blood loss anemia, started on Zosyn and discharged on amoxicillin  9/29/16: C1D1 Gemzar.  85.  10/21/16: C2D1 Gemzar.  106.   11/11/16: C3D1 Gemzar.  pending.    12/12/16: CT CAP  IMPRESSION: In this patient with a history of metastatic ovarian  cancer:  1. Progression of disease as evidenced by a slowly enlarging  mesenteric and omental soft tissue foci and slowly enlarging  periesophageal and pericardiophrenic lymph nodes, as detailed above.  2. Stable appearance of bilateral ureteral stents without  hydronephrosis.  3. Patient was premedicated for a pre-existing IV contrast allergy.  Despite this, she had itchiness on her face poststudy. She should no  longer receive IV contrast  in the future.      CT AP 1/10/2017: multiple dilated fluid filled small bowel loops with decompressed distal small bowel loops, consistent with obstruction. Transition point is suspected in the pelvis. No pneumatosis or free intraperitoneal gas. Bilateral ureteral stents appear appropriately positioned. There is mild dilation of the bilateral renal collecting systems, left greater than right.  1/23/17: admitted to clinic for dizziness and heart palpitations   1/27/17: ED admission - leg swelling and GI bleeding     1/27/17: US lower extremity   IMPRESSION:  1.  No evidence of right lower extremity deep venous thrombosis.  2.  Dampened waveforms in the right lower extremity, similar to  7/25/2016, though a more central occlusion cannot be excluded.  1/27/17: MRI Brain  IMPRESSION:  Normal brain MRI  1/27/17: CT AP  IMPRESSION: This patient with a history of metastatic ovarian cancer:  1. No bowel obstruction. Oral contrast progressed to the colon.  Extensive colonic stool.  2. Severe right hydronephrosis, new from 12/12/2016, may represent  obstruction of the right ureteral stent. No left-sided hydronephrosis.  3. Slight interval progression of diffuse peritoneal metastatic  disease.  4. Mildly nodular areas of increased attenuation within the pelvic  bowel which may represent loculated malignant ascites or peritoneal  implants similar in appearance to 12/12/2016.      1/28/17: colonoscopy- benign        Date Value Ref Range Status   01/05/2017 165* 0 - 30 U/mL Final     Comment:     Assay Method:  Chemiluminescence using Siemens Centaur XP   12/15/2016 145* 0 - 30 U/mL Final     Comment:     Assay Method:  Chemiluminescence using Siemens Centaur XP   12/12/2016 132* 0 - 30 U/mL Final     Comment:     Assay Method:  Chemiluminescence using Siemens Centaur XP   11/11/2016 111* 0 - 30 U/mL Final     Comment:     Assay Method:  Chemiluminescence using Siemens Centaur XP   10/21/2016 106* 0 - 30 U/mL Final      Comment:     Assay Method:  Chemiluminescence using Siemens Centaur XP   09/29/2016 85* 0 - 30 U/mL Final     Comment:     Assay Method:  Chemiluminescence using Siemens Centaur XP   09/15/2016 70* 0 - 30 U/mL Final     Comment:     Assay Method:  Chemiluminescence using Siemens Centaur XP   08/12/2016 38* 0 - 30 U/mL Final     Comment:     Assay Method:  Chemiluminescence using Siemens Centaur XP   06/28/2016 43* 0 - 30 U/mL Final   06/01/2016 50* 0 - 30 U/mL Final     Answers for HPI/ROS submitted by the patient on 1/23/2017   General Symptoms: No  Skin Symptoms: No  HENT Symptoms: No  EYE SYMPTOMS: No  HEART SYMPTOMS: Yes  LUNG SYMPTOMS: No  INTESTINAL SYMPTOMS: Yes  URINARY SYMPTOMS: Yes  GYNECOLOGIC SYMPTOMS: No  BREAST SYMPTOMS: No  SKELETAL SYMPTOMS: No  BLOOD SYMPTOMS: No  NERVOUS SYSTEM SYMPTOMS: Yes  MENTAL HEALTH SYMPTOMS: Yes  Chest pain or pressure: No  Fast or irregular heartbeat: Yes  Pain in legs with walking: No  Swelling in feet or ankles: No  Trouble breathing while lying down: No  Fingers or Toes appear blue: No  High blood pressure: Yes  Low blood pressure: No  Fainting: No  Murmurs: No  Chest pain on exertion: No  Chest pain at rest: No  Cramping pain in leg during exercise: No  Pacemaker: No  Varicose veins: No  Edema or swelling: No  Fast heart beat: Yes  Wake up at night with shortness of breath: No  Heart flutters: Yes  Light-headedness: Yes  Exercise intolerance: Yes  Heart burn or indigestion: No  Nausea: Yes  Vomiting: Yes  Abdominal pain: Yes  Bloating: Yes  Constipation: Yes  Diarrhea: No  Blood in stool: No  Black stools: No  Rectal or Anal pain: No  Fecal incontinence: Yes  Rectal bleeding: No  Yellowing of skin or eyes: No  Vomit with blood: No  Change in stools: No  Hemorrhoids: Yes  Trouble holding urine or incontinence: Yes  Pain or burning: No  Trouble starting or stopping: No  Increased frequency of urination: Yes  Blood in urine: Yes  Decreased frequency of urination:  No  Frequent nighttime urination: No  Flank pain: Yes  Difficulty emptying bladder: No  Trouble with coordination: No  Dizziness or trouble with balance: Yes  Fainting or black-out spells: No  Memory loss: Yes  Headache: No  Seizures: No  Speech problems: No  Tingling: No  Tremor: No  Weakness: No  Difficulty walking: No  Paralysis: No  Numbness: No  Nervous or Anxious: No  Depression: No  Trouble sleeping: Yes  Trouble thinking or concentrating: No  Mood changes: No  Panic attacks: No    I have reviewed and addressed the patient's review of symptoms for today's visit.           Past Medical History   Diagnosis Date     Hyperlipidemia      Osteoarthritis      Osteopenia      Polymyalgia rheumatica (H)      Ovarian cancer (H)      Primary cancer of peritoneum (H)      Hydronephrosis      Jugular vein thrombosis, right      Persistent right internal jugular DVT     Livedo annularis      History of blood transfusion      MULTIPLE     PONV (postoperative nausea and vomiting)      Anemia      Past Surgical History   Procedure Laterality Date     Appendectomy        section       Open reduction internal fixation toe(s)  9/3/13     Fifth digit, left foot, with screw fixation      Lymph node biopsy       Left posterior chain, beign     Breast surgery       biopsy negative     Laparoscopic salpingo-oophorectomy  2013     Procedure: LAPAROSCOPIC SALPINGO-OOPHORECTOMY;  Diagnostic Laparoscopy, Exploratory Laparotomy, Bilateral Salpingo-Oophorectomy,  Hysterectomy, Omentectomy, Pelvic Washings, Peritoneal staging and biopsies, Pelvic and Periaortic Lymph node Dissection;  Surgeon: Cheri Aguilar MD;  Location: [facility]     Hysterectomy total abdominal, bilateral salpingo-oophorectomy, node dissection, combined  2013     Procedure: COMBINED HYSTERECTOMY TOTAL ABDOMINAL, SALPINGO-OOPHORECTOMY, NODE DISSECTION;;  Surgeon: Cheri Aguilar MD;  Location: [facility] OR     Laparotomy, staging, combined   11/7/2013     Procedure: COMBINED LAPAROTOMY, STAGING;;  Surgeon: Cheri Aguilar MD;  Location: [facility]     Remove port peritoneal  5/5/2014     Procedure: REMOVE PORT PERITONEAL;  Surgeon: Cheri Aguilar MD;  Location: [facility]     Combined cystoscopy, retrogrades, exchange stent ureter(s) Bilateral 7/18/2016     Procedure: COMBINED CYSTOSCOPY, RETROGRADES, EXCHANGE STENT URETER(S);  Surgeon: Carmela Alvarez MD;  Location: [facility]     Combined cystoscopy, retrogrades, exchange stent ureter(s)  4/2016     @ [facility]     Combined cystoscopy, retrogrades, exchange stent ureter(s) Bilateral 10/17/2016     Procedure: COMBINED CYSTOSCOPY, RETROGRADES, EXCHANGE STENT URETER(S);  Surgeon: Carmela Alvarez MD;  Location: [facility]     Combined cystoscopy, retrogrades, exchange stent ureter(s) Bilateral 12/7/2016     Procedure: COMBINED CYSTOSCOPY, RETROGRADES, EXCHANGE STENT URETER(S);  Surgeon: Carmela Alvarez MD;  [facility]     Colonoscopy N/A 1/28/2017     Procedure: COLONOSCOPY;  Surgeon: Luis Richardson MD;  Location: [facility]     Esophagoscopy, gastroscopy, duodenoscopy (egd), combined N/A 1/28/2017     Procedure: COMBINED ESOPHAGOSCOPY, GASTROSCOPY, DUODENOSCOPY (EGD);  Surgeon: Luis Richardson MD;  Location: [facility]     Colonoscopy N/A 1/28/2017     Procedure: COMBINED COLONOSCOPY, SINGLE OR MULTIPLE BIOPSY/POLYPECTOMY BY BIOPSY;  Surgeon: Luis Richardson MD;  Location: [facility]     Family History   Problem Relation Age of Onset     Arthritis Father      DIABETES       Uncle     Hypertension Mother      Hypertension Sister      Myocardial Infarction Father      secondary to anesthesia     HEART DISEASE       unknown valve replacement     Colon Cancer Father      Other - See Comments Mother      cognitive decline     Bladder Cancer Sister      Skin Cancer Brother      Skin Cancer Mother      Social History     Social History     Marital Status:      Spouse Name: N/A     Number of  "Children: N/A     Years of Education: N/A     Social History Main Topics     Smoking status: Former Smoker     Smokeless tobacco: Never Used      Comment: Quit over 20 years ago     Alcohol Use: No     Drug Use: No     Sexual Activity: No     Other Topics Concern     Not on file     Social History Narrative         Current Outpatient Prescriptions   Medication     citalopram (CELEXA) 10 MG tablet     enoxaparin (LOVENOX) 40 MG/0.4ML injection     LORazepam (ATIVAN) 0.5 MG tablet     study - niraparib (IDS# 4759) 100 mg capsule     tamsulosin (FLOMAX) 0.4 MG capsule     LORazepam (ATIVAN) 1 MG tablet     tolterodine (DETROL LA) 4 MG 24 hr capsule     ondansetron (ZOFRAN) 8 MG tablet     ACETAMINOPHEN PO     Multiple Vitamins-Minerals (ONE DAILY MULTIVITAMIN WOMEN) TABS     polyethylene glycol (MIRALAX/GLYCOLAX) powder     cycloSPORINE (RESTASIS) 0.05 % ophthalmic emulsion     Ranitidine HCl (ZANTAC PO)     MELATONIN PO     EPINEPHrine (EPIPEN) 0.3 MG/0.3ML injection     senna-docusate (SENOKOT-S;PERICOLACE) 8.6-50 MG per tablet     Pyridoxine HCl (VITAMIN B6 PO)     prochlorperazine (COMPAZINE) 10 MG tablet     No current facility-administered medications for this visit.        Allergies   Allergen Reactions     Contrast Dye Itching and Swelling     Itching/tingling of mouth and nose with sensation of swelling after receiving IV contrast for CT.  This occurred despite steroid premedication. Therefore the patient should not receive intravenous contrast in the future.      Benadryl Allergy Other (See Comments)     Stay awake, restless, \"uncomfortable\", \"feel like I need to run away\"      Enoxaparin Hives     3/23/16: Okay to receive heparin flushes in port, tolerates well. Patricia EDGARP-C     Enoxaparin Sodium Other (See Comments)     Pt is taking this now.     Amoxicillin Rash     Diphenhydramine Anxiety     No Clinical Screening - See Comments Rash     Pollen Extract Rash     Sulfa Drugs Rash       Physical " Exam:    /84 mmHg  Pulse 101  Temp(Src) 98.1  F (36.7  C) (Oral)  Resp 18  Wt 56.926 kg (125 lb 8 oz)  SpO2 99%    General: Alert non-toxic appearing female in no acute distress  HEENT: Normocephalic, atraumatic;  PERRLA; no scleral icterus; oropharynx pink without lesions; neck supple without lymphadenopathy  Pulmonary: Lungs clear to auscultation, no increased work of breathing noted  Cardiac: regular rate and rhythm, S1S2, grade II murmur, consistent with prior exams  Gastrointestinal: Abdomen soft, non-tender. BS normal. No masses, organomegaly     Performance Status -1    Assessment/Plan:  1) Primary peritoneal cancer: Patient to restart (parp-inhibitor)-niraparib today at reduced dose if Hgb is stable.     2) Reviewed signs and symptoms for when she should contact the clinic or seek additional care, including but not limited to fever, chills, inability to keep down food or fluids, nausea and vomiting not controlled with antiemetics, and diarrhea leading to dehydration.     3) Anemia, resolved s/p transfusion.  Likely related to chemo AE in combination with hemorrhoidal bleeding.  Plan for PRN transfusions in [place] when Hgb <8, as patient becomes very symptomatic.     4) Nausea: Related to pain vs chemotherapy vs disease. Controlled with antiemetics.      Constipation: Improved with daily miralax.  Instructed to monitor for signs of obstruction. Continue low residue diet.    5) Severe hydronephrosis, bilateral stents in place with ongoing irritation/flank pain: follow-up with urology ASAP regarding plan for possible stent exchange.     6) Anxiety: continue ativan, will started celexa.     7) Genetic counseling: Has been done, negative for mutations in the following: BRCA1, BRCA2, EPCAM, MLH1, MSH2, MSH6, PMS2, PTEN and TP53.     8) Health maintenance issues discussed include to follow up with PCP for non-gynecologic issues.  Continue on prophylactic lovenox at 40 SC daily due to history of prior  bleeding on higher dose.     9) Marshall verbalizes understanding of and agreement with plan.    Of a 35 minute appointment, more than 50% was spent in counseling the patient.      Jia Mccollum MD    Department of Ob/Gyn and Women's Health  Division of Gynecologic Oncology  [facility]  [phone]    I saw and evaluated the patient with the resident.  I edited and reviewed the above note.     I served as a scribe for Dr. Mccollum, who formulated the treatment assessment and plan.     Nicole Blanca MD   OB/GYN PGY-2

## 2017-02-02 NOTE — NURSING NOTE
Chief Complaint   Patient presents with     Blood Draw     labs collected from left antecubital by RN and sent, vitals taken and pt checked in for next appt.     Jennifer Farris

## 2017-02-02 NOTE — PROGRESS NOTES
Date of Visit: 02/02/2017    CC: Margaux Guzmán is a 60 year old  female with a history of primary peritoneal cancer presenting today for Cycle 2 Day 1 of Niraparib on Tesaro trial but drug on hold due to anemia.    HPI:  Marshall comes to the clinic feeling. She currently denies any fevers, chills, nausea, vomiting, bleeding, shortness of breath, dysuria, or chest pain.      Brief Oncology History:  The patient is a 58 year old  female who initially presented for evaluation of pelvic and abdominal pain. On exam, she had mild right adnexal tenderness and slightly enlarged right ovary freely mobile and smooth. This prompted an abdominal ultrasound, which was normal, and a pelvic US that showed anechoic right ovarian cyst measuring 4.3 x 3.8 x 4.0 cm. Her  was elevated at 74 on 10/8/13. We reviewed the possible diagnosis including ovarian cancer versus benign ovarian cyst. Approach to surgery, alternatives to surgery and plan for possible extended open surgical staging based on the operative findings was discussed. In light of the size of the ovary we are offering an initial approach with minimally invasive surgery. After discussion of risks and benefits, consent was obtained.  11/7/13 Diagnostic laparoscopy converted to exploratory laparotomy, bilateral salpingo-oophorectomy, total abdominal hysterectomy, omentectomy, staging biopsies, bilateral pelvic and periaortic lymph node dissection and washings. Stage IIIB  12/4/13: Cycle #1 IV/IP  1/2/14: Cycle #2 IV/IP PCP.  - 14  1/23/14: Cycle 3 IV/IP.  - 7  2/11/14:  - 7. CT C/A/P Impression:  1. Multiple bilateral pulmonary nodules as described in full report measuring up to 3 mm along the right major fissure. These are indeterminate given lack of comparison and followup is recommended.  2. No definite evidence for recurrent or metastatic disease in the abdomen or the pelvis. There is a rounded hypodense focus abutting the left external iliac  artery and the adjacent sigmoid colon which measures 1 cm, this could represent a fluid-filled sigmoid diverticulum or a hypodense node, further attention to this area on subsequent examinations is recommended.  3. There is a 7 mm nonobstructing stone in the inferior collecting system of the right kidney.  2/12/14-3/26/14: Cycle #4-6 IV/IP CA-125 7, 7, 7.  4/21/14: CT C/A/P Impression:   1. Unchanged indeterminate pulmonary nodules. Recommend continued surveillance.   2. No definite evidence of metastatic ovarian cancer in the abdomen or pelvis. Small amount of subtle increased thickening and fluid is noted along the right lymph node dissection, nonspecific, but possibly a tiny developing lymphocele.   3. 6 mm nonobstructing stone in the right kidney.  Plan surveillance for ovarian cancer every 3 months with physical and .   Indeterminate pulmonary nodule: These were present before the surgery and there was not change with the chemotherapy. Highly unlikely to be a metastatic disease. Will repeat a CT in 3 months to see any change     5/22/14:  6. JOSEMANUEL.  5/17/14; ED visit [facility]. CT abd/pel noted possible chronic partial small bowel obstruction. Colonscopy scheduled for June 6, 2014 locally. Abdominal pain started 5/17/14 and noted pressure without bowel movement and went to ED that night due to increasing pain. In patient 2 day hospital with clear liquids/IV. Mild nausea without vomiting. BM subsequently occurred and pt was discharged. No pain since. Continues with fullness in abdomen. Diet is bland for the most part.  8/25/14:  -5. Notes increased abdominal girth and bloating x 2-3 weeks with urine urgency. Worried about recurrence. Is just starting to return to normal exercise and activities. No constipation, diarrhea, pain, vaginal or rectal bleeding, cough or dyspnea. CT to follow indeterminate pulmonary nodules today.   CT cap IMPRESSION:   1. No CT scan findings of  the chest, abdomen, or pelvis to indicate metastatic disease.  2. 2-5 mm pulmonary nodules, stable since 2/11/2014.  3. Nonobstructing 6 mm stone in the right kidney.  12/3/14:  8.. Pt started zoloft for anxiety. Worries about cancer recurrence.   3/2/15: CA 34  3/5/15: CT C/A/P: Impression:  1. Multiple new small peritoneal and retroperitoneal nodules are suspicious for metastases. Suspicious new left common iliac and central small bowel mesenteric nodes. No abdominal ascites.  2. Multiple pulmonary nodules are stable with no new nodules.  3. 9 mm nonobstructing right lower pole renal calculus.    5/20/15: CT c/a/p showed nodularity throughout the abdomen and pelvis worrisome for malignancy which has increased in size     5/28/15: CT abdomen and pelvis showed stable inumerabble peritoneal nodules scattered throughout the abdomen and pelvis consistent with metastases.     8/4/15 (approximatly): Left ureteral stent was placed.    8/12/15:  from [place] 303    8/18/15: CT chest Impression showed slight increase in mild, subtle nodularity along the diaphragm which is nonspecific but may represent metastatic disease.   8/18/15: CT abdomen and pelvis Impression showed interval progression of peritoneal metastatic disease.     8/25/15:  on 3/2/15 of 34 prompted CT c/a/p, which showed multiple new small peritoneal and retroperitoneal nodules are suspicious for metastases. Suspicious new left common iliac and central small bowel mesenteric nodes. She participated in [facility] study involving treatment with on clinical trial with vaccine [id number]. She is here today because the [place] trail failed and the pt pregressed. Her most recent CT c/a/p on 8/25 showed progression of peritoneal metastatic disease. And her most recent  from [place] on 8/12/15 was 303.    Plan: Carbo/Doxil x 6 cycles.with imaging after 3 cycles.     9/3/15: Cycle #1 Carbo/Doxil.  244.  9/24/15: right IJ thrombus; started  "on Lovenox.   9/26/15: Present to [place] ED. \"presented to the ED this morning with what appears to be a mild drug rash after administering her lovenox this morning. This writer discussed situation with Gyn Onc Fellow Jeanne Moon as well as [facility] Heme/Onc service. Per Heme, a rash of this nature is extremely uncommon with administration of Lovenox. Given the mild nature of this rash and acute need for anticoagulation, recommended the patient continue with Lovenox injections and treat with Benadryl as needed. Advised patient be given precautions to return to the ED immediately if she develops reactive respiratory symptoms. Alternatively patient could be switched to alternative formulation of low molecular weight heparin if she was strongly resistant to continuation of Lovenox.\"    10/1/15: Cycle #2 Carbo/Doxil.  175.    10/28/15: gyn onc visit: pt complains of worsening constipation and tenderness around her right ribs. She is taking senna for her constipation with minimal improvement. She admits that the swelling around her neck after her blood clot has decreased. She also admits to feeling a nodules on her left abdomen. She states her appetite is good but she has not been eating fruits and vegetables. She denies any chemo side effects besides fatigue.     10/28/15:  158  11/23/2015:  133  CT c/a/p  IMPRESSION: In this patient with a clinical history of ovarian cancer  there is mixed response to therapy:   1. Stable to slightly decreased peritoneal nodularity since  8/18/2015.  2. No significant change regarding the large subdiaphragmatic  peritoneal deposit since 9/24/2015.  3. Enlarging soft tissue nodule overlying the abdominal musculature  concerning for metastasis since March of 2015.   4. Unchanged, indeterminate hypodensity near the gallbladder fossa  since 8/18/2015, however progressively increased in size since  3/5/2015.  5. Bilateral nephroureteral stents. No significant " hydronephrosis.  6. Bilateral pulmonary nodules. Continued followup recommended.    12/23/15: Cycle 5 carboplatin/Doxil/Avastin.  96. To receive Avastin today despite proteinuria per Dr. Aguilar and nephrology.  1/21/16: Cycle 6 carboplatin/Doxil/Avastin, delayed one week secondary to neutropenia.  62.  1/28/16:  63.    2/2/16: Patient had right upper extremity doppler u/s done in [place] due to swollen glands and pain. 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 Pt is currently on lovenox.    2/2/16: US soft tissue neck  Conclusion:  1. Morphologically normal not pathologically enlarged two right carotid chain lymph nodes.  2. Chronic right jugular vein thrombosis, described in detail on  venous doppler exam.     2/2/16: Thyroid Ultrasound  Right lobe: 5.5 x 1.9 x 1.2 cm  Left lobe: 5.0 x 1.5 . 0.9 cm  Both lobes have normal background echotexture.    Conclusion:  1. 3 benign - appearing subcentimeter hypoechoic right mid thyroid nodules.  2. Borderline thyromegaly.    2/2/16: Right upper extremity venous duplex doppler evaluation  Conclusion:  1.) chronic non compressible echogenic right jugular vein thrombus, now 4.3 cm in length.     2/22/16:   IMPRESSION:    1. Ovarian cancer with peritoneal carcinomatosis.  2. Given the increased sensitivity of PET/CT, comparison with prior CT examinations is difficult. In general the abdominal/pelvic metastatic lesions appear to be decreased in size.  3. Persistent right internal jugular DVT, as evidenced by 5 cm filling  defect with inferior border at the central venous catheter.  4. Bilateral hydronephrosis without overt caliectasis. Some distal  migration of the bilateral double-J ureteral stents, although the  proximal coiled portions continue to be in the renal pelves.    2/24/16: C7 carboplatin/Doxil/Avastin.  56.  3/9/16: Hgb  6.6, worked up for transfusion reaction (negative). Bleed possibly secondary to   3/23/16: C8 carboplatin/Doxil/Avastin.  44.  4/2016: Hospitalized in [place] x2 weeks for hematuria leading to anemia after ureteral stent exchange  4/20/16: C9D1 carboplatin/Doxil/Avastin. Deferred one week secondary to acute UTI.  35.  4/28/16: C9D1 deferred due to continued bacteruria and ANC 1.3.  5/4/16: C9D1 carboplatin/Doxil/Avastin. Breast lump noted, diagnostic mammogram ordered.  6/1/16: C10D1 carboplatin/Doxil/Avastin.  50.  6/28/16: C11D1 carboplatin/Doxil/Avastin. Avastin held due to bleeding. Carbo/Doxil deferred one week secondary to neutropenia.  43.  7/7/16: C12D1 carboplatin/Doxil. Avastin held due to bleeding.  7/18/16: Bilateral ureteral stent exchange  7/20/16-7/29/16: Hospitalized with urosepsis and blood loss anemia, started on Zosyn and discharged on amoxicillin  9/29/16: C1D1 Gemzar.  85.  10/21/16: C2D1 Gemzar.  106.   11/11/16: C3D1 Gemzar.  pending.    12/12/16: CT CAP  IMPRESSION: In this patient with a history of metastatic ovarian  cancer:  1. Progression of disease as evidenced by a slowly enlarging  mesenteric and omental soft tissue foci and slowly enlarging  periesophageal and pericardiophrenic lymph nodes, as detailed above.  2. Stable appearance of bilateral ureteral stents without  hydronephrosis.  3. Patient was premedicated for a pre-existing IV contrast allergy.  Despite this, she had itchiness on her face poststudy. She should no  longer receive IV contrast in the future.      1/5/2017-Started Tesaro Trial C1 Day 1, taken off due to bowel obstruction-drug held, restarted 1/19/2017 then anemia and eye issues  1/23/17-Anemia with hgb down to 7.2 received 2 units blood, did not rise appropriately up to only 7.5. On 1/26/17.  1/27/17 admitted to hospital for anemia. Drug had been held. Received additional 2 units in the hospital. Colonoscopy, head imaging  no obvious source of bleeding.   2017: hgb 10.7       Date Value Ref Range Status   2017 165* 0 - 30 U/mL Final     Comment:     Assay Method:  Chemiluminescence using Siemens Centaur XP   12/15/2016 145* 0 - 30 U/mL Final     Comment:     Assay Method:  Chemiluminescence using Siemens Centaur XP   2016 132* 0 - 30 U/mL Final     Comment:     Assay Method:  Chemiluminescence using Siemens Centaur XP   2016 111* 0 - 30 U/mL Final     Comment:     Assay Method:  Chemiluminescence using Siemens Centaur XP   10/21/2016 106* 0 - 30 U/mL Final     Comment:     Assay Method:  Chemiluminescence using Siemens Centaur XP   2016 85* 0 - 30 U/mL Final     Comment:     Assay Method:  Chemiluminescence using Siemens Centaur XP   09/15/2016 70* 0 - 30 U/mL Final     Comment:     Assay Method:  Chemiluminescence using Siemens Centaur XP   2016 38* 0 - 30 U/mL Final     Comment:     Assay Method:  Chemiluminescence using Siemens Centaur XP   2016 43* 0 - 30 U/mL Final   2016 50* 0 - 30 U/mL Final               Past Medical History   Diagnosis Date     Hyperlipidemia      Osteoarthritis      Osteopenia      Polymyalgia rheumatica (H)      Ovarian cancer (H)      Primary cancer of peritoneum (H)      Hydronephrosis      Jugular vein thrombosis, right      Persistent right internal jugular DVT     Livedo annularis      History of blood transfusion      MULTIPLE     PONV (postoperative nausea and vomiting)      Anemia      Past Surgical History   Procedure Laterality Date     Appendectomy        section       Open reduction internal fixation toe(s)  9/3/13     Fifth digit, left foot, with screw fixation      Lymph node biopsy       Left posterior chain, beign     Breast surgery       biopsy negative     Laparoscopic salpingo-oophorectomy  2013     Procedure: LAPAROSCOPIC SALPINGO-OOPHORECTOMY;  Diagnostic Laparoscopy, Exploratory Laparotomy, Bilateral  Salpingo-Oophorectomy,  Hysterectomy, Omentectomy, Pelvic Washings, Peritoneal staging and biopsies, Pelvic and Periaortic Lymph node Dissection;  Surgeon: Cheri Aguilar MD;  Location: [facility] OR     Hysterectomy total abdominal, bilateral salpingo-oophorectomy, node dissection, combined  11/7/2013     Procedure: COMBINED HYSTERECTOMY TOTAL ABDOMINAL, SALPINGO-OOPHORECTOMY, NODE DISSECTION;;  Surgeon: Cheri Aguilar MD;  Location: [facility] OR     Laparotomy, staging, combined  11/7/2013     Procedure: COMBINED LAPAROTOMY, STAGING;;  Surgeon: Cheri Aguilar MD;  Location: [facility] OR     Remove port peritoneal  5/5/2014     Procedure: REMOVE PORT PERITONEAL;  Surgeon: Cheri Aguilar MD;  Location: [facility] OR     Combined cystoscopy, retrogrades, exchange stent ureter(s) Bilateral 7/18/2016     Procedure: COMBINED CYSTOSCOPY, RETROGRADES, EXCHANGE STENT URETER(S);  Surgeon: Carmela Alvarez MD;  Location: [facility] OR     Combined cystoscopy, retrogrades, exchange stent ureter(s)  4/2016     @ [facility]     Combined cystoscopy, retrogrades, exchange stent ureter(s) Bilateral 10/17/2016     Procedure: COMBINED CYSTOSCOPY, RETROGRADES, EXCHANGE STENT URETER(S);  Surgeon: Carmela Alvarez MD;  Location: [facility]     Combined cystoscopy, retrogrades, exchange stent ureter(s) Bilateral 12/7/2016     Procedure: COMBINED CYSTOSCOPY, RETROGRADES, EXCHANGE STENT URETER(S);  Surgeon: Carmela Alvarez MD;  [facility]: [facility]     Colonoscopy N/A 1/28/2017     Procedure: COLONOSCOPY;  Surgeon: Luis Richardson MD;  Location: [facility]     Esophagoscopy, gastroscopy, duodenoscopy (egd), combined N/A 1/28/2017     Procedure: COMBINED ESOPHAGOSCOPY, GASTROSCOPY, DUODENOSCOPY (EGD);  Surgeon: Luis Richardson MD;  Location: [facility]     Colonoscopy N/A 1/28/2017     Procedure: COMBINED COLONOSCOPY, SINGLE OR MULTIPLE BIOPSY/POLYPECTOMY BY BIOPSY;  Surgeon: Luis Richardson MD;  Location: [facility]     Family History   Problem Relation Age  of Onset     Arthritis Father      DIABETES       Uncle     Hypertension Mother      Hypertension Sister      Myocardial Infarction Father      secondary to anesthesia     HEART DISEASE       unknown valve replacement     Colon Cancer Father      Other - See Comments Mother      cognitive decline     Bladder Cancer Sister      Skin Cancer Brother      Skin Cancer Mother      Social History     Social History     Marital Status:      Spouse Name: N/A     Number of Children: N/A     Years of Education: N/A     Social History Main Topics     Smoking status: Former Smoker     Smokeless tobacco: Never Used      Comment: Quit over 20 years ago     Alcohol Use: No     Drug Use: No     Sexual Activity: No     Other Topics Concern     Not on file     Social History Narrative         Current Outpatient Prescriptions   Medication     enoxaparin (LOVENOX) 40 MG/0.4ML injection     LORazepam (ATIVAN) 0.5 MG tablet     study - niraparib (IDS# 4759) 100 mg capsule     dexamethasone (DECADRON) 4 MG tablet     tamsulosin (FLOMAX) 0.4 MG capsule     LORazepam (ATIVAN) 1 MG tablet     tolterodine (DETROL LA) 4 MG 24 hr capsule     ondansetron (ZOFRAN) 8 MG tablet     ACETAMINOPHEN PO     Multiple Vitamins-Minerals (ONE DAILY MULTIVITAMIN WOMEN) TABS     polyethylene glycol (MIRALAX/GLYCOLAX) powder     cycloSPORINE (RESTASIS) 0.05 % ophthalmic emulsion     Ranitidine HCl (ZANTAC PO)     MELATONIN PO     EPINEPHrine (EPIPEN) 0.3 MG/0.3ML injection     senna-docusate (SENOKOT-S;PERICOLACE) 8.6-50 MG per tablet     Pyridoxine HCl (VITAMIN B6 PO)     prochlorperazine (COMPAZINE) 10 MG tablet     No current facility-administered medications for this visit.        Allergies   Allergen Reactions     Contrast Dye Itching and Swelling     Itching/tingling of mouth and nose with sensation of swelling after receiving IV contrast for CT.  This occurred despite steroid premedication. Therefore the patient should not receive intravenous  "contrast in the future.      Benadryl Allergy Other (See Comments)     Stay awake, restless, \"uncomfortable\", \"feel like I need to run away\"      Enoxaparin Hives     3/23/16: Okay to receive heparin flushes in port, tolerates well. Patricia Cortez FNP-C     Enoxaparin Sodium Other (See Comments)     Pt is taking this now.     Amoxicillin Rash     Diphenhydramine Anxiety     No Clinical Screening - See Comments Rash     Pollen Extract Rash     Sulfa Drugs Rash       Review of Systems      Answers for HPI/ROS submitted by the patient on 1/23/2017   General Symptoms: No  Skin Symptoms: No  HENT Symptoms: No  EYE SYMPTOMS: No  HEART SYMPTOMS: Yes  LUNG SYMPTOMS: No  INTESTINAL SYMPTOMS: Yes  URINARY SYMPTOMS: Yes  GYNECOLOGIC SYMPTOMS: No  BREAST SYMPTOMS: No  SKELETAL SYMPTOMS: No  BLOOD SYMPTOMS: No  NERVOUS SYSTEM SYMPTOMS: Yes  MENTAL HEALTH SYMPTOMS: Yes  Chest pain or pressure: No  Fast or irregular heartbeat: Yes  Pain in legs with walking: No  Swelling in feet or ankles: No  Trouble breathing while lying down: No  Fingers or Toes appear blue: No  High blood pressure: Yes  Low blood pressure: No  Fainting: No  Murmurs: No  Chest pain on exertion: No  Chest pain at rest: No  Cramping pain in leg during exercise: No  Pacemaker: No  Varicose veins: No  Edema or swelling: No  Fast heart beat: Yes  Wake up at night with shortness of breath: No  Heart flutters: Yes  Light-headedness: Yes  Exercise intolerance: Yes  Heart burn or indigestion: No  Nausea: Yes  Vomiting: Yes  Abdominal pain: Yes  Bloating: Yes  Constipation: Yes  Diarrhea: No  Blood in stool: No  Black stools: No  Rectal or Anal pain: No  Fecal incontinence: Yes  Rectal bleeding: No  Yellowing of skin or eyes: No  Vomit with blood: No  Change in stools: No  Hemorrhoids: Yes  Trouble holding urine or incontinence: Yes  Pain or burning: No  Trouble starting or stopping: No  Increased frequency of urination: Yes  Blood in urine: Yes  Decreased frequency of " urination: No  Frequent nighttime urination: No  Flank pain: Yes  Difficulty emptying bladder: No  Trouble with coordination: No  Dizziness or trouble with balance: Yes  Fainting or black-out spells: No  Memory loss: Yes  Headache: No  Seizures: No  Speech problems: No  Tingling: No  Tremor: No  Weakness: No  Difficulty walking: No  Paralysis: No  Numbness: No  Nervous or Anxious: No  Depression: No  Trouble sleeping: Yes  Trouble thinking or concentrating: No  Mood changes: No  Panic attacks: No            Physical Exam:    There were no vitals taken for this visit.    General: Alert non-toxic appearing female in no acute distress  HEENT: Normocephalic, atraumatic; PERRLA; no scleral icterus; oropharynx pink without lesions; neck supple without lymphadenopathy  Pulmonary: Lungs clear to auscultation, no increased work of breathing noted  Cardiac: Tachycardic, regular rhythm, S1S2, grade II murmur best auscultated over aorta; no clicks, rubs, or gallops; bilateral lower extremities without edema, dorsalis pedis pulses 2+ bilaterally  GI: Normoactive bowel sounds x4 quadrants, abdomen soft, non-distended, and non-tender to palpation without masses or organomegaly  Lymph: No cervical, supraclavicular, or inguinal lymphadenopathy  Neuro: Grossly intact, normal gait  Skin: Mottled appearing hyperpigmentation to left lower back, no apparent rash or lesions; appropriate color for race, skin warm  Psych: Pleasant and interactive, affect bright but appropriately tearful when discussing her emotions about the election, makes appropriate eye contact, thought process linear         Assessment/Plan:  1) Primary peritoneal cancer: OnTesaro trial (parp-inhibitor). Cycle 2 Day 1 due today, niraparib on hold due to anemia-work did not find obvious etiology for anemia. Hgb has now risen appropriately. Eye changes also noted, saw eye doctor on Tuesday 1/31/17. 2) Reviewed signs and symptoms for when she should contact the clinic or  seek additional care, including but not limited to fever, chills, inability to keep down food or fluids, nausea and vomiting not controlled with antiemetics, and diarrhea leading to dehydration.   3) Nausea: Related to pain vs chemotherapy vs disease. Continue with Zofran as this is helpful for her.  4) Genetic counseling: Has been done, negative for mutations in the following: BRCA1, BRCA2, EPCAM, MLH1, MSH2, MSH6, PMS2, PTEN and TP53.   5) Health maintenance issues discussed include to follow up with PCP for non-gynecologic issues   6) Marshall verbalizes understanding of and agreement with plan.    Of a 30 minute appointment, more than 50% was spent in counseling the patient.      Jia Mccollum MD    Department of Ob/Gyn and Women's Health  Division of Gynecologic Oncology  [facility]  [phone]    I have reviewed the above note and agree with the scribe's notation as written.    I, Kolton Spencer, am serving as a scribe to document services personally performed by Jia Mccollum MD, based upon my observations and the provider's statements to me. All documentation has been reviewed by the aforementioned doctor prior to being entered into the official medical record.

## 2017-02-02 NOTE — MR AVS SNAPSHOT
After Visit Summary   2/2/2017    Margaux Guzmán    MRN: [id number]           Patient Information     Date Of Birth          1955        Visit Information        Provider Department      2/2/2017 9:40 AM Jia Mccollum MD [facility]        Today's Diagnoses     Anemia due to other cause    -  1    Chemotherapy-induced neutropenia (H)        Ovarian cancer, left (H)        Ovarian cancer, right (H)        Primary peritoneal adenocarcinoma (H)        Adjustment disorder with anxious mood           Follow-ups after your visit        Your next 10 appointments already scheduled     Feb 17, 2017  5:45 PM CST   (Arrive by 5:30 PM)   Return Visit with Carmela Alvarez MD   [facility] ([facility])    [address] [place] [address]  4th Floor  [facility] [id number] [phone]            Feb 28, 2017  9:20 AM CST   (Arrive by 9:05 AM)   CT CHEST/ABDOMEN/PELVIS W CONTRAST with UCCT1   [facility] ([facility])    [address] [place] [address]  1st [facility] [id number] [phone]           Please bring any scans or X-rays taken at other hospitals, if similar tests were done. Also bring a list of your medicines, including vitamins, minerals and over-the-counter drugs. It is safest to leave personal items at home.  Be sure to tell your doctor:   If you have any allergies.   If there s any chance you are pregnant.   If you are breastfeeding.   If you have any special needs.  You may have contrast for this exam. To prepare:   Do not eat or drink for 2 hours before your exam. If you need to take medicine, you may take it with small sips of water. (We may ask you to take liquid medicine as well.)   The day before your exam, drink extra fluids at least six 8-ounce glasses (unless your doctor tells you to restrict your fluids).  Patients over 70 or patients with  diabetes or kidney problems:   If you haven t had a blood test (creatinine test) within the last 30 days, go to your clinic or Diagnostic Imaging Department for this test.  If you have diabetes:   If your kidney function is normal, continue taking your metformin (Avandamet, Glucophage, Glucovance, Metaglip) on the day of your exam.   If your kidney function is abnormal, wait 48 hours before restarting this medicine.  You will have oral contrast for this exam:   You will drink the contrast at home. Get this from your clinic or Diagnostic Imaging Department. Please follow the directions given.  Please wear loose clothing, such as a sweat suit or jogging clothes. Avoid snaps, zippers and other metal. We may ask you to undress and put on a hospital gown.  If you have any questions, please call the Imaging Department where you will have your exam.            Feb 28, 2017  9:45 AM CST   [facility] Lab Draw with  [facility] LAB DRAW   [facility] Draw ([facility])    [address] [place] [address]  2nd [facility] [id number] [phone]            Feb 28, 2017  2:00 PM CST   (Arrive by 1:45 PM)   Return Visit with Jia Mccollum MD   [facility] ([facility])    [address] [place] [address]  2nd [facility] [id number] [phone]            Apr 12, 2017 10:00 AM CDT   Lab with  LAB   [facility] ([facility])    [address] [place] [address]  1st [facility] [address]   [phone]            Apr 12, 2017 10:20 AM CDT   (Arrive by 9:50 AM)   Return Visit with Mackenzie Ca MD   [facility] ([facility])    [address] [place] [address]  3rd [facility] [id number] [phone]              Who to contact     If you have questions or need follow up information about today's clinic visit or your schedule please contact [facility] directly at  [phone].  Normal or non-critical lab and imaging results will be communicated to you by Hotlisthart, letter or phone within 4 business days after the clinic has received the results. If you do not hear from us within 7 days, please contact the clinic through [email or web address] or phone. If you have a critical or abnormal lab result, we will notify you by phone as soon as possible.  Submit refill requests through [email or web address] or call your pharmacy and they will forward the refill request to us. Please allow 3 business days for your refill to be completed.          Additional Information About Your Visit        Hotlisthart Information     [email or web address] gives you secure access to your electronic health record. If you see a primary care provider, you can also send messages to your care team and make appointments. If you have questions, please call your primary care clinic.  If you do not have a primary care provider, please call [phone] and they will assist you.        Care EveryWhere ID     This is your Care EveryWhere ID. This could be used by other organizations to access your [place] medical records  [id number]        Your Vitals Were     Pulse Temperature Respirations Pulse Oximetry BMI (Body Mass Index)       101 98.1  F (36.7  C) (Oral) 18 99% 20.26 kg/m2        Blood Pressure from Last 3 Encounters:   02/02/17 146/84   02/02/17 146/84   01/29/17 158/64    Weight from Last 3 Encounters:   02/02/17 56.9 kg (125 lb 8 oz)   02/02/17 56.9 kg (125 lb 8 oz)   01/27/17 58.5 kg (128 lb 14.4 oz)              We Performed the Following     Amylase          CBC with platelets differential     Comprehensive metabolic panel     EKG 12-lead, complete     GGT     Lactate Dehydrogenase     Magnesium     MD Instruction for Protocol     Nursing Communication 1     Routine UA with microscopic     Treatment Conditions     Vital signs     Weigh patient          Today's Medication Changes          These changes are accurate as of: 2/2/17 11:59  PM.  If you have any questions, ask your nurse or doctor.               Start taking these medicines.        Dose/Directions    citalopram 10 MG tablet   Commonly known as:  celeXA   Used for:  Adjustment disorder with anxious mood, Anemia due to other cause, Chemotherapy-induced neutropenia (H), Ovarian cancer, left (H), Ovarian cancer, right (H), Primary peritoneal adenocarcinoma (H)   Started by:  Jia Mccollum MD        Dose:  10 mg   Take 1 tablet (10 mg) by mouth daily   Quantity:  90 tablet   Refills:  1         These medicines have changed or have updated prescriptions.        Dose/Directions    * study - niraparib 100 mg capsule   Commonly known as:  IDS# 4759   This may have changed:  Another medication with the same name was added. Make sure you understand how and when to take each.   Used for:  Anemia due to other cause, Chemotherapy-induced neutropenia (H), Ovarian cancer, left (H), Ovarian cancer, right (H), Primary peritoneal adenocarcinoma (H)   Changed by:  Jia Mccollum MD        Dose:  300 mg   Take 3 capsules (300 mg) by mouth every morning Take at the same time each day, preferably morning. Swallow whole and do NOT chew capsules. Food and water is permissible. First dose will be administered on site.   Quantity:  84 capsule   Refills:  0       * study - niraparib 100 mg capsule   Commonly known as:  IDS# [id number]   This may have changed:  You were already taking a medication with the same name, and this prescription was added. Make sure you understand how and when to take each.   Used for:  Anemia due to other cause, Chemotherapy-induced neutropenia (H), Ovarian cancer, left (H), Ovarian cancer, right (H), Primary peritoneal adenocarcinoma (H), Adjustment disorder with anxious mood   Changed by:  Jia Mccollum MD        Dose:  200 mg   Take 2 capsules (200 mg) by mouth every morning Take at the same time each day, preferably morning. Swallow whole and do NOT chew capsules. Food  and water is permissible. First dose will be administered on site.   Quantity:  84 capsule   Refills:  0       * Notice:  This list has 2 medication(s) that are the same as other medications prescribed for you. Read the directions carefully, and ask your doctor or other care provider to review them with you.         Where to get your medicines      These medications were sent to [facility] - [place], [place] - [address]  [address], [facility] [address]    Hours:  TRANSPLANT PHONE NUMBER [phone] Phone:  [phone]     citalopram 10 MG tablet         Some of these will need a paper prescription and others can be bought over the counter.  Ask your nurse if you have questions.     Bring a paper prescription for each of these medications     study - niraparib 100 mg capsule                Primary Care Provider Office Phone # Fax #    Aurelia Knox [phone]       [facility] [address] [address]        Thank you!     Thank you for choosing [facility]  for your care. Our goal is always to provide you with excellent care. Hearing back from our patients is one way we can continue to improve our services. Please take a few minutes to complete the written survey that you may receive in the mail after your visit with us. Thank you!             Your Updated Medication List - Protect others around you: Learn how to safely use, store and throw away your medicines at www.disposemymeds.org.          This list is accurate as of: 2/2/17 11:59 PM.  Always use your most recent med list.                   Brand Name Dispense Instructions for use    ACETAMINOPHEN PO      Take 500 mg by mouth every 6 hours as needed for pain       citalopram 10 MG tablet    celeXA    90 tablet    Take 1 tablet (10 mg) by mouth daily       cycloSPORINE 0.05 % ophthalmic emulsion    RESTASIS     Place 1 drop into both eyes 2  times daily       enoxaparin 40 MG/0.4ML injection    LOVENOX    30 Syringe    Inject 0.4 mLs (40 mg) Subcutaneous daily       EPINEPHrine 0.3 MG/0.3ML injection     1 each    Inject 0.3 mLs (0.3 mg) into the muscle once as needed for anaphylaxis       * LORazepam 1 MG tablet    ATIVAN    30 tablet    Take 1 tablet (1 mg) by mouth every 6 hours as needed (nausea/vomiting, anxiety or sleep)       * LORazepam 0.5 MG tablet    ATIVAN    30 tablet    Take 1 tablet (0.5 mg) by mouth every 4 hours as needed (Anxiety, Nausea/Vomiting or Sleep)       MELATONIN PO      Take 1 mg by mouth At Bedtime       ondansetron 8 MG tablet    ZOFRAN    30 tablet    Take 1 tablet (8 mg) by mouth every 8 hours as needed for nausea       ONE DAILY MULTIVITAMIN WOMEN Tabs      Take 1 tablet by mouth every morning       polyethylene glycol powder    MIRALAX/GLYCOLAX     Take 1 capful by mouth daily as needed       prochlorperazine 10 MG tablet    COMPAZINE    30 tablet    Take 1 tablet (10 mg) by mouth every 6 hours as needed (nausea/vomiting)       senna-docusate 8.6-50 MG per tablet    SENOKOT-S;PERICOLACE    7 tablet    Take 1-2 tablets by mouth 2 times daily       * study - niraparib 100 mg capsule    IDS# [id number]    84 capsule    Take 3 capsules (300 mg) by mouth every morning Take at the same time each day, preferably morning. Swallow whole and do NOT chew capsules. Food and water is permissible. First dose will be administered on site.       * study - niraparib 100 mg capsule    IDS# [id number]    84 capsule    Take 2 capsules (200 mg) by mouth every morning Take at the same time each day, preferably morning. Swallow whole and do NOT chew capsules. Food and water is permissible. First dose will be administered on site.       tamsulosin 0.4 MG capsule    FLOMAX    60 capsule    Take 1 capsule (0.4 mg) by mouth daily       tolterodine 4 MG 24 hr capsule    DETROL LA    30 capsule    Take 1 capsule (4 mg) by mouth daily       VITAMIN B6 PO       Take 1 tablet by mouth At Bedtime       ZANTAC PO      Take 75 mg by mouth daily       * Notice:  This list has 4 medication(s) that are the same as other medications prescribed for you. Read the directions carefully, and ask your doctor or other care provider to review them with you.

## 2017-02-02 NOTE — NURSING NOTE
"Margaux Guzmán is a 61 year old female who presents for:  Chief Complaint   Patient presents with     Oncology Clinic Visit     follow up for study        Initial Vitals:  /84 mmHg  Pulse 101  Temp(Src) 98.1  F (36.7  C) (Oral)  Resp 18  Wt 56.926 kg (125 lb 8 oz)  SpO2 99% Estimated body mass index is 20.27 kg/(m^2) as calculated from the following:    Height as of 1/27/17: 1.676 m (5' 6\").    Weight as of this encounter: 56.926 kg (125 lb 8 oz).. There is no height on file to calculate BSA. BP completed using cuff size: regular  Data Unavailable No LMP recorded. Patient has had a hysterectomy. Allergies and medications reviewed.     Medications: Medication refills not needed today.  Pharmacy name entered into [employer]:    [id number] - [place], [place] - [address] AT [facility] [address]  [id number] - [place] - [address]  [facility] [place], [place] - [address] [facility] [place], [place] - [phone]  [place], [place] - [address] [place] [address] [facility]ARMACY #[id number] - [place] - [address] [facility]    Comments:     6 minutes for nursing intake (face to face time)   Sonia Walters CMA          "

## 2017-02-09 NOTE — PROGRESS NOTES
Quick Note:    Reviewed done in [employer] and  Blued had given her the results..  These look great.  Jessica TIMMONS RN, OCN  Care Coordinator   Gynecologic Cancer   Office [phone]  Pager [phone]  ______

## 2017-02-10 NOTE — PROGRESS NOTES
Patient had repeat labs drawn in [place] for the Tesaro/Quadra Niraparib study.  Labs reviewed and ok to continue study medication at current dose. Spoke with patient regarding eye issues being related to the study drug.  Patient told that the study monitors were here last week and were asked about any AE's related to eye issues at other sites. The monitors stated that they have not seen any other AE's related to eye problems.  Writer told patient that she will speak to Dr. Mccollum again in Monday regarding her opinion and call her back early next week.   Patient voiced understanding and has no other issues or concerns at this time.      Aurelia Worley RN     Pager [phone]

## 2017-02-13 NOTE — TELEPHONE ENCOUNTER
Message sent to Dr. Payton:    Dr. Alvarez,  Pt comiong to see you on Fri and would like to know:    1. Does she need to see you? She was told her hydro is not bad and nothing needs to be done at this time. Please advise.   2. If you do need to see her, do you need anything prior tp the appt? Renal US?     Thank you  Luz Hansen RN

## 2017-02-13 NOTE — TELEPHONE ENCOUNTER
Per Dr. Alvarez:    If she is feeling better she doesn't have to come see me. She can get a renal u/s and then we can plan on stent exchange on her usual schedule. She lives in [place] but I would want to see the actual pictures from the u/s not just a report.     Called pt and left her a detailed message. Will try again later. Asked pt to call us back to confirm what she decided to do.     Luz Hansen RN

## 2017-02-14 NOTE — TELEPHONE ENCOUNTER
"Received VM from patient asking for some recommendations for pain control medication. She reports that she has been using only tylenol for flank pain control, but now is having \"cancer related pain\" and is asking for suggestions from our team. Called patient back to discuss further -- but was unable to reach her. Left her a VM for a call back when she is able.  "

## 2017-02-14 NOTE — TELEPHONE ENCOUNTER
"Received call back from patient. She tells me that overall she thinks her pain is worsening. She notes that tylenol used to be effective for her flank pain, but it really isn't anymore. She tells me that in addition to her flank pain she is having increased pain in her abdomen -- she tells me that she can specifically pin point the spots and they correlate with the spots where she knows she has cancer. She described the pain as sharp and localized, she tells me that she can't really remember if the pain comes and goes or if it is constant. She tells me that she thinks it varies. She notes that her pain kept her awake all night a few nights ago and she now has a hard time finding a comfortable position. She reports that she really wishes she could take ibuprofen as in the past that was really effective, however she is not able to due to taking blood thinners already. She notes that she is extremely sensitive to moved medications and \"absolutely must be able to drive\" so is reluctant to try opiate meds. Advised I would get all this info to the MDs for recommendations, she was okay with this plan.  "

## 2017-02-15 NOTE — TELEPHONE ENCOUNTER
"Called to discuss pain.   Flank pain (almost constant), pain cross back, pain where she knows she has tumor.  Also intermittent tightness under sternum/upper abdomen.  She remains concerned that pain medications have made her very fatigued and have not helped her pain in the past.  She still has bottles of oxycodone 5mg, hydrocodone-APAP, percocet, tramadol.     The only thing that helped the pain was Tylenol TID which is no longer working and Dexamethasone only worked for about 3 days, but made her feel like she was climbing the walls, so she stopped/decrease dose.     Has not been on morphine. (Had been on dilaudid in hospital, but didn't think it helped and made her feel bad.)  She re-mentioned that her old oncologist mentioned that later she could use a pain patch if she needed.  She wonders if this is an option.  Discussed slow on and slow off of patch and constant medication is not what she is seeking at this time.  No guarantee that the fentanyl patch will be less sedating for her.  She may need a patch at some point, but I worry that the patch would be less predictable for her at this time due to her sensitivities to medications.   She has been resistant to opioid medications due to the lack of efficacy and fatigue.  She is very independent and is worried about cognitive impairment of medications.  We discussed that side-effects and drowsiness often improve after the first few days of the medicine.  Attempted to problem solve concern of being impaired at home alone by having someone stay with her for the first few days of a new medicine.  She could not come up with one person that she could have stay with her or that she could stay with for a few days to allay fears.  When I asked about her daughter, she states that her daughter is overwhelmed with her new job and stress.  \"Her head is ready to explode.\"  There's no way she would stay with me.  \"She's weird like that.\"  She is not willing to admit that she " needs help and doesn't want to reach out for help at this time.  She states that her daughter and one friend usually call her daily, so they could help make sure she was safe and she could call them if she didn't feel well.  Will plan not to take walks in areas without cell service until pain medicine effects known to her.  No driving for a few days on new medicine.   Currently taking Ativan nightly for sleep due to anxiety while on Tesaro/Quadra Niraparib study.  Discussed not taking Ativan and Morphine at the same time until she knows how the morphine works with her body. She could trial taking the morphine instead of the ativan QHS.   Mailing Rx for Morphine 1mg liquid dosing, instructions for dispense with syringe.  Discussed that liquid medication may need to be ordered by [facility] as it is often not stocked at every pharmacy.  Plan to trial 1mg dosing and discussed that we would titrate medication to effect.

## 2017-02-20 NOTE — TELEPHONE ENCOUNTER
Patient called regarding renal ultrasound results.  Received results and sent message to barbra to call patient. Meg Leach LPN Staff Nurse

## 2017-02-20 NOTE — TELEPHONE ENCOUNTER
Last refill was 11/*23/16  #30 with 3 refills  Spoke with pharmacy and they did not have the refills on the RX from 11/23/16.  Pt last fill date was 11/23/16  The additional 3 refills were added and RX will be filled  Pt notified

## 2017-02-20 NOTE — TELEPHONE ENCOUNTER
Message from Tonia:  Original authorizing provider: JUSTIN San CNP would like a refill of the following medications:  LORazepam (ATIVAN) 1 MG tablet [JUSTIN San CNP]    Preferred pharmacy:  [id number] - [place], [address]    Comment:  could I get a lorazepam refill sent to [facility] pharmacy in [place], please? Thanks!

## 2017-02-20 NOTE — TELEPHONE ENCOUNTER
----- Message from Carmela Alvarez MD sent at 2/20/2017 12:45 PM CST -----  Contact: [phone]  Right side is basically non-functioning so that's okay to have some hydronephrosis there. Left side with some hydronephrosis. I'm not too worried if she isn't have lots of symptoms.   She should get on the schedule for stent exchange in the near future though.     MAO   ----- Message -----     From: Jhonny Leach LPN     Sent: 2/20/2017   9:32 AM       To: Carmela Alvarez MD    Please look at recent renal ultrasound I had it pushed to her chart. Looks like hydronephrosis present with stents in thanks jhonny

## 2017-02-23 NOTE — TELEPHONE ENCOUNTER
PA Initiation    Medication: enoxaparin Sodium   Insurance Company:  [employer] Filling the Rx: [facility]    Filling Pharmacy Phone:  [phone]  Filling Pharmacy Fax:  [phone]    LILIANA MCKEON ([place]: [id number])  Enoxaparin Sodium 40MG/0.4ML solution  Status: Sent to Plan via [facility]  Author  [facility]  Sent: February 23rd, 2017    Form: [employer] Prior Authorization Form for Specialty Medication Prescription  [phone]  [phone]    UPDATE 2/27/17    Prior Authorization Approval    Authorization Effective Date:  2/24/2017  Authorization Expiration Date:  2/23/2018  Medication: enoxaparin Sodium APPROVED   Which Pharmacy is filling the prescription (Not needed for infusion/clinic administered):    Pharmacy Notified: [facility]  Via fax

## 2017-02-27 NOTE — DISCHARGE INSTRUCTIONS
[facility]  Home Care Following Anesthesia  For 24 hours after surgery:  1. Get plenty of rest.  A responsible adult must stay with you for at least 24 hours after you leave the surgery center.  2. Do not drive or use heavy equipment.  If you have weakness or tingling, don't drive or use heavy equipment until this feeling goes away.   3. Do not drink alcohol.   4. Avoid strenuous or risky activities.  Ask for help when climbing stairs.  5. You may feel lightheaded.  IF so, sit for a few minutes before standing.  Have someone help you get up.   6. If you have nausea (feel sick to your stomach): Drink only clear liquids such as apple juice, ginger ale, broth or 7-Up.  Rest may also help.  Be sure to drink enough fluids.  Move to a regular diet as you feel able.   7. You may have a slight fever.  Call the doctor if your fever is over 100 F (37.7 C) (taken under the tongue) or lasts longer than 24 hours.  8. You may have a dry mouth, a sore throat, muscle aches or trouble sleeping. These should go away after 24 hours.  9. Do not make important or legal decisions.     Tips for taking pain medications  To get the best pain relief possible, remember these points:    Take pain medications as directed, before pain becomes severe.    Pain medication can upset your stomach: taking it with food may help.    Constipation is a common side effect of pain medication. Drink plenty of  fluids.    Eat foods high in fiber. Take a stool softener if recommended by your doctor or pharmacist.    Do not drink alcohol, drive or operate machinery while taking pain medications.    Ask about other ways to control pain, such as with heat, ice or relaxation.    Call a doctor for any of the followin. Signs of infection (fever, growing tenderness at the surgery site, a large amount of drainage or bleeding, severe pain, foul-smelling drainage, redness, swelling).  2. It has been over 8 to 10 hours since  surgery and you are still not able to urinate (pass water).  3. Headache for over 24 hours.  Your doctor is:  Dr. Carmela Alvarez, Prostate and Urology: [phone]               Or dial [phone] and ask for the resident on call for:  Prostate Urology  For emergency care, call the:  [facility]:  [phone] (TTY for hearing impaired: [phone])  Discharge Instructions Following a Cystoscopy, Stent and/or Ureteroscopy    Drainage/Urination:    Urine may be slightly bloody but should taper off in a few days.    You may have some frequency and urgency for a few days.    Comfort:    A burning sensation when urinating is common. Drinking extra fluids helps decrease this burning feeling and also helps to clear the bloody urine.    Mild abdominal cramps may occur for a few days.    Baths:    Daily tub baths aide in passing urine.    Frequent use of bubble bath is discouraged since it encourages urinary tract infections.    Report to your doctor at once if you experience:    Inability to pass your urine    Continuous or heavy bleeding    Severe Pain    Elevated temperature > than 101.5 for 24 hours    Home Activity:    On the day of surgery spend a quiet evening at home.    Increase activity as tolerated.      Scopolamine Patch- (Absorbed through the skin)    This medicine prevents nausea and vomiting caused by motion sickness or anesthesia.  The medicine is in a patch worn behind the ear.      Do NOT use the Scopolamine Patch if you have glaucoma or are allergic to scopolamine.    How to Use This Medicine:    The patch is applied behind the ear.    Keep the patch dry to prevent it from falling off.  Limit contact with water (no bathing or swimming).      If the patch is loose or falls off throw it away.  You do not need to apply a new patch.    After you take off the patch or if it falls off, wash your hands and the area behind your ear with soap and water.      You can remove the patch tomorrow,  or leave on for up to 3 days.    Only one patch should be used at any time.    How to Dispose of This Medicine:    Fold the used patch in half with the sticky sides together. Throw any used patch away so that children or pets cannot get to it. You will also need to throw away old patches after the expiration date has passed.    Keep all medicine away from children and never share your medicine with anyone.    Warnings While Using This Medicine:    This medicine can make you sleepy.  Avoid taking sleeping pills and other medicines that can make you sleepy while the patch is on.    Do not drink alcohol while the patch is on.    This medicine can cause temporary blurring and other vision problems if it comes in contact with the eyes.  This is not serious unless accompanied by eye pain and redness.     This medicine may cause problems with urination. If you have problems with urinating, remove the patch.  If you are unable to urinate, call your doctor.      This medicine may make you dizzy or drowsy. Avoid driving, using machines, or doing anything else that could be dangerous if the patch is on.    This medicine may make you sweat less and cause your body to get too hot. Be careful in hot weather or if you are exercising.    Make sure any doctor or dentist who treats you knows that you have the patch on. This medicine may affect the results of certain medical tests.    Skin burns have been reported at the patch site in several patients wearing an aluminized transdermal system during a magnetic resonance imaging scan (MRI).  Since this patch contains aluminum, it is recommended to remove the patch if you are having an MRI.    Possible Side Effects While Using This Medicine:    Dry mouth    Drowsiness    Temporary blurring of vision and widening of the pupils    Call your doctor right away if you notice any of these side effects:    Allergic reaction: Itching or hives, swelling in your face or hands, swelling or tingling  in your mouth or throat, chest tightness, trouble breathing.    Blurred vision that does not go away after the patch is removed    Confusion or memory loss    Fast,slow, or uneven heartbeat    Lightheadedness, dizziness, drowsiness, or fainting    Seeing, hearing, or feeling things that are not there    Restlessness    Severe eye pain    Trouble urinating    If you notice other side effects that you think are caused by this medicine, call your doctor immediately.

## 2017-02-27 NOTE — IP AVS SNAPSHOT
[facility]    [address] [place] [id number]    Phone:  [phone]    Fax:  [phone]                                       After Visit Summary   2/27/2017    Margaux Guzmán    MRN: [id number]           After Visit Summary Signature Page     I have received my discharge instructions, and my questions have been answered. I have discussed any challenges I see with this plan with the nurse or doctor.    ..........................................................................................................................................  Patient/Patient Representative Signature      ..........................................................................................................................................  Patient Representative Print Name and Relationship to Patient    ..................................................               ................................................  Date                                            Time    ..........................................................................................................................................  Reviewed by Signature/Title    ...................................................              ..............................................  Date                                                            Time

## 2017-02-27 NOTE — IP AVS SNAPSHOT
MRN:[id number]                      After Visit Summary   2/27/2017    Margaux Guzmán    MRN: [id number]           Thank you!     Thank you for choosing [facility] for your care. Our goal is always to provide you with excellent care. Hearing back from our patients is one way we can continue to improve our services. Please take a few minutes to complete the written survey that you may receive in the mail after you visit with us. Thank you!        Patient Information     Date Of Birth          1955        About your hospital stay     You were admitted on:  February 27, 2017 You last received care in the:  [facility]    You were discharged on:  February 27, 2017       Who to Call     For medical emergencies, please call 911.  For non-urgent questions about your medical care, please call your primary care provider or clinic, [phone]  For questions related to your surgery, please call your surgery clinic        Attending Provider     Provider Carmela Hull MD Urology       Primary Care Provider Office Phone # Fax #    Aurelia L Abilio [phone]       [facility] [address] [place] [address]        After Care Instructions     Diet Instructions       Resume pre procedure diet            Encourage fluids       Encourage fluids at home to keep urine clear to light pink            No Alcohol       for 24 hours post procedure            No driving or operating machinery        until the day after procedure            No lifting over 15 pounds and no strenuous physical activity       for 2 weeks                  Your next 10 appointments already scheduled     Feb 28, 2017  9:20 AM CST   (Arrive by 9:05 AM)   CT CHEST/ABDOMEN/PELVIS W CONTRAST with UCCT1   [facility] ([facility])    [address]  1st Floor  [facility] [address] [phone]           Please  bring any scans or X-rays taken at other hospitals, if similar tests were done. Also bring a list of your medicines, including vitamins, minerals and over-the-counter drugs. It is safest to leave personal items at home.  Be sure to tell your doctor:   If you have any allergies.   If there s any chance you are pregnant.   If you are breastfeeding.   If you have any special needs.  You may have contrast for this exam. To prepare:   Do not eat or drink for 2 hours before your exam. If you need to take medicine, you may take it with small sips of water. (We may ask you to take liquid medicine as well.)   The day before your exam, drink extra fluids at least six 8-ounce glasses (unless your doctor tells you to restrict your fluids).  Patients over 70 or patients with diabetes or kidney problems:   If you haven t had a blood test (creatinine test) within the last 30 days, go to your clinic or Diagnostic Imaging Department for this test.  If you have diabetes:   If your kidney function is normal, continue taking your metformin (Avandamet, Glucophage, Glucovance, Metaglip) on the day of your exam.   If your kidney function is abnormal, wait 48 hours before restarting this medicine.  You will have oral contrast for this exam:   You will drink the contrast at home. Get this from your clinic or Diagnostic Imaging Department. Please follow the directions given.  Please wear loose clothing, such as a sweat suit or jogging clothes. Avoid snaps, zippers and other metal. We may ask you to undress and put on a hospital gown.  If you have any questions, please call the Imaging Department where you will have your exam.            Feb 28, 2017  9:45 AM CST   [facility] with  [facility] DRAW   [facility] ([facility])    [address] [address] [phone]            Feb 28, 2017  2:00 PM CST   (Arrive by 1:45 PM)   Return Visit with Jia Gordon  MD Veda   [facility] ([facility])    [address]  2nd Floor  [facility] [address]   [phone]            Apr 12, 2017 10:00 AM CDT   Lab with  LAB   [facility] ([facility])    [address]  1st [facility] [address]   [phone]            Apr 12, 2017 10:20 AM CDT   (Arrive by 9:50 AM)   Return Visit with Mackenzie Ca MD   [facility] ([facility])    [address]  3rd Floor  [facility] [address]   [phone]              Further instructions from your care team       [facility]  Home Care Following Anesthesia  For 24 hours after surgery:  1. Get plenty of rest.  A responsible adult must stay with you for at least 24 hours after you leave the surgery center.  2. Do not drive or use heavy equipment.  If you have weakness or tingling, don't drive or use heavy equipment until this feeling goes away.   3. Do not drink alcohol.   4. Avoid strenuous or risky activities.  Ask for help when climbing stairs.  5. You may feel lightheaded.  IF so, sit for a few minutes before standing.  Have someone help you get up.   6. If you have nausea (feel sick to your stomach): Drink only clear liquids such as apple juice, ginger ale, broth or 7-Up.  Rest may also help.  Be sure to drink enough fluids.  Move to a regular diet as you feel able.   7. You may have a slight fever.  Call the doctor if your fever is over 100 F (37.7 C) (taken under the tongue) or lasts longer than 24 hours.  8. You may have a dry mouth, a sore throat, muscle aches or trouble sleeping. These should go away after 24 hours.  9. Do not make important or legal decisions.     Tips for taking pain medications  To get the best pain relief possible, remember these points:    Take pain medications as directed, before pain becomes severe.    Pain medication can  upset your stomach: taking it with food may help.    Constipation is a common side effect of pain medication. Drink plenty of  fluids.    Eat foods high in fiber. Take a stool softener if recommended by your doctor or pharmacist.    Do not drink alcohol, drive or operate machinery while taking pain medications.    Ask about other ways to control pain, such as with heat, ice or relaxation.    Call a doctor for any of the followin. Signs of infection (fever, growing tenderness at the surgery site, a large amount of drainage or bleeding, severe pain, foul-smelling drainage, redness, swelling).  2. It has been over 8 to 10 hours since surgery and you are still not able to urinate (pass water).  3. Headache for over 24 hours.  Your doctor is:  Dr. Carmela Alvarez, Prostate and Urology: [phone]               Or dial [phone] and ask for the resident on call for:  Prostate Urology  For emergency care, call the:  [facility]:  [phone] (TTY for hearing impaired: [phone])  Discharge Instructions Following a Cystoscopy, Stent and/or Ureteroscopy    Drainage/Urination:    Urine may be slightly bloody but should taper off in a few days.    You may have some frequency and urgency for a few days.    Comfort:    A burning sensation when urinating is common. Drinking extra fluids helps decrease this burning feeling and also helps to clear the bloody urine.    Mild abdominal cramps may occur for a few days.    Baths:    Daily tub baths aide in passing urine.    Frequent use of bubble bath is discouraged since it encourages urinary tract infections.    Report to your doctor at once if you experience:    Inability to pass your urine    Continuous or heavy bleeding    Severe Pain    Elevated temperature > than 101.5 for 24 hours    Home Activity:    On the day of surgery spend a quiet evening at home.    Increase activity as tolerated.      Scopolamine Patch- (Absorbed through the skin)    This  medicine prevents nausea and vomiting caused by motion sickness or anesthesia.  The medicine is in a patch worn behind the ear.      Do NOT use the Scopolamine Patch if you have glaucoma or are allergic to scopolamine.    How to Use This Medicine:    The patch is applied behind the ear.    Keep the patch dry to prevent it from falling off.  Limit contact with water (no bathing or swimming).      If the patch is loose or falls off throw it away.  You do not need to apply a new patch.    After you take off the patch or if it falls off, wash your hands and the area behind your ear with soap and water.      You can remove the patch tomorrow, or leave on for up to 3 days.    Only one patch should be used at any time.    How to Dispose of This Medicine:    Fold the used patch in half with the sticky sides together. Throw any used patch away so that children or pets cannot get to it. You will also need to throw away old patches after the expiration date has passed.    Keep all medicine away from children and never share your medicine with anyone.    Warnings While Using This Medicine:    This medicine can make you sleepy.  Avoid taking sleeping pills and other medicines that can make you sleepy while the patch is on.    Do not drink alcohol while the patch is on.    This medicine can cause temporary blurring and other vision problems if it comes in contact with the eyes.  This is not serious unless accompanied by eye pain and redness.     This medicine may cause problems with urination. If you have problems with urinating, remove the patch.  If you are unable to urinate, call your doctor.      This medicine may make you dizzy or drowsy. Avoid driving, using machines, or doing anything else that could be dangerous if the patch is on.    This medicine may make you sweat less and cause your body to get too hot. Be careful in hot weather or if you are exercising.    Make sure any doctor or dentist who treats you knows that you  "have the patch on. This medicine may affect the results of certain medical tests.    Skin burns have been reported at the patch site in several patients wearing an aluminized transdermal system during a magnetic resonance imaging scan (MRI).  Since this patch contains aluminum, it is recommended to remove the patch if you are having an MRI.    Possible Side Effects While Using This Medicine:    Dry mouth    Drowsiness    Temporary blurring of vision and widening of the pupils    Call your doctor right away if you notice any of these side effects:    Allergic reaction: Itching or hives, swelling in your face or hands, swelling or tingling in your mouth or throat, chest tightness, trouble breathing.    Blurred vision that does not go away after the patch is removed    Confusion or memory loss    Fast,slow, or uneven heartbeat    Lightheadedness, dizziness, drowsiness, or fainting    Seeing, hearing, or feeling things that are not there    Restlessness    Severe eye pain    Trouble urinating    If you notice other side effects that you think are caused by this medicine, call your doctor immediately.                        Pending Results     Date and Time Order Name Status Description    2/27/2017 1159 XR SURGERY EARNEST FLUORO LESS THAN 5 MIN W STILLS In process             Admission Information     Date & Time Provider Department Dept. Phone    2/27/2017 Carmela Alvarez MD [facility] [phone]      Your Vitals Were     Blood Pressure Temperature Respirations Height Weight Pulse Oximetry    134/67 98.9  F (37.2  C) (Oral) 16 1.676 m (5' 6\") 56.9 kg (125 lb 8 oz) 98%    BMI (Body Mass Index)                   20.26 kg/m2           [employer] Information     [employer] gives you secure access to your electronic health record. If you see a primary care provider, you can also send messages to your care team and make appointments. If you have questions, please call your primary care clinic.  " If you do not have a primary care provider, please call [phone] and they will assist you.      [employer] is an electronic gateway that provides easy, online access to your medical records. With [employer], you can request a clinic appointment, read your test results, renew a prescription or communicate with your care team.     To access your existing account, please contact your [facility] or call [phone] for assistance.        Care EveryWhere ID     This is your Care EveryWhere ID. This could be used by other organizations to access your [place] medical records  [id number]           Review of your medicines      CONTINUE these medicines which may have CHANGED, or have new prescriptions. If we are uncertain of the size of tablets/capsules you have at home, strength may be listed as something that might have changed.        Dose / Directions    * oxyCODONE 5 MG IR tablet   Commonly known as:  ROXICODONE   This may have changed:  Another medication with the same name was added. Make sure you understand how and when to take each.        Dose:  5-10 mg   Take 5-10 mg by mouth   Refills:  0       * oxyCODONE 5 MG IR tablet   Commonly known as:  ROXICODONE   This may have changed:  You were already taking a medication with the same name, and this prescription was added. Make sure you understand how and when to take each.   Used for:  Hydronephrosis, unspecified hydronephrosis type        Dose:  5-10 mg   Take 1-2 tablets (5-10 mg) by mouth every 3 hours as needed for other (Moderate to Severe Pain)   Quantity:  30 tablet   Refills:  0       * Notice:  This list has 2 medication(s) that are the same as other medications prescribed for you. Read the directions carefully, and ask your doctor or other care provider to review them with you.      CONTINUE these medicines which have NOT CHANGED        Dose / Directions    ACETAMINOPHEN PO        Dose:  500 mg   Take 500 mg by mouth every 6  hours as needed for pain   Refills:  0       cycloSPORINE 0.05 % ophthalmic emulsion   Commonly known as:  RESTASIS        Dose:  1 drop   Place 1 drop into both eyes 2 times daily   Refills:  0       enoxaparin 40 MG/0.4ML injection   Commonly known as:  LOVENOX   Indication:  0.4 ML   Used for:  Ovarian cancer, left (H), Ovarian cancer, right (H)        Dose:  40 mg   Inject 0.4 mLs (40 mg) Subcutaneous daily   Quantity:  30 Syringe   Refills:  3       EPINEPHrine 0.3 MG/0.3ML injection   Used for:  Preventative health care, Primary peritoneal adenocarcinoma (H)        Dose:  0.3 mg   Inject 0.3 mLs (0.3 mg) into the muscle once as needed for anaphylaxis   Quantity:  1 each   Refills:  0       ferrous sulfate 325 (65 FE) MG tablet   Commonly known as:  IRON        Dose:  325 mg   Take 325 mg by mouth   Refills:  0       HYDROmorphone 2 MG tablet   Commonly known as:  DILAUDID        Dose:  2 mg   Take 2 mg by mouth   Refills:  0       levofloxacin 500 MG tablet   Commonly known as:  LEVAQUIN        Dose:  500 mg   Take 500 mg by mouth   Refills:  0       LORazepam 1 MG tablet   Commonly known as:  ATIVAN   Used for:  Primary peritoneal adenocarcinoma (H)        Dose:  1 mg   Take 1 tablet (1 mg) by mouth every 6 hours as needed (nausea/vomiting, anxiety or sleep)   Quantity:  30 tablet   Refills:  3       MELATONIN PO        Dose:  1 mg   Take 1 mg by mouth At Bedtime   Refills:  0       morphine 10 MG/5ML solution   Used for:  Cancer related pain        Dose:  1 mg   Take 0.5 mLs (1 mg) by mouth every 2 hours as needed for moderate to severe pain   Quantity:  100 mL   Refills:  0       ondansetron 4 MG ODT tab   Commonly known as:  ZOFRAN-ODT        Dose:  4 mg   Place 4 mg under the tongue   Refills:  0       ONE DAILY MULTIVITAMIN WOMEN Tabs        Dose:  1 tablet   Take 1 tablet by mouth every morning   Refills:  0       polyethylene glycol powder   Commonly known as:  MIRALAX/GLYCOLAX        Dose:  1 capful    Take 1 capful by mouth daily as needed   Refills:  0       prochlorperazine 10 MG tablet   Commonly known as:  COMPAZINE   Used for:  Primary peritoneal adenocarcinoma (H)        Dose:  10 mg   Take 1 tablet (10 mg) by mouth every 6 hours as needed (nausea/vomiting)   Quantity:  30 tablet   Refills:  2       promethazine 25 MG Suppository   Commonly known as:  PHENERGAN        Dose:  25 mg   Place 25 mg rectally   Refills:  0       senna-docusate 8.6-50 MG per tablet   Commonly known as:  SENOKOT-S;PERICOLACE   Used for:  Primary peritoneal adenocarcinoma (H)        Dose:  1-2 tablet   Take 1-2 tablets by mouth 2 times daily   Quantity:  7 tablet   Refills:  0       * study - niraparib 100 mg capsule   Commonly known as:  IDS# [id number]   Used for:  Anemia due to other cause, Chemotherapy-induced neutropenia (H), Ovarian cancer, left (H), Ovarian cancer, right (H), Primary peritoneal adenocarcinoma (H)        Dose:  300 mg   Take 3 capsules (300 mg) by mouth every morning Take at the same time each day, preferably morning. Swallow whole and do NOT chew capsules. Food and water is permissible. First dose will be administered on site.   Quantity:  84 capsule   Refills:  0       * study - niraparib 100 mg capsule   Commonly known as:  IDS# [id number]   Used for:  Anemia due to other cause, Chemotherapy-induced neutropenia (H), Ovarian cancer, left (H), Ovarian cancer, right (H), Primary peritoneal adenocarcinoma (H), Adjustment disorder with anxious mood        Dose:  200 mg   Take 2 capsules (200 mg) by mouth every morning Take at the same time each day, preferably morning. Swallow whole and do NOT chew capsules. Food and water is permissible. First dose will be administered on site.   Quantity:  84 capsule   Refills:  0       tamsulosin 0.4 MG capsule   Commonly known as:  FLOMAX   Used for:  Primary peritoneal adenocarcinoma (H)        Dose:  0.4 mg   Take 1 capsule (0.4 mg) by mouth daily   Quantity:  60 capsule   Refills:   6       tolterodine 4 MG 24 hr capsule   Commonly known as:  DETROL LA   Used for:  Hydronephrosis, unspecified hydronephrosis type        Dose:  4 mg   Take 1 capsule (4 mg) by mouth daily   Quantity:  30 capsule   Refills:  3       traMADol 50 MG tablet   Commonly known as:  ULTRAM        Dose:  25-50 mg   Take 25-50 mg by mouth   Refills:  0       VITAMIN B6 PO        Dose:  1 tablet   Take 1 tablet by mouth At Bedtime   Refills:  0       ZANTAC PO        Dose:  75 mg   Take 75 mg by mouth daily   Refills:  0       * Notice:  This list has 2 medication(s) that are the same as other medications prescribed for you. Read the directions carefully, and ask your doctor or other care provider to review them with you.         Where to get your medicines      Some of these will need a paper prescription and others can be bought over the counter. Ask your nurse if you have questions.     Bring a paper prescription for each of these medications     oxyCODONE 5 MG IR tablet                Protect others around you: Learn how to safely use, store and throw away your medicines at www.disposemymeds.org.             Medication List: This is a list of all your medications and when to take them. Check marks below indicate your daily home schedule. Keep this list as a reference.      Medications           Morning Afternoon Evening Bedtime As Needed    ACETAMINOPHEN PO   Take 500 mg by mouth every 6 hours as needed for pain                                cycloSPORINE 0.05 % ophthalmic emulsion   Commonly known as:  RESTASIS   Place 1 drop into both eyes 2 times daily                                enoxaparin 40 MG/0.4ML injection   Commonly known as:  LOVENOX   Inject 0.4 mLs (40 mg) Subcutaneous daily                                EPINEPHrine 0.3 MG/0.3ML injection   Inject 0.3 mLs (0.3 mg) into the muscle once as needed for anaphylaxis                                ferrous sulfate 325 (65 FE) MG tablet   Commonly known as:  IRON    Take 325 mg by mouth                                HYDROmorphone 2 MG tablet   Commonly known as:  DILAUDID   Take 2 mg by mouth                                levofloxacin 500 MG tablet   Commonly known as:  LEVAQUIN   Take 500 mg by mouth                                LORazepam 1 MG tablet   Commonly known as:  ATIVAN   Take 1 tablet (1 mg) by mouth every 6 hours as needed (nausea/vomiting, anxiety or sleep)                                MELATONIN PO   Take 1 mg by mouth At Bedtime                                morphine 10 MG/5ML solution   Take 0.5 mLs (1 mg) by mouth every 2 hours as needed for moderate to severe pain                                ondansetron 4 MG ODT tab   Commonly known as:  ZOFRAN-ODT   Place 4 mg under the tongue                                ONE DAILY MULTIVITAMIN WOMEN Tabs   Take 1 tablet by mouth every morning                                * oxyCODONE 5 MG IR tablet   Commonly known as:  ROXICODONE   Take 5-10 mg by mouth                                * oxyCODONE 5 MG IR tablet   Commonly known as:  ROXICODONE   Take 1-2 tablets (5-10 mg) by mouth every 3 hours as needed for other (Moderate to Severe Pain)                                polyethylene glycol powder   Commonly known as:  MIRALAX/GLYCOLAX   Take 1 capful by mouth daily as needed                                prochlorperazine 10 MG tablet   Commonly known as:  COMPAZINE   Take 1 tablet (10 mg) by mouth every 6 hours as needed (nausea/vomiting)                                promethazine 25 MG Suppository   Commonly known as:  PHENERGAN   Place 25 mg rectally                                senna-docusate 8.6-50 MG per tablet   Commonly known as:  SENOKOT-S;PERICOLACE   Take 1-2 tablets by mouth 2 times daily                                * study - niraparib 100 mg capsule   Commonly known as:  IDS# [id number]   Take 3 capsules (300 mg) by mouth every morning Take at the same time each day, preferably morning. Swallow  whole and do NOT chew capsules. Food and water is permissible. First dose will be administered on site.                                * study - niraparib 100 mg capsule   Commonly known as:  IDS# [id number]   Take 2 capsules (200 mg) by mouth every morning Take at the same time each day, preferably morning. Swallow whole and do NOT chew capsules. Food and water is permissible. First dose will be administered on site.                                tamsulosin 0.4 MG capsule   Commonly known as:  FLOMAX   Take 1 capsule (0.4 mg) by mouth daily                                tolterodine 4 MG 24 hr capsule   Commonly known as:  DETROL LA   Take 1 capsule (4 mg) by mouth daily                                traMADol 50 MG tablet   Commonly known as:  ULTRAM   Take 25-50 mg by mouth                                VITAMIN B6 PO   Take 1 tablet by mouth At Bedtime                                ZANTAC PO   Take 75 mg by mouth daily                                * Notice:  This list has 4 medication(s) that are the same as other medications prescribed for you. Read the directions carefully, and ask your doctor or other care provider to review them with you.

## 2017-02-27 NOTE — OP NOTE
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 Cystourethroscopy with bilateral retrograde pyelography  2. Exchange of bilateral ureteral stent.  3. Intraoperative interpretation of fluoroscopic imaging.     STAFF SURGEON: Dr. Alvarez, present for entire case.     RESIDENT: Semaj Oneil MD    ANESTHESIA: General    ESTIMATED BLOOD LOSS: 0 mL.     DRAINS:   Left: Tandem 6Fr x 26 cm double J ureteral stents  Right: 6Fr x 28 cm double J ureterals stent      OPERATIVE INDICATIONS:   Margaux Guzmán is a(n) 61 year old female with a history of primary peritoneal adenocarcinoma with bilateral hydronephrosis and ureteral obstruction managed with indwelling ureteral stents since August 2015. She has previously been managed with a 6 Venezuelan left stent and a 7 Fr right stent but during last exchanged noted to develop worsening hydronephrosis and bilateral 8Fr stents were placed.  After a discussion of all risks, benefits, and alternatives, the patient elected to proceed with bilateral ureteral stent exchange.    DESCRIPTION OF PROCEDURE:    After informed consent was obtained, the patient was taken to the operating room, and moved to the operating table.  After adequate anesthesia was induced, the patient was repositioned in dorsal lithotomy position and prepped and draped in the usual sterile fashion. A timeout was taken to confirm correct patient, procedure and laterality.     A 22-Venezuelan cystoscope was inserted into a well lubricated urethra. The urethra was unremarkable.  The bladder was free of tumors, stones or diverticuli.  The media was clear.  Bilateral ureteral orifices were orthotopic.  A Sensor guidewire was advanced into the left renal pelvis with the aid of a 5-Venezuelan open ended ureteral catheter.  A retrograde pyelogram demonstrated severe hydronephrosis.  The scope was  reintroduced the previous 8Fr stent was removed the meatus and a second sensor was advanced through this stent.  Tandem 6Fr x 26 cm ureteral stents were then advanced at the same time up into the renal pelvis until there was a proximal curl in the renal pelvis and distal curl in the bladder.     We then turned out attention to the right side.  We used a flexible stent grasping forceps to grab the previous stent and pulled this out to the meatus.  We then attempted to advance a sensor guidewire up into the renal pelvis through the previous stent but were met with a J hook in the proximal ureter.  We then used a glidewire and JB1 catheter and were eventually able to bypass this into the renal pelvis.  A retrograde pyelogram was performed which demonstrated severe hydronephrosis.  A 6Fr x 28 cm ureteral stent was then advanced over the wire with a proximal curl in the upper pole and a distal curl in the bladder.  We attempted to advance another wire alongside this stent but were unable to bypass the J hook in the proximal ureter.  Given her right side is less functional we elected to stop at this point and leave a single stent. The bladder was then drained.    The patient tolerated the procedure well.  There were no complications.         PLAN:   - Discharge to home today  - Return in 3 months for next stent exchange

## 2017-02-27 NOTE — ANESTHESIA POSTPROCEDURE EVALUATION
Patient: Margaux Guzmán    Procedure(s):  Cystoscopy, Bilateral Retrograde Pyelogram, Bilateral Stent Exchange - Wound Class: II-Clean Contaminated    Diagnosis:Bilateral Hydronephrosis  Diagnosis Additional Information: No value filed.    Anesthesia Type:  MAC    Note:  Anesthesia Post Evaluation    Patient location during evaluation: PACU  Patient participation: Able to fully participate in evaluation  Level of consciousness: awake  Pain management: adequate  Airway patency: patent  Cardiovascular status: acceptable  Respiratory status: acceptable  Hydration status: balanced  PONV: none     Anesthetic complications: None          Last vitals:  Vitals:    02/27/17 1348 02/27/17 1356 02/27/17 1408   BP: 167/80 (!) 171/93 (!) 150/92   Resp: 16  16   Temp:   36.8  C (98.2  F)   SpO2:   100%         Electronically Signed By: Woody Yoon MD  February 27, 2017  3:14 PM

## 2017-02-27 NOTE — ANESTHESIA PREPROCEDURE EVALUATION
Anesthesia Evaluation     . Pt has had prior anesthetic.     History of anesthetic complications  - PONV    ROS/MED HX    ENT/Pulmonary:       Neurologic:       Cardiovascular:         METS/Exercise Tolerance:     Hematologic:         Musculoskeletal: Comment: Polymyalgia Rheumatica  (+) arthritis, , , -       GI/Hepatic:         Renal/Genitourinary:         Endo:         Psychiatric:         Infectious Disease:         Malignancy:   (+) Malignancy   Ovarian Ca        Other:               Physical Exam  Normal systems: cardiovascular, pulmonary and dental    Airway   Mallampati: I  TM distance: >3 FB  Neck ROM: full    Dental     Cardiovascular       Pulmonary                     Anesthesia Plan      History & Physical Review  History and physical reviewed and following examination; no interval change.    ASA Status:  3 .    NPO Status:  > 8 hours    Plan for MAC with Intravenous induction. Maintenance will be TIVA.  Reason for MAC:  Deep or markedly invasive procedure (G8)  PONV prophylaxis:  Dexamethasone or Solumedrol and Scopolamine patch       Postoperative Care  Postoperative pain management:  IV analgesics and Oral pain medications.      Consents  Anesthetic plan, risks, benefits and alternatives discussed with:  Patient..                          .

## 2017-02-27 NOTE — ANESTHESIA CARE TRANSFER NOTE
Patient: Margaux Guzmán    Procedure(s):  Cystoscopy, Bilateral Retrograde Pyelogram, Bilateral Stent Exchange - Wound Class: II-Clean Contaminated    Diagnosis: Bilateral Hydronephrosis  Diagnosis Additional Information: No value filed.    Anesthesia Type:   MAC     Note:  Airway :Room Air  Patient transferred to:Phase II  Comments: Arrive PACU, Stable, Airway Intact  166/85, 114,16,98%,36.4  All questions answered.        Vitals: (Last set prior to Anesthesia Care Transfer)    CRNA VITALS  2/27/2017 1258 - 2/27/2017 1331      2/27/2017             Pulse: 105    SpO2: 93 %    Resp Rate (set): 10                Electronically Signed By: JUSTIN Merino CRNA  February 27, 2017  1:31 PM

## 2017-02-28 PROBLEM — R10.84 ABDOMINAL PAIN, GENERALIZED: Status: ACTIVE | Noted: 2017-01-01

## 2017-02-28 PROBLEM — R11.2 NAUSEA WITH VOMITING: Status: ACTIVE | Noted: 2017-01-01

## 2017-02-28 NOTE — PATIENT INSTRUCTIONS
Contact Numbers    [facility] [place] Main Line: [phone]  [facility] [place] Triage:  [phone]    Call triage with chills and/or temperature greater than or equal to 100.5, uncontrolled nausea/vomiting, diarrhea, constipation, dizziness, shortness of breath, chest pain, bleeding, unexplained bruising, or any new/concerning symptoms, questions/concerns.     If you are having any concerning symptoms or wish to speak to a provider before your next infusion visit, please call your care coordinator or triage to notify them so we can adequately serve you.       After Hours: [phone]    If after hours, weekends, or holidays, call main hospital  and ask for Oncology doctor on call.         February 2017 Sunday Monday Tuesday Wednesday Thursday Friday Saturday                  1     2     [facility]    9:15 AM   (15 min.)    [facility]   [facility] Lab Draw     [facility] RETURN    9:25 AM   (20 min.)   Jia Mccollum MD   [facility] 3     4       5     6     7     8     9     10     11       12     13     14     15     16     17     18       19     20     21     22     23     24     25       26     27     Outpatient Visit    9:29 AM   [facility]     COMBINED CYSTOSCOPY, RETROGRADES, EXCHANGE STENT URETER(S)   11:45 AM   Carmela Alvarez MD   [facility]     XR SURG EARNEST <5 MIN FL W STILL   12:00 PM   (30 min.)   [id number]   [facility] 28     CT CHEST/ABDOMEN/PELVIS W    9:05 AM   (20 min.)   UCCT1   [facility] CT     [facility] DRAW    9:45 AM   (15 min.)   [facility]   [facility] Lab Draw     [facility] ONC INFUSION 120   12:30 PM   (120 min.)   [facility]ION   [facility]     [facility] RETURN    1:45 PM   (20 min.)   Jia Mccollum MD   [facility]     XR ABDOMEN 2 VIEWS    2:30 PM   (15 min.)   XR1   [facility] Xray                                March 2017    Sunday Monday Tuesday Wednesday Thursday Friday Saturday                  1     2     3     4       5     6     7     8     9     10     11       12     13     14     15     16     17     18       19     20     21     22     23     24     25       26     27     28     29     30     31                       Lab Results:  Recent Results (from the past 12 hour(s))   CBC with platelets differential    Collection Time: 02/28/17 10:52 AM   Result Value Ref Range    WBC 6.6 4.0 - 11.0 10e9/L    RBC Count 3.12 (L) 3.8 - 5.2 10e12/L    Hemoglobin 10.3 (L) 11.7 - 15.7 g/dL    Hematocrit 30.0 (L) 35.0 - 47.0 %    MCV 96 78 - 100 fl    MCH 33.0 26.5 - 33.0 pg    MCHC 34.3 31.5 - 36.5 g/dL    RDW 16.0 (H) 10.0 - 15.0 %    Platelet Count 126 (L) 150 - 450 10e9/L    Diff Method Automated Method     % Neutrophils 74.3 %    % Lymphocytes 14.0 %    % Monocytes 11.0 %    % Eosinophils 0.2 %    % Basophils 0.2 %    % Immature Granulocytes 0.3 %    Nucleated RBCs 0 0 /100    Absolute Neutrophil 4.9 1.6 - 8.3 10e9/L    Absolute Lymphocytes 0.9 0.8 - 5.3 10e9/L    Absolute Monocytes 0.7 0.0 - 1.3 10e9/L    Absolute Eosinophils 0.0 0.0 - 0.7 10e9/L    Absolute Basophils 0.0 0.0 - 0.2 10e9/L    Abs Immature Granulocytes 0.0 0 - 0.4 10e9/L    Absolute Nucleated RBC 0.0    Comprehensive metabolic panel    Collection Time: 02/28/17 10:52 AM   Result Value Ref Range    Sodium 136 133 - 144 mmol/L    Potassium 2.9 (L) 3.4 - 5.3 mmol/L    Chloride 95 94 - 109 mmol/L    Carbon Dioxide 30 20 - 32 mmol/L    Anion Gap 10 3 - 14 mmol/L    Glucose 104 (H) 70 - 99 mg/dL    Urea Nitrogen 13 7 - 30 mg/dL    Creatinine 0.95 0.52 - 1.04 mg/dL    GFR Estimate 59 (L) >60 mL/min/1.7m2    GFR Estimate If Black 72 >60 mL/min/1.7m2    Calcium 9.4 8.5 - 10.1 mg/dL    Bilirubin Total 0.4 0.2 - 1.3 mg/dL    Albumin 3.8 3.4 - 5.0 g/dL    Protein Total 7.1 6.8 - 8.8 g/dL    Alkaline Phosphatase 63 40 - 150 U/L    ALT 14 0 - 50 U/L    AST 13 0 - 45 U/L   Magnesium     Collection Time: 02/28/17 10:52 AM   Result Value Ref Range    Magnesium 1.8 1.6 - 2.3 mg/dL       Collection Time: 02/28/17 10:52 AM   Result Value Ref Range     285 (H) 0 - 30 U/mL   GGT    Collection Time: 02/28/17 10:52 AM   Result Value Ref Range    GGT 19 0 - 40 U/L   Lactate Dehydrogenase    Collection Time: 02/28/17 10:52 AM   Result Value Ref Range    Lactate Dehydrogenase 190 81 - 234 U/L   Amylase    Collection Time: 02/28/17 10:52 AM   Result Value Ref Range    Amylase 34 30 - 110 U/L   Routine UA with microscopic - No culture    Collection Time: 02/28/17 10:52 AM   Result Value Ref Range    Color Urine Yellow     Appearance Urine Slightly Cloudy     Glucose Urine Negative NEG mg/dL    Bilirubin Urine Negative NEG    Ketones Urine Negative NEG mg/dL    Specific Gravity Urine 1.012 1.003 - 1.035    Blood Urine Large (A) NEG    pH Urine 7.0 5.0 - 7.0 pH    Protein Albumin Urine >499 (A) NEG mg/dL    Urobilinogen mg/dL 0.0 0.0 - 2.0 mg/dL    Nitrite Urine Negative NEG    Leukocyte Esterase Urine Small (A) NEG    Source Random     WBC Urine 85 (H) 0 - 2 /HPF    RBC Urine >182 (H) 0 - 2 /HPF    Bacteria Urine Few (A) NEG /HPF    Squamous Epithelial /HPF Urine <1 0 - 1 /HPF    Mucous Urine Present (A) NEG /LPF

## 2017-02-28 NOTE — NURSING NOTE
Chief Complaint   Patient presents with     Port Draw     Labs collected via port by RN.      Port accessed, flushed with NS and Heparin.   Mary Ann Murillo RN

## 2017-02-28 NOTE — PROGRESS NOTES
"Infusion Nursing Note:  Margaux Guzmán presents today for IVF and K replacement.    Patient seen by provider today: Yes: Dr. Mccollum    Note: Patient presented to clinic with c/o nausea; somewhat relieved by IV Zofran, but returned, again partially relieved by IV Compazine.  Also c/o abd pain and flank pain, 0.3mg IV Dilaudid administered; hesitant for full dose of 0.5mg 2/2 \"feeling loopy\" when has taken in past and will be transferring to hospital.  K replaced per protocol.  X-ray revealed possible SBO, will be admitted for management.  Updated report given to MK Todd; dilaudid, compazine, K and zofran.  Full report given by RNCC.  Patient and patient's daughter agreeable to independent transport to hospital room 412-2.    Intravenous Access:  Implanted Port.    Treatment Conditions:  Lab Results   Component Value Date     02/28/2017                   Lab Results   Component Value Date    POTASSIUM 2.9 02/28/2017           Lab Results   Component Value Date    MAG 1.8 02/28/2017            Lab Results   Component Value Date    CR 0.95 02/28/2017                   Lab Results   Component Value Date    PURVI 9.4 02/28/2017                Lab Results   Component Value Date    BILITOTAL 0.4 02/28/2017           Lab Results   Component Value Date    ALBUMIN 3.8 02/28/2017                    Lab Results   Component Value Date    ALT 14 02/28/2017           Lab Results   Component Value Date    AST 13 02/28/2017     Results reviewed, labs MET treatment parameters, ok to proceed with treatment.    Post Infusion Assessment:  Patient tolerated infusion without incident.  Blood return noted pre and post infusion.  Site patent and intact, free from redness, edema or discomfort.  No evidence of extravasations.  Access discontinued per protocol.    Discharge Plan:   Prescription refills given for Zofran and Dilaudid.  Discharge instructions reviewed with: Patient.  Patient and/or family verbalized understanding of " discharge instructions and all questions answered.  Departure Mode: Ambulatory.    Alvina Lemus RN

## 2017-02-28 NOTE — MR AVS SNAPSHOT
After Visit Summary   2/28/2017    Margaux Guzmán    MRN: [id number]           Patient Information     Date Of Birth          1955        Visit Information        Provider Department      2/28/2017 2:00 PM Jia Mccollum MD [facility]        Today's Diagnoses     Anemia due to other cause    -  1    Chemotherapy-induced neutropenia (H)        Ovarian cancer, left (H)        Ovarian cancer, right (H)        Primary peritoneal adenocarcinoma (H)        Flank pain        Palpitations        Abdominal pain, generalized        Dehydration        Nausea        Loss of appetite           Follow-ups after your visit        Your next 10 appointments already scheduled     Mar 30, 2017 10:15 AM CDT   Lab with  LAB   [facility] ([facility])    [address] [place] [address]  1st Floor  [facility] [id number] [phone]            Mar 30, 2017 10:40 AM CDT   (Arrive by 10:25 AM)   Return Visit with Jia Mccollum MD   [facility] ([facility])    [address] [place] [address]  2nd Floor  [facility] [id number] [phone]            Apr 12, 2017 10:00 AM CDT   Lab with  LAB   [facility] ([facility])    [address] [place] [address]  1st [facility] [id number] [phone]            Apr 12, 2017 10:20 AM CDT   (Arrive by 9:50 AM)   Return Visit with Mackenzie Ca MD   [facility] ([facility])    [address] [place] [address]  3rd Floor  [facility] [id number] [phone]              Who to contact     If you have questions or need follow up information about today's clinic visit or your schedule please contact [facility] directly at [phone].  Normal or non-critical lab and imaging results will be communicated to you by MyChart, letter or phone within 4 business days after the clinic has received the results. If  "you do not hear from us within 7 days, please contact the clinic through [employer] or phone. If you have a critical or abnormal lab result, we will notify you by phone as soon as possible.  Submit refill requests through [employer] or call your pharmacy and they will forward the refill request to us. Please allow 3 business days for your refill to be completed.          Additional Information About Your Visit        [facility] [employer] Information     [employer] gives you secure access to your electronic health record. If you see a primary care provider, you can also send messages to your care team and make appointments. If you have questions, please call your primary care clinic.  If you do not have a primary care provider, please call [phone] and they will assist you.        Care EveryWhere ID     This is your Care EveryWhere ID. This could be used by other organizations to access your [facility] medical records  [id number]        Your Vitals Were     Pulse Temperature Respirations Height Pulse Oximetry BMI (Body Mass Index)    88 98.2  F (36.8  C) (Oral) 16 1.676 m (5' 6\") 98% 20.11 kg/m2       Blood Pressure from Last 3 Encounters:   02/28/17 147/83   02/27/17 (!) 150/92   02/02/17 146/84    Weight from Last 3 Encounters:   02/28/17 56.5 kg (124 lb 9.6 oz)   02/27/17 56.9 kg (125 lb 8 oz)   02/02/17 56.9 kg (125 lb 8 oz)              We Performed the Following     Amylase          CBC with platelets differential     Comprehensive metabolic panel     EKG 12-Lead Complete w/read - Clinics     GGT     Lactate Dehydrogenase     Magnesium     Routine UA with microscopic - No culture     Routine UA with microscopic          Today's Medication Changes          These changes are accurate as of: 2/28/17  2:51 PM.  If you have any questions, ask your nurse or doctor.               Start taking these medicines.        Dose/Directions    megestrol 40 MG/ML suspension   Commonly known as:  MEGACE ORAL   Used for:  Ovarian cancer, " right (H), Loss of appetite   Started by:  Jia Mccollum MD        Dose:  400 mg   Take 10 mLs (400 mg) by mouth 2 times daily   Quantity:  600 mL   Refills:  1         These medicines have changed or have updated prescriptions.        Dose/Directions    * HYDROmorphone 2 MG tablet   Commonly known as:  DILAUDID   This may have changed:  Another medication with the same name was added. Make sure you understand how and when to take each.        Dose:  2 mg   Take 2 mg by mouth   Refills:  0       * HYDROmorphone 4 MG tablet   Commonly known as:  DILAUDID   This may have changed:  You were already taking a medication with the same name, and this prescription was added. Make sure you understand how and when to take each.   Used for:  Flank pain, Ovarian cancer, left (H), Ovarian cancer, right (H)   Changed by:  Jia Mccollum MD        Dose:  4 mg   Take 1 tablet (4 mg) by mouth every 4 hours as needed for moderate to severe pain maximum  6  tablet(s) per day   Quantity:  35 tablet   Refills:  0       ondansetron 8 MG ODT tab   Commonly known as:  ZOFRAN-ODT   This may have changed:    - medication strength  - how much to take  - when to take this  - reasons to take this   Used for:  Ovarian cancer, right (H), Nausea   Changed by:  Jia Mccollum MD        Dose:  8 mg   Place 1 tablet (8 mg) under the tongue every 8 hours as needed for nausea   Quantity:  30 tablet   Refills:  3       * study - niraparib 100 mg capsule   Commonly known as:  IDS# [id number]   This may have changed:  Another medication with the same name was added. Make sure you understand how and when to take each.   Used for:  Anemia due to other cause, Chemotherapy-induced neutropenia (H), Ovarian cancer, left (H), Ovarian cancer, right (H), Primary peritoneal adenocarcinoma (H)   Changed by:  Jia Mccollum MD        Dose:  300 mg   Take 3 capsules (300 mg) by mouth every morning Take at the same time each day, preferably morning.  Swallow whole and do NOT chew capsules. Food and water is permissible. First dose will be administered on site.   Quantity:  84 capsule   Refills:  0       * study - niraparib 100 mg capsule   Commonly known as:  IDS# [id number]   This may have changed:  Another medication with the same name was added. Make sure you understand how and when to take each.   Used for:  Anemia due to other cause, Chemotherapy-induced neutropenia (H), Ovarian cancer, left (H), Ovarian cancer, right (H), Primary peritoneal adenocarcinoma (H), Adjustment disorder with anxious mood   Changed by:  Jia Mccollum MD        Dose:  200 mg   Take 2 capsules (200 mg) by mouth every morning Take at the same time each day, preferably morning. Swallow whole and do NOT chew capsules. Food and water is permissible. First dose will be administered on site.   Quantity:  84 capsule   Refills:  0       * study - niraparib 100 mg capsule   Commonly known as:  IDS# [id number]   This may have changed:  You were already taking a medication with the same name, and this prescription was added. Make sure you understand how and when to take each.   Used for:  Anemia due to other cause, Chemotherapy-induced neutropenia (H), Ovarian cancer, left (H), Ovarian cancer, right (H), Primary peritoneal adenocarcinoma (H)   Changed by:  Aurelia Worley RN        Dose:  200 mg   Take 2 capsules (200 mg) by mouth every morning Take at the same time each day, preferably morning. Swallow whole and do NOT chew capsules. Food and water is permissible. First dose will be administered on site.   Quantity:  84 capsule   Refills:  0       * Notice:  This list has 5 medication(s) that are the same as other medications prescribed for you. Read the directions carefully, and ask your doctor or other care provider to review them with you.         Where to get your medicines      These medications were sent to [place], [place] - [address] [place] [address] [address]  [address] [facility] [address] [address]    Hours:  TRANSPLANT PHONE NUMBER [phone] Phone:  [phone]     megestrol 40 MG/ML suspension    ondansetron 8 MG ODT tab         Some of these will need a paper prescription and others can be bought over the counter.  Ask your nurse if you have questions.     Bring a paper prescription for each of these medications     HYDROmorphone 4 MG tablet         Information about where to get these medications is not yet available     ! Ask your nurse or doctor about these medications     study - niraparib 100 mg capsule                Primary Care Provider Office Phone # Fax #    Aurelia Knox [phone]       [facility] [address] [facility] [address]        Thank you!     Thank you for choosing [facility]  for your care. Our goal is always to provide you with excellent care. Hearing back from our patients is one way we can continue to improve our services. Please take a few minutes to complete the written survey that you may receive in the mail after your visit with us. Thank you!             Your Updated Medication List - Protect others around you: Learn how to safely use, store and throw away your medicines at www.disposemymeds.org.          This list is accurate as of: 2/28/17  2:51 PM.  Always use your most recent med list.                   Brand Name Dispense Instructions for use    ACETAMINOPHEN PO      Take 500 mg by mouth every 6 hours as needed for pain Reported on 2/28/2017       cycloSPORINE 0.05 % ophthalmic emulsion    RESTASIS     Place 1 drop into both eyes 2 times daily       enoxaparin 40 MG/0.4ML injection    LOVENOX    30 Syringe    Inject 0.4 mLs (40 mg) Subcutaneous daily       EPINEPHrine 0.3 MG/0.3ML injection     1 each    Inject 0.3 mLs (0.3 mg) into the muscle once as needed for anaphylaxis       ferrous sulfate 325 (65 FE) MG tablet    IRON     Take 325 mg by mouth       *  HYDROmorphone 2 MG tablet    DILAUDID     Take 2 mg by mouth       * HYDROmorphone 4 MG tablet    DILAUDID    35 tablet    Take 1 tablet (4 mg) by mouth every 4 hours as needed for moderate to severe pain maximum  6  tablet(s) per day       levofloxacin 500 MG tablet    LEVAQUIN     Take 500 mg by mouth       LORazepam 1 MG tablet    ATIVAN    30 tablet    Take 1 tablet (1 mg) by mouth every 6 hours as needed (nausea/vomiting, anxiety or sleep)       megestrol 40 MG/ML suspension    MEGACE ORAL    600 mL    Take 10 mLs (400 mg) by mouth 2 times daily       MELATONIN PO      Take 1 mg by mouth At Bedtime       morphine 10 MG/5ML solution     100 mL    Take 0.5 mLs (1 mg) by mouth every 2 hours as needed for moderate to severe pain       ondansetron 8 MG ODT tab    ZOFRAN-ODT    30 tablet    Place 1 tablet (8 mg) under the tongue every 8 hours as needed for nausea       ONE DAILY MULTIVITAMIN WOMEN Tabs      Take 1 tablet by mouth every morning       * oxyCODONE 5 MG IR tablet    ROXICODONE     Take 5-10 mg by mouth       * oxyCODONE 5 MG IR tablet    ROXICODONE    30 tablet    Take 1-2 tablets (5-10 mg) by mouth every 3 hours as needed for other (Moderate to Severe Pain)       polyethylene glycol powder    MIRALAX/GLYCOLAX     Take 1 capful by mouth daily as needed       prochlorperazine 10 MG tablet    COMPAZINE    30 tablet    Take 1 tablet (10 mg) by mouth every 6 hours as needed (nausea/vomiting)       promethazine 25 MG Suppository    PHENERGAN     Place 25 mg rectally       senna-docusate 8.6-50 MG per tablet    SENOKOT-S;PERICOLACE    7 tablet    Take 1-2 tablets by mouth 2 times daily       * study - niraparib 100 mg capsule    IDS# [id number]    84 capsule    Take 3 capsules (300 mg) by mouth every morning Take at the same time each day, preferably morning. Swallow whole and do NOT chew capsules. Food and water is permissible. First dose will be administered on site.       * study - niraparib 100 mg capsule     IDS# [id number]    84 capsule    Take 2 capsules (200 mg) by mouth every morning Take at the same time each day, preferably morning. Swallow whole and do NOT chew capsules. Food and water is permissible. First dose will be administered on site.       * study - niraparib 100 mg capsule    IDS# [id number]    84 capsule    Take 2 capsules (200 mg) by mouth every morning Take at the same time each day, preferably morning. Swallow whole and do NOT chew capsules. Food and water is permissible. First dose will be administered on site.       tamsulosin 0.4 MG capsule    FLOMAX    60 capsule    Take 1 capsule (0.4 mg) by mouth daily       tolterodine 4 MG 24 hr capsule    DETROL LA    30 capsule    Take 1 capsule (4 mg) by mouth daily       traMADol 50 MG tablet    ULTRAM     Take 25-50 mg by mouth       VITAMIN B6 PO      Take 1 tablet by mouth At Bedtime       ZANTAC PO      Take 75 mg by mouth daily       * Notice:  This list has 7 medication(s) that are the same as other medications prescribed for you. Read the directions carefully, and ask your doctor or other care provider to review them with you.

## 2017-02-28 NOTE — NURSING NOTE
"Margaux Guzmán is a 61 year old female who presents for:  Chief Complaint   Patient presents with     Port Draw     Labs collected via port by RN.      Oncology Clinic Visit     return patient visit for MRI results related to ovarian cancer         Initial Vitals:  /83  Pulse 88  Temp 98.2  F (36.8  C) (Oral)  Resp 16  Ht 1.676 m (5' 6\")  Wt 56.5 kg (124 lb 9.6 oz)  SpO2 98%  BMI 20.11 kg/m2 Estimated body mass index is 20.11 kg/(m^2) as calculated from the following:    Height as of this encounter: 1.676 m (5' 6\").    Weight as of this encounter: 56.5 kg (124 lb 9.6 oz).. Body surface area is 1.62 meters squared. BP completed using cuff size: NA (Not Taken)  Mild Pain (3) No LMP recorded. Patient has had a hysterectomy. Allergies and medications reviewed.     Medications: Medication refills not needed today.  Pharmacy name entered into [email or web address]:    [facility] - [place], [place] - [address] AT [facility] [address]  [id number] - [place] - [address]  [facility] - [place], [place] - [address] [facility] [place], [place] - [address] [facility] [address]  [place], [place] - [address] [place] [address] PHARMACY #[id number] - [place], [place] - [address] [facility]    Comments: patient mentioned minor upper back pain and upper abdomen pain. Patient takes oxycodone but feels that it doesn't help relieve pain.     5 minutes for nursing intake (face to face time)   Helio Yates CMA        "

## 2017-02-28 NOTE — IP AVS SNAPSHOT
MRN:[id number]                      After Visit Summary   2/28/2017    Margaux Guzmán    MRN: [id number]           Thank you!     Thank you for choosing [facility] for your care. Our goal is always to provide you with excellent care. Hearing back from our patients is one way we can continue to improve our services. Please take a few minutes to complete the written survey that you may receive in the mail after you visit with us. Thank you!        Patient Information     Date Of Birth          1955        About your hospital stay     You were admitted on:  February 28, 2017 You last received care in the:  [facility]    You were discharged on:  March 3, 2017        Reason for your hospital stay       Bowel obstruction                  Who to Call     For medical emergencies, please call 911.  For non-urgent questions about your medical care, please call your primary care provider or clinic, [phone]          Attending Provider     Provider Specialty    Jia Mccollum MD Oncology    Dayan Marcano MD OB/Gyn       Primary Care Provider Office Phone # Fax #    Aurelia Knox [phone]       [facility] [address] [place] [address]         When to contact your care team       FOLLOW-UP:    Call Surgeon if you have:  Temperature greater than 100.4  Persistent nausea and vomiting  Severe uncontrolled pain  Difficulty breathing, headache or visual disturbances  Hives  Persistent dizziness or light-headedness  Extreme fatigue  Any other questions or concerns you may have after discharge    In an emergency, call 911 or go to an Emergency Department at a nearby hospital    OTHER:  Patients are often constipated.  The patient should continue to take stool softeners (for example, Senokot-S) (unless diarrhea develops) and consume adequate amounts of water.  If the patient remains constipated or unable to pass stool, please try one or all of the  following measures:  1.  Milk of Magnesia 30cc twice a day as needed by mouth  2.  Metamucil 2 tablespoons in 12 ounces of fluid  3.  Dulcolax oral or suppositories  4.  Prunes or prune juice  5.  Miralax daily      QUESTIONS:  Please feel free to call your physician or the hospital  if you have any questions, and they will be glad to assist you.                  After Care Instructions     Activity       Your activity upon discharge: activity as tolerated            Diet       Follow this diet upon discharge:       Low Fiber Diet  Ensure Shakes                  Follow-up Appointments     Adult [facility] Follow-up and recommended labs and tests       3/30/17 Dr Mccollum. Call prior to then for questions or concerns [phone]    Appointments on [place] and/or [facility] (with [facility] or [facility] provider or service). Call [phone] if you haven't heard regarding these appointments within 7 days of discharge.                  Your next 10 appointments already scheduled     Mar 30, 2017 10:15 AM CDT   Lab with  LAB   [facility]    [address] [place] [address]  [address]st [facility] [id number] [phone]            Mar 30, 2017 10:40 AM CDT   (Arrive by 10:25 AM)   Return Visit with Jia Mccollum MD   [facility]    [address] [place] [address]  2nd [facility] [id number] [phone]            Apr 12, 2017 10:00 AM CDT   Lab with  LAB   [facility]    [address] [place] [address]  1st [facility] [id number] [phone]            Apr 12, 2017 10:20 AM CDT   (Arrive by 9:50 AM)   Return Visit with Mackenzie Ca MD   [facility] ([facility])    [address] [place] [address]  3rd [facility] [id number] [phone]              Pending Results     No orders found from 2/26/2017 to 3/1/2017.            Statement  "of Approval     Ordered          03/03/17 1357  I have reviewed and agree with all the recommendations and orders detailed in this document.  EFFECTIVE NOW     Approved and electronically signed by:  Nicole Naranjo APRN CNP             Admission Information     Date & Time Provider Department Dept. Phone    2/28/2017 Dayan Marcano MD [address] [facility] [place] [phone]      Your Vitals Were     Blood Pressure Pulse Temperature Respirations Height Weight    124/65 (BP Location: Right arm) 108 97.1  F (36.2  C) (Oral) 16 1.676 m (5' 6\") 54.3 kg (119 lb 11.2 oz)    Pulse Oximetry BMI (Body Mass Index)                97% 19.32 kg/m2          [employer] Information     [employer] gives you secure access to your electronic health record. If you see a primary care provider, you can also send messages to your care team and make appointments. If you have questions, please call your primary care clinic.  If you do not have a primary care provider, please call [phone] and they will assist you.        Care EveryWhere ID     This is your Care EveryWhere ID. This could be used by other organizations to access your [place] medical records  [id number]           Review of your medicines      START taking        Dose / Directions    amLODIPine 2.5 MG tablet   Commonly known as:  NORVASC   Used for:  Primary peritoneal adenocarcinoma (H)        Dose:  2.5 mg   Take 1 tablet (2.5 mg) by mouth daily   Quantity:  30 tablet   Refills:  0       bisacodyl 10 MG Suppository   Commonly known as:  DULCOLAX   Used for:  Primary peritoneal adenocarcinoma (H)        Dose:  10 mg   Place 1 suppository (10 mg) rectally daily as needed for constipation   Quantity:  10 suppository   Refills:  0         CONTINUE these medicines which may have CHANGED, or have new prescriptions. If we are uncertain of the size of tablets/capsules you have at home, strength may be listed as something that might have changed.        Dose / Directions    " HYDROmorphone 4 MG tablet   Commonly known as:  DILAUDID   This may have changed:  Another medication with the same name was removed. Continue taking this medication, and follow the directions you see here.   Used for:  Flank pain, Ovarian cancer, left (H), Ovarian cancer, right (H)        Dose:  4 mg   Take 1 tablet (4 mg) by mouth every 4 hours as needed for moderate to severe pain maximum  6  tablet(s) per day   Quantity:  35 tablet   Refills:  0         CONTINUE these medicines which have NOT CHANGED        Dose / Directions    ACETAMINOPHEN PO        Dose:  500 mg   Take 500 mg by mouth every 6 hours as needed for pain Reported on 2/28/2017   Refills:  0       cycloSPORINE 0.05 % ophthalmic emulsion   Commonly known as:  RESTASIS        Dose:  1 drop   Place 1 drop into both eyes 2 times daily   Refills:  0       enoxaparin 40 MG/0.4ML injection   Commonly known as:  LOVENOX   Indication:  0.4 ML   Used for:  Ovarian cancer, left (H), Ovarian cancer, right (H)        Dose:  40 mg   Inject 0.4 mLs (40 mg) Subcutaneous daily   Quantity:  30 Syringe   Refills:  3       EPINEPHrine 0.3 MG/0.3ML injection   Used for:  Preventative health care, Primary peritoneal adenocarcinoma (H)        Dose:  0.3 mg   Inject 0.3 mLs (0.3 mg) into the muscle once as needed for anaphylaxis   Quantity:  1 each   Refills:  0       ferrous sulfate 325 (65 FE) MG tablet   Commonly known as:  IRON        Dose:  325 mg   Take 325 mg by mouth   Refills:  0       LORazepam 1 MG tablet   Commonly known as:  ATIVAN   Used for:  Primary peritoneal adenocarcinoma (H)        Dose:  1 mg   Take 1 tablet (1 mg) by mouth every 6 hours as needed (nausea/vomiting, anxiety or sleep)   Quantity:  30 tablet   Refills:  3       megestrol 40 MG/ML suspension   Commonly known as:  MEGACE ORAL   Used for:  Ovarian cancer, right (H), Loss of appetite        Dose:  400 mg   Take 10 mLs (400 mg) by mouth 2 times daily   Quantity:  600 mL   Refills:  1        MELATONIN PO        Dose:  1 mg   Take 1 mg by mouth At Bedtime   Refills:  0       ondansetron 8 MG ODT tab   Commonly known as:  ZOFRAN-ODT   Used for:  Ovarian cancer, right (H), Nausea        Dose:  8 mg   Place 1 tablet (8 mg) under the tongue every 8 hours as needed for nausea   Quantity:  30 tablet   Refills:  3       ONE DAILY MULTIVITAMIN WOMEN Tabs        Dose:  1 tablet   Take 1 tablet by mouth every morning   Refills:  0       polyethylene glycol powder   Commonly known as:  MIRALAX/GLYCOLAX        Dose:  1 capful   Take 1 capful by mouth daily as needed   Refills:  0       prochlorperazine 10 MG tablet   Commonly known as:  COMPAZINE   Used for:  Primary peritoneal adenocarcinoma (H)        Dose:  10 mg   Take 1 tablet (10 mg) by mouth every 6 hours as needed (nausea/vomiting)   Quantity:  30 tablet   Refills:  2       promethazine 25 MG Suppository   Commonly known as:  PHENERGAN        Dose:  25 mg   Place 25 mg rectally   Refills:  0       senna-docusate 8.6-50 MG per tablet   Commonly known as:  SENOKOT-S;PERICOLACE   Used for:  Primary peritoneal adenocarcinoma (H)        Dose:  1-2 tablet   Take 1-2 tablets by mouth 2 times daily   Quantity:  7 tablet   Refills:  0       * study - niraparib 100 mg capsule   Commonly known as:  IDS# 4759   Used for:  Anemia due to other cause, Chemotherapy-induced neutropenia (H), Ovarian cancer, left (H), Ovarian cancer, right (H), Primary peritoneal adenocarcinoma (H)        Dose:  300 mg   Take 3 capsules (300 mg) by mouth every morning Take at the same time each day, preferably morning. Swallow whole and do NOT chew capsules. Food and water is permissible. First dose will be administered on site.   Quantity:  84 capsule   Refills:  0       * study - niraparib 100 mg capsule   Commonly known as:  IDS# 4759   Used for:  Anemia due to other cause, Chemotherapy-induced neutropenia (H), Ovarian cancer, left (H), Ovarian cancer, right (H), Primary peritoneal  adenocarcinoma (H), Adjustment disorder with anxious mood        Dose:  200 mg   Take 2 capsules (200 mg) by mouth every morning Take at the same time each day, preferably morning. Swallow whole and do NOT chew capsules. Food and water is permissible. First dose will be administered on site.   Quantity:  84 capsule   Refills:  0       * study - niraparib 100 mg capsule   Commonly known as:  IDS# 4759   Used for:  Anemia due to other cause, Chemotherapy-induced neutropenia (H), Ovarian cancer, left (H), Ovarian cancer, right (H), Primary peritoneal adenocarcinoma (H)        Dose:  200 mg   Take 2 capsules (200 mg) by mouth every morning Take at the same time each day, preferably morning. Swallow whole and do NOT chew capsules. Food and water is permissible. First dose will be administered on site.   Quantity:  84 capsule   Refills:  0       tamsulosin 0.4 MG capsule   Commonly known as:  FLOMAX   Used for:  Primary peritoneal adenocarcinoma (H)        Dose:  0.4 mg   Take 1 capsule (0.4 mg) by mouth daily   Quantity:  60 capsule   Refills:  6       tolterodine 4 MG 24 hr capsule   Commonly known as:  DETROL LA   Used for:  Hydronephrosis, unspecified hydronephrosis type        Dose:  4 mg   Take 1 capsule (4 mg) by mouth daily   Quantity:  30 capsule   Refills:  3       traMADol 50 MG tablet   Commonly known as:  ULTRAM        Dose:  25-50 mg   Take 25-50 mg by mouth   Refills:  0       VITAMIN B6 PO        Dose:  1 tablet   Take 1 tablet by mouth At Bedtime   Refills:  0       ZANTAC PO        Dose:  75 mg   Take 75 mg by mouth daily   Refills:  0       * Notice:  This list has 3 medication(s) that are the same as other medications prescribed for you. Read the directions carefully, and ask your doctor or other care provider to review them with you.      STOP taking     levofloxacin 500 MG tablet   Commonly known as:  LEVAQUIN           morphine 10 MG/5ML solution           oxyCODONE 5 MG IR tablet   Commonly known  as:  RON                Where to get your medicines      These medications were sent to [facility] [place], [place] - [address] [facility] [address]  [address], [facility] [address]     Phone:  [phone]     bisacodyl 10 MG Suppository                Protect others around you: Learn how to safely use, store and throw away your medicines at www.disposemymeds.org.             Medication List: This is a list of all your medications and when to take them. Check marks below indicate your daily home schedule. Keep this list as a reference.      Medications           Morning Afternoon Evening Bedtime As Needed    ACETAMINOPHEN PO   Take 500 mg by mouth every 6 hours as needed for pain Reported on 2/28/2017   Last time this was given:  650 mg on 3/3/2017 12:21 PM                                amLODIPine 2.5 MG tablet   Commonly known as:  NORVASC   Take 1 tablet (2.5 mg) by mouth daily   Last time this was given:  2.5 mg on 3/3/2017  8:18 AM                                bisacodyl 10 MG Suppository   Commonly known as:  DULCOLAX   Place 1 suppository (10 mg) rectally daily as needed for constipation                                cycloSPORINE 0.05 % ophthalmic emulsion   Commonly known as:  RESTASIS   Place 1 drop into both eyes 2 times daily   Last time this was given:  1 drop on 3/3/2017  8:19 AM                                enoxaparin 40 MG/0.4ML injection   Commonly known as:  LOVENOX   Inject 0.4 mLs (40 mg) Subcutaneous daily   Last time this was given:  40 mg on 3/2/2017  7:21 PM                                EPINEPHrine 0.3 MG/0.3ML injection   Inject 0.3 mLs (0.3 mg) into the muscle once as needed for anaphylaxis                                ferrous sulfate 325 (65 FE) MG tablet   Commonly known as:  IRON   Take 325 mg by mouth                                HYDROmorphone 4 MG tablet   Commonly known as:  DILAUDID   Take 1 tablet (4 mg) by mouth every 4 hours as needed for  moderate to severe pain maximum  6  tablet(s) per day   Last time this was given:  2 mg on 3/3/2017 11:05 AM                                LORazepam 1 MG tablet   Commonly known as:  ATIVAN   Take 1 tablet (1 mg) by mouth every 6 hours as needed (nausea/vomiting, anxiety or sleep)                                megestrol 40 MG/ML suspension   Commonly known as:  MEGACE ORAL   Take 10 mLs (400 mg) by mouth 2 times daily                                MELATONIN PO   Take 1 mg by mouth At Bedtime                                ondansetron 8 MG ODT tab   Commonly known as:  ZOFRAN-ODT   Place 1 tablet (8 mg) under the tongue every 8 hours as needed for nausea   Last time this was given:  4 mg on 3/3/2017 11:06 AM                                ONE DAILY MULTIVITAMIN WOMEN Tabs   Take 1 tablet by mouth every morning                                polyethylene glycol powder   Commonly known as:  MIRALAX/GLYCOLAX   Take 1 capful by mouth daily as needed                                prochlorperazine 10 MG tablet   Commonly known as:  COMPAZINE   Take 1 tablet (10 mg) by mouth every 6 hours as needed (nausea/vomiting)                                promethazine 25 MG Suppository   Commonly known as:  PHENERGAN   Place 25 mg rectally                                senna-docusate 8.6-50 MG per tablet   Commonly known as:  SENOKOT-S;PERICOLACE   Take 1-2 tablets by mouth 2 times daily                                * study - niraparib 100 mg capsule   Commonly known as:  IDS# 4759   Take 3 capsules (300 mg) by mouth every morning Take at the same time each day, preferably morning. Swallow whole and do NOT chew capsules. Food and water is permissible. First dose will be administered on site.                                * study - niraparib 100 mg capsule   Commonly known as:  IDS# 4759   Take 2 capsules (200 mg) by mouth every morning Take at the same time each day, preferably morning. Swallow whole and do NOT chew  capsules. Food and water is permissible. First dose will be administered on site.                                * study - niraparib 100 mg capsule   Commonly known as:  IDS# 4759   Take 2 capsules (200 mg) by mouth every morning Take at the same time each day, preferably morning. Swallow whole and do NOT chew capsules. Food and water is permissible. First dose will be administered on site.                                tamsulosin 0.4 MG capsule   Commonly known as:  FLOMAX   Take 1 capsule (0.4 mg) by mouth daily   Last time this was given:  0.4 mg on 3/2/2017  7:20 PM                                tolterodine 4 MG 24 hr capsule   Commonly known as:  DETROL LA   Take 1 capsule (4 mg) by mouth daily   Last time this was given:  4 mg on 3/2/2017  7:20 PM                                traMADol 50 MG tablet   Commonly known as:  ULTRAM   Take 25-50 mg by mouth                                VITAMIN B6 PO   Take 1 tablet by mouth At Bedtime                                ZANTAC PO   Take 75 mg by mouth daily                                * Notice:  This list has 3 medication(s) that are the same as other medications prescribed for you. Read the directions carefully, and ask your doctor or other care provider to review them with you.

## 2017-02-28 NOTE — NURSING NOTE
Patient here for C3D1 visit for Tesaro/Quadra Niraparib study.  Labs were reviewed and ok'd to continue on current dose of study medication.  Patient has decreased protein of 2.9.  This is a grade 3 AE.  Patient c/o nausea, vomiting, lack of appetite, insomnia, increased palpations, shakiness, flushed cheeks, and abdominal discomfort.  AE's opened for these symptoms.  Patient returned 41 pills from bottle #[id number] along with pill diary.  Pill taken match pill diary.  Bottle #[id number] dispensed to the patient along with new pill diary.  The medical monitor was notified for the study and states the following         I have confirmed with the medical monitor that it is ultimately at the PI s discretion whether or not the hypokalemia is considered related to the study drug. As you mentioned, the protocol only provides the following guidance:     Treatment must be interrupted for any nonhematologic CTCAE (v.4.03) Grade 3 or 4 AE that the Investigator considers to be related to administration of niraparib     In this particular case, we do not need to interrupt the drug or have a dose reduction. The medical monitors strongly recommend that the patient is immediately assessed to ensure that the hypokalemia is related to the stents and appropriate measures to manage it immediately implemented.     Please let me know if there s any other questions/concerns!     Thank you for reaching out MICHELLE Younger  Clinical Monitoring Associate I    Per Dr. Mccollum the decreased protein is not believed to be from the study drug.  As well as the increased protein in the urine.        After receiving the results of the imaging it was determined that the patient has a partial small bowel obstruction.  Patient was still in clinic getting potassium replacement and IV fluids, she was informed of the results of the imaging and that she would need to be admitted.  The study drug will be on hold until the obstruction is resolved.  Patient is  aware.  EMELI sent for the bowel obstruction and hospitalization.      Aurelia Worley RN     Pager [phone]

## 2017-02-28 NOTE — IP AVS SNAPSHOT
[address] [address]    Phone:  [phone]                                       After Visit Summary   2/28/2017    Margaux Guzmán    MRN: [id number]           After Visit Summary Signature Page     I have received my discharge instructions, and my questions have been answered. I have discussed any challenges I see with this plan with the nurse or doctor.    ..........................................................................................................................................  Patient/Patient Representative Signature      ..........................................................................................................................................  Patient Representative Print Name and Relationship to Patient    ..................................................               ................................................  Date                                            Time    ..........................................................................................................................................  Reviewed by Signature/Title    ...................................................              ..............................................  Date                                                            Time

## 2017-02-28 NOTE — MR AVS SNAPSHOT
After Visit Summary   2/28/2017    Margaux Guzmán    MRN: [id number]           Patient Information     Date Of Birth          1955        Visit Information        Provider Department      2/28/2017 12:30 PM [facility] 18 ATC; [facility] ONCOLOGY INFUSION [facility] [place]        Today's Diagnoses     Dehydration    -  1    Ovarian cancer, left (H)        Ovarian cancer, right (H)        Nausea with vomiting        Abdominal pain, generalized          Care Instructions    Contact Numbers    [facility] [place] Main Line: [phone]  [facility] [place] Triage:  [phone]    Call triage with chills and/or temperature greater than or equal to 100.5, uncontrolled nausea/vomiting, diarrhea, constipation, dizziness, shortness of breath, chest pain, bleeding, unexplained bruising, or any new/concerning symptoms, questions/concerns.     If you are having any concerning symptoms or wish to speak to a provider before your next infusion visit, please call your care coordinator or triage to notify them so we can adequately serve you.       After Hours: [phone]    If after hours, weekends, or holidays, call main hospital  and ask for Oncology doctor on call.         February 2017 Sunday Monday Tuesday Wednesday Thursday Friday Saturday                  1     2     [facility]    9:15 AM   (15 min.)    [facility]   [facility] Draw     [facility] RETURN    9:25 AM   (20 min.)   Jia Mccollum MD   [facility] [place] 3     4       5     6     7     8     9     10     11       12     13     14     15     16     17     18       19     20     21     22     23     24     25       26     27     Outpatient Visit    9:29 AM   [facility]     COMBINED CYSTOSCOPY, RETROGRADES, EXCHANGE STENT URETER(S)   11:45 AM   Carmela Alvarez MD   [facility] OR     XR SURG EARNEST <5 MIN FL W STILL   12:00 PM   (30 min.)   [facility]2   [facility] 28     CT  CHEST/ABDOMEN/PELVIS W    9:05 AM   (20 min.)   UCCT1   [facility] CT     [facility] DRAW    9:45 AM   (15 min.)   [facility] LAB DRAW   [facility] Draw     [facility] ONC INFUSION 120   12:30 PM   (120 min.)    ONCOLOGY INFUSION   [facility]     [facility] RETURN    1:45 PM   (20 min.)   Jia Mccollum MD   [facility]     XR ABDOMEN 2 VIEWS    2:30 PM   (15 min.)   XR1   [facility] Xray                                March 2017 Sunday Monday Tuesday Wednesday Thursday Friday Saturday                  1     2     3     4       5     6     7     8     9     10     11       12     13     14     15     16     17     18       19     20     21     22     23     24     25       26     27     28     29     30     31                       Lab Results:  Recent Results (from the past 12 hour(s))   CBC with platelets differential    Collection Time: 02/28/17 10:52 AM   Result Value Ref Range    WBC 6.6 4.0 - 11.0 10e9/L    RBC Count 3.12 (L) 3.8 - 5.2 10e12/L    Hemoglobin 10.3 (L) 11.7 - 15.7 g/dL    Hematocrit 30.0 (L) 35.0 - 47.0 %    MCV 96 78 - 100 fl    MCH 33.0 26.5 - 33.0 pg    MCHC 34.3 31.5 - 36.5 g/dL    RDW 16.0 (H) 10.0 - 15.0 %    Platelet Count 126 (L) 150 - 450 10e9/L    Diff Method Automated Method     % Neutrophils 74.3 %    % Lymphocytes 14.0 %    % Monocytes 11.0 %    % Eosinophils 0.2 %    % Basophils 0.2 %    % Immature Granulocytes 0.3 %    Nucleated RBCs 0 0 /100    Absolute Neutrophil 4.9 1.6 - 8.3 10e9/L    Absolute Lymphocytes 0.9 0.8 - 5.3 10e9/L    Absolute Monocytes 0.7 0.0 - 1.3 10e9/L    Absolute Eosinophils 0.0 0.0 - 0.7 10e9/L    Absolute Basophils 0.0 0.0 - 0.2 10e9/L    Abs Immature Granulocytes 0.0 0 - 0.4 10e9/L    Absolute Nucleated RBC 0.0    Comprehensive metabolic panel    Collection Time: 02/28/17 10:52 AM   Result Value Ref Range    Sodium 136 133 - 144 mmol/L    Potassium 2.9 (L) 3.4 - 5.3 mmol/L     Chloride 95 94 - 109 mmol/L    Carbon Dioxide 30 20 - 32 mmol/L    Anion Gap 10 3 - 14 mmol/L    Glucose 104 (H) 70 - 99 mg/dL    Urea Nitrogen 13 7 - 30 mg/dL    Creatinine 0.95 0.52 - 1.04 mg/dL    GFR Estimate 59 (L) >60 mL/min/1.7m2    GFR Estimate If Black 72 >60 mL/min/1.7m2    Calcium 9.4 8.5 - 10.1 mg/dL    Bilirubin Total 0.4 0.2 - 1.3 mg/dL    Albumin 3.8 3.4 - 5.0 g/dL    Protein Total 7.1 6.8 - 8.8 g/dL    Alkaline Phosphatase 63 40 - 150 U/L    ALT 14 0 - 50 U/L    AST 13 0 - 45 U/L   Magnesium    Collection Time: 02/28/17 10:52 AM   Result Value Ref Range    Magnesium 1.8 1.6 - 2.3 mg/dL       Collection Time: 02/28/17 10:52 AM   Result Value Ref Range     285 (H) 0 - 30 U/mL   GGT    Collection Time: 02/28/17 10:52 AM   Result Value Ref Range    GGT 19 0 - 40 U/L   Lactate Dehydrogenase    Collection Time: 02/28/17 10:52 AM   Result Value Ref Range    Lactate Dehydrogenase 190 81 - 234 U/L   Amylase    Collection Time: 02/28/17 10:52 AM   Result Value Ref Range    Amylase 34 30 - 110 U/L   Routine UA with microscopic - No culture    Collection Time: 02/28/17 10:52 AM   Result Value Ref Range    Color Urine Yellow     Appearance Urine Slightly Cloudy     Glucose Urine Negative NEG mg/dL    Bilirubin Urine Negative NEG    Ketones Urine Negative NEG mg/dL    Specific Gravity Urine 1.012 1.003 - 1.035    Blood Urine Large (A) NEG    pH Urine 7.0 5.0 - 7.0 pH    Protein Albumin Urine >499 (A) NEG mg/dL    Urobilinogen mg/dL 0.0 0.0 - 2.0 mg/dL    Nitrite Urine Negative NEG    Leukocyte Esterase Urine Small (A) NEG    Source Random     WBC Urine 85 (H) 0 - 2 /HPF    RBC Urine >182 (H) 0 - 2 /HPF    Bacteria Urine Few (A) NEG /HPF    Squamous Epithelial /HPF Urine <1 0 - 1 /HPF    Mucous Urine Present (A) NEG /LPF             Follow-ups after your visit        Your next 10 appointments already scheduled     Mar 30, 2017 10:15 AM CDT   Lab with  LAB    [facility]  [place])    [address] [place] [address]  [address] [facility] [address]   [phone]            Mar 30, 2017 10:40 AM CDT   (Arrive by 10:25 AM)   Return Visit with Jia Mccollum MD   [facility] ([facility])    [address] [place] [address]  2nd Floor  [facility] [address]   [phone]            Apr 12, 2017 10:00 AM CDT   Lab with  LAB   [facility] ([facility])    [address] [place] [address]  [address] [facility] [address]   [phone]            Apr 12, 2017 10:20 AM CDT   (Arrive by 9:50 AM)   Return Visit with Mackenzie Ca MD   [facility] Nephrology ([facility])    [address] [place] [address]  3[address] [facility] [address] [phone]              Who to contact     If you have questions or need follow up information about today's clinic visit or your schedule please contact [facility] directly at [phone].  Normal or non-critical lab and imaging results will be communicated to you by "[employer]hart, letter or phone within 4 business days after the clinic has received the results. If you do not hear from us within 7 days, please contact the clinic through [employer] or phone. If you have a critical or abnormal lab result, we will notify you by phone as soon as possible.  Submit refill requests through [employer] or call your pharmacy and they will forward the refill request to us. Please allow 3 business days for your refill to be completed.          Additional Information About Your Visit        [employer] Information     [employer] gives you secure access to your electronic health record. If you see a primary care provider, you can also send messages to your care team and make appointments. If you have questions, please call your primary care clinic.  If you do not have a primary care provider, please call [phone] and they will assist you.        Care EveryWhere ID     This is your  Care EveryWhere ID. This could be used by other organizations to access your [place] medical records  [id number]         Blood Pressure from Last 3 Encounters:   02/28/17 147/83   02/27/17 (!) 150/92   02/02/17 146/84    Weight from Last 3 Encounters:   02/28/17 56.5 kg (124 lb 9.6 oz)   02/27/17 56.9 kg (125 lb 8 oz)   02/02/17 56.9 kg (125 lb 8 oz)              We Performed the Following     Electrolyte Replacement     MD Instruction for Therapy Plan          Today's Medication Changes          These changes are accurate as of: 2/28/17  5:39 PM.  If you have any questions, ask your nurse or doctor.               Start taking these medicines.        Dose/Directions    megestrol 40 MG/ML suspension   Commonly known as:  MEGACE ORAL   Used for:  Ovarian cancer, right (H), Loss of appetite   Started by:  Jia Mccollum MD        Dose:  400 mg   Take 10 mLs (400 mg) by mouth 2 times daily   Quantity:  600 mL   Refills:  1         These medicines have changed or have updated prescriptions.        Dose/Directions    * HYDROmorphone 2 MG tablet   Commonly known as:  DILAUDID   This may have changed:  Another medication with the same name was added. Make sure you understand how and when to take each.        Dose:  2 mg   Take 2 mg by mouth   Refills:  0       * HYDROmorphone 4 MG tablet   Commonly known as:  DILAUDID   This may have changed:  You were already taking a medication with the same name, and this prescription was added. Make sure you understand how and when to take each.   Used for:  Flank pain, Ovarian cancer, left (H), Ovarian cancer, right (H)   Changed by:  Jai Mccollum MD        Dose:  4 mg   Take 1 tablet (4 mg) by mouth every 4 hours as needed for moderate to severe pain maximum  6  tablet(s) per day   Quantity:  35 tablet   Refills:  0       ondansetron 8 MG ODT tab   Commonly known as:  ZOFRAN-ODT   This may have changed:    - medication strength  - how much to take  - when to take  this  - reasons to take this   Used for:  Ovarian cancer, right (H), Nausea   Changed by:  Jia Mccollum MD        Dose:  8 mg   Place 1 tablet (8 mg) under the tongue every 8 hours as needed for nausea   Quantity:  30 tablet   Refills:  3       * study - niraparib 100 mg capsule   Commonly known as:  IDS# [id number]   This may have changed:  Another medication with the same name was added. Make sure you understand how and when to take each.   Used for:  Anemia due to other cause, Chemotherapy-induced neutropenia (H), Ovarian cancer, left (H), Ovarian cancer, right (H), Primary peritoneal adenocarcinoma (H)   Changed by:  Jia Mccollum MD        Dose:  300 mg   Take 3 capsules (300 mg) by mouth every morning Take at the same time each day, preferably morning. Swallow whole and do NOT chew capsules. Food and water is permissible. First dose will be administered on site.   Quantity:  84 capsule   Refills:  0       * study - niraparib 100 mg capsule   Commonly known as:  IDS# [id number]   This may have changed:  Another medication with the same name was added. Make sure you understand how and when to take each.   Used for:  Anemia due to other cause, Chemotherapy-induced neutropenia (H), Ovarian cancer, left (H), Ovarian cancer, right (H), Primary peritoneal adenocarcinoma (H), Adjustment disorder with anxious mood   Changed by:  Jia Mccollum MD        Dose:  200 mg   Take 2 capsules (200 mg) by mouth every morning Take at the same time each day, preferably morning. Swallow whole and do NOT chew capsules. Food and water is permissible. First dose will be administered on site.   Quantity:  84 capsule   Refills:  0       * study - niraparib 100 mg capsule   Commonly known as:  IDS# [id number]   This may have changed:  You were already taking a medication with the same name, and this prescription was added. Make sure you understand how and when to take each.   Used for:  Anemia due to other cause, Chemotherapy-induced  neutropenia (H), Ovarian cancer, left (H), Ovarian cancer, right (H), Primary peritoneal adenocarcinoma (H)   Changed by:  Aurelia Worley RN        Dose:  200 mg   Take 2 capsules (200 mg) by mouth every morning Take at the same time each day, preferably morning. Swallow whole and do NOT chew capsules. Food and water is permissible. First dose will be administered on site.   Quantity:  84 capsule   Refills:  0       * Notice:  This list has 5 medication(s) that are the same as other medications prescribed for you. Read the directions carefully, and ask your doctor or other care provider to review them with you.         Where to get your medicines      These medications were sent to [place], [place] - [address] [place] [address] [address] [place] [address] [address] [place] [id number]    Hours:  TRANSPLANT PHONE NUMBER [phone] Phone:  [phone]     megestrol 40 MG/ML suspension    ondansetron 8 MG ODT tab         Some of these will need a paper prescription and others can be bought over the counter.  Ask your nurse if you have questions.     Bring a paper prescription for each of these medications     HYDROmorphone 4 MG tablet         Information about where to get these medications is not yet available     ! Ask your nurse or doctor about these medications     study - niraparib 100 mg capsule                Primary Care Provider Office Phone # Fax #    Aurelia Knox [phone]       [facility] [address] [facility] [address]        Thank you!     Thank you for choosing [facility]  for your care. Our goal is always to provide you with excellent care. Hearing back from our patients is one way we can continue to improve our services. Please take a few minutes to complete the written survey that you may receive in the mail after your visit with us. Thank you!             Your Updated Medication List - Protect others around  you: Learn how to safely use, store and throw away your medicines at www.disposemymeds.org.          This list is accurate as of: 2/28/17  5:39 PM.  Always use your most recent med list.                   Brand Name Dispense Instructions for use    ACETAMINOPHEN PO      Take 500 mg by mouth every 6 hours as needed for pain Reported on 2/28/2017       cycloSPORINE 0.05 % ophthalmic emulsion    RESTASIS     Place 1 drop into both eyes 2 times daily       enoxaparin 40 MG/0.4ML injection    LOVENOX    30 Syringe    Inject 0.4 mLs (40 mg) Subcutaneous daily       EPINEPHrine 0.3 MG/0.3ML injection     1 each    Inject 0.3 mLs (0.3 mg) into the muscle once as needed for anaphylaxis       ferrous sulfate 325 (65 FE) MG tablet    IRON     Take 325 mg by mouth       * HYDROmorphone 2 MG tablet    DILAUDID     Take 2 mg by mouth       * HYDROmorphone 4 MG tablet    DILAUDID    35 tablet    Take 1 tablet (4 mg) by mouth every 4 hours as needed for moderate to severe pain maximum  6  tablet(s) per day       levofloxacin 500 MG tablet    LEVAQUIN     Take 500 mg by mouth       LORazepam 1 MG tablet    ATIVAN    30 tablet    Take 1 tablet (1 mg) by mouth every 6 hours as needed (nausea/vomiting, anxiety or sleep)       megestrol 40 MG/ML suspension    MEGACE ORAL    600 mL    Take 10 mLs (400 mg) by mouth 2 times daily       MELATONIN PO      Take 1 mg by mouth At Bedtime       morphine 10 MG/5ML solution     100 mL    Take 0.5 mLs (1 mg) by mouth every 2 hours as needed for moderate to severe pain       ondansetron 8 MG ODT tab    ZOFRAN-ODT    30 tablet    Place 1 tablet (8 mg) under the tongue every 8 hours as needed for nausea       ONE DAILY MULTIVITAMIN WOMEN Tabs      Take 1 tablet by mouth every morning       * oxyCODONE 5 MG IR tablet    ROXICODONE     Take 5-10 mg by mouth       * oxyCODONE 5 MG IR tablet    ROXICODONE    30 tablet    Take 1-2 tablets (5-10 mg) by mouth every 3 hours as needed for other (Moderate to  Severe Pain)       polyethylene glycol powder    MIRALAX/GLYCOLAX     Take 1 capful by mouth daily as needed       prochlorperazine 10 MG tablet    COMPAZINE    30 tablet    Take 1 tablet (10 mg) by mouth every 6 hours as needed (nausea/vomiting)       promethazine 25 MG Suppository    PHENERGAN     Place 25 mg rectally       senna-docusate 8.6-50 MG per tablet    SENOKOT-S;PERICOLACE    7 tablet    Take 1-2 tablets by mouth 2 times daily       * study - niraparib 100 mg capsule    IDS# [id number]    84 capsule    Take 3 capsules (300 mg) by mouth every morning Take at the same time each day, preferably morning. Swallow whole and do NOT chew capsules. Food and water is permissible. First dose will be administered on site.       * study - niraparib 100 mg capsule    IDS# [id number]    84 capsule    Take 2 capsules (200 mg) by mouth every morning Take at the same time each day, preferably morning. Swallow whole and do NOT chew capsules. Food and water is permissible. First dose will be administered on site.       * study - niraparib 100 mg capsule    IDS# [id number]    84 capsule    Take 2 capsules (200 mg) by mouth every morning Take at the same time each day, preferably morning. Swallow whole and do NOT chew capsules. Food and water is permissible. First dose will be administered on site.       tamsulosin 0.4 MG capsule    FLOMAX    60 capsule    Take 1 capsule (0.4 mg) by mouth daily       tolterodine 4 MG 24 hr capsule    DETROL LA    30 capsule    Take 1 capsule (4 mg) by mouth daily       traMADol 50 MG tablet    ULTRAM     Take 25-50 mg by mouth       VITAMIN B6 PO      Take 1 tablet by mouth At Bedtime       ZANTAC PO      Take 75 mg by mouth daily       * Notice:  This list has 7 medication(s) that are the same as other medications prescribed for you. Read the directions carefully, and ask your doctor or other care provider to review them with you.

## 2017-03-01 NOTE — PROGRESS NOTES
"Care Coordinator Note  PT seen on 2/28/17.  Assited with getting her IV fluids, abd xray and replacement at [facility], \"My stomach is feeling like it did when I had my bowel obstruction.\"  Nausea and vomiting the last 48 hours. PT is alert and oriented up and ambulatory, will admit after infusion is completed report given to 7C.  [facility] nurse will given  report again after infusion is completed.       Pt verbalized back understanding of the above information discussed.   Face to face time spent with patient 15.  Jessica TIMMONS RN, OCN  Care Coordinator   Gynecologic Cancer   Office [phone]  Pager [phone] #[phone]  "

## 2017-03-01 NOTE — H&P
"Gynecology/Oncology History and Physical    CC: Abdominal pain and nausea    HPI: Margaux Guzmán is a 61 year old with IIIB primary peritoneal cancer presenting from clinic with worsening abdominal pain and nausea. She vomited twice, once today in the Infusion center while waiting to have a room here in the hospital, and once two days ago. Her abdominal pain is diffuse, achy and crampy. It did respond to Dilaudid in the infusion center, but didn't go away completely. She is constipated at baseline and has had small, regular bowel movements. She has had a poor appetite for a long time, and that has worsened over the past day. She has been able to eat and drink very small amounts, mostly drinking. On 2/25 she was admitted to the hospital in [place] where she was started on Levofloxacin for presumed UTI. As she is on a study drug (Niraparib), the Levofloxacin was discontinued because of the possible QT interaction, but also because her UA abnormalities were thought to be from stent issues. Now she thinks that her \"stent\" pain might have actually been abdominal pain. She has some mild dysuria that is improved from previously. She is voiding normal amounts without hematuria. Also no hematochezia.     She was admitted to the hospital 1/27-30 for rectal bleeding (now resolved, as above) and Hgb <8, received blood transfusions. She has a history of R IJ clot (9/2015). Her Lovenox had previously been decreased because of bleeding to 40 mg daily.     She was previously admitted to the hospital on 1/10 for management of a small bowel obstruction and had NGT placed at that time. This resolved and she resumed her normal diet.     Regarding her pain, she's had a varied home medication regimen including PO Dilaudid, morphine    She says she recently started an antihypertensive (\"it starts with 'a'\") but can't remember which one or the dose.     She requests to be DNR but NOT DNI.     ROS:  Gen: No fevers/chills  HEENT: No " changes in vision   Neuro: Occasionally has dizziness with amulation  CV: No CP; endorses palpitations.   Pulm: No SOB or cough  GI: Nausea as above. Occasional vomiting, no constipation, no current hematochezia  : Mild dysuria, which is normal for her. No bothersome vaginal discharge  Skin: No rashes or itching  MSK: No joint problems.  Endocrine: polydypsia/polyuria  Allergy/Immunology: No autoimmune diseases  Psych: Anxiety, controlled recently    Brief Oncology History:  9/2013: Evaluation in [place]  - presented for evaluation of pelvic and abdominal pain. Exam demonstrated right adnexal tenderness and slightly enlarged right ovary freely mobile and smooth.   - Transabdominal ultrasound was normal; pelvic US showed anechoic right ovarian cyst measuring 4.3 x 3.8 x 4.0 cm.  - : 74 on 10/8/13.   10/30/13: Seen by and discussed surgery and possible staging with Dr. Aguilar  11/7/13 Diagnostic laparoscopy converted to exploratory laparotomy, bilateral salpingo-oophorectomy, total abdominal hysterectomy, omentectomy, staging biopsies, bilateral pelvic and periaortic lymph node dissection and washings. Stage IIIB  12/4/13: Cycle #1 IV/IP  1/2/14: Cycle #2 IV/IP PCP.  - 14  1/23/14: Cycle 3 IV/IP.  - 7  2/11/14:  - 7. CT C/A/P Impression:  1. Multiple bilateral pulmonary nodules as described in full report measuring up to 3 mm along the right major fissure. These are indeterminate given lack of comparison and followup is recommended.  2. No definite evidence for recurrent or metastatic disease in the abdomen or the pelvis. There is a rounded hypodense focus abutting the left external iliac artery and the adjacent sigmoid colon which measures 1 cm, this could represent a fluid-filled sigmoid diverticulum or a hypodense node, further attention to this area on subsequent examinations is recommended.  3. There is a 7 mm nonobstructing stone in the inferior collecting system of the right  kidney.  2/12/14-3/26/14: Cycle #4-6 IV/IP CA-125 7, 7, 7.  4/21/14: CT C/A/P Impression:   1. Unchanged indeterminate pulmonary nodules. Recommend continued surveillance.   2. No definite evidence of metastatic ovarian cancer in the abdomen or pelvis. Small amount of subtle increased thickening and fluid is noted along the right lymph node dissection, nonspecific, but possibly a tiny developing lymphocele.   3. 6 mm nonobstructing stone in the right kidney.  Plan surveillance for ovarian cancer every 3 months with physical and .   Indeterminate pulmonary nodule: These were present before the surgery and there was not change with the chemotherapy. Highly unlikely to be a metastatic disease. Will repeat a CT in 3 months to see any change      5/22/14:  6. JOSEMANUEL.  5/17/14: ED visit [facility]. CT abd/pel noted possible chronic partial small bowel obstruction. Colonscopy scheduled for June 6, 2014 locally. Abdominal pain started 5/17/14 and noted pressure without bowel movement and went to ED that night due to increasing pain. In patient 2 day hospital with clear liquids/IV. Mild nausea without vomiting. BM subsequently occurred and pt was discharged. No pain since. Continues with fullness in abdomen. Diet is bland for the most part.  8/25/14:  -5. Notes increased abdominal girth and bloating x 2-3 weeks with urine urgency. Worried about recurrence. Is just starting to return to normal exercise and activities. No constipation, diarrhea, pain, vaginal or rectal bleeding, cough or dyspnea. CT to follow indeterminate pulmonary nodules today.   CT cap IMPRESSION:   1. No CT scan findings of the chest, abdomen, or pelvis to indicate metastatic disease.  2. 2-5 mm pulmonary nodules, stable since 2/11/2014.  3. Nonobstructing 6 mm stone in the right kidney.  12/3/14:  8. Pt started Zoloft for anxiety. Worries about cancer recurrence.   3/2/15: CA 34  3/5/15: CT C/A/P:  "Impression:  1. Multiple new small peritoneal and retroperitoneal nodules are suspicious for metastases. Suspicious new left common iliac and central small bowel mesenteric nodes. No abdominal ascites.  2. Multiple pulmonary nodules are stable with no new nodules.  3. 9 mm nonobstructing right lower pole renal calculus.     5/20/15: CT c/a/p showed nodularity throughout the abdomen and pelvis worrisome for malignancy which has increased in size      5/28/15: CT abdomen and pelvis showed stable inumerabble peritoneal nodules scattered throughout the abdomen and pelvis consistent with metastases.      8/4/15 (approximatly): Left ureteral stent was placed.     8/12/15:  from [place] 303     8/18/15: CT chest Impression showed slight increase in mild, subtle nodularity along the diaphragm which is nonspecific but may represent metastatic disease.   8/18/15: CT abdomen and pelvis Impression showed interval progression of peritoneal metastatic disease.      8/25/15:  on 3/2/15 of 34 prompted CT c/a/p, which showed multiple new small peritoneal and retroperitoneal nodules are suspicious for metastases. Suspicious new left common iliac and central small bowel mesenteric nodes. She participated in [facility] study involving treatment with on clinical trial with vaccine [id number]. She is here today because the [place] trail failed and the pt progressed. Her most recent CT c/a/p on 8/25 showed progression of peritoneal metastatic disease. And her most recent  from [place] on 8/12/15 was 303.     Plan: Carbo/Doxil x 6 cycles.with imaging after 3 cycles.      9/3/15: Cycle #1 Carbo/Doxil.  244.  9/24/15: right IJ thrombus; started on Lovenox.   9/26/15: Present to [place] ED. \"presented to the ED this morning with what appears to be a mild drug rash after administering her lovenox this morning. This writer discussed situation with Gyn Onc Fellow Jeanne Moon as well as [facility] Heme/Onc service. Per Heme, a rash " "of this nature is extremely uncommon with administration of Lovenox. Given the mild nature of this rash and acute need for anticoagulation, recommended the patient continue with Lovenox injections and treat with Benadryl as needed. Advised patient be given precautions to return to the ED immediately if she develops reactive respiratory symptoms. Alternatively patient could be switched to alternative formulation of low molecular weight heparin if she was strongly resistant to continuation of Lovenox.\"     10/1/15: Cycle #2 Carbo/Doxil.  175.     10/28/15: gyn onc visit: pt complains of worsening constipation and tenderness around her right ribs. She is taking senna for her constipation with minimal improvement. She admits that the swelling around her neck after her blood clot has decreased. She also admits to feeling a nodules on her left abdomen. She states her appetite is good but she has not been eating fruits and vegetables. She denies any chemo side effects besides fatigue.      10/28/15:  158  11/23/2015:  133  CT c/a/p  IMPRESSION: In this patient with a clinical history of ovarian cancer  there is mixed response to therapy:   1. Stable to slightly decreased peritoneal nodularity since  8/18/2015.  2. No significant change regarding the large subdiaphragmatic  peritoneal deposit since 9/24/2015.  3. Enlarging soft tissue nodule overlying the abdominal musculature  concerning for metastasis since March of 2015.   4. Unchanged, indeterminate hypodensity near the gallbladder fossa  since 8/18/2015, however progressively increased in size since  3/5/2015.  5. Bilateral nephroureteral stents. No significant hydronephrosis.  6. Bilateral pulmonary nodules. Continued followup recommended.     12/23/15: Cycle 5 carboplatin/Doxil/Avastin.  96. To receive Avastin today despite proteinuria per Dr. Aguilar and nephrology.  1/21/16: Cycle 6 carboplatin/Doxil/Avastin, delayed one week secondary to " neutropenia.  62.  1/28/16:  63.     2/2/16: Patient had right upper extremity doppler u/s done in [place] due to swollen glands and pain. 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 Pt is currently on Lovenox.     2/2/16: US soft tissue neck  Conclusion:  1. Morphologically normal not pathologically enlarged two right carotid chain lymph nodes.  2. Chronic right jugular vein thrombosis, described in detail on  venous doppler exam.      2/2/16: Thyroid Ultrasound  Right lobe: 5.5 x 1.9 x 1.2 cm  Left lobe: 5.0 x 1.5 . 0.9 cm  Both lobes have normal background echotexture.     Conclusion:  1. 3 benign - appearing subcentimeter hypoechoic right mid thyroid nodules.  2. Borderline thyromegaly.     2/2/16: Right upper extremity venous duplex doppler evaluation  Conclusion:  1.) chronic non compressible echogenic right jugular vein thrombus, now 4.3 cm in length.      2/22/16:   IMPRESSION:    1. Ovarian cancer with peritoneal carcinomatosis.  2. Given the increased sensitivity of PET/CT, comparison with prior CT examinations is difficult. In general the abdominal/pelvic metastatic lesions appear to be decreased in size.  3. Persistent right internal jugular DVT, as evidenced by 5 cm filling  defect with inferior border at the central venous catheter.  4. Bilateral hydronephrosis without overt caliectasis. Some distal  migration of the bilateral double-J ureteral stents, although the  proximal coiled portions continue to be in the renal pelves.     2/24/16: C7 carboplatin/Doxil/Avastin.  56.  3/9/16: Hgb 6.6, worked up for transfusion reaction (negative). Bleed possibly secondary to   3/23/16: C8 carboplatin/Doxil/Avastin.  44.  4/2016: Hospitalized in [place] x2 weeks for hematuria leading to anemia after ureteral stent exchange  4/20/16: C9D1 carboplatin/Doxil/Avastin.  Deferred one week secondary to acute UTI.  35.  4/28/16: C9D1 deferred due to continued bacteruria and ANC 1.3.  5/4/16: C9D1 carboplatin/Doxil/Avastin. Breast lump noted, diagnostic mammogram ordered.  6/1/16: C10D1 carboplatin/Doxil/Avastin.  50.  6/28/16: C11D1 carboplatin/Doxil/Avastin. Avastin held due to bleeding. Carbo/Doxil deferred one week secondary to neutropenia.  43.  7/7/16: C12D1 carboplatin/Doxil. Avastin held due to bleeding.  7/18/16: Bilateral ureteral stent exchange  7/20/16-7/29/16: Hospitalized with urosepsis and blood loss anemia, started on Zosyn and discharged on amoxicillin  9/29/16: C1D1 Gemzar.  85.  10/21/16: C2D1 Gemzar.  106.   11/11/16: C3D1 Gemzar.  pending.     12/12/16: CT CAP  IMPRESSION: In this patient with a history of metastatic ovarian cancer:  1. Progression of disease as evidenced by a slowly enlarging mesenteric and omental soft tissue foci and slowly enlarging periesophageal and pericardiophrenic lymph nodes, as detailed above.  2. Stable appearance of bilateral ureteral stents without hydronephrosis.  3. Patient was premedicated for a pre-existing IV contrast allergy.  Despite this, she had itchiness on her face poststudy. She should no longer receive IV contrast in the future.       CT AP 1/10/2017: multiple dilated fluid filled small bowel loops with decompressed distal small bowel loops, consistent with obstruction. Transition point is suspected in the pelvis. No pneumatosis or free intraperitoneal gas. Bilateral ureteral stents appear appropriately positioned. There is mild dilation of the bilateral renal collecting systems, left greater than right.  - Admitted to hospital from clinic for management of SBO      1/23/17: admitted to clinic for dizziness and heart palpitations   1/27/17: ED admission - leg swelling and GI bleeding      1/27/17: US lower extremity   IMPRESSION:  1.  No evidence of right lower extremity deep venous  thrombosis.  2.  Dampened waveforms in the right lower extremity, similar to  2016, though a more central occlusion cannot be excluded.  17: MRI Brain  IMPRESSION:  Normal brain MRI  17: CT AP  IMPRESSION: This patient with a history of metastatic ovarian cancer:  1. No bowel obstruction. Oral contrast progressed to the colon.  Extensive colonic stool.  2. Severe right hydronephrosis, new from 2016, may represent  obstruction of the right ureteral stent. No left-sided hydronephrosis.  3. Slight interval progression of diffuse peritoneal metastatic  disease.  4. Mildly nodular areas of increased attenuation within the pelvic  bowel which may represent loculated malignant ascites or peritoneal  implants similar in appearance to 2016.     17: colonoscopy- benign      Treatment: Tesaro/Quadra Niraparib study  17: Cycle #1 Day 1   17: Cycle #2 Day 1   17: Cycle #3 Day 1      17: CT scan - [facility]   IMPRESSION:  1. Severe right hydronephrosis, increased compared to the prior exam. Stable, mild left hydronephrosis. Both ureteral stents appear appropriate in position.   2. Stable subcapsular hepatic lesion.  3.  Right paraesophageal adenopathy may be related to history of genitourinary cancer.   4.  Status post hysterectomy.    2017: Stent exchange with Urology    PMH:  Past Medical History   Diagnosis Date     Anemia      History of blood transfusion      MULTIPLE     Hydronephrosis      Hyperlipidemia      Jugular vein thrombosis, right      Persistent right internal jugular DVT     Livedo annularis      Osteoarthritis      Osteopenia      Ovarian cancer (H)      Polymyalgia rheumatica (H)      PONV (postoperative nausea and vomiting)      Primary cancer of peritoneum (H)        PSHx:  Past Surgical History   Procedure Laterality Date     Appendectomy        section       Open reduction internal fixation toe(s)  9/3/13     Fifth digit, left  foot, with screw fixation      Lymph node biopsy  2008     Left posterior chain, beign     Breast surgery       biopsy negative     Laparoscopic salpingo-oophorectomy  11/7/2013     Procedure: LAPAROSCOPIC SALPINGO-OOPHORECTOMY;  Diagnostic Laparoscopy, Exploratory Laparotomy, Bilateral Salpingo-Oophorectomy,  Hysterectomy, Omentectomy, Pelvic Washings, Peritoneal staging and biopsies, Pelvic and Periaortic Lymph node Dissection;  Surgeon: Cheri Aguilar MD;  Location: [facility] OR     Hysterectomy total abdominal, bilateral salpingo-oophorectomy, node dissection, combined  11/7/2013     Procedure: COMBINED HYSTERECTOMY TOTAL ABDOMINAL, SALPINGO-OOPHORECTOMY, NODE DISSECTION;;  Surgeon: Cheri Aguilar MD;  Location: [facility] OR     Laparotomy, staging, combined  11/7/2013     Procedure: COMBINED LAPAROTOMY, STAGING;;  Surgeon: Cheri Aguilar MD;  Location: [facility] OR     Remove port peritoneal  5/5/2014     Procedure: REMOVE PORT PERITONEAL;  Surgeon: Cheri Aguilar MD;  Location: [facility] OR     Combined cystoscopy, retrogrades, exchange stent ureter(s) Bilateral 7/18/2016     Procedure: COMBINED CYSTOSCOPY, RETROGRADES, EXCHANGE STENT URETER(S);  Surgeon: Carmela Alvarez MD;  Location: [facility] OR     Combined cystoscopy, retrogrades, exchange stent ureter(s)  4/2016     @ [facility]     Combined cystoscopy, retrogrades, exchange stent ureter(s) Bilateral 10/17/2016     Procedure: COMBINED CYSTOSCOPY, RETROGRADES, EXCHANGE STENT URETER(S);  Surgeon: Carmela Alvarez MD;  Location: [facility] OR     Combined cystoscopy, retrogrades, exchange stent ureter(s) Bilateral 12/7/2016     Procedure: COMBINED CYSTOSCOPY, RETROGRADES, EXCHANGE STENT URETER(S);  Surgeon: Carmela Alvarez MD;  [facility]     Colonoscopy N/A 1/28/2017     Procedure: COLONOSCOPY;  Surgeon: Luis Rcihardson MD;  Location: [facility] GI     Esophagoscopy, gastroscopy, duodenoscopy (egd), combined N/A 1/28/2017     Procedure: COMBINED  ESOPHAGOSCOPY, GASTROSCOPY, DUODENOSCOPY (EGD);  Surgeon: Luis Richardson MD;  Location: [facility]     Colonoscopy N/A 1/28/2017     Procedure: COMBINED COLONOSCOPY, SINGLE OR MULTIPLE BIOPSY/POLYPECTOMY BY BIOPSY;  Surgeon: Luis Richardson MD;  Location: [facility]     Combined cystoscopy, retrogrades, exchange stent ureter(s) Bilateral 2/27/2017     Procedure: COMBINED CYSTOSCOPY, RETROGRADES, EXCHANGE STENT URETER(S);  Surgeon: Carmela Alvarez MD;  [facility]       Meds:     Current Facility-Administered Medications   Medication     cycloSPORINE (RESTASIS) 0.05 % ophthalmic emulsion 1 drop     enoxaparin (LOVENOX) injection 40 mg     promethazine (PHENERGAN) Suppository 25 mg     naloxone (NARCAN) injection 0.1-0.4 mg     Patient is already receiving anticoagulation with heparin, enoxaparin (LOVENOX), warfarin (COUMADIN)  or other anticoagulant medication     potassium chloride SA (K-DUR/KLOR-CON M) CR tablet 20-40 mEq     potassium chloride (KLOR-CON) Packet 20-40 mEq     potassium chloride 10 mEq in 100 mL intermittent infusion     potassium chloride 10 mEq in 100 mL intermittent infusion with 10 mg lidocaine     potassium chloride 20 mEq in 50 mL intermittent infusion     magnesium sulfate 4 g in 100 mL sterile water (premade)     HYDROmorphone (DILAUDID) injection 0.2-0.4 mg     ondansetron (ZOFRAN-ODT) ODT tab 4 mg    Or     ondansetron (ZOFRAN) injection 4 mg     prochlorperazine (COMPAZINE) injection 5-10 mg    Or     prochlorperazine (COMPAZINE) tablet 5-10 mg    Or     prochlorperazine (COMPAZINE) Suppository 25 mg     metoclopramide (REGLAN) tablet 10 mg    Or     metoclopramide (REGLAN) injection 10 mg     ranitidine (ZANTAC) injection 50 mg     lactated ringers infusion         Allergies:     Allergies   Allergen Reactions     Contrast Dye Itching and Swelling     Itching/tingling of mouth and nose with sensation of swelling after receiving IV contrast for CT.  This occurred despite steroid  "premedication. Therefore the patient should not receive intravenous contrast in the future.      Benadryl Allergy Other (See Comments)     Stay awake, restless, \"uncomfortable\", \"feel like I need to run away\"      Enoxaparin Hives     3/23/16: Okay to receive heparin flushes in port, tolerates well. Patricia Cortez FNP-C     Enoxaparin Sodium Other (See Comments)     Pt is taking this now.     Amoxicillin Rash     Diphenhydramine Anxiety     No Clinical Screening - See Comments Rash     Pollen Extract Rash     Sulfa Drugs Rash        FamHx:  Family History   Problem Relation Age of Onset     Arthritis Father      Myocardial Infarction Father      secondary to anesthesia     Colon Cancer Father      DIABETES Other      Uncle     Hypertension Mother      Other - See Comments Mother      cognitive decline     Skin Cancer Mother      Hypertension Sister      HEART DISEASE Other      unknown valve replacement     Bladder Cancer Sister      Skin Cancer Brother        SocialHx:  Social History     Social History     Marital status:      Spouse name: N/A     Number of children: N/A     Years of education: N/A     Social History Main Topics     Smoking status: Former Smoker     Smokeless tobacco: Former User      Comment: Quit over 20 years ago     Alcohol use No     Drug use: No     Sexual activity: No     Other Topics Concern     Not on file     Social History Narrative    .  Lives alone with her dog and cat.      Daughter or friend is available to help after surgeries/hospitalizations.      Has 2 children; both alive and well.    Parents are both alive in their 90's.     Retired business owner.       Vitals:  Vitals:    02/28/17 1804 02/28/17 1941   BP: 174/82 151/74   Pulse: 111 115   Resp: 18    Temp: 99.2  F (37.3  C)    TempSrc: Oral    SpO2: 99% 97%   Weight: 56.9 kg (125 lb 8 oz)    Height: 1.676 m (5' 6\")        PEx:  Gen: No acute distress, although she appears somewhat comfortable; lines present " include PIV and R chest port  HEENT: Normocephalic, atraumatic; supple neck  Neuro: PERRL, EOMI; grossly normal motor movement bilaterally  CV: RRR, nl S1/S2, no murmurs/clicks/gallops, well-perfused extremities  Pulm: CTAB, no wheezing/rhonchi/crackles; non-labored breathing  Abd: Soft, non-tender, minimally distended; hypoactive BS present  Ext: Non-tender, no erythema; trace edema  Skin: No rashes appreciated  Psych: Appropriate insight/judgment    Labs:  Results for orders placed or performed in visit on 02/28/17 (from the past 24 hour(s))   XR Abdomen 2 Views    Narrative    XR ABDOMEN 2 VW  2/28/2017 1:02 PM      HISTORY: Rule out  Obstruction., Malignant neoplasm of left ovary    COMPARISON: CT 2/28/2017    FINDINGS: Right-sided ureteral stent, and 2 left-sided ureteral  stents. Surgical clips over the mid abdomen. Dilated loops of small  bowel with air-fluid levels in the mid abdomen. There is air in the  distal colon. No pneumatosis, free air, or portal venous gas. Lung  bases are clear.      Impression    IMPRESSION: Dilated loops of small bowel with air-fluid levels. There  is air in the distal colon. Possible ileus versus partial small bowel  obstruction. Better seen on CT 2/28/2017.    I have personally reviewed the examination and initial interpretation  and I agree with the findings.    JANNETH BARNES MD       Imaging:  CT 2/28/2017: IMPRESSION: In this patient with a history of ovarian cancer, there is  mild disease progression as follows:  1. Increase in size and number of multiple subcentimeter  nodules/intrafissural lymph nodes along the right major and minor  fissures and also bilateral pulmonary nodules, predominantly along the  diaphragmatic pleura.   2. Slight increase in size of small lymph nodes in the right internal  mammary region and bilateral anterior cardiophrenic region.  3. Stable to slight increase in size of deposits in the perihepatic  region. No significant change in the omental  deposits in the left  upper quadrant of the abdomen.  4. Stable to slight increase in size of mesenteric deposits/lymph  nodes.  5. Bilateral nephroureteric stent in place. Atrophy of the right  kidney with interval resolution of right hydronephrosis. Unchanged  mild left hydronephrosis. Air within the collecting systems and  bladder likely related to prior intervention.  6. Dilation of proximal small bowel loops with interspersed areas of  narrowing. No progression of oral contrast into the ileum. Moderate  amount of fecal material in the colon. Partial/early small bowel  obstruction is possible.    AXR 2/28/2017: FINDINGS: Right-sided ureteral stent, and 2 left-sided ureteral  stents. Surgical clips over the mid abdomen. Dilated loops of small  bowel with air-fluid levels in the mid abdomen. There is air in the  distal colon. No pneumatosis, free air, or portal venous gas. Lung  bases are clear. IMPRESSION: Dilated loops of small bowel with air-fluid levels. There  is air in the distal colon. Possible ileus versus partial small bowel  obstruction. Better seen on CT 2/28/2017.    A/P: 61 year old with primary peritoneal cancer, on C3D1 Tesoro/Quadra Niraparib study, admitted with early ileus versus SBO.     - DZ: IIIB primary peritoneal cancer. S/p GUSTAVO, BSO, omentectomy, PPALND, staging. Followed by 3/3 cycles Gemcitabine (9/29/2016-1/5/2017), 11/12 cycles Doxil (9/3/2015 - 9/26/2016), 6/6 cycles cisplatin/paclitaxe (12/4/2013 - 3/12/2015); now on Tesoro Niraparib trial (daily Niraparib); as previously discussed, we will hold the study drug while NPO inpatient    - FEN: NPO for early ileus. LR at 125 mL/h. K and Mg ERP ordered.   - Pain: IV Dilaudid ordered  - CV: Hypertension, unsure of what medication she is taking -- will discuss with family. For now, watch BP carefully, PRN IV medication if SBP >180 or DBP >110. History of palpitations, no current symptoms.   - Pulm: No issues  - Heme: History of DVT (2015), on  40 mg daily Lovenox (bled on higher dose). Will continue as an inpatient. Has received transfusions for Hgb <8, although currently 10.3 - not indicated.   - GI: NPO for suspected early ileus. If vomiting, or worsening nausea, plan to place NGT.  - : Ureteral stents in place, exchanged 2/27. Voiding spontaneously. Work towards pain control with IV Dilaudid. As above, no concern for infection currently  - Endo: No issues. Daily BMP with glucose while NPO.   - ID: Recently started and stopped Levofloxacin for [id number] UTI  - Neuro: No issues  - Psych: Previously tried Ativan, Celexa for anxiety, nothing currently.   - Ppx: SCDs, PPI, Lovenox 40 mg daily (decreased therapeutic dosing)    Radha Hoover, PGY2  OB/Gyn  3/1/2017, 12:43 AM

## 2017-03-01 NOTE — PROGRESS NOTES
"Gynecology Oncology Progress Note    Ms. Margaux Guzmán is a 61 year old HD#2 admitted with early small bowel obstruction.      Dz: IIIB primary peritoneal cancer    24-hour events:   - Admitted for early small bowel obstruction and pain management  - NGT placed  - Started presumed dose of home antihypertensive for very elevated BP    Subjective: Feeling pretty uncomfortable with NGT in place.  Pain is still present in abdomen as before, but has been responding to Dilaudid (receiving regularly). Not passing gas.   Objective:   Patient Vitals for the past 24 hrs:   BP Temp Temp src Pulse Resp SpO2 Height Weight   03/01/17 0229 168/84 - - 117 - - - -   03/01/17 0041 179/83 - - 112 - - - -   02/28/17 2254 174/85 98.7  F (37.1  C) Oral 110 18 98 % - -   02/28/17 1941 151/74 - - 115 - 97 % - -   02/28/17 1804 174/82 99.2  F (37.3  C) Oral 111 18 99 % 1.676 m (5' 6\") 56.9 kg (125 lb 8 oz)     General: Normal weight female, in NAD. NGT in place  CV: RRR, no m/r/g  Resp: CTAB; on RA  Abdomen: Moderately distended, tympanic. Moderately tender throughout. +Normoactive bowel sounds.   Extremities: nontender, trace edema, SCDs in place    I&Os  24 Hrs // Since MN  PO 0 mL // 0 mL   mL/h, not completely recorded   mL // 450 mL  Emesis 200 mL + 100 mL suctioned after NGT placement    UOP: 450 mL since midnight, about 100 mL/h     New labs/imaging:   KUB to confirm NGT     Assessment: 61 year old female with IIIB primary peritoneal cancer, admitted for second SBO, now with NGT in place.     Active Problem list:  - Ileus, now with NGT in place  - Significant hypertension, started amlodipine (presumed home medication)  - Tesoro Niraparib study, held study drug    Plan:    - DZ: IIIB primary peritoneal cancer. S/p GUSTAVO, BSO, omentectomy, PPALND, staging. Followed by 3/3 cycles Gemcitabine (9/29/2016-1/5/2017), 11/12 cycles Doxil (9/3/2015 - 9/26/2016), 6/6 cycles cisplatin/paclitaxe (12/4/2013 - 3/12/2015); now on " Tesoro Niraparib trial (daily Niraparib); as previously discussed, we will hold the study drug while NPO inpatient     - FEN: NPO for early ileus. LR at 125 mL/h. K and Mg ERP ordered.   - Pain: IV Dilaudid ordered, has been receiving regularly  - CV: Hypertension, unsure of what medication she is taking -- will discuss with family. For now, watch BP carefully, PRN IV medication if SBP >180 or DBP >110. History of palpitations, no current symptoms.   - Pulm: No issues  - Heme: History of DVT (2015), continue 40 mg daily Lovenox (bled on higher dose). Will continue as an inpatient. Has received transfusions for Hgb <8, although currently 10.3 - not indicated.   - GI: NPO for suspected early ileus. NGT placed overnight with worsening nausea not controlled by medications (received Reglan, Compazine)  - : Ureteral stents in place, exchanged 2/27. Voiding spontaneously. Work towards pain control with IV Dilaudid. As above, no concern for infection currently  - Endo: No issues. Daily BMP with glucose while NPO.   - ID: Recently started and stopped Levofloxacin for [id number] UTI  - Neuro: No issues  - Psych: Previously tried Ativan, Celexa for anxiety, nothing currently.   - Ppx: SCDs, PPI, Lovenox 40 mg daily (decreased therapeutic dosing)    Disposition: with resolution of ileus.     Radha Hoover MD  OBGYN, PGY2  Gyn Onc Pager [phone]    Provider Disclosure:   I agree with above History, Review of Systems, Physical exam and Plan. I have reviewed the content of the documentation and have edited it as needed. I have personally performed the services documented here and the documentation accurately represents those services and the decisions I have made.     Electronically signed by:   Hector Sapp MD   Gynecologic Oncology   [facility]

## 2017-03-01 NOTE — PLAN OF CARE
Admitted to 7C. Pt hypertensive, but not over notify parameter, and tachy to 115 otherwise VSS. Up ad mack, ambulated in [place]. Voiding - urine dark/agnes. Denies passing flatus, pt states last BM was 2 days ago. NPO. LR infusing into port @ 125 mL/hr. Pain and nausea decreased after taking IV dilaudid/compazine. K replaced at infusion clinic, re-check tomorrow AM. Continue with POC.

## 2017-03-01 NOTE — PROGRESS NOTES
Writer was told to advance pt's NG and it was done from 50 cm to 59 cm. The output is consistent with gastrointestinal fluid. MD said no need for x ray, since the content is consistent with gastrointestinal fluid. Will continue to monitor.

## 2017-03-01 NOTE — DISCHARGE SUMMARY
Gynecologic Oncology Discharge Summary    Margaux Guzmán  [id number]    Admit Date: 2/28/2017  Discharge Date: 3/3/2017  Admitting Provider: Dr. Sapp  Discharge Provider: Dr. Sapp    Admission Dx:   - Stage IIIB primary peritoneal cancer  - Early ileus vs. Small bowel obstruction  - Chronic hypertension  - History of DVT, on prophylactic lovenox  - Bilateral hydronephrosis with bilateral ureteral stents in place  - Anxiety    Discharge Dx:  -Same  -Ileus vs bowel obstruction resolved.    Patient Active Problem List   Diagnosis     Primary peritoneal adenocarcinoma (H)     Recurrent cold sores     Pulmonary nodules     Encounter for long-term current use of medication     DVT (deep venous thrombosis) (H)     Family history of peritoneal cancer     Protein in urine     Ovarian cancer, left (H)     Ovarian cancer, right (H)     Chemotherapy-induced neutropenia (H)     Anemia     Pain due to ureteral stent (H)     Sterile pyuria     Pyelonephritis     Small bowel obstruction (H)     Dehydration     Patient in cancer related research study     Rectal bleeding     Confusion     Adjustment disorder with anxious mood     Nausea with vomiting     Abdominal pain, generalized       Procedures:   - NG tube placement  - AXR for NG tube positioning    Prior to Admission Medications:  Prescriptions Prior to Admission   Medication Sig Dispense Refill Last Dose     HYDROmorphone (DILAUDID) 4 MG tablet Take 1 tablet (4 mg) by mouth every 4 hours as needed for moderate to severe pain maximum  6  tablet(s) per day 35 tablet 0      study - niraparib (IDS# 4759) 100 mg capsule Take 2 capsules (200 mg) by mouth every morning Take at the same time each day, preferably morning. Swallow whole and do NOT chew capsules. Food and water is permissible. First dose will be administered on site. 84 capsule 0      ondansetron (ZOFRAN-ODT) 8 MG ODT tab Place 1 tablet (8 mg) under the tongue every 8 hours as needed for nausea 30  tablet 3      megestrol (MEGACE ORAL) 40 MG/ML suspension Take 10 mLs (400 mg) by mouth 2 times daily 600 mL 1      promethazine (PHENERGAN) 25 MG Suppository Place 25 mg rectally   Taking     traMADol (ULTRAM) 50 MG tablet Take 25-50 mg by mouth   Taking     oxyCODONE (ROXICODONE) 5 MG IR tablet Take 5-10 mg by mouth   Taking     levofloxacin (LEVAQUIN) 500 MG tablet Take 500 mg by mouth   Taking     HYDROmorphone (DILAUDID) 2 MG tablet Take 2 mg by mouth   Taking     ferrous sulfate (IRON) 325 (65 FE) MG tablet Take 325 mg by mouth   Taking     oxyCODONE (ROXICODONE) 5 MG IR tablet Take 1-2 tablets (5-10 mg) by mouth every 3 hours as needed for other (Moderate to Severe Pain) 30 tablet 0 Taking     morphine 10 MG/5ML solution Take 0.5 mLs (1 mg) by mouth every 2 hours as needed for moderate to severe pain 100 mL 0 Taking     study - niraparib (IDS# 4759) 100 mg capsule Take 2 capsules (200 mg) by mouth every morning Take at the same time each day, preferably morning. Swallow whole and do NOT chew capsules. Food and water is permissible. First dose will be administered on site. 84 capsule 0 Taking     enoxaparin (LOVENOX) 40 MG/0.4ML injection Inject 0.4 mLs (40 mg) Subcutaneous daily 30 Syringe 3 Taking     study - niraparib (IDS# 4759) 100 mg capsule Take 3 capsules (300 mg) by mouth every morning Take at the same time each day, preferably morning. Swallow whole and do NOT chew capsules. Food and water is permissible. First dose will be administered on site. 84 capsule 0 Taking     tamsulosin (FLOMAX) 0.4 MG capsule Take 1 capsule (0.4 mg) by mouth daily 60 capsule 6 Taking     LORazepam (ATIVAN) 1 MG tablet Take 1 tablet (1 mg) by mouth every 6 hours as needed (nausea/vomiting, anxiety or sleep) 30 tablet 3 Taking     tolterodine (DETROL LA) 4 MG 24 hr capsule Take 1 capsule (4 mg) by mouth daily 30 capsule 3 Taking     ACETAMINOPHEN PO Take 500 mg by mouth every 6 hours as needed for pain Reported on 2/28/2017    Not Taking     Multiple Vitamins-Minerals (ONE DAILY MULTIVITAMIN WOMEN) TABS Take 1 tablet by mouth every morning    Taking     polyethylene glycol (MIRALAX/GLYCOLAX) powder Take 1 capful by mouth daily as needed    Taking     cycloSPORINE (RESTASIS) 0.05 % ophthalmic emulsion Place 1 drop into both eyes 2 times daily    Taking     Ranitidine HCl (ZANTAC PO) Take 75 mg by mouth daily    Taking     MELATONIN PO Take 1 mg by mouth At Bedtime    Taking     EPINEPHrine (EPIPEN) 0.3 MG/0.3ML injection Inject 0.3 mLs (0.3 mg) into the muscle once as needed for anaphylaxis 1 each 0 Taking     senna-docusate (SENOKOT-S;PERICOLACE) 8.6-50 MG per tablet Take 1-2 tablets by mouth 2 times daily 7 tablet  Taking     Pyridoxine HCl (VITAMIN B6 PO) Take 1 tablet by mouth At Bedtime    Taking     prochlorperazine (COMPAZINE) 10 MG tablet Take 1 tablet (10 mg) by mouth every 6 hours as needed (nausea/vomiting) 30 tablet 2 Taking       Discharge Medications:   Margaux Guzmán   Home Medication Instructions AMIE:[id number]    Printed on:03/03/17 1012   Medication Information                      ACETAMINOPHEN PO  Take 500 mg by mouth every 6 hours as needed for pain Reported on 2/28/2017             amLODIPine (NORVASC) 2.5 MG tablet  Take 1 tablet (2.5 mg) by mouth daily             bisacodyl (DULCOLAX) 10 MG Suppository  Place 1 suppository (10 mg) rectally daily as needed for constipation             cycloSPORINE (RESTASIS) 0.05 % ophthalmic emulsion  Place 1 drop into both eyes 2 times daily              enoxaparin (LOVENOX) 40 MG/0.4ML injection  Inject 0.4 mLs (40 mg) Subcutaneous daily             EPINEPHrine (EPIPEN) 0.3 MG/0.3ML injection  Inject 0.3 mLs (0.3 mg) into the muscle once as needed for anaphylaxis             ferrous sulfate (IRON) 325 (65 FE) MG tablet  Take 325 mg by mouth             HYDROmorphone (DILAUDID) 4 MG tablet  Take 1 tablet (4 mg) by mouth every 4 hours as needed for moderate to severe pain  maximum  6  tablet(s) per day             LORazepam (ATIVAN) 1 MG tablet  Take 1 tablet (1 mg) by mouth every 6 hours as needed (nausea/vomiting, anxiety or sleep)             megestrol (MEGACE ORAL) 40 MG/ML suspension  Take 10 mLs (400 mg) by mouth 2 times daily             MELATONIN PO  Take 1 mg by mouth At Bedtime              Multiple Vitamins-Minerals (ONE DAILY MULTIVITAMIN WOMEN) TABS  Take 1 tablet by mouth every morning              ondansetron (ZOFRAN-ODT) 8 MG ODT tab  Place 1 tablet (8 mg) under the tongue every 8 hours as needed for nausea             polyethylene glycol (MIRALAX/GLYCOLAX) powder  Take 1 capful by mouth daily as needed              prochlorperazine (COMPAZINE) 10 MG tablet  Take 1 tablet (10 mg) by mouth every 6 hours as needed (nausea/vomiting)             promethazine (PHENERGAN) 25 MG Suppository  Place 25 mg rectally             Pyridoxine HCl (VITAMIN B6 PO)  Take 1 tablet by mouth At Bedtime              Ranitidine HCl (ZANTAC PO)  Take 75 mg by mouth daily              senna-docusate (SENOKOT-S;PERICOLACE) 8.6-50 MG per tablet  Take 1-2 tablets by mouth 2 times daily             study - niraparib (IDS# 4759) 100 mg capsule  Take 3 capsules (300 mg) by mouth every morning Take at the same time each day, preferably morning. Swallow whole and do NOT chew capsules. Food and water is permissible. First dose will be administered on site.             study - niraparib (IDS# 4759) 100 mg capsule  Take 2 capsules (200 mg) by mouth every morning Take at the same time each day, preferably morning. Swallow whole and do NOT chew capsules. Food and water is permissible. First dose will be administered on site.             study - niraparib (IDS# 4759) 100 mg capsule  Take 2 capsules (200 mg) by mouth every morning Take at the same time each day, preferably morning. Swallow whole and do NOT chew capsules. Food and water is permissible. First dose will be administered on site.              tamsulosin (FLOMAX) 0.4 MG capsule  Take 1 capsule (0.4 mg) by mouth daily             tolterodine (DETROL LA) 4 MG 24 hr capsule  Take 1 capsule (4 mg) by mouth daily             traMADol (ULTRAM) 50 MG tablet  Take 25-50 mg by mouth                 Consultations:  None    Brief History of Illness:  Ms. Guzmán is a 61-year-old female with IIIB primary peritoneal cancer who presented from clinic with worsening abdominal pain and nausea. She vomited twice, once in the Infusion center while waiting to have a room in the hospital and once two days ago. She described her abdominal pain as diffuse, achy and crampy. She had some improvement with Dilaudid but not complete resolution. She has baseline constipation and has had small, regular bowel movement. She has had a poor appetite for a long time, which worsened in the last few days. She was recently admitted to a hospital in [place] on 2/25 where she was started on Levofloxacin for presumed UTI. Levofloxacin was discontinued, because she is on a study drug (Niraparib), and there is potential for QT interaction.She has bilateral ureteral stens in place.    Hospital Course:  Dz:  - IIIB primary peritoneal cancer. S/p GUSTAVO, BSO, omentectomy, PPALND, staging. Followed by 3/3 cycles Gemcitabine (9/29/2016-1/5/2017), 11/12 cycles Doxil (9/3/2015 - 9/26/2016), 6/6 cycles cisplatin/paclitaxel (12/4/2013 - 3/12/2015); now on Tesoro Niraparib trial (daily Niraparib); as previously discussed, hold the study drug while NPO inpatient. Patient to resume treatment and follow up with Dr Mccollum.  FEN:   - Patient was made NPO for suspected early ileus given abdominal pain, nausea, and CT findings. NGT was placed for decompression. LR at 125 mL/h with K and Mg ERP ordered. On HD#1-2 patient passing flatus and having BM. NGT output significantly decreased. HD#2 clamp trial x 6 hours with minimal residual. NGT removed and diet slowly advanced to low residue diet.   Pain:   - Her pain  was initially controlled on IV dilaudid. She was transitioned to PO pain medications when tolerating PO.   CV:   - Patient has a history of hypertension and was continued on home amlodipine.  PULM:   - No issues. O2 sats remained >90% on room air.  HEME:   - History of DVT (2015), continued on 40 mg daily Lovenox due to bleeding on higher dose. Has previously received transfusions for Hgb <8, but Hgb 10.3 on admission.  GI:   - She was made NPO for suspected early ileus with NGT placed on HD#1 and removed HD#2. Patient passing flatus and having BM. Diet slowly advanced as tolerated. At time of discharge patient tolerating a low residual diet.   :    - Bilateral ureteral stents in place, recently exchanged on 2/27 at OSH. She continued to void spontaneously and had no further  issues.  ID:   - She had recently completed a course of levofloxacin. UTI no longer suspected. The patient was AF during her hospitalization.   ENDO:   - No acute issues. Daily BMP with glucose while NPO  PSYCH/NEURO:   - Previously tried Ativan, Celexa for anxiety, nothing currently  PPX:    - She received SCDs, PPI, and Lovenox, 40 mg daily.      Discharge Instructions and Follow up:  Ms. Margaux Guzmán was discharged from the hospital with follow up for     Discharge Diet: Low Residue Diet  Discharge Activity: Activity as tolerated  Discharge Follow up: 3/30/17 Dr Mccollum    Discharge Disposition:  Discharged to home    Discharge Staff: Dr Senait Naranjo, CNP  3/3/2017 10:13 AM    Provider Disclosure:   I agree with above History, Review of Systems, Physical exam and Plan. I have reviewed the content of the documentation and have edited it as needed. I have personally performed the services documented here and the documentation accurately represents those services and the decisions I have made.     Electronically signed by:   Hector Sapp MD   Gynecologic Oncology   [facility]

## 2017-03-01 NOTE — PLAN OF CARE
Problem: Goal Outcome Summary  Goal: Goal Outcome Summary  Outcome: No Change  Continuous nausea overnight, with intermittent vomitting.  Minimal relief from Reglan, Zofran, and Compazine.  Ativan administered prior to NG insertion.  NG tube inserted successfully on second attempt, with brown output.  X-ray completed to verify placement.  MD notified about intermitted red output from NG tube order for Pepcid received.  Benzocaine spray administered for sore throat and gagging on tube.  Patient reported relief of nausea, but later vomited small amount around tube due to gag reflex.     Patient slept on and off overnight.  Up in room with stand-by assist.  Dilaudid IV administered for pain x1.  Voiding adequate amounts, urine pink from recent stent exchange.  Denies passing flatus.  NPO.  Potassium recheck obtained, WNL.

## 2017-03-01 NOTE — PROGRESS NOTES
CLINICAL NUTRITION SERVICES - ASSESSMENT NOTE     Nutrition Prescription    RECOMMENDATIONS FOR MDs/PROVIDERS TO ORDER:  -If patient unable to advance diet to at least full liquids with tolerance in the next 4-5 days would recommend starting TPN to meet nutritional needs until able to tolerate PO.     Malnutrition Status:    -Non-severe    Recommendations already ordered by Registered Dietitian (RD):  -None at this time    Future/Additional Recommendations:  -If TPN warranted recommend goal: Clinimix at 65 mL/hr (1560 mL/day via central line) with 250 mL of 20% IV lipids daily provides 1608 kcals, 78 g protein, 234 g CHO, and 31% of kcals from fat daily.  -Once diet advances recommend supplements to increase kcals/protein     REASON FOR ASSESSMENT  Margaux Guzmán is a/an 61 year old female assessed by the dietitian for Admission Nutrition Risk Screen for reduced oral intake over the last month    NUTRITION HISTORY  Pt was only able to eat a bowl of cereal yesterday, the day before she ate 3 small meals (her baseline), the day before that she didn't eat anything d/t not feeling well.   Usual intake includes:  B: bowl of cereal with 2% milk  L: soup  D:  Eggs and stove top stuffing or eggs with another side.     She notes no appetite. Patient reports she was just prescribed megace and is hopeful this will help.     She generally follows a low fiber diet but did eat some celery last week but noted pulling off the strings.     Pt notes muscle loss related to inability to go for walks like she used to d/t pain.     Last admitted to hospital on 1/10 d/t SBO.     PMH: IIIB primary peritoneal cancer presenting from clinic with worsening abdominal pain and nausea    CURRENT NUTRITION ORDERS  Diet: NPO    PROCEDURES WITH NUTRITIONAL IMPLICATIONS  2/28: Dilated loops of small bowel with air-fluid levels. There  is air in the distal colon. Possible ileus versus partial small bowel obstruction.    LABS  K+ 3.3  2/27: +  "ketones in urine    MEDICATIONS  LR @ 125mL/hr  Mg++/K+ lyte replacement    ANTHROPOMETRICS  Height: 167.6 cm (5' 6\")  Most Recent Weight: 56.9 kg (125 lb 8 oz)    IBW: 59.1 kg  BMI: Normal BMI  Weight History:   Wt Readings from Last 15 Encounters:   02/28/17 56.9 kg (125 lb 8 oz)   02/28/17 56.5 kg (124 lb 9.6 oz)   02/27/17 56.9 kg (125 lb 8 oz)   02/02/17 56.9 kg (125 lb 8 oz)   02/02/17 56.9 kg (125 lb 8 oz)   01/27/17 58.5 kg (128 lb 14.4 oz)   01/23/17 58.2 kg (128 lb 6.4 oz)   01/20/17 58.3 kg (128 lb 9.6 oz)   01/15/17 57.3 kg (126 lb 4.8 oz)   01/05/17 59.1 kg (130 lb 4.7 oz)   12/28/16 56.9 kg (125 lb 6.4 oz)   12/15/16 57.7 kg (127 lb 4.8 oz)   12/07/16 57.2 kg (126 lb)   11/18/16 59.9 kg (132 lb 1.6 oz)   11/11/16 59 kg (130 lb 0.3 oz)   ~5% weight loss in 3 months    Dosing Weight: 57kg    ASSESSED NUTRITION NEEDS  Estimated Energy Needs: 3572-1613+ kcals/day (25 - 30+ kcals/kg)  Justification: Maintenance/+ weight gain  Estimated Protein Needs: 68 -86 grams protein/day (1.2 - 1.5 grams of pro/kg)  Justification: Hypercatabolism with critical illness  Estimated Fluid Needs: 1 mL/kcal  Justification: Maintenance    PHYSICAL FINDINGS  NG-tube in place    MALNUTRITION  % Intake: </=75% for >/= 1 month (severe)  % Weight Loss: ~5% weight loss in 3 months  Subcutaneous Fat Loss: None observed  Muscle Loss: Temporal/Thoracic region (clavicle, acromium bone, deltoid, trapezius, pectoral)/ Upper arm (bicep, tricep):  Mild/moderate  Fluid Accumulation/Edema: None noted  Malnutrition Diagnosis: Non-severe malnutrition in the context of acute on chronic illness    NUTRITION DIAGNOSIS  Inadequate oral intake related to hx of decreased appetite and now with SBO limiting diet as evidenced by NPO x 1+ days and history of decreased intake eating only 3 small meals per day.    INTERVENTIONS  Implementation  Nutrition Education: Provided education on ways to increase kcals/protein in the diet. Provided handouts: high " calorie/high protein recipes and suggestions to increase kcals/protein. Discussed potential need for nutrition support if unable to advance diet in future.      Goals  Diet adv v nutrition support within 4-5 days.     Monitoring/Evaluation  Progress toward goals will be monitored and evaluated per protocol.    Priscilla Taylor RD, LD  Unit pager: [phone]

## 2017-03-02 NOTE — PLAN OF CARE
Problem: Goal Outcome Summary  Goal: Goal Outcome Summary  Outcome: Improving  Ativan administered for sleep and patient has since been sleeping soundly between cares.  Up ad mack in room, needs disconnecting assistance with NG tube.  Denies pain.  Voiding marginal amounts.  Denies passing flatus, but did have 2 bowel movements yesterday.  NG to low-intermittent suction, with small amount of green output.  NG output replaced with LR as ordered.  NPO.     Continue with current cares.  Anticipate possible clamp trial today.

## 2017-03-02 NOTE — PROGRESS NOTES
Gynecology Oncology Progress Note     Ms. Margaux Mckeon is a 61 year old HD#3 admitted with early small bowel obstruction.      Dz: IIIB primary peritoneal cancer     24-hour events:   - NGT continued, started 1:1 replacement     Subjective: Ms. Mckeon did well overnight. She remains NPO. She is voiding spontaneously and has not passed gas. She is ambulating independently. Pain is controlled. Denies nausea.    Objective:   Patient Vitals for the past 24 hrs:  Patient Vitals for the past 24 hrs:   BP Temp Temp src Pulse Resp SpO2   03/01/17 1404 154/76 96.6  F (35.9  C) Axillary 110 18 98 %   03/01/17 0907 158/81 - - 115 - -   03/01/17 0644 172/89 99.5  F (37.5  C) Oral 117 16 95 %   03/01/17 0229 168/84 - - 117 - -   03/01/17 0041 179/83 - - 112 - -   02/28/17 2254 174/85 98.7  F (37.1  C) Oral 110 18 98 %   General: Normal weight female, in NAD. NGT in place  CV: RRR, no m/r/g  Resp: CTAB; on RA  Abdomen: Moderately distended, tympanic. Moderately tender throughout. +Normoactive bowel sounds.   Extremities: nontender, trace edema, SCDs in place     I&Os  24 Hrs // Since MN  PO 0 mL // 0 mL  IVF 2768 mL // 968 mL   UOP 1025 mL // 200 mL  NGT 1875 mL // 200 mL      Labs  Results for MARGAUX MCKEON (MRN [id number]) as of 3/2/2017 06:27   3/2/2017 04:53   Sodium 140   Potassium 3.3 (L)   Chloride 102   Carbon Dioxide 27   Urea Nitrogen 12   Creatinine 0.83   GFR Estimate 69   GFR Estimate If Black 84   Calcium 8.4 (L)   Anion Gap 11        Assessment: 61 year old female with IIIB primary peritoneal cancer, HD#3 admitted for second SBO, now with NGT in place.      Active Problem list:  - Ileus,  NGT in place  - Hypertension  - Tesoro Niraparib study, held study drug     Plan:   - DZ: IIIB primary peritoneal cancer. S/p GUSTAVO, BSO, omentectomy, PPALND, staging. Followed by 3/3 cycles Gemcitabine (9/29/2016-1/5/2017), 11/12 cycles Doxil (9/3/2015 - 9/26/2016), 6/6 cycles cisplatin/paclitaxe (12/4/2013 -  3/12/2015); now on Tesoro Niraparib trial (daily Niraparib); as previously discussed, we will hold the study drug while NPO inpatient      - FEN: NPO for early ileus. LR at 125 mL/h and 1:1 replacement. K and Mg ERP ordered.   - Pain: IV Dilaudid ordered, has not been receiving  - CV: Hypertension, unsure of what medication she is taking -- will discuss with family. For now, watch BP carefully, PRN IV medication if SBP >180 or DBP >110. History of palpitations, no current symptoms.   - Pulm: No issues  - Heme: History of DVT (2015), continue 40 mg daily Lovenox (bled on higher dose). Will continue as an inpatient. Has received transfusions for Hgb <8, although currently 10.3 - not indicated.   - GI: NPO for suspected early ileus. NGT placed overnight with worsening nausea not controlled by medications (received Reglan, Compazine). Significant decrease in output from prior shifts (1425/200/200). Plan clamp trial this morning  - : Borderline low UOP during the day, started 2:1 replacement NGT output. Ureteral stents in place, exchanged 2/27. Voiding spontaneously. Work towards pain control with IV Dilaudid. As above, no concern for infection at this time  - Endo: No issues. Daily BMP with glucose while NPO.   - ID: Recently started and stopped Levofloxacin for tg-pwazwe-rhoozjgsu UTI  - Neuro: No issues  - Psych: Previously tried Ativan, Celexa for anxiety, nothing currently.   - Ppx: SCDs, PPI, Lovenox 40 mg daily (decreased therapeutic dosing)     Disposition: with resolution of ileus.      Radha Hoover MD  OBGYN, PGY2  Gyn Onc Pager [phone]    Provider Disclosure:   I agree with above History, Review of Systems, Physical exam and Plan. I have reviewed the content of the documentation and have edited it as needed. I have personally performed the services documented here and the documentation accurately represents those services and the decisions I have made.     Electronically signed by:   Dayan Marcano MD    Gynecologic Oncology   [facility]

## 2017-03-02 NOTE — PLAN OF CARE
Problem: Individualization  Goal: Patient Preferences  IV K+ replaced per standing orders. NPO: NG clamped ~ 7:30 am per order. Not passing gas/ no BM. PORT  cc/hr. She voids spont: urine is cherry colored and cloudy (unchanged). She has walked in the [place] and took a seated shower with minimal assist. She is motivated to use the IS and walk in the [place]. She has been able to rest. She seems in good spirits.

## 2017-03-02 NOTE — PLAN OF CARE
Problem: Individualization  Goal: Patient Preferences  After NG was clamped 7:30 am --2 pm residual 10 cc. MD team aware and orders obtained to dc NG and start clear liq diet and decrease IVF rate. Pt is pleased to hear this. NG removed.

## 2017-03-02 NOTE — PLAN OF CARE
VSS Up ad mack, ambulated in [place] x2. NPO. NG to LIS with green/brown output, output replaced with IV fluids per order. Voiding, urine still pink-tinged. Denies passing flatus. Had another BM - 2 today. Has not needed prn pain meds or antiemetics this shift. Using Lozenges/Hurricane spray for sore throat. Scopolamine patch in place since Monday, due to be removed tomorrow AM. K replaced, re-check at 2245. LR infusing @ 125 mL/hr into port. Continue with POC.        Margaux Guzmán was screened for distress using the distress thermometer. Distress thermometer score 4    Concerns listed on the problem list were:  Practical: Yes  Transportation  Emotional: Yes  Anxiety Worry/Nervousness  Spiritual: No  Family: Yes  Feeling like a burden to others  Physical: Yes Appetite changes Bowel changes Energy level/fatigue Memory changes/concentration Pain   Informational: No    Risk factors include: Living alone    Contributing health concerns:  has a past medical history of Anemia; History of blood transfusion; Hydronephrosis; Hyperlipidemia; Jugular vein thrombosis, right; Livedo annularis; Osteoarthritis; Osteopenia; Ovarian cancer (H); Polymyalgia rheumatica (H); PONV (postoperative nausea and vomiting); and Primary cancer of peritoneum (H). She also has no past medical history of Asymptomatic human immunodeficiency virus (HIV) infection status (H); Cerebral palsy (H); Congenital renal agenesis and dysgenesis; Goiter; Gout; Hernia, abdominal; History of spinal cord injury; History of thrombophlebitis; Horseshoe kidney; Hydrocephalus; Mumps; Palpitations; Parkinsons disease (H); Spider veins; Spina bifida (H); STD (sexually transmitted disease); Tethered cord (H); or Tuberculosis.    Interventions provided: Directed to the unit resource center Physical activity    RN assessment: approachable    Follow-up:  3/2/17    Peyton Tapia 9:10 PM 03/01/17

## 2017-03-02 NOTE — PROGRESS NOTES
Evening progress note  Margaux Guzmán, [id number]    To see Ms Guzmán, but she is sound asleep. Per RN, she has been more comfortable since readjusting the NGT.     Patient Vitals for the past 8 hrs:   BP Temp Temp src Pulse Resp SpO2   03/01/17 1404 154/76 96.6  F (35.9  C) Axillary 110 18 98 %     UOP: 575 mL since 1500 > 82 mL/h  NGT output 2350 mL since placement (0300), 250 mL since 1500    A/P  Start 2:1 NGT:LR replacement as heavy output during the day  Continue monitoring NGT output; reconsider clamp trial tomorrow morning  Continue NPO  Blood pressures remain elevated but - continue amlodipine 2.5 mg    Radha Hoover, PGY2  OB/Gyn  3/1/2017, 10:03 PM

## 2017-03-03 NOTE — PROGRESS NOTES
Clinical Nutrition Services- Brief Note/Nutrition education: low residue    Patient has received education in the past and is aware of diet restrictions.     No education necessary. RD will continue to follow per LOS.     Priscilla Taylor RD, LD  Unit pager: [phone]

## 2017-03-03 NOTE — PROGRESS NOTES
Evening progress note  Margaux Guzmán, [id number]  3/2/2017, 11:25 PM     Ms Guzmán feels well and has no immediate concerns. She is very happy that the NGT is out and is tolerating her full liquid diet without nausea or vomiting. Minimal abdominal pain.    Patient Vitals for the past 8 hrs:   BP Temp Temp src Pulse Resp SpO2   03/02/17 1653 - - - 107 - -   03/02/17 1547 144/64 98  F (36.7  C) Axillary 116 16 98 %     Gen: sitting in bed, NAD    A/P:   HD#4, admitted for SBO. Now s/p NGT, advancing diet slowly. Continue to monitor, consider discharge when tolerating regular diet.     Radha Hoover  3/2/2017, 11:25 PM   Gyn Onc  Pg

## 2017-03-03 NOTE — PROGRESS NOTES
Gynecology Oncology Progress Note     Ms. Margaux Guzmán is a 61 year old HD#4 admitted with early small bowel obstruction.      Dz: IIIB primary peritoneal cancer     24-hour events:    - NGT removed after successful clamp trial with minimal residual  - Advanced to full liquid diet     Subjective: Patient has been doing much better, no N/V, fever or chills. Passing flatus. BMx2.    Objective:   Patient Vitals for the past 24 hrs:   BP Temp Temp src Pulse Resp SpO2 Weight   03/02/17 1653 - - - 107 - - -   03/02/17 1547 144/64 98  F (36.7  C) Axillary 116 16 98 % -   03/02/17 1248 148/69 98.7  F (37.1  C) Oral 106 18 98 % -   03/02/17 0900 - - - - - - 54.3 kg (119 lb 11.2 oz)   03/02/17 0724 153/68 98.4  F (36.9  C) Oral 105 18 98 % -   General: Normal weight female, in NAD. NGT in place  CV: RRR, no m/r/g  Resp: CTAB; on RA  Abdomen: Moderately distended, tympanic. Moderately tender throughout. +Normoactive bowel sounds.   Extremities: nontender, trace edema, SCDs in place     I&Os  24 Hrs // Since MN   mL // 0 mL  IVF 1530 mL // 0 recorded, 75 mL/h  UOP 1750 mL // 400 mL  no emesis  Stool x2    Labs  No new    Assessment: 61 year old female with IIIB primary peritoneal cancer, HD#4 admitted for second SBO, now s/p NGT with successful diet advancement     Active Problem list:  - SBO, resolving  - Hypertension  - Tesoro Niraparib study, held study drug     Plan:   - DZ: IIIB primary peritoneal cancer. S/p GUSTAVO, BSO, omentectomy, PPALND, staging. Followed by 3/3 cycles Gemcitabine (9/29/2016-1/5/2017), 11/12 cycles Doxil (9/3/2015 - 9/26/2016), 6/6 cycles cisplatin/paclitaxe (12/4/2013 - 3/12/2015); now on Tesoro Niraparib trial (daily Niraparib); as previously discussed, we will hold the study drug while NPO inpatient      - FEN: FLD. LR decreased to 75 mL/h yesterday, plan to discontinue today as PO intake increases. K and Mg ERP ordered.   - Pain: IV Dilaudid ordered, has not been receiving  - CV: HTN,  started on amlodipine 2.5 mg (presumed home dose) with acceptable BP response  - Pulm: No issues  - Heme: History of DVT (2015), continue 40 mg daily Lovenox (bled on higher dose). Will continue as an inpatient. Has received transfusions for Hgb <8, although currently 10.3 - not indicated.   - GI: s/p NGT, now full liquids, tolerating well.   - : Adequate UOP over past 24 hours. Ureteral stents in place, exchanged 2/27. Voiding spontaneously. Work towards pain control with IV Dilaudid. As above, no concern for infection at this time  - Endo: No issues.   - ID: Recently started and stopped Levofloxacin for fn-mzpwhm-[id number] UTI  - Neuro: No issues  - Psych: Previously tried Ativan, Celexa for anxiety, nothing currently.   - Ppx: SCDs, PPI, Lovenox 40 mg daily (decreased therapeutic dosing)     Disposition: with advanced diet.      Radha Hoover MD  OBGYN, PGY2  Gyn Onc Pager [phone]        Provider Disclosure:   I agree with above History, Review of Systems, Physical exam and Plan. I have reviewed the content of the documentation and have edited it as needed. I have personally performed the services documented here and the documentation accurately represents those services and the decisions I have made.     Electronically signed by:   Hector Sapp MD   Gynecologic Oncology   [facility]

## 2017-03-03 NOTE — PLAN OF CARE
Problem: Goal Outcome Summary  Goal: Goal Outcome Summary  Outcome: Improving  VSS. Pt tolerating low fiber diet for breakfast. Pt reported bilateral flank pain (per pt baseline) which also induced nausea, relieved with oral dilaudid/ zofran/ tylenol given X 1. Port heparin locked with blood return - will need larger dose upon discharge and when de-accessed. Up ad mack. Voiding not saving - reporting occurrences. Pt reports passing gas, and one bowel movement today. Reviewed managing bowel function at home/ and will  a suppository from discharge pharmacy.      Plan is to be picked up by family around 1700 this evening for discharge.

## 2017-03-03 NOTE — PLAN OF CARE
Problem: Goal Outcome Summary  Goal: Goal Outcome Summary  Outcome: Improving  Patient sleeping between cares overnight.  Up ad mack.  Denies pain.  Voiding adequate amounts, urine remains pink from recent stent procedure.  Reports positive flatus.  Tolerating full liquid diet without nausea.     Anticipate likely discharge home soon.

## 2017-03-03 NOTE — PLAN OF CARE
VSS on RA. Up ad mack, ambulated in [place] several times. No nausea since NG tube d/c'd. Tolerating full liquid diet. Had loose BM x2, passing flatus. Denies pain. K re-check 3.6. Voiding - one occurrence unmeasured. Continue with POC.

## 2017-03-06 NOTE — TELEPHONE ENCOUNTER
Spoke with patient regarding update on condition after discharge from the hospital.  Patient states that she is felling better and would like to continue with the Tesaro study if able.  Spoke with Dr. Mccollum who states that patient is ok to start Niraparib again tomorrow morning at 100 mg daily.  Spoke with patient regarding Dr. Mccollum's recommendations.  Patient voiced understanding.  Patient will go in for repeat labs on Thursday 3/9/2017.  Message sent to Jessica to fax orders.  Will speak with patient on Thursday once lab results are reviewed.      Aurelia Worley RN     Pager [phone]

## 2017-03-08 NOTE — NURSING NOTE
Patient to have repeat labs drawn in [place] on 3/6/2017.  Orders placed per Dr. Mccollum and faxed to [facility] at [phone].  Will follow up with patient as soon as results are received.      Aurelia Worley RN     Pager [phone]

## 2017-03-09 NOTE — NURSING NOTE
Patient had repeat labs completed in [place] today for Tesaro/Quadra Niraparib study.  Labs reviewed and patient is ok to continue on study medication at current dose.  Patient aware.      Aurelia Worley RN     Pager [phone]

## 2017-03-15 NOTE — TELEPHONE ENCOUNTER
----- Message from Socorro Martinez RN sent at 3/15/2017 10:12 AM CDT -----  Regarding: Constipation, obstruction?  Hello,     Patient is concerned she has a bowel obstruction. 3 small BMs on 3/13. Feeling very nauseous and has abdominal cramps. Has been using miralax and senna without relief. Has suppository- will try this. Talked to Patricia and she advised I update you to check in with the patient as well.     Margaux's callback: [phone]    Thanks  Maricruz Quintanilla

## 2017-03-15 NOTE — ED NOTES
"\"feel like I have a bowel obstruction\"  Has had similar symptoms previously and it was an obstruction.  "

## 2017-03-15 NOTE — TELEPHONE ENCOUNTER
Patient reports the following: history of bowel obstruction, discharge from hospital on 3/3. Feels like she may have another obstruction. Has had 3 small BMs on 3/13, but with last obstruction she also had small BMs. She has a lot of nausea and abdominal cramps. Has been taking miralax and senna for several days without relief. Discharged with suppositories, has not tried this yet. Feels too nauseous to take study drug. Contacted research nurse Aurelia to make sure study drug not increasing Margaux's bleeding risk and is okay to use supp. Aurelia okayed usage and will be calling Margaux. Paged Patricia Murcia about symptoms, per Patricia: okay to try supp, if constipation does not resolve should go to ED. Updated gyn-onc nurses Mitali and Shameka to check in with the patient. Margaux will update us if no BM occurs and go to ED for possible obstruction.

## 2017-03-15 NOTE — ED AVS SNAPSHOT
[facility], [place], Emergency Department    [address] [place] [id number]    Phone:  [phone]                                       Margaux Guzmán   MRN: [id number]    Department:  [facility], Emergency Department   Date of Visit:  3/15/2017           After Visit Summary Signature Page     I have received my discharge instructions, and my questions have been answered. I have discussed any challenges I see with this plan with the nurse or doctor.    ..........................................................................................................................................  Patient/Patient Representative Signature      ..........................................................................................................................................  Patient Representative Print Name and Relationship to Patient    ..................................................               ................................................  Date                                            Time    ..........................................................................................................................................  Reviewed by Signature/Title    ...................................................              ..............................................  Date                                                            Time

## 2017-03-15 NOTE — ED AVS SNAPSHOT
[facility], Emergency Department    [address] [address]    Phone:  [phone]                                       Margaux Guzmán   MRN: [id number]    Department:  [facility], Emergency Department   Date of Visit:  3/15/2017           Patient Information     Date Of Birth          1955        Your diagnoses for this visit were:     Other constipation     Abdominal pain, generalized     Primary peritoneal adenocarcinoma (H)        You were seen by Qi Alvarez MD.        Discharge Instructions       TODAY'S VISIT:  You were seen today for abdominal discomfort and nausea.   - Thankfully, your CT scan reportedly did not show any evidence for obstruction and they do have a moderate stool burden.  Here laboratory studies were grossly unremarkable and your urine studies were fairly comparable to previous.    FOLLOW-UP:  Please make an appointment to follow up with:  - Your Primary Care Provider,   - Your Oncology team, and   - Pain Clinic (phone: [phone])     PRESCRIPTIONS / MEDICATIONS:  - No new changes to chronic medications.   - Discuss your pain medications and anti-emetics with your usual teams.   - Continue with the good bowel regimen as we discussed. You can try either the milk of magnesia or the magnesium citrate that we discussed today, but you should discuss this all with your regular teams tomorrow and at a close follow-up appointment.     OTHER INSTRUCTIONS:  - Do your best to stay hydrated.    RETURN TO THE EMERGENCY DEPARTMENT  Return to the Emergency Department at any time for new/worsening symptoms.       Abdominal Pain  Abdominal pain is pain in the stomach or intestinal area. Everyone has this pain from time to time. In many cases it goes away on its own. But abdominal pain can sometimes be due to a serious problem, such as appendicitis. So it s important to know when to seek help.  Causes of abdominal pain  There are many possible causes of abdominal  pain. Common causes in adults include:    Constipation, diarrhea, or gas    GERD (gastroesophageal reflux disease) movement of stomach acid into the esophagus, also known as acid reflux or heartburn    Peptic ulcer (a sore in the lining of the stomach or small intestine)    Inflammation of the gallbladder, liver, or pancreas    Gallstones or kidney stones    Appendicitis     Obstruction of the intestines     Hernia (bulging of an internal organ through a muscle or other tissue)    Urinary tract infections    In women, menstrual cramps, fibroids, or endometriosis of the uterus    Inflammation or infection of the intestines  Diagnosing the cause of abdominal pain  Your health care provider will examine you to help find the cause of your pain. If needed, tests will be ordered. Because abdominal pain has so many possible causes, it can be hard to discover the reason for the pain. Giving details about your pain can help. Be ready to tell your health care provider where and when you feel the pain and what makes it better or worse. Also mention whether you have other symptoms such as fever, tiredness, nausea, vomiting, or changes in bathroom habits.  Treating abdominal pain  Certain causes of pain, such as appendicitis or a bowel obstruction, need emergency treatment. Other problems can be treated with rest, fluids, or medications. Your health care provider can give you specific instructions for treatment or self-care based on the cause of your pain.  If you have vomiting or diarrhea, sip water or other clear fluids. When you are ready to eat solid foods again, start with small amounts of easy-to-digest, low-fat foods, such as applesauce, toast, or crackers.   When to call the doctor  Call 911 or go to the hospital right away if you:    Can t pass stool and are vomiting    Are vomiting blood or have black, tarry diarrhea    Also have chest, neck, or shoulder pain    Feel like you are about to pass out    Have pain in your  shoulder blades with nausea    Have sudden, excruciating abdominal pain    Have new, severe pain unlike any you have felt before    Have a belly that is rigid, hard, and tender to touch  Call your doctor if you have:    Pain for more than 5 days    Bloating for more than 2 days    Diarrhea for more than 5 days    Fever of 101 F (38.3 C) or higher    Pain that continues to worsen    Unexplained weight loss    Continued lack of appetite    Blood in the stool  How to prevent abdominal pain  Here are some tips to help prevent abdominal pain:    Eat smaller amounts of food at one time.    Avoid greasy, fried, or other high-fat foods.    Avoid foods that give you gas.    Exercise regularly.    Drink plenty of fluids.  To help prevent symptoms of gastroesophageal reflux disease (GERD):    Quit smoking.    Reduce alcohol and certain foods that increase stomach acid.     Lose excess weight.    Finish eating at least 2 hours before you go to bed or lie down.    Elevate the head of your bed.    4978-8727 [employer]. [address] [place] [address]. All rights reserved. This information is not intended as a substitute for professional medical care. Always follow your healthcare professional's instructions.        Constipation (Adult)  Constipation means that you have bowel movements that are less frequent than usual. Stools often become very hard and difficult to pass.  Constipation is very common. At some point in life it affects almost everyone. Since everyone's bowel habits are different, what is constipation to one person may not be to another. Your healthcare provider may do tests to diagnose constipation. It depends on what he or she finds when evaluating you.    Symptoms of constipation include:    Abdominal pain    Bloating    Vomiting    Painful bowel movements    Itching, swelling, bleeding, or pain around the anus  Causes  Constipation can have many causes. These include:    Diet low in  fiber    Too much dairy    Not drinking enough liquids    Lack of exercise or physical activity. This is especially true for older adults.    Changes in lifestyle or daily routine, including pregnancy, aging, work, and travel    Frequent use or misuse of laxatives    Ignoring the urge to have a bowel movement or delaying it until later    Medicines, such as certain prescription pain medicines, iron supplements, antacids, certain antidepressants, and calcium supplements    Diseases like irritable bowel syndrome, bowel obstructions, stroke, diabetes, thyroid disease, Parkinson disease, hemorrhoids, and colon cancer  Complications  Potential complications of constipation can include:    Hemorrhoids    Rectal bleeding from hemorrhoids or anal fissures (skin tears)    Hernias    Dependency on laxatives    Chronic constipation    Fecal impaction    Bowel obstruction or perforation  Home care  All treatment should be done after talking with your healthcare provider. This is especially true if you have another medical problems, are taking prescription medicines, or are an older adult. Treatment most often involves lifestyle changes. You may also need medicines. Your healthcare provider will tell you which will work best for you. Follow the advice below to help avoid this problem in the future.  Lifestyle changes  These lifestyle changes can help prevent constipation:    Diet. Eat a high-fiber diet, with fresh fruit and vegetables, and reduce dairy intake, meats, and processed foods    Fluids. It's important to get enough fluids each day. Drink plenty of water when you eat more fiber. If you are on diet that limits the amount of fluid you can have, talk about this with your healthcare provider.    Regular exercise. Check with your healthcare provider first.  Medications  Take any medicines as directed. Some laxatives are safe to use only every now and then. Others can be taken on a regular basis. Talk with your doctor or  pharmacist if you have questions.  Prescription pain medicines can cause constipation. If you are taking this kind of medicine, ask your healthcare provider if you should also take a stool softener.  Medicines you may take to treat constipation include:    Fiber supplements    Stool softeners    Laxatives    Enemas    Rectal suppositories  Follow-up care  Follow up with your healthcare provider if symptoms don't get better in the next few days. You may need to have more tests or see a specialist.  Call 911  Call 911 if any of these occur:    Trouble breathing    Stiff, rigid abdomen that is severely painful to touch    Confusion    Fainting or loss of consciousness    Rapid heart rate    Chest pain  When to seek medical advice  Call your healthcare provider right away if any of these occur:    Fever over 100.4 F (38 C)    Failure to resume normal bowel movements    Pain in your abdomen or back gets worse    Nausea or vomiting    Swelling in your abdomen    Blood in the stool    Black, tarry stool    Involuntary weight loss    Weakness    5785-2782 The "[employer]. [address] [place] [address]. All rights reserved. This information is not intended as a substitute for professional medical care. Always follow your healthcare professional's instructions.            Future Appointments        Provider Department Dept Phone Center    3/30/2017 10:15 AM [facility] Lab [phone] [facility]    3/30/2017 10:40 AM Jia Mccollum MD [facility] [phone] [facility]    4/12/2017 10:00 AM [facility] Lab [phone] [facility]    4/12/2017 10:20 AM Mackenzie Ca MD [facility] Nephrology [phone] [facility]      24 Hour Appointment Hotline       To make an appointment at any [facility], call [phone] ([phone]). If you don't have a family doctor or clinic, we will help you find one. [facility] are conveniently located to serve the needs of you and your  family.          ED Discharge Orders     PAIN MANAGEMENT CENTER ([place]) REFERRAL       Your provider has referred you to the [facility].    Reason for Referral: Cancer related pain    Please complete the following questions:    What is your diagnosis for the patient's pain? Cancer-related pain, narcotic-related constipation/slow transit    Do you have any specific questions for the pain specialist? Yes: Is there a better suited pain regimen (ideally non-narcotic, given bowel effects) to manage her ongoing cancer-related abdominal pain, or is there a way to mitigate the narcotic sequelae (like methylnaltrexone, etc.)    Are there any red flags that may impact the assessment or management of the patient? None    **ANY DIAGNOSTIC TESTS THAT ARE NOT IN EPIC SHOULD BE SENT TO THE PAIN CENTER**    Please note the Pre-Op Pain Consult must be scheduled 2-3 weeks prior to the patient's surgery.  Patient's trying to schedule within 2 weeks of surgery may not be accommodated.     Pre-Op Pain Consults are only good for 30 days.    REGARDING OPIOID MEDICATIONS:  We will always address appropriateness of opioid pain medications, but we generally will not automatically take on a prescribing role. When we do take on prescribing of opioids for chronic pain, it is in collaboration with the referring physician for an intermediate period of time (months), with an expectation that the primary physician or provider will assume the prescribing role if medications are effective at stable doses with demonstrated compliance.  Therefore, please do not assume that your prescribing responsibilities end on the day of pain clinic consultation.  Is this agreeable to you? NO - Pain Consult Referral is from Emergency Medicine MD after emergency department encounter, either Pain clinic to prescribe or coordinate with Patient's PCP or Oncology team for them to prescribe, I personally will not be the prescribing provider.      For any questions, contact the [facility] at [phone].    Please be aware that coverage of these services is subject to the terms and limitations of your health insurance plan.  Call member services at your health plan with any benefit or coverage questions.      Please bring the following with you to your appointment:    (1) Any X-Rays, CTs or MRIs which have been performed.  Contact the facility where they were done to arrange for  prior to your scheduled appointment.    (2) List of current medications   (3) This referral request   (4) Any documents/labs given to you for this referral                     Review of your medicines      Our records show that you are taking the medicines listed below. If these are incorrect, please call your family doctor or clinic.        Dose / Directions Last dose taken    ACETAMINOPHEN PO   Dose:  500 mg        Take 500 mg by mouth every 6 hours as needed for pain Reported on 2/28/2017   Refills:  0        amLODIPine 2.5 MG tablet   Commonly known as:  NORVASC   Dose:  2.5 mg   Quantity:  30 tablet        Take 1 tablet (2.5 mg) by mouth daily   Refills:  0        bisacodyl 10 MG Suppository   Commonly known as:  DULCOLAX   Dose:  10 mg   Quantity:  10 suppository        Place 1 suppository (10 mg) rectally daily as needed for constipation   Refills:  0        cycloSPORINE 0.05 % ophthalmic emulsion   Commonly known as:  RESTASIS   Dose:  1 drop        Place 1 drop into both eyes 2 times daily   Refills:  0        enoxaparin 40 MG/0.4ML injection   Commonly known as:  LOVENOX   Dose:  40 mg   Indication:  0.4 ML   Quantity:  30 Syringe        Inject 0.4 mLs (40 mg) Subcutaneous daily   Refills:  3        EPINEPHrine 0.3 MG/0.3ML injection   Dose:  0.3 mg   Quantity:  1 each        Inject 0.3 mLs (0.3 mg) into the muscle once as needed for anaphylaxis   Refills:  0        ferrous sulfate 325 (65 FE) MG tablet   Commonly known as:  IRON   Dose:   325 mg        Take 325 mg by mouth   Refills:  0        HYDROmorphone 4 MG tablet   Commonly known as:  DILAUDID   Dose:  4 mg   Quantity:  35 tablet        Take 1 tablet (4 mg) by mouth every 4 hours as needed for moderate to severe pain maximum  6  tablet(s) per day   Refills:  0        LORazepam 1 MG tablet   Commonly known as:  ATIVAN   Dose:  1 mg   Quantity:  30 tablet        Take 1 tablet (1 mg) by mouth every 6 hours as needed (nausea/vomiting, anxiety or sleep)   Refills:  3        megestrol 40 MG/ML suspension   Commonly known as:  MEGACE ORAL   Dose:  400 mg   Quantity:  600 mL        Take 10 mLs (400 mg) by mouth 2 times daily   Refills:  1        MELATONIN PO   Dose:  1 mg        Take 1 mg by mouth At Bedtime   Refills:  0        ondansetron 8 MG ODT tab   Commonly known as:  ZOFRAN-ODT   Dose:  8 mg   Quantity:  30 tablet        Place 1 tablet (8 mg) under the tongue every 8 hours as needed for nausea   Refills:  3        ONE DAILY MULTIVITAMIN WOMEN Tabs   Dose:  1 tablet        Take 1 tablet by mouth every morning   Refills:  0        polyethylene glycol powder   Commonly known as:  MIRALAX/GLYCOLAX   Dose:  1 capful        Take 1 capful by mouth daily as needed   Refills:  0        prochlorperazine 10 MG tablet   Commonly known as:  COMPAZINE   Dose:  10 mg   Quantity:  30 tablet        Take 1 tablet (10 mg) by mouth every 6 hours as needed (nausea/vomiting)   Refills:  2        promethazine 25 MG Suppository   Commonly known as:  PHENERGAN   Dose:  25 mg        Place 25 mg rectally   Refills:  0        senna-docusate 8.6-50 MG per tablet   Commonly known as:  SENOKOT-S;PERICOLACE   Dose:  1-2 tablet   Quantity:  7 tablet        Take 1-2 tablets by mouth 2 times daily   Refills:  0        * study - niraparib 100 mg capsule   Commonly known as:  IDS# [id number]   Dose:  300 mg   Quantity:  84 capsule        Take 3 capsules (300 mg) by mouth every morning Take at the same time each day, preferably morning.  Swallow whole and do NOT chew capsules. Food and water is permissible. First dose will be administered on site.   Refills:  0        * study - niraparib 100 mg capsule   Commonly known as:  IDS# [id number]   Dose:  200 mg   Quantity:  84 capsule        Take 2 capsules (200 mg) by mouth every morning Take at the same time each day, preferably morning. Swallow whole and do NOT chew capsules. Food and water is permissible. First dose will be administered on site.   Refills:  0        * study - niraparib 100 mg capsule   Commonly known as:  IDS# [id number]   Dose:  200 mg   Quantity:  84 capsule        Take 2 capsules (200 mg) by mouth every morning Take at the same time each day, preferably morning. Swallow whole and do NOT chew capsules. Food and water is permissible. First dose will be administered on site.   Refills:  0        tamsulosin 0.4 MG capsule   Commonly known as:  FLOMAX   Dose:  0.4 mg   Quantity:  60 capsule        Take 1 capsule (0.4 mg) by mouth daily   Refills:  6        tolterodine 4 MG 24 hr capsule   Commonly known as:  DETROL LA   Dose:  4 mg   Quantity:  30 capsule        Take 1 capsule (4 mg) by mouth daily   Refills:  3        traMADol 50 MG tablet   Commonly known as:  ULTRAM   Dose:  25-50 mg        Take 25-50 mg by mouth   Refills:  0        VITAMIN B6 PO   Dose:  1 tablet        Take 1 tablet by mouth At Bedtime   Refills:  0        ZANTAC PO   Dose:  75 mg        Take 75 mg by mouth daily   Refills:  0        * Notice:  This list has 3 medication(s) that are the same as other medications prescribed for you. Read the directions carefully, and ask your doctor or other care provider to review them with you.            Procedures and tests performed during your visit     CBC with platelets differential    CT Abdomen Pelvis w/o Contrast    Comprehensive metabolic panel    INR    ISTAT CG4 gases lactate kaity nursing POCT    ISTAT gases lactate kaity POCT    Lipase    Soap suds enema    UA with Microscopic  reflex to Culture    Urine Culture Aerobic Bacterial      Orders Needing Specimen Collection     None      Pending Results     Date and Time Order Name Status Description    3/15/2017 1658 Urine Culture Aerobic Bacterial Preliminary             Pending Culture Results     Date and Time Order Name Status Description    3/15/2017 1658 Urine Culture Aerobic Bacterial Preliminary             Thank you for choosing [facility]       Thank you for choosing [facility] for your care. Our goal is always to provide you with excellent care. Hearing back from our patients is one way we can continue to improve our services. Please take a few minutes to complete the written survey that you may receive in the mail after you visit with us. Thank you!        [facility] Information     [employer] gives you secure access to your electronic health record. If you see a primary care provider, you can also send messages to your care team and make appointments. If you have questions, please call your primary care clinic.  If you do not have a primary care provider, please call [phone] and they will assist you.        Care EveryWhere ID     This is your Care EveryWhere ID. This could be used by other organizations to access your [facility] medical records  [id number]        After Visit Summary       This is your record. Keep this with you and show to your community pharmacist(s) and doctor(s) at your next visit.

## 2017-03-15 NOTE — ED PROVIDER NOTES
History     Chief Complaint   Patient presents with     Constipation     HPI  Margaux Guzmán is a 61 year old female with a history of primary peritoneal adenocarcinoma s/p TAHBSO (2013), appendectomy (1980), multiple prior C-sections, history of SBO (recent admission for SBO 2/28-3/3), hyperlipidemia, osteoarthritis and polymyalgia rheumatica who presents to the emergency department today with complaints of feelings of constipation and decreased BMs.    Patient reports a sense of abdominal fullness and distention for about the past week. She also reports constipation over the past week and states her last normal bowel movement was 3 days ago.  She reports that these symptoms started after starting again on oral Dilaudid and oral Zofran for her ongoing cancer-related abdominal pain.  Her cancer related abdominal pain is unchanged, but now her abdomen feels more bloated/full, like when constipated in the past.    She states she has been taking Miralax, Senna, Colace and suppositories without improvement in her symptoms. She does report passing a small amount of stool this morning along with some flatus but denies having passed any flatus since. She denies any noted blood in her stool.     Patient also endorses nausea and a poor appetite but denies any vomiting. Additionally, patient reports some abdominal pain, however, thinks this may be related to her chronic bilateral ureteral stents. She reports taking Dilaudid and Zofran for her symptoms without significant relief. She otherwise denies fevers, chest pain or shortness of breath. She reports ongoing hematuria related to her chronic stents but denies any new urinary symptoms. Of note, patient s last colonoscopy was attempted in February, however, this was not completed because of strictures.     No other new symptoms or complaints at this time.  Please see ROS further details.    I have reviewed the Medications, Allergies, Past Medical and Surgical History,  and Social History in the [employer] system.    Past Medical History   Diagnosis Date     Anemia      History of blood transfusion      MULTIPLE     Hydronephrosis      Hyperlipidemia      Jugular vein thrombosis, right      Persistent right internal jugular DVT     Livedo annularis      Osteoarthritis      Osteopenia      Ovarian cancer (H)      Polymyalgia rheumatica (H)      PONV (postoperative nausea and vomiting)      Primary cancer of peritoneum (H)        Past Surgical History   Procedure Laterality Date     Appendectomy        section       Open reduction internal fixation toe(s)  9/3/13     Fifth digit, left foot, with screw fixation      Lymph node biopsy       Left posterior chain, beign     Breast surgery       biopsy negative     Laparoscopic salpingo-oophorectomy  2013     Procedure: LAPAROSCOPIC SALPINGO-OOPHORECTOMY;  Diagnostic Laparoscopy, Exploratory Laparotomy, Bilateral Salpingo-Oophorectomy,  Hysterectomy, Omentectomy, Pelvic Washings, Peritoneal staging and biopsies, Pelvic and Periaortic Lymph node Dissection;  Surgeon: Cheri Aguilar MD;  Location: [facility]     Hysterectomy total abdominal, bilateral salpingo-oophorectomy, node dissection, combined  2013     Procedure: COMBINED HYSTERECTOMY TOTAL ABDOMINAL, SALPINGO-OOPHORECTOMY, NODE DISSECTION;;  Surgeon: Cheri Aguilar MD;  Location: [facility]     Laparotomy, staging, combined  2013     Procedure: COMBINED LAPAROTOMY, STAGING;;  Surgeon: Cheri Aguilar MD;  Location: [facility]     Remove port peritoneal  2014     Procedure: REMOVE PORT PERITONEAL;  Surgeon: Cheri Aguilar MD;  Location: [facility]     Combined cystoscopy, retrogrades, exchange stent ureter(s) Bilateral 2016     Procedure: COMBINED CYSTOSCOPY, RETROGRADES, EXCHANGE STENT URETER(S);  Surgeon: Carmela Alvarez MD;  [facility]: [facility]     Combined cystoscopy, retrogrades, exchange stent ureter(s)  2016     @ [facility]      Combined cystoscopy, retrogrades, exchange stent ureter(s) Bilateral 10/17/2016     Procedure: COMBINED CYSTOSCOPY, RETROGRADES, EXCHANGE STENT URETER(S);  Surgeon: Carmela Alvarez MD;  [facility]: [facility]     Combined cystoscopy, retrogrades, exchange stent ureter(s) Bilateral 12/7/2016     Procedure: COMBINED CYSTOSCOPY, RETROGRADES, EXCHANGE STENT URETER(S);  Surgeon: Carmela Alvarez MD;  [facility]     Colonoscopy N/A 1/28/2017     Procedure: COLONOSCOPY;  Surgeon: Luis Richardson MD;  Location: [facility]     Esophagoscopy, gastroscopy, duodenoscopy (egd), combined N/A 1/28/2017     Procedure: COMBINED ESOPHAGOSCOPY, GASTROSCOPY, DUODENOSCOPY (EGD);  Surgeon: Luis Richardson MD;  [facility]: [facility]     Colonoscopy N/A 1/28/2017     Procedure: COMBINED COLONOSCOPY, SINGLE OR MULTIPLE BIOPSY/POLYPECTOMY BY BIOPSY;  Surgeon: Luis Richardson MD;  [facility]: [facility]     Combined cystoscopy, retrogrades, exchange stent ureter(s) Bilateral 2/27/2017     Procedure: COMBINED CYSTOSCOPY, RETROGRADES, EXCHANGE STENT URETER(S);  Surgeon: Carmela Alvarez MD;  [facility]       Family History   Problem Relation Age of Onset     Arthritis Father      Myocardial Infarction Father      secondary to anesthesia     Colon Cancer Father      DIABETES Other      Uncle     Hypertension Mother      Other - See Comments Mother      cognitive decline     Skin Cancer Mother      Hypertension Sister      HEART DISEASE Other      unknown valve replacement     Bladder Cancer Sister      Skin Cancer Brother        Social History   Substance Use Topics     Smoking status: Former Smoker     Smokeless tobacco: Former User      Comment: Quit over 20 years ago     Alcohol use No     Current Facility-Administered Medications   Medication     0.9% sodium chloride BOLUS    Followed by     0.9% sodium chloride infusion     ondansetron (ZOFRAN) injection 4 mg     Current Outpatient Prescriptions   Medication     amLODIPine  "(NORVASC) 2.5 MG tablet     bisacodyl (DULCOLAX) 10 MG Suppository     HYDROmorphone (DILAUDID) 4 MG tablet     study - niraparib (IDS# 4759) 100 mg capsule     ondansetron (ZOFRAN-ODT) 8 MG ODT tab     megestrol (MEGACE ORAL) 40 MG/ML suspension     promethazine (PHENERGAN) 25 MG Suppository     traMADol (ULTRAM) 50 MG tablet     ferrous sulfate (IRON) 325 (65 FE) MG tablet     study - niraparib (IDS# 4759) 100 mg capsule     enoxaparin (LOVENOX) 40 MG/0.4ML injection     study - niraparib (IDS# 4759) 100 mg capsule     tamsulosin (FLOMAX) 0.4 MG capsule     LORazepam (ATIVAN) 1 MG tablet     tolterodine (DETROL LA) 4 MG 24 hr capsule     ACETAMINOPHEN PO     Multiple Vitamins-Minerals (ONE DAILY MULTIVITAMIN WOMEN) TABS     polyethylene glycol (MIRALAX/GLYCOLAX) powder     cycloSPORINE (RESTASIS) 0.05 % ophthalmic emulsion     Ranitidine HCl (ZANTAC PO)     MELATONIN PO     EPINEPHrine (EPIPEN) 0.3 MG/0.3ML injection     senna-docusate (SENOKOT-S;PERICOLACE) 8.6-50 MG per tablet     Pyridoxine HCl (VITAMIN B6 PO)     prochlorperazine (COMPAZINE) 10 MG tablet        Allergies   Allergen Reactions     Contrast Dye Itching and Swelling     Itching/tingling of mouth and nose with sensation of swelling after receiving IV contrast for CT.  This occurred despite steroid premedication. Therefore the patient should not receive intravenous contrast in the future.      Benadryl Allergy Other (See Comments)     Stay awake, restless, \"uncomfortable\", \"feel like I need to run away\"      Enoxaparin Hives     3/23/16: Okay to receive heparin flushes in port, tolerates well. Patricia BAZAN-JUAN PABLO     Enoxaparin Sodium Other (See Comments)     Pt is taking this now.     Amoxicillin Rash     Diphenhydramine Anxiety     No Clinical Screening - See Comments Rash     Pollen Extract Rash     Sulfa Drugs Rash       Review of Systems   Constitutional: Positive for appetite change. Negative for chills, diaphoresis and fever.   HENT: " "Negative for ear pain, sore throat, tinnitus, trouble swallowing and voice change.    Eyes: Negative for pain and visual disturbance.   Respiratory: Negative for cough and shortness of breath.    Cardiovascular: Negative for chest pain, palpitations and leg swelling.   Gastrointestinal: Positive for abdominal distention, abdominal pain, constipation and nausea. Negative for blood in stool, diarrhea and vomiting.   Endocrine: Negative for polydipsia and polyuria.   Genitourinary: Positive for hematuria (chronic). Negative for dysuria, frequency and urgency.   Musculoskeletal: Negative for back pain and neck pain.   Skin: Negative for color change and rash.   Allergic/Immunologic: Negative for immunocompromised state.   Neurological: Negative for dizziness, weakness, light-headedness, numbness and headaches.   Hematological: Negative for adenopathy. Does not bruise/bleed easily.   Psychiatric/Behavioral: Negative for dysphoric mood. The patient is not nervous/anxious.        Physical Exam   BP: (!) 174/92  Pulse: 115  Heart Rate: 115  Temp: 98.1  F (36.7  C)  SpO2: 99 %  Physical Exam  CONSTITUTIONAL: Well-developed and well-nourished. Awake and alert. Non-toxic appearance. No acute distress.   HENT:   - Head: Normocephalic and atraumatic.   - Ears: Hearing and external ear grossly normal.   - Nose: Nose normal. No rhinorrhea. No epistaxis.   - Mouth/Throat: Oropharynx is clear and moist and mucous membranes are normal.   EYES: Conjunctivae and lids are normal. No scleral icterus.   NECK: Normal range of motion and phonation normal. Neck supple. No JVD present. No tracheal deviation, no stridor. No edema or erythema noted.  CARDIOVASCULAR: Normal rate, regular rhythm and no appreciable abnormal heart sounds.  PULMONARY/CHEST: Effort normal. No accessory muscle usage or stridor. No respiratory distress.  No appreciable abnormal breath sounds.  ABDOMEN: Soft, non-distended. Mild tenderness/\"uncomfortable\" sensation " reported, no worse with palpation. No rigidity, rebound or guarding.   MUSCULOSKELETAL: Extremities warm and seemingly well perfused. No edema or calf tenderness.  NEUROLOGIC: Awake, alert. Not disoriented. She displays no atrophy and no tremor.  Normal tone. No seizure activity. Coordination normal. GCS 15  SKIN: Skin is warm and dry. No rash noted. No diaphoresis. No pallor.   PSYCHIATRIC: Normal mood and affect. Speech and behavior normal. Thought processes linear. Cognition and memory are normal.     ED Course     ED Course     Procedures                 Labs Ordered and Resulted from Time of ED Arrival Up to the Time of Departure from the ED - No data to display    Assessments & Plan (with Medical Decision Making)   IMPRESSION: 61-year-old female with a PMH notable for ovarian cancer as well as peritoneal adenocarcinoma (debated by her usual teams which was primary), who was had previous SBO that was medically managed, some ongoing nausea and vomiting, dehydration, previous pyelonephritis but also sterile pyuria who has ureteral stents in place because of her cancer/peritoneal disease, presenting today with slowly progressive increasing abdominal discomfort, nausea without vomiting and difficulty with BMs as described further above in HPI/ROS.  Patient arrives with some mild tachycardia but vitals otherwise grossly WNL.  She is thin but nontoxic, NAD and actually looks quite good and appearance considering her known cancer.  Her abdomen is soft, ND, no significant tenderness to palpation.     DDx includes but is not limited to constipation (which would be fairly likely given that she is on ongoing narcotic pain medications and zofran) and the symptoms began getting worse when she took the narcotics and the antinausea medicine, she's also had SBO's and she could either have a bowel obstruction or partial door exam is not frankly consistent with that, could be consistent with a partial SBO based upon her history.   Other things to consider would be abdominal discomfort related to hydronephrosis, UTI, ureteral stents, etc.  Cells though her pain is not any worse but if she were to have a new UTI, etc. perhaps I could contribute to her symptoms.  Likewise some other type of intra-abdominal cause cause ileus may be consistent with this.    PLAN: Laboratory studies, urine studies, CT abdomen/pelvis (noncontrast, has allergy), symptom management.  If no obstruction we will work to augment her bowel regimen.  I'm thinking about some other medications such as methylnaltrexone as her symptoms do seem to be strongly tied to her use of narcotics, but I will discuss this with her usual team and our ED pharmacist before we would try the medication.    RESULTS:  - See ED Course above for pertinent results and correlating MDM  - Labs: Hgb at baseline, no significant changes or findings  - Urine: WBC 46 (down from previous), RBC >182 (same as previous), Moderate LE (consistent w/ previous), 40 ketones, moderate blood (consistent with previous), 100 protein (improved from previous).   - Imaging: Images and written preliminary reports reviewed by myself and revealed:  --- CT A/P: No acute intra-abdominal pathology, no evidence for obstruction.  No significant change in intra-abdominal metastatic disease or enlarged lymph nodes in the lower chest.  Moderate colonic stool burden, bilateral nephroureteral stents remain in place without hydronephrosis    INTERVENTIONS:   - IVF  - IV Zofran  - Soap Suds Enema    RE-EVALUATION:  - The patient's symptoms were not significantly changed during ED stay.  HR did improve somewhat and she did have a small amount of feces produced with the enema. Was able to keep down some PO intake (fluids, crackers)  - Patient observed for multiple hours with multiple repeat exams and remains hemodynamically and clinically stable.    DISCUSSIONS:  - w/ Gyn Onc: Reviewed case and patient's testing from here today.  The  would recommend either suppository or enema and then if neither successful could discuss with them again the possibility of methylnaltrexone.     DISPOSITION/PLANNING:  - FINAL IMPRESSION: Constipation, abnormal UA (not significantly changed from previous)  - DISPOSITION:  D/C to home  --- Pending: Urine culture  --- Follow-up: with PCP and Oncology teams. Pain Med Consult if interested  --- Recommendations: Conservative symptom management, continue with bowel regimen, strict return instructions.       __________________________________________________________________________  - I have reviewed the nursing notes.  - I have reviewed the findings, plan, strict return instructions and need for follow up with the patient and her daughter. They expressed understanding and agreement with this plan. All questions answered to the best of our ability at this time.           New Prescriptions    No medications on file       Final diagnoses:   None   Vivienne TREVIÑO, am serving as a trained medical scribe to document services personally performed by Qi Alvarez MD, based on the provider's statements to me.      Qi TREVIÑO MD, was physically present and have reviewed and verified the accuracy of this note documented by Vivienne Trivedi.       3/15/2017   [facility], EMERGENCY DEPARTMENT     Qi Alvarez MD  03/16/17 [id number]

## 2017-03-15 NOTE — TELEPHONE ENCOUNTER
Margaux called back, used supp and no BM. Advised to go to ED. Called ED and spoke to charge nurse Lisa.

## 2017-03-15 NOTE — ED NOTES
Bed: IN03  Expected date: 3/15/17  Expected time: 1:48 PM  Means of arrival:   Comments:  Margaux Guzmán  [id number]  [facility] called  Hx peritoneal cancer  Today - possible bowel obstruction. Recent sbo early march.  Last BM 3/13. Has tried miralax and senna, suppository today with no result.

## 2017-03-16 NOTE — TELEPHONE ENCOUNTER
Spoke with patient regarding ED visit yesterday.  Patient states that she thought that she had another bowel obstruction but CT scan showed no evidence of obstruction and patient had bowel movement before leaving the ED.  Labs were drawn in the ED so patient will not need repeat labs today in [place].  Labs reviewed and ok to continue on current dose of study medication per protocol.  Patient will have repeat labs in one week in [place].  Patient voiced understanding.  Patient will call to see if she can get into the pain clinic to explore other options for pain management.      Aurelia Worley RN     Pager [phone]

## 2017-03-17 NOTE — TELEPHONE ENCOUNTER
[place] Emergency Department Lab result notification [Adult-Female]    [facility] ED lab result protocol used  Urine Culture protocol    Reason for call  Notify of lab results, assess symptoms,  review ED providers recommendations/discharge instructions (if necessary) and advise per ED lab result f/u protocol    Lab Result (including Rx patient on, if applicable)  Preliminary urine culture report on 3/17/19 shows the presence of bacteria(s):  10,000 to 50,000 colonies/mL Gram positive cocci  [place] ED discharge antibiotic: None.  Recommendations per [place] ED Lab result protocol - Urine culture protocol.    Information table from ED Provider visit on 3/15/17  ED diagnosis      ED provider   Qi Alvarez MD   Symptoms reported at ED visit (Chief complaint, HPI) Margaux Guzmán is a 61 year old female with a history of primary peritoneal adenocarcinoma s/p TAHBSO (2013), appendectomy (1980), multiple prior C-sections, history of SBO (recent admission for SBO 2/28-3/3), hyperlipidemia, osteoarthritis and polymyalgia rheumatica who presents to the emergency department today with complaints of feelings of constipation and decreased BMs.     Patient reports a sense of abdominal fullness and distention for about the past week. She also reports constipation over the past week and states her last normal bowel movement was 3 days ago.  She reports that these symptoms started after starting again on oral Dilaudid and oral Zofran for her ongoing cancer-related abdominal pain. Her cancer related abdominal pain is unchanged, but now her abdomen feels more bloated/full, like when constipated in the past.     She states she has been taking Miralax, Senna, Colace and suppositories without improvement in her symptoms. She does report passing a small amount of stool this morning along with some flatus but denies having passed any flatus since. She denies any noted blood in her stool.      Patient also  endorses nausea and a poor appetite but denies any vomiting. Additionally, patient reports some abdominal pain, however, thinks this may be related to her chronic bilateral ureteral stents. She reports taking Dilaudid and Zofran for her symptoms without significant relief. She otherwise denies fevers, chest pain or shortness of breath. She reports ongoing hematuria related to her chronic stents but denies any new urinary symptoms. Of note, patient s last colonoscopy was attempted in February, however, this was not completed because of strictures.    ED providers Impression and Plan (applicable information) IMPRESSION: 61-year-old female with a PMH notable for ovarian cancer as well as peritoneal adenocarcinoma (debated by her usual teams which was primary), who was had previous SBO that was medically managed, some ongoing nausea and vomiting, dehydration, previous pyelonephritis but also sterile pyuria who has ureteral stents in place because of her cancer/peritoneal disease, presenting today with slowly progressive increasing abdominal discomfort, nausea without vomiting and difficulty with BMs as described further above in HPI/ROS. Patient arrives with some mild tachycardia but vitals otherwise grossly WNL. She is thin but nontoxic, NAD and actually looks quite good and appearance considering her known cancer. Her abdomen is soft, ND, no significant tenderness to palpation.      DDx includes but is not limited to constipation (which would be fairly likely given that she is on ongoing narcotic pain medications and zofran) and the symptoms began getting worse when she took the narcotics and the antinausea medicine, she's also had SBO's and she could either have a bowel obstruction or partial door exam is not frankly consistent with that, could be consistent with a partial SBO based upon her history. Other things to consider would be abdominal discomfort related to hydronephrosis, UTI, ureteral stents, etc. Cells  though her pain is not any worse but if she were to have a new UTI, etc. perhaps I could contribute to her symptoms. Likewise some other type of intra-abdominal cause cause ileus may be consistent with this.     PLAN: Laboratory studies, urine studies, CT abdomen/pelvis (noncontrast, has allergy), symptom management. If no obstruction we will work to augment her bowel regimen. I'm thinking about some other medications such as methylnaltrexone as her symptoms do seem to be strongly tied to her use of narcotics, but I will discuss this with her usual team and our ED pharmacist before we would try the medication.   Significant Medical hx, if applicable Ovarian cancer, peritoneal carcinoma, DVT, anemia, neutropenia, ureteral stents   Coumadin/Warfarin [Yes /No] No   Creatinine Level (mg/dl) 0.92   Creatinine clearance (ml/min), if applicable 53   Pregnant (Yes/No/NA) No   Breastfeeding (Yes/No/NA) No   Allergies Contrast dye, benadryl, enoxaprin, amoxicillin, pollen, sulfa drugs   Weight, if applicable 119#      RN Assessment (Patient s current Symptoms), include time called.  [Insert Left message here if message left]  Abdominal pain improved and hematuria much improved since discharge from ED.  Continues to have flank pain which is related to ureteral stents and has been ongoing.   Does report all her UA's are positive, but UC's are negative and is related to her stents.  Advised she will be contacted with final UC if treatment is indicated.  No questions, no concerns.    Please Contact your PCP clinic or return to the Emergency department if your:    Symptoms return.    Symptoms worsen or other concerning symptom's.      Tamanna Nayak RN    [facility] RN  Lung Nodule and ED Lab Results F/U RN  Epic pool (ED late result f/u RN) : [id number]   # [phone]    Copy of Lab result   Order   Urine Culture Aerobic Bacterial [[id number]] (Order [id number])   Preliminary Result   Exam Information   Exam Date Exam  Time Accession # Results    3/15/17  4:58 PM [id number]    Component Results   Component Collected Lab   Specimen Description 03/15/2017  4:58 PM 51   [facility] Urine   Special Requests 03/15/2017  4:58 PM 75   Specimen received in preservative   Culture Micro (Abnormal) 03/15/2017  4:58    10,000 to 50,000 colonies/mL Gram positive cocci   Susceptibility testing in progress      Micro Report Status 03/15/2017  4:58    Pending

## 2017-03-17 NOTE — TELEPHONE ENCOUNTER
----- Message from Nicki Ghosh RN sent at 3/16/2017  8:50 AM CDT -----  Regarding: FW: Patient requesting alternatives for nausea and pain      ----- Message -----     From: Socorro Martinez RN     Sent: 3/16/2017   8:44 AM       To: Gyn Onc Nurses Team-  Subject: Patient requesting alternatives for nausea a#    Hello,     Patient is requesting an alternative to Zofran to manage nausea, says Zofran is constipating for her.     Also requesting an alternative to dilaudid to control pain. She says that dilaudid is generally unhelpful and very constipating for her. She prefers non-opioids to control pain due to her ongoing issues with constipation/bowel obstructions.     Her callback is [phone]    Thanks  Maricruz Quintanilla

## 2017-03-18 NOTE — TELEPHONE ENCOUNTER
Margaux calling back, Vanessa reports Fosfomycin not in stock, this nurse attempted to call [facility] and pharmacy now closed.  Patient requests transfer to [facility] in [place], [place].  Med reordered.    Tamanna Nayak, MK    [facility] RN  Lung Nodule and ED Lab Results F/U RN  Epic pool (ED late result f/u RN) : [id number]   # [phone]

## 2017-03-22 NOTE — NURSING NOTE
Orders for repeat CBC and CMP faxed to [facility] at [phone] phone number [phone].  Results will be faxed to clinic tomorrow when completed.      Aurelia Worley RN     Pager [phone]

## 2017-03-23 NOTE — TELEPHONE ENCOUNTER
Patient had repeat labs in [place] for the Tesaro/Quadra study.  Labs reviewed and ok to continue on the current dose of study medication.  Patient denies any other issues at this time.  Patient is still trying to work with clinic for pain management as she is trying to not take narcotics at this time.  Patient will have repeat labs in one week as well as appointment with Dr. Mccollum.      Aurelia Worley RN     Pager [phone]

## 2017-03-24 NOTE — TELEPHONE ENCOUNTER
Received call from patient with questions regarding her morphine prescription. She tells me that she really tried not to use her morphine script, but she was having severe pain so has started taking it -- she reports she used 1ml and this gives her about 1.5 hours of relief. She asks if it is okay to continue with 1ml every 2 hours and reports that she tends to take meds and have them become ineffective for her after some time, she would like to know if it would be possible to increase the morphine further then 1ml if this happens.    Discussed with Dr. Urbina who advised okay for patient to take 1-2ml Q2H PRN of her morphine.     Called patient and advised of the above. She verbalized understanding and agreement. She tells me that she wishes she was able to take ibuprofen as that has been the best pain med in the past. She reports that she did get some hydromorphone from oncology and it was helpful the first 2 times she took it, but then it stopped working and only caused her to feel groggy. She reports normal BMs and tells me that she is monitoring very closely for constipation and taking a senna tab every time she takes pain medication.     Patient states she is setting up appt with pain clinic for second opio ion on pain, gave her encouragement about this. Welcomed her to schedule an appt with our clinic again, patient would like to wait on this at this time. She has palliative contact info if needed.

## 2017-03-30 NOTE — PROGRESS NOTES
D: 61 year old female with ovarian cancer  I: Home health services  A: GARRY, covering for GARRY Arauz, was asked to meet with patient regaridng questions about home services. SW met with patient and her daughter. [place] services. Patient stated she lives alone but has problems with cooking and cleaning around the home for extended periods of time. She does not have many family members or friends in the area who would be able to assist. Patient also stated she has been trying to find tranpsortation assistance but has run into problems due to the distance between her home and the clinic.  Patient has already looked into [facility] resources and county options.  Patient also asked about any meal programs, stating her area is out of the meals on wheels service zone.  SW provided brochure for [facility] [employer] as well as list of private home duty providers who she could contact to see if they would service the area. Patient was also given [employer] brochure and informed that picking up meals from the home office while in the area for medical care may be an option.  Patient was given GARRY contact information for any further questions, needs, or concerns.  P: GARRY Arauz will be updated. GARRY is available to assist with any other identified needs.    Soo Yeon Han, [facility]  [phone]

## 2017-03-30 NOTE — NURSING NOTE
Performed triplicate EKG on pt in clinic for research. First EKG was performed at 13:02, second EKG was at 13:04, and third was taken at 13:06. Labs were drawn at 13:14.  Yasmin Chance, CMA

## 2017-03-30 NOTE — NURSING NOTE
"Margaux Guzmán is a 61 year old female who presents for:  Chief Complaint   Patient presents with     Oncology Clinic Visit     return visit for research follow up related to Ovarian cancer, left (H)     Research Study        Initial Vitals:  /84  Pulse 109  Temp 98.4  F (36.9  C) (Oral)  Resp 20  Ht 1.676 m (5' 6\")  Wt 52.2 kg (115 lb)  SpO2 99%  BMI 18.56 kg/m2 Estimated body mass index is 18.56 kg/(m^2) as calculated from the following:    Height as of this encounter: 1.676 m (5' 6\").    Weight as of this encounter: 52.2 kg (115 lb).. Body surface area is 1.56 meters squared. BP completed using cuff size: regular  No Pain (0) No LMP recorded. Patient has had a hysterectomy. Allergies and medications reviewed.     Medications: Medication refills not needed today.  Pharmacy name entered into [employer]:    [id number] - [place], [place] - [address] AT [facility] [address]  [id number] - [place], [place] - [address]  [facility] [place], [place] - [address] [facility] [place] [phone]  [place], [place] [address] [id number] - [place], [place] - [address] [id number] - [place], [place] - [address]  [id number] - [facility] [address] AT [facility] [address]    Comments: patient denied pain/discomfort.     5 minutes for nursing intake (face to face time)   Helio Yates CMA        "

## 2017-03-30 NOTE — PROGRESS NOTES
Gyn Oncology Follow-up Visit    Date of Visit: 03/30/2017    CC: Margaux Guzmán is a 61 year old female with a history of primary peritoneal cancer presenting today for hospital follow-up, on Tesaro trial currently being held.    HPI:    Marshall comes to the clinic not feeling well today. She has lost a lot of weight even though she has been eating well. She does not get much physical activity and her appetite has been normal. She has also been having some pain associated with her stent. She had initial pain relief with dilaudid however after 2 or 3 days of usage it did not provide relief. She has switched back to using 1.5 mg of morphine for pain relief but it does not provide any relief until 3 hours after taking it. She does not want to use dilaudid due to severe constipation which is unable to be alleviated with miralax or other laxatives/stool softeners. She also continues to use zofran to prevent nausea but that continues to curb her appetite. Her stools are loose after she stopped taking dilaudid. We discussed long acting morphine which she has never taken. She has tried to get into a pain clinic but it has taken her about 3 months to get an appointment. CT scan ordered for today.     Brief Oncology History:  The patient is a 58 year old  female who initially presented for evaluation of pelvic and abdominal pain. On exam, she had mild right adnexal tenderness and slightly enlarged right ovary freely mobile and smooth. This prompted an abdominal ultrasound, which was normal, and a pelvic US that showed anechoic right ovarian cyst measuring 4.3 x 3.8 x 4.0 cm. Her  was elevated at 74 on 10/8/13. We reviewed the possible diagnosis including ovarian cancer versus benign ovarian cyst. Approach to surgery, alternatives to surgery and plan for possible extended open surgical staging based on the operative findings was discussed. In light of the size of the ovary we are offering an initial approach with  minimally invasive surgery. After discussion of risks and benefits, consent was obtained.  11/7/13 Diagnostic laparoscopy converted to exploratory laparotomy, bilateral salpingo-oophorectomy, total abdominal hysterectomy, omentectomy, staging biopsies, bilateral pelvic and periaortic lymph node dissection and washings. Stage IIIB  12/4/13: Cycle #1 IV/IP Taxol/CDDP  1/2/14: Cycle #2 IV/IP PCP.  - 14  1/23/14: Cycle 3 IV/IP.  - 7  2/11/14:  - 7. CT C/A/P Impression:  1. Multiple bilateral pulmonary nodules as described in full report measuring up to 3 mm along the right major fissure. These are indeterminate given lack of comparison and followup is recommended.  2. No definite evidence for recurrent or metastatic disease in the abdomen or the pelvis. There is a rounded hypodense focus abutting the left external iliac artery and the adjacent sigmoid colon which measures 1 cm, this could represent a fluid-filled sigmoid diverticulum or a hypodense node, further attention to this area on subsequent examinations is recommended.  3. There is a 7 mm nonobstructing stone in the inferior collecting system of the right kidney.  2/12/14-3/26/14: Cycle #4-6 IV/IP CA-125 7, 7, 7.  4/21/14: CT C/A/P Impression:   1. Unchanged indeterminate pulmonary nodules. Recommend continued surveillance.   2. No definite evidence of metastatic ovarian cancer in the abdomen or pelvis. Small amount of subtle increased thickening and fluid is noted along the right lymph node dissection, nonspecific, but possibly a tiny developing lymphocele.   3. 6 mm nonobstructing stone in the right kidney.  Plan surveillance for ovarian cancer every 3 months with physical and .   Indeterminate pulmonary nodule: These were present before the surgery and there was not change with the chemotherapy. Highly unlikely to be a metastatic disease. Will repeat a CT in 3 months to see any change     5/22/14:  6. JOSEMANUEL.  5/17/14; ED visit [facility]. CT abd/pel noted possible chronic partial small bowel obstruction. Colonscopy scheduled for June 6, 2014 locally. Abdominal pain started 5/17/14 and noted pressure without bowel movement and went to ED that night due to increasing pain. In patient 2 day hospital with clear liquids/IV. Mild nausea without vomiting. BM subsequently occurred and pt was discharged. No pain since. Continues with fullness in abdomen. Diet is bland for the most part.  8/25/14:  -5. Notes increased abdominal girth and bloating x 2-3 weeks with urine urgency. Worried about recurrence. Is just starting to return to normal exercise and activities. No constipation, diarrhea, pain, vaginal or rectal bleeding, cough or dyspnea. CT to follow indeterminate pulmonary nodules today.   CT cap IMPRESSION:   1. No CT scan findings of the chest, abdomen, or pelvis to indicate metastatic disease.  2. 2-5 mm pulmonary nodules, stable since 2/11/2014.  3. Nonobstructing 6 mm stone in the right kidney.  12/3/14:  8.. Pt started zoloft for anxiety. Worries about cancer recurrence.   3/2/15: CA 34  3/5/15: CT C/A/P: Impression:  1. Multiple new small peritoneal and retroperitoneal nodules are suspicious for metastases. Suspicious new left common iliac and central small bowel mesenteric nodes. No abdominal ascites.  2. Multiple pulmonary nodules are stable with no new nodules.  3. 9 mm nonobstructing right lower pole renal calculus.    5/20/15: CT c/a/p showed nodularity throughout the abdomen and pelvis worrisome for malignancy which has increased in size     5/28/15: CT abdomen and pelvis showed stable inumerabble peritoneal nodules scattered throughout the abdomen and pelvis consistent with metastases.     8/4/15 (approximatly): Left ureteral stent was placed.    8/12/15:  from [place] [id number]    8/18/15: CT chest Impression showed slight increase in mild, subtle nodularity along the diaphragm which is nonspecific  "but may represent metastatic disease.   8/18/15: CT abdomen and pelvis Impression showed interval progression of peritoneal metastatic disease.     8/25/15:  on 3/2/15 of 34 prompted CT c/a/p, which showed multiple new small peritoneal and retroperitoneal nodules are suspicious for metastases. Suspicious new left common iliac and central small bowel mesenteric nodes. She participated in [facility] study involving treatment with on clinical trial with vaccine [id number]. She is here today because the [place] trail failed and the pt pregressed. Her most recent CT c/a/p on 8/25 showed progression of peritoneal metastatic disease. And her most recent  from [place] on 8/12/15 was 303.    Plan: Carbo/Doxil x 6 cycles.with imaging after 3 cycles.     9/3/15: Cycle #1 Carbo/Doxil.  244.  9/24/15: right IJ thrombus; started on Lovenox.   9/26/15: Present to [place] ED. \"presented to the ED this morning with what appears to be a mild drug rash after administering her lovenox this morning. This writer discussed situation with Gyn Onc Fellow Jeanne Moon as well as [facility] Heme/Onc service. Per Heme, a rash of this nature is extremely uncommon with administration of Lovenox. Given the mild nature of this rash and acute need for anticoagulation, recommended the patient continue with Lovenox injections and treat with Benadryl as needed. Advised patient be given precautions to return to the ED immediately if she develops reactive respiratory symptoms. Alternatively patient could be switched to alternative formulation of low molecular weight heparin if she was strongly resistant to continuation of Lovenox.\"    10/1/15: Cycle #2 Carbo/Doxil.  175.    10/28/15: gyn onc visit: pt complains of worsening constipation and tenderness around her right ribs. She is taking senna for her constipation with minimal improvement. She admits that the swelling around her neck after her blood clot has decreased. She also admits " to feeling a nodules on her left abdomen. She states her appetite is good but she has not been eating fruits and vegetables. She denies any chemo side effects besides fatigue.     10/28/15:  158  11/23/2015:  133  CT c/a/p  IMPRESSION: In this patient with a clinical history of ovarian cancer  there is mixed response to therapy:   1. Stable to slightly decreased peritoneal nodularity since  8/18/2015.  2. No significant change regarding the large subdiaphragmatic  peritoneal deposit since 9/24/2015.  3. Enlarging soft tissue nodule overlying the abdominal musculature  concerning for metastasis since March of 2015.   4. Unchanged, indeterminate hypodensity near the gallbladder fossa  since 8/18/2015, however progressively increased in size since  3/5/2015.  5. Bilateral nephroureteral stents. No significant hydronephrosis.  6. Bilateral pulmonary nodules. Continued followup recommended.    12/23/15: Cycle 5 carboplatin/Doxil/Avastin.  96. To receive Avastin today despite proteinuria per Dr. Aguilar and nephrology.  1/21/16: Cycle 6 carboplatin/Doxil/Avastin, delayed one week secondary to neutropenia.  62.  1/28/16:  63.    2/2/16: Patient had right upper extremity doppler u/s done in [place] due to swollen glands and pain. 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 Pt is currently on lovenox.    2/2/16: US soft tissue neck  Conclusion:  1. Morphologically normal not pathologically enlarged two right carotid chain lymph nodes.  2. Chronic right jugular vein thrombosis, described in detail on  venous doppler exam.     2/2/16: Thyroid Ultrasound  Right lobe: 5.5 x 1.9 x 1.2 cm  Left lobe: 5.0 x 1.5 . 0.9 cm  Both lobes have normal background echotexture.    Conclusion:  1. 3 benign - appearing subcentimeter hypoechoic right mid thyroid nodules.  2. Borderline  thyromegaly.    2/2/16: Right upper extremity venous duplex doppler evaluation  Conclusion:  1.) chronic non compressible echogenic right jugular vein thrombus, now 4.3 cm in length.     2/22/16:   IMPRESSION:    1. Ovarian cancer with peritoneal carcinomatosis.  2. Given the increased sensitivity of PET/CT, comparison with prior CT examinations is difficult. In general the abdominal/pelvic metastatic lesions appear to be decreased in size.  3. Persistent right internal jugular DVT, as evidenced by 5 cm filling  defect with inferior border at the central venous catheter.  4. Bilateral hydronephrosis without overt caliectasis. Some distal  migration of the bilateral double-J ureteral stents, although the  proximal coiled portions continue to be in the renal pelves.    2/24/16: C7 carboplatin/Doxil/Avastin.  56.  3/9/16: Hgb 6.6, worked up for transfusion reaction (negative). Bleed possibly secondary to   3/23/16: C8 carboplatin/Doxil/Avastin.  44.  4/2016: Hospitalized in [place] x2 weeks for hematuria leading to anemia after ureteral stent exchange  4/20/16: C9D1 carboplatin/Doxil/Avastin. Deferred one week secondary to acute UTI.  35.  4/28/16: C9D1 deferred due to continued bacteruria and ANC 1.3.  5/4/16: C9D1 carboplatin/Doxil/Avastin. Breast lump noted, diagnostic mammogram ordered.  6/1/16: C10D1 carboplatin/Doxil/Avastin.  50.  6/28/16: C11D1 carboplatin/Doxil/Avastin. Avastin held due to bleeding. Carbo/Doxil deferred one week secondary to neutropenia.  43.  7/7/16: C12D1 carboplatin/Doxil. Avastin held due to bleeding.  7/18/16: Bilateral ureteral stent exchange  7/20/16-7/29/16: Hospitalized with urosepsis and blood loss anemia, started on Zosyn and discharged on amoxicillin  9/29/16: C1D1 Gemzar.  85.  10/21/16: C2D1 Gemzar.  106.   11/11/16: C3D1 Gemzar.  pending.    12/12/16: CT CAP  IMPRESSION: In this patient with a history of metastatic  ovarian  cancer:  1. Progression of disease as evidenced by a slowly enlarging  mesenteric and omental soft tissue foci and slowly enlarging  periesophageal and pericardiophrenic lymph nodes, as detailed above.  2. Stable appearance of bilateral ureteral stents without  hydronephrosis.  3. Patient was premedicated for a pre-existing IV contrast allergy.  Despite this, she had itchiness on her face poststudy. She should no  longer receive IV contrast in the future.      CT AP 1/10/2017: multiple dilated fluid filled small bowel loops with decompressed distal small bowel loops, consistent with obstruction. Transition point is suspected in the pelvis. No pneumatosis or free intraperitoneal gas. Bilateral ureteral stents appear appropriately positioned. There is mild dilation of the bilateral renal collecting systems, left greater than right.    1/23/17: admitted to clinic for dizziness and heart palpitations   1/27/17: ED admission - leg swelling and GI bleeding     1/27/17: US lower extremity   IMPRESSION:  1.  No evidence of right lower extremity deep venous thrombosis.  2.  Dampened waveforms in the right lower extremity, similar to  7/25/2016, though a more central occlusion cannot be excluded.  1/27/17: MRI Brain  IMPRESSION:  Normal brain MRI  1/27/17: CT AP  IMPRESSION: This patient with a history of metastatic ovarian cancer:  1. No bowel obstruction. Oral contrast progressed to the colon.  Extensive colonic stool.  2. Severe right hydronephrosis, new from 12/12/2016, may represent  obstruction of the right ureteral stent. No left-sided hydronephrosis.  3. Slight interval progression of diffuse peritoneal metastatic  disease.  4. Mildly nodular areas of increased attenuation within the pelvic  bowel which may represent loculated malignant ascites or peritoneal  implants similar in appearance to 12/12/2016.    1/28/17: colonoscopy- benign     Treatment: Tesaro/Quadra Niraparib study  1/5/17: Cycle #1 Day 1     2/2/17: Cycle #2 Day 1   2/28/17: Cycle #3 Day 1     2/28/17: CT scan - [facility]   IMPRESSION:     IMPRESSION: In this patient with a history of ovarian cancer, there is  mild disease progression as follows:  1. Increase in size and number of multiple subcentimeter  nodules/intrafissural lymph nodes along the right major and minor  fissures and also bilateral pulmonary nodules, predominantly along the  diaphragmatic pleura.   2. Slight increase in size of small lymph nodes in the right internal  mammary region and bilateral anterior cardiophrenic region.  3. Stable to slight increase in size of deposits in the perihepatic  region. No significant change in the omental deposits in the left  upper quadrant of the abdomen.  4. Stable to slight increase in size of mesenteric deposits/lymph  nodes.  5. Bilateral nephroureteric stent in place. Atrophy of the right  kidney with interval resolution of right hydronephrosis. Unchanged  mild left hydronephrosis. Air within the collecting systems and  bladder likely related to prior intervention.  6. Dilation of proximal small bowel loops with interspersed areas of  narrowing. No progression of oral contrast into the ileum. Moderate  amount of fecal material in the colon. Partial/early small bowel  obstruction is possible.         Date Value Ref Range Status   03/30/2017 381 (H) 0 - 30 U/mL Final     Comment:     Assay Method:  Chemiluminescence using Siemens Centaur XP   02/28/2017 285 (H) 0 - 30 U/mL Final     Comment:     Assay Method:  Chemiluminescence using Siemens Centaur XP   02/02/2017 243 (H) 0 - 30 U/mL Final     Comment:     Assay Method:  Chemiluminescence using Siemens Centaur XP   01/05/2017 165 (H) 0 - 30 U/mL Final     Comment:     Assay Method:  Chemiluminescence using Siemens Centaur XP   12/15/2016 145 (H) 0 - 30 U/mL Final     Comment:     Assay Method:  Chemiluminescence using Siemens Centaur XP   12/12/2016 132 (H) 0 - 30 U/mL Final      Comment:     Assay Method:  Chemiluminescence using Siemens Centaur XP   11/11/2016 111 (H) 0 - 30 U/mL Final     Comment:     Assay Method:  Chemiluminescence using Siemens Centaur XP   10/21/2016 106 (H) 0 - 30 U/mL Final     Comment:     Assay Method:  Chemiluminescence using Siemens Centaur XP   09/29/2016 85 (H) 0 - 30 U/mL Final     Comment:     Assay Method:  Chemiluminescence using Siemens Centaur XP   09/15/2016 70 (H) 0 - 30 U/mL Final     Comment:     Assay Method:  Chemiluminescence using Siemens Centaur XP     ROS:  Answers for HPI/ROS submitted by the patient on 3/27/2017   General Symptoms: Yes  Skin Symptoms: No  HENT Symptoms: No  EYE SYMPTOMS: No  HEART SYMPTOMS: Yes  LUNG SYMPTOMS: No  INTESTINAL SYMPTOMS: Yes  URINARY SYMPTOMS: Yes  GYNECOLOGIC SYMPTOMS: No  BREAST SYMPTOMS: No  SKELETAL SYMPTOMS: Yes  BLOOD SYMPTOMS: No  NERVOUS SYSTEM SYMPTOMS: No  MENTAL HEALTH SYMPTOMS: No  Fever: No  Loss of appetite: No  Weight loss: Yes  Weight gain: No  Fatigue: Yes  Night sweats: No  Chills: No  Increased stress: No  Excessive hunger: No  Excessive thirst: No  Feeling hot or cold when others believe the temperature is normal: Yes  Loss of height: No  Post-operative complications: No  Surgical site pain: No  Hallucinations: No  Change in or Loss of Energy: No  Hyperactivity: No  Confusion: No  Chest pain or pressure: No  Fast or irregular heartbeat: Yes  Pain in legs with walking: No  Swelling in feet or ankles: No  Trouble breathing while lying down: No  Fingers or Toes appear blue: No  High blood pressure: Yes  Low blood pressure: No  Fainting: No  Murmurs: No  Chest pain on exertion: No  Chest pain at rest: No  Cramping pain in leg during exercise: No  Pacemaker: No  Varicose veins: No  Edema or swelling: No  Fast heart beat: Yes  Wake up at night with shortness of breath: No  Heart flutters: Yes  Light-headedness: Yes  Exercise intolerance: Yes  Heart burn or indigestion: No  Nausea: Yes  Vomiting:  No  Abdominal pain: No  Bloating: Yes  Constipation: Yes  Diarrhea: No  Blood in stool: No  Black stools: No  Rectal or Anal pain: No  Fecal incontinence: No  Rectal bleeding: No  Yellowing of skin or eyes: No  Vomit with blood: No  Change in stools: No  Hemorrhoids: No  Trouble holding urine or incontinence: No  Pain or burning: No  Trouble starting or stopping: No  Increased frequency of urination: Yes  Blood in urine: Yes  Decreased frequency of urination: No  Frequent nighttime urination: No  Flank pain: Yes  Difficulty emptying bladder: No  Back pain: Yes  Muscle aches: No  Neck pain: No  Swollen joints: No  Joint pain: No  Bone pain: No  Muscle cramps: No  Muscle weakness: Yes  Joint stiffness: No  Bone fracture: No    I have reviewed and addressed the patient's review of symptoms for today's visit.             Past Medical History:   Diagnosis Date     Anemia      History of blood transfusion     MULTIPLE     Hydronephrosis      Hyperlipidemia      Jugular vein thrombosis, right     Persistent right internal jugular DVT     Livedo annularis      Osteoarthritis      Osteopenia      Ovarian cancer (H)      Polymyalgia rheumatica (H)      PONV (postoperative nausea and vomiting)      Primary cancer of peritoneum (H)      Past Surgical History:   Procedure Laterality Date     APPENDECTOMY       BREAST SURGERY      biopsy negative      SECTION       COLONOSCOPY N/A 2017    Procedure: COLONOSCOPY;  Surgeon: Luis Richardson MD;  Location: [facility]     COLONOSCOPY N/A 2017    Procedure: COMBINED COLONOSCOPY, SINGLE OR MULTIPLE BIOPSY/POLYPECTOMY BY BIOPSY;  Surgeon: Luis Richardson MD;  Location: [facility]     COMBINED CYSTOSCOPY, RETROGRADES, EXCHANGE STENT URETER(S) Bilateral 2016    Procedure: COMBINED CYSTOSCOPY, RETROGRADES, EXCHANGE STENT URETER(S);  Surgeon: Carmela Alvarez MD;  Location: [facility]     COMBINED CYSTOSCOPY, RETROGRADES, EXCHANGE STENT URETER(S)  2016    @ [facility]     COMBINED CYSTOSCOPY, RETROGRADES, EXCHANGE STENT URETER(S) Bilateral 10/17/2016    Procedure: COMBINED CYSTOSCOPY, RETROGRADES, EXCHANGE STENT URETER(S);  Surgeon: Carmela Alvarez MD;  [facility]: [facility]     COMBINED CYSTOSCOPY, RETROGRADES, EXCHANGE STENT URETER(S) Bilateral 12/7/2016    Procedure: COMBINED CYSTOSCOPY, RETROGRADES, EXCHANGE STENT URETER(S);  Surgeon: Carmela Alvarez MD;  [facility]     COMBINED CYSTOSCOPY, RETROGRADES, EXCHANGE STENT URETER(S) Bilateral 2/27/2017    Procedure: COMBINED CYSTOSCOPY, RETROGRADES, EXCHANGE STENT URETER(S);  Surgeon: Carmela Alvarez MD;  [facility]     ESOPHAGOSCOPY, GASTROSCOPY, DUODENOSCOPY (EGD), COMBINED N/A 1/28/2017    Procedure: COMBINED ESOPHAGOSCOPY, GASTROSCOPY, DUODENOSCOPY (EGD);  Surgeon: Luis Richardson MD;  Location: [facility]     HYSTERECTOMY TOTAL ABDOMINAL, BILATERAL SALPINGO-OOPHORECTOMY, NODE DISSECTION, COMBINED  11/7/2013    Procedure: COMBINED HYSTERECTOMY TOTAL ABDOMINAL, SALPINGO-OOPHORECTOMY, NODE DISSECTION;;  Surgeon: Cheri Aguilar MD;  [facility]     LAPAROSCOPIC SALPINGO-OOPHORECTOMY  11/7/2013    Procedure: LAPAROSCOPIC SALPINGO-OOPHORECTOMY;  Diagnostic Laparoscopy, Exploratory Laparotomy, Bilateral Salpingo-Oophorectomy,  Hysterectomy, Omentectomy, Pelvic Washings, Peritoneal staging and biopsies, Pelvic and Periaortic Lymph node Dissection;  Surgeon: Cheri Aguilar MD;  [facility]     LAPAROTOMY, STAGING, COMBINED  11/7/2013    Procedure: COMBINED LAPAROTOMY, STAGING;;  Surgeon: Cheri Aguilar MD;  [facility]     LYMPH NODE BIOPSY  2008    Left posterior chain, beign     OPEN REDUCTION INTERNAL FIXATION TOE(S)  9/3/13    Fifth digit, left foot, with screw fixation      REMOVE PORT PERITONEAL  5/5/2014    Procedure: REMOVE PORT PERITONEAL;  Surgeon: Cheri Aguilar MD;  Location[facility]     Family History   Problem Relation Age of Onset     Arthritis Father       Myocardial Infarction Father      secondary to anesthesia     Colon Cancer Father      DIABETES Other      Uncle     Hypertension Mother      Other - See Comments Mother      cognitive decline     Skin Cancer Mother      Hypertension Sister      HEART DISEASE Other      unknown valve replacement     Bladder Cancer Sister      Skin Cancer Brother      Social History     Social History     Marital Status:      Spouse Name: N/A     Number of Children: N/A     Years of Education: N/A     Social History Main Topics     Smoking status: Former Smoker     Smokeless tobacco: Never Used      Comment: Quit over 20 years ago     Alcohol Use: No     Drug Use: No     Sexual Activity: No     Other Topics Concern     Not on file     Social History Narrative         Current Outpatient Prescriptions   Medication     morphine 10 MG/5ML solution     amLODIPine (NORVASC) 2.5 MG tablet     bisacodyl (DULCOLAX) 10 MG Suppository     study - niraparib (IDS# 4759) 100 mg capsule     ondansetron (ZOFRAN-ODT) 8 MG ODT tab     megestrol (MEGACE ORAL) 40 MG/ML suspension     promethazine (PHENERGAN) 25 MG Suppository     traMADol (ULTRAM) 50 MG tablet     ferrous sulfate (IRON) 325 (65 FE) MG tablet     study - niraparib (IDS# 4759) 100 mg capsule     enoxaparin (LOVENOX) 40 MG/0.4ML injection     study - niraparib (IDS# 4759) 100 mg capsule     tamsulosin (FLOMAX) 0.4 MG capsule     LORazepam (ATIVAN) 1 MG tablet     tolterodine (DETROL LA) 4 MG 24 hr capsule     ACETAMINOPHEN PO     Multiple Vitamins-Minerals (ONE DAILY MULTIVITAMIN WOMEN) TABS     polyethylene glycol (MIRALAX/GLYCOLAX) powder     cycloSPORINE (RESTASIS) 0.05 % ophthalmic emulsion     Ranitidine HCl (ZANTAC PO)     MELATONIN PO     EPINEPHrine (EPIPEN) 0.3 MG/0.3ML injection     senna-docusate (SENOKOT-S;PERICOLACE) 8.6-50 MG per tablet     Pyridoxine HCl (VITAMIN B6 PO)     prochlorperazine (COMPAZINE) 10 MG tablet     No current facility-administered medications for  "this visit.         Allergies   Allergen Reactions     Contrast Dye Itching and Swelling     Itching/tingling of mouth and nose with sensation of swelling after receiving IV contrast for CT.  This occurred despite steroid premedication. Therefore the patient should not receive intravenous contrast in the future.      Benadryl Allergy Other (See Comments)     Stay awake, restless, \"uncomfortable\", \"feel like I need to run away\"      Enoxaparin Hives     3/23/16: Okay to receive heparin flushes in port, tolerates well. Patricia Cortez FNP-C     Enoxaparin Sodium Other (See Comments)     Pt is taking this now.     Amoxicillin Rash     Diphenhydramine Anxiety     No Clinical Screening - See Comments Rash     Pollen Extract Rash     [facility] Rash       Physical Exam:    /84  Pulse 109  Temp 98.4  F (36.9  C) (Oral)  Resp 20  Ht 1.676 m (5' 6\")  Wt 52.2 kg (115 lb)  SpO2 99%  BMI 18.56 kg/m2     General Appearance: appears ill and thin, able to sit up and walk     HEENT:  no thyromegaly, no palpable nodules or masses        Cardiovascular: regular rate and rhythm, S1S2, grade II murmur, consistent with prior exams     Respiratory: lungs clear, no rales, rhonchi or wheezes, normal diaphragmatic excursion    Musculoskeletal: extremities non tender and without edema    Skin: Rash on lower back from heat pads, scar right lower extremity due to previous gangrene infection    Neurological: normal gait, no gross defects     Psychiatric: appropriate mood and affect                               Hematological: normal cervical, supraclavicular and inguinal lymph nodes     Gastrointestinal:       abdomen soft, mildly tender, minimally distended, palpable nodule lateral to umbilius, + BS x 4 quadrants.    Genitourinary: Deferred    Performance Status -2    Assessment/Plan:  1) Primary peritoneal cancer: Patient to continue (parp-inhibitor)-niraparib today. CT scan done today. Long acting morphine and zofran ordered. If " she decides to come off the trial would recommend weekly taxol.    2) Reviewed signs and symptoms for when she should contact the clinic or seek additional care, including but not limited to fever, chills, inability to keep down food or fluids, nausea and vomiting not controlled with antiemetics, and diarrhea leading to dehydration.      3) Nausea: Related to pain vs chemotherapy vs disease. Controlled with antiemetics, has improved after hospital visit.       Constipation: Improved with daily miralax.  Instructed to monitor for signs of obstruction. Continue low residue diet.    4) Severe hydronephrosis, bilateral stents in place with ongoing irritation/flank pain: had stent exchange which caused severe pain and nausea/emesis. Continues to have severe flank pain. Long acting morphine ordered for pain management - will take 1 every 12 hours. Recommend she see urology right away to see if any other options to help with this pain.    5) Anxiety: continue ativan.    6) Genetic counseling: Has been done, negative for mutations in the following: BRCA1, BRCA2, EPCAM, MLH1, MSH2, MSH6, PMS2, PTEN and TP53.     7) Levofloxacin: Patient was place on levofloxacin on 2/25/17 at outside clinic. Patient was taken off of this medication today as the urine culture came back negative from clinic. We were unaware of this addition of the antibiotic.     8) Health maintenance issues discussed include to follow up with PCP for non-gynecologic issues.  Continue on prophylactic lovenox at 40 SC daily due to history of prior bleeding on higher dose.    9) Appetite: Consider Megace for appetite stimulant. Patient declines at this time.     10) Marshall verbalizes understanding of and agreement with plan.    Of a 60 minute appointment, more than 50% was spent in counseling the patient.    Addendum: 3/30/17 CT CAP ordered due to acute pain    IMPRESSION: Images patient with a history of ovarian cancer, overall  findings of stable disease  includin. No acute findings to suggest etiology of severe left flank pain.  2. Unchanged pulmonary nodules/pleural nodularity and thoracic lymph  nodes.  3. Unchanged perihepatic and left upper quadrant omental deposits as  well as diffuse mesenteric lymphadenopathy.  4. Unchanged hypoattenuating focus in the hepatic dome.  5. Unchanged placement of bilateral nephroureteral stents. Unchanged  mild to moderate left hydronephrosis. Stable atrophy of the right  kidney.  6. Unchanged 8 mm right flank soft tissue nodule.    Called patient with this report, desires to stay on study at this time. Will see pain clinic.    Jia Mccollum MD    Department of Ob/Gyn and Women's Health  Division of Gynecologic Oncology  [facility]  [phone]    I have reviewed the above note and agree with the scribe's notation as written.    I, Kolton Spencer, am serving as a scribe to document services personally performed by Jia Mccollum MD, based upon my observations and the provider's statements to me. All documentation has been reviewed by the aforementioned doctor prior to being entered into the official medical record.

## 2017-03-30 NOTE — PROGRESS NOTES
"Care Coordinator Note  I am wanting infformation on Home health.  I have to set my alarm to take my pills at times I am forget full.  What about asisited living? O dp not have a lot of energry to make meals  Or to do the house work.  Offered suggestions for agency to look into to assit with [employer] or [facility] etc.  \"'Those kind of places will not come to the Home I am to far out.\"  Contacted  to see her after the CT scan.   [facility] is the agency she wanted me to contact for initial assessment.     Pt verbalized back understanding of the above information discussed.   Face to face time spent with patient 15.  Jessica TIMMONS RN, OCN  Care Coordinator   Gynecologic Cancer   Office [phone]  Pager [phone]  "

## 2017-03-30 NOTE — NURSING NOTE
Patient here for C4D1 for the Tesaro/Quadra Niraparib study.  Patient c/o increased nausea, pain in abdomin, right flank pain, fatigue and weight loss.  Patient will have CT scan done today to determine progression.  Patient will continue with study medication until results are received.  Next appointments will be made at that time.  ECG's and PK to be done per protocol.  71 pills returned from bottle number [id number] along with drug diary.  Per drug diary patient should have 68 tabs left not 71.  Patient did skip some doses due to having nausea.  Patient does not recall missing more doses than documented.  Patient instructed to take medication as directed.  Bottle number [id number] dispensed to patient along with study diary.  Patient voiced understanding.     Aurelia Worley RN     Pager [phone]

## 2017-03-30 NOTE — LETTER
3/30/2017       RE: Margaux Guzmán  [address] [facility] [address]     Dear Colleague,    Thank you for referring your patient, Margaux Guzmán, to the [facility]. Please see a copy of my visit note below.    Gyn Oncology Follow-up Visit    Date of Visit: 03/30/2017    CC: Margaux Guzmán is a 61 year old female with a history of primary peritoneal cancer presenting today for hospital follow-up, on Tesaro trial currently being held.    HPI:    Marshall comes to the clinic not feeling well today. She has lost a lot of weight even though she has been eating well. She does not get much physical activity and her appetite has been normal. She has also been having some pain associated with her stent. She had initial pain relief with dilaudid however after 2 or 3 days of usage it did not provide relief. She has switched back to using 1.5 mg of morphine for pain relief but it does not provide any relief until 3 hours after taking it. She does not want to use dilaudid due to severe constipation which is unable to be alleviated with miralax or other laxatives/stool softeners. She also continues to use zofran to prevent nausea but that continues to curb her appetite. Her stools are loose after she stopped taking dilaudid. We discussed long acting morphine which she has never taken. She has tried to get into a pain clinic but it has taken her about 3 months to get an appointment. CT scan ordered for today.     Brief Oncology History:  The patient is a 58 year old  female who initially presented for evaluation of pelvic and abdominal pain. On exam, she had mild right adnexal tenderness and slightly enlarged right ovary freely mobile and smooth. This prompted an abdominal ultrasound, which was normal, and a pelvic US that showed anechoic right ovarian cyst measuring 4.3 x 3.8 x 4.0 cm. Her  was elevated at 74 on 10/8/13. We reviewed the possible diagnosis including ovarian cancer versus  benign ovarian cyst. Approach to surgery, alternatives to surgery and plan for possible extended open surgical staging based on the operative findings was discussed. In light of the size of the ovary we are offering an initial approach with minimally invasive surgery. After discussion of risks and benefits, consent was obtained.  11/7/13 Diagnostic laparoscopy converted to exploratory laparotomy, bilateral salpingo-oophorectomy, total abdominal hysterectomy, omentectomy, staging biopsies, bilateral pelvic and periaortic lymph node dissection and washings. Stage IIIB  12/4/13: Cycle #1 IV/IP Taxol/CDDP  1/2/14: Cycle #2 IV/IP PCP.  - 14  1/23/14: Cycle 3 IV/IP.  - 7  2/11/14:  - 7. CT C/A/P Impression:  1. Multiple bilateral pulmonary nodules as described in full report measuring up to 3 mm along the right major fissure. These are indeterminate given lack of comparison and followup is recommended.  2. No definite evidence for recurrent or metastatic disease in the abdomen or the pelvis. There is a rounded hypodense focus abutting the left external iliac artery and the adjacent sigmoid colon which measures 1 cm, this could represent a fluid-filled sigmoid diverticulum or a hypodense node, further attention to this area on subsequent examinations is recommended.  3. There is a 7 mm nonobstructing stone in the inferior collecting system of the right kidney.  2/12/14-3/26/14: Cycle #4-6 IV/IP CA-125 7, 7, 7.  4/21/14: CT C/A/P Impression:   1. Unchanged indeterminate pulmonary nodules. Recommend continued surveillance.   2. No definite evidence of metastatic ovarian cancer in the abdomen or pelvis. Small amount of subtle increased thickening and fluid is noted along the right lymph node dissection, nonspecific, but possibly a tiny developing lymphocele.   3. 6 mm nonobstructing stone in the right kidney.  Plan surveillance for ovarian cancer every 3 months with physical and .   Indeterminate  pulmonary nodule: These were present before the surgery and there was not change with the chemotherapy. Highly unlikely to be a metastatic disease. Will repeat a CT in 3 months to see any change     5/22/14:  6. JOSEMANUEL.  5/17/14; ED visit [facility]. CT abd/pel noted possible chronic partial small bowel obstruction. Colonscopy scheduled for June 6, 2014 locally. Abdominal pain started 5/17/14 and noted pressure without bowel movement and went to ED that night due to increasing pain. In patient 2 day hospital with clear liquids/IV. Mild nausea without vomiting. BM subsequently occurred and pt was discharged. No pain since. Continues with fullness in abdomen. Diet is bland for the most part.  8/25/14:  -5. Notes increased abdominal girth and bloating x 2-3 weeks with urine urgency. Worried about recurrence. Is just starting to return to normal exercise and activities. No constipation, diarrhea, pain, vaginal or rectal bleeding, cough or dyspnea. CT to follow indeterminate pulmonary nodules today.   CT cap IMPRESSION:   1. No CT scan findings of the chest, abdomen, or pelvis to indicate metastatic disease.  2. 2-5 mm pulmonary nodules, stable since 2/11/2014.  3. Nonobstructing 6 mm stone in the right kidney.  12/3/14:  8.. Pt started zoloft for anxiety. Worries about cancer recurrence.   3/2/15: CA 34  3/5/15: CT C/A/P: Impression:  1. Multiple new small peritoneal and retroperitoneal nodules are suspicious for metastases. Suspicious new left common iliac and central small bowel mesenteric nodes. No abdominal ascites.  2. Multiple pulmonary nodules are stable with no new nodules.  3. 9 mm nonobstructing right lower pole renal calculus.    5/20/15: CT c/a/p showed nodularity throughout the abdomen and pelvis worrisome for malignancy which has increased in size     5/28/15: CT abdomen and pelvis showed stable inumerabble peritoneal nodules scattered throughout the abdomen and  "pelvis consistent with metastases.     8/4/15 (approximatly): Left ureteral stent was placed.    8/12/15:  from [place] 303    8/18/15: CT chest Impression showed slight increase in mild, subtle nodularity along the diaphragm which is nonspecific but may represent metastatic disease.   8/18/15: CT abdomen and pelvis Impression showed interval progression of peritoneal metastatic disease.     8/25/15:  on 3/2/15 of 34 prompted CT c/a/p, which showed multiple new small peritoneal and retroperitoneal nodules are suspicious for metastases. Suspicious new left common iliac and central small bowel mesenteric nodes. She participated in [facility] study involving treatment with on clinical trial with vaccine [id number]. She is here today because the [place] trail failed and the pt pregressed. Her most recent CT c/a/p on 8/25 showed progression of peritoneal metastatic disease. And her most recent  from [place] on 8/12/15 was 303.    Plan: Carbo/Doxil x 6 cycles.with imaging after 3 cycles.     9/3/15: Cycle #1 Carbo/Doxil.  244.  9/24/15: right IJ thrombus; started on Lovenox.   9/26/15: Present to [place] ED. \"presented to the ED this morning with what appears to be a mild drug rash after administering her lovenox this morning. This writer discussed situation with Gyn Onc Fellow Jeanne Moon as well as [facility] Heme/Onc service. Per Heme, a rash of this nature is extremely uncommon with administration of Lovenox. Given the mild nature of this rash and acute need for anticoagulation, recommended the patient continue with Lovenox injections and treat with Benadryl as needed. Advised patient be given precautions to return to the ED immediately if she develops reactive respiratory symptoms. Alternatively patient could be switched to alternative formulation of low molecular weight heparin if she was strongly resistant to continuation of Lovenox.\"    10/1/15: Cycle #2 Carbo/Doxil.  175.    10/28/15: gyn onc " visit: pt complains of worsening constipation and tenderness around her right ribs. She is taking senna for her constipation with minimal improvement. She admits that the swelling around her neck after her blood clot has decreased. She also admits to feeling a nodules on her left abdomen. She states her appetite is good but she has not been eating fruits and vegetables. She denies any chemo side effects besides fatigue.     10/28/15:  158  11/23/2015:  133  CT c/a/p  IMPRESSION: In this patient with a clinical history of ovarian cancer  there is mixed response to therapy:   1. Stable to slightly decreased peritoneal nodularity since  8/18/2015.  2. No significant change regarding the large subdiaphragmatic  peritoneal deposit since 9/24/2015.  3. Enlarging soft tissue nodule overlying the abdominal musculature  concerning for metastasis since March of 2015.   4. Unchanged, indeterminate hypodensity near the gallbladder fossa  since 8/18/2015, however progressively increased in size since  3/5/2015.  5. Bilateral nephroureteral stents. No significant hydronephrosis.  6. Bilateral pulmonary nodules. Continued followup recommended.    12/23/15: Cycle 5 carboplatin/Doxil/Avastin.  96. To receive Avastin today despite proteinuria per Dr. Aguilar and nephrology.  1/21/16: Cycle 6 carboplatin/Doxil/Avastin, delayed one week secondary to neutropenia.  62.  1/28/16:  63.    2/2/16: Patient had right upper extremity doppler u/s done in [place] due to swollen glands and pain. 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 Pt is currently on lovenox.    2/2/16: US soft tissue neck  Conclusion:  1. Morphologically normal not pathologically enlarged two right carotid chain lymph nodes.  2. Chronic right jugular vein thrombosis, described in detail on  venous doppler exam.     2/2/16:  Thyroid Ultrasound  Right lobe: 5.5 x 1.9 x 1.2 cm  Left lobe: 5.0 x 1.5 . 0.9 cm  Both lobes have normal background echotexture.    Conclusion:  1. 3 benign - appearing subcentimeter hypoechoic right mid thyroid nodules.  2. Borderline thyromegaly.    2/2/16: Right upper extremity venous duplex doppler evaluation  Conclusion:  1.) chronic non compressible echogenic right jugular vein thrombus, now 4.3 cm in length.     2/22/16:   IMPRESSION:    1. Ovarian cancer with peritoneal carcinomatosis.  2. Given the increased sensitivity of PET/CT, comparison with prior CT examinations is difficult. In general the abdominal/pelvic metastatic lesions appear to be decreased in size.  3. Persistent right internal jugular DVT, as evidenced by 5 cm filling  defect with inferior border at the central venous catheter.  4. Bilateral hydronephrosis without overt caliectasis. Some distal  migration of the bilateral double-J ureteral stents, although the  proximal coiled portions continue to be in the renal pelves.    2/24/16: C7 carboplatin/Doxil/Avastin.  56.  3/9/16: Hgb 6.6, worked up for transfusion reaction (negative). Bleed possibly secondary to   3/23/16: C8 carboplatin/Doxil/Avastin.  44.  4/2016: Hospitalized in [place] x2 weeks for hematuria leading to anemia after ureteral stent exchange  4/20/16: C9D1 carboplatin/Doxil/Avastin. Deferred one week secondary to acute UTI.  35.  4/28/16: C9D1 deferred due to continued bacteruria and ANC 1.3.  5/4/16: C9D1 carboplatin/Doxil/Avastin. Breast lump noted, diagnostic mammogram ordered.  6/1/16: C10D1 carboplatin/Doxil/Avastin.  50.  6/28/16: C11D1 carboplatin/Doxil/Avastin. Avastin held due to bleeding. Carbo/Doxil deferred one week secondary to neutropenia.  43.  7/7/16: C12D1 carboplatin/Doxil. Avastin held due to bleeding.  7/18/16: Bilateral ureteral stent exchange  7/20/16-7/29/16: Hospitalized with urosepsis and blood loss anemia, started on  Zosyn and discharged on amoxicillin  9/29/16: C1D1 Gemzar.  85.  10/21/16: C2D1 Gemzar.  106.   11/11/16: C3D1 Gemzar.  pending.    12/12/16: CT CAP  IMPRESSION: In this patient with a history of metastatic ovarian  cancer:  1. Progression of disease as evidenced by a slowly enlarging  mesenteric and omental soft tissue foci and slowly enlarging  periesophageal and pericardiophrenic lymph nodes, as detailed above.  2. Stable appearance of bilateral ureteral stents without  hydronephrosis.  3. Patient was premedicated for a pre-existing IV contrast allergy.  Despite this, she had itchiness on her face poststudy. She should no  longer receive IV contrast in the future.      CT AP 1/10/2017: multiple dilated fluid filled small bowel loops with decompressed distal small bowel loops, consistent with obstruction. Transition point is suspected in the pelvis. No pneumatosis or free intraperitoneal gas. Bilateral ureteral stents appear appropriately positioned. There is mild dilation of the bilateral renal collecting systems, left greater than right.    1/23/17: admitted to clinic for dizziness and heart palpitations   1/27/17: ED admission - leg swelling and GI bleeding     1/27/17: US lower extremity   IMPRESSION:  1.  No evidence of right lower extremity deep venous thrombosis.  2.  Dampened waveforms in the right lower extremity, similar to  7/25/2016, though a more central occlusion cannot be excluded.  1/27/17: MRI Brain  IMPRESSION:  Normal brain MRI  1/27/17: CT AP  IMPRESSION: This patient with a history of metastatic ovarian cancer:  1. No bowel obstruction. Oral contrast progressed to the colon.  Extensive colonic stool.  2. Severe right hydronephrosis, new from 12/12/2016, may represent  obstruction of the right ureteral stent. No left-sided hydronephrosis.  3. Slight interval progression of diffuse peritoneal metastatic  disease.  4. Mildly nodular areas of increased attenuation within the  pelvic  bowel which may represent loculated malignant ascites or peritoneal  implants similar in appearance to 12/12/2016.    1/28/17: colonoscopy- benign     Treatment: Tesaro/[facility]adra Niraparib study  1/5/17: Cycle #1 Day 1    2/2/17: Cycle #2 Day 1   2/28/17: Cycle #3 Day 1     2/28/17: CT scan - [facility]   IMPRESSION:     IMPRESSION: In this patient with a history of ovarian cancer, there is  mild disease progression as follows:  1. Increase in size and number of multiple subcentimeter  nodules/intrafissural lymph nodes along the right major and minor  fissures and also bilateral pulmonary nodules, predominantly along the  diaphragmatic pleura.   2. Slight increase in size of small lymph nodes in the right internal  mammary region and bilateral anterior cardiophrenic region.  3. Stable to slight increase in size of deposits in the perihepatic  region. No significant change in the omental deposits in the left  upper quadrant of the abdomen.  4. Stable to slight increase in size of mesenteric deposits/lymph  nodes.  5. Bilateral nephroureteric stent in place. Atrophy of the right  kidney with interval resolution of right hydronephrosis. Unchanged  mild left hydronephrosis. Air within the collecting systems and  bladder likely related to prior intervention.  6. Dilation of proximal small bowel loops with interspersed areas of  narrowing. No progression of oral contrast into the ileum. Moderate  amount of fecal material in the colon. Partial/early small bowel  obstruction is possible.         Date Value Ref Range Status   02/28/2017 285 (H) 0 - 30 U/mL Final     Comment:     Assay Method:  Chemiluminescence using Siemens Centaur XP   02/02/2017 243 (H) 0 - 30 U/mL Final     Comment:     Assay Method:  Chemiluminescence using Siemens Centaur XP   01/05/2017 165 (H) 0 - 30 U/mL Final     Comment:     Assay Method:  Chemiluminescence using Siemens Centaur XP   12/15/2016 145 (H) 0 - 30 U/mL Final     Comment:      Assay Method:  Chemiluminescence using Siemens Centaur XP   12/12/2016 132 (H) 0 - 30 U/mL Final     Comment:     Assay Method:  Chemiluminescence using Siemens Centaur XP   11/11/2016 111 (H) 0 - 30 U/mL Final     Comment:     Assay Method:  Chemiluminescence using Siemens Centaur XP   10/21/2016 106 (H) 0 - 30 U/mL Final     Comment:     Assay Method:  Chemiluminescence using Siemens Centaur XP   09/29/2016 85 (H) 0 - 30 U/mL Final     Comment:     Assay Method:  Chemiluminescence using Siemens Centaur XP   09/15/2016 70 (H) 0 - 30 U/mL Final     Comment:     Assay Method:  Chemiluminescence using Siemens Centaur XP   08/12/2016 38 (H) 0 - 30 U/mL Final     Comment:     Assay Method:  Chemiluminescence using Siemens Centaur XP     ROS:  Answers for HPI/ROS submitted by the patient on 3/27/2017   General Symptoms: Yes  Skin Symptoms: No  HENT Symptoms: No  EYE SYMPTOMS: No  HEART SYMPTOMS: Yes  LUNG SYMPTOMS: No  INTESTINAL SYMPTOMS: Yes  URINARY SYMPTOMS: Yes  GYNECOLOGIC SYMPTOMS: No  BREAST SYMPTOMS: No  SKELETAL SYMPTOMS: Yes  BLOOD SYMPTOMS: No  NERVOUS SYSTEM SYMPTOMS: No  MENTAL HEALTH SYMPTOMS: No  Fever: No  Loss of appetite: No  Weight loss: Yes  Weight gain: No  Fatigue: Yes  Night sweats: No  Chills: No  Increased stress: No  Excessive hunger: No  Excessive thirst: No  Feeling hot or cold when others believe the temperature is normal: Yes  Loss of height: No  Post-operative complications: No  Surgical site pain: No  Hallucinations: No  Change in or Loss of Energy: No  Hyperactivity: No  Confusion: No  Chest pain or pressure: No  Fast or irregular heartbeat: Yes  Pain in legs with walking: No  Swelling in feet or ankles: No  Trouble breathing while lying down: No  Fingers or Toes appear blue: No  High blood pressure: Yes  Low blood pressure: No  Fainting: No  Murmurs: No  Chest pain on exertion: No  Chest pain at rest: No  Cramping pain in leg during exercise: No  Pacemaker: No  Varicose veins:  No  Edema or swelling: No  Fast heart beat: Yes  Wake up at night with shortness of breath: No  Heart flutters: Yes  Light-headedness: Yes  Exercise intolerance: Yes  Heart burn or indigestion: No  Nausea: Yes  Vomiting: No  Abdominal pain: No  Bloating: Yes  Constipation: Yes  Diarrhea: No  Blood in stool: No  Black stools: No  Rectal or Anal pain: No  Fecal incontinence: No  Rectal bleeding: No  Yellowing of skin or eyes: No  Vomit with blood: No  Change in stools: No  Hemorrhoids: No  Trouble holding urine or incontinence: No  Pain or burning: No  Trouble starting or stopping: No  Increased frequency of urination: Yes  Blood in urine: Yes  Decreased frequency of urination: No  Frequent nighttime urination: No  Flank pain: Yes  Difficulty emptying bladder: No  Back pain: Yes  Muscle aches: No  Neck pain: No  Swollen joints: No  Joint pain: No  Bone pain: No  Muscle cramps: No  Muscle weakness: Yes  Joint stiffness: No  Bone fracture: No    I have reviewed and addressed the patient's review of symptoms for today's visit.             Past Medical History:   Diagnosis Date     Anemia      History of blood transfusion     MULTIPLE     Hydronephrosis      Hyperlipidemia      Jugular vein thrombosis, right     Persistent right internal jugular DVT     Livedo annularis      Osteoarthritis      Osteopenia      Ovarian cancer (H)      Polymyalgia rheumatica (H)      PONV (postoperative nausea and vomiting)      Primary cancer of peritoneum (H)      Past Surgical History:   Procedure Laterality Date     APPENDECTOMY       BREAST SURGERY      biopsy negative      SECTION       COLONOSCOPY N/A 2017    Procedure: COLONOSCOPY;  Surgeon: Luis Richardson MD;  Location: [facility]     COLONOSCOPY N/A 2017    Procedure: COMBINED COLONOSCOPY, SINGLE OR MULTIPLE BIOPSY/POLYPECTOMY BY BIOPSY;  Surgeon: Luis Richardson MD;  Location: [facility]     COMBINED CYSTOSCOPY, RETROGRADES, EXCHANGE STENT URETER(S) Bilateral  7/18/2016    Procedure: COMBINED CYSTOSCOPY, RETROGRADES, EXCHANGE STENT URETER(S);  Surgeon: Carmela Alvarez MD;  Location: [facility]     COMBINED CYSTOSCOPY, RETROGRADES, EXCHANGE STENT URETER(S)  4/2016    @ [facility]     COMBINED CYSTOSCOPY, RETROGRADES, EXCHANGE STENT URETER(S) Bilateral 10/17/2016    Procedure: COMBINED CYSTOSCOPY, RETROGRADES, EXCHANGE STENT URETER(S);  Surgeon: Carmela Alvarez MD;  Location: [facility]     COMBINED CYSTOSCOPY, RETROGRADES, EXCHANGE STENT URETER(S) Bilateral 12/7/2016    Procedure: COMBINED CYSTOSCOPY, RETROGRADES, EXCHANGE STENT URETER(S);  Surgeon: Carmela Alvarez MD;  [facility]     COMBINED CYSTOSCOPY, RETROGRADES, EXCHANGE STENT URETER(S) Bilateral 2/27/2017    Procedure: COMBINED CYSTOSCOPY, RETROGRADES, EXCHANGE STENT URETER(S);  Surgeon: Carmela Alvarez MD;  [facility]     ESOPHAGOSCOPY, GASTROSCOPY, DUODENOSCOPY (EGD), COMBINED N/A 1/28/2017    Procedure: COMBINED ESOPHAGOSCOPY, GASTROSCOPY, DUODENOSCOPY (EGD);  Surgeon: Luis Richardson MD;  Location: [facility]     HYSTERECTOMY TOTAL ABDOMINAL, BILATERAL SALPINGO-OOPHORECTOMY, NODE DISSECTION, COMBINED  11/7/2013    Procedure: COMBINED HYSTERECTOMY TOTAL ABDOMINAL, SALPINGO-OOPHORECTOMY, NODE DISSECTION;;  Surgeon: Cheri Aguilar MD;  Location: [facility]     LAPAROSCOPIC SALPINGO-OOPHORECTOMY  11/7/2013    Procedure: LAPAROSCOPIC SALPINGO-OOPHORECTOMY;  Diagnostic Laparoscopy, Exploratory Laparotomy, Bilateral Salpingo-Oophorectomy,  Hysterectomy, Omentectomy, Pelvic Washings, Peritoneal staging and biopsies, Pelvic and Periaortic Lymph node Dissection;  Surgeon: Cheri Aguilar MD;  [facility]     LAPAROTOMY, STAGING, COMBINED  11/7/2013    Procedure: COMBINED LAPAROTOMY, STAGING;;  Surgeon: Cheri Aguilar MD;  Location: [facility]     LYMPH NODE BIOPSY  2008    Left posterior chain, beign     OPEN REDUCTION INTERNAL FIXATION TOE(S)  9/3/13    Fifth digit, left foot,  with screw fixation      REMOVE PORT PERITONEAL  5/5/2014    Procedure: REMOVE PORT PERITONEAL;  Surgeon: Cheri Aguilar MD;  Location: [facility]     Family History   Problem Relation Age of Onset     Arthritis Father      Myocardial Infarction Father      secondary to anesthesia     Colon Cancer Father      DIABETES Other      Uncle     Hypertension Mother      Other - See Comments Mother      cognitive decline     Skin Cancer Mother      Hypertension Sister      HEART DISEASE Other      unknown valve replacement     Bladder Cancer Sister      Skin Cancer Brother      Social History     Social History     Marital Status:      Spouse Name: N/A     Number of Children: N/A     Years of Education: N/A     Social History Main Topics     Smoking status: Former Smoker     Smokeless tobacco: Never Used      Comment: Quit over 20 years ago     Alcohol Use: No     Drug Use: No     Sexual Activity: No     Other Topics Concern     Not on file     Social History Narrative         Current Outpatient Prescriptions   Medication     morphine 10 MG/5ML solution     amLODIPine (NORVASC) 2.5 MG tablet     bisacodyl (DULCOLAX) 10 MG Suppository     study - niraparib (IDS# 4759) 100 mg capsule     ondansetron (ZOFRAN-ODT) 8 MG ODT tab     megestrol (MEGACE ORAL) 40 MG/ML suspension     promethazine (PHENERGAN) 25 MG Suppository     traMADol (ULTRAM) 50 MG tablet     ferrous sulfate (IRON) 325 (65 FE) MG tablet     study - niraparib (IDS# 4759) 100 mg capsule     enoxaparin (LOVENOX) 40 MG/0.4ML injection     study - niraparib (IDS# 4759) 100 mg capsule     tamsulosin (FLOMAX) 0.4 MG capsule     LORazepam (ATIVAN) 1 MG tablet     tolterodine (DETROL LA) 4 MG 24 hr capsule     ACETAMINOPHEN PO     Multiple Vitamins-Minerals (ONE DAILY MULTIVITAMIN WOMEN) TABS     polyethylene glycol (MIRALAX/GLYCOLAX) powder     cycloSPORINE (RESTASIS) 0.05 % ophthalmic emulsion     Ranitidine HCl (ZANTAC PO)     MELATONIN PO     EPINEPHrine  "(EPIPEN) 0.3 MG/0.3ML injection     senna-docusate (SENOKOT-S;PERICOLACE) 8.6-50 MG per tablet     Pyridoxine HCl (VITAMIN B6 PO)     prochlorperazine (COMPAZINE) 10 MG tablet     No current facility-administered medications for this visit.         Allergies   Allergen Reactions     Contrast Dye Itching and Swelling     Itching/tingling of mouth and nose with sensation of swelling after receiving IV contrast for CT.  This occurred despite steroid premedication. Therefore the patient should not receive intravenous contrast in the future.      Benadryl Allergy Other (See Comments)     Stay awake, restless, \"uncomfortable\", \"feel like I need to run away\"      Enoxaparin Hives     3/23/16: Okay to receive heparin flushes in port, tolerates well. Patricia Cortez FNP-C     Enoxaparin Sodium Other (See Comments)     Pt is taking this now.     Amoxicillin Rash     Diphenhydramine Anxiety     No Clinical Screening - See Comments Rash     Pollen Extract Rash     Sulfa Drugs Rash       Physical Exam:    /84  Pulse 109  Temp 98.4  F (36.9  C) (Oral)  Resp 20  Ht 1.676 m (5' 6\")  Wt 52.2 kg (115 lb)  SpO2 99%  BMI 18.56 kg/m2     General Appearance: appears ill and thin, able to sit up and walk     HEENT:  no thyromegaly, no palpable nodules or masses        Cardiovascular: regular rate and rhythm, S1S2, grade II murmur, consistent with prior exams     Respiratory: lungs clear, no rales, rhonchi or wheezes, normal diaphragmatic excursion    Musculoskeletal: extremities non tender and without edema    Skin: Rash on lower back from heat pads, scar right lower extremity due to previous gangrene infection    Neurological: normal gait, no gross defects     Psychiatric: appropriate mood and affect                               Hematological: normal cervical, supraclavicular and inguinal lymph nodes     Gastrointestinal:       abdomen soft, mildly tender, minimally distended, palpable nodule lateral to umbilius, + BS x 4 " quadrants.    Genitourinary: Deferred    Performance Status -2    Assessment/Plan:  1) Primary peritoneal cancer: Patient to continue (parp-inhibitor)-niraparib today. CT scan done today. Long acting morphine and zofran ordered.     2) Reviewed signs and symptoms for when she should contact the clinic or seek additional care, including but not limited to fever, chills, inability to keep down food or fluids, nausea and vomiting not controlled with antiemetics, and diarrhea leading to dehydration.      3) Nausea: Related to pain vs chemotherapy vs disease. Controlled with antiemetics, has improved after hospital visit.       Constipation: Improved with daily miralax.  Instructed to monitor for signs of obstruction. Continue low residue diet.    4) Severe hydronephrosis, bilateral stents in place with ongoing irritation/flank pain: had stent exchange which caused severe pain and nausea/emesis. Continues to have severe flank pain. Long acting morphine ordered for pain management - will take 1 every 12 hours. Recommend she see urology right away to see if any other options to help with this pain.    5) Anxiety: continue ativan.    6) Genetic counseling: Has been done, negative for mutations in the following: BRCA1, BRCA2, EPCAM, MLH1, MSH2, MSH6, PMS2, PTEN and TP53.     7) Levofloxacin: Patient was place on levofloxacin on 2/25/17 at outside clinic. Patient was taken off of this medication today as the urine culture came back negative from clinic. We were unaware of this addition of the antibiotic.     8) Health maintenance issues discussed include to follow up with PCP for non-gynecologic issues.  Continue on prophylactic lovenox at 40 SC daily due to history of prior bleeding on higher dose.    9) Appetite: Consider Megace for appetite stimulant. Patient declines at this time.     10) Marshall verbalizes understanding of and agreement with plan.    Of a 60 minute appointment, more than 50% was spent in counseling the  patient.    Addendum: 3/30/17 CT CAP ordered due to acute pain    IMPRESSION: Images patient with a history of ovarian cancer, overall  findings of stable disease includin. No acute findings to suggest etiology of severe left flank pain.  2. Unchanged pulmonary nodules/pleural nodularity and thoracic lymph  nodes.  3. Unchanged perihepatic and left upper quadrant omental deposits as  well as diffuse mesenteric lymphadenopathy.  4. Unchanged hypoattenuating focus in the hepatic dome.  5. Unchanged placement of bilateral nephroureteral stents. Unchanged  mild to moderate left hydronephrosis. Stable atrophy of the right  kidney.  6. Unchanged 8 mm right flank soft tissue nodule.    Called patient with this report, desires to stay on study at this time. Will see pain clinic.    Jia Mccollum MD    Department of Ob/Gyn and Women's Health  Division of Gynecologic Oncology  [facility]  [phone]    I have reviewed the above note and agree with the scribe's notation as written.    I, Kolton Spencer, am serving as a scribe to document services personally performed by Jia Mccollum MD, based upon my observations and the provider's statements to me. All documentation has been reviewed by the aforementioned doctor prior to being entered into the official medical record.     Again, thank you for allowing me to participate in the care of your patient.      Sincerely,    Jia Mccollum MD

## 2017-03-30 NOTE — MR AVS SNAPSHOT
After Visit Summary   3/30/2017    Margaux Guzmán    MRN: [id number]           Patient Information     Date Of Birth          1955        Visit Information        Provider Department      3/30/2017 10:40 AM Jia Mccollum MD [facility]        Today's Diagnoses     Anemia due to other cause    -  1    Chemotherapy-induced neutropenia (H)        Ovarian cancer, left (H)        Ovarian cancer, right (H)        Primary peritoneal adenocarcinoma (H)        Nausea           Follow-ups after your visit        Your next 10 appointments already scheduled     Apr 12, 2017 10:00 AM CDT   Lab with  LAB   [facility] ([facility])    [address] [place] [id number] [phone]            Apr 12, 2017 10:20 AM CDT   (Arrive by 9:50 AM)   Return Visit with Mackenzie Ca MD   [facility] ([facility])    [address] [place] [id number] [phone]              Who to contact     If you have questions or need follow up information about today's clinic visit or your schedule please contact [facility] directly at [phone].  Normal or non-critical lab and imaging results will be communicated to you by MyChart, letter or phone within 4 business days after the clinic has received the results. If you do not hear from us within 7 days, please contact the clinic through [employer] or phone. If you have a critical or abnormal lab result, we will notify you by phone as soon as possible.  Submit refill requests through [employer] or call your pharmacy and they will forward the refill request to us. Please allow 3 business days for your refill to be completed.          Additional Information About Your Visit        [employer] Information     [employer] gives you secure access to your electronic health record. If you see a primary care provider, you can also  "send messages to your care team and make appointments. If you have questions, please call your primary care clinic.  If you do not have a primary care provider, please call [phone] and they will assist you.        Care EveryWhere ID     This is your Care EveryWhere ID. This could be used by other organizations to access your [facility] medical records  [id number]        Your Vitals Were     Pulse Temperature Respirations Height Pulse Oximetry BMI (Body Mass Index)    109 98.4  F (36.9  C) (Oral) 20 1.676 m (5' 6\") 99% 18.56 kg/m2       Blood Pressure from Last 3 Encounters:   03/30/17 141/84   03/15/17 147/87   03/03/17 124/65    Weight from Last 3 Encounters:   03/30/17 52.2 kg (115 lb)   03/02/17 54.3 kg (119 lb 11.2 oz)   02/28/17 56.5 kg (124 lb 9.6 oz)              We Performed the Following     *UA reflex to Microscopic and Culture ([place] and LOS)     Amylase          CBC with platelets differential     Comprehensive metabolic panel     EKG 12-lead, complete     GGT     Lactate Dehydrogenase     Magnesium     Research Lab     Routine UA with microscopic     Urine Culture Aerobic Bacterial          Today's Medication Changes          These changes are accurate as of: 3/30/17 11:59 PM.  If you have any questions, ask your nurse or doctor.               Start taking these medicines.        Dose/Directions    ondansetron 8 MG ODT tab   Commonly known as:  ZOFRAN-ODT   Used for:  Ovarian cancer, right (H), Nausea   Started by:  Jia Mccollum MD        Dose:  8 mg   Place 1 tablet (8 mg) under the tongue every 8 hours as needed for nausea   Quantity:  90 tablet   Refills:  3         These medicines have changed or have updated prescriptions.        Dose/Directions    * morphine 10 MG/5ML solution   This may have changed:  Another medication with the same name was added. Make sure you understand how and when to take each.   Used for:  Cancer related pain   Changed by:  Norm Urbina, " MD        Dose:  2-4 mg   Take 1-2 mLs (2-4 mg) by mouth every 2 hours as needed for moderate to severe pain   Quantity:  100 mL   Refills:  0       * morphine 15 MG 12 hr tablet   Commonly known as:  MS CONTIN   This may have changed:  You were already taking a medication with the same name, and this prescription was added. Make sure you understand how and when to take each.   Used for:  Anemia due to other cause, Chemotherapy-induced neutropenia (H), Ovarian cancer, left (H), Ovarian cancer, right (H), Primary peritoneal adenocarcinoma (H)   Changed by:  Jia Mccollum MD        Dose:  15 mg   Take 1 tablet (15 mg) by mouth every 12 hours maximum 2 tablet(s) per day   Quantity:  60 tablet   Refills:  0       * study - niraparib 100 mg capsule   Commonly known as:  IDS# [id number]   This may have changed:  Another medication with the same name was added. Make sure you understand how and when to take each.   Used for:  Anemia due to other cause, Chemotherapy-induced neutropenia (H), Ovarian cancer, left (H), Ovarian cancer, right (H), Primary peritoneal adenocarcinoma (H)   Changed by:  Jia Mccollum MD        Dose:  300 mg   Take 3 capsules (300 mg) by mouth every morning Take at the same time each day, preferably morning. Swallow whole and do NOT chew capsules. Food and water is permissible. First dose will be administered on site.   Quantity:  84 capsule   Refills:  0       * study - niraparib 100 mg capsule   Commonly known as:  IDS# [id number]   This may have changed:  Another medication with the same name was added. Make sure you understand how and when to take each.   Used for:  Anemia due to other cause, Chemotherapy-induced neutropenia (H), Ovarian cancer, left (H), Ovarian cancer, right (H), Primary peritoneal adenocarcinoma (H), Adjustment disorder with anxious mood   Changed by:  Jia Mccollum MD        Dose:  200 mg   Take 2 capsules (200 mg) by mouth every morning Take at the same time each  day, preferably morning. Swallow whole and do NOT chew capsules. Food and water is permissible. First dose will be administered on site.   Quantity:  84 capsule   Refills:  0       * study - niraparib 100 mg capsule   Commonly known as:  IDS# [id number]   This may have changed:  Another medication with the same name was added. Make sure you understand how and when to take each.   Used for:  Anemia due to other cause, Chemotherapy-induced neutropenia (H), Ovarian cancer, left (H), Ovarian cancer, right (H), Primary peritoneal adenocarcinoma (H)   Changed by:  Aurelia Worley RN        Dose:  200 mg   Take 2 capsules (200 mg) by mouth every morning Take at the same time each day, preferably morning. Swallow whole and do NOT chew capsules. Food and water is permissible. First dose will be administered on site.   Quantity:  84 capsule   Refills:  0       * study - niraparib 100 mg capsule   Commonly known as:  IDS# [id number]   This may have changed:  You were already taking a medication with the same name, and this prescription was added. Make sure you understand how and when to take each.   Used for:  Anemia due to other cause, Chemotherapy-induced neutropenia (H), Ovarian cancer, left (H), Ovarian cancer, right (H), Primary peritoneal adenocarcinoma (H)   Changed by:  Jia Mccollum MD        Dose:  100 mg   Take 1 capsule (100 mg) by mouth every morning Take at the same time each day, preferably morning. Swallow whole and do NOT chew capsules. Food and water is permissible. First dose will be administered on site.   Quantity:  84 capsule   Refills:  0       * Notice:  This list has 6 medication(s) that are the same as other medications prescribed for you. Read the directions carefully, and ask your doctor or other care provider to review them with you.         Where to get your medicines      These medications were sent to [id number] - [place], [place] - [address]  [address] [place]  [address]     Phone:  [phone]     ondansetron 8 MG ODT tab         Some of these will need a paper prescription and others can be bought over the counter.  Ask your nurse if you have questions.     Bring a paper prescription for each of these medications     morphine 15 MG 12 hr tablet    study - niraparib 100 mg capsule                Primary Care Provider Office Phone # Fax #    Aurelia Knox [phone]       [facility] [address] [place] [phone]        Thank you!     Thank you for choosing [facility]  for your care. Our goal is always to provide you with excellent care. Hearing back from our patients is one way we can continue to improve our services. Please take a few minutes to complete the written survey that you may receive in the mail after your visit with us. Thank you!             Your Updated Medication List - Protect others around you: Learn how to safely use, store and throw away your medicines at www.disposemymeds.org.          This list is accurate as of: 3/30/17 11:59 PM.  Always use your most recent med list.                   Brand Name Dispense Instructions for use    ACETAMINOPHEN PO      Take 500 mg by mouth every 6 hours as needed for pain Reported on 3/30/2017       amLODIPine 2.5 MG tablet    NORVASC    30 tablet    Take 1 tablet (2.5 mg) by mouth daily       bisacodyl 10 MG Suppository    DULCOLAX    10 suppository    Place 1 suppository (10 mg) rectally daily as needed for constipation       cycloSPORINE 0.05 % ophthalmic emulsion    RESTASIS     Place 1 drop into both eyes 2 times daily       enoxaparin 40 MG/0.4ML injection    LOVENOX    30 Syringe    Inject 0.4 mLs (40 mg) Subcutaneous daily       EPINEPHrine 0.3 MG/0.3ML injection     1 each    Inject 0.3 mLs (0.3 mg) into the muscle once as needed for anaphylaxis       ferrous sulfate 325 (65 FE) MG tablet    IRON     Take 325 mg by mouth       LORazepam 1 MG tablet    ATIVAN     30 tablet    Take 1 tablet (1 mg) by mouth every 6 hours as needed (nausea/vomiting, anxiety or sleep)       megestrol 40 MG/ML suspension    MEGACE ORAL    600 mL    Take 10 mLs (400 mg) by mouth 2 times daily       MELATONIN PO      Take 1 mg by mouth At Bedtime       * morphine 10 MG/5ML solution     100 mL    Take 1-2 mLs (2-4 mg) by mouth every 2 hours as needed for moderate to severe pain       * morphine 15 MG 12 hr tablet    MS CONTIN    60 tablet    Take 1 tablet (15 mg) by mouth every 12 hours maximum 2 tablet(s) per day       ondansetron 8 MG ODT tab    ZOFRAN-ODT    90 tablet    Place 1 tablet (8 mg) under the tongue every 8 hours as needed for nausea       ONE DAILY MULTIVITAMIN WOMEN Tabs      Take 1 tablet by mouth every morning       polyethylene glycol powder    MIRALAX/GLYCOLAX     Take 1 capful by mouth daily as needed       prochlorperazine 10 MG tablet    COMPAZINE    30 tablet    Take 1 tablet (10 mg) by mouth every 6 hours as needed (nausea/vomiting)       promethazine 25 MG Suppository    PHENERGAN     Place 25 mg rectally       senna-docusate 8.6-50 MG per tablet    SENOKOT-S;PERICOLACE    7 tablet    Take 1-2 tablets by mouth 2 times daily       * study - niraparib 100 mg capsule    IDS# [id number]    84 capsule    Take 3 capsules (300 mg) by mouth every morning Take at the same time each day, preferably morning. Swallow whole and do NOT chew capsules. Food and water is permissible. First dose will be administered on site.       * study - niraparib 100 mg capsule    IDS# [id number]    84 capsule    Take 2 capsules (200 mg) by mouth every morning Take at the same time each day, preferably morning. Swallow whole and do NOT chew capsules. Food and water is permissible. First dose will be administered on site.       * study - niraparib 100 mg capsule    IDS# [id number]    84 capsule    Take 2 capsules (200 mg) by mouth every morning Take at the same time each day, preferably morning. Swallow whole and do NOT  chew capsules. Food and water is permissible. First dose will be administered on site.       * study - niraparib 100 mg capsule    IDS# [id number]    84 capsule    Take 1 capsule (100 mg) by mouth every morning Take at the same time each day, preferably morning. Swallow whole and do NOT chew capsules. Food and water is permissible. First dose will be administered on site.       tamsulosin 0.4 MG capsule    FLOMAX    60 capsule    Take 1 capsule (0.4 mg) by mouth daily       tolterodine 4 MG 24 hr capsule    DETROL LA    30 capsule    Take 1 capsule (4 mg) by mouth daily       traMADol 50 MG tablet    ULTRAM     Take 25-50 mg by mouth       VITAMIN B6 PO      Take 1 tablet by mouth At Bedtime       ZANTAC PO      Take 75 mg by mouth daily       * Notice:  This list has 6 medication(s) that are the same as other medications prescribed for you. Read the directions carefully, and ask your doctor or other care provider to review them with you.

## 2017-03-31 NOTE — TELEPHONE ENCOUNTER
----- Message from Carmela Alvarez MD sent at 3/31/2017 10:14 AM CDT -----  Regarding: RE: flank pain  She could get an ultrasound to see how hydronephrotic her kidneys are and then we could decide.   ----- Message -----     From: Vivienne Velazquez LPN     Sent: 3/31/2017   8:51 AM       To: Carmela Alvarez MD  Subject: flank pain                                       Dr. Alvarez,    This patient called triage wanting to know if there is anything that can help with her stent/flank pain. Patient is on flomax and detrol. Patient cannot take NSAIDS. She states the pain is not helped by narcotics.  She states that she knows that the other option is PNTs.  She wants to know what else can be done so she is more comfortable.  Please advise.    -Vivienne Velazquez LPN

## 2017-03-31 NOTE — TELEPHONE ENCOUNTER
Vivienne Velazquez LPN Ordonez, Maria Alexandra, MD Dr. Ordonez,     This patient called triage wanting to know if there is anything that can help with her stent/flank pain. Patient is on flomax and detrol. Patient cannot take NSAIDS. She states the pain is not helped by narcotics.   She states that she knows that the other option is PNTs.   She wants to know what else can be done so she is more comfortable.   Please advise.     -Vivienne Velazquez LPN

## 2017-04-02 PROBLEM — Z71.89 ACP (ADVANCE CARE PLANNING): Chronic | Status: ACTIVE | Noted: 2017-01-01

## 2017-04-03 NOTE — NURSING NOTE
Labs per clinic 2A protocol.  Follow up/proteinuria  Last OV: 8/2/16  Daksha Wagner LPN  Nephrology  [facility]  [phone]

## 2017-04-04 NOTE — TELEPHONE ENCOUNTER
----- Message from Carmela Alvarez MD sent at 4/4/2017  1:17 PM CDT -----  Contact: [phone]  This is probably not from her kidneys, but I think a block would be a good idea (since the hydro is unchanged it's likely not the source of new pain). I guess I did change them recently, so she isn't due necessarily.     ----- Message -----     From: Jhonny Leach LPN     Sent: 4/4/2017  12:50 PM       To: Carmela Alvarez MD    Patient called about her stent pain  She had this stent placed on 2/27 she feels that it is too soon??? To exchange  You put in 2 stents side by side?? This time.  She also went to pain clinic and they want to do a seliac pelxuf block (spelling might be off) what do you think about that.??? It was dr olivia office calling. jhonny

## 2017-04-04 NOTE — TELEPHONE ENCOUNTER
Patient called and told that she needs renal ultrasound.  She does not want to come to the [place] for that but mentioned she just had ct scan of abdomen done last week   Dr belle notified she states does not need renal ultrasound stent looks like it is in place.  She offered to have the stent exchanged  Patient feels in the past that does not help with the pain. But she will call us if she has second thoughts.  Meg Leach LPN Staff Nurse

## 2017-04-04 NOTE — TELEPHONE ENCOUNTER
Spoke with pt   Pain clinic wants to do a celiac plexus block and MD there   Dr. June will be calling to talk with Dr. Mccollum  Pt also would like Dr. Mccollum to speak with daughter and discuss again the options, pt is wondering if MD thinks she should stay on the study or move to taxol.  Pt just does not think she is able to clearly process things due to memory and would like daughter to get the information    Pt has not heard from urology, I reivewed the chart and see message pt left for urology, I also do note a response from the doctor of suggesting an u/s.  I will check with urology on what type of u/s and if they will be ordering it or it we should and get back to pt

## 2017-04-04 NOTE — TELEPHONE ENCOUNTER
Pt left message on voice mail  Message left for urology but they have not responded  Spoke with nephrology nurse triage did not think rise in ca125 could be due to stents or inflammation   Pt saw pain clinic in [facility] and they have some options for the pain  Pt wants to move forward with taxol and thinking on [place]orns or [place], she will let us know what is best for her

## 2017-04-05 NOTE — TELEPHONE ENCOUNTER
Pt was referred from Oncology for a clinic appointment. LPN was directed to call patient and assist to schedule into clinic. Dr. Valladares was okay with Double booking appointment on 4/18/17, patient was offered this day.    Pt stated they had just been to a different pain clinic the day before and are going to pursue their treatment plan and have a procedure.     Pt was given the contact information for Dr. Valladares's office and was asked to reach out to the clinic if they are needing our services.     Pt verbalized understanding and was agreeable to plan.  Nicki Keating LPN

## 2017-04-11 NOTE — TELEPHONE ENCOUNTER
LPN followed up with patient regarding wanting to come to clinic for an evaluation.     Dr. Mensah was agreeable to do a last minute add on.   Pt added to the scheduled on 4/12/17 at 1230 pm.  Nicki Keating LPN

## 2017-04-11 NOTE — TELEPHONE ENCOUNTER
----- Message from Michelle Moore sent at 4/11/2017  9:39 AM CDT -----  Regarding: Pt wondering if she can still come in on the 18th   Contact: [phone]  Pt was wondering if she can still have the appointment that was suppose to be scheduled on the 18th of this month. Pt can be reached at [phone].    Thank you,  Michelle MCLAUGHLIN     Abdominal pain, generalized   Primary peritoneal adenocarcinoma

## 2017-04-12 NOTE — LETTER
4/12/2017      RE: Margaux Guzmán  [address] [facility] [address]       Assessment and Plan:   61 year old female with hypertension, hyperlipidemia, OA, PMR, and metastastic ovarian cancer/ peritoneal cancer with hydronephrosis and multiple cycles of chemotherapy since diagnosis in [id number]. She is currently in a trial at [facility], who presents for followup of kidney function  1. Renal function- near solitary kidney but does seem to have some kidney function is right kidney. She has not had too many infections recently thus holding on nephrectomy.  - renal function stable  2. Blood pressure- at goal, on amlodipine. She has lost a lot of weight, may not need BP medication if this continues.  3. Anemia- per oncology. She is iron deficient thus could benefit from (more) iron supplementation  4. Electrolytes/ acid base- within normal, low normal potassium    Assessment and plan was discussed with patient and she voiced her understanding and agreement.    Reason for Visit:  Margaux Guzmán is a 61 year old female with CKD from cancer, who presents for routine follow up.     HPI:  61 year old  woman with history of metastatic ovarian vs. Peritoneal cancer, history of nephrolithiasis, history of DVT, and history or proteinuria, recently started on avastin, here for further evaluation. She was first diagnosed with the cancer in 2013 and 11/2013 and went through multiple cycles of intraperitoneal / intravenous chemotherapy (ending 3/2014). She had disease reoccurrence 3/2015. She underwent lentivirus vector delivered immunotherapy (ID-) trial, specifically targeting gene [id number]. The disease progressed and she was started on Carbo/Doxil on 9/3/2015.  After three cycles, Avastin was added to the regiment. She was to receive the medication on 11/25 when it was held due to 3+ proteinuria on urine dipstick. She performed a 24 hour urine with 2.16 g proteinuria and received the avastin on  12/8/15.    She denies prior history of proteinuria. Care everywhere documents reviewed and first instance of proteinuria noted on urinalysis 9/26/2015. At this time she had reaction to use of lovenox. She developed wheals and was seen in the ER. She continues to use lovenox for a right IJ thrombus but uses Zantac as well for allergic symptoms. Of note, urinalysis taken 7/30/2015 had no proteinuria. She denies use of nsaids over the past few months. No history of diabetes. She has history of high blood pressure and was switched from 12/1 from metoprolol to propranolol. She notes low blood pressures currently.    Hematuria noted on urinalysis since 5/2014. Persistent right nephrolithiasis since 2/2014 seen on imaging. She had right hydronephrosis with stent placement in 8/2015, replaced bilaterally 11/2015.    She returns for followup and her SCr is improved since last check 0.86mg/dL she is in end stage cancer, but now in new trial and not sure if stabilizing, but  is increasing  She wonders if kidney function affects the , but kidney function is fairly good, so doubt this.    Home BP: Not checked    Baseline Cr: 0.8    ROS:  A comprehensive review of systems was obtained and negative, except as noted in the HPI or PMH.    Active Medical Problems:  Patient Active Problem List   Diagnosis     Primary peritoneal adenocarcinoma (H)     Recurrent cold sores     Pulmonary nodules     Encounter for long-term current use of medication     DVT (deep venous thrombosis) (H)     Family history of peritoneal cancer     Protein in urine     Ovarian cancer, left (H)     Ovarian cancer, right (H)     Chemotherapy-induced neutropenia (H)     Anemia     Pain due to ureteral stent (H)     Sterile pyuria     Pyelonephritis     Small bowel obstruction (H)     Dehydration     Patient in cancer related research study     Rectal bleeding     Confusion     Adjustment disorder with anxious mood     Nausea with vomiting      "Abdominal pain, generalized     ACP (advance care planning)       Personal Hx:   Social History     Social History     Marital status:      Spouse name: N/A     Number of children: N/A     Years of education: N/A     Occupational History     Not on file.     Social History Main Topics     Smoking status: Former Smoker     Smokeless tobacco: Former User      Comment: Quit over 20 years ago     Alcohol use No     Drug use: No     Sexual activity: No     Other Topics Concern     Not on file     Social History Narrative    .  Lives alone with her dog and cat.      Daughter or friend is available to help after surgeries/hospitalizations.      Has 2 children; both alive and well.    Parents are both alive in their 90's.     Retired business owner.       Allergies:  Allergies   Allergen Reactions     Contrast Dye Itching and Swelling     Itching/tingling of mouth and nose with sensation of swelling after receiving IV contrast for CT.  This occurred despite steroid premedication. Therefore the patient should not receive intravenous contrast in the future.      Benadryl Allergy Other (See Comments)     Stay awake, restless, \"uncomfortable\", \"feel like I need to run away\"      Enoxaparin Hives     3/23/16: Okay to receive heparin flushes in port, tolerates well. Patricia Cortez FNP-C     Enoxaparin Sodium Other (See Comments)     Pt is taking this now.     Amoxicillin Rash     Diphenhydramine Anxiety     No Clinical Screening - See Comments Rash     Pollen Extract Rash     Sulfa Drugs Rash       Medications:  Prior to Admission medications    Medication Sig Start Date End Date Taking? Authorizing Provider   ondansetron (ZOFRAN-ODT) 8 MG ODT tab Place 1 tablet (8 mg) under the tongue every 8 hours as needed for nausea 3/31/17  Yes iJa Mccollum MD   study - niraparib (IDS# 4759) 100 mg capsule Take 1 capsule (100 mg) by mouth every morning Take at the same time each day, preferably morning. Swallow whole " and do NOT chew capsules. Food and water is permissible. First dose will be administered on site. 3/30/17  Yes Jia Mccollum MD   amLODIPine (NORVASC) 2.5 MG tablet Take 1 tablet (2.5 mg) by mouth daily 3/3/17  Yes Nicole Naranjo APRN CNP   bisacodyl (DULCOLAX) 10 MG Suppository Place 1 suppository (10 mg) rectally daily as needed for constipation 3/3/17  Yes Nicole Naranjo APRN CNP   study - niraparib (IDS# 4759) 100 mg capsule Take 2 capsules (200 mg) by mouth every morning Take at the same time each day, preferably morning. Swallow whole and do NOT chew capsules. Food and water is permissible. First dose will be administered on site. 2/28/17  Yes Jia Mccollum MD   megestrol (MEGACE ORAL) 40 MG/ML suspension Take 10 mLs (400 mg) by mouth 2 times daily 2/28/17  Yes Jia Mccollum MD   ferrous sulfate (IRON) 325 (65 FE) MG tablet Take 325 mg by mouth   Yes Reported, Patient   study - niraparib (IDS# 4759) 100 mg capsule Take 2 capsules (200 mg) by mouth every morning Take at the same time each day, preferably morning. Swallow whole and do NOT chew capsules. Food and water is permissible. First dose will be administered on site. 2/2/17  Yes Jia Mccollum MD   enoxaparin (LOVENOX) 40 MG/0.4ML injection Inject 0.4 mLs (40 mg) Subcutaneous daily 1/5/17  Yes Jia Mccollum MD   study - niraparib (IDS# 4759) 100 mg capsule Take 3 capsules (300 mg) by mouth every morning Take at the same time each day, preferably morning. Swallow whole and do NOT chew capsules. Food and water is permissible. First dose will be administered on site. 1/5/17  Yes Jia Mccollum MD   tamsulosin (FLOMAX) 0.4 MG capsule Take 1 capsule (0.4 mg) by mouth daily 12/7/16  Yes Carmela Alvarez MD   LORazepam (ATIVAN) 1 MG tablet Take 1 tablet (1 mg) by mouth every 6 hours as needed (nausea/vomiting, anxiety or sleep) 11/23/16  Yes Patricia Murcia APRN CNP   tolterodine (DETROL LA) 4 MG 24 hr  "capsule Take 1 capsule (4 mg) by mouth daily 11/21/16  Yes Carmela Alvarez MD   ACETAMINOPHEN PO Take 500 mg by mouth every 6 hours as needed for pain Reported on 3/30/2017   Yes Unknown, Entered By History   Multiple Vitamins-Minerals (ONE DAILY MULTIVITAMIN WOMEN) TABS Take 1 tablet by mouth every morning    Yes Unknown, Entered By History   polyethylene glycol (MIRALAX/GLYCOLAX) powder Take 1 capful by mouth daily as needed  12/1/15  Yes Reported, Patient   cycloSPORINE (RESTASIS) 0.05 % ophthalmic emulsion Place 1 drop into both eyes 2 times daily  9/1/13  Yes Reported, Patient   Ranitidine HCl (ZANTAC PO) Take 75 mg by mouth daily    Yes Reported, Patient   MELATONIN PO Take 1 mg by mouth At Bedtime    Yes Reported, Patient   EPINEPHrine (EPIPEN) 0.3 MG/0.3ML injection Inject 0.3 mLs (0.3 mg) into the muscle once as needed for anaphylaxis 9/29/15  Yes Lizet Truong APRN CNP   senna-docusate (SENOKOT-S;PERICOLACE) 8.6-50 MG per tablet Take 1-2 tablets by mouth 2 times daily 9/25/15  Yes Nicole Naranjo APRN CNP   Pyridoxine HCl (VITAMIN B6 PO) Take 1 tablet by mouth At Bedtime    Yes Unknown, Entered By History   prochlorperazine (COMPAZINE) 10 MG tablet Take 1 tablet (10 mg) by mouth every 6 hours as needed (nausea/vomiting) 9/3/15  Yes Jia Mccollum MD       Vitals:  /84  Pulse 107  Temp 98.8  F (37.1  C) (Oral)  Ht 1.676 m (5' 6\")  Wt 52.6 kg (115 lb 14.4 oz)  SpO2 100%  BMI 18.71 kg/m2    Exam:  GENERAL APPEARANCE: alert and no distress  HENT: mouth without ulcers or lesions  LYMPHATICS: no cervical or supraclavicular nodes  RESP: lungs clear to auscultation - no rales, rhonchi or wheezes  CV: regular rhythm, normal rate, no rub, no murmur  EDEMA: no LE edema bilaterally  ABDOMEN: soft, nondistended, nontender, bowel sounds normal  MS: extremities normal - no gross deformities noted, no evidence of inflammation in joints, no muscle tenderness  SKIN: no " rash    Results:  Last Basic Metabolic Panel:  Lab Results   Component Value Date     04/12/2017      Lab Results   Component Value Date    POTASSIUM 3.5 04/12/2017     Lab Results   Component Value Date    CHLORIDE 99 04/12/2017     Lab Results   Component Value Date    PURVI 9.4 04/12/2017     Lab Results   Component Value Date    CO2 30 04/12/2017     Lab Results   Component Value Date    BUN 18 04/12/2017     Lab Results   Component Value Date    CR 0.86 04/12/2017     Lab Results   Component Value Date     04/12/2017       Lab Results   Component Value Date    WBC 5.4 04/12/2017     Lab Results   Component Value Date    RBC 2.76 04/12/2017     Lab Results   Component Value Date    HGB 9.8 04/12/2017     Lab Results   Component Value Date    HCT 29.0 04/12/2017     No components found for: MCT  Lab Results   Component Value Date     04/12/2017     Lab Results   Component Value Date    MCH 35.5 04/12/2017     Lab Results   Component Value Date    MCHC 33.8 04/12/2017     Lab Results   Component Value Date    RDW 15.6 04/12/2017     Lab Results   Component Value Date     04/12/2017       Mackenzie Ca MD

## 2017-04-12 NOTE — TELEPHONE ENCOUNTER
1.  Date/reason for appt:  4/12/17   Cancer Related Abdominal Pain    2.  Referring provider:  Dr Alvarez    3.  Call to patient (Yes / No - short description):  No, referred established pt.  Records reviewed.  All records are in [facility] and imaging is in PACS.

## 2017-04-12 NOTE — PATIENT INSTRUCTIONS
1. We will contact you to schedule a procedure for you pain    2. Acupuncture    Rangel DialBeechmontcandido   [facility]  [phone]    Last Bustos  [facility] [place]  [phone]    Follow up:    After your procedure      To speak with a nurse, schedule/reschedule/cancel a clinic appointment, or request a medication refill call: [phone]     You can also reach us by [employer]: [email or web address]    For refills, please call on Monday, 1 week before your medication runs out. The doctors are not always in clinic, so this gives us time to get your prescriptions ready.  Please let us know the name of the medication you are requesting a refill of.

## 2017-04-12 NOTE — LETTER
"4/12/2017       RE: Margaux Guzmán  [address] [facility] [address]     Dear Colleague,    Thank you for referring your patient, Margaux Guzmán, to the [facility] CLINIC FOR COMPREHENSIVE PAIN MANAGEMENT at [facility]. Please see a copy of my visit note below.    Chronic Pain Management Outpatient Clinic Consultation Note    Patient:  Margaux Guzmán    Chief Complaint:   Margaux Guzmán 61 year old female who is presenting to the chronic pain management clinic today for a consultation secondary to malignant pain.   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 Margaux contributes the pain to ureteral stents, as the pain started after the stents were placed. The stents are exchanged q 3 months, and changing the size of the stents hasn't helped. She describes feeling like there's \"a knife stuck in me\" and the pain is constant, but worse with movements. Heat helps a lot, so she carries around heat pads. No new dysuria or hematuria. She did have a urinary tract infection a few weeks ago with severe constipation, but this has resolved.    Medications tried:  -  MS contin 15 mg tried about 2 weeks ago and had severe constipation and it didn't work, so she stopped using it.   - Dilaudid hasn't worked well so she stopped using it. She also experienced significant constipation and grogginess with dilaudid in the past.   - Dexamethasone \"was awful\"-only worked for a few days and kept her awake  - Tramadol only worked for a bit, and " "had some grogginess  - Oxycodone didn't work, and had constipation/grogginess  - Ibuprofen in the past as helped, but can't use  - Icy Hot hasn't helped  - Tylenol-taking 1000mg TID, helps \"a tiny bit\"  - Marinol for appetite (panic attacks)    Medications she hasn't tried:  - Muscle relaxants  - Fentanyl, methadone  - medical cannabis (too expensive)  - gabapentin  - topical lidocaine/NSAIDS/ketamine    Other modalities:  - Heat helps  - Yoga \"made the pain worse\"    She does get associated \"horrible nausea\" from the pain and uses regular antiemetics. She does have some insomnia and uses ativan. She did try ativan during the day for the pain, but it made her too groggy. She uses senna and colace daily for constipation prophylaxis. She doesn't have any constipation currently.    The option for PNT tubes was discussed, but she \"doesn't want to go there\". She did see a pain clinic in [place], recommended celiac plexus block, but she would like to continue pain management through [facility]. She is weary of trying further medications.      Social History  Living Situation: Lives [place] of [facility], lives alone  Support System: Friends, daughter  Occupation: Not working, retired before cancer dx  Hobbies: Being in nature and hiking, but has been \"flat on my back for months now\"  Substance Use/History of misuse: No   Financial Concerns: Yes, \"can't afford to spend more on medical care\"    Social History   Substance Use Topics     Smoking status: Former Smoker     Smokeless tobacco: Former User      Comment: Quit over 20 years ago     Alcohol use No       Family History  Patient's Involvement with Prior History of Serious Illness in Family:   Family History   Problem Relation Age of Onset     Arthritis Father      Myocardial Infarction Father      secondary to anesthesia     Colon Cancer Father      DIABETES Other      Uncle     Hypertension Mother      Other - See Comments Mother      cognitive decline     Skin " "Cancer Mother      Hypertension Sister      HEART DISEASE Other      unknown valve replacement     Bladder Cancer Sister      Skin Cancer Brother        REVIEW OF SYSTEMS:   ROS: 10 point ROS neg other than the symptoms noted above in the HPI and here:        Pain: Yes, see abouve      Fatigue: Yes, some      Nausea: yes, uses Zofran every day as well as compazine prn.       Constipation: Not since stopping MS Contin      Diarrhea: No, but \"on the loose side\"      Depressive Symptoms: \"pretty good for where I'm at\"      Anxiety: No      Drowsiness: some      Poor Appetite: yes      Shortness of Breath: No      Insomnia: yes           Physical Exam:   Vitals were reviewed  General: Alert, comfortable appearing female in no acute distress.   Eyes: Pupils equal, sclera clear.   ENT: MMM. No ulcerations or plaques.   Cardiac: Normal rate, regular rhythm, no murmurs.    Resp: Unlabored on room air  Abd: Soft, nontender  MSK: +tenderness to mild and deep palpation of left CVA and lateral lower thoracic musculature. No significant tenderness to palpation of right back  Neuro: No facial asymmetry.  Spontaneous movements grossly non-focal.  Normal gait.   Neuropsych: Alert, appropriately interactive, full affect.       Data Reviewed:  LABS:   Lab Results   Component Value Date    WBC 5.4 04/12/2017    HGB 9.8 (L) 04/12/2017    HCT 29.0 (L) 04/12/2017     04/12/2017     04/12/2017    POTASSIUM 3.5 04/12/2017    CHLORIDE 99 04/12/2017    CO2 30 04/12/2017    BUN 18 04/12/2017    CR 0.86 04/12/2017     (H) 04/12/2017    AST 13 04/12/2017    ALT 12 04/12/2017    GGT 17 03/30/2017    ALKPHOS 54 04/12/2017    BILITOTAL 0.2 04/12/2017    INR 0.98 03/15/2017     IMAGING: Reviewed    Images personally reviewed: Yes, no additions to the above interpretation by Radiologist     [place]  - Reviewed    Assessment:  1. Left flank pain 2/2 ureteral stent and hydronephrosis  2. Metastatic ovarian vs primary peritoneal " carcinoma  3. Bilateral hydronephrosis s/p ureteral stending  4. Medication sensitivity     Margaux is a 60 yo female with ovarian vs primary peritoneal carcinoma and bilateral hydronephrosis s/p ureteral stenting who has significant left flank pain for >1 year, which started after stent placement. She has significant tenderness to palpation at her left flank, likely due to a visceral disturbance from her stent and hydronephrosis, along with associated paraspinal muscular hypertrophy, but no overt allodynia. She has not tolerated medications well in the past due to side effects and ineffectiveness, and is not open to further medication management or further surgical procedures (ie: PNT placement). We talked about trying acupuncture and possible interventions, including a quadratus lumborum injection, splanchnic block, or epidural injection, all of which she is open to.  At this point a splanchnic ganglion block may likely be the best option.    Plan:   1. Patient education: I went over the above diagnoses and treatment plan with her and answered all of  his questions.  2. Imaging review: I reviewed the CT scan from 3/30/2017  3. Interventions: left splanchnic ganglion block recommended - she also can try acupuncture first if she chooses.    4. Medications  1. No new medications recommended today  2. Continue scheduled tylenol, antiemetics, and constipation medications  3. Referrals for acupuncture provided  5. Exercise program: Not indicated  6. Behavioral Psychology:  Not indicated  7. Further Diagnostic Testing/Imaging: None.  8. Referrals: No new referals  9. Follow up: Pending possible injection, likely follow up in 2 months    Thank you for involving me in the care of this patient.     Patient seen and examined by Dr. Bina HORTON  Hosp[facility] Pall[facility]        Additional History Reviewed:   Allergies   Allergen Reactions     Contrast Dye Itching  "and Swelling     Itching/tingling of mouth and nose with sensation of swelling after receiving IV contrast for CT.  This occurred despite steroid premedication. Therefore the patient should not receive intravenous contrast in the future.      Benadryl Allergy Other (See Comments)     Stay awake, restless, \"uncomfortable\", \"feel like I need to run away\"      Enoxaparin Hives     3/23/16: Okay to receive heparin flushes in port, tolerates well. Patricia Diego FNP-C     Enoxaparin Sodium Other (See Comments)     Pt is taking this now.     Amoxicillin Rash     Diphenhydramine Anxiety     No Clinical Screening - See Comments Rash     Pollen Extract Rash     Sulfa Drugs Rash     Current Outpatient Prescriptions   Medication Sig Dispense Refill     ondansetron (ZOFRAN-ODT) 8 MG ODT tab Place 1 tablet (8 mg) under the tongue every 8 hours as needed for nausea 90 tablet 3     study - niraparib (IDS# 4759) 100 mg capsule Take 1 capsule (100 mg) by mouth every morning Take at the same time each day, preferably morning. Swallow whole and do NOT chew capsules. Food and water is permissible. First dose will be administered on site. 84 capsule 0     amLODIPine (NORVASC) 2.5 MG tablet Take 1 tablet (2.5 mg) by mouth daily 30 tablet      bisacodyl (DULCOLAX) 10 MG Suppository Place 1 suppository (10 mg) rectally daily as needed for constipation 10 suppository 0     study - niraparib (IDS# 4759) 100 mg capsule Take 2 capsules (200 mg) by mouth every morning Take at the same time each day, preferably morning. Swallow whole and do NOT chew capsules. Food and water is permissible. First dose will be administered on site. 84 capsule 0     megestrol (MEGACE ORAL) 40 MG/ML suspension Take 10 mLs (400 mg) by mouth 2 times daily 600 mL 1     ferrous sulfate (IRON) 325 (65 FE) MG tablet Take 325 mg by mouth       study - niraparib (IDS# 4759) 100 mg capsule Take 2 capsules (200 mg) by mouth every morning Take at the same time each day, " preferably morning. Swallow whole and do NOT chew capsules. Food and water is permissible. First dose will be administered on site. 84 capsule 0     enoxaparin (LOVENOX) 40 MG/0.4ML injection Inject 0.4 mLs (40 mg) Subcutaneous daily 30 Syringe 3     study - niraparib (IDS# 4759) 100 mg capsule Take 3 capsules (300 mg) by mouth every morning Take at the same time each day, preferably morning. Swallow whole and do NOT chew capsules. Food and water is permissible. First dose will be administered on site. 84 capsule 0     tamsulosin (FLOMAX) 0.4 MG capsule Take 1 capsule (0.4 mg) by mouth daily 60 capsule 6     LORazepam (ATIVAN) 1 MG tablet Take 1 tablet (1 mg) by mouth every 6 hours as needed (nausea/vomiting, anxiety or sleep) 30 tablet 3     tolterodine (DETROL LA) 4 MG 24 hr capsule Take 1 capsule (4 mg) by mouth daily 30 capsule 3     ACETAMINOPHEN PO Take 500 mg by mouth every 6 hours as needed for pain Reported on 3/30/2017       Multiple Vitamins-Minerals (ONE DAILY MULTIVITAMIN WOMEN) TABS Take 1 tablet by mouth every morning        polyethylene glycol (MIRALAX/GLYCOLAX) powder Take 1 capful by mouth daily as needed        cycloSPORINE (RESTASIS) 0.05 % ophthalmic emulsion Place 1 drop into both eyes 2 times daily        Ranitidine HCl (ZANTAC PO) Take 75 mg by mouth daily        MELATONIN PO Take 1 mg by mouth At Bedtime        EPINEPHrine (EPIPEN) 0.3 MG/0.3ML injection Inject 0.3 mLs (0.3 mg) into the muscle once as needed for anaphylaxis 1 each 0     senna-docusate (SENOKOT-S;PERICOLACE) 8.6-50 MG per tablet Take 1-2 tablets by mouth 2 times daily 7 tablet      Pyridoxine HCl (VITAMIN B6 PO) Take 1 tablet by mouth At Bedtime        prochlorperazine (COMPAZINE) 10 MG tablet Take 1 tablet (10 mg) by mouth every 6 hours as needed (nausea/vomiting) 30 tablet 2     Past Medical History:   Diagnosis Date     Anemia      History of blood transfusion     MULTIPLE     Hydronephrosis      Hyperlipidemia       Jugular vein thrombosis, right     Persistent right internal jugular DVT     Livedo annularis      Osteoarthritis      Osteopenia      Ovarian cancer (H)      Polymyalgia rheumatica (H)      PONV (postoperative nausea and vomiting)      Primary cancer of peritoneum (H)      Past Surgical History:   Procedure Laterality Date     APPENDECTOMY       BREAST SURGERY      biopsy negative      SECTION       COLONOSCOPY N/A 2017    Procedure: COLONOSCOPY;  Surgeon: Luis Richardson MD;  Location: [facility]     COLONOSCOPY N/A 2017    Procedure: COMBINED COLONOSCOPY, SINGLE OR MULTIPLE BIOPSY/POLYPECTOMY BY BIOPSY;  Surgeon: Luis Richardson MD;  Location: [facility]     COMBINED CYSTOSCOPY, RETROGRADES, EXCHANGE STENT URETER(S) Bilateral 2016    Procedure: COMBINED CYSTOSCOPY, RETROGRADES, EXCHANGE STENT URETER(S);  Surgeon: Carmela Alvarez MD;  Location: [facility]     COMBINED CYSTOSCOPY, RETROGRADES, EXCHANGE STENT URETER(S)  2016    @ [facility]     COMBINED CYSTOSCOPY, RETROGRADES, EXCHANGE STENT URETER(S) Bilateral 10/17/2016    Procedure: COMBINED CYSTOSCOPY, RETROGRADES, EXCHANGE STENT URETER(S);  Surgeon: Carmela Alvarez MD;  Location: [facility]     COMBINED CYSTOSCOPY, RETROGRADES, EXCHANGE STENT URETER(S) Bilateral 2016    Procedure: COMBINED CYSTOSCOPY, RETROGRADES, EXCHANGE STENT URETER(S);  Surgeon: Carmela Alvarez MD;  [facility]: [facility]     COMBINED CYSTOSCOPY, RETROGRADES, EXCHANGE STENT URETER(S) Bilateral 2017    Procedure: COMBINED CYSTOSCOPY, RETROGRADES, EXCHANGE STENT URETER(S);  Surgeon: Carmela Alvarez MD;  [facility]     ESOPHAGOSCOPY, GASTROSCOPY, DUODENOSCOPY (EGD), COMBINED N/A 2017    Procedure: COMBINED ESOPHAGOSCOPY, GASTROSCOPY, DUODENOSCOPY (EGD);  Surgeon: Luis Richardson MD;  Location: [facility]     HYSTERECTOMY TOTAL ABDOMINAL, BILATERAL SALPINGO-OOPHORECTOMY, NODE DISSECTION, COMBINED  2013    Procedure:  COMBINED HYSTERECTOMY TOTAL ABDOMINAL, SALPINGO-OOPHORECTOMY, NODE DISSECTION;;  Surgeon: Cheri Aguilar MD;  Location: [facility]     LAPAROSCOPIC SALPINGO-OOPHORECTOMY  11/7/2013    Procedure: LAPAROSCOPIC SALPINGO-OOPHORECTOMY;  Diagnostic Laparoscopy, Exploratory Laparotomy, Bilateral Salpingo-Oophorectomy,  Hysterectomy, Omentectomy, Pelvic Washings, Peritoneal staging and biopsies, Pelvic and Periaortic Lymph node Dissection;  Surgeon: Cheri Aguilar MD;  Location: [facility]     LAPAROTOMY, STAGING, COMBINED  11/7/2013    Procedure: COMBINED LAPAROTOMY, STAGING;;  Surgeon: Chrei gAuilar MD;  Location: [facility]     LYMPH NODE BIOPSY  2008    Left posterior chain, beign     OPEN REDUCTION INTERNAL FIXATION TOE(S)  9/3/13    Fifth digit, left foot, with screw fixation      REMOVE PORT PERITONEAL  5/5/2014    Procedure: REMOVE PORT PERITONEAL;  Surgeon: Cheri Aguilar MD;  Location: [facility]     Family History   Problem Relation Age of Onset     Arthritis Father      Myocardial Infarction Father      secondary to anesthesia     Colon Cancer Father      DIABETES Other      Uncle     Hypertension Mother      Other - See Comments Mother      cognitive decline     Skin Cancer Mother      Hypertension Sister      HEART DISEASE Other      unknown valve replacement     Bladder Cancer Sister      Skin Cancer Brother        I saw the patient with Dr. Huddleston, palliative fellow and agree with the findings and the plan of care as documented in the  fellow's note.     Total time spent was 20 minutes, and more than 50% of face to face time was spent in counseling and/or coordination of care regarding the above plan.    Stephen Mensah DO    [facility]  Pain Management  Department of Anesthesiology

## 2017-04-12 NOTE — MR AVS SNAPSHOT
After Visit Summary   4/12/2017    Margaux Guzmán    MRN: [id number]           Patient Information     Date Of Birth          1955        Visit Information        Provider Department      4/12/2017 10:20 AM Mackenzie Ca MD [facility] Nephrology        Today's Diagnoses     Pain due to ureteral stent, subsequent encounter    -  1    Primary peritoneal adenocarcinoma (H)        Other iron deficiency anemia           Follow-ups after your visit        Follow-up notes from your care team     Return in about 7 months (around 11/12/2017).      Your next 10 appointments already scheduled     Apr 27, 2017  9:15 AM CDT   [facility] Draw with  [facility] DRAW   [facility] Draw ([facility])    [address] [place] [address]  2nd Floor  [facility] [address]   [phone]            Apr 27, 2017  9:40 AM CDT   (Arrive by 9:25 AM)   Return Visit with Jia Mccollum MD   [facility] ([facility])    [address] [place] [address]  2nd Floor  [facility] [address]   [phone]            Nov 01, 2017 10:30 AM CDT   Lab with  LAB   [facility] ([facility])    [address] [place] [address]  1st Floor  [facility] [address]   [phone]            Nov 01, 2017 11:35 AM CDT   (Arrive by 11:05 AM)   Return Visit with Mackenzie Ca MD   [facility] Nephrology ([facility])    [address] [place] [address]  3rd Floor  [facility] [id number] [phone]              Who to contact     If you have questions or need follow up information about today's clinic visit or your schedule please contact [facility] NEPHROLOGY directly at [phone].  Normal or non-critical lab and imaging results will be communicated to you by MyChart, letter or phone within 4 business days after the clinic has received the results. If you do not hear from us within 7 days, please contact the clinic  "through [employer] or phone. If you have a critical or abnormal lab result, we will notify you by phone as soon as possible.  Submit refill requests through [employer] or call your pharmacy and they will forward the refill request to us. Please allow 3 business days for your refill to be completed.          Additional Information About Your Visit        [employer] Information     [employer] gives you secure access to your electronic health record. If you see a primary care provider, you can also send messages to your care team and make appointments. If you have questions, please call your primary care clinic.  If you do not have a primary care provider, please call [phone] and they will assist you.        Care EveryWhere ID     This is your Care EveryWhere ID. This could be used by other organizations to access your [facility] medical records  [id number]        Your Vitals Were     Pulse Temperature Height Pulse Oximetry BMI (Body Mass Index)       107 98.8  F (37.1  C) (Oral) 1.676 m (5' 6\") 100% 18.71 kg/m2        Blood Pressure from Last 3 Encounters:   04/12/17 130/84   03/30/17 141/84   03/15/17 147/87    Weight from Last 3 Encounters:   04/12/17 52.6 kg (115 lb 14.4 oz)   03/30/17 52.2 kg (115 lb)   03/02/17 54.3 kg (119 lb 11.2 oz)              Today, you had the following     No orders found for display       Primary Care Provider Office Phone # Fax #    Aurelia Knox [phone]       [facility] [address] [place] [address]        Thank you!     Thank you for choosing [facility] NEPHROLOGY  for your care. Our goal is always to provide you with excellent care. Hearing back from our patients is one way we can continue to improve our services. Please take a few minutes to complete the written survey that you may receive in the mail after your visit with us. Thank you!             Your Updated Medication List - Protect others around you: Learn how to safely use, store and throw away " your medicines at www.disposemymeds.org.          This list is accurate as of: 4/12/17 11:59 PM.  Always use your most recent med list.                   Brand Name Dispense Instructions for use    ACETAMINOPHEN PO      Take 500 mg by mouth every 6 hours as needed for pain Reported on 3/30/2017       amLODIPine 2.5 MG tablet    NORVASC    30 tablet    Take 1 tablet (2.5 mg) by mouth daily       bisacodyl 10 MG Suppository    DULCOLAX    10 suppository    Place 1 suppository (10 mg) rectally daily as needed for constipation       cycloSPORINE 0.05 % ophthalmic emulsion    RESTASIS     Place 1 drop into both eyes 2 times daily       enoxaparin 40 MG/0.4ML injection    LOVENOX    30 Syringe    Inject 0.4 mLs (40 mg) Subcutaneous daily       EPINEPHrine 0.3 MG/0.3ML injection     1 each    Inject 0.3 mLs (0.3 mg) into the muscle once as needed for anaphylaxis       ferrous sulfate 325 (65 FE) MG tablet    IRON     Take 325 mg by mouth       LORazepam 1 MG tablet    ATIVAN    30 tablet    Take 1 tablet (1 mg) by mouth every 6 hours as needed (nausea/vomiting, anxiety or sleep)       megestrol 40 MG/ML suspension    MEGACE ORAL    600 mL    Take 10 mLs (400 mg) by mouth 2 times daily       MELATONIN PO      Take 1 mg by mouth At Bedtime       ondansetron 8 MG ODT tab    ZOFRAN-ODT    90 tablet    Place 1 tablet (8 mg) under the tongue every 8 hours as needed for nausea       ONE DAILY MULTIVITAMIN WOMEN Tabs      Take 1 tablet by mouth every morning       polyethylene glycol powder    MIRALAX/GLYCOLAX     Take 1 capful by mouth daily as needed       prochlorperazine 10 MG tablet    COMPAZINE    30 tablet    Take 1 tablet (10 mg) by mouth every 6 hours as needed (nausea/vomiting)       senna-docusate 8.6-50 MG per tablet    SENOKOT-S;PERICOLACE    7 tablet    Take 1-2 tablets by mouth 2 times daily       * study - niraparib 100 mg capsule    IDS# [id number]    84 capsule    Take 3 capsules (300 mg) by mouth every morning Take at  the same time each day, preferably morning. Swallow whole and do NOT chew capsules. Food and water is permissible. First dose will be administered on site.       * study - niraparib 100 mg capsule    IDS# [id number]    84 capsule    Take 2 capsules (200 mg) by mouth every morning Take at the same time each day, preferably morning. Swallow whole and do NOT chew capsules. Food and water is permissible. First dose will be administered on site.       * study - niraparib 100 mg capsule    IDS# [id number]    84 capsule    Take 2 capsules (200 mg) by mouth every morning Take at the same time each day, preferably morning. Swallow whole and do NOT chew capsules. Food and water is permissible. First dose will be administered on site.       * study - niraparib 100 mg capsule    IDS# [id number]    84 capsule    Take 1 capsule (100 mg) by mouth every morning Take at the same time each day, preferably morning. Swallow whole and do NOT chew capsules. Food and water is permissible. First dose will be administered on site.       tamsulosin 0.4 MG capsule    FLOMAX    60 capsule    Take 1 capsule (0.4 mg) by mouth daily       tolterodine 4 MG 24 hr capsule    DETROL LA    30 capsule    Take 1 capsule (4 mg) by mouth daily       VITAMIN B6 PO      Take 1 tablet by mouth At Bedtime       ZANTAC PO      Take 75 mg by mouth daily       * Notice:  This list has 4 medication(s) that are the same as other medications prescribed for you. Read the directions carefully, and ask your doctor or other care provider to review them with you.

## 2017-04-12 NOTE — PROGRESS NOTES
Assessment and Plan:   61 year old female with hypertension, hyperlipidemia, OA, PMR, and metastastic ovarian cancer/ peritoneal cancer with hydronephrosis and multiple cycles of chemotherapy since diagnosis in [id number]. She is currently in a trial at  [facility], who presents for followup of kidney function  1. Renal function- near solitary kidney but does seem to have some kidney function is right kidney. She has not had too many infections recently thus holding on nephrectomy.  - renal function stable  2. Blood pressure- at goal, on amlodipine. She has lost a lot of weight, may not need BP medication if this continues.  3. Anemia- per oncology. She is iron deficient thus could benefit from (more) iron supplementation  4. Electrolytes/ acid base- within normal, low normal potassium    Assessment and plan was discussed with patient and she voiced her understanding and agreement.    Reason for Visit:  Margaux Guzmán is a 61 year old female with CKD from cancer, who presents for routine follow up.     HPI:  61 year old  woman with history of metastatic ovarian vs. Peritoneal cancer, history of nephrolithiasis, history of DVT, and history or proteinuria, recently started on avastin, here for further evaluation. She was first diagnosed with the cancer in 2013 and 11/2013 and went through multiple cycles of intraperitoneal / intravenous chemotherapy (ending 3/2014). She had disease reoccurrence 3/2015. She underwent lentivirus vector delivered immunotherapy (ID-) trial, specifically targeting gene [id number]. The disease progressed and she was started on Carbo/Doxil on 9/3/2015.  After three cycles, Avastin was added to the regiment. She was to receive the medication on 11/25 when it was held due to 3+ proteinuria on urine dipstick. She performed a 24 hour urine with 2.16 g proteinuria and received the avastin on 12/8/15.    She denies prior history of proteinuria. Care everywhere documents  reviewed and first instance of proteinuria noted on urinalysis 9/26/2015. At this time she had reaction to use of lovenox. She developed wheals and was seen in the ER. She continues to use lovenox for a right IJ thrombus but uses Zantac as well for allergic symptoms. Of note, urinalysis taken 7/30/2015 had no proteinuria. She denies use of nsaids over the past few months. No history of diabetes. She has history of high blood pressure and was switched from 12/1 from metoprolol to propranolol. She notes low blood pressures currently.    Hematuria noted on urinalysis since 5/2014. Persistent right nephrolithiasis since 2/2014 seen on imaging. She had right hydronephrosis with stent placement in 8/2015, replaced bilaterally 11/2015.    She returns for followup and her SCr is improved since last check 0.86mg/dL she is in end stage cancer, but now in new trial and not sure if stabilizing, but  is increasing  She wonders if kidney function affects the , but kidney function is fairly good, so doubt this.    Home BP: Not checked    Baseline Cr: 0.8    ROS:  A comprehensive review of systems was obtained and negative, except as noted in the HPI or PMH.    Active Medical Problems:  Patient Active Problem List   Diagnosis     Primary peritoneal adenocarcinoma (H)     Recurrent cold sores     Pulmonary nodules     Encounter for long-term current use of medication     DVT (deep venous thrombosis) (H)     Family history of peritoneal cancer     Protein in urine     Ovarian cancer, left (H)     Ovarian cancer, right (H)     Chemotherapy-induced neutropenia (H)     Anemia     Pain due to ureteral stent (H)     Sterile pyuria     Pyelonephritis     Small bowel obstruction (H)     Dehydration     Patient in cancer related research study     Rectal bleeding     Confusion     Adjustment disorder with anxious mood     Nausea with vomiting     Abdominal pain, generalized     ACP (advance care planning)       Personal Hx:  "  Social History     Social History     Marital status:      Spouse name: N/A     Number of children: N/A     Years of education: N/A     Occupational History     Not on file.     Social History Main Topics     Smoking status: Former Smoker     Smokeless tobacco: Former User      Comment: Quit over 20 years ago     Alcohol use No     Drug use: No     Sexual activity: No     Other Topics Concern     Not on file     Social History Narrative    .  Lives alone with her dog and cat.      Daughter or friend is available to help after surgeries/hospitalizations.      Has 2 children; both alive and well.    Parents are both alive in their 90's.     Retired business owner.       Allergies:  Allergies   Allergen Reactions     Contrast Dye Itching and Swelling     Itching/tingling of mouth and nose with sensation of swelling after receiving IV contrast for CT.  This occurred despite steroid premedication. Therefore the patient should not receive intravenous contrast in the future.      Benadryl Allergy Other (See Comments)     Stay awake, restless, \"uncomfortable\", \"feel like I need to run away\"      Enoxaparin Hives     3/23/16: Okay to receive heparin flushes in port, tolerates well. Patricia Cortez FNP-C     Enoxaparin Sodium Other (See Comments)     Pt is taking this now.     Amoxicillin Rash     Diphenhydramine Anxiety     No Clinical Screening - See Comments Rash     Pollen Extract Rash     Sulfa Drugs Rash       Medications:  Prior to Admission medications    Medication Sig Start Date End Date Taking? Authorizing Provider   ondansetron (ZOFRAN-ODT) 8 MG ODT tab Place 1 tablet (8 mg) under the tongue every 8 hours as needed for nausea 3/31/17  Yes Jia Mccollum MD   study - niraparib (IDS# 4759) 100 mg capsule Take 1 capsule (100 mg) by mouth every morning Take at the same time each day, preferably morning. Swallow whole and do NOT chew capsules. Food and water is permissible. First dose will be " administered on site. 3/30/17  Yes Jia Mccollum MD   amLODIPine (NORVASC) 2.5 MG tablet Take 1 tablet (2.5 mg) by mouth daily 3/3/17  Yes Nicole Naranjo APRN CNP   bisacodyl (DULCOLAX) 10 MG Suppository Place 1 suppository (10 mg) rectally daily as needed for constipation 3/3/17  Yes Nicole Naranjo APRN CNP   study - niraparib (IDS# [id number]) 100 mg capsule Take 2 capsules (200 mg) by mouth every morning Take at the same time each day, preferably morning. Swallow whole and do NOT chew capsules. Food and water is permissible. First dose will be administered on site. 2/28/17  Yes Jia Mccollum MD   megestrol (MEGACE ORAL) 40 MG/ML suspension Take 10 mLs (400 mg) by mouth 2 times daily 2/28/17  Yes Jia Mccollum MD   ferrous sulfate (IRON) 325 (65 FE) MG tablet Take 325 mg by mouth   Yes Reported, Patient   study - niraparib (IDS# [id number]) 100 mg capsule Take 2 capsules (200 mg) by mouth every morning Take at the same time each day, preferably morning. Swallow whole and do NOT chew capsules. Food and water is permissible. First dose will be administered on site. 2/2/17  Yes Jia Mccollum MD   enoxaparin (LOVENOX) 40 MG/0.4ML injection Inject 0.4 mLs (40 mg) Subcutaneous daily 1/5/17  Yes Jia Mccollum MD   study - niraparib (IDS# [id number]) 100 mg capsule Take 3 capsules (300 mg) by mouth every morning Take at the same time each day, preferably morning. Swallow whole and do NOT chew capsules. Food and water is permissible. First dose will be administered on site. 1/5/17  Yes Jia Mccollum MD   tamsulosin (FLOMAX) 0.4 MG capsule Take 1 capsule (0.4 mg) by mouth daily 12/7/16  Yes Carmela Alvarez MD   LORazepam (ATIVAN) 1 MG tablet Take 1 tablet (1 mg) by mouth every 6 hours as needed (nausea/vomiting, anxiety or sleep) 11/23/16  Yes Patricia Murcia APRN CNP   tolterodine (DETROL LA) 4 MG 24 hr capsule Take 1 capsule (4 mg) by mouth daily 11/21/16  Yes Carmela Alvarez  "MD Naila   ACETAMINOPHEN PO Take 500 mg by mouth every 6 hours as needed for pain Reported on 3/30/2017   Yes Unknown, Entered By History   Multiple Vitamins-Minerals (ONE DAILY MULTIVITAMIN WOMEN) TABS Take 1 tablet by mouth every morning    Yes Unknown, Entered By History   polyethylene glycol (MIRALAX/GLYCOLAX) powder Take 1 capful by mouth daily as needed  12/1/15  Yes Reported, Patient   cycloSPORINE (RESTASIS) 0.05 % ophthalmic emulsion Place 1 drop into both eyes 2 times daily  9/1/13  Yes Reported, Patient   Ranitidine HCl (ZANTAC PO) Take 75 mg by mouth daily    Yes Reported, Patient   MELATONIN PO Take 1 mg by mouth At Bedtime    Yes Reported, Patient   EPINEPHrine (EPIPEN) 0.3 MG/0.3ML injection Inject 0.3 mLs (0.3 mg) into the muscle once as needed for anaphylaxis 9/29/15  Yes Lizet Truong APRN CNP   senna-docusate (SENOKOT-S;PERICOLACE) 8.6-50 MG per tablet Take 1-2 tablets by mouth 2 times daily 9/25/15  Yes Nicole Naranjo APRN CNP   Pyridoxine HCl (VITAMIN B6 PO) Take 1 tablet by mouth At Bedtime    Yes Unknown, Entered By History   prochlorperazine (COMPAZINE) 10 MG tablet Take 1 tablet (10 mg) by mouth every 6 hours as needed (nausea/vomiting) 9/3/15  Yes Jia Mccollum MD       Vitals:  /84  Pulse 107  Temp 98.8  F (37.1  C) (Oral)  Ht 1.676 m (5' 6\")  Wt 52.6 kg (115 lb 14.4 oz)  SpO2 100%  BMI 18.71 kg/m2    Exam:  GENERAL APPEARANCE: alert and no distress  HENT: mouth without ulcers or lesions  LYMPHATICS: no cervical or supraclavicular nodes  RESP: lungs clear to auscultation - no rales, rhonchi or wheezes  CV: regular rhythm, normal rate, no rub, no murmur  EDEMA: no LE edema bilaterally  ABDOMEN: soft, nondistended, nontender, bowel sounds normal  MS: extremities normal - no gross deformities noted, no evidence of inflammation in joints, no muscle tenderness  SKIN: no rash    Results:  Last Basic Metabolic Panel:  Lab Results   Component Value Date    NA " 136 04/12/2017      Lab Results   Component Value Date    POTASSIUM 3.5 04/12/2017     Lab Results   Component Value Date    CHLORIDE 99 04/12/2017     Lab Results   Component Value Date    PURVI 9.4 04/12/2017     Lab Results   Component Value Date    CO2 30 04/12/2017     Lab Results   Component Value Date    BUN 18 04/12/2017     Lab Results   Component Value Date    CR 0.86 04/12/2017     Lab Results   Component Value Date     04/12/2017       Lab Results   Component Value Date    WBC 5.4 04/12/2017     Lab Results   Component Value Date    RBC 2.76 04/12/2017     Lab Results   Component Value Date    HGB 9.8 04/12/2017     Lab Results   Component Value Date    HCT 29.0 04/12/2017     No components found for: MCT  Lab Results   Component Value Date     04/12/2017     Lab Results   Component Value Date    MCH 35.5 04/12/2017     Lab Results   Component Value Date    MCHC 33.8 04/12/2017     Lab Results   Component Value Date    RDW 15.6 04/12/2017     Lab Results   Component Value Date     04/12/2017                 Answers for HPI/ROS submitted by the patient on 4/11/2017   General Symptoms: Yes  Skin Symptoms: No  HENT Symptoms: No  EYE SYMPTOMS: No  HEART SYMPTOMS: No  LUNG SYMPTOMS: No  INTESTINAL SYMPTOMS: Yes  URINARY SYMPTOMS: Yes  GYNECOLOGIC SYMPTOMS: No  BREAST SYMPTOMS: No  SKELETAL SYMPTOMS: No  BLOOD SYMPTOMS: No  NERVOUS SYSTEM SYMPTOMS: No  MENTAL HEALTH SYMPTOMS: No  Fever: No  Loss of appetite: Yes  Weight loss: Yes  Weight gain: No  Fatigue: Yes  Night sweats: No  Chills: No  Increased stress: No  Excessive hunger: No  Excessive thirst: No  Feeling hot or cold when others believe the temperature is normal: No  Loss of height: No  Post-operative complications: No  Surgical site pain: No  Hallucinations: No  Change in or Loss of Energy: No  Hyperactivity: No  Confusion: No  Heart burn or indigestion: No  Nausea: Yes  Vomiting: No  Abdominal pain: Yes  Bloating: No  Constipation:  Yes  Diarrhea: No  Blood in stool: No  Black stools: No  Rectal or Anal pain: No  Fecal incontinence: No  Rectal bleeding: No  Yellowing of skin or eyes: No  Vomit with blood: No  Change in stools: No  Hemorrhoids: No  Trouble holding urine or incontinence: No  Pain or burning: No  Trouble starting or stopping: No  Increased frequency of urination: Yes  Blood in urine: No  Decreased frequency of urination: No  Frequent nighttime urination: No  Flank pain: Yes  Difficulty emptying bladder: No

## 2017-04-12 NOTE — PROGRESS NOTES
"Chronic Pain Management Outpatient Clinic Consultation Note    Patient:  Margaux Guzmán    Chief Complaint:   Margaux Guzmán 61 year old female who is presenting to the chronic pain management clinic today for a consultation secondary to malignant pain.   The patient's primary care provider is:  Aurelia Knox     History of Present Illness:    Margaux Guzmán is a 62 yo female with a history of metastatic ovarian vs primary peritoneal carcinoma (dx 2013) s/p surgery and multiple cycles of chemotherapy and immunotherapy treatment now on a clinical trial drug,  bilateral hydronephrosis s/p ureteral stents w/ associated flank pain, R IJ thrombus on lovenox, and polymyalgia rheumatica who presents today to discuss left sided flank pain. Margaux contributes the pain to ureteral stents, as the pain started after the stents were placed. The stents are exchanged q 3 months, and changing the size of the stents hasn't helped. She describes feeling like there's \"a knife stuck in me\" and the pain is constant, but worse with movements. Heat helps a lot, so she carries around heat pads. No new dysuria or hematuria. She did have a urinary tract infection a few weeks ago with severe constipation, but this has resolved.    Medications tried:  -  MS contin 15 mg tried about 2 weeks ago and had severe constipation and it didn't work, so she stopped using it.   - Dilaudid hasn't worked well so she stopped using it. She also experienced significant constipation and grogginess with dilaudid in the past.   - Dexamethasone \"was awful\"-only worked for a few days and kept her awake  - Tramadol only worked for a bit, and had some grogginess  - Oxycodone didn't work, and had constipation/grogginess  - Ibuprofen in the past as helped, but can't use  - Icy Hot hasn't helped  - Tylenol-taking 1000mg TID, helps \"a tiny bit\"  - Marinol for appetite (panic attacks)    Medications she hasn't tried:  - Muscle relaxants  - Fentanyl, " "methadone  - medical cannabis (too expensive)  - gabapentin  - topical lidocaine/NSAIDS/ketamine    Other modalities:  - Heat helps  - Yoga \"made the pain worse\"    She does get associated \"horrible nausea\" from the pain and uses regular antiemetics. She does have some insomnia and uses ativan. She did try ativan during the day for the pain, but it made her too groggy. She uses senna and colace daily for constipation prophylaxis. She doesn't have any constipation currently.    The option for PNT tubes was discussed, but she \"doesn't want to go there\". She did see a pain clinic in [place], recommended celiac plexus block, but she would like to continue pain management through [facility]. She is weary of trying further medications.      Social History  Living Situation: Lives [place] of [facility], lives alone  Support System: Friends, daughter  Occupation: Not working, retired before cancer dx  Hobbies: Being in nature and hiking, but has been \"flat on my back for months now\"  Substance Use/History of misuse: No   Financial Concerns: Yes, \"can't afford to spend more on medical care\"    Social History   Substance Use Topics     Smoking status: Former Smoker     Smokeless tobacco: Former User      Comment: Quit over 20 years ago     Alcohol use No       Family History  Patient's Involvement with Prior History of Serious Illness in Family:   Family History   Problem Relation Age of Onset     Arthritis Father      Myocardial Infarction Father      secondary to anesthesia     Colon Cancer Father      DIABETES Other      Uncle     Hypertension Mother      Other - See Comments Mother      cognitive decline     Skin Cancer Mother      Hypertension Sister      HEART DISEASE Other      unknown valve replacement     Bladder Cancer Sister      Skin Cancer Brother        REVIEW OF SYSTEMS:   ROS: 10 point ROS neg other than the symptoms noted above in the HPI and here:        Pain: Yes, see abouve      Fatigue: Yes, some      " "Nausea: yes, uses Zofran every day as well as compazine prn.       Constipation: Not since stopping MS Contin      Diarrhea: No, but \"on the loose side\"      Depressive Symptoms: \"pretty good for where I'm at\"      Anxiety: No      Drowsiness: some      Poor Appetite: yes      Shortness of Breath: No      Insomnia: yes           Physical Exam:   Vitals were reviewed  General: Alert, comfortable appearing female in no acute distress.   Eyes: Pupils equal, sclera clear.   ENT: MMM. No ulcerations or plaques.   Cardiac: Normal rate, regular rhythm, no murmurs.    Resp: Unlabored on room air  Abd: Soft, nontender  MSK: +tenderness to mild and deep palpation of left CVA and lateral lower thoracic musculature. No significant tenderness to palpation of right back  Neuro: No facial asymmetry.  Spontaneous movements grossly non-focal.  Normal gait.   Neuropsych: Alert, appropriately interactive, full affect.       Data Reviewed:  LABS:   Lab Results   Component Value Date    WBC 5.4 04/12/2017    HGB 9.8 (L) 04/12/2017    HCT 29.0 (L) 04/12/2017     04/12/2017     04/12/2017    POTASSIUM 3.5 04/12/2017    CHLORIDE 99 04/12/2017    CO2 30 04/12/2017    BUN 18 04/12/2017    CR 0.86 04/12/2017     (H) 04/12/2017    AST 13 04/12/2017    ALT 12 04/12/2017    GGT 17 03/30/2017    ALKPHOS 54 04/12/2017    BILITOTAL 0.2 04/12/2017    INR 0.98 03/15/2017     IMAGING: Reviewed    Images personally reviewed: Yes, no additions to the above interpretation by Radiologist     MN  - Reviewed    Assessment:  1. Left flank pain 2/2 ureteral stent and hydronephrosis  2. Metastatic ovarian vs primary peritoneal carcinoma  3. Bilateral hydronephrosis s/p ureteral stending  4. Medication sensitivity     Margaux is a 60 yo female with ovarian vs primary peritoneal carcinoma and bilateral hydronephrosis s/p ureteral stenting who has significant left flank pain for >1 year, which started after stent placement. She has " significant tenderness to palpation at her left flank, likely due to a visceral disturbance from her stent and hydronephrosis, along with associated paraspinal muscular hypertrophy, but no overt allodynia. She has not tolerated medications well in the past due to side effects and ineffectiveness, and is not open to further medication management or further surgical procedures (ie: PNT placement). We talked about trying acupuncture and possible interventions, including a quadratus lumborum injection, splanchnic block, or epidural injection, all of which she is open to.  At this point a splanchnic ganglion block may likely be the best option.    Plan:   1. Patient education: I went over the above diagnoses and treatment plan with her and answered all of  his questions.  2. Imaging review: I reviewed the CT scan from 3/30/2017  3. Interventions: left splanchnic ganglion block recommended - she also can try acupuncture first if she chooses.    4. Medications  1. No new medications recommended today  2. Continue scheduled tylenol, antiemetics, and constipation medications  3. Referrals for acupuncture provided  5. Exercise program: Not indicated  6. Behavioral Psychology:  Not indicated  7. Further Diagnostic Testing/Imaging: None.  8. Referrals: No new referals  9. Follow up: Pending possible injection, likely follow up in 2 months    Thank you for involving me in the care of this patient.     Patient seen and examined by Dr. Bina HORTON  Hospice and Palliative Medicine Fellow  [facility]        Additional History Reviewed:   Allergies   Allergen Reactions     Contrast Dye Itching and Swelling     Itching/tingling of mouth and nose with sensation of swelling after receiving IV contrast for CT.  This occurred despite steroid premedication. Therefore the patient should not receive intravenous contrast in the future.      Benadryl Allergy Other (See Comments)     Stay awake, restless,  "\"uncomfortable\", \"feel like I need to run away\"      Enoxaparin Hives     3/23/16: Okay to receive heparin flushes in port, tolerates well. Patricia Cortez FNP-C     Enoxaparin Sodium Other (See Comments)     Pt is taking this now.     Amoxicillin Rash     Diphenhydramine Anxiety     No Clinical Screening - See Comments Rash     Pollen Extract Rash     Sulfa Drugs Rash     Current Outpatient Prescriptions   Medication Sig Dispense Refill     ondansetron (ZOFRAN-ODT) 8 MG ODT tab Place 1 tablet (8 mg) under the tongue every 8 hours as needed for nausea 90 tablet 3     study - niraparib (IDS# 4759) 100 mg capsule Take 1 capsule (100 mg) by mouth every morning Take at the same time each day, preferably morning. Swallow whole and do NOT chew capsules. Food and water is permissible. First dose will be administered on site. 84 capsule 0     amLODIPine (NORVASC) 2.5 MG tablet Take 1 tablet (2.5 mg) by mouth daily 30 tablet      bisacodyl (DULCOLAX) 10 MG Suppository Place 1 suppository (10 mg) rectally daily as needed for constipation 10 suppository 0     study - niraparib (IDS# 4759) 100 mg capsule Take 2 capsules (200 mg) by mouth every morning Take at the same time each day, preferably morning. Swallow whole and do NOT chew capsules. Food and water is permissible. First dose will be administered on site. 84 capsule 0     megestrol (MEGACE ORAL) 40 MG/ML suspension Take 10 mLs (400 mg) by mouth 2 times daily 600 mL 1     ferrous sulfate (IRON) 325 (65 FE) MG tablet Take 325 mg by mouth       study - niraparib (IDS# 4759) 100 mg capsule Take 2 capsules (200 mg) by mouth every morning Take at the same time each day, preferably morning. Swallow whole and do NOT chew capsules. Food and water is permissible. First dose will be administered on site. 84 capsule 0     enoxaparin (LOVENOX) 40 MG/0.4ML injection Inject 0.4 mLs (40 mg) Subcutaneous daily 30 Syringe 3     study - niraparib (IDS# 4759) 100 mg capsule Take 3 capsules " (300 mg) by mouth every morning Take at the same time each day, preferably morning. Swallow whole and do NOT chew capsules. Food and water is permissible. First dose will be administered on site. 84 capsule 0     tamsulosin (FLOMAX) 0.4 MG capsule Take 1 capsule (0.4 mg) by mouth daily 60 capsule 6     LORazepam (ATIVAN) 1 MG tablet Take 1 tablet (1 mg) by mouth every 6 hours as needed (nausea/vomiting, anxiety or sleep) 30 tablet 3     tolterodine (DETROL LA) 4 MG 24 hr capsule Take 1 capsule (4 mg) by mouth daily 30 capsule 3     ACETAMINOPHEN PO Take 500 mg by mouth every 6 hours as needed for pain Reported on 3/30/2017       Multiple Vitamins-Minerals (ONE DAILY MULTIVITAMIN WOMEN) TABS Take 1 tablet by mouth every morning        polyethylene glycol (MIRALAX/GLYCOLAX) powder Take 1 capful by mouth daily as needed        cycloSPORINE (RESTASIS) 0.05 % ophthalmic emulsion Place 1 drop into both eyes 2 times daily        Ranitidine HCl (ZANTAC PO) Take 75 mg by mouth daily        MELATONIN PO Take 1 mg by mouth At Bedtime        EPINEPHrine (EPIPEN) 0.3 MG/0.3ML injection Inject 0.3 mLs (0.3 mg) into the muscle once as needed for anaphylaxis 1 each 0     senna-docusate (SENOKOT-S;PERICOLACE) 8.6-50 MG per tablet Take 1-2 tablets by mouth 2 times daily 7 tablet      Pyridoxine HCl (VITAMIN B6 PO) Take 1 tablet by mouth At Bedtime        prochlorperazine (COMPAZINE) 10 MG tablet Take 1 tablet (10 mg) by mouth every 6 hours as needed (nausea/vomiting) 30 tablet 2     Past Medical History:   Diagnosis Date     Anemia      History of blood transfusion     MULTIPLE     Hydronephrosis      Hyperlipidemia      Jugular vein thrombosis, right     Persistent right internal jugular DVT     Livedo annularis      Osteoarthritis      Osteopenia      Ovarian cancer (H)      Polymyalgia rheumatica (H)      PONV (postoperative nausea and vomiting)      Primary cancer of peritoneum (H)      Past Surgical History:   Procedure  Laterality Date     APPENDECTOMY       BREAST SURGERY      biopsy negative      SECTION       COLONOSCOPY N/A 2017    Procedure: COLONOSCOPY;  Surgeon: Luis Richardson MD;  Location: [facility]     COLONOSCOPY N/A 2017    Procedure: COMBINED COLONOSCOPY, SINGLE OR MULTIPLE BIOPSY/POLYPECTOMY BY BIOPSY;  Surgeon: Luis Richardson MD;  Location: [facility]     COMBINED CYSTOSCOPY, RETROGRADES, EXCHANGE STENT URETER(S) Bilateral 2016    Procedure: COMBINED CYSTOSCOPY, RETROGRADES, EXCHANGE STENT URETER(S);  Surgeon: Carmela Alvarez MD;  [facility]     COMBINED CYSTOSCOPY, RETROGRADES, EXCHANGE STENT URETER(S)  2016    @ [facility]     COMBINED CYSTOSCOPY, RETROGRADES, EXCHANGE STENT URETER(S) Bilateral 10/17/2016    Procedure: COMBINED CYSTOSCOPY, RETROGRADES, EXCHANGE STENT URETER(S);  Surgeon: Carmela Alvarez MD;  [facility]     COMBINED CYSTOSCOPY, RETROGRADES, EXCHANGE STENT URETER(S) Bilateral 2016    Procedure: COMBINED CYSTOSCOPY, RETROGRADES, EXCHANGE STENT URETER(S);  Surgeon: Carmela Alvarez MD;  [facility]     COMBINED CYSTOSCOPY, RETROGRADES, EXCHANGE STENT URETER(S) Bilateral 2017    Procedure: COMBINED CYSTOSCOPY, RETROGRADES, EXCHANGE STENT URETER(S);  Surgeon: Carmela Alvarez MD;  [facility]     ESOPHAGOSCOPY, GASTROSCOPY, DUODENOSCOPY (EGD), COMBINED N/A 2017    Procedure: COMBINED ESOPHAGOSCOPY, GASTROSCOPY, DUODENOSCOPY (EGD);  Surgeon: Luis Richardson MD;  [facility]     HYSTERECTOMY TOTAL ABDOMINAL, BILATERAL SALPINGO-OOPHORECTOMY, NODE DISSECTION, COMBINED  2013    Procedure: COMBINED HYSTERECTOMY TOTAL ABDOMINAL, SALPINGO-OOPHORECTOMY, NODE DISSECTION;;  Surgeon: Cheri Aguilar MD;  [facility]     LAPAROSCOPIC SALPINGO-OOPHORECTOMY  2013    Procedure: LAPAROSCOPIC SALPINGO-OOPHORECTOMY;  Diagnostic Laparoscopy, Exploratory Laparotomy, Bilateral Salpingo-Oophorectomy,   Hysterectomy, Omentectomy, Pelvic Washings, Peritoneal staging and biopsies, Pelvic and Periaortic Lymph node Dissection;  Surgeon: Cheri Aguilar MD;  [facility]     LAPAROTOMY, STAGING, COMBINED  11/7/2013    Procedure: COMBINED LAPAROTOMY, STAGING;;  Surgeon: Cheri Aguilar MD;  [facility]     LYMPH NODE BIOPSY  2008    Left posterior chain, beign     OPEN REDUCTION INTERNAL FIXATION TOE(S)  9/3/13    Fifth digit, left foot, with screw fixation      REMOVE PORT PERITONEAL  5/5/2014    Procedure: REMOVE PORT PERITONEAL;  Surgeon: Cheri Aguilar MD;  [facility]     Family History   Problem Relation Age of Onset     Arthritis Father      Myocardial Infarction Father      secondary to anesthesia     Colon Cancer Father      DIABETES Other      Uncle     Hypertension Mother      Other - See Comments Mother      cognitive decline     Skin Cancer Mother      Hypertension Sister      HEART DISEASE Other      unknown valve replacement     Bladder Cancer Sister      Skin Cancer Brother        I saw the patient with Dr. Huddleston, palliative fellow and agree with the findings and the plan of care as documented in the  fellow's note.     Total time spent was 20 minutes, and more than 50% of face to face time was spent in counseling and/or coordination of care regarding the above plan.    Stephen Mensah DO    [facility]  Pain Management  Department of Anesthesiology

## 2017-04-12 NOTE — MR AVS SNAPSHOT
After Visit Summary   4/12/2017    Margaux Guzmán    MRN: [id number]           Patient Information     Date Of Birth          1955        Visit Information        Provider Department      4/12/2017 12:30 PM Stephen Mensah DO [facility]        Care Instructions    1. We will contact you to schedule a procedure for you pain    2. Acupuncture    [facility]  [phone]    [facility] [place]  [phone]    Follow up:    After your procedure      To speak with a nurse, schedule/reschedule/cancel a clinic appointment, or request a medication refill call: [phone]     You can also reach us by [employer]: [email or web address]    For refills, please call on Monday, 1 week before your medication runs out. The doctors are not always in clinic, so this gives us time to get your prescriptions ready.  Please let us know the name of the medication you are requesting a refill of.                                   Follow-ups after your visit        Your next 10 appointments already scheduled     Apr 27, 2017  9:15 AM CDT   [facility] Draw with  [employer] LAB DRAW   [facility] ([facility])    [address] [place] [id number] [phone]            Apr 27, 2017  9:40 AM CDT   (Arrive by 9:25 AM)   Return Visit with Jia Mccollum MD   [facility] ([facility])    [address] [place] [id number] [phone]              Who to contact     Please call your clinic at [phone] to:    Ask questions about your health    Make or cancel appointments    Discuss your medicines    Learn about your test results    Speak to your doctor   If you have compliments or concerns about an experience at your clinic, or if you wish to file  a complaint, please contact [facility] Patient Relations at [phone] or email us at [email or web address]         Additional Information About Your Visit        [employer] Information     [employer] gives you secure access to your electronic health record. If you see a primary care provider, you can also send messages to your care team and make appointments. If you have questions, please call your primary care clinic.  If you do not have a primary care provider, please call [phone] and they will assist you.      [employer] is an electronic gateway that provides easy, online access to your medical records. With [employer], you can request a clinic appointment, read your test results, renew a prescription or communicate with your care team.     To access your existing account, please contact your [facility] Clinic or call [phone] for assistance.        Care EveryWhere ID     This is your Care EveryWhere ID. This could be used by other organizations to access your [place] medical records  [id number]         Blood Pressure from Last 3 Encounters:   04/12/17 130/84   03/30/17 141/84   03/15/17 147/87    Weight from Last 3 Encounters:   04/12/17 52.6 kg (115 lb 14.4 oz)   03/30/17 52.2 kg (115 lb)   03/02/17 54.3 kg (119 lb 11.2 oz)              Today, you had the following     No orders found for display       Primary Care Provider Office Phone # Fax #    Aurelia Knox [phone]       [facility] MEDICAL [facility] [address] [place] [id number]        Thank you!     Thank you for choosing [facility]  for your care. Our goal is always to provide you with excellent care. Hearing back from our patients is one way we can continue to improve our services. Please take a few minutes to complete the written survey that you may receive in the mail after your visit with us. Thank you!             Your Updated  Medication List - Protect others around you: Learn how to safely use, store and throw away your medicines at www.disposemymeds.org.          This list is accurate as of: 4/12/17  1:38 PM.  Always use your most recent med list.                   Brand Name Dispense Instructions for use    ACETAMINOPHEN PO      Take 500 mg by mouth every 6 hours as needed for pain Reported on 3/30/2017       amLODIPine 2.5 MG tablet    NORVASC    30 tablet    Take 1 tablet (2.5 mg) by mouth daily       bisacodyl 10 MG Suppository    DULCOLAX    10 suppository    Place 1 suppository (10 mg) rectally daily as needed for constipation       cycloSPORINE 0.05 % ophthalmic emulsion    RESTASIS     Place 1 drop into both eyes 2 times daily       enoxaparin 40 MG/0.4ML injection    LOVENOX    30 Syringe    Inject 0.4 mLs (40 mg) Subcutaneous daily       EPINEPHrine 0.3 MG/0.3ML injection     1 each    Inject 0.3 mLs (0.3 mg) into the muscle once as needed for anaphylaxis       ferrous sulfate 325 (65 FE) MG tablet    IRON     Take 325 mg by mouth       LORazepam 1 MG tablet    ATIVAN    30 tablet    Take 1 tablet (1 mg) by mouth every 6 hours as needed (nausea/vomiting, anxiety or sleep)       megestrol 40 MG/ML suspension    MEGACE ORAL    600 mL    Take 10 mLs (400 mg) by mouth 2 times daily       MELATONIN PO      Take 1 mg by mouth At Bedtime       ondansetron 8 MG ODT tab    ZOFRAN-ODT    90 tablet    Place 1 tablet (8 mg) under the tongue every 8 hours as needed for nausea       ONE DAILY MULTIVITAMIN WOMEN Tabs      Take 1 tablet by mouth every morning       polyethylene glycol powder    MIRALAX/GLYCOLAX     Take 1 capful by mouth daily as needed       prochlorperazine 10 MG tablet    COMPAZINE    30 tablet    Take 1 tablet (10 mg) by mouth every 6 hours as needed (nausea/vomiting)       senna-docusate 8.6-50 MG per tablet    SENOKOT-S;PERICOLACE    7 tablet    Take 1-2 tablets by mouth 2 times daily       * study - niraparib 100 mg  capsule    IDS# [id number]    84 capsule    Take 3 capsules (300 mg) by mouth every morning Take at the same time each day, preferably morning. Swallow whole and do NOT chew capsules. Food and water is permissible. First dose will be administered on site.       * study - niraparib 100 mg capsule    IDS# [id number]    84 capsule    Take 2 capsules (200 mg) by mouth every morning Take at the same time each day, preferably morning. Swallow whole and do NOT chew capsules. Food and water is permissible. First dose will be administered on site.       * study - niraparib 100 mg capsule    IDS# [id number]    84 capsule    Take 2 capsules (200 mg) by mouth every morning Take at the same time each day, preferably morning. Swallow whole and do NOT chew capsules. Food and water is permissible. First dose will be administered on site.       * study - niraparib 100 mg capsule    IDS# [id number]    84 capsule    Take 1 capsule (100 mg) by mouth every morning Take at the same time each day, preferably morning. Swallow whole and do NOT chew capsules. Food and water is permissible. First dose will be administered on site.       tamsulosin 0.4 MG capsule    FLOMAX    60 capsule    Take 1 capsule (0.4 mg) by mouth daily       tolterodine 4 MG 24 hr capsule    DETROL LA    30 capsule    Take 1 capsule (4 mg) by mouth daily       VITAMIN B6 PO      Take 1 tablet by mouth At Bedtime       ZANTAC PO      Take 75 mg by mouth daily       * Notice:  This list has 4 medication(s) that are the same as other medications prescribed for you. Read the directions carefully, and ask your doctor or other care provider to review them with you.

## 2017-04-12 NOTE — NURSING NOTE
"Chief Complaint   Patient presents with     RECHECK     Follow up protein in urine.       Initial /84  Pulse 107  Temp 98.8  F (37.1  C) (Oral)  Ht 1.676 m (5' 6\")  Wt 52.6 kg (115 lb 14.4 oz)  SpO2 100%  BMI 18.71 kg/m2 Estimated body mass index is 18.71 kg/(m^2) as calculated from the following:    Height as of this encounter: 1.676 m (5' 6\").    Weight as of this encounter: 52.6 kg (115 lb 14.4 oz).  Medication Reconciliation: complete   Susan Guzman. CMA    "

## 2017-04-20 NOTE — OP NOTE
Patient: Margaux Guzmán Age: 61 year old   MRN: [id number] Attending: Dr. Valladares     Date of Visit: April 20, 2017      [facility] PROCEDURE NOTE    ATTENDING CLINICIAN:    Shabbir Valladares MD    PREPROCEDURE DIAGNOSES:  1. Cancer associated pain  2. Chronic abdominal/pelvic pain  3. Peritoneal carcinomatosis  4. Chronic left flank pain      POSTPROCEDURE DIAGNOSES:  1. Cancer associated pain  2. Chronic abdominal/pelvic pain  3. Peritoneal carcinomatosis  4. Chronic left flank pain      PROCEDURE(S) PERFORMED:  1.  Left splanchnic nerves block   2.  Fluoroscopic guidance for the above-named procedure(s)      ANESTHESIA:  Local.    BLOOD LOSS:  Minimal.    DRAINS AND SPECIMENS:  None.    COMPLICATIONS:  None.    INDICATIONS:  Margaux Guzmán is a 61 year old female with a history chronic left flank pain.  The patient stated that he was in usual state of health and denied recent anticoagulant use or recent infections.  She stopped Lovenox 24 hours before. Therefore, the plan is to perform above mentioned procedure.     Procedure Details:    The patient was met in the procedure room, where the patient was identified by name, medical record number and date of birth.  All of the patient s last minute questions were answered. Written informed consent was obtained and saved in the electronic medical record, after the risks, benefits, and alternatives were discussed with the patient.      A formal time-out procedure was performed, as per protocol, including patient name, title of procedure, and site of procedure, and all in the room concurred.  Routine monitors were applied.      The patient was placed in the prone position on the procedure room table.  All pressure points were checked and comfortably padded.  Routine monitors were placed.  Vital signs were stable.    A chlorhexidine prep was completed followed by sterile draping per standard procedure.     The anatomical target site was determined using  fluoroscopy by squaring off the superior endplate of T12, and then ipsilaterally obliquing the C-arm intensifier until the tip of the T12 transverse process was at the anterolateral border of the T12 vertebral body. We then had cephalad tilt of the C-arm to get 12 th rib out of our way. Target was made over the left inferior portion of the vertebral body of T12. Local anesthetic was given by raising a skin wheal and going down to the hub of the 25-gauge 1.25-inch needle. A 22-gauge 5-inch Quincke needle was then advanced just caudal to 12th rib at the T12. The needle was then advanced  intermittently with close contact of vertebral at all times. Multiple AP and lateral fluoroscopy images were taken to make sure that the needle is always in close proximity of vertebral body.  The needle tip was advanced until it was just ventral to the anterior border of the body of T12.  After negative aspiration, injection of  2 cc magnevist contrast under live fluoroscopy demonstrated excellent prevertebral spread.  Next, after negative aspiration, 15 mL of 0.25% bupivacaine and 1 ml of dexamethasone (40mg) were slowly injected with appropriate dissipation of contrast shown in washout images. This was also confirmed on both lateral and AP fluoroscopic views.  The needle was removed and no significant bleeding was noted.      Light pressure was held at the puncture site(s) to prevent ecchymosis and oozing.  The patient's skin was cleansed, and hemostasis was confirmed.  Band-aids were applied to the needle injection site(s).      Condition:    The patient remained awake and alert throughout the procedure.  The patient tolerated the procedure well and was monitored for approximately 15 minutes afterward in the post procedure area.  There were no immediate post procedure complications noted.  The patient was then discharged to home as per protocol.  The patient will follow up in the outpatient clinic in 4 week(s), unless otherwise  clinically indicated.    Pre-procedure pain score: not recorded  Pos-procedure pain score: much improved

## 2017-04-20 NOTE — H&P
"Abbreviated History and Physical    Patient Name: Margaux Guzmán  YOB: 1955    Reason for Procedure: chronic flank pain    History:    Past Medical History:   Diagnosis Date     Anemia      History of blood transfusion     MULTIPLE     Hydronephrosis      Hyperlipidemia      Jugular vein thrombosis, right     Persistent right internal jugular DVT     Livedo annularis      Osteoarthritis      Osteopenia      Ovarian cancer (H)      Polymyalgia rheumatica (H)      PONV (postoperative nausea and vomiting)      Primary cancer of peritoneum (H)        History of obstructive sleep apnea? no    History of problems with sedation? no    Physical exam:  /64  Temp 97.4  F (36.3  C) (Temporal)  Resp 18  Ht 1.676 m (5' 5.98\")  Wt 52.6 kg (115 lb 14.4 oz)  SpO2 100%  BMI 18.72 kg/m2  General: in no apparent distress   Neuro: At least antigravity strength noted in all 4 extremities  Respiratory: Clear to ausculation bilaterally   Cardiac: Regular rate and rhythm  Skin: No rashes or lesions on exposed areas of skin    Medications:   Current Outpatient Prescriptions   Medication     ondansetron (ZOFRAN-ODT) 8 MG ODT tab     amLODIPine (NORVASC) 2.5 MG tablet     enoxaparin (LOVENOX) 40 MG/0.4ML injection     study - niraparib (IDS# 4759) 100 mg capsule     tamsulosin (FLOMAX) 0.4 MG capsule     LORazepam (ATIVAN) 1 MG tablet     tolterodine (DETROL LA) 4 MG 24 hr capsule     ACETAMINOPHEN PO     Multiple Vitamins-Minerals (ONE DAILY MULTIVITAMIN WOMEN) TABS     polyethylene glycol (MIRALAX/GLYCOLAX) powder     cycloSPORINE (RESTASIS) 0.05 % ophthalmic emulsion     Ranitidine HCl (ZANTAC PO)     MELATONIN PO     senna-docusate (SENOKOT-S;PERICOLACE) 8.6-50 MG per tablet     prochlorperazine (COMPAZINE) 10 MG tablet     bisacodyl (DULCOLAX) 10 MG Suppository     megestrol (MEGACE ORAL) 40 MG/ML suspension     EPINEPHrine (EPIPEN) 0.3 MG/0.3ML injection     Pyridoxine HCl (VITAMIN B6 PO)     Current " "Facility-Administered Medications   Medication     fentaNYL Citrate (PF) (SUBLIMAZE) injection 50 mcg     midazolam (VERSED) injection 2 mg     lidocaine 1 % 1 mL     lidocaine (LMX4) kit     sodium chloride (PF) 0.9% PF flush 10-20 mL     heparin lock flush 10 UNIT/ML injection 5-10 mL     heparin lock flush 10 UNIT/ML injection 5-10 mL     heparin 100 UNIT/ML injection 5 mL     sodium chloride (PF) 0.9% PF flush 10-20 mL       Allergies:     Allergies   Allergen Reactions     Contrast Dye Itching and Swelling     Itching/tingling of mouth and nose with sensation of swelling after receiving IV contrast for CT.  This occurred despite steroid premedication. Therefore the patient should not receive intravenous contrast in the future.      Benadryl Allergy Other (See Comments)     Stay awake, restless, \"uncomfortable\", \"feel like I need to run away\"      Amoxicillin Rash     Diphenhydramine Anxiety     No Clinical Screening - See Comments Rash     Pollen Extract Rash     Sulfa Drugs Rash       ASA Classification: 3    OK for local anesthetic use.     Shabbir Valladares MD  Pain Medicine  [facility] Department of Anesthesia  4/20/2017        "

## 2017-04-20 NOTE — LETTER
2017       RE: Margaux Guzmán  [address] [facility] [address]     Dear Colleague,    Thank you for referring your patient, Margaux Guzmán, to the [facility]. Please see a copy of my visit note below.     Follow Up Notes on Referred Patient    Date: 2017       RE: Margaux Guzámn  : 1955  MELANI: 2017    Margaux Guzmán is a 61 year old woman with a history of primary peritoneal cancer.  She is here today for an acute visit regarding weakness and fatigue. She is currently enrolled in the Tesaro study.      Brief Oncology History:  The patient is a 58 year old  female who initially presented for evaluation of pelvic and abdominal pain. On exam, she had mild right adnexal tenderness and slightly enlarged right ovary freely mobile and smooth. This prompted an abdominal ultrasound, which was normal, and a pelvic US that showed anechoic right ovarian cyst measuring 4.3 x 3.8 x 4.0 cm. Her  was elevated at 74 on 10/8/13. We reviewed the possible diagnosis including ovarian cancer versus benign ovarian cyst. Approach to surgery, alternatives to surgery and plan for possible extended open surgical staging based on the operative findings was discussed. In light of the size of the ovary we are offering an initial approach with minimally invasive surgery. After discussion of risks and benefits, consent was obtained.  13 Diagnostic laparoscopy converted to exploratory laparotomy, bilateral salpingo-oophorectomy, total abdominal hysterectomy, omentectomy, staging biopsies, bilateral pelvic and periaortic lymph node dissection and washings. Stage IIIB  13: Cycle #1 IV/IP Taxol/CDDP  14: Cycle #2 IV/IP PCP.  - 14  14: Cycle 3 IV/IP.  - 7  14:  - 7. CT C/A/P Impression:  1. Multiple bilateral pulmonary nodules as described in full report measuring up to 3 mm along the right major fissure. These are indeterminate given  lack of comparison and followup is recommended.  2. No definite evidence for recurrent or metastatic disease in the abdomen or the pelvis. There is a rounded hypodense focus abutting the left external iliac artery and the adjacent sigmoid colon which measures 1 cm, this could represent a fluid-filled sigmoid diverticulum or a hypodense node, further attention to this area on subsequent examinations is recommended.  3. There is a 7 mm nonobstructing stone in the inferior collecting system of the right kidney.  2/12/14-3/26/14: Cycle #4-6 IV/IP CA-125 7, 7, 7.  4/21/14: CT C/A/P Impression:   1. Unchanged indeterminate pulmonary nodules. Recommend continued surveillance.   2. No definite evidence of metastatic ovarian cancer in the abdomen or pelvis. Small amount of subtle increased thickening and fluid is noted along the right lymph node dissection, nonspecific, but possibly a tiny developing lymphocele.   3. 6 mm nonobstructing stone in the right kidney.  Plan surveillance for ovarian cancer every 3 months with physical and .   Indeterminate pulmonary nodule: These were present before the surgery and there was not change with the chemotherapy. Highly unlikely to be a metastatic disease. Will repeat a CT in 3 months to see any change      5/22/14:  6. JOSEMANUEL.  5/17/14; ED visit [facility]. CT abd/pel noted possible chronic partial small bowel obstruction. Colonscopy scheduled for June 6, 2014 locally. Abdominal pain started 5/17/14 and noted pressure without bowel movement and went to ED that night due to increasing pain. In patient 2 day hospital with clear liquids/IV. Mild nausea without vomiting. BM subsequently occurred and pt was discharged. No pain since. Continues with fullness in abdomen. Diet is bland for the most part.  8/25/14:  -5. Notes increased abdominal girth and bloating x 2-3 weeks with urine urgency. Worried about recurrence. Is just starting to return to  normal exercise and activities. No constipation, diarrhea, pain, vaginal or rectal bleeding, cough or dyspnea. CT to follow indeterminate pulmonary nodules today.   CT cap IMPRESSION:   1. No CT scan findings of the chest, abdomen, or pelvis to indicate metastatic disease.  2. 2-5 mm pulmonary nodules, stable since 2/11/2014.  3. Nonobstructing 6 mm stone in the right kidney.  12/3/14:  8.. Pt started zoloft for anxiety. Worries about cancer recurrence.   3/2/15: CA 34  3/5/15: CT C/A/P: Impression:  1. Multiple new small peritoneal and retroperitoneal nodules are suspicious for metastases. Suspicious new left common iliac and central small bowel mesenteric nodes. No abdominal ascites.  2. Multiple pulmonary nodules are stable with no new nodules.  3. 9 mm nonobstructing right lower pole renal calculus.     5/20/15: CT c/a/p showed nodularity throughout the abdomen and pelvis worrisome for malignancy which has increased in size      5/28/15: CT abdomen and pelvis showed stable inumerabble peritoneal nodules scattered throughout the abdomen and pelvis consistent with metastases.      8/4/15 (approximatly): Left ureteral stent was placed.     8/12/15:  from [place] 303     8/18/15: CT chest Impression showed slight increase in mild, subtle nodularity along the diaphragm which is nonspecific but may represent metastatic disease.   8/18/15: CT abdomen and pelvis Impression showed interval progression of peritoneal metastatic disease.      8/25/15:  on 3/2/15 of 34 prompted CT c/a/p, which showed multiple new small peritoneal and retroperitoneal nodules are suspicious for metastases. Suspicious new left common iliac and central small bowel mesenteric nodes. She participated in [facility] study involving treatment with on clinical trial with vaccine [id number]. She is here today because the [place] trail failed and the pt pregressed. Her most recent CT c/a/p on 8/25 showed progression of peritoneal  "metastatic disease. And her most recent  from [place] on 8/12/15 was 303.     Plan: Carbo/Doxil x 6 cycles.with imaging after 3 cycles.      9/3/15: Cycle #1 Carbo/Doxil.  244.  9/24/15: right IJ thrombus; started on Lovenox.   9/26/15: Present to [place] ED. \"presented to the ED this morning with what appears to be a mild drug rash after administering her lovenox this morning. This writer discussed situation with Gyn Onc Fellow Jeanne Moon as well as [facility] Heme/Onc service. Per Heme, a rash of this nature is extremely uncommon with administration of Lovenox. Given the mild nature of this rash and acute need for anticoagulation, recommended the patient continue with Lovenox injections and treat with Benadryl as needed. Advised patient be given precautions to return to the ED immediately if she develops reactive respiratory symptoms. Alternatively patient could be switched to alternative formulation of low molecular weight heparin if she was strongly resistant to continuation of Lovenox.\"     10/1/15: Cycle #2 Carbo/Doxil.  175.     10/28/15: gyn onc visit: pt complains of worsening constipation and tenderness around her right ribs. She is taking senna for her constipation with minimal improvement. She admits that the swelling around her neck after her blood clot has decreased. She also admits to feeling a nodules on her left abdomen. She states her appetite is good but she has not been eating fruits and vegetables. She denies any chemo side effects besides fatigue.      10/28/15:  158  11/23/2015:  133  CT c/a/p  IMPRESSION: In this patient with a clinical history of ovarian cancer  there is mixed response to therapy:   1. Stable to slightly decreased peritoneal nodularity since  8/18/2015.  2. No significant change regarding the large subdiaphragmatic  peritoneal deposit since 9/24/2015.  3. Enlarging soft tissue nodule overlying the abdominal musculature  concerning for metastasis since " March of 2015.   4. Unchanged, indeterminate hypodensity near the gallbladder fossa  since 8/18/2015, however progressively increased in size since  3/5/2015.  5. Bilateral nephroureteral stents. No significant hydronephrosis.  6. Bilateral pulmonary nodules. Continued followup recommended.     12/23/15: Cycle 5 carboplatin/Doxil/Avastin.  96. To receive Avastin today despite proteinuria per Dr. Aguilar and nephrology.  1/21/16: Cycle 6 carboplatin/Doxil/Avastin, delayed one week secondary to neutropenia.  62.  1/28/16:  63.     2/2/16: Patient had right upper extremity doppler u/s done in [place] due to swollen glands and pain. 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 Pt is currently on lovenox.     2/2/16: US soft tissue neck  Conclusion:  1. Morphologically normal not pathologically enlarged two right carotid chain lymph nodes.  2. Chronic right jugular vein thrombosis, described in detail on  venous doppler exam.      2/2/16: Thyroid Ultrasound  Right lobe: 5.5 x 1.9 x 1.2 cm  Left lobe: 5.0 x 1.5 . 0.9 cm  Both lobes have normal background echotexture.     Conclusion:  1. 3 benign - appearing subcentimeter hypoechoic right mid thyroid nodules.  2. Borderline thyromegaly.     2/2/16: Right upper extremity venous duplex doppler evaluation  Conclusion:  1.) chronic non compressible echogenic right jugular vein thrombus, now 4.3 cm in length.      2/22/16:   IMPRESSION:    1. Ovarian cancer with peritoneal carcinomatosis.  2. Given the increased sensitivity of PET/CT, comparison with prior CT examinations is difficult. In general the abdominal/pelvic metastatic lesions appear to be decreased in size.  3. Persistent right internal jugular DVT, as evidenced by 5 cm filling defect with inferior border at the central venous catheter.  4. Bilateral hydronephrosis without overt  caliectasis. Some distal migration of the bilateral double-J ureteral stents, although the  proximal coiled portions continue to be in the renal pelves.     2/24/16: C7 carboplatin/Doxil/Avastin.  56.  3/9/16: Hgb 6.6, worked up for transfusion reaction (negative). Bleed possibly secondary to   3/23/16: C8 carboplatin/Doxil/Avastin.  44.  4/2016: Hospitalized in [place] x2 weeks for hematuria leading to anemia after ureteral stent exchange  4/20/16: C9D1 carboplatin/Doxil/Avastin. Deferred one week secondary to acute UTI.  35.  4/28/16: C9D1 deferred due to continued bacteruria and ANC 1.3.  5/4/16: C9D1 carboplatin/Doxil/Avastin. Breast lump noted, diagnostic mammogram ordered.  6/1/16: C10D1 carboplatin/Doxil/Avastin.  50.  6/28/16: C11D1 carboplatin/Doxil/Avastin. Avastin held due to bleeding. Carbo/Doxil deferred one week secondary to neutropenia.  43.  7/7/16: C12D1 carboplatin/Doxil. Avastin held due to bleeding.  7/18/16: Bilateral ureteral stent exchange  7/20/16-7/29/16: Hospitalized with urosepsis and blood loss anemia, started on Zosyn and discharged on amoxicillin  9/29/16: C1D1 Gemzar.  85.  10/21/16: C2D1 Gemzar.  106.   11/11/16: C3D1 Gemzar.   111.     12/12/16: CT CAP IMPRESSION: In this patient with a history of metastatic ovarian cancer:  1. Progression of disease as evidenced by a slowly enlarging mesenteric and omental soft tissue foci and slowly enlarging  periesophageal and pericardiophrenic lymph nodes, as detailed above.  2. Stable appearance of bilateral ureteral stents without hydronephrosis.  3. Patient was premedicated for a pre-existing IV contrast allergy. Despite this, she had itchiness on her face poststudy. She should no longer receive IV contrast in the future.       CT AP 1/10/2017: multiple dilated fluid filled small bowel loops with decompressed distal small bowel loops, consistent with obstruction. Transition point is suspected  in the pelvis. No pneumatosis or free intraperitoneal gas. Bilateral ureteral stents appear appropriately positioned. There is mild dilation of the bilateral renal collecting systems, left greater than right.     1/23/17: admitted to clinic for dizziness and heart palpitations   1/27/17: ED admission - leg swelling and GI bleeding      1/27/17: US lower extremity IMPRESSION:  1.  No evidence of right lower extremity deep venous thrombosis.  2.  Dampened waveforms in the right lower extremity, similar to 7/25/2016, though a more central occlusion cannot be excluded.  1/27/17: MRI Brain IMPRESSION:  Normal brain MRI  1/27/17: CT AP IMPRESSION: This patient with a history of metastatic ovarian cancer:  1. No bowel obstruction. Oral contrast progressed to the colon. Extensive colonic stool.  2. Severe right hydronephrosis, new from 12/12/2016, may represent obstruction of the right ureteral stent. No left-sided hydronephrosis.  3. Slight interval progression of diffuse peritoneal metastatic disease.  4. Mildly nodular areas of increased attenuation within the pelvic bowel which may represent loculated malignant ascites or peritoneal implants similar in appearance to 12/12/2016.     1/28/17: colonoscopy- benign      Treatment: Tesaro/Quadra Niraparib study  1/5/17: Cycle #1 Day 1   2/2/17: Cycle #2 Day 1   2/28/17: Cycle #3 Day 1      2/28/17: CT scan - [facility] access hospital  IMPRESSION: In this patient with a history of ovarian cancer, there is mild disease progression as follows:  1. Increase in size and number of multiple subcentimeter nodules/intrafissural lymph nodes along the right major and minor  fissures and also bilateral pulmonary nodules, predominantly along the diaphragmatic pleura.   2. Slight increase in size of small lymph nodes in the right internal mammary region and bilateral anterior cardiophrenic region.  3. Stable to slight increase in size of deposits in the perihepatic region. No significant change in  "the omental deposits in the left  upper quadrant of the abdomen.  4. Stable to slight increase in size of mesenteric deposits/lymph nodes.  5. Bilateral nephroureteric stent in place. Atrophy of the right kidney with interval resolution of right hydronephrosis. Unchanged  mild left hydronephrosis. Air within the collecting systems and bladder likely related to prior intervention.  6. Dilation of proximal small bowel loops with interspersed areas of narrowing. No progression of oral contrast into the ileum. Moderate amount of fecal material in the colon. Partial/early small bowel obstruction is possible.  3/30/17: CT cap IMPRESSION: Images patient with a history of ovarian cancer, overall findings of stable disease includin. No acute findings to suggest etiology of severe left flank pain.  2. Unchanged pulmonary nodules/pleural nodularity and thoracic lymph nodes.  3. Unchanged perihepatic and left upper quadrant omental deposits as well as diffuse mesenteric lymphadenopathy.  4. Unchanged hypoattenuating focus in the hepatic dome.  5. Unchanged placement of bilateral nephroureteral stents. Unchanged mild to moderate left hydronephrosis. Stable atrophy of the right kidney.  6. Unchanged 8 mm right flank soft tissue nodule.   17: left splanchnic ganglion block. FNA of lesion in umbilicus completed; results pending.         Today she comes to clinic reporting she has started feeling quite weak over the past couple of days. She reports she began feeling dizzy yesterday and it has continued; she is also dizzy when walking. She states that the last time this happened her Hgb was low and she required a blood transfusion; she states that while on this study medication her Hgb can be good and then it drops quite quickly. She states she is taking in adequate fluids and is eating \"ok\". She states she has had an elevated heart rate for a while and has had several testings done which have been \"ok\" and not showing any " "changes. She denies sensation like her heart is pounding; she has noticed occassionally hearing a \"swoosh\" sound in her ears. She is checking her BP at home and it has remained in the 130-140's. She has previously received blood transfusions and reports she typically \"responds very well\". She is not currently taking an iron supplement as she has issues with constipation. Otherwise she has no changes in her symptoms (nausea, discomfort from her stent, back pain, constipation).       Review of Systems:    Systemic           + weight changes; no fever; no chills; no night sweats; no appetite changes' + fatigue/weakness  Skin           no rashes, or lesions  Eye           no irritation; no changes in vision  Hue-Laryngeal           no dysphagia; no hoarseness   Pulmonary    no cough; no shortness of breath  Cardiovascular    no chest pain; no palpitations; + tachycardia; + HTN  Gastrointestinal    no diarrhea; + constipation; no abdominal pain; no changes in bowel habits; no blood in stool  Genitourinary   no urinary frequency; no urinary urgency; no dysuria; no pain; no abnormal vaginal discharge; no abnormal vaginal bleeding  Breast    no breast discharge; no breast changes; no breast pain  Musculoskeletal    + myalgias; + arthralgias; + back pain  Psychiatric           no depressed mood; no anxiety    Hematologic               no tender lymph nodes; no noticeable swellings or lumps   Endocrine    no hot flashes; no heat/cold intolerance         Neurological   no tremor; no numbness and tingling; no headaches; no difficulty sleeping      Past Medical History:    Past Medical History:   Diagnosis Date     Anemia      History of blood transfusion     MULTIPLE     Hydronephrosis      Hyperlipidemia      Jugular vein thrombosis, right     Persistent right internal jugular DVT     Livedo annularis      Osteoarthritis      Osteopenia      Ovarian cancer (H)      Polymyalgia rheumatica (H)      PONV (postoperative nausea and " vomiting)      Primary cancer of peritoneum (H)          Past Surgical History:    Past Surgical History:   Procedure Laterality Date     APPENDECTOMY       BREAST SURGERY      biopsy negative      SECTION       COLONOSCOPY N/A 2017    Procedure: COLONOSCOPY;  Surgeon: Luis Richardson MD;  Location: [facility]     COLONOSCOPY N/A 2017    Procedure: COMBINED COLONOSCOPY, SINGLE OR MULTIPLE BIOPSY/POLYPECTOMY BY BIOPSY;  Surgeon: Luis Richardson MD;  Location: [facility]     COMBINED CYSTOSCOPY, RETROGRADES, EXCHANGE STENT URETER(S) Bilateral 2016    Procedure: COMBINED CYSTOSCOPY, RETROGRADES, EXCHANGE STENT URETER(S);  Surgeon: Carmela Alvarez MD;  Location: [facility]     COMBINED CYSTOSCOPY, RETROGRADES, EXCHANGE STENT URETER(S)  2016    @ [facility]     COMBINED CYSTOSCOPY, RETROGRADES, EXCHANGE STENT URETER(S) Bilateral 10/17/2016    Procedure: COMBINED CYSTOSCOPY, RETROGRADES, EXCHANGE STENT URETER(S);  Surgeon: Carmela Alvarez MD;  Location: [facility]     COMBINED CYSTOSCOPY, RETROGRADES, EXCHANGE STENT URETER(S) Bilateral 2016    Procedure: COMBINED CYSTOSCOPY, RETROGRADES, EXCHANGE STENT URETER(S);  Surgeon: Carmela Alvarez MD;  [facility]     COMBINED CYSTOSCOPY, RETROGRADES, EXCHANGE STENT URETER(S) Bilateral 2017    Procedure: COMBINED CYSTOSCOPY, RETROGRADES, EXCHANGE STENT URETER(S);  Surgeon: Carmela Alvarez MD;  [facility]     ESOPHAGOSCOPY, GASTROSCOPY, DUODENOSCOPY (EGD), COMBINED N/A 2017    Procedure: COMBINED ESOPHAGOSCOPY, GASTROSCOPY, DUODENOSCOPY (EGD);  Surgeon: Luis Richardson MD;  Location: [facility]     HYSTERECTOMY TOTAL ABDOMINAL, BILATERAL SALPINGO-OOPHORECTOMY, NODE DISSECTION, COMBINED  2013    Procedure: COMBINED HYSTERECTOMY TOTAL ABDOMINAL, SALPINGO-OOPHORECTOMY, NODE DISSECTION;;  Surgeon: Cheri Aguilar MD;  Location: [facility]     LAPAROSCOPIC SALPINGO-OOPHORECTOMY  2013    Procedure:  LAPAROSCOPIC SALPINGO-OOPHORECTOMY;  Diagnostic Laparoscopy, Exploratory Laparotomy, Bilateral Salpingo-Oophorectomy,  Hysterectomy, Omentectomy, Pelvic Washings, Peritoneal staging and biopsies, Pelvic and Periaortic Lymph node Dissection;  Surgeon: Cheri Aguilar MD;  Location: [facility]     LAPAROTOMY, STAGING, COMBINED  11/7/2013    Procedure: COMBINED LAPAROTOMY, STAGING;;  Surgeon: Cheri Aguilar MD;  Location: [facility]     LYMPH NODE BIOPSY  2008    Left posterior chain, beign     OPEN REDUCTION INTERNAL FIXATION TOE(S)  9/3/13    Fifth digit, left foot, with screw fixation      REMOVE PORT PERITONEAL  5/5/2014    Procedure: REMOVE PORT PERITONEAL;  Surgeon: Cheri Aguilar MD;  Location: [facility]         Health Maintenance Due   Topic Date Due     PHQ-9 Q1YR (NO INBASKET)  1955     TETANUS IMMUNIZATION (SYSTEM ASSIGNED)  11/24/1972     HEPATITIS C SCREENING  11/24/1972     LIPID SCREEN Q5 YR FEMALE (SYSTEM ASSIGNED)  11/24/1999       Current Medications:     Current Outpatient Prescriptions   Medication Sig Dispense Refill     ondansetron (ZOFRAN-ODT) 8 MG ODT tab Place 1 tablet (8 mg) under the tongue every 8 hours as needed for nausea 90 tablet 3     amLODIPine (NORVASC) 2.5 MG tablet Take 1 tablet (2.5 mg) by mouth daily 30 tablet      bisacodyl (DULCOLAX) 10 MG Suppository Place 1 suppository (10 mg) rectally daily as needed for constipation 10 suppository 0     megestrol (MEGACE ORAL) 40 MG/ML suspension Take 10 mLs (400 mg) by mouth 2 times daily 600 mL 1     enoxaparin (LOVENOX) 40 MG/0.4ML injection Inject 0.4 mLs (40 mg) Subcutaneous daily 30 Syringe 3     study - niraparib (IDS# 4759) 100 mg capsule Take 3 capsules (300 mg) by mouth every morning Take at the same time each day, preferably morning. Swallow whole and do NOT chew capsules. Food and water is permissible. First dose will be administered on site. (Patient taking differently: Take 300 mg by mouth every morning Take at the same time each  "day, preferably morning. Swallow whole and do NOT chew capsules. Food and water is permissible. First dose will be administered on site.) 84 capsule 0     tamsulosin (FLOMAX) 0.4 MG capsule Take 1 capsule (0.4 mg) by mouth daily 60 capsule 6     LORazepam (ATIVAN) 1 MG tablet Take 1 tablet (1 mg) by mouth every 6 hours as needed (nausea/vomiting, anxiety or sleep) 30 tablet 3     tolterodine (DETROL LA) 4 MG 24 hr capsule Take 1 capsule (4 mg) by mouth daily 30 capsule 3     ACETAMINOPHEN PO Take 500 mg by mouth every 6 hours as needed for pain Reported on 3/30/2017       Multiple Vitamins-Minerals (ONE DAILY MULTIVITAMIN WOMEN) TABS Take 1 tablet by mouth every morning        polyethylene glycol (MIRALAX/GLYCOLAX) powder Take 1 capful by mouth daily as needed        cycloSPORINE (RESTASIS) 0.05 % ophthalmic emulsion Place 1 drop into both eyes 2 times daily        Ranitidine HCl (ZANTAC PO) Take 75 mg by mouth daily        MELATONIN PO Take 1 mg by mouth At Bedtime        EPINEPHrine (EPIPEN) 0.3 MG/0.3ML injection Inject 0.3 mLs (0.3 mg) into the muscle once as needed for anaphylaxis 1 each 0     senna-docusate (SENOKOT-S;PERICOLACE) 8.6-50 MG per tablet Take 1-2 tablets by mouth 2 times daily 7 tablet      Pyridoxine HCl (VITAMIN B6 PO) Take 1 tablet by mouth At Bedtime        prochlorperazine (COMPAZINE) 10 MG tablet Take 1 tablet (10 mg) by mouth every 6 hours as needed (nausea/vomiting) 30 tablet 2         Allergies:        Allergies   Allergen Reactions     Contrast Dye Itching and Swelling     Itching/tingling of mouth and nose with sensation of swelling after receiving IV contrast for CT.  This occurred despite steroid premedication. Therefore the patient should not receive intravenous contrast in the future.      Benadryl Allergy Other (See Comments)     Stay awake, restless, \"uncomfortable\", \"feel like I need to run away\"      Lovenox [Enoxaparin] Hives     3/23/16: Okay to receive heparin flushes in " port, tolerates well. Patricia Cortez FNP-C     Amoxicillin Rash     Diphenhydramine Anxiety     No Clinical Screening - See Comments Rash     Pollen Extract Rash     Sulfa Drugs Rash        Social History:     Social History   Substance Use Topics     Smoking status: Former Smoker     Smokeless tobacco: Former User      Comment: Quit over 20 years ago     Alcohol use No       History   Drug Use No         Family History:     The patient's family history is notable for:    Family History   Problem Relation Age of Onset     Arthritis Father      Myocardial Infarction Father      secondary to anesthesia     Colon Cancer Father      DIABETES Other      Uncle     Hypertension Mother      Other - See Comments Mother      cognitive decline     Skin Cancer Mother      Hypertension Sister      HEART DISEASE Other      unknown valve replacement     Bladder Cancer Sister      Skin Cancer Brother          Physical Exam:     /74  Temp 96.4  F (35.8  C) (Tympanic)  Resp 14  Wt 53.1 kg (117 lb)  SpO2 100%  BMI 18.89 kg/m2  Body mass index is 18.89 kg/(m^2).    General Appearance: pale and alert, no acute distress     HEENT: no thyromegaly, no palpable nodules or masses        Cardiovascular: regular rate and rhythm, no gallops, rubs or murmurs     Respiratory: lungs clear, no rales, rhonchi or wheezes, normal diaphragmatic excursion    Musculoskeletal: extremities non tender and without edema    Skin: no lesions or rashes     Neurological: wheelchair     Psychiatric: appropriate mood and affect                               Hematological: normal cervical, supraclavicular  ymph nodes     Gastrointestinal:       abdomen soft, non-tender, non-distended    Genitourinary: Not indicated      Assessment:    Margaux Guzmán is a 61 year old woman with a history of primary peritoneal cancer.  She is here today for an acute visit regarding weakness and fatigue. She is currently enrolled in the Tesaro study.       10 minutes  were spent with this patient, over 50% of that time was spent in symptom management, treatment planning and in counseling and coordination of care.      Plan:     1.)        Will transfuse one unit of blood today and have her recheck a CBC closer to home tomorrow; she will have the results faxed to ervin Najera RN for follow up. She met with Tamanna MATTHEWS, research coordinator, regarding her anemia and the status of her eligibiilty to remain on the Tesaro study. Reviewed slow position changes as well as eating iron rich foods/starting on slow iron supplement. Reviewed signs and symptoms for when she should contact the clinic or seek additional care. Patient to contact the clinic with any questions or concerns in the interim. Consent for blood transfusion was obtained today.      2.) Genetic risk factors were assessed and she isnegative for mutations in the following: BRCA1, BRCA2, EPCAM, MLH1, MSH2, MSH6, PMS2, PTEN and TP53.       3.) Labs and/or tests ordered include:  CBC. CMP. Blood transfusion.       4.) Health maintenance issues addressed today include annual health maintenance and non-gynecologic issues with PCP.    JUSTIN Lombardo, WHNP-BC, ANP-BC  Women's Health Nurse Practitioner  Adult Nurse Pracitioner  Gynecologic Oncology    CC  Patient Care Team:  Aurelia Knox as PCP - General (Family Practice)  Jessica Galvin, MK as Continuity Care Coordinator (Oncology)  Derrek Gamble MD as MD (Nephrology)  Patricia Murcia APRN CNP as Nurse Practitioner (Nurse Practitioner)  Elissa Wheeler MD as MD (Infectious Diseases)  Carmela Alvarez MD as MD (Urology)  Keira Rolle RN as Registered Nurse (Urology)  Sophai Garcia PA-C as Referring Physician (Physician Assistant)     Reviewed with patient; plan for one unit blood today & recheck of CBC in 2 days.

## 2017-04-20 NOTE — PROGRESS NOTES
Infusion Nursing Note:  Margaux Guzmán presents today for 1 unit of blood.    Patient seen by provider today: Yes: Lizet Truong NP    Intravenous Access:  Implanted Port.    Treatment Conditions:  Lab Results   Component Value Date    HGB 7.4 04/20/2017     Lab Results   Component Value Date    WBC 4.5 04/20/2017      Lab Results   Component Value Date    ANEU 3.9 04/20/2017     Lab Results   Component Value Date     04/20/2017      Results reviewed, labs MET treatment parameters, ok to proceed with treatment.  Blood transfusion consent signed 4/20/17.      Post Infusion Assessment:  Patient tolerated infusion without incident.  Blood return noted pre and post infusion.  Site patent and intact, free from redness, edema or discomfort.  No evidence of extravasations.  Access discontinued per protocol.    Discharge Plan:   Prescription refills given for Compazine.  Copy of AVS reviewed with patient and/or family. Patient will return 4/27/17 for next appointment.  Patient discharged in stable condition accompanied by: daughter.  Departure Mode: Ambulatory.    Jia Rmaey RN

## 2017-04-20 NOTE — MR AVS SNAPSHOT
After Visit Summary   4/20/2017    Margaux Guzmán    MRN: [id number]           Patient Information     Date Of Birth          1955        Visit Information        Provider Department      4/20/2017 12:00 PM Lizet Truong APRN CNP [facility]        Today's Diagnoses     Primary peritoneal adenocarcinoma (H)    -  1    Ovarian cancer, left (H)        Ovarian cancer, right (H)        Anemia           Follow-ups after your visit        Follow-up notes from your care team     Return if symptoms worsen or fail to improve.      Your next 10 appointments already scheduled     Apr 27, 2017  7:00 AM CDT   CT CHEST ABDOMEN PELVIS W/O CONTRAST with CT1   [facility] ([facility])    [address] [place] [id number] [phone]           Please bring any scans or X-rays taken at other hospitals, if similar tests were done. Also bring a list of your medicines, including vitamins, minerals and over-the-counter drugs. It is safest to leave personal items at home.  Be sure to tell your doctor:   If you have any allergies.   If there s any chance you are pregnant.   If you are breastfeeding.   If you have any special needs.  You do not need to do anything special to prepare.  Please wear loose clothing, such as a sweat suit or jogging clothes. Avoid snaps, zippers and other metal. We may ask you to undress and put on a hospital gown.            Apr 27, 2017  9:15 AM CDT   [facility] Draw with  [facility] DRAW   [facility] ([facility])    [address] [place] [id number] [phone]            Apr 27, 2017  9:40 AM CDT   (Arrive by 9:25 AM)   Return Visit with Jia Mccollum MD   [facility] ([facility])    [address] [place] [id number] [phone]            Nov  01, 2017 10:30 AM CDT   Lab with  LAB   [facility] ([facility])    [address]  1st Floor  [facility] [id number] [phone]            Nov 01, 2017 11:35 AM CDT   (Arrive by 11:05 AM)   Return Visit with Mackenzie Ca MD   [facility] ([facility])    [address]  3rd Floor  [facility] [id number] [phone]              Future tests that were ordered for you today     Open Standing Orders        Priority Remaining Interval Expires Ordered    Red blood cell prepare order unit Routine 99/100 CONDITIONAL (SPECIFY) BLOOD  4/20/2017          Open Future Orders        Priority Expected Expires Ordered    CBC with platelets differential Routine  4/20/2018 4/20/2017    CT Chest abdomen pelvis w/o contrast Routine  4/20/2018 4/20/2017            Who to contact     If you have questions or need follow up information about today's clinic visit or your schedule please contact [facility] directly at [phone].  Normal or non-critical lab and imaging results will be communicated to you by ARPUhart, letter or phone within 4 business days after the clinic has received the results. If you do not hear from us within 7 days, please contact the clinic through CDEL or phone. If you have a critical or abnormal lab result, we will notify you by phone as soon as possible.  Submit refill requests through CDEL or call your pharmacy and they will forward the refill request to us. Please allow 3 business days for your refill to be completed.          Additional Information About Your Visit        ARPUhart Information     CDEL gives you secure access to your electronic health record. If you see a primary care provider, you can also send messages to your care team and make appointments. If you have questions, please call your primary care clinic.  If you do not have a primary care provider, please call  [phone] and they will assist you.        Care EveryWhere ID     This is your Care EveryWhere ID. This could be used by other organizations to access your [place] medical records  [id number]        Your Vitals Were     Temperature Respirations Pulse Oximetry BMI (Body Mass Index)          96.4  F (35.8  C) (Tympanic) 14 100% 18.89 kg/m2         Blood Pressure from Last 3 Encounters:   04/20/17 135/65   04/20/17 136/74   04/20/17 136/74    Weight from Last 3 Encounters:   04/20/17 53.1 kg (117 lb)   04/20/17 52.6 kg (115 lb 14.4 oz)   04/12/17 52.6 kg (115 lb 14.4 oz)              We Performed the Following     CBC with platelets differential     CMP - Comprehensive Metabolic Panel     Fine needle aspiration          Today's Medication Changes          These changes are accurate as of: 4/20/17 11:59 PM.  If you have any questions, ask your nurse or doctor.               These medicines have changed or have updated prescriptions.        Dose/Directions    study - niraparib 100 mg capsule   Commonly known as:  IDS# [id number]   This may have changed:  additional instructions   Used for:  Anemia due to other cause, Chemotherapy-induced neutropenia (H), Ovarian cancer, left (H), Ovarian cancer, right (H), Primary peritoneal adenocarcinoma (H)        Dose:  300 mg   Take 3 capsules (300 mg) by mouth every morning Take at the same time each day, preferably morning. Swallow whole and do NOT chew capsules. Food and water is permissible. First dose will be administered on site.   Quantity:  84 capsule   Refills:  0                Primary Care Provider Office Phone # Fax #    Aurelia MCLAUGHLIN Johnrodríguez [phone]       [facility] [address] [facility] [address]        Thank you!     Thank you for choosing [facility]  for your care. Our goal is always to provide you with excellent care. Hearing back from our patients is one way we can continue to improve our services. Please take a few  minutes to complete the written survey that you may receive in the mail after your visit with us. Thank you!             Your Updated Medication List - Protect others around you: Learn how to safely use, store and throw away your medicines at www.disposemymeds.org.          This list is accurate as of: 4/20/17 11:59 PM.  Always use your most recent med list.                   Brand Name Dispense Instructions for use    ACETAMINOPHEN PO      Take 500 mg by mouth every 6 hours as needed for pain Reported on 3/30/2017       amLODIPine 2.5 MG tablet    NORVASC    30 tablet    Take 1 tablet (2.5 mg) by mouth daily       bisacodyl 10 MG Suppository    DULCOLAX    10 suppository    Place 1 suppository (10 mg) rectally daily as needed for constipation       cycloSPORINE 0.05 % ophthalmic emulsion    RESTASIS     Place 1 drop into both eyes 2 times daily       enoxaparin 40 MG/0.4ML injection    LOVENOX    30 Syringe    Inject 0.4 mLs (40 mg) Subcutaneous daily       EPINEPHrine 0.3 MG/0.3ML injection     1 each    Inject 0.3 mLs (0.3 mg) into the muscle once as needed for anaphylaxis       LORazepam 1 MG tablet    ATIVAN    30 tablet    Take 1 tablet (1 mg) by mouth every 6 hours as needed (nausea/vomiting, anxiety or sleep)       megestrol 40 MG/ML suspension    MEGACE ORAL    600 mL    Take 10 mLs (400 mg) by mouth 2 times daily       MELATONIN PO      Take 1 mg by mouth At Bedtime       ondansetron 8 MG ODT tab    ZOFRAN-ODT    90 tablet    Place 1 tablet (8 mg) under the tongue every 8 hours as needed for nausea       ONE DAILY MULTIVITAMIN WOMEN Tabs      Take 1 tablet by mouth every morning       polyethylene glycol powder    MIRALAX/GLYCOLAX     Take 1 capful by mouth daily as needed       prochlorperazine 10 MG tablet    COMPAZINE    30 tablet    Take 1 tablet (10 mg) by mouth every 6 hours as needed (nausea/vomiting)       senna-docusate 8.6-50 MG per tablet    SENOKOT-S;PERICOLACE    7 tablet    Take 1-2 tablets  by mouth 2 times daily       study - niraparib 100 mg capsule    IDS# [id number]    84 capsule    Take 3 capsules (300 mg) by mouth every morning Take at the same time each day, preferably morning. Swallow whole and do NOT chew capsules. Food and water is permissible. First dose will be administered on site.       tamsulosin 0.4 MG capsule    FLOMAX    60 capsule    Take 1 capsule (0.4 mg) by mouth daily       tolterodine 4 MG 24 hr capsule    DETROL LA    30 capsule    Take 1 capsule (4 mg) by mouth daily       VITAMIN B6 PO      Take 1 tablet by mouth At Bedtime       ZANTAC PO      Take 75 mg by mouth daily

## 2017-04-20 NOTE — NURSING NOTE
"Margaux Guzmán is a 61 year old female who presents for:  Chief Complaint   Patient presents with     Oncology Clinic Visit     Ovarian cancer, left (H)        Initial Vitals:  /74  Temp 96.4  F (35.8  C) (Tympanic)  Resp 14  Wt 53.1 kg (117 lb)  SpO2 100%  BMI 18.89 kg/m2 Estimated body mass index is 18.89 kg/(m^2) as calculated from the following:    Height as of an earlier encounter on 4/20/17: 1.676 m (5' 5.98\").    Weight as of this encounter: 53.1 kg (117 lb).. Body surface area is 1.57 meters squared. BP completed using cuff size: regular  Data Unavailable No LMP recorded. Patient has had a hysterectomy. Allergies and medications reviewed.     Medications: Medication refills not needed today.  Pharmacy name entered into EPIC:    [id number] - [place], [place] - [address] [facility] [address]  [id number] [place] - [address]  [facility] [place] [address] [facility] [facility] [place], [place] [id number]  [place] [address] [place] [address] [id number] [place] [address] [id number] [place] - [address]  [id number] - [place], [place] - [address]    Comments: Pt states she if feeling dizzy, onset [id number].  Provider and research coordinator were both notified.    6 minutes for nursing intake (face to face time)   Sonia Walters CMA        "

## 2017-04-20 NOTE — PROGRESS NOTES
Follow Up Notes on Referred Patient    Date: 2017       RE: Margaux Guzmná  : 1955  MELANI: 2017    Margaux Guzmán is a 61 year old woman with a history of primary peritoneal cancer.  She is here today for an acute visit regarding weakness and fatigue. She is currently enrolled in the Tesaro study.      Brief Oncology History:  The patient is a 58 year old  female who initially presented for evaluation of pelvic and abdominal pain. On exam, she had mild right adnexal tenderness and slightly enlarged right ovary freely mobile and smooth. This prompted an abdominal ultrasound, which was normal, and a pelvic US that showed anechoic right ovarian cyst measuring 4.3 x 3.8 x 4.0 cm. Her  was elevated at 74 on 10/8/13. We reviewed the possible diagnosis including ovarian cancer versus benign ovarian cyst. Approach to surgery, alternatives to surgery and plan for possible extended open surgical staging based on the operative findings was discussed. In light of the size of the ovary we are offering an initial approach with minimally invasive surgery. After discussion of risks and benefits, consent was obtained.  13 Diagnostic laparoscopy converted to exploratory laparotomy, bilateral salpingo-oophorectomy, total abdominal hysterectomy, omentectomy, staging biopsies, bilateral pelvic and periaortic lymph node dissection and washings. Stage IIIB  13: Cycle #1 IV/IP Taxol/CDDP  14: Cycle #2 IV/IP PCP.  - 14  14: Cycle 3 IV/IP.  - 7  14:  - 7. CT C/A/P Impression:  1. Multiple bilateral pulmonary nodules as described in full report measuring up to 3 mm along the right major fissure. These are indeterminate given lack of comparison and followup is recommended.  2. No definite evidence for recurrent or metastatic disease in the abdomen or the pelvis. There is a rounded hypodense focus abutting the left external iliac artery and the adjacent sigmoid colon  which measures 1 cm, this could represent a fluid-filled sigmoid diverticulum or a hypodense node, further attention to this area on subsequent examinations is recommended.  3. There is a 7 mm nonobstructing stone in the inferior collecting system of the right kidney.  2/12/14-3/26/14: Cycle #4-6 IV/IP CA-125 7, 7, 7.  4/21/14: CT C/A/P Impression:   1. Unchanged indeterminate pulmonary nodules. Recommend continued surveillance.   2. No definite evidence of metastatic ovarian cancer in the abdomen or pelvis. Small amount of subtle increased thickening and fluid is noted along the right lymph node dissection, nonspecific, but possibly a tiny developing lymphocele.   3. 6 mm nonobstructing stone in the right kidney.  Plan surveillance for ovarian cancer every 3 months with physical and .   Indeterminate pulmonary nodule: These were present before the surgery and there was not change with the chemotherapy. Highly unlikely to be a metastatic disease. Will repeat a CT in 3 months to see any change      5/22/14:  6. JOSEMANUEL.  5/17/14; ED visit [facility]. CT abd/pel noted possible chronic partial small bowel obstruction. Colonscopy scheduled for June 6, 2014 locally. Abdominal pain started 5/17/14 and noted pressure without bowel movement and went to ED that night due to increasing pain. In patient 2 day hospital with clear liquids/IV. Mild nausea without vomiting. BM subsequently occurred and pt was discharged. No pain since. Continues with fullness in abdomen. Diet is bland for the most part.  8/25/14:  -5. Notes increased abdominal girth and bloating x 2-3 weeks with urine urgency. Worried about recurrence. Is just starting to return to normal exercise and activities. No constipation, diarrhea, pain, vaginal or rectal bleeding, cough or dyspnea. CT to follow indeterminate pulmonary nodules today.   CT cap IMPRESSION:   1. No CT scan findings of the chest, abdomen, or pelvis to  indicate metastatic disease.  2. 2-5 mm pulmonary nodules, stable since 2/11/2014.  3. Nonobstructing 6 mm stone in the right kidney.  12/3/14:  8.. Pt started zoloft for anxiety. Worries about cancer recurrence.   3/2/15: CA 34  3/5/15: CT C/A/P: Impression:  1. Multiple new small peritoneal and retroperitoneal nodules are suspicious for metastases. Suspicious new left common iliac and central small bowel mesenteric nodes. No abdominal ascites.  2. Multiple pulmonary nodules are stable with no new nodules.  3. 9 mm nonobstructing right lower pole renal calculus.     5/20/15: CT c/a/p showed nodularity throughout the abdomen and pelvis worrisome for malignancy which has increased in size      5/28/15: CT abdomen and pelvis showed stable inumerabble peritoneal nodules scattered throughout the abdomen and pelvis consistent with metastases.      8/4/15 (approximatly): Left ureteral stent was placed.     8/12/15:  from [place] 303     8/18/15: CT chest Impression showed slight increase in mild, subtle nodularity along the diaphragm which is nonspecific but may represent metastatic disease.   8/18/15: CT abdomen and pelvis Impression showed interval progression of peritoneal metastatic disease.      8/25/15:  on 3/2/15 of 34 prompted CT c/a/p, which showed multiple new small peritoneal and retroperitoneal nodules are suspicious for metastases. Suspicious new left common iliac and central small bowel mesenteric nodes. She participated in [facility] study involving treatment with on clinical trial with vaccine [id number]. She is here today because the [place] trail failed and the pt pregressed. Her most recent CT c/a/p on 8/25 showed progression of peritoneal metastatic disease. And her most recent  from [place] on 8/12/15 was 303.     Plan: Carbo/Doxil x 6 cycles.with imaging after 3 cycles.      9/3/15: Cycle #1 Carbo/Doxil.  244.  9/24/15: right IJ thrombus; started on Lovenox.   9/26/15:  "Present to [place] ED. \"presented to the ED this morning with what appears to be a mild drug rash after administering her lovenox this morning. This writer discussed situation with Gyn Onc Fellow Jeanne Moon as well as [facility] Heme/Onc service. Per Heme, a rash of this nature is extremely uncommon with administration of Lovenox. Given the mild nature of this rash and acute need for anticoagulation, recommended the patient continue with Lovenox injections and treat with Benadryl as needed. Advised patient be given precautions to return to the ED immediately if she develops reactive respiratory symptoms. Alternatively patient could be switched to alternative formulation of low molecular weight heparin if she was strongly resistant to continuation of Lovenox.\"     10/1/15: Cycle #2 Carbo/Doxil.  175.     10/28/15: gyn onc visit: pt complains of worsening constipation and tenderness around her right ribs. She is taking senna for her constipation with minimal improvement. She admits that the swelling around her neck after her blood clot has decreased. She also admits to feeling a nodules on her left abdomen. She states her appetite is good but she has not been eating fruits and vegetables. She denies any chemo side effects besides fatigue.      10/28/15:  158  11/23/2015:  133  CT c/a/p  IMPRESSION: In this patient with a clinical history of ovarian cancer  there is mixed response to therapy:   1. Stable to slightly decreased peritoneal nodularity since  8/18/2015.  2. No significant change regarding the large subdiaphragmatic  peritoneal deposit since 9/24/2015.  3. Enlarging soft tissue nodule overlying the abdominal musculature  concerning for metastasis since March of 2015.   4. Unchanged, indeterminate hypodensity near the gallbladder fossa  since 8/18/2015, however progressively increased in size since  3/5/2015.  5. Bilateral nephroureteral stents. No significant hydronephrosis.  6. Bilateral " pulmonary nodules. Continued followup recommended.     12/23/15: Cycle 5 carboplatin/Doxil/Avastin.  96. To receive Avastin today despite proteinuria per Dr. Aguilar and nephrology.  1/21/16: Cycle 6 carboplatin/Doxil/Avastin, delayed one week secondary to neutropenia.  62.  1/28/16:  63.     2/2/16: Patient had right upper extremity doppler u/s done in [place] due to swollen glands and pain. 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 Pt is currently on lovenox.     2/2/16: US soft tissue neck  Conclusion:  1. Morphologically normal not pathologically enlarged two right carotid chain lymph nodes.  2. Chronic right jugular vein thrombosis, described in detail on  venous doppler exam.      2/2/16: Thyroid Ultrasound  Right lobe: 5.5 x 1.9 x 1.2 cm  Left lobe: 5.0 x 1.5 . 0.9 cm  Both lobes have normal background echotexture.     Conclusion:  1. 3 benign - appearing subcentimeter hypoechoic right mid thyroid nodules.  2. Borderline thyromegaly.     2/2/16: Right upper extremity venous duplex doppler evaluation  Conclusion:  1.) chronic non compressible echogenic right jugular vein thrombus, now 4.3 cm in length.      2/22/16:   IMPRESSION:    1. Ovarian cancer with peritoneal carcinomatosis.  2. Given the increased sensitivity of PET/CT, comparison with prior CT examinations is difficult. In general the abdominal/pelvic metastatic lesions appear to be decreased in size.  3. Persistent right internal jugular DVT, as evidenced by 5 cm filling defect with inferior border at the central venous catheter.  4. Bilateral hydronephrosis without overt caliectasis. Some distal migration of the bilateral double-J ureteral stents, although the  proximal coiled portions continue to be in the renal pelves.     2/24/16: C7 carboplatin/Doxil/Avastin.  56.  3/9/16: Hgb 6.6, worked up for  transfusion reaction (negative). Bleed possibly secondary to   3/23/16: C8 carboplatin/Doxil/Avastin.  44.  4/2016: Hospitalized in [place] x2 weeks for hematuria leading to anemia after ureteral stent exchange  4/20/16: C9D1 carboplatin/Doxil/Avastin. Deferred one week secondary to acute UTI.  35.  4/28/16: C9D1 deferred due to continued bacteruria and ANC 1.3.  5/4/16: C9D1 carboplatin/Doxil/Avastin. Breast lump noted, diagnostic mammogram ordered.  6/1/16: C10D1 carboplatin/Doxil/Avastin.  50.  6/28/16: C11D1 carboplatin/Doxil/Avastin. Avastin held due to bleeding. Carbo/Doxil deferred one week secondary to neutropenia.  43.  7/7/16: C12D1 carboplatin/Doxil. Avastin held due to bleeding.  7/18/16: Bilateral ureteral stent exchange  7/20/16-7/29/16: Hospitalized with urosepsis and blood loss anemia, started on Zosyn and discharged on amoxicillin  9/29/16: C1D1 Gemzar.  85.  10/21/16: C2D1 Gemzar.  106.   11/11/16: C3D1 Gemzar.   111.     12/12/16: CT CAP IMPRESSION: In this patient with a history of metastatic ovarian cancer:  1. Progression of disease as evidenced by a slowly enlarging mesenteric and omental soft tissue foci and slowly enlarging  periesophageal and pericardiophrenic lymph nodes, as detailed above.  2. Stable appearance of bilateral ureteral stents without hydronephrosis.  3. Patient was premedicated for a pre-existing IV contrast allergy. Despite this, she had itchiness on her face poststudy. She should no longer receive IV contrast in the future.       CT AP 1/10/2017: multiple dilated fluid filled small bowel loops with decompressed distal small bowel loops, consistent with obstruction. Transition point is suspected in the pelvis. No pneumatosis or free intraperitoneal gas. Bilateral ureteral stents appear appropriately positioned. There is mild dilation of the bilateral renal collecting systems, left greater than right.     1/23/17: admitted to  clinic for dizziness and heart palpitations   1/27/17: ED admission - leg swelling and GI bleeding      1/27/17: US lower extremity IMPRESSION:  1.  No evidence of right lower extremity deep venous thrombosis.  2.  Dampened waveforms in the right lower extremity, similar to 7/25/2016, though a more central occlusion cannot be excluded.  1/27/17: MRI Brain IMPRESSION:  Normal brain MRI  1/27/17: CT AP IMPRESSION: This patient with a history of metastatic ovarian cancer:  1. No bowel obstruction. Oral contrast progressed to the colon. Extensive colonic stool.  2. Severe right hydronephrosis, new from 12/12/2016, may represent obstruction of the right ureteral stent. No left-sided hydronephrosis.  3. Slight interval progression of diffuse peritoneal metastatic disease.  4. Mildly nodular areas of increased attenuation within the pelvic bowel which may represent loculated malignant ascites or peritoneal implants similar in appearance to 12/12/2016.     1/28/17: colonoscopy- benign      Treatment: Tesaro/[facility]adra Niraparib study  1/5/17: Cycle #1 Day 1   2/2/17: Cycle #2 Day 1   2/28/17: Cycle #3 Day 1      2/28/17: CT [facility]  IMPRESSION: In this patient with a history of ovarian cancer, there is mild disease progression as follows:  1. Increase in size and number of multiple subcentimeter nodules/intrafissural lymph nodes along the right major and minor  fissures and also bilateral pulmonary nodules, predominantly along the diaphragmatic pleura.   2. Slight increase in size of small lymph nodes in the right internal mammary region and bilateral anterior cardiophrenic region.  3. Stable to slight increase in size of deposits in the perihepatic region. No significant change in the omental deposits in the left  upper quadrant of the abdomen.  4. Stable to slight increase in size of mesenteric deposits/lymph nodes.  5. Bilateral nephroureteric stent in place. Atrophy of the right kidney with interval resolution  "of right hydronephrosis. Unchanged  mild left hydronephrosis. Air within the collecting systems and bladder likely related to prior intervention.  6. Dilation of proximal small bowel loops with interspersed areas of narrowing. No progression of oral contrast into the ileum. Moderate amount of fecal material in the colon. Partial/early small bowel obstruction is possible.  3/30/17: CT cap IMPRESSION: Images patient with a history of ovarian cancer, overall findings of stable disease includin. No acute findings to suggest etiology of severe left flank pain.  2. Unchanged pulmonary nodules/pleural nodularity and thoracic lymph nodes.  3. Unchanged perihepatic and left upper quadrant omental deposits as well as diffuse mesenteric lymphadenopathy.  4. Unchanged hypoattenuating focus in the hepatic dome.  5. Unchanged placement of bilateral nephroureteral stents. Unchanged mild to moderate left hydronephrosis. Stable atrophy of the right kidney.  6. Unchanged 8 mm right flank soft tissue nodule.   17: left splanchnic ganglion block. FNA of lesion in umbilicus completed; results pending.         Today she comes to clinic reporting she has started feeling quite weak over the past couple of days. She reports she began feeling dizzy yesterday and it has continued; she is also dizzy when walking. She states that the last time this happened her Hgb was low and she required a blood transfusion; she states that while on this study medication her Hgb can be good and then it drops quite quickly. She states she is taking in adequate fluids and is eating \"ok\". She states she has had an elevated heart rate for a while and has had several testings done which have been \"ok\" and not showing any changes. She denies sensation like her heart is pounding; she has noticed occassionally hearing a \"swoosh\" sound in her ears. She is checking her BP at home and it has remained in the 130-140's. She has previously received blood " "transfusions and reports she typically \"responds very well\". She is not currently taking an iron supplement as she has issues with constipation. Otherwise she has no changes in her symptoms (nausea, discomfort from her stent, back pain, constipation).       Review of Systems:    Systemic           + weight changes; no fever; no chills; no night sweats; no appetite changes' + fatigue/weakness  Skin           no rashes, or lesions  Eye           no irritation; no changes in vision  Hue-Laryngeal           no dysphagia; no hoarseness   Pulmonary    no cough; no shortness of breath  Cardiovascular    no chest pain; no palpitations; + tachycardia; + HTN  Gastrointestinal    no diarrhea; + constipation; no abdominal pain; no changes in bowel habits; no blood in stool  Genitourinary   no urinary frequency; no urinary urgency; no dysuria; no pain; no abnormal vaginal discharge; no abnormal vaginal bleeding  Breast    no breast discharge; no breast changes; no breast pain  Musculoskeletal    + myalgias; + arthralgias; + back pain  Psychiatric           no depressed mood; no anxiety    Hematologic               no tender lymph nodes; no noticeable swellings or lumps   Endocrine    no hot flashes; no heat/cold intolerance         Neurological   no tremor; no numbness and tingling; no headaches; no difficulty sleeping      Past Medical History:    Past Medical History:   Diagnosis Date     Anemia      History of blood transfusion     MULTIPLE     Hydronephrosis      Hyperlipidemia      Jugular vein thrombosis, right     Persistent right internal jugular DVT     Livedo annularis      Osteoarthritis      Osteopenia      Ovarian cancer (H)      Polymyalgia rheumatica (H)      PONV (postoperative nausea and vomiting)      Primary cancer of peritoneum (H)          Past Surgical History:    Past Surgical History:   Procedure Laterality Date     APPENDECTOMY       BREAST SURGERY      biopsy negative      SECTION       " COLONOSCOPY N/A 1/28/2017    Procedure: COLONOSCOPY;  Surgeon: Luis Ricahrdson MD;  Location: [facility]     COLONOSCOPY N/A 1/28/2017    Procedure: COMBINED COLONOSCOPY, SINGLE OR MULTIPLE BIOPSY/POLYPECTOMY BY BIOPSY;  Surgeon: Luis Richardson MD;  Location: [facility]     COMBINED CYSTOSCOPY, RETROGRADES, EXCHANGE STENT URETER(S) Bilateral 7/18/2016    Procedure: COMBINED CYSTOSCOPY, RETROGRADES, EXCHANGE STENT URETER(S);  Surgeon: Carmela Alvarez MD;  Location: [facility]     COMBINED CYSTOSCOPY, RETROGRADES, EXCHANGE STENT URETER(S)  4/2016    @ [facility]     COMBINED CYSTOSCOPY, RETROGRADES, EXCHANGE STENT URETER(S) Bilateral 10/17/2016    Procedure: COMBINED CYSTOSCOPY, RETROGRADES, EXCHANGE STENT URETER(S);  Surgeon: Carmela Alvarez MD;  Location: [facility]     COMBINED CYSTOSCOPY, RETROGRADES, EXCHANGE STENT URETER(S) Bilateral 12/7/2016    Procedure: COMBINED CYSTOSCOPY, RETROGRADES, EXCHANGE STENT URETER(S);  Surgeon: Carmela Alvarez MD;  [facility]     COMBINED CYSTOSCOPY, RETROGRADES, EXCHANGE STENT URETER(S) Bilateral 2/27/2017    Procedure: COMBINED CYSTOSCOPY, RETROGRADES, EXCHANGE STENT URETER(S);  Surgeon: Carmela Alvarez MD;  Location: [facility]     ESOPHAGOSCOPY, GASTROSCOPY, DUODENOSCOPY (EGD), COMBINED N/A 1/28/2017    Procedure: COMBINED ESOPHAGOSCOPY, GASTROSCOPY, DUODENOSCOPY (EGD);  Surgeon: Luis Richardson MD;  Location: [facility]     HYSTERECTOMY TOTAL ABDOMINAL, BILATERAL SALPINGO-OOPHORECTOMY, NODE DISSECTION, COMBINED  11/7/2013    Procedure: COMBINED HYSTERECTOMY TOTAL ABDOMINAL, SALPINGO-OOPHORECTOMY, NODE DISSECTION;;  Surgeon: Cheri Aguilar MD;  [facility]: [facility]     LAPAROSCOPIC SALPINGO-OOPHORECTOMY  11/7/2013    Procedure: LAPAROSCOPIC SALPINGO-OOPHORECTOMY;  Diagnostic Laparoscopy, Exploratory Laparotomy, Bilateral Salpingo-Oophorectomy,  Hysterectomy, Omentectomy, Pelvic Washings, Peritoneal staging and biopsies, Pelvic and Periaortic Lymph node  Dissection;  Surgeon: Cheri Aguilar MD;  Location: [facility]     LAPAROTOMY, STAGING, COMBINED  11/7/2013    Procedure: COMBINED LAPAROTOMY, STAGING;;  Surgeon: Cheri Aguilar MD;  Location: [facility]     LYMPH NODE BIOPSY  2008    Left posterior chain, beign     OPEN REDUCTION INTERNAL FIXATION TOE(S)  9/3/13    Fifth digit, left foot, with screw fixation      REMOVE PORT PERITONEAL  5/5/2014    Procedure: REMOVE PORT PERITONEAL;  Surgeon: Cheri Aguilar MD;  Location: [facility]         Health Maintenance Due   Topic Date Due     PHQ-9 Q1YR (NO INBASKET)  1955     TETANUS IMMUNIZATION (SYSTEM ASSIGNED)  11/24/1972     HEPATITIS C SCREENING  11/24/1972     LIPID SCREEN Q5 YR FEMALE (SYSTEM ASSIGNED)  11/24/1999       Current Medications:     Current Outpatient Prescriptions   Medication Sig Dispense Refill     ondansetron (ZOFRAN-ODT) 8 MG ODT tab Place 1 tablet (8 mg) under the tongue every 8 hours as needed for nausea 90 tablet 3     amLODIPine (NORVASC) 2.5 MG tablet Take 1 tablet (2.5 mg) by mouth daily 30 tablet      bisacodyl (DULCOLAX) 10 MG Suppository Place 1 suppository (10 mg) rectally daily as needed for constipation 10 suppository 0     megestrol (MEGACE ORAL) 40 MG/ML suspension Take 10 mLs (400 mg) by mouth 2 times daily 600 mL 1     enoxaparin (LOVENOX) 40 MG/0.4ML injection Inject 0.4 mLs (40 mg) Subcutaneous daily 30 Syringe 3     study - niraparib (IDS# 4759) 100 mg capsule Take 3 capsules (300 mg) by mouth every morning Take at the same time each day, preferably morning. Swallow whole and do NOT chew capsules. Food and water is permissible. First dose will be administered on site. (Patient taking differently: Take 300 mg by mouth every morning Take at the same time each day, preferably morning. Swallow whole and do NOT chew capsules. Food and water is permissible. First dose will be administered on site.) 84 capsule 0     tamsulosin (FLOMAX) 0.4 MG capsule Take 1 capsule (0.4 mg) by mouth daily  "60 capsule 6     LORazepam (ATIVAN) 1 MG tablet Take 1 tablet (1 mg) by mouth every 6 hours as needed (nausea/vomiting, anxiety or sleep) 30 tablet 3     tolterodine (DETROL LA) 4 MG 24 hr capsule Take 1 capsule (4 mg) by mouth daily 30 capsule 3     ACETAMINOPHEN PO Take 500 mg by mouth every 6 hours as needed for pain Reported on 3/30/2017       Multiple Vitamins-Minerals (ONE DAILY MULTIVITAMIN WOMEN) TABS Take 1 tablet by mouth every morning        polyethylene glycol (MIRALAX/GLYCOLAX) powder Take 1 capful by mouth daily as needed        cycloSPORINE (RESTASIS) 0.05 % ophthalmic emulsion Place 1 drop into both eyes 2 times daily        Ranitidine HCl (ZANTAC PO) Take 75 mg by mouth daily        MELATONIN PO Take 1 mg by mouth At Bedtime        EPINEPHrine (EPIPEN) 0.3 MG/0.3ML injection Inject 0.3 mLs (0.3 mg) into the muscle once as needed for anaphylaxis 1 each 0     senna-docusate (SENOKOT-S;PERICOLACE) 8.6-50 MG per tablet Take 1-2 tablets by mouth 2 times daily 7 tablet      Pyridoxine HCl (VITAMIN B6 PO) Take 1 tablet by mouth At Bedtime        prochlorperazine (COMPAZINE) 10 MG tablet Take 1 tablet (10 mg) by mouth every 6 hours as needed (nausea/vomiting) 30 tablet 2         Allergies:        Allergies   Allergen Reactions     Contrast Dye Itching and Swelling     Itching/tingling of mouth and nose with sensation of swelling after receiving IV contrast for CT.  This occurred despite steroid premedication. Therefore the patient should not receive intravenous contrast in the future.      Benadryl Allergy Other (See Comments)     Stay awake, restless, \"uncomfortable\", \"feel like I need to run away\"      Lovenox [Enoxaparin] Hives     3/23/16: Okay to receive heparin flushes in port, tolerates well. Patricia Cortez FNP-C     Amoxicillin Rash     Diphenhydramine Anxiety     No Clinical Screening - See Comments Rash     Pollen Extract Rash     Sulfa Drugs Rash        Social History:     Social History "   Substance Use Topics     Smoking status: Former Smoker     Smokeless tobacco: Former User      Comment: Quit over 20 years ago     Alcohol use No       History   Drug Use No         Family History:     The patient's family history is notable for:    Family History   Problem Relation Age of Onset     Arthritis Father      Myocardial Infarction Father      secondary to anesthesia     Colon Cancer Father      DIABETES Other      Uncle     Hypertension Mother      Other - See Comments Mother      cognitive decline     Skin Cancer Mother      Hypertension Sister      HEART DISEASE Other      unknown valve replacement     Bladder Cancer Sister      Skin Cancer Brother          Physical Exam:     /74  Temp 96.4  F (35.8  C) (Tympanic)  Resp 14  Wt 53.1 kg (117 lb)  SpO2 100%  BMI 18.89 kg/m2  Body mass index is 18.89 kg/(m^2).    General Appearance: pale and alert, no acute distress     HEENT: no thyromegaly, no palpable nodules or masses        Cardiovascular: regular rate and rhythm, no gallops, rubs or murmurs     Respiratory: lungs clear, no rales, rhonchi or wheezes, normal diaphragmatic excursion    Musculoskeletal: extremities non tender and without edema    Skin: no lesions or rashes     Neurological: wheelchair     Psychiatric: appropriate mood and affect                               Hematological: normal cervical, supraclavicular  ymph nodes     Gastrointestinal:       abdomen soft, non-tender, non-distended    Genitourinary: Not indicated      Assessment:    Margaux Guzmán is a 61 year old woman with a history of primary peritoneal cancer.  She is here today for an acute visit regarding weakness and fatigue. She is currently enrolled in the Tesaro study.       10 minutes were spent with this patient, over 50% of that time was spent in symptom management, treatment planning and in counseling and coordination of care.      Plan:     1.)        Will transfuse one unit of blood today and have her  recheck a CBC closer to home tomorrow; she will have the results faxed to ervin Najera RN for follow up. She met with Tamanna MATTHEWS, research coordinator, regarding her anemia and the status of her eligibiilty to remain on the Tesaro study. Reviewed slow position changes as well as eating iron rich foods/starting on slow iron supplement. Reviewed signs and symptoms for when she should contact the clinic or seek additional care. Patient to contact the clinic with any questions or concerns in the interim. Consent for blood transfusion was obtained today.      2.) Genetic risk factors were assessed and she isnegative for mutations in the following: BRCA1, BRCA2, EPCAM, MLH1, MSH2, MSH6, PMS2, PTEN and TP53.       3.) Labs and/or tests ordered include:  CBC. CMP. Blood transfusion.       4.) Health maintenance issues addressed today include annual health maintenance and non-gynecologic issues with PCP.    JUSTIN Lombardo, WHNP-BC, ANP-BC  Women's Health Nurse Practitioner  Adult Nurse Pracitioner  Gynecologic Oncology          CC  Patient Care Team:  Aurelia Knox as PCP - General (Family Practice)  Jessica Galvin, KM as Continuity Care Coordinator (Gyn-Onc)  Jessica Galvin RN as Continuity Care Coordinator (Oncology)  Lizet Truong APRN CNP (Nurse Practitioner - Women's Health)  Derrek Gamble MD as MD (Nephrology)  Patricia Murcia APRN CNP as Nurse Practitioner (Nurse Practitioner)  Elissa Wheeler MD as MD (Infectious Diseases)  Carmela Alvarez MD as MD (Urology)  Keira Rolle RN as Registered Nurse (Urology)  Sophia Gacria PA-C as Referring Physician (Physician Assistant)

## 2017-04-20 NOTE — IP AVS SNAPSHOT
[facility]    [address] [place] [address]    Phone:  [phone]    Fax:  [phone]                                       After Visit Summary   4/20/2017    Margaux Guzmán    MRN: [id number]           After Visit Summary Signature Page     I have received my discharge instructions, and my questions have been answered. I have discussed any challenges I see with this plan with the nurse or doctor.    ..........................................................................................................................................  Patient/Patient Representative Signature      ..........................................................................................................................................  Patient Representative Print Name and Relationship to Patient    ..................................................               ................................................  Date                                            Time    ..........................................................................................................................................  Reviewed by Signature/Title    ...................................................              ..............................................  Date                                                            Time

## 2017-04-20 NOTE — DISCHARGE INSTRUCTIONS
Home Care Instructions after a Splanchnic Nerve Block     The splanchnic nerve block is a minimally invasive treatment  for patients suffering from severe and chronic symptoms of abdominal pain. Originating from the 11th and 12th thoracic vertebrate, the splanchnic nerves extend down along each side of the spinal column. These splanchnic nerves terminate at the celiac plexus within the abdomen. Their primary function is to relay sensory and motor information from the abdominal region to the spinal cord and brain. During a splanchnic nerve block, these signals of pain that originate from within the abdomen are blocked before they can reach the spinal cord. This may significantly reduce or even eliminate the degree of pain severity.        Activity  -You may resume most normal activity levels with the exception of strenuous activity. It is important for us to know if your pain with normal activity is relieved after this injection.  -DO NOT shower for 24 hours  -DO NOT remove bandaid for 24 hours    Pain  -You may experience soreness at the injection site for one or two days  -You may use an ice pack for 20 minutes every 2 hours for the first 24 hours  -You may use a heating pad after the first 24 hours  -You may use Tylenol (acetaminophen) every 4 hours or other pain medicines as     directed by your physician    You may experience numbness radiating into your legs or arms (depending on the procedure location). This numbness may last several hours. Until sensation returns to normal; please use caution in walking, climbing stairs, and stepping out of your vehicle, etc.    DID YOU RECEIVE SEDATION TODAY?  No    If you received sedation please follow these additional safety measures.  Sedation medicine, if given, may remain active for many hours. It is important for the next 24 hours that you do not:  -Drive a car  -Operate machines or power tools  -Consume alcohol, including beer  -Sign any important papers or legal  documents    DID YOU RECEIVE STEROIDS TODAY?  Yes    Common side effects of steroids:  Not everyone will experience corticosteroid side effects. If side effects are experienced, they will gradually subside in the 7-10 day period following an injection. Most common side effects include:  -Flushed face and/or chest  -Feeling of warmth, particularly in the face but could be an overall feeling of warmth  -Increased blood sugar in diabetic patients  -Menstrual irregularities my occur. If taking hormone-based birth control an alternate method of birth control is recommended  -Sleep disturbances and/or mood swings are possible  -Leg cramps      PLEASE KEEP TRACK OF YOUR SYMPTOMS AND NOTE YOUR IMPROVEMENT FOR YOUR DOCTOR.     Please contact us if you have:  -Severe pain  -Fever more than 101.5 degrees Fahrenheit  -Signs of infection at the injection site (redness, swelling, or drainage)    If you have questions, please contact our office at [phone] between the hours of 7:00 am and 3:00 pm Monday through Friday. After office hours you can contact the on call provider by dialing [phone]. If you need immediate attention, we recommend that you go to a hospital emergency room or dial [phone].

## 2017-04-20 NOTE — MR AVS SNAPSHOT
After Visit Summary   4/20/2017    Margaux Guzmán    MRN: [id number]           Patient Information     Date Of Birth          1955        Visit Information        Provider Department      4/20/2017 2:30 PM  30 ATC;  ONCOLOGY INFUSION [facility]        Today's Diagnoses     Ovarian cancer, left (H)    -  1    Ovarian cancer, right (H)        Encounter for long-term current use of medication        Anemia due to other cause          Care Instructions    Contact Numbers    [facility] Main Line: [phone]  [facility] Triage:  [phone]    Call triage with chills and/or temperature greater than or equal to 100.5, uncontrolled nausea/vomiting, diarrhea, constipation, dizziness, shortness of breath, chest pain, bleeding, unexplained bruising, or any new/concerning symptoms, questions/concerns.     If you are having any concerning symptoms or wish to speak to a provider before your next infusion visit, please call your care coordinator or triage to notify them so we can adequately serve you.       After Hours: [phone]    If after hours, weekends, or holidays, call main hospital  and ask for Oncology doctor on call.         April 2017 Sunday Monday Tuesday Wednesday Thursday Friday Saturday                                 1       2     3     4     5     6     7     8       9     10     11     12     [facility] RETURN    9:50 AM   (25 min.)   Mackenzie Ca MD   [facility] Nephrology     LAB WITH  CLINIC   10:00 AM   (15 min.)    LAB   [facility] Lab     [facility] NEW   12:15 PM   (60 min.)   Stephen Mensah DO   [facility] 13     14     15       16     17     18     19     20     Outpatient Visit    9:07 AM   [facility]     XR SURG EARNEST <5 MIN FL W STILL    9:20 AM   (30 min.)   [email or web address]   [facility] ASC Imaging     INJECT NERVE BLOCK CELIAC PLEXUS   10:15 AM   Shabbir Valladares MD    OR     [facility]  RETURN   11:45 AM   (30 min.)   Lizet Truong APRN CNP   [facility] ONC INFUSION 240    2:30 PM   (240 min.)   [facility] ONCOLOGY INFUSION   [facility] 21     22       23     24     25     26     27     CT CHEST ABDOMEN PELVIS WO    7:00 AM   (20 min.)   UCCT1   [facility] CT     [facility] DRAW    9:15 AM   (15 min.)   [facility] D[facility]   [facility] Lab Draw     [facility] RETURN    9:25 AM   (20 min.)   Jia Mccollum MD   [facility] 28     29       30                                              May 2017   Robert Monday Tuesday Wednesday Thursday Friday Saturday        1     2     3     4     5     6       7     8     9     10     11     12     13       14     15     16     17     18     19     20       21     22     23     24     25     26     27       28     29     30     31                                Recent Results (from the past 24 hour(s))   CBC with platelets differential    Collection Time: 04/20/17 12:55 PM   Result Value Ref Range    WBC 4.5 4.0 - 11.0 10e9/L    RBC Count 2.08 (L) 3.8 - 5.2 10e12/L    Hemoglobin 7.4 (L) 11.7 - 15.7 g/dL    Hematocrit 22.2 (L) 35.0 - 47.0 %     (H) 78 - 100 fl    MCH 35.6 (H) 26.5 - 33.0 pg    MCHC 33.3 31.5 - 36.5 g/dL    RDW 14.7 10.0 - 15.0 %    Platelet Count 239 150 - 450 10e9/L    Diff Method Automated Method     % Neutrophils 86.4 %    % Lymphocytes 8.2 %    % Monocytes 4.4 %    % Eosinophils 0.2 %    % Basophils 0.4 %    % Immature Granulocytes 0.4 %    Nucleated RBCs 0 0 /100    Absolute Neutrophil 3.9 1.6 - 8.3 10e9/L    Absolute Lymphocytes 0.4 (L) 0.8 - 5.3 10e9/L    Absolute Monocytes 0.2 0.0 - 1.3 10e9/L    Absolute Eosinophils 0.0 0.0 - 0.7 10e9/L    Absolute Basophils 0.0 0.0 - 0.2 10e9/L    Abs Immature Granulocytes 0.0 0 - 0.4 10e9/L    Absolute Nucleated RBC 0.0    CMP - Comprehensive Metabolic Panel    Collection Time: 04/20/17 12:55 PM   Result  Value Ref Range    Sodium 134 133 - 144 mmol/L    Potassium 3.9 3.4 - 5.3 mmol/L    Chloride 100 94 - 109 mmol/L    Carbon Dioxide 27 20 - 32 mmol/L    Anion Gap 7 3 - 14 mmol/L    Glucose 123 (H) 70 - 99 mg/dL    Urea Nitrogen 28 7 - 30 mg/dL    Creatinine 0.94 0.52 - 1.04 mg/dL    GFR Estimate 60 (L) >60 mL/min/1.7m2    GFR Estimate If Black 73 >60 mL/min/1.7m2    Calcium 8.9 8.5 - 10.1 mg/dL    Bilirubin Total 0.3 0.2 - 1.3 mg/dL    Albumin 3.4 3.4 - 5.0 g/dL    Protein Total 6.6 (L) 6.8 - 8.8 g/dL    Alkaline Phosphatase 48 40 - 150 U/L    ALT 10 0 - 50 U/L    AST 13 0 - 45 U/L   ABO/Rh type and screen    Collection Time: 04/20/17 12:55 PM   Result Value Ref Range    ABO Pending     RH(D) Pending     Antibody Screen Pending     Test Valid Only At       [facility]    Specimen Expires 04/23/2017                Follow-ups after your visit        Your next 10 appointments already scheduled     Apr 27, 2017  7:00 AM CDT   CT CHEST ABDOMEN PELVIS W/O CONTRAST with UCCT1   [facility] [place] [facility] ([facility])    [address] [place] [address] [phone]           Please bring any scans or X-rays taken at other hospitals, if similar tests were done. Also bring a list of your medicines, including vitamins, minerals and over-the-counter drugs. It is safest to leave personal items at home.  Be sure to tell your doctor:   If you have any allergies.   If there s any chance you are pregnant.   If you are breastfeeding.   If you have any special needs.  You do not need to do anything special to prepare.  Please wear loose clothing, such as a sweat suit or jogging clothes. Avoid snaps, zippers and other metal. We may ask you to undress and put on a hospital gown.            Apr 27, 2017  9:15 AM CDT   [facility] Draw with  [facility] DRAW   [facility] Draw ([facility])    [address] [place] [address]  [facility]  2nd Floor  [facility] [address]   [phone]            Apr 27, 2017  9:40 AM CDT   (Arrive by 9:25 AM)   Return Visit with Jia Mccollum MD   [facility] ([facility])    [address] [place] [address]  [address] [facility] [address]   [phone]            Nov 01, 2017 10:30 AM CDT   Lab with  LAB   [facility] ([facility])    [address] [place] [address]  [address] [facility] [address]   [phone]            Nov 01, 2017 11:35 AM CDT   (Arrive by 11:05 AM)   Return Visit with Mackenzie Ca MD   [facility] Nephrology ([facility])    [address] [place] [address]  [address] [facility] [address]   [phone]              Future tests that were ordered for you today     Open Standing Orders        Priority Remaining Interval Expires Ordered    Red blood cell prepare order unit Routine 99/100 CONDITIONAL (SPECIFY) BLOOD  4/20/2017    Glucose monitor nursing POCT STAT 57097/15207 PRN  4/20/2017    Notify ANESTHESIA Routine [id number] PRN  4/20/2017    Oxygen: Nasal cannula Routine 51410/97047 CONTINUOUS  4/20/2017          Open Future Orders        Priority Expected Expires Ordered    CBC with platelets differential Routine  4/20/2018 4/20/2017    CT Chest abdomen pelvis w/o contrast Routine  4/20/2018 4/20/2017            Who to contact     If you have questions or need follow up information about today's clinic visit or your schedule please contact [facility] directly at [phone].  Normal or non-critical lab and imaging results will be communicated to you by MyChart, letter or phone within 4 business days after the clinic has received the results. If you do not hear from us within 7 days, please contact the clinic through MyChart or phone. If you have a critical or abnormal lab result, we will notify you by phone as soon as possible.  Submit refill requests through  [employer] or call your pharmacy and they will forward the refill request to us. Please allow 3 business days for your refill to be completed.          Additional Information About Your Visit        "[employer]hart Information     [employer] gives you secure access to your electronic health record. If you see a primary care provider, you can also send messages to your care team and make appointments. If you have questions, please call your primary care clinic.  If you do not have a primary care provider, please call [phone] and they will assist you.        Care EveryWhere ID     This is your Care EveryWhere ID. This could be used by other organizations to access your [facility] medical records  [id number]         Blood Pressure from Last 3 Encounters:   04/20/17 136/74   04/20/17 136/74   04/12/17 130/84    Weight from Last 3 Encounters:   04/20/17 53.1 kg (117 lb)   04/20/17 52.6 kg (115 lb 14.4 oz)   04/12/17 52.6 kg (115 lb 14.4 oz)              We Performed the Following     ABO/Rh type and screen     Red blood cell prepare order unit     Transfuse red blood cell unit          Today's Medication Changes          These changes are accurate as of: 4/20/17  3:58 PM.  If you have any questions, ask your nurse or doctor.               These medicines have changed or have updated prescriptions.        Dose/Directions    study - niraparib 100 mg capsule   Commonly known as:  IDS# [id number]   This may have changed:  additional instructions   Used for:  Anemia due to other cause, Chemotherapy-induced neutropenia (H), Ovarian cancer, left (H), Ovarian cancer, right (H), Primary peritoneal adenocarcinoma (H)        Dose:  300 mg   Take 3 capsules (300 mg) by mouth every morning Take at the same time each day, preferably morning. Swallow whole and do NOT chew capsules. Food and water is permissible. First dose will be administered on site.   Quantity:  84 capsule   Refills:  0                Primary Care Provider Office Phone # Fax #     Aurelia Knox [phone]       [facility] [address] [facility] [address]        Thank you!     Thank you for choosing [facility]  for your care. Our goal is always to provide you with excellent care. Hearing back from our patients is one way we can continue to improve our services. Please take a few minutes to complete the written survey that you may receive in the mail after your visit with us. Thank you!             Your Updated Medication List - Protect others around you: Learn how to safely use, store and throw away your medicines at www.disposemymeds.org.          This list is accurate as of: 4/20/17  3:58 PM.  Always use your most recent med list.                   Brand Name Dispense Instructions for use    ACETAMINOPHEN PO      Take 500 mg by mouth every 6 hours as needed for pain Reported on 3/30/2017       amLODIPine 2.5 MG tablet    NORVASC    30 tablet    Take 1 tablet (2.5 mg) by mouth daily       bisacodyl 10 MG Suppository    DULCOLAX    10 suppository    Place 1 suppository (10 mg) rectally daily as needed for constipation       cycloSPORINE 0.05 % ophthalmic emulsion    RESTASIS     Place 1 drop into both eyes 2 times daily       enoxaparin 40 MG/0.4ML injection    LOVENOX    30 Syringe    Inject 0.4 mLs (40 mg) Subcutaneous daily       EPINEPHrine 0.3 MG/0.3ML injection     1 each    Inject 0.3 mLs (0.3 mg) into the muscle once as needed for anaphylaxis       LORazepam 1 MG tablet    ATIVAN    30 tablet    Take 1 tablet (1 mg) by mouth every 6 hours as needed (nausea/vomiting, anxiety or sleep)       megestrol 40 MG/ML suspension    MEGACE ORAL    600 mL    Take 10 mLs (400 mg) by mouth 2 times daily       MELATONIN PO      Take 1 mg by mouth At Bedtime       ondansetron 8 MG ODT tab    ZOFRAN-ODT    90 tablet    Place 1 tablet (8 mg) under the tongue every 8 hours as needed for nausea       ONE DAILY MULTIVITAMIN WOMEN Tabs      Take 1 tablet  by mouth every morning       polyethylene glycol powder    MIRALAX/GLYCOLAX     Take 1 capful by mouth daily as needed       prochlorperazine 10 MG tablet    COMPAZINE    30 tablet    Take 1 tablet (10 mg) by mouth every 6 hours as needed (nausea/vomiting)       senna-docusate 8.6-50 MG per tablet    SENOKOT-S;PERICOLACE    7 tablet    Take 1-2 tablets by mouth 2 times daily       study - niraparib 100 mg capsule    IDS# [id number]    84 capsule    Take 3 capsules (300 mg) by mouth every morning Take at the same time each day, preferably morning. Swallow whole and do NOT chew capsules. Food and water is permissible. First dose will be administered on site.       tamsulosin 0.4 MG capsule    FLOMAX    60 capsule    Take 1 capsule (0.4 mg) by mouth daily       tolterodine 4 MG 24 hr capsule    DETROL LA    30 capsule    Take 1 capsule (4 mg) by mouth daily       VITAMIN B6 PO      Take 1 tablet by mouth At Bedtime       ZANTAC PO      Take 75 mg by mouth daily

## 2017-04-20 NOTE — IP AVS SNAPSHOT
MRN:[id number]                      After Visit Summary   4/20/2017    Margaux Guzmán    MRN: [id number]           Thank you!     Thank you for choosing [place] for your care. Our goal is always to provide you with excellent care. Hearing back from our patients is one way we can continue to improve our services. Please take a few minutes to complete the written survey that you may receive in the mail after you visit with us. Thank you!        Patient Information     Date Of Birth          1955        About your hospital stay     You were admitted on:  April 20, 2017 You last received care in the:  [facility]    You were discharged on:  April 20, 2017       Who to Call     For medical emergencies, please call 911.  For non-urgent questions about your medical care, please call your primary care provider or clinic, [phone]  For questions related to your surgery, please call your surgery clinic        Attending Provider     Provider Specialty    Shabbir Valladares MD Anesthesiology       Primary Care Provider Office Phone # Fax #    Aurelia Knox [phone]       [address] [address]        Your next 10 appointments already scheduled     Apr 27, 2017  9:15 AM CDT   [facility] Draw with  [facility] DRAW   [facility] ([facility])    [address] [place] [address]  [address] [facility] [id number] [phone]            Apr 27, 2017  9:40 AM CDT   (Arrive by 9:25 AM)   Return Visit with Jia Mccollum MD   [facility] ([facility])    [address] [place] [address]  [address] [facility] [address]   [phone]            Nov 01, 2017 10:30 AM CDT   Lab with  LAB   [facility] ([facility])    [address] [place] [address]  [address] [facility] [address]   [phone]            Nov 01, 2017 11:35 AM  CDT   (Arrive by 11:05 AM)   Return Visit with Mackenzie Ca MD   [facility] ([facility])    [address] [facility]  3rd Floor  [facility] [address] [phone]              Further instructions from your care team       Home Care Instructions after a Splanchnic Nerve Block     The splanchnic nerve block is a minimally invasive treatment  for patients suffering from severe and chronic symptoms of abdominal pain. Originating from the 11th and 12th thoracic vertebrate, the splanchnic nerves extend down along each side of the spinal column. These splanchnic nerves terminate at the celiac plexus within the abdomen. Their primary function is to relay sensory and motor information from the abdominal region to the spinal cord and brain. During a splanchnic nerve block, these signals of pain that originate from within the abdomen are blocked before they can reach the spinal cord. This may significantly reduce or even eliminate the degree of pain severity.        Activity  -You may resume most normal activity levels with the exception of strenuous activity. It is important for us to know if your pain with normal activity is relieved after this injection.  -DO NOT shower for 24 hours  -DO NOT remove bandaid for 24 hours    Pain  -You may experience soreness at the injection site for one or two days  -You may use an ice pack for 20 minutes every 2 hours for the first 24 hours  -You may use a heating pad after the first 24 hours  -You may use Tylenol (acetaminophen) every 4 hours or other pain medicines as     directed by your physician    You may experience numbness radiating into your legs or arms (depending on the procedure location). This numbness may last several hours. Until sensation returns to normal; please use caution in walking, climbing stairs, and stepping out of your vehicle, etc.    DID YOU RECEIVE SEDATION TODAY?  No    If you received sedation please follow  "these additional safety measures.  Sedation medicine, if given, may remain active for many hours. It is important for the next 24 hours that you do not:  -Drive a car  -Operate machines or power tools  -Consume alcohol, including beer  -Sign any important papers or legal documents    DID YOU RECEIVE STEROIDS TODAY?  Yes    Common side effects of steroids:  Not everyone will experience corticosteroid side effects. If side effects are experienced, they will gradually subside in the 7-10 day period following an injection. Most common side effects include:  -Flushed face and/or chest  -Feeling of warmth, particularly in the face but could be an overall feeling of warmth  -Increased blood sugar in diabetic patients  -Menstrual irregularities my occur. If taking hormone-based birth control an alternate method of birth control is recommended  -Sleep disturbances and/or mood swings are possible  -Leg cramps      PLEASE KEEP TRACK OF YOUR SYMPTOMS AND NOTE YOUR IMPROVEMENT FOR YOUR DOCTOR.     Please contact us if you have:  -Severe pain  -Fever more than 101.5 degrees Fahrenheit  -Signs of infection at the injection site (redness, swelling, or drainage)    If you have questions, please contact our office at [phone] between the hours of 7:00 am and 3:00 pm Monday through Friday. After office hours you can contact the on call provider by dialing [phone]. If you need immediate attention, we recommend that you go to a hospital emergency room or dial 221.      Pending Results     Date and Time Order Name Status Description    4/20/2017 0918 XR SURGERY EARNEST FLUORO LESS THAN 5 MIN W STILLS In process             Admission Information     Date & Time Provider Department Dept. Phone    4/20/2017 Shabbir Valladares MD [facility] [phone]      Your Vitals Were     Blood Pressure Temperature Respirations Height Weight Pulse Oximetry    143/79 97.4  F (36.3  C) (Temporal) 15 1.676 m (5' 5.98\") 52.6 " kg (115 lb 14.4 oz) 100%    BMI (Body Mass Index)                   18.72 kg/m2           EV Connect Information     [facility] Connect gives you secure access to your electronic health record. If you see a primary care provider, you can also send messages to your care team and make appointments. If you have questions, please call your primary care clinic.  If you do not have a primary care provider, please call [phone] and they will assist you.      [facility] Connect is an electronic gateway that provides easy, online access to your medical records. With [facility] Connect, you can request a clinic appointment, read your test results, renew a prescription or communicate with your care team.     To access your existing account, please contact your [facility] or call [phone] for assistance.        Care EveryWhere ID     This is your Care EveryWhere ID. This could be used by other organizations to access your [facility] medical records  [id number]           Review of your medicines      UNREVIEWED medicines. Ask your doctor about these medicines        Dose / Directions    ACETAMINOPHEN PO        Dose:  500 mg   Take 500 mg by mouth every 6 hours as needed for pain Reported on 3/30/2017   Refills:  0       amLODIPine 2.5 MG tablet   Commonly known as:  NORVASC   Used for:  Primary peritoneal adenocarcinoma (H)        Dose:  2.5 mg   Take 1 tablet (2.5 mg) by mouth daily   Quantity:  30 tablet   Refills:  0       bisacodyl 10 MG Suppository   Commonly known as:  DULCOLAX   Used for:  Primary peritoneal adenocarcinoma (H)        Dose:  10 mg   Place 1 suppository (10 mg) rectally daily as needed for constipation   Quantity:  10 suppository   Refills:  0       cycloSPORINE 0.05 % ophthalmic emulsion   Commonly known as:  RESTASIS        Dose:  1 drop   Place 1 drop into both eyes 2 times daily   Refills:  0       enoxaparin 40 MG/0.4ML injection   Commonly known as:  LOVENOX   Indication:  0.4 ML   Used for:   Ovarian cancer, left (H), Ovarian cancer, right (H)        Dose:  40 mg   Inject 0.4 mLs (40 mg) Subcutaneous daily   Quantity:  30 Syringe   Refills:  3       EPINEPHrine 0.3 MG/0.3ML injection   Used for:  Preventative health care, Primary peritoneal adenocarcinoma (H)        Dose:  0.3 mg   Inject 0.3 mLs (0.3 mg) into the muscle once as needed for anaphylaxis   Quantity:  1 each   Refills:  0       LORazepam 1 MG tablet   Commonly known as:  ATIVAN   Used for:  Primary peritoneal adenocarcinoma (H)        Dose:  1 mg   Take 1 tablet (1 mg) by mouth every 6 hours as needed (nausea/vomiting, anxiety or sleep)   Quantity:  30 tablet   Refills:  3       megestrol 40 MG/ML suspension   Commonly known as:  MEGACE ORAL   Used for:  Ovarian cancer, right (H), Loss of appetite        Dose:  400 mg   Take 10 mLs (400 mg) by mouth 2 times daily   Quantity:  600 mL   Refills:  1       MELATONIN PO        Dose:  1 mg   Take 1 mg by mouth At Bedtime   Refills:  0       ondansetron 8 MG ODT tab   Commonly known as:  ZOFRAN-ODT   Used for:  Ovarian cancer, right (H), Nausea        Dose:  8 mg   Place 1 tablet (8 mg) under the tongue every 8 hours as needed for nausea   Quantity:  90 tablet   Refills:  3       ONE DAILY MULTIVITAMIN WOMEN Tabs        Dose:  1 tablet   Take 1 tablet by mouth every morning   Refills:  0       polyethylene glycol powder   Commonly known as:  MIRALAX/GLYCOLAX        Dose:  1 capful   Take 1 capful by mouth daily as needed   Refills:  0       prochlorperazine 10 MG tablet   Commonly known as:  COMPAZINE   Used for:  Primary peritoneal adenocarcinoma (H)        Dose:  10 mg   Take 1 tablet (10 mg) by mouth every 6 hours as needed (nausea/vomiting)   Quantity:  30 tablet   Refills:  2       senna-docusate 8.6-50 MG per tablet   Commonly known as:  SENOKOT-S;PERICOLACE   Used for:  Primary peritoneal adenocarcinoma (H)        Dose:  1-2 tablet   Take 1-2 tablets by mouth 2 times daily   Quantity:  7  tablet   Refills:  0       study - niraparib 100 mg capsule   Commonly known as:  IDS# 4759   Used for:  Anemia due to other cause, Chemotherapy-induced neutropenia (H), Ovarian cancer, left (H), Ovarian cancer, right (H), Primary peritoneal adenocarcinoma (H)        Dose:  300 mg   Take 3 capsules (300 mg) by mouth every morning Take at the same time each day, preferably morning. Swallow whole and do NOT chew capsules. Food and water is permissible. First dose will be administered on site.   Quantity:  84 capsule   Refills:  0       tamsulosin 0.4 MG capsule   Commonly known as:  FLOMAX   Used for:  Primary peritoneal adenocarcinoma (H)        Dose:  0.4 mg   Take 1 capsule (0.4 mg) by mouth daily   Quantity:  60 capsule   Refills:  6       tolterodine 4 MG 24 hr capsule   Commonly known as:  DETROL LA   Used for:  Hydronephrosis, unspecified hydronephrosis type        Dose:  4 mg   Take 1 capsule (4 mg) by mouth daily   Quantity:  30 capsule   Refills:  3       VITAMIN B6 PO        Dose:  1 tablet   Take 1 tablet by mouth At Bedtime   Refills:  0       ZANTAC PO        Dose:  75 mg   Take 75 mg by mouth daily   Refills:  0                Protect others around you: Learn how to safely use, store and throw away your medicines at www.disposemymeds.org.             Medication List: This is a list of all your medications and when to take them. Check marks below indicate your daily home schedule. Keep this list as a reference.      Medications           Morning Afternoon Evening Bedtime As Needed    ACETAMINOPHEN PO   Take 500 mg by mouth every 6 hours as needed for pain Reported on 3/30/2017                                amLODIPine 2.5 MG tablet   Commonly known as:  NORVASC   Take 1 tablet (2.5 mg) by mouth daily                                bisacodyl 10 MG Suppository   Commonly known as:  DULCOLAX   Place 1 suppository (10 mg) rectally daily as needed for constipation                                cycloSPORINE  0.05 % ophthalmic emulsion   Commonly known as:  RESTASIS   Place 1 drop into both eyes 2 times daily                                enoxaparin 40 MG/0.4ML injection   Commonly known as:  LOVENOX   Inject 0.4 mLs (40 mg) Subcutaneous daily                                EPINEPHrine 0.3 MG/0.3ML injection   Inject 0.3 mLs (0.3 mg) into the muscle once as needed for anaphylaxis                                LORazepam 1 MG tablet   Commonly known as:  ATIVAN   Take 1 tablet (1 mg) by mouth every 6 hours as needed (nausea/vomiting, anxiety or sleep)                                megestrol 40 MG/ML suspension   Commonly known as:  MEGACE ORAL   Take 10 mLs (400 mg) by mouth 2 times daily                                MELATONIN PO   Take 1 mg by mouth At Bedtime                                ondansetron 8 MG ODT tab   Commonly known as:  ZOFRAN-ODT   Place 1 tablet (8 mg) under the tongue every 8 hours as needed for nausea                                ONE DAILY MULTIVITAMIN WOMEN Tabs   Take 1 tablet by mouth every morning                                polyethylene glycol powder   Commonly known as:  MIRALAX/GLYCOLAX   Take 1 capful by mouth daily as needed                                prochlorperazine 10 MG tablet   Commonly known as:  COMPAZINE   Take 1 tablet (10 mg) by mouth every 6 hours as needed (nausea/vomiting)                                senna-docusate 8.6-50 MG per tablet   Commonly known as:  SENOKOT-S;PERICOLACE   Take 1-2 tablets by mouth 2 times daily                                study - niraparib 100 mg capsule   Commonly known as:  IDS# 4759   Take 3 capsules (300 mg) by mouth every morning Take at the same time each day, preferably morning. Swallow whole and do NOT chew capsules. Food and water is permissible. First dose will be administered on site.                                tamsulosin 0.4 MG capsule   Commonly known as:  FLOMAX   Take 1 capsule (0.4 mg) by mouth daily                                 tolterodine 4 MG 24 hr capsule   Commonly known as:  DETROL LA   Take 1 capsule (4 mg) by mouth daily                                VITAMIN B6 PO   Take 1 tablet by mouth At Bedtime                                ZANTAC PO   Take 75 mg by mouth daily

## 2017-04-20 NOTE — NURSING NOTE
Patient here today for biopsy appointment.  At that time patient c/o dizziness that started yesterday.  Labs were ordered and hemoglobin came back as 7.4.  Patient is currently on the Tesaro/Quadra study.  Per the protocol the patient needs to hold medication.  Patient is already taking the lowest dose and is not able to do anymore dose reductions and will need to come off of the study.  Patient was re-consented per the protocol.  Consent date of 4/4/2017, Protocol RK--C, IRB #[id number], [id number].  Patient was given a copy of the signed consent and copy sent to scan.  Patient will be coming back on 4/27/2017 for the EOT visit and CT scan as well as office visit with Dr. Mccollum.  Patient last took her study medication today 4/20/2017.  Patient instructed to bring all study medication and diary with to the next visit.  Patient will stop study medication at this time.  Patient voiced understanding.      Aurelia Worley RN     Pager [phone]

## 2017-04-20 NOTE — NURSING NOTE
Labs drawn peripherally without complication.    Sonia Walters CMA ([facility])

## 2017-04-20 NOTE — PATIENT INSTRUCTIONS
Contact Numbers    [facility] [place] [phone]  [facility] [place]:  [phone]    Call triage with chills and/or temperature greater than or equal to 100.5, uncontrolled nausea/vomiting, diarrhea, constipation, dizziness, shortness of breath, chest pain, bleeding, unexplained bruising, or any new/concerning symptoms, questions/concerns.     If you are having any concerning symptoms or wish to speak to a provider before your next infusion visit, please call your care coordinator or triage to notify them so we can adequately serve you.       After Hours: [phone]    If after hours, weekends, or holidays, call main hospital  and ask for Oncology doctor on call.         April 2017 Sunday Monday Tuesday Wednesday Thursday Friday Saturday                                 1       2     3     4     5     6     7     8       9     10     11     12     [facility] RETURN    9:50 AM   (25 min.)   Mackenzie Ca MD   [facility]     LAB WITH  CLINIC   10:00 AM   (15 min.)    LAB   [facility]     P NEW   12:15 PM   (60 min.)   Stephen Mensah DO   [facility] 13     14     15       16     17     18     19     20     Outpatient Visit    9:07 AM   [facility]     XR SURG EARNEST <5 MIN FL W STILL    9:20 AM   (30 min.)   [email or web address]   [facility]     INJECT NERVE BLOCK CELIAC PLEXUS   10:15 AM   Shabbir Valladares MD    OR     [facility] RETURN   11:45 AM   (30 min.)   Lizet Truong APRN CNP   [facility]    2:30 PM   (240 min.)   [facility] INFUSION   [facility] 21     22       23     24     25     26     27     CT CHEST ABDOMEN PELVIS WO    7:00 AM   (20 min.)   [email or web address]   [facility] CT     [facility] DRAW    9:15 AM   (15 min.)    [facility] DRAW   [facility] Lab Draw     [facility] RETURN    9:25 AM   (20 min.)   Jia Mccollum MD     [facility] 28     29       30                                              May 2017   Robert Monday Tuesday Wednesday Thursday Friday Saturday        1     2     3     4     5     6       7     8     9     10     11     12     13       14     15     16     17     18     19     20       21     22     23     24     25     26     27       28     29     30     31                                Recent Results (from the past 24 hour(s))   CBC with platelets differential    Collection Time: 04/20/17 12:55 PM   Result Value Ref Range    WBC 4.5 4.0 - 11.0 10e9/L    RBC Count 2.08 (L) 3.8 - 5.2 10e12/L    Hemoglobin 7.4 (L) 11.7 - 15.7 g/dL    Hematocrit 22.2 (L) 35.0 - 47.0 %     (H) 78 - 100 fl    MCH 35.6 (H) 26.5 - 33.0 pg    MCHC 33.3 31.5 - 36.5 g/dL    RDW 14.7 10.0 - 15.0 %    Platelet Count 239 150 - 450 10e9/L    Diff Method Automated Method     % Neutrophils 86.4 %    % Lymphocytes 8.2 %    % Monocytes 4.4 %    % Eosinophils 0.2 %    % Basophils 0.4 %    % Immature Granulocytes 0.4 %    Nucleated RBCs 0 0 /100    Absolute Neutrophil 3.9 1.6 - 8.3 10e9/L    Absolute Lymphocytes 0.4 (L) 0.8 - 5.3 10e9/L    Absolute Monocytes 0.2 0.0 - 1.3 10e9/L    Absolute Eosinophils 0.0 0.0 - 0.7 10e9/L    Absolute Basophils 0.0 0.0 - 0.2 10e9/L    Abs Immature Granulocytes 0.0 0 - 0.4 10e9/L    Absolute Nucleated RBC 0.0    CMP - Comprehensive Metabolic Panel    Collection Time: 04/20/17 12:55 PM   Result Value Ref Range    Sodium 134 133 - 144 mmol/L    Potassium 3.9 3.4 - 5.3 mmol/L    Chloride 100 94 - 109 mmol/L    Carbon Dioxide 27 20 - 32 mmol/L    Anion Gap 7 3 - 14 mmol/L    Glucose 123 (H) 70 - 99 mg/dL    Urea Nitrogen 28 7 - 30 mg/dL    Creatinine 0.94 0.52 - 1.04 mg/dL    GFR Estimate 60 (L) >60 mL/min/1.7m2    GFR Estimate If Black 73 >60 mL/min/1.7m2    Calcium 8.9 8.5 - 10.1 mg/dL    Bilirubin Total 0.3 0.2 - 1.3 mg/dL    Albumin 3.4 3.4 - 5.0 g/dL    Protein Total 6.6 (L) 6.8 - 8.8 g/dL     Alkaline Phosphatase 48 40 - 150 U/L    ALT 10 0 - 50 U/L    AST 13 0 - 45 U/L   ABO/Rh type and screen    Collection Time: 04/20/17 12:55 PM   Result Value Ref Range    ABO Pending     RH(D) Pending     Antibody Screen Pending     Test Valid Only At       [facility]    Specimen Expires 04/23/2017

## 2017-04-26 NOTE — PROGRESS NOTES
Follow Up Notes on Referred Patient    Date: 2017       RE: Margaux Guzmán  : 1955  MELANI: 2017    Margaux Guzmán is a 61 year old woman with a history of primary peritoneal cancer. She is here today regarding recent biopsy results which revealed disease progression while on Tesaro trial.     Brief Oncology History:  The patient is a 61 year old  female who initially presented for evaluation of pelvic and abdominal pain. On exam, she had mild right adnexal tenderness and slightly enlarged right ovary freely mobile and smooth. This prompted an abdominal ultrasound, which was normal, and a pelvic US that showed anechoic right ovarian cyst measuring 4.3 x 3.8 x 4.0 cm. Her  was elevated at 74 on 10/8/13. We reviewed the possible diagnosis including ovarian cancer versus benign ovarian cyst. Approach to surgery, alternatives to surgery and plan for possible extended open surgical staging based on the operative findings was discussed. In light of the size of the ovary we are offering an initial approach with minimally invasive surgery. After discussion of risks and benefits, consent was obtained.  13 Diagnostic laparoscopy converted to exploratory laparotomy, bilateral salpingo-oophorectomy, total abdominal hysterectomy, omentectomy, staging biopsies, bilateral pelvic and periaortic lymph node dissection and washings. Stage IIIB  13: Cycle #1 IV/IP Taxol/CDDP  14: Cycle #2 IV/IP PCP.  - 14  14: Cycle 3 IV/IP.  - 7  14:  - 7. CT C/A/P Impression:  1. Multiple bilateral pulmonary nodules as described in full report measuring up to 3 mm along the right major fissure. These are indeterminate given lack of comparison and followup is recommended.  2. No definite evidence for recurrent or metastatic disease in the abdomen or the pelvis. There is a rounded hypodense focus abutting the left external iliac artery and the adjacent sigmoid colon which  measures 1 cm, this could represent a fluid-filled sigmoid diverticulum or a hypodense node, further attention to this area on subsequent examinations is recommended.  3. There is a 7 mm nonobstructing stone in the inferior collecting system of the right kidney.  2/12/14-3/26/14: Cycle #4-6 IV/IP CA-125 7, 7, 7.  4/21/14: CT C/A/P Impression:   1. Unchanged indeterminate pulmonary nodules. Recommend continued surveillance.   2. No definite evidence of metastatic ovarian cancer in the abdomen or pelvis. Small amount of subtle increased thickening and fluid is noted along the right lymph node dissection, nonspecific, but possibly a tiny developing lymphocele.   3. 6 mm nonobstructing stone in the right kidney.  Plan surveillance for ovarian cancer every 3 months with physical and .   Indeterminate pulmonary nodule: These were present before the surgery and there was not change with the chemotherapy. Highly unlikely to be a metastatic disease. Will repeat a CT in 3 months to see any change      5/22/14:  6. JOSEMANUEL.  5/17/14; ED visit [facility]. CT abd/pel noted possible chronic partial small bowel obstruction. Colonscopy scheduled for June 6, 2014 locally. Abdominal pain started 5/17/14 and noted pressure without bowel movement and went to ED that night due to increasing pain. In patient 2 day hospital with clear liquids/IV. Mild nausea without vomiting. BM subsequently occurred and pt was discharged. No pain since. Continues with fullness in abdomen. Diet is bland for the most part.  8/25/14:  -5. Notes increased abdominal girth and bloating x 2-3 weeks with urine urgency. Worried about recurrence. Is just starting to return to normal exercise and activities. No constipation, diarrhea, pain, vaginal or rectal bleeding, cough or dyspnea. CT to follow indeterminate pulmonary nodules today.   CT cap IMPRESSION:   1. No CT scan findings of the chest, abdomen, or pelvis to indicate  metastatic disease.  2. 2-5 mm pulmonary nodules, stable since 2/11/2014.  3. Nonobstructing 6 mm stone in the right kidney.  12/3/14:  8.. Pt started zoloft for anxiety. Worries about cancer recurrence.   3/2/15: CA 34  3/5/15: CT C/A/P: Impression:  1. Multiple new small peritoneal and retroperitoneal nodules are suspicious for metastases. Suspicious new left common iliac and central small bowel mesenteric nodes. No abdominal ascites.  2. Multiple pulmonary nodules are stable with no new nodules.  3. 9 mm nonobstructing right lower pole renal calculus.     5/20/15: CT c/a/p showed nodularity throughout the abdomen and pelvis worrisome for malignancy which has increased in size      5/28/15: CT abdomen and pelvis showed stable inumerabble peritoneal nodules scattered throughout the abdomen and pelvis consistent with metastases.      8/4/15 (approximatly): Left ureteral stent was placed.     8/12/15:  from [place] 303     8/18/15: CT chest Impression showed slight increase in mild, subtle nodularity along the diaphragm which is nonspecific but may represent metastatic disease.   8/18/15: CT abdomen and pelvis Impression showed interval progression of peritoneal metastatic disease.      8/25/15:  on 3/2/15 of 34 prompted CT c/a/p, which showed multiple new small peritoneal and retroperitoneal nodules are suspicious for metastases. Suspicious new left common iliac and central small bowel mesenteric nodes. She participated in [facility] study involving treatment with on clinical trial with vaccine [id number]. She is here today because the [place] trail failed and the pt pregressed. Her most recent CT c/a/p on 8/25 showed progression of peritoneal metastatic disease. And her most recent  from [place] on 8/12/15 was 303.     Plan: Carbo/Doxil x 6 cycles.with imaging after 3 cycles.      9/3/15: Cycle #1 Carbo/Doxil.  244.  9/24/15: right IJ thrombus; started on Lovenox.   9/26/15: Present to  "[place] ED. \"presented to the ED this morning with what appears to be a mild drug rash after administering her lovenox this morning. This writer discussed situation with Gyn Onc Fellow Jeanne Moon as well as [facility] Heme/Onc service. Per Heme, a rash of this nature is extremely uncommon with administration of Lovenox. Given the mild nature of this rash and acute need for anticoagulation, recommended the patient continue with Lovenox injections and treat with Benadryl as needed. Advised patient be given precautions to return to the ED immediately if she develops reactive respiratory symptoms. Alternatively patient could be switched to alternative formulation of low molecular weight heparin if she was strongly resistant to continuation of Lovenox.\"     10/1/15: Cycle #2 Carbo/Doxil.  175.     10/28/15: gyn onc visit: pt complains of worsening constipation and tenderness around her right ribs. She is taking senna for her constipation with minimal improvement. She admits that the swelling around her neck after her blood clot has decreased. She also admits to feeling a nodules on her left abdomen. She states her appetite is good but she has not been eating fruits and vegetables. She denies any chemo side effects besides fatigue.      10/28/15:  158  11/23/2015:  133  CT c/a/p  IMPRESSION: In this patient with a clinical history of ovarian cancer  there is mixed response to therapy:   1. Stable to slightly decreased peritoneal nodularity since  8/18/2015.  2. No significant change regarding the large subdiaphragmatic  peritoneal deposit since 9/24/2015.  3. Enlarging soft tissue nodule overlying the abdominal musculature  concerning for metastasis since March of 2015.   4. Unchanged, indeterminate hypodensity near the gallbladder fossa  since 8/18/2015, however progressively increased in size since  3/5/2015.  5. Bilateral nephroureteral stents. No significant hydronephrosis.  6. Bilateral pulmonary nodules. " Continued followup recommended.     12/23/15: Cycle 5 carboplatin/Doxil/Avastin.  96. To receive Avastin today despite proteinuria per Dr. Aguilar and nephrology.  1/21/16: Cycle 6 carboplatin/Doxil/Avastin, delayed one week secondary to neutropenia.  62.  1/28/16:  63.     2/2/16: Patient had right upper extremity doppler u/s done in [place] due to swollen glands and pain. 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 Pt is currently on lovenox.     2/2/16: US soft tissue neck  Conclusion:  1. Morphologically normal not pathologically enlarged two right carotid chain lymph nodes.  2. Chronic right jugular vein thrombosis, described in detail on  venous doppler exam.      2/2/16: Thyroid Ultrasound  Right lobe: 5.5 x 1.9 x 1.2 cm  Left lobe: 5.0 x 1.5 . 0.9 cm  Both lobes have normal background echotexture.     Conclusion:  1. 3 benign - appearing subcentimeter hypoechoic right mid thyroid nodules.  2. Borderline thyromegaly.     2/2/16: Right upper extremity venous duplex doppler evaluation  Conclusion:  1.) chronic non compressible echogenic right jugular vein thrombus, now 4.3 cm in length.      2/22/16:   IMPRESSION:    1. Ovarian cancer with peritoneal carcinomatosis.  2. Given the increased sensitivity of PET/CT, comparison with prior CT examinations is difficult. In general the abdominal/pelvic metastatic lesions appear to be decreased in size.  3. Persistent right internal jugular DVT, as evidenced by 5 cm filling defect with inferior border at the central venous catheter.  4. Bilateral hydronephrosis without overt caliectasis. Some distal migration of the bilateral double-J ureteral stents, although the  proximal coiled portions continue to be in the renal pelves.     2/24/16: C7 carboplatin/Doxil/Avastin.  56.  3/9/16: Hgb 6.6, worked up for transfusion reaction  (negative). Bleed possibly secondary to   3/23/16: C8 carboplatin/Doxil/Avastin.  44.  4/2016: Hospitalized in [place] x2 weeks for hematuria leading to anemia after ureteral stent exchange  4/20/16: C9D1 carboplatin/Doxil/Avastin. Deferred one week secondary to acute UTI.  35.  4/28/16: C9D1 deferred due to continued bacteruria and ANC 1.3.  5/4/16: C9D1 carboplatin/Doxil/Avastin. Breast lump noted, diagnostic mammogram ordered.  6/1/16: C10D1 carboplatin/Doxil/Avastin.  50.  6/28/16: C11D1 carboplatin/Doxil/Avastin. Avastin held due to bleeding. Carbo/Doxil deferred one week secondary to neutropenia.  43.  7/7/16: C12D1 carboplatin/Doxil. Avastin held due to bleeding.  7/18/16: Bilateral ureteral stent exchange  7/20/16-7/29/16: Hospitalized with urosepsis and blood loss anemia, started on Zosyn and discharged on amoxicillin  9/29/16: C1D1 Gemzar.  85.  10/21/16: C2D1 Gemzar.  106.   11/11/16: C3D1 Gemzar.   111.     12/12/16: CT CAP IMPRESSION: In this patient with a history of metastatic ovarian cancer:  1. Progression of disease as evidenced by a slowly enlarging mesenteric and omental soft tissue foci and slowly enlarging  periesophageal and pericardiophrenic lymph nodes, as detailed above.  2. Stable appearance of bilateral ureteral stents without hydronephrosis.  3. Patient was premedicated for a pre-existing IV contrast allergy. Despite this, she had itchiness on her face poststudy. She should no longer receive IV contrast in the future.       CT AP 1/10/2017: multiple dilated fluid filled small bowel loops with decompressed distal small bowel loops, consistent with obstruction. Transition point is suspected in the pelvis. No pneumatosis or free intraperitoneal gas. Bilateral ureteral stents appear appropriately positioned. There is mild dilation of the bilateral renal collecting systems, left greater than right.     1/23/17: admitted to clinic for dizziness and  heart palpitations   1/27/17: ED admission - leg swelling and GI bleeding      1/27/17: US lower extremity IMPRESSION:  1.  No evidence of right lower extremity deep venous thrombosis.  2.  Dampened waveforms in the right lower extremity, similar to 7/25/2016, though a more central occlusion cannot be excluded.  1/27/17: MRI Brain IMPRESSION:  Normal brain MRI  1/27/17: CT AP IMPRESSION: This patient with a history of metastatic ovarian cancer:  1. No bowel obstruction. Oral contrast progressed to the colon. Extensive colonic stool.  2. Severe right hydronephrosis, new from 12/12/2016, may represent obstruction of the right ureteral stent. No left-sided hydronephrosis.  3. Slight interval progression of diffuse peritoneal metastatic disease.  4. Mildly nodular areas of increased attenuation within the pelvic bowel which may represent loculated malignant ascites or peritoneal implants similar in appearance to 12/12/2016.     1/28/17: colonoscopy- benign      Treatment: Tesaro/Quadra Niraparib study  1/5/17: Cycle #1 Day 1   2/2/17: Cycle #2 Day 1   2/28/17: Cycle #3 Day 1      2/28/17: CT scan - [facility]  IMPRESSION: In this patient with a history of ovarian cancer, there is mild disease progression as follows:  1. Increase in size and number of multiple subcentimeter nodules/intrafissural lymph nodes along the right major and minor  fissures and also bilateral pulmonary nodules, predominantly along the diaphragmatic pleura.   2. Slight increase in size of small lymph nodes in the right internal mammary region and bilateral anterior cardiophrenic region.  3. Stable to slight increase in size of deposits in the perihepatic region. No significant change in the omental deposits in the left  upper quadrant of the abdomen.  4. Stable to slight increase in size of mesenteric deposits/lymph nodes.  5. Bilateral nephroureteric stent in place. Atrophy of the right kidney with interval resolution of right hydronephrosis.  "Unchanged  mild left hydronephrosis. Air within the collecting systems and bladder likely related to prior intervention.  6. Dilation of proximal small bowel loops with interspersed areas of narrowing. No progression of oral contrast into the ileum. Moderate amount of fecal material in the colon. Partial/early small bowel obstruction is possible.  3/30/17: CT cap IMPRESSION: Images patient with a history of ovarian cancer, overall findings of stable disease includin. No acute findings to suggest etiology of severe left flank pain.  2. Unchanged pulmonary nodules/pleural nodularity and thoracic lymph nodes.  3. Unchanged perihepatic and left upper quadrant omental deposits as well as diffuse mesenteric lymphadenopathy.  4. Unchanged hypoattenuating focus in the hepatic dome.  5. Unchanged placement of bilateral nephroureteral stents. Unchanged mild to moderate left hydronephrosis. Stable atrophy of the right kidney.  6. Unchanged 8 mm right flank soft tissue nodule.     17: left splanchnic ganglion block. FNA of lesion in umbilicus completed; results pending.     3/30/17: CT AP   IMPRESSION: Images patient with a history of ovarian cancer, overall  findings of stable disease includin. No acute findings to suggest etiology of severe left flank pain.  2. Unchanged pulmonary nodules/pleural nodularity and thoracic lymph  nodes.  3. Unchanged perihepatic and left upper quadrant omental deposits as  well as diffuse mesenteric lymphadenopathy.  4. Unchanged hypoattenuating focus in the hepatic dome.  5. Unchanged placement of bilateral nephroureteral stents. Unchanged  mild to moderate left hydronephrosis. Stable atrophy of the right  kidney.  6. Unchanged 8 mm right flank soft tissue nodule.    2017: FNA- \"Lump in belly button\", palpation guided fine needle aspiration:   - Positive for Malignancy   - Metastatic adenocarcinoma, consistent with Müllerian origin (see   comment)   Specimen Adequacy: " "Satisfactory for evaluation.     4/27/17: CT CAP  IMPRESSION: In this patient with history of metastatic ovarian  carcinoma: Stable disease.  1. Unchanged pulmonary nodules and thoracic lymph nodes.  2. There are two nodules in the right lower quadrant posterior  mesentery, which are stable to minimally increased on current study  and slightly increased from 03/15/17. Recommend attention on follow  up. Otherwise stable peritoneal tumor metastasis.  3. New fluid attenuation collection in the central presacral pelvis,  HU measures 12, likely ascitic fluid, not present on prior study.  Attention on follow-up.  4. Stable bilateral nephroureteral stents and mild hydronephrosis,  with left greater than right fullness of the renal hilar region.    Today: Margaux comes to clinic with her daughter feeling well. Biopsy results discussed with patient, explained she will come off tesaro trial due to disease progression based on positive pathology from the umbilical nodule biopsy. Discussed possible treatment - weekly taxol or topotecan. Margaux admits to some neuropathy in her toes and feet. She reports some tingling and she is worried about it getting worse if she starts taxol again. She explains that she had trouble \"knowing where her feet were\" during her last treatment with taxol. She admits to severe flank pain. Had left splanchnic ganglion block on 4/20, feels as though it did not work. She is apprehensive towards restarting chemo due to the pain she has experienced. She explains that opioids have never worked for her as a pain reliever. The pain center suggested gabapentin but she denied due to constipation issues which she has always had problems with. We discussed a visit with palliative care to discuss pain, as well as fatigue, sleep and other symptoms she may be having. Will go to clinic in [place] for treatment if she decides to receive chemotherapy.         Review of Systems:  Answers for HPI/ROS submitted by the " patient on 4/26/2017   General Symptoms: Yes  Skin Symptoms: No  HENT Symptoms: No  EYE SYMPTOMS: No  HEART SYMPTOMS: Yes  LUNG SYMPTOMS: No  INTESTINAL SYMPTOMS: Yes  URINARY SYMPTOMS: No  GYNECOLOGIC SYMPTOMS: No  BREAST SYMPTOMS: No  SKELETAL SYMPTOMS: No  BLOOD SYMPTOMS: Yes  NERVOUS SYSTEM SYMPTOMS: No  MENTAL HEALTH SYMPTOMS: No  Fever: No  Loss of appetite: No  Weight loss: No  Weight gain: No  Fatigue: Yes  Night sweats: No  Chills: No  Increased stress: No  Excessive hunger: No  Excessive thirst: No  Feeling hot or cold when others believe the temperature is normal: No  Loss of height: No  Post-operative complications: No  Surgical site pain: No  Hallucinations: No  Change in or Loss of Energy: No  Hyperactivity: No  Confusion: No  Chest pain or pressure: No  Fast or irregular heartbeat: Yes  Pain in legs with walking: No  Swelling in feet or ankles: No  Trouble breathing while lying down: No  Fingers or Toes appear blue: No  High blood pressure: Yes  Low blood pressure: No  Fainting: No  Murmurs: No  Chest pain on exertion: No  Chest pain at rest: No  Cramping pain in leg during exercise: No  Pacemaker: No  Varicose veins: No  Edema or swelling: No  Fast heart beat: Yes  Wake up at night with shortness of breath: No  Heart flutters: Yes  Light-headedness: Yes  Exercise intolerance: Yes  Heart burn or indigestion: No  Nausea: Yes  Vomiting: No  Abdominal pain: Yes  Bloating: No  Constipation: Yes  Diarrhea: No  Blood in stool: No  Black stools: No  Rectal or Anal pain: No  Fecal incontinence: No  Rectal bleeding: No  Yellowing of skin or eyes: No  Vomit with blood: No  Change in stools: No  Hemorrhoids: No  Anemia: Yes  Swollen glands: No  Easy bleeding or bruising: No      I have reviewed and addressed the patient's review of symptoms for today's visit.     Past Medical History:    Past Medical History:   Diagnosis Date     Anemia      History of blood transfusion     MULTIPLE     Hydronephrosis       Hyperlipidemia      Jugular vein thrombosis, right     Persistent right internal jugular DVT     Livedo annularis      Osteoarthritis      Osteopenia      Ovarian cancer (H)      Polymyalgia rheumatica (H)      PONV (postoperative nausea and vomiting)      Primary cancer of peritoneum (H)          Past Surgical History:    Past Surgical History:   Procedure Laterality Date     APPENDECTOMY       BREAST SURGERY      biopsy negative      SECTION       COLONOSCOPY N/A 2017    Procedure: COLONOSCOPY;  Surgeon: Luis Richardson MD;  Location: [facility]     COLONOSCOPY N/A 2017    Procedure: COMBINED COLONOSCOPY, SINGLE OR MULTIPLE BIOPSY/POLYPECTOMY BY BIOPSY;  Surgeon: Luis Richardson MD;  Location: [facility]     COMBINED CYSTOSCOPY, RETROGRADES, EXCHANGE STENT URETER(S) Bilateral 2016    Procedure: COMBINED CYSTOSCOPY, RETROGRADES, EXCHANGE STENT URETER(S);  Surgeon: Carmela Alvarez MD;  Location: [facility]     COMBINED CYSTOSCOPY, RETROGRADES, EXCHANGE STENT URETER(S)  2016    @ [facility]     COMBINED CYSTOSCOPY, RETROGRADES, EXCHANGE STENT URETER(S) Bilateral 10/17/2016    Procedure: COMBINED CYSTOSCOPY, RETROGRADES, EXCHANGE STENT URETER(S);  Surgeon: Carmela Alvarez MD;  [facility]: [facility]     COMBINED CYSTOSCOPY, RETROGRADES, EXCHANGE STENT URETER(S) Bilateral 2016    Procedure: COMBINED CYSTOSCOPY, RETROGRADES, EXCHANGE STENT URETER(S);  Surgeon: Carmela Alvarez MD;  [facility]     COMBINED CYSTOSCOPY, RETROGRADES, EXCHANGE STENT URETER(S) Bilateral 2017    Procedure: COMBINED CYSTOSCOPY, RETROGRADES, EXCHANGE STENT URETER(S);  Surgeon: Carmela Alvarez MD;  [facility]     ESOPHAGOSCOPY, GASTROSCOPY, DUODENOSCOPY (EGD), COMBINED N/A 2017    Procedure: COMBINED ESOPHAGOSCOPY, GASTROSCOPY, DUODENOSCOPY (EGD);  Surgeon: Luis Richardson MD;  Location: [facility]     HYSTERECTOMY TOTAL ABDOMINAL, BILATERAL SALPINGO-OOPHORECTOMY, NODE  DISSECTION, COMBINED  11/7/2013    Procedure: COMBINED HYSTERECTOMY TOTAL ABDOMINAL, SALPINGO-OOPHORECTOMY, NODE DISSECTION;;  Surgeon: Cheri Aguilar MD;  Location: [facility]     INJECT NERVE BLOCK CELIAC PLEXUS Left 4/20/2017    Procedure: INJECT NERVE BLOCK CELIAC PLEXUS;  Left splanchnic nerve block;  Surgeon: Shabbir Valladares MD;  [facility]     LAPAROSCOPIC SALPINGO-OOPHORECTOMY  11/7/2013    Procedure: LAPAROSCOPIC SALPINGO-OOPHORECTOMY;  Diagnostic Laparoscopy, Exploratory Laparotomy, Bilateral Salpingo-Oophorectomy,  Hysterectomy, Omentectomy, Pelvic Washings, Peritoneal staging and biopsies, Pelvic and Periaortic Lymph node Dissection;  Surgeon: Cheri Aguilar MD;  [facility]: [facility]     LAPAROTOMY, STAGING, COMBINED  11/7/2013    Procedure: COMBINED LAPAROTOMY, STAGING;;  Surgeon: Cheri Aguilar MD;  Location: [facility]     LYMPH NODE BIOPSY  2008    Left posterior chain, beign     OPEN REDUCTION INTERNAL FIXATION TOE(S)  9/3/13    Fifth digit, left foot, with screw fixation      REMOVE PORT PERITONEAL  5/5/2014    Procedure: REMOVE PORT PERITONEAL;  Surgeon: Cheri Aguilar MD;  Location: [facility]         Health Maintenance Due   Topic Date Due     PHQ-9 Q1YR (NO INBASKET)  1955     TETANUS IMMUNIZATION (SYSTEM ASSIGNED)  11/24/1972     HEPATITIS C SCREENING  11/24/1972     LIPID SCREEN Q5 YR FEMALE (SYSTEM ASSIGNED)  11/24/1999       Current Medications:     Current Outpatient Prescriptions   Medication Sig Dispense Refill     ondansetron (ZOFRAN-ODT) 8 MG ODT tab Place 1 tablet (8 mg) under the tongue every 8 hours as needed for nausea 90 tablet 3     amLODIPine (NORVASC) 2.5 MG tablet Take 1 tablet (2.5 mg) by mouth daily 30 tablet      bisacodyl (DULCOLAX) 10 MG Suppository Place 1 suppository (10 mg) rectally daily as needed for constipation 10 suppository 0     megestrol (MEGACE ORAL) 40 MG/ML suspension Take 10 mLs (400 mg) by mouth 2 times daily 600 mL 1     enoxaparin (LOVENOX) 40 MG/0.4ML  injection Inject 0.4 mLs (40 mg) Subcutaneous daily 30 Syringe 3     study - niraparib (IDS# [id number]) 100 mg capsule Take 3 capsules (300 mg) by mouth every morning Take at the same time each day, preferably morning. Swallow whole and do NOT chew capsules. Food and water is permissible. First dose will be administered on site. (Patient taking differently: Take 300 mg by mouth every morning Take at the same time each day, preferably morning. Swallow whole and do NOT chew capsules. Food and water is permissible. First dose will be administered on site.) 84 capsule 0     tamsulosin (FLOMAX) 0.4 MG capsule Take 1 capsule (0.4 mg) by mouth daily 60 capsule 6     LORazepam (ATIVAN) 1 MG tablet Take 1 tablet (1 mg) by mouth every 6 hours as needed (nausea/vomiting, anxiety or sleep) 30 tablet 3     tolterodine (DETROL LA) 4 MG 24 hr capsule Take 1 capsule (4 mg) by mouth daily 30 capsule 3     ACETAMINOPHEN PO Take 500 mg by mouth every 6 hours as needed for pain Reported on 3/30/2017       Multiple Vitamins-Minerals (ONE DAILY MULTIVITAMIN WOMEN) TABS Take 1 tablet by mouth every morning        polyethylene glycol (MIRALAX/GLYCOLAX) powder Take 1 capful by mouth daily as needed        cycloSPORINE (RESTASIS) 0.05 % ophthalmic emulsion Place 1 drop into both eyes 2 times daily        Ranitidine HCl (ZANTAC PO) Take 75 mg by mouth daily        MELATONIN PO Take 1 mg by mouth At Bedtime        EPINEPHrine (EPIPEN) 0.3 MG/0.3ML injection Inject 0.3 mLs (0.3 mg) into the muscle once as needed for anaphylaxis 1 each 0     senna-docusate (SENOKOT-S;PERICOLACE) 8.6-50 MG per tablet Take 1-2 tablets by mouth 2 times daily 7 tablet      Pyridoxine HCl (VITAMIN B6 PO) Take 1 tablet by mouth At Bedtime        prochlorperazine (COMPAZINE) 10 MG tablet Take 1 tablet (10 mg) by mouth every 6 hours as needed (nausea/vomiting) 30 tablet 2         Allergies:        Allergies   Allergen Reactions     Contrast Dye Itching and Swelling      "Itching/tingling of mouth and nose with sensation of swelling after receiving IV contrast for CT.  This occurred despite steroid premedication. Therefore the patient should not receive intravenous contrast in the future.      Benadryl Allergy Other (See Comments)     Stay awake, restless, \"uncomfortable\", \"feel like I need to run away\"      Lovenox [Enoxaparin] Hives     3/23/16: Okay to receive heparin flushes in port, tolerates well. Patricia Cortez FNP-C     Amoxicillin Rash     Diphenhydramine Anxiety     No Clinical Screening - See Comments Rash     Pollen Extract Rash     Sulfa Drugs Rash        Social History:     Social History   Substance Use Topics     Smoking status: Former Smoker     Smokeless tobacco: Former User      Comment: Quit over 20 years ago     Alcohol use No       History   Drug Use No         Family History:     The patient's family history is notable for:    Family History   Problem Relation Age of Onset     Arthritis Father      Myocardial Infarction Father      secondary to anesthesia     Colon Cancer Father      DIABETES Other      Uncle     Hypertension Mother      Other - See Comments Mother      cognitive decline     Skin Cancer Mother      Hypertension Sister      HEART DISEASE Other      unknown valve replacement     Bladder Cancer Sister      Skin Cancer Brother          Physical Exam:     /63 (BP Location: Right arm, Patient Position: Chair, Cuff Size: Adult Regular)  Pulse 104  Temp 98.9  F (37.2  C) (Oral)  Resp 16  Ht 1.676 m (5' 5.98\")  Wt 52.7 kg (116 lb 2.9 oz)  SpO2 100%  BMI 18.76 kg/m2  Body mass index is 18.76 kg/(m^2).    General Appearance: pale and alert, no acute distress     HEENT: no thyromegaly, no palpable nodules or masses        Cardiovascular: regular rate and rhythm, no gallops, rubs or murmurs     Respiratory: lungs clear, no rales, rhonchi or wheezes, normal diaphragmatic excursion    Musculoskeletal: extremities non tender and without " edema    Skin: no lesions or rashes     Neurological: wheelchair     Psychiatric: appropriate mood and affect                               Hematological: normal cervical, supraclavicular  ymph nodes     Gastrointestinal:       abdomen soft, non-tender, non-distended    Genitourinary: Not indicated      Assessment:    Margaux Guzmán is a 61 year old woman with a history of primary peritoneal cancer. She is here today for f/u appt regarding biopsy which revealed disease progression. Will come off Tesaro trial. Patient in agreement with plan.    40 minutes were spent with this patient, over 50% of that time was spent in symptom management, treatment planning and in counseling and coordination of care.      Plan:     1.)       Disease progression on Tesaro study- positive FNA on biopsy for metastatic adenocarcinoma. Will come off study, to proceed with weekly Taxol at [facility]. ECOG = 1     2.) Genetic risk factors were assessed and she isnegative for mutations in the following: BRCA1, BRCA2, EPCAM, MLH1, MSH2, MSH6, PMS2, PTEN and TP53.       3.) Labs and/or tests ordered include:  None.      4.) Health maintenance issues addressed today include annual health maintenance and non-gynecologic issues with PCP.    5.) Pain: discussed visit with palliative care or pain clinic in [place]. Pt not responding to left splanchnic ganglion block placed on 4/20. Will schedule appt with palliative care to discuss other options for pain management. Also discussed future appointment with hospice which Margaux was not ready to consider.     Sincerely,     Jia Mccollum MD    Department of Ob/Gyn and Women's Health  Division of Gynecologic Oncology  [facility]  [phone]          CC  Patient Care Team:  Aurelia Knox as PCP - General (Family Practice)  Jessica Galvin, RN as Continuity Care Coordinator (Gyn-Onc)  Jessica Galvin, RN as Continuity Care Coordinator  (Oncology)  Lizet Truong APRN CNP (Nurse Practitioner - Women's Health)  Derrek Gamble MD as MD (Nephrology)  Patricia Murcia APRN CNP as Nurse Practitioner (Nurse Practitioner)  Elissa Wheeler MD as MD (Infectious Diseases)  Carmela Alvarez MD as MD (Urology)  Keira Rolle RN as Registered Nurse (Urology)  Sophia Garcia PA-C as Referring Physician (Physician Assistant)  Aurelia Knox (Family Practice)      I have reviewed the above note and agree with the scribe's notation as written.    I, Kolton Spencer, am serving as a scribe to document services personally performed by Jia Mccollum MD, based upon my observations and the provider's statements to me. All documentation has been reviewed by the aforementioned doctor prior to being entered into the official medical record.

## 2017-04-27 NOTE — NURSING NOTE
Patient here today for the EOT visit for the Tesaro/Quadra Niraparib study.  Patient will be stopping treatment due to low hemoglobin and progression.  Patient last took study medication on 4/20/2017.  Patient returned 74 tabs of the study medication from bottle number [id number] along with the study diary.  Pill taken match pills returned.  Patient denies any new issues at this time.  Patient still has increased pain in the flank area.  All EOT procedures completed per protocol.        Aurelia Worley RN     Pager [phone]

## 2017-04-27 NOTE — MR AVS SNAPSHOT
After Visit Summary   4/27/2017    Margaux Guzmán    MRN: [id number]           Patient Information     Date Of Birth          1955        Visit Information        Provider Department      4/27/2017 9:40 AM Jia Mccollum MD [facility]        Today's Diagnoses     Anemia due to other cause    -  1    Chemotherapy-induced neutropenia (H)        Ovarian cancer, left (H)        Ovarian cancer, right (H)        Primary peritoneal adenocarcinoma (H)        Abnormal finding on urinalysis           Follow-ups after your visit        Your next 10 appointments already scheduled     May 18, 2017   Procedure with Shabbir Valladares MD   [facility] ([facility])    [address] [place] [address]  [address]th [facility] [id number] [phone]           Located in the [facility] at [address] [place] [address].   parking is very convenient and highly recommended.  is a $6 flat rate fee.  Both  and self parkers should enter the main arrival plaza from [facility]; parking attendants will direct you based on your parking preference.            Jun 12, 2017   Procedure with Carmela Alvarez MD   [facility] ([facility])    [address] [place] [address]  5th [facility] [id number] [phone]           Located in the [facility] at [address] [place] [address].   parking is very convenient and highly recommended.  is a $6 flat rate fee.  Both  and self parkers should enter the main arrival plaza from [facility]; parking attendants will direct you based on your parking preference.            Nov 01, 2017 10:30 AM CDT   Lab with  LAB   [facility] ([facility])    [address] [place] [address]  1st [facility] [address]   [phone]            Nov 01, 2017 11:35 AM  "CDT   (Arrive by 11:05 AM)   Return Visit with Mackenzie Ca MD   [facility] ([facility] [place])    [address]  3rd Floor  [facility] [address] [phone]              Who to contact     If you have questions or need follow up information about today's clinic visit or your schedule please contact [facility] directly at [phone].  Normal or non-critical lab and imaging results will be communicated to you by Trunk Showhart, letter or phone within 4 business days after the clinic has received the results. If you do not hear from us within 7 days, please contact the clinic through BumpTopt or phone. If you have a critical or abnormal lab result, we will notify you by phone as soon as possible.  Submit refill requests through Asterion or call your pharmacy and they will forward the refill request to us. Please allow 3 business days for your refill to be completed.          Additional Information About Your Visit        Asterion Information     Asterion gives you secure access to your electronic health record. If you see a primary care provider, you can also send messages to your care team and make appointments. If you have questions, please call your primary care clinic.  If you do not have a primary care provider, please call [phone] and they will assist you.        Care EveryWhere ID     This is your Care EveryWhere ID. This could be used by other organizations to access your [facility] medical records  [id number]        Your Vitals Were     Pulse Temperature Respirations Height Pulse Oximetry BMI (Body Mass Index)    104 98.9  F (37.2  C) (Oral) 16 1.676 m (5' 5.98\") 100% 18.76 kg/m2       Blood Pressure from Last 3 Encounters:   05/03/17 121/70   04/27/17 123/63   04/20/17 135/65    Weight from Last 3 Encounters:   05/03/17 53.5 kg (118 lb)   04/27/17 52.7 kg (116 lb 2.9 oz)   04/20/17 53.1 kg (117 lb)              We Performed the Following  "    Amylase          CBC with platelets differential     Comprehensive metabolic panel     EKG 12-lead complete w/read - Clinics     GGT     INR     Lactate Dehydrogenase     Magnesium     Partial thromboplastin time     Routine UA with microscopic     Urine Culture Aerobic Bacterial     Urine Culture,Comprehensive ([facility])          Today's Medication Changes          These changes are accurate as of: 4/27/17 11:59 PM.  If you have any questions, ask your nurse or doctor.               Stop taking these medicines if you haven't already. Please contact your care team if you have questions.     study - niraparib 100 mg capsule   Commonly known as:  IDS# [id number]   Stopped by:  Jia Mccollum MD                    Primary Care Provider Office Phone # Fax #    Aurelia Knox [phone]       [facility] [address] [facility] [address]        Thank you!     Thank you for choosing [facility]  for your care. Our goal is always to provide you with excellent care. Hearing back from our patients is one way we can continue to improve our services. Please take a few minutes to complete the written survey that you may receive in the mail after your visit with us. Thank you!             Your Updated Medication List - Protect others around you: Learn how to safely use, store and throw away your medicines at www.disposemymeds.org.          This list is accurate as of: 4/27/17 11:59 PM.  Always use your most recent med list.                   Brand Name Dispense Instructions for use    ACETAMINOPHEN PO      Take 500 mg by mouth every 6 hours as needed for pain Reported on 3/30/2017       amLODIPine 2.5 MG tablet    NORVASC    30 tablet    Take 1 tablet (2.5 mg) by mouth daily       bisacodyl 10 MG Suppository    DULCOLAX    10 suppository    Place 1 suppository (10 mg) rectally daily as needed for constipation       cycloSPORINE 0.05 % ophthalmic emulsion    RESTASIS      Place 1 drop into both eyes 2 times daily       EPINEPHrine 0.3 MG/0.3ML injection     1 each    Inject 0.3 mLs (0.3 mg) into the muscle once as needed for anaphylaxis       LORazepam 1 MG tablet    ATIVAN    30 tablet    Take 1 tablet (1 mg) by mouth every 6 hours as needed (nausea/vomiting, anxiety or sleep)       megestrol 40 MG/ML suspension    MEGACE ORAL    600 mL    Take 10 mLs (400 mg) by mouth 2 times daily       MELATONIN PO      Take 1 mg by mouth At Bedtime       ondansetron 8 MG ODT tab    ZOFRAN-ODT    90 tablet    Place 1 tablet (8 mg) under the tongue every 8 hours as needed for nausea       ONE DAILY MULTIVITAMIN WOMEN Tabs      Take 1 tablet by mouth every morning       polyethylene glycol powder    MIRALAX/GLYCOLAX     Take 1 capful by mouth daily as needed       prochlorperazine 10 MG tablet    COMPAZINE    30 tablet    Take 1 tablet (10 mg) by mouth every 6 hours as needed (nausea/vomiting)       senna-docusate 8.6-50 MG per tablet    SENOKOT-S;PERICOLACE    7 tablet    Take 1-2 tablets by mouth 2 times daily       tamsulosin 0.4 MG capsule    FLOMAX    60 capsule    Take 1 capsule (0.4 mg) by mouth daily       VITAMIN B6 PO      Take 1 tablet by mouth At Bedtime Reported on 4/27/2017       ZANTAC PO      Take 75 mg by mouth daily

## 2017-04-27 NOTE — NURSING NOTE
"Oncology Rooming Note    April 27, 2017 9:36 AM   Margaux Guzmán is a 51 year old female who presents for: Oncology Clinic Visit    Initial Vitals: /63 (BP Location: Right arm, Patient Position: Chair, Cuff Size: Adult Regular)  Pulse 104  Temp 98.9  F (37.2  C) (Oral)  Resp 16  Ht 1.676 m (5' 5.98\")  Wt 52.7 kg (116 lb 2.9 oz)  SpO2 100%  BMI 18.76 kg/m2 Estimated body mass index is 18.76 kg/(m^2) as calculated from the following:    Height as of this encounter: 1.676 m (5' 5.98\").    Weight as of this encounter: 52.7 kg (116 lb 2.9 oz). Body surface area is 1.57 meters squared.  Severe Pain (7) Comment: Abdomen (5) and left flank (7)   No LMP recorded. Patient has had a hysterectomy.  Allergies reviewed: Yes  Medications reviewed: Yes    Medications: Medication refills not needed today.  Pharmacy name entered into EPIC:    [facility] - [place], [place] - [address] AT [facility] [address]  [id number] - [place], [place] - [address]  [facility] [place], [place] - [address] [facility] [place], [place] [address] [facility] [address]  [place], [place] - [address] [id number] - [place] - [address] [id number] - [place], [place] - [address] [id number] - [facility] [address] [facility] [address]    Clinical concerns:None Dr Mccollum was NOT notified.    7 minutes for nursing intake (face to face time)     Angie Peña LPN                "

## 2017-04-27 NOTE — TELEPHONE ENCOUNTER
"LPN called and spoke with pt regarding their message that the procedure (Left Splanchnic block, performed on 4/20/17) \"did not work, and wore off after 2 days\"    LPN updated Dr. Mensah in clinic who informed LPN to relay the following to the patient.   The steroid that was used could have a delayed onset any may take up to 2 weeks to take effect.   Pt could trial Acupuncture in the meantime to help with the pain.   Staff will call patient at the 2 week jamarcus (5/4/17)  to monitor pain relief. Dr. Mensah was going to converse with Dr. Valladares who performed the procedure, and will update the LPN of any other information that should be relayed to the patient.     Nicki Keating, SELENE    "

## 2017-04-27 NOTE — NURSING NOTE
Labs drawn peripherally including research lab.  UA collected and sent.  VS done.  Pt tolerated well.

## 2017-04-27 NOTE — LETTER
2017       RE: Margaux Guzmán  [address] [facility] [address]     Dear Colleague,    Thank you for referring your patient, Margaux Guzmán, to the [facility]. Please see a copy of my visit note below.     Follow Up Notes on Referred Patient    Date: 2017       RE: Margaux Guzmán  : 1955  MELANI: 2017    Margaux Guzmán is a 61 year old woman with a history of primary peritoneal cancer. She is here today regarding recent biopsy results which revealed disease progression while on Tesaro trial.     Brief Oncology History:  The patient is a 61 year old  female who initially presented for evaluation of pelvic and abdominal pain. On exam, she had mild right adnexal tenderness and slightly enlarged right ovary freely mobile and smooth. This prompted an abdominal ultrasound, which was normal, and a pelvic US that showed anechoic right ovarian cyst measuring 4.3 x 3.8 x 4.0 cm. Her  was elevated at 74 on 10/8/13. We reviewed the possible diagnosis including ovarian cancer versus benign ovarian cyst. Approach to surgery, alternatives to surgery and plan for possible extended open surgical staging based on the operative findings was discussed. In light of the size of the ovary we are offering an initial approach with minimally invasive surgery. After discussion of risks and benefits, consent was obtained.  13 Diagnostic laparoscopy converted to exploratory laparotomy, bilateral salpingo-oophorectomy, total abdominal hysterectomy, omentectomy, staging biopsies, bilateral pelvic and periaortic lymph node dissection and washings. Stage IIIB  13: Cycle #1 IV/IP Taxol/CDDP  14: Cycle #2 IV/IP PCP.  - 14  14: Cycle 3 IV/IP.  - 7  14:  - 7. CT C/A/P Impression:  1. Multiple bilateral pulmonary nodules as described in full report measuring up to 3 mm along the right major fissure. These are indeterminate given lack of  comparison and followup is recommended.  2. No definite evidence for recurrent or metastatic disease in the abdomen or the pelvis. There is a rounded hypodense focus abutting the left external iliac artery and the adjacent sigmoid colon which measures 1 cm, this could represent a fluid-filled sigmoid diverticulum or a hypodense node, further attention to this area on subsequent examinations is recommended.  3. There is a 7 mm nonobstructing stone in the inferior collecting system of the right kidney.  2/12/14-3/26/14: Cycle #4-6 IV/IP CA-125 7, 7, 7.  4/21/14: CT C/A/P Impression:   1. Unchanged indeterminate pulmonary nodules. Recommend continued surveillance.   2. No definite evidence of metastatic ovarian cancer in the abdomen or pelvis. Small amount of subtle increased thickening and fluid is noted along the right lymph node dissection, nonspecific, but possibly a tiny developing lymphocele.   3. 6 mm nonobstructing stone in the right kidney.  Plan surveillance for ovarian cancer every 3 months with physical and .   Indeterminate pulmonary nodule: These were present before the surgery and there was not change with the chemotherapy. Highly unlikely to be a metastatic disease. Will repeat a CT in 3 months to see any change      5/22/14:  6. JOSEMANUEL.  5/17/14; ED visit [facility] CT abd/pel noted possible chronic partial small bowel obstruction. Colonscopy scheduled for June 6, 2014 locally. Abdominal pain started 5/17/14 and noted pressure without bowel movement and went to ED that night due to increasing pain. In patient 2 day hospital with clear liquids/IV. Mild nausea without vomiting. BM subsequently occurred and pt was discharged. No pain since. Continues with fullness in abdomen. Diet is bland for the most part.  8/25/14:  -5. Notes increased abdominal girth and bloating x 2-3 weeks with urine urgency. Worried about recurrence. Is just starting to return to normal  exercise and activities. No constipation, diarrhea, pain, vaginal or rectal bleeding, cough or dyspnea. CT to follow indeterminate pulmonary nodules today.   CT cap IMPRESSION:   1. No CT scan findings of the chest, abdomen, or pelvis to indicate metastatic disease.  2. 2-5 mm pulmonary nodules, stable since 2/11/2014.  3. Nonobstructing 6 mm stone in the right kidney.  12/3/14:  8.. Pt started zoloft for anxiety. Worries about cancer recurrence.   3/2/15: CA 34  3/5/15: CT C/A/P: Impression:  1. Multiple new small peritoneal and retroperitoneal nodules are suspicious for metastases. Suspicious new left common iliac and central small bowel mesenteric nodes. No abdominal ascites.  2. Multiple pulmonary nodules are stable with no new nodules.  3. 9 mm nonobstructing right lower pole renal calculus.     5/20/15: CT c/a/p showed nodularity throughout the abdomen and pelvis worrisome for malignancy which has increased in size      5/28/15: CT abdomen and pelvis showed stable inumerabble peritoneal nodules scattered throughout the abdomen and pelvis consistent with metastases.      8/4/15 (approximatly): Left ureteral stent was placed.     8/12/15:  from [place] 303     8/18/15: CT chest Impression showed slight increase in mild, subtle nodularity along the diaphragm which is nonspecific but may represent metastatic disease.   8/18/15: CT abdomen and pelvis Impression showed interval progression of peritoneal metastatic disease.      8/25/15:  on 3/2/15 of 34 prompted CT c/a/p, which showed multiple new small peritoneal and retroperitoneal nodules are suspicious for metastases. Suspicious new left common iliac and central small bowel mesenteric nodes. She participated in [facility] study involving treatment with on clinical trial with vaccine [id number]. She is here today because the [place] trail failed and the pt pregressed. Her most recent CT c/a/p on 8/25 showed progression of peritoneal metastatic  "disease. And her most recent  from [place] on 8/12/15 was 303.     Plan: Carbo/Doxil x 6 cycles.with imaging after 3 cycles.      9/3/15: Cycle #1 Carbo/Doxil.  244.  9/24/15: right IJ thrombus; started on Lovenox.   9/26/15: Present to [place] ED. \"presented to the ED this morning with what appears to be a mild drug rash after administering her lovenox this morning. This writer discussed situation with Gyn Onc Fellow Jeanne Moon as well as [facility] Heme/Onc service. Per Heme, a rash of this nature is extremely uncommon with administration of Lovenox. Given the mild nature of this rash and acute need for anticoagulation, recommended the patient continue with Lovenox injections and treat with Benadryl as needed. Advised patient be given precautions to return to the ED immediately if she develops reactive respiratory symptoms. Alternatively patient could be switched to alternative formulation of low molecular weight heparin if she was strongly resistant to continuation of Lovenox.\"     10/1/15: Cycle #2 Carbo/Doxil.  175.     10/28/15: gyn onc visit: pt complains of worsening constipation and tenderness around her right ribs. She is taking senna for her constipation with minimal improvement. She admits that the swelling around her neck after her blood clot has decreased. She also admits to feeling a nodules on her left abdomen. She states her appetite is good but she has not been eating fruits and vegetables. She denies any chemo side effects besides fatigue.      10/28/15:  158  11/23/2015:  133  CT c/a/p  IMPRESSION: In this patient with a clinical history of ovarian cancer  there is mixed response to therapy:   1. Stable to slightly decreased peritoneal nodularity since  8/18/2015.  2. No significant change regarding the large subdiaphragmatic  peritoneal deposit since 9/24/2015.  3. Enlarging soft tissue nodule overlying the abdominal musculature  concerning for metastasis since March of 2015. "   4. Unchanged, indeterminate hypodensity near the gallbladder fossa  since 8/18/2015, however progressively increased in size since  3/5/2015.  5. Bilateral nephroureteral stents. No significant hydronephrosis.  6. Bilateral pulmonary nodules. Continued followup recommended.     12/23/15: Cycle 5 carboplatin/Doxil/Avastin.  96. To receive Avastin today despite proteinuria per Dr. Aguilar and nephrology.  1/21/16: Cycle 6 carboplatin/Doxil/Avastin, delayed one week secondary to neutropenia.  62.  1/28/16:  63.     2/2/16: Patient had right upper extremity doppler u/s done in [place] due to swollen glands and pain. 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 Pt is currently on lovenox.     2/2/16: US soft tissue neck  Conclusion:  1. Morphologically normal not pathologically enlarged two right carotid chain lymph nodes.  2. Chronic right jugular vein thrombosis, described in detail on  venous doppler exam.      2/2/16: Thyroid Ultrasound  Right lobe: 5.5 x 1.9 x 1.2 cm  Left lobe: 5.0 x 1.5 . 0.9 cm  Both lobes have normal background echotexture.     Conclusion:  1. 3 benign - appearing subcentimeter hypoechoic right mid thyroid nodules.  2. Borderline thyromegaly.     2/2/16: Right upper extremity venous duplex doppler evaluation  Conclusion:  1.) chronic non compressible echogenic right jugular vein thrombus, now 4.3 cm in length.      2/22/16:   IMPRESSION:    1. Ovarian cancer with peritoneal carcinomatosis.  2. Given the increased sensitivity of PET/CT, comparison with prior CT examinations is difficult. In general the abdominal/pelvic metastatic lesions appear to be decreased in size.  3. Persistent right internal jugular DVT, as evidenced by 5 cm filling defect with inferior border at the central venous catheter.  4. Bilateral hydronephrosis without overt caliectasis. Some  distal migration of the bilateral double-J ureteral stents, although the  proximal coiled portions continue to be in the renal pelves.     2/24/16: C7 carboplatin/Doxil/Avastin.  56.  3/9/16: Hgb 6.6, worked up for transfusion reaction (negative). Bleed possibly secondary to   3/23/16: C8 carboplatin/Doxil/Avastin.  44.  4/2016: Hospitalized in [place] x2 weeks for hematuria leading to anemia after ureteral stent exchange  4/20/16: C9D1 carboplatin/Doxil/Avastin. Deferred one week secondary to acute UTI.  35.  4/28/16: C9D1 deferred due to continued bacteruria and ANC 1.3.  5/4/16: C9D1 carboplatin/Doxil/Avastin. Breast lump noted, diagnostic mammogram ordered.  6/1/16: C10D1 carboplatin/Doxil/Avastin.  50.  6/28/16: C11D1 carboplatin/Doxil/Avastin. Avastin held due to bleeding. Carbo/Doxil deferred one week secondary to neutropenia.  43.  7/7/16: C12D1 carboplatin/Doxil. Avastin held due to bleeding.  7/18/16: Bilateral ureteral stent exchange  7/20/16-7/29/16: Hospitalized with urosepsis and blood loss anemia, started on Zosyn and discharged on amoxicillin  9/29/16: C1D1 Gemzar.  85.  10/21/16: C2D1 Gemzar.  106.   11/11/16: C3D1 Gemzar.   111.     12/12/16: CT CAP IMPRESSION: In this patient with a history of metastatic ovarian cancer:  1. Progression of disease as evidenced by a slowly enlarging mesenteric and omental soft tissue foci and slowly enlarging  periesophageal and pericardiophrenic lymph nodes, as detailed above.  2. Stable appearance of bilateral ureteral stents without hydronephrosis.  3. Patient was premedicated for a pre-existing IV contrast allergy. Despite this, she had itchiness on her face poststudy. She should no longer receive IV contrast in the future.       CT AP 1/10/2017: multiple dilated fluid filled small bowel loops with decompressed distal small bowel loops, consistent with obstruction. Transition point is suspected in the pelvis. No  pneumatosis or free intraperitoneal gas. Bilateral ureteral stents appear appropriately positioned. There is mild dilation of the bilateral renal collecting systems, left greater than right.     1/23/17: admitted to clinic for dizziness and heart palpitations   1/27/17: ED admission - leg swelling and GI bleeding      1/27/17: US lower extremity IMPRESSION:  1.  No evidence of right lower extremity deep venous thrombosis.  2.  Dampened waveforms in the right lower extremity, similar to 7/25/2016, though a more central occlusion cannot be excluded.  1/27/17: MRI Brain IMPRESSION:  Normal brain MRI  1/27/17: CT AP IMPRESSION: This patient with a history of metastatic ovarian cancer:  1. No bowel obstruction. Oral contrast progressed to the colon. Extensive colonic stool.  2. Severe right hydronephrosis, new from 12/12/2016, may represent obstruction of the right ureteral stent. No left-sided hydronephrosis.  3. Slight interval progression of diffuse peritoneal metastatic disease.  4. Mildly nodular areas of increased attenuation within the pelvic bowel which may represent loculated malignant ascites or peritoneal implants similar in appearance to 12/12/2016.     1/28/17: colonoscopy- benign      Treatment: Tesaro/Quadra Niraparib study  1/5/17: Cycle #1 Day 1   2/2/17: Cycle #2 Day 1   2/28/17: Cycle #3 Day 1      2/28/17: CT scan - [facility]  IMPRESSION: In this patient with a history of ovarian cancer, there is mild disease progression as follows:  1. Increase in size and number of multiple subcentimeter nodules/intrafissural lymph nodes along the right major and minor  fissures and also bilateral pulmonary nodules, predominantly along the diaphragmatic pleura.   2. Slight increase in size of small lymph nodes in the right internal mammary region and bilateral anterior cardiophrenic region.  3. Stable to slight increase in size of deposits in the perihepatic region. No significant change in the omental deposits  in the left  upper quadrant of the abdomen.  4. Stable to slight increase in size of mesenteric deposits/lymph nodes.  5. Bilateral nephroureteric stent in place. Atrophy of the right kidney with interval resolution of right hydronephrosis. Unchanged  mild left hydronephrosis. Air within the collecting systems and bladder likely related to prior intervention.  6. Dilation of proximal small bowel loops with interspersed areas of narrowing. No progression of oral contrast into the ileum. Moderate amount of fecal material in the colon. Partial/early small bowel obstruction is possible.  3/30/17: CT cap IMPRESSION: Images patient with a history of ovarian cancer, overall findings of stable disease includin. No acute findings to suggest etiology of severe left flank pain.  2. Unchanged pulmonary nodules/pleural nodularity and thoracic lymph nodes.  3. Unchanged perihepatic and left upper quadrant omental deposits as well as diffuse mesenteric lymphadenopathy.  4. Unchanged hypoattenuating focus in the hepatic dome.  5. Unchanged placement of bilateral nephroureteral stents. Unchanged mild to moderate left hydronephrosis. Stable atrophy of the right kidney.  6. Unchanged 8 mm right flank soft tissue nodule.     17: left splanchnic ganglion block. FNA of lesion in umbilicus completed; results pending.     3/30/17: CT AP   IMPRESSION: Images patient with a history of ovarian cancer, overall  findings of stable disease includin. No acute findings to suggest etiology of severe left flank pain.  2. Unchanged pulmonary nodules/pleural nodularity and thoracic lymph  nodes.  3. Unchanged perihepatic and left upper quadrant omental deposits as  well as diffuse mesenteric lymphadenopathy.  4. Unchanged hypoattenuating focus in the hepatic dome.  5. Unchanged placement of bilateral nephroureteral stents. Unchanged  mild to moderate left hydronephrosis. Stable atrophy of the right  kidney.  6. Unchanged 8 mm right  "flank soft tissue nodule.    4/2017: FNA- \"Lump in belly button\", palpation guided fine needle aspiration:   - Positive for Malignancy   - Metastatic adenocarcinoma, consistent with Müllerian origin (see   comment)   Specimen Adequacy: Satisfactory for evaluation.     4/27/17: CT CAP  IMPRESSION: In this patient with history of metastatic ovarian  carcinoma: Stable disease.  1. Unchanged pulmonary nodules and thoracic lymph nodes.  2. There are two nodules in the right lower quadrant posterior  mesentery, which are stable to minimally increased on current study  and slightly increased from 03/15/17. Recommend attention on follow  up. Otherwise stable peritoneal tumor metastasis.  3. New fluid attenuation collection in the central presacral pelvis,  HU measures 12, likely ascitic fluid, not present on prior study.  Attention on follow-up.  4. Stable bilateral nephroureteral stents and mild hydronephrosis,  with left greater than right fullness of the renal hilar region.    Today: Margaux comes to clinic with her daughter feeling well. Biopsy results discussed with patient, explained she will come off tesaro trial due to disease progression based on positive pathology from the umbilical nodule biopsy. Discussed possible treatment - weekly taxol or topotecan. Margaux admits to some neuropathy in her toes and feet. She reports some tingling and she is worried about it getting worse if she starts taxol again. She explains that she had trouble \"knowing where her feet were\" during her last treatment with taxol. She admits to severe flank pain. Had left splanchnic ganglion block on 4/20, feels as though it did not work. She is apprehensive towards restarting chemo due to the pain she has experienced. She explains that opioids have never worked for her as a pain reliever. The pain center suggested gabapentin but she denied due to constipation issues which she has always had problems with. We discussed a visit with palliative " care to discuss pain, as well as fatigue, sleep and other symptoms she may be having. Will go to clinic in [place] for treatment if she decides to receive chemotherapy.         Review of Systems:  Answers for HPI/ROS submitted by the patient on 4/26/2017   General Symptoms: Yes  Skin Symptoms: No  HENT Symptoms: No  EYE SYMPTOMS: No  HEART SYMPTOMS: Yes  LUNG SYMPTOMS: No  INTESTINAL SYMPTOMS: Yes  URINARY SYMPTOMS: No  GYNECOLOGIC SYMPTOMS: No  BREAST SYMPTOMS: No  SKELETAL SYMPTOMS: No  BLOOD SYMPTOMS: Yes  NERVOUS SYSTEM SYMPTOMS: No  MENTAL HEALTH SYMPTOMS: No  Fever: No  Loss of appetite: No  Weight loss: No  Weight gain: No  Fatigue: Yes  Night sweats: No  Chills: No  Increased stress: No  Excessive hunger: No  Excessive thirst: No  Feeling hot or cold when others believe the temperature is normal: No  Loss of height: No  Post-operative complications: No  Surgical site pain: No  Hallucinations: No  Change in or Loss of Energy: No  Hyperactivity: No  Confusion: No  Chest pain or pressure: No  Fast or irregular heartbeat: Yes  Pain in legs with walking: No  Swelling in feet or ankles: No  Trouble breathing while lying down: No  Fingers or Toes appear blue: No  High blood pressure: Yes  Low blood pressure: No  Fainting: No  Murmurs: No  Chest pain on exertion: No  Chest pain at rest: No  Cramping pain in leg during exercise: No  Pacemaker: No  Varicose veins: No  Edema or swelling: No  Fast heart beat: Yes  Wake up at night with shortness of breath: No  Heart flutters: Yes  Light-headedness: Yes  Exercise intolerance: Yes  Heart burn or indigestion: No  Nausea: Yes  Vomiting: No  Abdominal pain: Yes  Bloating: No  Constipation: Yes  Diarrhea: No  Blood in stool: No  Black stools: No  Rectal or Anal pain: No  Fecal incontinence: No  Rectal bleeding: No  Yellowing of skin or eyes: No  Vomit with blood: No  Change in stools: No  Hemorrhoids: No  Anemia: Yes  Swollen glands: No  Easy bleeding or bruising: No      I  have reviewed and addressed the patient's review of symptoms for today's visit.     Past Medical History:    Past Medical History:   Diagnosis Date     Anemia      History of blood transfusion     MULTIPLE     Hydronephrosis      Hyperlipidemia      Jugular vein thrombosis, right     Persistent right internal jugular DVT     Livedo annularis      Osteoarthritis      Osteopenia      Ovarian cancer (H)      Polymyalgia rheumatica (H)      PONV (postoperative nausea and vomiting)      Primary cancer of peritoneum (H)          Past Surgical History:    Past Surgical History:   Procedure Laterality Date     APPENDECTOMY       BREAST SURGERY      biopsy negative      SECTION       COLONOSCOPY N/A 2017    Procedure: COLONOSCOPY;  Surgeon: Luis Richardson MD;  Location: [facility]     COLONOSCOPY N/A 2017    Procedure: COMBINED COLONOSCOPY, SINGLE OR MULTIPLE BIOPSY/POLYPECTOMY BY BIOPSY;  Surgeon: Luis Richardson MD;  Location: [facility]     COMBINED CYSTOSCOPY, RETROGRADES, EXCHANGE STENT URETER(S) Bilateral 2016    Procedure: COMBINED CYSTOSCOPY, RETROGRADES, EXCHANGE STENT URETER(S);  Surgeon: Carmela Alvarez MD;  Location: [facility]     COMBINED CYSTOSCOPY, RETROGRADES, EXCHANGE STENT URETER(S)  2016    @ [facility]     COMBINED CYSTOSCOPY, RETROGRADES, EXCHANGE STENT URETER(S) Bilateral 10/17/2016    Procedure: COMBINED CYSTOSCOPY, RETROGRADES, EXCHANGE STENT URETER(S);  Surgeon: Carmela Alvarez MD;  Location: [facility]     COMBINED CYSTOSCOPY, RETROGRADES, EXCHANGE STENT URETER(S) Bilateral 2016    Procedure: COMBINED CYSTOSCOPY, RETROGRADES, EXCHANGE STENT URETER(S);  Surgeon: Carmela Alvarez MD;  [facility]     COMBINED CYSTOSCOPY, RETROGRADES, EXCHANGE STENT URETER(S) Bilateral 2017    Procedure: COMBINED CYSTOSCOPY, RETROGRADES, EXCHANGE STENT URETER(S);  Surgeon: Carmela Alvarez MD;  [facility]     ESOPHAGOSCOPY, GASTROSCOPY,  DUODENOSCOPY (EGD), COMBINED N/A 1/28/2017    Procedure: COMBINED ESOPHAGOSCOPY, GASTROSCOPY, DUODENOSCOPY (EGD);  Surgeon: Luis Richardson MD;  Location: [facility]     HYSTERECTOMY TOTAL ABDOMINAL, BILATERAL SALPINGO-OOPHORECTOMY, NODE DISSECTION, COMBINED  11/7/2013    Procedure: COMBINED HYSTERECTOMY TOTAL ABDOMINAL, SALPINGO-OOPHORECTOMY, NODE DISSECTION;;  Surgeon: Cheri Aguilar MD;  Location: [facility]     INJECT NERVE BLOCK CELIAC PLEXUS Left 4/20/2017    Procedure: INJECT NERVE BLOCK CELIAC PLEXUS;  Left splanchnic nerve block;  Surgeon: Shabbir Valladares MD;  [facility]     LAPAROSCOPIC SALPINGO-OOPHORECTOMY  11/7/2013    Procedure: LAPAROSCOPIC SALPINGO-OOPHORECTOMY;  Diagnostic Laparoscopy, Exploratory Laparotomy, Bilateral Salpingo-Oophorectomy,  Hysterectomy, Omentectomy, Pelvic Washings, Peritoneal staging and biopsies, Pelvic and Periaortic Lymph node Dissection;  Surgeon: Cheri Aguilar MD;  Location: [facility]     LAPAROTOMY, STAGING, COMBINED  11/7/2013    Procedure: COMBINED LAPAROTOMY, STAGING;;  Surgeon: Cheri Aguilar MD;  Location: [facility]     LYMPH NODE BIOPSY  2008    Left posterior chain, beign     OPEN REDUCTION INTERNAL FIXATION TOE(S)  9/3/13    Fifth digit, left foot, with screw fixation      REMOVE PORT PERITONEAL  5/5/2014    Procedure: REMOVE PORT PERITONEAL;  Surgeon: Cheri Aguilar MD;  Location: [facility]         Health Maintenance Due   Topic Date Due     PHQ-9 Q1YR (NO INBASKET)  1955     TETANUS IMMUNIZATION (SYSTEM ASSIGNED)  11/24/1972     HEPATITIS C SCREENING  11/24/1972     LIPID SCREEN Q5 YR FEMALE (SYSTEM ASSIGNED)  11/24/1999       Current Medications:     Current Outpatient Prescriptions   Medication Sig Dispense Refill     ondansetron (ZOFRAN-ODT) 8 MG ODT tab Place 1 tablet (8 mg) under the tongue every 8 hours as needed for nausea 90 tablet 3     amLODIPine (NORVASC) 2.5 MG tablet Take 1 tablet (2.5 mg) by mouth daily 30 tablet      bisacodyl (DULCOLAX) 10 MG  Suppository Place 1 suppository (10 mg) rectally daily as needed for constipation 10 suppository 0     megestrol (MEGACE ORAL) 40 MG/ML suspension Take 10 mLs (400 mg) by mouth 2 times daily 600 mL 1     enoxaparin (LOVENOX) 40 MG/0.4ML injection Inject 0.4 mLs (40 mg) Subcutaneous daily 30 Syringe 3     study - niraparib (IDS# [id number]) 100 mg capsule Take 3 capsules (300 mg) by mouth every morning Take at the same time each day, preferably morning. Swallow whole and do NOT chew capsules. Food and water is permissible. First dose will be administered on site. (Patient taking differently: Take 300 mg by mouth every morning Take at the same time each day, preferably morning. Swallow whole and do NOT chew capsules. Food and water is permissible. First dose will be administered on site.) 84 capsule 0     tamsulosin (FLOMAX) 0.4 MG capsule Take 1 capsule (0.4 mg) by mouth daily 60 capsule 6     LORazepam (ATIVAN) 1 MG tablet Take 1 tablet (1 mg) by mouth every 6 hours as needed (nausea/vomiting, anxiety or sleep) 30 tablet 3     tolterodine (DETROL LA) 4 MG 24 hr capsule Take 1 capsule (4 mg) by mouth daily 30 capsule 3     ACETAMINOPHEN PO Take 500 mg by mouth every 6 hours as needed for pain Reported on 3/30/2017       Multiple Vitamins-Minerals (ONE DAILY MULTIVITAMIN WOMEN) TABS Take 1 tablet by mouth every morning        polyethylene glycol (MIRALAX/GLYCOLAX) powder Take 1 capful by mouth daily as needed        cycloSPORINE (RESTASIS) 0.05 % ophthalmic emulsion Place 1 drop into both eyes 2 times daily        Ranitidine HCl (ZANTAC PO) Take 75 mg by mouth daily        MELATONIN PO Take 1 mg by mouth At Bedtime        EPINEPHrine (EPIPEN) 0.3 MG/0.3ML injection Inject 0.3 mLs (0.3 mg) into the muscle once as needed for anaphylaxis 1 each 0     senna-docusate (SENOKOT-S;PERICOLACE) 8.6-50 MG per tablet Take 1-2 tablets by mouth 2 times daily 7 tablet      Pyridoxine HCl (VITAMIN B6 PO) Take 1 tablet by mouth At Bedtime    "     prochlorperazine (COMPAZINE) 10 MG tablet Take 1 tablet (10 mg) by mouth every 6 hours as needed (nausea/vomiting) 30 tablet 2         Allergies:        Allergies   Allergen Reactions     Contrast Dye Itching and Swelling     Itching/tingling of mouth and nose with sensation of swelling after receiving IV contrast for CT.  This occurred despite steroid premedication. Therefore the patient should not receive intravenous contrast in the future.      Benadryl Allergy Other (See Comments)     Stay awake, restless, \"uncomfortable\", \"feel like I need to run away\"      Lovenox [Enoxaparin] Hives     3/23/16: Okay to receive heparin flushes in port, tolerates well. Patricia Cortez FNP-C     Amoxicillin Rash     Diphenhydramine Anxiety     No Clinical Screening - See Comments Rash     Pollen Extract Rash     Sulfa Drugs Rash        Social History:     Social History   Substance Use Topics     Smoking status: Former Smoker     Smokeless tobacco: Former User      Comment: Quit over 20 years ago     Alcohol use No       History   Drug Use No         Family History:     The patient's family history is notable for:    Family History   Problem Relation Age of Onset     Arthritis Father      Myocardial Infarction Father      secondary to anesthesia     Colon Cancer Father      DIABETES Other      Uncle     Hypertension Mother      Other - See Comments Mother      cognitive decline     Skin Cancer Mother      Hypertension Sister      HEART DISEASE Other      unknown valve replacement     Bladder Cancer Sister      Skin Cancer Brother          Physical Exam:     /63 (BP Location: Right arm, Patient Position: Chair, Cuff Size: Adult Regular)  Pulse 104  Temp 98.9  F (37.2  C) (Oral)  Resp 16  Ht 1.676 m (5' 5.98\")  Wt 52.7 kg (116 lb 2.9 oz)  SpO2 100%  BMI 18.76 kg/m2  Body mass index is 18.76 kg/(m^2).    General Appearance: pale and alert, no acute distress     HEENT: no thyromegaly, no palpable nodules or masses "        Cardiovascular: regular rate and rhythm, no gallops, rubs or murmurs     Respiratory: lungs clear, no rales, rhonchi or wheezes, normal diaphragmatic excursion    Musculoskeletal: extremities non tender and without edema    Skin: no lesions or rashes     Neurological: wheelchair     Psychiatric: appropriate mood and affect                               Hematological: normal cervical, supraclavicular  ymph nodes     Gastrointestinal:       abdomen soft, non-tender, non-distended    Genitourinary: Not indicated      Assessment:    Margaux Guzmán is a 61 year old woman with a history of primary peritoneal cancer. She is here today for f/u appt regarding biopsy which revealed disease progression. Will come off Tesaro trial. Patient in agreement with plan.    40 minutes were spent with this patient, over 50% of that time was spent in symptom management, treatment planning and in counseling and coordination of care.      Plan:     1.)       Disease progression on Tesaro study- positive FNA on biopsy for metastatic adenocarcinoma. Will come off study, to proceed with weekly Taxol at [facility]. ECOG = 1     2.) Genetic risk factors were assessed and she isnegative for mutations in the following: BRCA1, BRCA2, EPCAM, MLH1, MSH2, MSH6, PMS2, PTEN and TP53.       3.) Labs and/or tests ordered include:  None.      4.) Health maintenance issues addressed today include annual health maintenance and non-gynecologic issues with PCP.    5.) Pain: discussed visit with palliative care or pain clinic in [place]. Pt not responding to left splanchnic ganglion block placed on 4/20. Will schedule appt with palliative care to discuss other options for pain management. Also discussed future appointment with hospice which Margaux was not ready to consider.     Sincerely,     Jia Mccollum MD    Department of Ob/Gyn and Women's Health  Division of Gynecologic Oncology  [facility]  [place]  [phone]            Patient Care Team:  Aurelia Knox as PCP - General (Family Practice)  eJssica Galvin, RN as Continuity Care Coordinator (Gyn-Onc)  Jessica Galvin, MK as Continuity Care Coordinator (Oncology)  Lizet Truong APRN CNP (Nurse Practitioner - Women's Health)  Derrek Gamble MD as MD (Nephrology)  Patricia Murcia APRN CNP as Nurse Practitioner (Nurse Practitioner)  Elissa Wheeler MD as MD (Infectious Diseases)  Carmela Alvarez MD as MD (Urology)  Keira Rolle RN as Registered Nurse (Urology)  Sophia Garcia PA-C as Referring Physician (Physician Assistant)  Aurelia Knox (Family Practice)      I have reviewed the above note and agree with the scribe's notation as written.    I, Kolton Spencer, am serving as a scribe to document services personally performed by Jia Mccollum MD, based upon my observations and the provider's statements to me. All documentation has been reviewed by the aforementioned doctor prior to being entered into the official medical record.       Again, thank you for allowing me to participate in the care of your patient.      Sincerely,    Jia Mccollum MD

## 2017-04-30 NOTE — PROGRESS NOTES
"Care Coordinator Note  Reviewed Treatment plan of Taxol 6 weeks in a row and one week off.  Pt would like it in [facility].  Called sj at the [facility] and information was faxed to them.  They will call her with the date and time once the MD reviews the information.    Phone [phone] Fax [phone].  Will follow up with us the off week of the chemotherapy.   just [place] not [place] to the cities because [place].   Discussed the use of a \" Life line for her she [place] it [place] \"     Will contact SW to assist with this this.       Pt verbalized back understanding of the above information discussed.   Face to face time spent with patient 25,  Jessica TIMMONS RN, OCN  Care Coordinator   Gynecologic Cancer   Office [phone]  Pager [phone] #[phone]  "

## 2017-05-01 NOTE — TELEPHONE ENCOUNTER
ONCOLOGY SOCIAL WORK  Referral from [facility] that pt is asking about lifeline  Emailed a website for pt to order one if it is what she is wanting.   Called to let her know that I emailed this info to her, and she said she did receive it.      Heidi Arauz, [facility]  [phone]

## 2017-05-03 NOTE — MR AVS SNAPSHOT
After Visit Summary   5/3/2017    Margaux Guzmán    MRN: [id number]           Patient Information     Date Of Birth          1955        Visit Information        Provider Department      5/3/2017 10:50 AM Shabbir Valladares MD [facility] for Comprehensive Pain Management        Today's Diagnoses     Pain due to ureteral stent, initial encounter (H)    -  1      Care Instructions    1. buprenorphine (BUTRANS) 5 MCG/HR WK patch- Place 1 patch onto the skin every 7 days    * If you feel that this medication is not working please call clinic. You may remove patches.     2. Please schedule your Procedure with Dr. Valladares.    Please call Susan at [phone] to schedule your procedure. She is available Monday - Friday from 9-5:30pm, if she is unavailable to take your call, please leave her a voice message with your name, birth date, and phone number and she will call you back.    Your procedure: Radiofrequency ablation of the Splanchnic nerves    On the day of the procedure  1. Arrive 1 hour earlier than your scheduled time, to the [facility]  Address: [address] [place] [address], [place] [address]  2. Check in on the 5th floor for your procedure    A nurse will call you 4 weeks after your procedure to follow up.    If you must reschedule your procedure more than two times, you must follow up in clinic before rescheduling again.      Patient Pre-Procedure Instructions    CAUTION - FAILURE TO FOLLOW THESE PRE-PROCEDURE INSTRUCTIONS WILL RESULT IN YOUR PROCEDURE BEING RESCHEDULED.      Pregnancy  If you are pregnant, or think that you may be pregnant, please notify our staff. This may or may not affect the ability to perform the procedure.     You MUST have a  TO TAKE YOU HOME after your procedure. Transportation by Taxi or Para-transit must have a responsible adult accompany you home (other than the ). Travel by bus or light rail is not acceptable  transportation. You must provide your 's full name and contact number at time of check in.   Fasting Protocol You may have NOTHING SOLID TO EAT FOR 8 HOURS prior to arrival at the procedure area.   Broth and candy are considered solid food and require an eight hour fast.   You may have CLEAR LIQUIDS UP TO 2 HOURS prior to arrival at the clinic.   Clear liquids include water, clear fruit juice (no pulp) carbonated beverages, ice, black coffee, black tea (no milk or cream), chewing gum (un-swallowed), and/or clear jello (no fruit or milk). No alcohol containing beverages.   Medications If you take any medications,  DO NOT STOP. Take your medications as usual the morning of your procedure with a sip of water AT LEAST 2 HOURS PRIOR TO ARRIVAL.    Antibiotics If you are currently taking antibiotics, you must complete the entire dose 7 days prior to your scheduled procedure. You must be clear of any signs or symptoms of infection. If you begin antibiotics, please contact our clinic for instructions.   Fever, Chills, or Rash If you experience a fever of higher than 100 degrees, chills, rash, or open wounds during the one week prior to your procedure, please call the clinic.         Medication Hold List  **Patients under Cardiology/Neurology care should consult their provider prior to the pain procedure to verify pre-procedure medication instructions. The information below contains general guidelines.**    Blood Thinners If you are taking daily ASPIRIN, PLAVIX, OR OTHER BLOOD THINNERS SUCH AS COUMADIN/WARFARIN, we will need your prescribing doctor to sign a release permitting you to stop these medications. Once approved by your prescribing doctor - STOP ALL BLOOD THINNERS BASED ON THE TIME TABLE BELOW PRIOR TO YOUR PROCEDURE. If you have been instructed to stop WARFARIN(COUMADIN), you must have an INR lab drawn the day before your procedure. . Your INR must be within normal limits before we can perform your injection.  MEDICATIONS CAN BE RESTARTED AFTER YOUR PROCEDURE.    14 DAY HOLD  Ticlid (ticlopidine)    10 DAY HOLD  Effient (Prasugel)    3 DAY HOLD  Xarelto (rivaroxaban) 7 DAY HOLD  Anacin, Bufferin, Ecotrin, Excedrin, Aggrenox (Aspirin)  Brilinta (ticagrelor)  Coumadin (Warfarin)  Pradexa (Dabigatran)  Elmiron (Pentosan)  Plavix (Clopidogrel Bisulfate)  Pletal (Cilostazol)    24 DAY HOLD  Lovenox (enoxaparin)  Agrylin (Anagrelide)        Non-steroidal Anti-inflammatories (NSAIDs) DO NOT TAKE any non-steroidal anti-inflammatory medications (NSAIDs) listed on the table below. MEDICATIONS CAN BE RESTARTED AFTER YOUR PROCEDURE. Celebrex is OK to take and does not need to be discontinued.     Medications to stop:  3 DAY HOLD  Advil, Motrin (Ibuprofen)  Arthrotec (diciofenac sodium/misoprostol)  Clinoril (Sulindac)  Indocin (Indomethacin)  Lodine (Etodolac)  Toradol (Ketorolac)  Vicoprofen (Hydrocodone and Ibuprofen)  Voltaren (Diclotenac)    14 DAY HOLD  Daypro (Oxaprozin)  Feldene (Piroxicam)   7 DAY HOLD  Aleve (Naproxen sodium)  Darvon compound (contains aspirin)  Naprosyn (Naproxen)  Norgesic Forte (contains aspirin)  Mobic (Meloxicam)  Oruvall (Ketoprofen)  Percodan (contains aspirin)  Relafen (Nabumetone)  Salsalate  Trilisate  Vitamin E (more than 400 mg per day)  Any medication containing aspirin                To speak with a nurse, schedule/reschedule/cancel a clinic appointment, or request a medication refill call: [phone]     You can also reach us by [employer]: [email or web address]                                               Follow-ups after your visit        Your next 10 appointments already scheduled     Nov 01, 2017 10:30 AM CDT   Lab with  LAB    Health Lab ([facility])    [address] [place] [id number] [phone]            Nov 01, 2017 11:35 AM CDT   (Arrive by 11:05 AM)   Return Visit with MD [facility]  "Nephrology ([facility])    [address] [facility]  3rd [facility] [address] [phone]              Who to contact     Please call your clinic at [phone] to:    Ask questions about your health    Make or cancel appointments    Discuss your medicines    Learn about your test results    Speak to your doctor   If you have compliments or concerns about an experience at your clinic, or if you wish to file a complaint, please contact [facility] Patient Relations at [phone] or email us at [email or web address]         Additional Information About Your Visit        [employer] Information     [employer] gives you secure access to your electronic health record. If you see a primary care provider, you can also send messages to your care team and make appointments. If you have questions, please call your primary care clinic.  If you do not have a primary care provider, please call [phone] and they will assist you.      [employer] is an electronic gateway that provides easy, online access to your medical records. With [employer], you can request a clinic appointment, read your test results, renew a prescription or communicate with your care team.     To access your existing account, please contact your [facility] Clinic or call [phone] for assistance.        Care EveryWhere ID     This is your Care EveryWhere ID. This could be used by other organizations to access your [facility] medical records  [id number]        Your Vitals Were     Pulse Height Pulse Oximetry BMI (Body Mass Index)          100 1.676 m (5' 6\") 100% 19.05 kg/m2         Blood Pressure from Last 3 Encounters:   05/03/17 121/70   04/27/17 123/63   04/20/17 135/65    Weight from Last 3 Encounters:   05/03/17 53.5 kg (118 lb)   04/27/17 52.7 kg (116 lb 2.9 oz)   04/20/17 53.1 kg (117 lb)              Today, you had the following     No orders found for display "         Today's Medication Changes          These changes are accurate as of: 5/3/17 11:40 AM.  If you have any questions, ask your nurse or doctor.               Start taking these medicines.        Dose/Directions    buprenorphine 5 MCG/HR WK patch   Commonly known as:  BUTRANS   Used for:  Pain due to ureteral stent, initial encounter (H)        Dose:  1 patch   Place 1 patch onto the skin every 7 days   Quantity:  4 patch   Refills:  1            Where to get your medicines      Some of these will need a paper prescription and others can be bought over the counter.  Ask your nurse if you have questions.     Bring a paper prescription for each of these medications     buprenorphine 5 MCG/HR WK patch                Primary Care Provider Office Phone # Fax #    Aurelia Knox [phone]       [facility] MEDICAL [facility] [address] [place] [id number]        Thank you!     Thank you for choosing [facility] COMPREHENSIVE [facility]NAGEMENT  for your care. Our goal is always to provide you with excellent care. Hearing back from our patients is one way we can continue to improve our services. Please take a few minutes to complete the written survey that you may receive in the mail after your visit with us. Thank you!             Your Updated Medication List - Protect others around you: Learn how to safely use, store and throw away your medicines at www.disposemymeds.org.          This list is accurate as of: 5/3/17 11:40 AM.  Always use your most recent med list.                   Brand Name Dispense Instructions for use    ACETAMINOPHEN PO      Take 500 mg by mouth every 6 hours as needed for pain Reported on 3/30/2017       amLODIPine 2.5 MG tablet    NORVASC    30 tablet    Take 1 tablet (2.5 mg) by mouth daily       bisacodyl 10 MG Suppository    DULCOLAX    10 suppository    Place 1 suppository (10 mg) rectally daily as needed for constipation       buprenorphine 5 MCG/HR WK patch    BUTRANS     4 patch    Place 1 patch onto the skin every 7 days       cycloSPORINE 0.05 % ophthalmic emulsion    RESTASIS     Place 1 drop into both eyes 2 times daily       enoxaparin 40 MG/0.4ML injection    LOVENOX    12 mL    INJECT 1 SYRINGE SUBCUTANEOUSLY DAILY       EPINEPHrine 0.3 MG/0.3ML injection     1 each    Inject 0.3 mLs (0.3 mg) into the muscle once as needed for anaphylaxis       LORazepam 1 MG tablet    ATIVAN    30 tablet    Take 1 tablet (1 mg) by mouth every 6 hours as needed (nausea/vomiting, anxiety or sleep)       megestrol 40 MG/ML suspension    MEGACE ORAL    600 mL    Take 10 mLs (400 mg) by mouth 2 times daily       MELATONIN PO      Take 1 mg by mouth At Bedtime       ondansetron 8 MG ODT tab    ZOFRAN-ODT    90 tablet    Place 1 tablet (8 mg) under the tongue every 8 hours as needed for nausea       ONE DAILY MULTIVITAMIN WOMEN Tabs      Take 1 tablet by mouth every morning       polyethylene glycol powder    MIRALAX/GLYCOLAX     Take 1 capful by mouth daily as needed       prochlorperazine 10 MG tablet    COMPAZINE    30 tablet    Take 1 tablet (10 mg) by mouth every 6 hours as needed (nausea/vomiting)       senna-docusate 8.6-50 MG per tablet    SENOKOT-S;PERICOLACE    7 tablet    Take 1-2 tablets by mouth 2 times daily       tamsulosin 0.4 MG capsule    FLOMAX    60 capsule    Take 1 capsule (0.4 mg) by mouth daily       tolterodine 4 MG 24 hr capsule    DETROL LA    30 capsule    TAKE ONE CAPSULE BY MOUTH DAILY       VITAMIN B6 PO      Take 1 tablet by mouth At Bedtime Reported on 4/27/2017       ZANTAC PO      Take 75 mg by mouth daily

## 2017-05-03 NOTE — PROGRESS NOTES
Care Coordinator Note  PT is being seen on 5/4/ in [facility] with Dr Solomon.  Mailed the CDs today to them.   Jessica TIMMONS, RN, OCN  Care Coordinator   Gynecologic Cancer   Office [phone]  Pager [phone] #[phone]

## 2017-05-03 NOTE — NURSING NOTE
LPN reviewed AVS with Pt including:  . buprenorphine (BUTRANS) 5 MCG/HR WK patch- Place 1 patch onto the skin every 7 days    * If you feel that this medication is not working please call clinic. You may remove patches.     2. Please schedule your Procedure with Dr. Valladares.    Please call Susan at [phone] to schedule your procedure. She is available Monday - Friday from 9-5:30pm, if she is unavailable to take your call, please leave her a voice message with your name, birth date, and phone number and she will call you back.    Your procedure: Radiofrequency ablation of the Splanchnic nerves    Pt verbalized an understanding of information, and was asked to contact clinic with questions.    LPN read through the Pre-procedure instructions with pt.   Pt verbalized understanding to holding appropriate medication per protocol, and was agreeable to NPO policy and needing a .      Nicki Keating LPN

## 2017-05-03 NOTE — PATIENT INSTRUCTIONS
1. buprenorphine (BUTRANS) 5 MCG/HR WK patch- Place 1 patch onto the skin every 7 days    * If you feel that this medication is not working please call clinic. You may remove patches.     2. Please schedule your Procedure with Dr. Valladares.    Please call Susna at [phone] to schedule your procedure. She is available Monday - Friday from 9-5:30pm, if she is unavailable to take your call, please leave her a voice message with your name, birth date, and phone number and she will call you back.    Your procedure: Radiofrequency ablation of the Splanchnic nerves    On the day of the procedure  1. Arrive 1 hour earlier than your scheduled time, to the [facility]  Address: [address] [place] [address], [place] [address]  2. Check in on the 5th floor for your procedure    A nurse will call you 4 weeks after your procedure to follow up.    If you must reschedule your procedure more than two times, you must follow up in clinic before rescheduling again.      Patient Pre-Procedure Instructions    CAUTION - FAILURE TO FOLLOW THESE PRE-PROCEDURE INSTRUCTIONS WILL RESULT IN YOUR PROCEDURE BEING RESCHEDULED.      Pregnancy  If you are pregnant, or think that you may be pregnant, please notify our staff. This may or may not affect the ability to perform the procedure.     You MUST have a  TO TAKE YOU HOME after your procedure. Transportation by Taxi or Para-transit must have a responsible adult accompany you home (other than the ). Travel by bus or light rail is not acceptable transportation. You must provide your 's full name and contact number at time of check in.   Fasting Protocol You may have NOTHING SOLID TO EAT FOR 8 HOURS prior to arrival at the procedure area.   Broth and candy are considered solid food and require an eight hour fast.   You may have CLEAR LIQUIDS UP TO 2 HOURS prior to arrival at the clinic.   Clear liquids include water, clear fruit juice (no pulp) carbonated  beverages, ice, black coffee, black tea (no milk or cream), chewing gum (un-swallowed), and/or clear jello (no fruit or milk). No alcohol containing beverages.   Medications If you take any medications,  DO NOT STOP. Take your medications as usual the morning of your procedure with a sip of water AT LEAST 2 HOURS PRIOR TO ARRIVAL.    Antibiotics If you are currently taking antibiotics, you must complete the entire dose 7 days prior to your scheduled procedure. You must be clear of any signs or symptoms of infection. If you begin antibiotics, please contact our clinic for instructions.   Fever, Chills, or Rash If you experience a fever of higher than 100 degrees, chills, rash, or open wounds during the one week prior to your procedure, please call the clinic.         Medication Hold List  **Patients under Cardiology/Neurology care should consult their provider prior to the pain procedure to verify pre-procedure medication instructions. The information below contains general guidelines.**    Blood Thinners If you are taking daily ASPIRIN, PLAVIX, OR OTHER BLOOD THINNERS SUCH AS COUMADIN/WARFARIN, we will need your prescribing doctor to sign a release permitting you to stop these medications. Once approved by your prescribing doctor - STOP ALL BLOOD THINNERS BASED ON THE TIME TABLE BELOW PRIOR TO YOUR PROCEDURE. If you have been instructed to stop WARFARIN(COUMADIN), you must have an INR lab drawn the day before your procedure. . Your INR must be within normal limits before we can perform your injection. MEDICATIONS CAN BE RESTARTED AFTER YOUR PROCEDURE.    14 DAY HOLD  Ticlid (ticlopidine)    10 DAY HOLD  Effient (Prasugel)    3 DAY HOLD  Xarelto (rivaroxaban) 7 DAY HOLD  Anacin, Bufferin, Ecotrin, Excedrin, Aggrenox (Aspirin)  Brilinta (ticagrelor)  Coumadin (Warfarin)  Pradexa (Dabigatran)  Elmiron (Pentosan)  Plavix (Clopidogrel Bisulfate)  Pletal (Cilostazol)    24 DAY HOLD  Lovenox (enoxaparin)  Agrylin  (Anagrelide)        Non-steroidal Anti-inflammatories (NSAIDs) DO NOT TAKE any non-steroidal anti-inflammatory medications (NSAIDs) listed on the table below. MEDICATIONS CAN BE RESTARTED AFTER YOUR PROCEDURE. Celebrex is OK to take and does not need to be discontinued.     Medications to stop:  3 DAY HOLD  Advil, Motrin (Ibuprofen)  Arthrotec (diciofenac sodium/misoprostol)  Clinoril (Sulindac)  Indocin (Indomethacin)  Lodine (Etodolac)  Toradol (Ketorolac)  Vicoprofen (Hydrocodone and Ibuprofen)  Voltaren (Diclotenac)    14 DAY HOLD  Daypro (Oxaprozin)  Feldene (Piroxicam)   7 DAY HOLD  Aleve (Naproxen sodium)  Darvon compound (contains aspirin)  Naprosyn (Naproxen)  Norgesic Forte (contains aspirin)  Mobic (Meloxicam)  Oruvall (Ketoprofen)  Percodan (contains aspirin)  Relafen (Nabumetone)  Salsalate  Trilisate  Vitamin E (more than 400 mg per day)  Any medication containing aspirin                To speak with a nurse, schedule/reschedule/cancel a clinic appointment, or request a medication refill call: [phone]     You can also reach us by [email or web address]: [email or web address]

## 2017-05-03 NOTE — LETTER
"5/3/2017     RE: Margaux Guzmán  [address] [facility] [address]     Dear Colleague,    Thank you for referring your patient, Margaux Guzmán, to the [facility] at [facility]. Please see a copy of my visit note below.    [facility] : Progress Note    Date of visit: 5/3/2017    Interval history:  Margaux Guzmán is a 61 year old female  is known to me for Pain due to ureteral stent, initial encounter (H). She is patient of my partner Dr. Mensah. 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 Margaux contributes the pain to ureteral stents, as the pain started after the stents were placed. The stents are exchanged q 3 months, and changing the size of the stents hasn't helped. She describes feeling like there's \"a knife stuck in me\" and the pain is constant, but worse with movements. Heat helps a lot, so she carries around heat pads. No new dysuria or hematuria. She did have a urinary tract infection a few weeks ago with severe constipation, but this has resolved. In the past she has tried multiple opioids including MS contin, Dilaudid, Tramadol, Oxycodone, Ibuprofen. However, she didn't tolerate any of these medications. Opioids work for few days then they stop working. She has stomach upset with ibuprofen. She takes tylenol but its not strong enough to control her pain. The option for PNT tubes was discussed, but she \"doesn't want to go there\". She did see a pain clinic in [place], recommended celiac plexus block, but she would like to " continue pain management through [facility]. She is weary of trying further medications. Then we performed left splanchnic nerve block on 4/20/17. She had good pain relief for 2 days and then her pain came back. However, she recalls that those two days were her best days in the recent months. For the first time in several months,  She was able to  her mails from [email or web address] which was 1/10 mile from her apartment. Today she came for follow up visits to discuss her pain management options.       Recommendations/plan at the last visit included:  Patient education: I went over the above diagnoses and treatment plan with her and answered all of his questions.  Imaging review: I reviewed the CT scan from 3/30/2017  Interventions: left splanchnic ganglion block recommended - she also can try acupuncture first if she chooses.   Medications  -No new medications recommended today  -Continue scheduled tylenol, antiemetics, and constipation medications  -Referrals for acupuncture provided  Exercise program: Not indicated  Behavioral Psychology: Not indicated  Further Diagnostic Testing/Imaging: None.  Referrals: No new referals  Follow up: Pending possible injection, likely follow up in 2 months    Since the last visit, Margaux Guzmán reports:  - left flankpain: pain continues and she feels it getting worse everyday.  - medication response: she has tried multiple opioids including MS contin, Dilaudid, Tramadol, Oxycodone, Ibuprofen. However, she didn't tolerate any of these medications. Opioids work for few days then they stop working. She has stomach upset with ibuprofen.     - L splanchnic block procedure response: had left splanchnic nerve block on 4/20/17. She had good pain relief for 2 days and then her pain came back. However, she recalls that those two days were her best days in the recent months. For the first time in several months,  She was able to  her mails from [email or web address] which was 1/10 mile from her  "apartment.     Review of Systems:  The 14 system ROS was reviewed and was negative except what is documented above and as follows.  Any bowel or bladder problems: none  Mood: depressed     Physical Exam:  Vitals:    05/03/17 1048   BP: 121/70   Pulse: 100   SpO2: 100%   Weight: 53.5 kg (118 lb)   Height: 1.676 m (5' 6\")       General: Alert,  in no acute distress.   Eyes: Pupils equal, sclera clear.   ENT: MMM. No ulcerations or plaques.   Cardiac: Normal rate, regular rhythm, no murmurs.   Resp: Unlabored on room air  Abd: Soft, nontender  MSK: +tenderness to mild and deep palpation of left CVA and lateral lower thoracic musculature. No significant tenderness to palpation of right back  Neuro: No facial asymmetry. Spontaneous movements grossly non-focal. Normal gait.   Neuropsych: feeling depressed.     Medications:  Current Outpatient Prescriptions   Medication Sig Dispense Refill     buprenorphine (BUTRANS) 5 MCG/HR WK patch Place 1 patch onto the skin every 7 days 4 patch 1     enoxaparin (LOVENOX) 40 MG/0.4ML injection INJECT 1 SYRINGE SUBCUTANEOUSLY DAILY 12 mL 1     tolterodine (DETROL LA) 4 MG 24 hr capsule TAKE ONE CAPSULE BY MOUTH DAILY 30 capsule 3     ondansetron (ZOFRAN-ODT) 8 MG ODT tab Place 1 tablet (8 mg) under the tongue every 8 hours as needed for nausea 90 tablet 3     amLODIPine (NORVASC) 2.5 MG tablet Take 1 tablet (2.5 mg) by mouth daily 30 tablet      bisacodyl (DULCOLAX) 10 MG Suppository Place 1 suppository (10 mg) rectally daily as needed for constipation 10 suppository 0     megestrol (MEGACE ORAL) 40 MG/ML suspension Take 10 mLs (400 mg) by mouth 2 times daily 600 mL 1     tamsulosin (FLOMAX) 0.4 MG capsule Take 1 capsule (0.4 mg) by mouth daily 60 capsule 6     LORazepam (ATIVAN) 1 MG tablet Take 1 tablet (1 mg) by mouth every 6 hours as needed (nausea/vomiting, anxiety or sleep) 30 tablet 3     ACETAMINOPHEN PO Take 500 mg by mouth every 6 hours as needed for pain Reported on 3/30/2017 "       Multiple Vitamins-Minerals (ONE DAILY MULTIVITAMIN WOMEN) TABS Take 1 tablet by mouth every morning        polyethylene glycol (MIRALAX/GLYCOLAX) powder Take 1 capful by mouth daily as needed        cycloSPORINE (RESTASIS) 0.05 % ophthalmic emulsion Place 1 drop into both eyes 2 times daily        Ranitidine HCl (ZANTAC PO) Take 75 mg by mouth daily        MELATONIN PO Take 1 mg by mouth At Bedtime        EPINEPHrine (EPIPEN) 0.3 MG/0.3ML injection Inject 0.3 mLs (0.3 mg) into the muscle once as needed for anaphylaxis 1 each 0     senna-docusate (SENOKOT-S;PERICOLACE) 8.6-50 MG per tablet Take 1-2 tablets by mouth 2 times daily 7 tablet      Pyridoxine HCl (VITAMIN B6 PO) Take 1 tablet by mouth At Bedtime Reported on 4/27/2017       prochlorperazine (COMPAZINE) 10 MG tablet Take 1 tablet (10 mg) by mouth every 6 hours as needed (nausea/vomiting) 30 tablet 2         LABORATORY VALUES:   Recent Labs   Lab Test  04/27/17   0923  04/20/17   1255   NA  133  134   POTASSIUM  3.7  3.9   CHLORIDE  97  100   CO2  29  27   ANIONGAP  7  7   GLC  105*  123*   BUN  15  28   CR  0.77  0.94   PURVI  9.2  8.9       CBC RESULTS:   Recent Labs   Lab Test  04/27/17   0923   WBC  2.9*   RBC  2.62*   HGB  9.0*   HCT  26.9*   MCV  103*   MCH  34.4*   MCHC  33.5   RDW  16.6*   PLT  287       Most Recent 3 INR's:  Recent Labs   Lab Test  04/27/17   0923  03/15/17   1524  01/27/17   0007   INR  0.98  0.98  0.99       Diagnostic tests:            ASSESSMENT:    Margaux Guzmán is a 61 year old female with past medical history significant for ovarian vs primary peritoneal carcinoma and bilateral hydronephrosis s/p ureteral stenting who has significant left flank pain for >1 year, which started after stent placement. She has significant tenderness to palpation at her left flank, likely due to a visceral disturbance from her stent and hydronephrosis, along with associated paraspinal muscular hypertrophy, but no overt allodynia. She has  not tolerated medications well in the past due to side effects and ineffectiveness, and is not open to further medication management or further surgical procedures (ie: PNT placement). She did see a pain clinic in [place], recommended celiac plexus block, but she would like to continue pain management through [facility]. She is weary of trying further medications. Then we performed left splanchnic nerve block on 4/20/17. She had good pain relief for 2 days and then her pain came back. However, she recalls that those two days were her best days in the recent months. For the first time in several months,  She was able to  her mails from [employer] which was 1/10 mile from her apartment. Today she came for follow up visits to discuss her pain management options. Her pain is somatic and neuropathic pain related to cancer.       Diagnosis:   Pain due to ureteral stent, initial encounter (H)  Cancer associated pain        PLAN:    1. Medications.     a. -Considering the patient's intolerance to a number of opioids, we will start her on buprenorphine patch. a 2009 consensus statement from an international panel with expertise in palliative care and pain treatment endorsed the use of transdermal buprenorphine where available.     -Would recommend to take butrans patch 5 mcg/hour applied once every 7 days.    2. Interventional procedures:    We will schedule the patient for left splanchnic nerve neurolysis  pending insurance approval. I went over the  risks of the procedure including pneumothorax, paraplegia, failed procedure, increased pain, with her and answered all of her questions.     3. Labs and imaging: None needed for pain management. The patient will provide a urine/drug screen at the next visit to monitor and adhere to the plan of care (as drawn out in our pain contract) as well as to screen for the presence of illicit drugs/substances.      4. Rehab: None indicated. The patient is encouraged to stay active and to  perform exercise as tolerated.      5. Psychology: I offered her to see our pain psychologist. She is not interested.     6. Integrated medicine: We discussed acupuncture therapy, but the patient is not interested.     7. Opioid Safety. The patient received full information regarding chronic pain management guidelines, appropriate opioid and adjuvant pain medication usage, potential side effects, adverse reactions, and risks of possible withdrawal and dependency. The patient has signed opioid contract and agreed to follow the established guidelines and the goals of treatment plan.    8. Disposition:  The patient will follow up in our outpatient clinic in 4 weeks or earlier if clinically indicated.       Assessment will be ongoing with changes in treatment as indicated.  Benefits/risks/alternatives to treatment have been reviewed and the patient has been instructed to contact this office if they have any questions or concerns.  This plan of care has been discussed with the patient and the patient is in agreement.     Shabbir Valladares MD, PHD

## 2017-05-03 NOTE — PROGRESS NOTES
"[facility] : Progress Note    Date of visit: 5/3/2017    Interval history:  Margaux Guzmán is a 61 year old female  is known to me for Pain due to ureteral stent, initial encounter (H). She is patient of my partner Dr. Mensah. 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 Margaux contributes the pain to ureteral stents, as the pain started after the stents were placed. The stents are exchanged q 3 months, and changing the size of the stents hasn't helped. She describes feeling like there's \"a knife stuck in me\" and the pain is constant, but worse with movements. Heat helps a lot, so she carries around heat pads. No new dysuria or hematuria. She did have a urinary tract infection a few weeks ago with severe constipation, but this has resolved. In the past she has tried multiple opioids including MS contin, Dilaudid, Tramadol, Oxycodone, Ibuprofen. However, she didn't tolerate any of these medications. Opioids work for few days then they stop working. She has stomach upset with ibuprofen. She takes tylenol but its not strong enough to control her pain. The option for PNT tubes was discussed, but she \"doesn't want to go there\". She did see a pain clinic in [place], recommended celiac plexus block, but she would like to continue pain management through [facility]. She is weary of trying further medications. Then we performed left splanchnic nerve block on 4/20/17. She had good pain relief for 2 days and then her pain came back. However, she recalls that those two days were her best days in the recent months. For the first time in " several months,  She was able to  her mails from mailbox which was 1/10 mile from her apartment. Today she came for follow up visits to discuss her pain management options.       Recommendations/plan at the last visit included:  Patient education: I went over the above diagnoses and treatment plan with her and answered all of his questions.  Imaging review: I reviewed the CT scan from 3/30/2017  Interventions: left splanchnic ganglion block recommended - she also can try acupuncture first if she chooses.   Medications  -No new medications recommended today  -Continue scheduled tylenol, antiemetics, and constipation medications  -Referrals for acupuncture provided  Exercise program: Not indicated  Behavioral Psychology: Not indicated  Further Diagnostic Testing/Imaging: None.  Referrals: No new referals  Follow up: Pending possible injection, likely follow up in 2 months    Since the last visit, Margaux Guzmán reports:  - left flankpain: pain continues and she feels it getting worse everyday.  - medication response: she has tried multiple opioids including MS contin, Dilaudid, Tramadol, Oxycodone, Ibuprofen. However, she didn't tolerate any of these medications. Opioids work for few days then they stop working. She has stomach upset with ibuprofen.     - L splanchnic block procedure response: had left splanchnic nerve block on 4/20/17. She had good pain relief for 2 days and then her pain came back. However, she recalls that those two days were her best days in the recent months. For the first time in several months,  She was able to  her mails from mailbox which was 1/10 mile from her apartment.     Review of Systems:  The 14 system ROS was reviewed and was negative except what is documented above and as follows.  Any bowel or bladder problems: none  Mood: depressed     Physical Exam:  Vitals:    05/03/17 1048   BP: 121/70   Pulse: 100   SpO2: 100%   Weight: 53.5 kg (118 lb)   Height: 1.676 m (5'  "6\")       General: Alert,  in no acute distress.   Eyes: Pupils equal, sclera clear.   ENT: MMM. No ulcerations or plaques.   Cardiac: Normal rate, regular rhythm, no murmurs.   Resp: Unlabored on room air  Abd: Soft, nontender  MSK: +tenderness to mild and deep palpation of left CVA and lateral lower thoracic musculature. No significant tenderness to palpation of right back  Neuro: No facial asymmetry. Spontaneous movements grossly non-focal. Normal gait.   Neuropsych: feeling depressed.     Medications:  Current Outpatient Prescriptions   Medication Sig Dispense Refill     buprenorphine (BUTRANS) 5 MCG/HR WK patch Place 1 patch onto the skin every 7 days 4 patch 1     enoxaparin (LOVENOX) 40 MG/0.4ML injection INJECT 1 SYRINGE SUBCUTANEOUSLY DAILY 12 mL 1     tolterodine (DETROL LA) 4 MG 24 hr capsule TAKE ONE CAPSULE BY MOUTH DAILY 30 capsule 3     ondansetron (ZOFRAN-ODT) 8 MG ODT tab Place 1 tablet (8 mg) under the tongue every 8 hours as needed for nausea 90 tablet 3     amLODIPine (NORVASC) 2.5 MG tablet Take 1 tablet (2.5 mg) by mouth daily 30 tablet      bisacodyl (DULCOLAX) 10 MG Suppository Place 1 suppository (10 mg) rectally daily as needed for constipation 10 suppository 0     megestrol (MEGACE ORAL) 40 MG/ML suspension Take 10 mLs (400 mg) by mouth 2 times daily 600 mL 1     tamsulosin (FLOMAX) 0.4 MG capsule Take 1 capsule (0.4 mg) by mouth daily 60 capsule 6     LORazepam (ATIVAN) 1 MG tablet Take 1 tablet (1 mg) by mouth every 6 hours as needed (nausea/vomiting, anxiety or sleep) 30 tablet 3     ACETAMINOPHEN PO Take 500 mg by mouth every 6 hours as needed for pain Reported on 3/30/2017       Multiple Vitamins-Minerals (ONE DAILY MULTIVITAMIN WOMEN) TABS Take 1 tablet by mouth every morning        polyethylene glycol (MIRALAX/GLYCOLAX) powder Take 1 capful by mouth daily as needed        cycloSPORINE (RESTASIS) 0.05 % ophthalmic emulsion Place 1 drop into both eyes 2 times daily        Ranitidine " HCl (ZANTAC PO) Take 75 mg by mouth daily        MELATONIN PO Take 1 mg by mouth At Bedtime        EPINEPHrine (EPIPEN) 0.3 MG/0.3ML injection Inject 0.3 mLs (0.3 mg) into the muscle once as needed for anaphylaxis 1 each 0     senna-docusate (SENOKOT-S;PERICOLACE) 8.6-50 MG per tablet Take 1-2 tablets by mouth 2 times daily 7 tablet      Pyridoxine HCl (VITAMIN B6 PO) Take 1 tablet by mouth At Bedtime Reported on 4/27/2017       prochlorperazine (COMPAZINE) 10 MG tablet Take 1 tablet (10 mg) by mouth every 6 hours as needed (nausea/vomiting) 30 tablet 2         LABORATORY VALUES:   Recent Labs   Lab Test  04/27/17   0923 04/20/17   1255   NA  133  134   POTASSIUM  3.7  3.9   CHLORIDE  97  100   CO2  29  27   ANIONGAP  7  7   GLC  105*  123*   BUN  15  28   CR  0.77  0.94   PURVI  9.2  8.9       CBC RESULTS:   Recent Labs   Lab Test  04/27/17   0923   WBC  2.9*   RBC  2.62*   HGB  9.0*   HCT  26.9*   MCV  103*   MCH  34.4*   MCHC  33.5   RDW  16.6*   PLT  287       Most Recent 3 INR's:  Recent Labs   Lab Test  04/27/17   0923  03/15/17   1524  01/27/17   0007   INR  0.98  0.98  0.99       Diagnostic tests:            ASSESSMENT:    Margaux Guzmán is a 61 year old female with past medical history significant for ovarian vs primary peritoneal carcinoma and bilateral hydronephrosis s/p ureteral stenting who has significant left flank pain for >1 year, which started after stent placement. She has significant tenderness to palpation at her left flank, likely due to a visceral disturbance from her stent and hydronephrosis, along with associated paraspinal muscular hypertrophy, but no overt allodynia. She has not tolerated medications well in the past due to side effects and ineffectiveness, and is not open to further medication management or further surgical procedures (ie: PNT placement). She did see a pain clinic in [place], recommended celiac plexus block, but she would like to continue pain management through  [facility]. She is weary of trying further medications. Then we performed left splanchnic nerve block on 4/20/17. She had good pain relief for 2 days and then her pain came back. However, she recalls that those two days were her best days in the recent months. For the first time in several months,  She was able to  her mails from [employer] which was 1/10 mile from her apartment. Today she came for follow up visits to discuss her pain management options. Her pain is somatic and neuropathic pain related to cancer.       Diagnosis:   Pain due to ureteral stent, initial encounter (H)  Cancer associated pain        PLAN:    1. Medications.     a. -Considering the patient's intolerance to a number of opioids, we will start her on buprenorphine patch. a 2009 consensus statement from an international panel with expertise in palliative care and pain treatment endorsed the use of transdermal buprenorphine where available.     -Would recommend to take butrans patch 5 mcg/hour applied once every 7 days.    2. Interventional procedures:    We will schedule the patient for left splanchnic nerve neurolysis  pending insurance approval. I went over the  risks of the procedure including pneumothorax, paraplegia, failed procedure, increased pain, with her and answered all of her questions.     3. Labs and imaging: None needed for pain management. The patient will provide a urine/drug screen at the next visit to monitor and adhere to the plan of care (as drawn out in our pain contract) as well as to screen for the presence of illicit drugs/substances.      4. Rehab: None indicated. The patient is encouraged to stay active and to perform exercise as tolerated.      5. Psychology: I offered her to see our pain psychologist. She is not interested.     6. Integrated medicine: We discussed acupuncture therapy, but the patient is not interested.     7. Opioid Safety. The patient received full information regarding chronic pain management  guidelines, appropriate opioid and adjuvant pain medication usage, potential side effects, adverse reactions, and risks of possible withdrawal and dependency. The patient has signed opioid contract and agreed to follow the established guidelines and the goals of treatment plan.    8. Disposition:  The patient will follow up in our outpatient clinic in 4 weeks or earlier if clinically indicated.       Assessment will be ongoing with changes in treatment as indicated.  Benefits/risks/alternatives to treatment have been reviewed and the patient has been instructed to contact this office if they have any questions or concerns.  This plan of care has been discussed with the patient and the patient is in agreement.     Shabbir Valladares MD, PHD

## 2017-05-08 NOTE — TELEPHONE ENCOUNTER
Surgery scheduled for 06/12/17 at 9:50am with a 8:00am check in. Patient is going to go to her PCP for her pre-op due to a 2 hour drive to the [facility]. She also requested that she follow up with her PCP for her post-op due to not having a  and she has not been feeling well to drive herself. I did check with  Nurse and she said that was fine. Patients surgery packet is being mailed out today.

## 2017-05-08 NOTE — PROGRESS NOTES
"Care Coordinator Note  \" I saw [facility] last week and they started to say different things then what Dr Fuentes wanted to do. I just do not fell comfortable with them Could I get the chemo and see Dr Mccollum at [facility].  Yes I want to start the Weekly Taxol next week.   Will inform Dr Mccollum and start the process.     Pt verbalized back understanding of the above information discussed.   Jessica TIMMONS RN, OCN  Care Coordinator   Gynecologic Cancer   Office [phone]  Pager [phone] #[phone]    "

## 2017-05-10 NOTE — PROGRESS NOTES
"Care Coordinator Note  \" I  think my final decision is weekly at the [facility] but I do no know for how long. \".  After much discussion this came to be.   Will start on 5/22 with weekly Taxol for 3 cycles then to repeat the CT scan.          Pt verbalized back understanding of the above information discussed.   Jessica TIMMONS RN, OCN  Care Coordinator   Gynecologic Cancer   Office [phone]  Pager [phone] #[phone]  "

## 2017-05-15 NOTE — PROGRESS NOTES
"Care Coordinator Note  Called pt to clarify her allergy to Benadryl She states \" It is with the IV bendryl, has had oral Benadryl and I did just fine.\"      Pt verbalized back understanding of the above information discussed.   Jessica TIMMONS RN, OCN  Care Coordinator   Gynecologic Cancer   Office [phone]  Pager [phone] #[phone]  "

## 2017-05-18 NOTE — H&P
"Abbreviated History and Physical    Patient Name: Margaux Guzmán  YOB: 1955    Reason for Procedure: chronic flank pain and pelvic pain. Cancer associated pain     History:    Past Medical History:   Diagnosis Date     Anemia      History of blood transfusion     MULTIPLE     Hydronephrosis      Hyperlipidemia      Jugular vein thrombosis, right     Persistent right internal jugular DVT     Livedo annularis      Osteoarthritis      Osteopenia      Ovarian cancer (H)      Polymyalgia rheumatica (H)      PONV (postoperative nausea and vomiting)      Primary cancer of peritoneum (H)        History of obstructive sleep apnea? no    History of problems with sedation? no    Physical exam:  /75  Pulse 95  Temp 98.6  F (37  C) (Temporal)  Resp 16  Ht 1.676 m (5' 6\")  Wt 51.7 kg (114 lb)  SpO2 100%  BMI 18.4 kg/m2  General: in no apparent distress   Neuro: At least antigravity strength noted in all 4 extremities  Respiratory: Clear to ausculation bilaterally   Cardiac: Regular rate and rhythm  Skin: No rashes or lesions on exposed areas of skin    Medications:   Current Outpatient Prescriptions   Medication     buprenorphine (BUTRANS) 5 MCG/HR WK patch     enoxaparin (LOVENOX) 40 MG/0.4ML injection     tolterodine (DETROL LA) 4 MG 24 hr capsule     ondansetron (ZOFRAN-ODT) 8 MG ODT tab     amLODIPine (NORVASC) 2.5 MG tablet     tamsulosin (FLOMAX) 0.4 MG capsule     LORazepam (ATIVAN) 1 MG tablet     ACETAMINOPHEN PO     Multiple Vitamins-Minerals (ONE DAILY MULTIVITAMIN WOMEN) TABS     polyethylene glycol (MIRALAX/GLYCOLAX) powder     cycloSPORINE (RESTASIS) 0.05 % ophthalmic emulsion     Ranitidine HCl (ZANTAC PO)     MELATONIN PO     senna-docusate (SENOKOT-S;PERICOLACE) 8.6-50 MG per tablet     Pyridoxine HCl (VITAMIN B6 PO)     prochlorperazine (COMPAZINE) 10 MG tablet     bisacodyl (DULCOLAX) 10 MG Suppository     megestrol (MEGACE ORAL) 40 MG/ML suspension     EPINEPHrine (EPIPEN) 0.3 " "MG/0.3ML injection     Current Facility-Administered Medications   Medication     midazolam (VERSED) injection 0.5-1 mg     flumazenil (ROMAZICON) injection 0.2 mg     fentaNYL Citrate (PF) (SUBLIMAZE) injection 25-50 mcg     naloxone (NARCAN) injection 0.1-0.4 mg       Allergies:     Allergies   Allergen Reactions     Contrast Dye Itching and Swelling     Itching/tingling of mouth and nose with sensation of swelling after receiving IV contrast for CT.  This occurred despite steroid premedication. Therefore the patient should not receive intravenous contrast in the future.      Benadryl Allergy Other (See Comments)     Stay awake, restless, \"uncomfortable\", \"feel like I need to run away\"  With  IV dose,  does fine with oral Benadryl.     Lovenox [Enoxaparin] Hives     3/23/16: Okay to receive heparin flushes in port, tolerates well. Patricia Cortez FNP-C     Amoxicillin Rash     Diphenhydramine Anxiety     No Clinical Screening - See Comments Rash     Pollen Extract Rash     Sulfa Drugs Rash       ASA Classification: 3    OK for local anesthetic use.     Shabbir Valladares MD  Pain Medicine  [facility] Department of Anesthesia  5/18/2017        "

## 2017-05-18 NOTE — OP NOTE
Patient: Margaux Guzmán Age: 61 year old   MRN: [id number] Attending: Dr. Valladares     Date of Visit: May 18, 2017       PAIN MEDICINE CLINIC PROCEDURE NOTE     ATTENDING CLINICIAN:   Shabbir Valladares MD     PREPROCEDURE DIAGNOSES:  1. Cancer associated pain  2. Chronic abdominal/pelvic pain  3. Peritoneal carcinomatosis  4. Chronic left flank pain        POSTPROCEDURE DIAGNOSES:  1. Cancer associated pain  2. Chronic abdominal/pelvic pain  3. Peritoneal carcinomatosis  4. Chronic left flank pain        PROCEDURE(S) PERFORMED:  1. Left splanchnic nerves block   2. Fluoroscopic guidance for the above-named procedure(s)        ANESTHESIA:  Local.     BLOOD LOSS:  Minimal.     DRAINS AND SPECIMENS:  None.     COMPLICATIONS:  None.     INDICATIONS:  Margaux Guzmán is a 61 year old female with a history chronic left flank pain.  She reported excellent pain relief from similar procedure in the past. Unfortunately pain relief was short lasting. She therefore initially agreed to perform splanchnic nerve neurolysis but changed her mind prior to the procedure considering significant risks of neurolysis. The patient stated that he was in usual state of health and denied recent anticoagulant use or recent infections. She stopped Lovenox 24 hours before. Therefore, the plan is to perform above mentioned procedure.      Procedure Details:     The patient was met in the procedure room, where the patient was identified by name, medical record number and date of birth. All of the patient s last minute questions were answered. Written informed consent was obtained and saved in the electronic medical record, after the risks, benefits, and alternatives were discussed with the patient.      A formal time-out procedure was performed, as per protocol, including patient name, title of procedure, and site of procedure, and all in the room concurred. Routine monitors were applied.      The patient was placed in the prone position on the  procedure room table. All pressure points were checked and comfortably padded. Routine monitors were placed. Vital signs were stable.     A chlorhexidine prep was completed followed by sterile draping per standard procedure.      The anatomical target site was determined using fluoroscopy by squaring off the superior endplate of T12, and then ipsilaterally obliquing the C-arm intensifier until the tip of the T12 transverse process was at the anterolateral border of the T12 vertebral body. We then had cephalad tilt of the C-arm to get 12 th rib out of our way. Target was made over the left inferior portion of the vertebral body of T12. Local anesthetic was given by raising a skin wheal and going down to the hub of the 25-gauge 1.25-inch needle. A 22-gauge 5-inch Quincke needle was then advanced just caudal to 12th rib at the T12. The needle was then advanced intermittently with close contact of vertebral at all times. Multiple AP and lateral fluoroscopy images were taken to make sure that the needle is always in close proximity of vertebral body. The needle tip was advanced until it was just ventral to the anterior border of the body of T12. After negative aspiration, injection of  2 cc magnevist contrast under live fluoroscopy demonstrated excellent prevertebral spread. Next, after negative aspiration, 19 mL of 0.25% bupivacaine and 1 ml of dexamethasone (40mg) were slowly injected with appropriate dissipation of contrast shown in washout images. This was also confirmed on both lateral and AP fluoroscopic views. The needle was removed and no significant bleeding was noted.      Light pressure was held at the puncture site(s) to prevent ecchymosis and oozing. The patient's skin was cleansed, and hemostasis was confirmed. Band-aids were applied to the needle injection site(s).      Condition:     The patient remained awake and alert throughout the procedure. The patient tolerated the procedure well and was monitored  for approximately 15 minutes afterward in the post procedure area. There were no immediate post procedure complications noted. The patient was then discharged to home as per protocol. The patient will follow up in the outpatient clinic in 4 week(s), unless otherwise clinically indicated.       Pre-procedure pain score: 6/10  Pos-procedure pain score: 3/10

## 2017-05-18 NOTE — IP AVS SNAPSHOT
MRN:[id number]                      After Visit Summary   5/18/2017    Margaux Guzmán    MRN: [id number]           Thank you!     Thank you for choosing [facility] for your care. Our goal is always to provide you with excellent care. Hearing back from our patients is one way we can continue to improve our services. Please take a few minutes to complete the written survey that you may receive in the mail after you visit with us. Thank you!        Patient Information     Date Of Birth          1955        About your hospital stay     You were admitted on:  May 18, 2017 [facility]    You were discharged on:  May 18, 2017       Who to Call     For medical emergencies, please call 911.  For non-urgent questions about your medical care, please call your primary care provider or clinic, [phone]  For questions related to your surgery, please call your surgery clinic        Attending Provider     Provider Specialty    Shabbir Valladares MD Anesthesiology       Primary Care Provider Office Phone # Fax #    Aurelia Knox [phone]       [facility] MEDICAL [facility] [address] [place] [id number]        Your next 10 appointments already scheduled     May 22, 2017  8:00 AM CDT   LAB with LAB ONC [facility] ([facility])    [address] [place] [id number] [phone]           Patient must bring picture ID.  Patient should be prepared to give a urine specimen  Please do not eat 10-12 hours before your appointment if you are coming in fasting for labs on lipids, cholesterol, or glucose (sugar).  Pregnant women should follow their Care Team instructions. Water with medications is okay. Do not drink coffee or other fluids.   If you have concerns about taking  your medications, please ask at office or if scheduling via [employer], send a message by clicking on Secure  Messaging, Message Your Care Team.            May 22, 2017  8:30 AM CDT   Return Visit with JUSTIN Patrick CNP   [facility] ([facility]    [address] [facility] [address]   [phone]            May 22, 2017  9:30 AM CDT   Level 2 with BAY 1 INFUSION   [facility] ([facility]    [address] [facility] [address]   [phone]            May 30, 2017  2:00 PM CDT   Level 2 with BAY 7 INFUSION   [facility] ([facility])    [address] [facility] [address]   [phone]            Jun 05, 2017  9:00 AM CDT   Level 2 with BAY 7 INFUSION   [facility] ([facility])    [address] [facility] [address]   [phone]            Jun 12, 2017   Procedure with Carmela Alvarez MD   [facility] ([facility])    [address] [place] [address]  [address]th [facility] [id number] [phone]           Located in the [facility] at [address] [place] [address].   parking is very convenient and highly recommended.  is a $6 flat rate fee.  Both  and self parkers should enter the main arrival plaza from [facility]; parking attendants will direct you based on your parking preference.            Nov 01, 2017 10:30 AM CDT   Lab with  LAB   [facility] ([facility])    [address] [place] [id number] [phone]            Nov 01, 2017 11:35 AM CDT   (Arrive by 11:05 AM)   Return Visit with Mackenzie Ca MD   [facility] ([facility])    [address] [place] [address]  3rd [facility] [id number] [phone]              Further instructions from your care team       Home Care Instructions after a Nerve Block      In an splanchnic  nerve block, a local anesthetic (numbing medicine) is injected near sensory nerves which carry pain signals to the brain. With this injection, a steroid to increase the longevity of the blocks effect may be used.    Activity  -You may resume most normal activity levels with the exception of strenuous activity. It is important for us to know if your pain with normal activity is relieved after this injection.  -DO NOT shower for 24 hours  -DO NOT remove bandaid for 24 hours    Pain  -You may experience soreness at the injection site for one or two days  -You may use an ice pack for 20 minutes every 2 hours for the first 24 hours  -You may use a heating pad after the first 24 hours  -You may use Tylenol (acetaminophen) every 4 hours or other pain medicines as     directed by your physician    You may experience numbness radiating into your legs or arms (depending on the procedure location). This numbness may last several hours. Until sensation returns to normal; please use caution in walking, climbing stairs, and stepping out of your vehicle, etc.    DID YOU RECEIVE SEDATION TODAY?  No    If you received sedation please follow these additional safety measures.  Sedation medicine, if given, may remain active for many hours. It is important for the next 24 hours that you do not:  -Drive a car  -Operate machines or power tools  -Consume alcohol, including beer  -Sign any important papers or legal documents    DID YOU RECEIVE STEROIDS TODAY?  Yes    Common side effects of steroids:  Not everyone will experience corticosteroid side effects. If side effects are experienced, they will gradually subside in the 7-10 day period following an injection. Most common side effects include:  -Flushed face and/or chest  -Feeling of warmth, particularly in the face but could be an overall feeling of warmth  -Increased blood sugar in diabetic patients  -Menstrual irregularities my occur. If taking hormone-based birth control an alternate  "method of birth control is recommended  -Sleep disturbances and/or mood swings are possible  -Leg cramps      PLEASE KEEP TRACK OF YOUR SYMPTOMS AND NOTE YOUR IMPROVEMENT FOR YOUR DOCTOR.     Please contact us if you have:  -Severe pain  -Fever more than 101.5 degrees Fahrenheit  -Signs of infection at the injection site (redness, swelling, or drainage)    If you have questions, please contact our office at [phone] between the hours of 7:00 am and 3:00 pm Monday through Friday. After office hours you can contact the on call provider by dialing [phone]. If you need immediate attention, we recommend that you go to a hospital emergency room or dial 911.          Pending Results     Date and Time Order Name Status Description    5/18/2017 0956 XR SURGERY EARNEST FLUORO LESS THAN 5 MIN W STILLS In process             Admission Information     Date & Time Provider Department Dept. Phone    5/18/2017 Shabbir Valladares MD [facility] [phone]      Your Vitals Were     Blood Pressure Pulse Temperature Respirations Height Weight    157/93 95 98.6  F (37  C) (Temporal) 20 1.676 m (5' 6\") 51.7 kg (114 lb)    Pulse Oximetry BMI (Body Mass Index)                99% 18.4 kg/m2          [employer] Information     [employer] gives you secure access to your electronic health record. If you see a primary care provider, you can also send messages to your care team and make appointments. If you have questions, please call your primary care clinic.  If you do not have a primary care provider, please call [phone] and they will assist you.      [employer] is an electronic gateway that provides easy, online access to your medical records. With [employer], you can request a clinic appointment, read your test results, renew a prescription or communicate with your care team.     To access your existing account, please contact your [facility] or call [phone] for assistance.      "   Care EveryWhere ID     This is your Care EveryWhere ID. This could be used by other organizations to access your [facility] medical records  [id number]           Review of your medicines      UNREVIEWED medicines. Ask your doctor about these medicines        Dose / Directions    ACETAMINOPHEN PO        Dose:  500 mg   Take 500 mg by mouth every 6 hours as needed for pain Reported on 3/30/2017   Refills:  0       amLODIPine 2.5 MG tablet   Commonly known as:  NORVASC   Used for:  Primary peritoneal adenocarcinoma (H)        Dose:  2.5 mg   Take 1 tablet (2.5 mg) by mouth daily   Quantity:  30 tablet   Refills:  0       bisacodyl 10 MG Suppository   Commonly known as:  DULCOLAX   Used for:  Primary peritoneal adenocarcinoma (H)        Dose:  10 mg   Place 1 suppository (10 mg) rectally daily as needed for constipation   Quantity:  10 suppository   Refills:  0       buprenorphine 5 MCG/HR WK patch   Commonly known as:  BUTRANS   Used for:  Cancer associated pain        Dose:  1 patch   Place 1 patch onto the skin every 7 days   Quantity:  4 patch   Refills:  1       cycloSPORINE 0.05 % ophthalmic emulsion   Commonly known as:  RESTASIS        Dose:  1 drop   Place 1 drop into both eyes 2 times daily   Refills:  0       enoxaparin 40 MG/0.4ML injection   Commonly known as:  LOVENOX   Used for:  Ovarian cancer, left (H), Ovarian cancer, right (H)        INJECT 1 SYRINGE SUBCUTANEOUSLY DAILY   Quantity:  12 mL   Refills:  1       EPINEPHrine 0.3 MG/0.3ML injection   Used for:  Preventative health care, Primary peritoneal adenocarcinoma (H)        Dose:  0.3 mg   Inject 0.3 mLs (0.3 mg) into the muscle once as needed for anaphylaxis   Quantity:  1 each   Refills:  0       LORazepam 1 MG tablet   Commonly known as:  ATIVAN   Used for:  Primary peritoneal adenocarcinoma (H)        Dose:  1 mg   Take 1 tablet (1 mg) by mouth every 6 hours as needed (nausea/vomiting, anxiety or sleep)   Quantity:  30 tablet   Refills:  3        megestrol 40 MG/ML suspension   Commonly known as:  MEGACE ORAL   Used for:  Ovarian cancer, right (H), Loss of appetite        Dose:  400 mg   Take 10 mLs (400 mg) by mouth 2 times daily   Quantity:  600 mL   Refills:  1       MELATONIN PO        Dose:  1 mg   Take 1 mg by mouth At Bedtime   Refills:  0       ondansetron 8 MG ODT tab   Commonly known as:  ZOFRAN-ODT   Used for:  Ovarian cancer, right (H), Nausea        Dose:  8 mg   Place 1 tablet (8 mg) under the tongue every 8 hours as needed for nausea   Quantity:  90 tablet   Refills:  3       ONE DAILY MULTIVITAMIN WOMEN Tabs        Dose:  1 tablet   Take 1 tablet by mouth every morning   Refills:  0       polyethylene glycol powder   Commonly known as:  MIRALAX/GLYCOLAX        Dose:  1 capful   Take 1 capful by mouth daily as needed   Refills:  0       prochlorperazine 10 MG tablet   Commonly known as:  COMPAZINE   Used for:  Primary peritoneal adenocarcinoma (H)        Dose:  10 mg   Take 1 tablet (10 mg) by mouth every 6 hours as needed (nausea/vomiting)   Quantity:  30 tablet   Refills:  2       senna-docusate 8.6-50 MG per tablet   Commonly known as:  SENOKOT-S;PERICOLACE   Used for:  Primary peritoneal adenocarcinoma (H)        Dose:  1-2 tablet   Take 1-2 tablets by mouth 2 times daily   Quantity:  7 tablet   Refills:  0       tamsulosin 0.4 MG capsule   Commonly known as:  FLOMAX   Used for:  Primary peritoneal adenocarcinoma (H)        Dose:  0.4 mg   Take 1 capsule (0.4 mg) by mouth daily   Quantity:  60 capsule   Refills:  6       tolterodine 4 MG 24 hr capsule   Commonly known as:  DETROL LA   Used for:  Hydronephrosis, unspecified hydronephrosis type        TAKE ONE CAPSULE BY MOUTH DAILY   Quantity:  30 capsule   Refills:  3       VITAMIN B6 PO        Dose:  1 tablet   Take 1 tablet by mouth At Bedtime Reported on 4/27/2017   Refills:  0       ZANTAC PO        Dose:  75 mg   Take 75 mg by mouth daily   Refills:  0                Protect others  around you: Learn how to safely use, store and throw away your medicines at www.disposemymeds.org.             Medication List: This is a list of all your medications and when to take them. Check marks below indicate your daily home schedule. Keep this list as a reference.      Medications           Morning Afternoon Evening Bedtime As Needed    ACETAMINOPHEN PO   Take 500 mg by mouth every 6 hours as needed for pain Reported on 3/30/2017                                amLODIPine 2.5 MG tablet   Commonly known as:  NORVASC   Take 1 tablet (2.5 mg) by mouth daily                                bisacodyl 10 MG Suppository   Commonly known as:  DULCOLAX   Place 1 suppository (10 mg) rectally daily as needed for constipation                                buprenorphine 5 MCG/HR WK patch   Commonly known as:  BUTRANS   Place 1 patch onto the skin every 7 days                                cycloSPORINE 0.05 % ophthalmic emulsion   Commonly known as:  RESTASIS   Place 1 drop into both eyes 2 times daily                                enoxaparin 40 MG/0.4ML injection   Commonly known as:  LOVENOX   INJECT 1 SYRINGE SUBCUTANEOUSLY DAILY                                EPINEPHrine 0.3 MG/0.3ML injection   Inject 0.3 mLs (0.3 mg) into the muscle once as needed for anaphylaxis                                LORazepam 1 MG tablet   Commonly known as:  ATIVAN   Take 1 tablet (1 mg) by mouth every 6 hours as needed (nausea/vomiting, anxiety or sleep)                                megestrol 40 MG/ML suspension   Commonly known as:  MEGACE ORAL   Take 10 mLs (400 mg) by mouth 2 times daily                                MELATONIN PO   Take 1 mg by mouth At Bedtime                                ondansetron 8 MG ODT tab   Commonly known as:  ZOFRAN-ODT   Place 1 tablet (8 mg) under the tongue every 8 hours as needed for nausea                                ONE DAILY MULTIVITAMIN WOMEN Tabs   Take 1 tablet by mouth every morning                                 polyethylene glycol powder   Commonly known as:  MIRALAX/GLYCOLAX   Take 1 capful by mouth daily as needed                                prochlorperazine 10 MG tablet   Commonly known as:  COMPAZINE   Take 1 tablet (10 mg) by mouth every 6 hours as needed (nausea/vomiting)                                senna-docusate 8.6-50 MG per tablet   Commonly known as:  SENOKOT-S;PERICOLACE   Take 1-2 tablets by mouth 2 times daily                                tamsulosin 0.4 MG capsule   Commonly known as:  FLOMAX   Take 1 capsule (0.4 mg) by mouth daily                                tolterodine 4 MG 24 hr capsule   Commonly known as:  DETROL LA   TAKE ONE CAPSULE BY MOUTH DAILY                                VITAMIN B6 PO   Take 1 tablet by mouth At Bedtime Reported on 4/27/2017                                ZANTAC PO   Take 75 mg by mouth daily

## 2017-05-18 NOTE — IP AVS SNAPSHOT
[facility]    [address] [place] [address]    Phone:  [phone]    Fax:  [phone]                                       After Visit Summary   5/18/2017    Margaux Guzmán    MRN: [id number]           After Visit Summary Signature Page     I have received my discharge instructions, and my questions have been answered. I have discussed any challenges I see with this plan with the nurse or doctor.    ..........................................................................................................................................  Patient/Patient Representative Signature      ..........................................................................................................................................  Patient Representative Print Name and Relationship to Patient    ..................................................               ................................................  Date                                            Time    ..........................................................................................................................................  Reviewed by Signature/Title    ...................................................              ..............................................  Date                                                            Time

## 2017-05-22 PROBLEM — G62.0 CHEMOTHERAPY-INDUCED PERIPHERAL NEUROPATHY (H): Status: ACTIVE | Noted: 2017-01-01

## 2017-05-22 PROBLEM — T45.1X5A CHEMOTHERAPY-INDUCED PERIPHERAL NEUROPATHY (H): Status: ACTIVE | Noted: 2017-01-01

## 2017-05-22 PROBLEM — E83.42 HYPOMAGNESEMIA: Status: ACTIVE | Noted: 2017-01-01

## 2017-05-22 PROBLEM — T45.1X5A ANEMIA ASSOCIATED WITH CHEMOTHERAPY: Status: ACTIVE | Noted: 2017-01-01

## 2017-05-22 PROBLEM — D64.81 ANEMIA ASSOCIATED WITH CHEMOTHERAPY: Status: ACTIVE | Noted: 2017-01-01

## 2017-05-22 NOTE — MR AVS SNAPSHOT
After Visit Summary   5/22/2017    Margaux Guzmán    MRN: [id number]           Patient Information     Date Of Birth          1955        Visit Information        Provider Department      5/22/2017 9:30 AM BAY 1 INFUSION [facility]        Today's Diagnoses     Other iron deficiency anemia    -  1    Chemotherapy-induced neutropenia (H)        Ovarian cancer, left (H)        Ovarian cancer, right (H)        Primary peritoneal adenocarcinoma (H)           Follow-ups after your visit        Your next 10 appointments already scheduled     May 30, 2017  2:00 PM CDT   Level 2 with BAY 7 INFUSION   [facility] ([facility])    [address] [place] [address]   [phone]            May 31, 2017  2:30 PM CDT   Level 2 with BAY 2 INFUSION   [facility] ([facility])    [address] [place] [address]   [phone]            Jun 05, 2017  9:00 AM CDT   Level 2 with BAY 7 INFUSION   [facility] ([facility])    [address] [place] [address]   [phone]            Jun 12, 2017   Procedure with Carmela Alvarez MD   [facility] ([facility])    [address] [place] [address]  5th [facility] [id number] [phone]           Located in the Clinics and Surgery Center at [address] [place] [address].   parking is very convenient and highly recommended.  is a $6 flat rate fee.  Both  and self parkers should enter the main arrival plaza from [facility]; parking attendants will direct you based on your parking preference.            Nov 01, 2017 10:30 AM CDT   Lab with UC LAB   [facility])    [address] [place] [address]  1st [facility] [address] [phone]            Nov 01, 2017 11:35 AM CDT   (Arrive by  11:05 AM)   Return Visit with Mackenzie Ca MD   [facility] Nephrology ([facility])    [address] [facility]  3rd Floor  [facility] [address] [phone]              Who to contact     If you have questions or need follow up information about today's clinic visit or your schedule please contact [facility] directly at [phone].  Normal or non-critical lab and imaging results will be communicated to you by [employer], letter or phone within 4 business days after the clinic has received the results. If you do not hear from us within 7 days, please contact the clinic through [employer] or phone. If you have a critical or abnormal lab result, we will notify you by phone as soon as possible.  Submit refill requests through [employer] or call your pharmacy and they will forward the refill request to us. Please allow 3 business days for your refill to be completed.          Additional Information About Your Visit        [employer] Information     [employer] gives you secure access to your electronic health record. If you see a primary care provider, you can also send messages to your care team and make appointments. If you have questions, please call your primary care clinic.  If you do not have a primary care provider, please call [phone] and they will assist you.      [employer]Front is an electronic gateway that provides easy, online access to your medical records. With PacketFront, you can request a clinic appointment, read your test results, renew a prescription or communicate with your care team.     To access your existing account, please contact your [facility] or call [phone] for assistance.        Care EveryWhere ID     This is your Care EveryWhere ID. This could be used by other organizations to access your [place] medical records  [id number]         Blood Pressure from Last 3 Encounters:   05/22/17 124/71   05/18/17 155/85   05/03/17  121/70    Weight from Last 3 Encounters:   05/22/17 53.1 kg (117 lb)   05/18/17 51.7 kg (114 lb)   05/03/17 53.5 kg (118 lb)              We Performed the Following     Electrolyte Replacement     Treatment Conditions          Today's Medication Changes          These changes are accurate as of: 5/22/17  1:41 PM.  If you have any questions, ask your nurse or doctor.               Start taking these medicines.        Dose/Directions    LORazepam 1 MG tablet   Commonly known as:  ATIVAN   Used for:  Other iron deficiency anemia, Chemotherapy-induced neutropenia (H), Ovarian cancer, left (H), Ovarian cancer, right (H), Primary peritoneal adenocarcinoma (H)        Dose:  1 mg   Take 1 tablet (1 mg) by mouth every 6 hours as needed (Anxiety, Nausea/Vomiting or Sleep)   Quantity:  30 tablet   Refills:  2       prochlorperazine 10 MG tablet   Commonly known as:  COMPAZINE   Used for:  Other iron deficiency anemia, Chemotherapy-induced neutropenia (H), Ovarian cancer, left (H), Ovarian cancer, right (H), Primary peritoneal adenocarcinoma (H)        Dose:  10 mg   Take 1 tablet (10 mg) by mouth every 6 hours as needed (Nausea/Vomiting)   Quantity:  30 tablet   Refills:  2            Where to get your medicines      These medications were sent to [facility] - [place], [place] - [address]  [address], [facility] [address]     Phone:  [phone]     prochlorperazine 10 MG tablet         Some of these will need a paper prescription and others can be bought over the counter.  Ask your nurse if you have questions.     Bring a paper prescription for each of these medications     LORazepam 1 MG tablet                Primary Care Provider Office Phone # Fax #    Aurelia MCLAUGHLIN Abilio [phone]       [facility] [address] [facility] [address]        Thank you!     Thank you for choosing [facility]  for your care. Our goal is always  to provide you with excellent care. Hearing back from our patients is one way we can continue to improve our services. Please take a few minutes to complete the written survey that you may receive in the mail after your visit with us. Thank you!             Your Updated Medication List - Protect others around you: Learn how to safely use, store and throw away your medicines at www.disposemymeds.org.          This list is accurate as of: 5/22/17  1:41 PM.  Always use your most recent med list.                   Brand Name Dispense Instructions for use    ACETAMINOPHEN PO      Take 500 mg by mouth every 6 hours as needed for pain Reported on 3/30/2017       amLODIPine 2.5 MG tablet    NORVASC    30 tablet    Take 1 tablet (2.5 mg) by mouth daily       bisacodyl 10 MG Suppository    DULCOLAX    10 suppository    Place 1 suppository (10 mg) rectally daily as needed for constipation       buprenorphine 5 MCG/HR WK patch    BUTRANS    4 patch    Place 1 patch onto the skin every 7 days       cycloSPORINE 0.05 % ophthalmic emulsion    RESTASIS     Place 1 drop into both eyes 2 times daily       enoxaparin 40 MG/0.4ML injection    LOVENOX    12 mL    INJECT 1 SYRINGE SUBCUTANEOUSLY DAILY       EPINEPHrine 0.3 MG/0.3ML injection     1 each    Inject 0.3 mLs (0.3 mg) into the muscle once as needed for anaphylaxis       LORazepam 1 MG tablet    ATIVAN    30 tablet    Take 1 tablet (1 mg) by mouth every 6 hours as needed (Anxiety, Nausea/Vomiting or Sleep)       megestrol 40 MG/ML suspension    MEGACE ORAL    600 mL    Take 10 mLs (400 mg) by mouth 2 times daily       MELATONIN PO      Take 1 mg by mouth At Bedtime       ondansetron 8 MG ODT tab    ZOFRAN-ODT    90 tablet    Place 1 tablet (8 mg) under the tongue every 8 hours as needed for nausea       ONE DAILY MULTIVITAMIN WOMEN Tabs      Take 1 tablet by mouth every morning       polyethylene glycol powder    MIRALAX/GLYCOLAX     Take 1 capful by mouth daily as needed        prochlorperazine 10 MG tablet    COMPAZINE    30 tablet    Take 1 tablet (10 mg) by mouth every 6 hours as needed (Nausea/Vomiting)       senna-docusate 8.6-50 MG per tablet    SENOKOT-S;PERICOLACE    7 tablet    Take 1-2 tablets by mouth 2 times daily       tamsulosin 0.4 MG capsule    FLOMAX    60 capsule    Take 1 capsule (0.4 mg) by mouth daily       tolterodine 4 MG 24 hr capsule    DETROL LA    30 capsule    TAKE ONE CAPSULE BY MOUTH DAILY       VITAMIN B6 PO      Take 1 tablet by mouth At Bedtime Reported on 4/27/2017       ZANTAC PO      Take 75 mg by mouth daily

## 2017-05-22 NOTE — PROGRESS NOTES
Infusion Nursing Note:  Margaux Guzmán presents today for C1 D1 taxol.    Patient seen by provider today: Yes: Lizet Truong NP   present during visit today:   Not Applicable.    Note: Magnesium was 1.5.  2 grams IV given today along with Taxol.     Intravenous Access:  Implanted Port.    Treatment Conditions:  Lab Results   Component Value Date    HGB 8.4 05/22/2017     Lab Results   Component Value Date    WBC 5.3 05/22/2017      Lab Results   Component Value Date    ANEU 4.1 05/22/2017     Lab Results   Component Value Date     05/22/2017      Lab Results   Component Value Date     05/22/2017                   Lab Results   Component Value Date    POTASSIUM 3.4 05/22/2017           Lab Results   Component Value Date    MAG 1.5 05/22/2017            Lab Results   Component Value Date    CR 0.82 05/22/2017                   Lab Results   Component Value Date    PURVI 9.4 05/22/2017                Lab Results   Component Value Date    BILITOTAL 0.2 05/22/2017           Lab Results   Component Value Date    ALBUMIN 3.5 05/22/2017                    Lab Results   Component Value Date    ALT 17 05/22/2017           Lab Results   Component Value Date    AST 13 05/22/2017     Results reviewed, labs MET treatment parameters, ok to proceed with treatment.      Post Infusion Assessment:  Patient tolerated infusion without incident.  Site patent and intact, free from redness, edema or discomfort.  No evidence of extravasations.  Access discontinued per protocol.    Discharge Plan:   Discharge instructions reviewed with: Patient.  Patient and/or family verbalized understanding of discharge instructions and all questions answered.  Patient discharged in stable condition accompanied by: self.  Departure Mode: Ambulatory.    Grace Carlos RN

## 2017-05-22 NOTE — MR AVS SNAPSHOT
After Visit Summary   5/22/2017    Margaux Guzmán    MRN: [id number]           Patient Information     Date Of Birth          1955        Visit Information        Provider Department      5/22/2017 8:30 AM NURSE ONLY CANCER CENTER [facility]        Today's Diagnoses     Other iron deficiency anemia    -  1    Chemotherapy-induced neutropenia (H)        Ovarian cancer, left (H)        Ovarian cancer, right (H)        Primary peritoneal adenocarcinoma (H)           Follow-ups after your visit        Your next 10 appointments already scheduled     May 22, 2017  8:30 AM CDT   Return Visit with JUSTIN Patrick CNP   [facility] ([facility])    [address] [place] [address]   [phone]            May 22, 2017  8:30 AM CDT   Nurse Only with NURSE ONLY CANCER CENTER   [facility] ([facility])    [address] [place] [address]   [phone]            May 22, 2017  9:30 AM CDT   Level 2 with BAY 1 INFUSION   [facility] ([facility])    [address] [place] [address]   [phone]            May 30, 2017  2:00 PM CDT   Level 2 with BAY 7 INFUSION   [facility] ([facility])    [address] [place] [address]   [phone]            Jun 05, 2017  9:00 AM CDT   Level 2 with BAY 7 INFUSION   [facility] ([facility])    [address] [place] [address]   [phone]            Jun 12, 2017   Procedure with Carmela Alvarez MD   [facility] ([facility])    [address] [place] [address]  5th [facility] [address] [phone]           Located in the Clinics and Surgery Center at [address] [place] [address].   parking is very convenient and  highly recommended.  is a $6 flat rate fee.  Both  and self parkers should enter the main arrival plaza from [facility]; parking attendants will direct you based on your parking preference.            Nov 01, 2017 10:30 AM CDT   Lab with  LAB   [facility] ([facility])    [address] [id number] [phone]            Nov 01, 2017 11:35 AM CDT   (Arrive by 11:05 AM)   Return Visit with Mackenzie Ca MD   [facility] Nephrology ([facility])    [address] [address] [phone]              Who to contact     If you have questions or need follow up information about today's clinic visit or your schedule please contact [facility] directly at [phone].  Normal or non-critical lab and imaging results will be communicated to you by [employer], letter or phone within 4 business days after the clinic has received the results. If you do not hear from us within 7 days, please contact the clinic through [employer] or phone. If you have a critical or abnormal lab result, we will notify you by phone as soon as possible.  Submit refill requests through [employer] or call your pharmacy and they will forward the refill request to us. Please allow 3 business days for your refill to be completed.          Additional Information About Your Visit        [employer] Information     [employer] gives you secure access to your electronic health record. If you see a primary care provider, you can also send messages to your care team and make appointments. If you have questions, please call your primary care clinic.  If you do not have a primary care provider, please call [phone] and they will assist you.      [employer] is an electronic gateway that provides easy, online access to your medical records. With [employer], you can request a clinic appointment, read your test results, renew a  prescription or communicate with your care team.     To access your existing account, please contact your [facility] or call [phone] for assistance.        Care EveryWhere ID     This is your Care EveryWhere ID. This could be used by other organizations to access your [place] medical records  [id number]         Blood Pressure from Last 3 Encounters:   05/18/17 155/85   05/03/17 121/70   04/27/17 123/63    Weight from Last 3 Encounters:   05/18/17 51.7 kg (114 lb)   05/03/17 53.5 kg (118 lb)   04/27/17 52.7 kg (116 lb 2.9 oz)              We Performed the Following          CBC with platelets differential     Central Venous Catheter: implanted port (Port-A-Cath, Power Port)     Comprehensive metabolic panel     Magnesium          Today's Medication Changes          These changes are accurate as of: 5/22/17  8:23 AM.  If you have any questions, ask your nurse or doctor.               These medicines have changed or have updated prescriptions.        Dose/Directions    * LORazepam 1 MG tablet   Commonly known as:  ATIVAN   This may have changed:  Another medication with the same name was added. Make sure you understand how and when to take each.   Used for:  Primary peritoneal adenocarcinoma (H)   Changed by:  Patricia Murcia APRN CNP        Dose:  1 mg   Take 1 tablet (1 mg) by mouth every 6 hours as needed (nausea/vomiting, anxiety or sleep)   Quantity:  30 tablet   Refills:  3       * LORazepam 1 MG tablet   Commonly known as:  ATIVAN   This may have changed:  You were already taking a medication with the same name, and this prescription was added. Make sure you understand how and when to take each.   Used for:  Other iron deficiency anemia, Chemotherapy-induced neutropenia (H), Ovarian cancer, left (H), Ovarian cancer, right (H), Primary peritoneal adenocarcinoma (H)        Dose:  1 mg   Take 1 tablet (1 mg) by mouth every 6 hours as needed (Anxiety, Nausea/Vomiting  or Sleep)   Quantity:  30 tablet   Refills:  2       * prochlorperazine 10 MG tablet   Commonly known as:  COMPAZINE   This may have changed:  Another medication with the same name was added. Make sure you understand how and when to take each.   Used for:  Primary peritoneal adenocarcinoma (H)        Dose:  10 mg   Take 1 tablet (10 mg) by mouth every 6 hours as needed (nausea/vomiting)   Quantity:  30 tablet   Refills:  2       * prochlorperazine 10 MG tablet   Commonly known as:  COMPAZINE   This may have changed:  You were already taking a medication with the same name, and this prescription was added. Make sure you understand how and when to take each.   Used for:  Other iron deficiency anemia, Chemotherapy-induced neutropenia (H), Ovarian cancer, left (H), Ovarian cancer, right (H), Primary peritoneal adenocarcinoma (H)        Dose:  10 mg   Take 1 tablet (10 mg) by mouth every 6 hours as needed (Nausea/Vomiting)   Quantity:  30 tablet   Refills:  2       * Notice:  This list has 4 medication(s) that are the same as other medications prescribed for you. Read the directions carefully, and ask your doctor or other care provider to review them with you.         Where to get your medicines      These medications were sent to [facility] - [place], [place] - [address]  [address], [facility] [address]     Phone:  [phone]     prochlorperazine 10 MG tablet         Some of these will need a paper prescription and others can be bought over the counter.  Ask your nurse if you have questions.     Bring a paper prescription for each of these medications     LORazepam 1 MG tablet                Primary Care Provider Office Phone # Fax #    Aurelia MCLAUGHLIN Abilio [phone]       [facility] [address] [facility] [address]        Thank you!     Thank you for choosing [facility]  for your care. Our goal is always to provide  you with excellent care. Hearing back from our patients is one way we can continue to improve our services. Please take a few minutes to complete the written survey that you may receive in the mail after your visit with us. Thank you!             Your Updated Medication List - Protect others around you: Learn how to safely use, store and throw away your medicines at www.disposemymeds.org.          This list is accurate as of: 5/22/17  8:23 AM.  Always use your most recent med list.                   Brand Name Dispense Instructions for use    ACETAMINOPHEN PO      Take 500 mg by mouth every 6 hours as needed for pain Reported on 3/30/2017       amLODIPine 2.5 MG tablet    NORVASC    30 tablet    Take 1 tablet (2.5 mg) by mouth daily       bisacodyl 10 MG Suppository    DULCOLAX    10 suppository    Place 1 suppository (10 mg) rectally daily as needed for constipation       buprenorphine 5 MCG/HR WK patch    BUTRANS    4 patch    Place 1 patch onto the skin every 7 days       cycloSPORINE 0.05 % ophthalmic emulsion    RESTASIS     Place 1 drop into both eyes 2 times daily       enoxaparin 40 MG/0.4ML injection    LOVENOX    12 mL    INJECT 1 SYRINGE SUBCUTANEOUSLY DAILY       EPINEPHrine 0.3 MG/0.3ML injection     1 each    Inject 0.3 mLs (0.3 mg) into the muscle once as needed for anaphylaxis       * LORazepam 1 MG tablet    ATIVAN    30 tablet    Take 1 tablet (1 mg) by mouth every 6 hours as needed (nausea/vomiting, anxiety or sleep)       * LORazepam 1 MG tablet    ATIVAN    30 tablet    Take 1 tablet (1 mg) by mouth every 6 hours as needed (Anxiety, Nausea/Vomiting or Sleep)       megestrol 40 MG/ML suspension    MEGACE ORAL    600 mL    Take 10 mLs (400 mg) by mouth 2 times daily       MELATONIN PO      Take 1 mg by mouth At Bedtime       ondansetron 8 MG ODT tab    ZOFRAN-ODT    90 tablet    Place 1 tablet (8 mg) under the tongue every 8 hours as needed for nausea       ONE DAILY MULTIVITAMIN WOMEN Tabs       Take 1 tablet by mouth every morning       polyethylene glycol powder    MIRALAX/GLYCOLAX     Take 1 capful by mouth daily as needed       * prochlorperazine 10 MG tablet    COMPAZINE    30 tablet    Take 1 tablet (10 mg) by mouth every 6 hours as needed (nausea/vomiting)       * prochlorperazine 10 MG tablet    COMPAZINE    30 tablet    Take 1 tablet (10 mg) by mouth every 6 hours as needed (Nausea/Vomiting)       senna-docusate 8.6-50 MG per tablet    SENOKOT-S;PERICOLACE    7 tablet    Take 1-2 tablets by mouth 2 times daily       tamsulosin 0.4 MG capsule    FLOMAX    60 capsule    Take 1 capsule (0.4 mg) by mouth daily       tolterodine 4 MG 24 hr capsule    DETROL LA    30 capsule    TAKE ONE CAPSULE BY MOUTH DAILY       VITAMIN B6 PO      Take 1 tablet by mouth At Bedtime Reported on 4/27/2017       ZANTAC PO      Take 75 mg by mouth daily       * Notice:  This list has 4 medication(s) that are the same as other medications prescribed for you. Read the directions carefully, and ask your doctor or other care provider to review them with you.

## 2017-05-22 NOTE — PROGRESS NOTES
Your  is 743 which is not surprising since you are just starting your new regimen today. Will continue to monitor this.

## 2017-05-22 NOTE — MR AVS SNAPSHOT
After Visit Summary   5/22/2017    Margaux Guzámn    MRN: [id number]           Patient Information     Date Of Birth          1955        Visit Information        Provider Department      5/22/2017 8:30 AM Lizet Truong APRN CNP [facility]        Today's Diagnoses     Ovarian cancer, left (H)    -  1    Ovarian cancer, right (H)        Encounter for long-term current use of medication        Hypomagnesemia        Anemia associated with chemotherapy        Chemotherapy-induced peripheral neuropathy (H)           Follow-ups after your visit        Your next 10 appointments already scheduled     May 30, 2017  2:00 PM CDT   Level 2 with BAY 7 INFUSION   [facility] ([facility])    [address] [place] [address]   [phone]            May 31, 2017  2:30 PM CDT   Level 2 with BAY 2 INFUSION   [facility] ([facility])    [address] [place] [address]   [phone]            Jun 05, 2017  8:30 AM CDT   Return Visit with NURSE ONLY CANCER CENTER   [facility] ([facility])    [address] [place] [address]   [phone]            Jun 05, 2017  9:00 AM CDT   Level 2 with BAY 7 INFUSION   [facility] ([facility])    [address] [place] [address]   [phone]            Jun 12, 2017   Procedure with Carmela Alvarez MD   [facility] ([facility])    [address] [place] [address]  [address] [place] [phone]           Located in the Clinics and Surgery Center at [address] [place] [address].   parking is very convenient and highly recommended.  is a $6 flat rate fee.  Both  and self parkers should enter the main arrival plaza from [facility]; parking attendants will  direct you based on your parking preference.            Jun 13, 2017  9:30 AM CDT   Return Visit with NURSE ONLY CANCER CENTER   [facility] ([facility])    [address] [facility] [address]   [phone]            Jun 13, 2017 10:00 AM CDT   Level 2 with BAY 3 INFUSION   [facility] ([facility])    [address] [facility] [address]   [phone]            Jun 19, 2017  9:00 AM CDT   Return Visit with NURSE ONLY CANCER CENTER   [facility] ([facility])    [address] [facility] [address]   [phone]            Jun 19, 2017  9:30 AM CDT   Level 2 with BAY 9 INFUSION   [facility] ([facility])    [address] [facility] [address]   [phone]            Jun 26, 2017  9:00 AM CDT   Return Visit with NURSE ONLY CANCER CENTER   [facility] ([facility])    [address] [facility] [address]   [phone]              Who to contact     If you have questions or need follow up information about today's clinic visit or your schedule please contact [facility] directly at [phone].  Normal or non-critical lab and imaging results will be communicated to you by MyChart, letter or phone within 4 business days after the clinic has received the results. If you do not hear from us within 7 days, please contact the clinic through [employer] or phone. If you have a critical or abnormal lab result, we will notify you by phone as soon as possible.  Submit refill requests through [facility] or call your pharmacy and they will forward the refill request to us. Please allow 3 business days for your refill to be completed.          Additional Information About Your Visit        [employer] Information     [facility] gives you secure access to your electronic health record. If you see a  "primary care provider, you can also send messages to your care team and make appointments. If you have questions, please call your primary care clinic.  If you do not have a primary care provider, please call [phone] and they will assist you.      [employer] is an electronic gateway that provides easy, online access to your medical records. With [employer], you can request a clinic appointment, read your test results, renew a prescription or communicate with your care team.     To access your existing account, please contact your [facility] or call [phone] for assistance.        Care EveryWhere ID     This is your Care EveryWhere ID. This could be used by other organizations to access your [facility] medical records  [id number]        Your Vitals Were     Pulse Temperature Respirations Height Pulse Oximetry BMI (Body Mass Index)    98 98.5  F (36.9  C) (Oral) 20 1.676 m (5' 5.98\") 99% 18.89 kg/m2       Blood Pressure from Last 3 Encounters:   05/22/17 124/71   05/18/17 155/85   05/03/17 121/70    Weight from Last 3 Encounters:   05/22/17 53.1 kg (117 lb)   05/18/17 51.7 kg (114 lb)   05/03/17 53.5 kg (118 lb)              Today, you had the following     No orders found for display         Today's Medication Changes          These changes are accurate as of: 5/22/17 11:59 PM.  If you have any questions, ask your nurse or doctor.               Start taking these medicines.        Dose/Directions    LORazepam 1 MG tablet   Commonly known as:  ATIVAN   Used for:  Other iron deficiency anemia, Chemotherapy-induced neutropenia (H), Ovarian cancer, left (H), Ovarian cancer, right (H), Primary peritoneal adenocarcinoma (H)        Dose:  1 mg   Take 1 tablet (1 mg) by mouth every 6 hours as needed (Anxiety, Nausea/Vomiting or Sleep)   Quantity:  30 tablet   Refills:  2       prochlorperazine 10 MG tablet   Commonly known as:  COMPAZINE   Used for:  Other iron deficiency anemia, " Chemotherapy-induced neutropenia (H), Ovarian cancer, left (H), Ovarian cancer, right (H), Primary peritoneal adenocarcinoma (H)        Dose:  10 mg   Take 1 tablet (10 mg) by mouth every 6 hours as needed (Nausea/Vomiting)   Quantity:  30 tablet   Refills:  2            Where to get your medicines      These medications were sent to [facility] [place], [place] - [address]  [address], [facility] [address]     Phone:  [phone]     prochlorperazine 10 MG tablet         Some of these will need a paper prescription and others can be bought over the counter.  Ask your nurse if you have questions.     Bring a paper prescription for each of these medications     LORazepam 1 MG tablet                Primary Care Provider Office Phone # Fax #    Aurelia Knox [phone]       [facility] [address] [facility] [address]        Thank you!     Thank you for choosing [facility]  for your care. Our goal is always to provide you with excellent care. Hearing back from our patients is one way we can continue to improve our services. Please take a few minutes to complete the written survey that you may receive in the mail after your visit with us. Thank you!             Your Updated Medication List - Protect others around you: Learn how to safely use, store and throw away your medicines at www.disposemymeds.org.          This list is accurate as of: 5/22/17 11:59 PM.  Always use your most recent med list.                   Brand Name Dispense Instructions for use    ACETAMINOPHEN PO      Take 500 mg by mouth every 6 hours as needed for pain Reported on 3/30/2017       amLODIPine 2.5 MG tablet    NORVASC    30 tablet    Take 1 tablet (2.5 mg) by mouth daily       bisacodyl 10 MG Suppository    DULCOLAX    10 suppository    Place 1 suppository (10 mg) rectally daily as needed for constipation       buprenorphine 5 MCG/HR  WK patch    BUTRANS    4 patch    Place 1 patch onto the skin every 7 days       cycloSPORINE 0.05 % ophthalmic emulsion    RESTASIS     Place 1 drop into both eyes 2 times daily       enoxaparin 40 MG/0.4ML injection    LOVENOX    12 mL    INJECT 1 SYRINGE SUBCUTANEOUSLY DAILY       EPINEPHrine 0.3 MG/0.3ML injection     1 each    Inject 0.3 mLs (0.3 mg) into the muscle once as needed for anaphylaxis       LORazepam 1 MG tablet    ATIVAN    30 tablet    Take 1 tablet (1 mg) by mouth every 6 hours as needed (Anxiety, Nausea/Vomiting or Sleep)       megestrol 40 MG/ML suspension    MEGACE ORAL    600 mL    Take 10 mLs (400 mg) by mouth 2 times daily       MELATONIN PO      Take 1 mg by mouth At Bedtime       ondansetron 8 MG ODT tab    ZOFRAN-ODT    90 tablet    Place 1 tablet (8 mg) under the tongue every 8 hours as needed for nausea       ONE DAILY MULTIVITAMIN WOMEN Tabs      Take 1 tablet by mouth every morning       polyethylene glycol powder    MIRALAX/GLYCOLAX     Take 1 capful by mouth daily as needed       prochlorperazine 10 MG tablet    COMPAZINE    30 tablet    Take 1 tablet (10 mg) by mouth every 6 hours as needed (Nausea/Vomiting)       senna-docusate 8.6-50 MG per tablet    SENOKOT-S;PERICOLACE    7 tablet    Take 1-2 tablets by mouth 2 times daily       tamsulosin 0.4 MG capsule    FLOMAX    60 capsule    Take 1 capsule (0.4 mg) by mouth daily       tolterodine 4 MG 24 hr capsule    DETROL LA    30 capsule    TAKE ONE CAPSULE BY MOUTH DAILY       VITAMIN B6 PO      Take 1 tablet by mouth At Bedtime Reported on 4/27/2017       ZANTAC PO      Take 75 mg by mouth daily

## 2017-05-22 NOTE — PROGRESS NOTES
Follow Up Notes on Referred Patient    Date: 2017        RE: Margaux Guzmán  : 1955  MELANI: 2017      Margaux Guzmán is a 61 year old woman with a history of primary peritoneal cancer.  She was recently on the Tesaro study but unfortunately had progression. She is here today for follow up and disease management.     Brief Oncology History:  The patient is a 61 year old  female who initially presented for evaluation of pelvic and abdominal pain. On exam, she had mild right adnexal tenderness and slightly enlarged right ovary freely mobile and smooth. This prompted an abdominal ultrasound, which was normal, and a pelvic US that showed anechoic right ovarian cyst measuring 4.3 x 3.8 x 4.0 cm. Her  was elevated at 74 on 10/8/13. We reviewed the possible diagnosis including ovarian cancer versus benign ovarian cyst. Approach to surgery, alternatives to surgery and plan for possible extended open surgical staging based on the operative findings was discussed. In light of the size of the ovary we are offering an initial approach with minimally invasive surgery. After discussion of risks and benefits, consent was obtained.  13: Diagnostic laparoscopy converted to exploratory laparotomy, bilateral salpingo-oophorectomy, total abdominal hysterectomy, omentectomy, staging biopsies, bilateral pelvic and periaortic lymph node dissection and washings. Stage IIIB  13: Cycle #1 IV/IP Taxol/CDDP  14: Cycle #2 IV/IP PCP.  - 14  14: Cycle 3 IV/IP.  - 7  14:   7. CT C/A/P Impression:  1. Multiple bilateral pulmonary nodules as described in full report measuring up to 3 mm along the right major fissure. These are indeterminate given lack of comparison and followup is recommended.  2. No definite evidence for recurrent or metastatic disease in the abdomen or the pelvis. There is a rounded hypodense focus abutting the left external iliac artery and  the adjacent sigmoid colon which measures 1 cm, this could represent a fluid-filled sigmoid diverticulum or a hypodense node, further attention to this area on subsequent examinations is recommended.  3. There is a 7 mm nonobstructing stone in the inferior collecting system of the right kidney.  2/12/14-3/26/14: Cycle #4-6 IV/IP  7, 7, 7.  4/21/14: CT C/A/P Impression:   1. Unchanged indeterminate pulmonary nodules. Recommend continued surveillance.   2. No definite evidence of metastatic ovarian cancer in the abdomen or pelvis. Small amount of subtle increased thickening and fluid is noted along the right lymph node dissection, nonspecific, but possibly a tiny developing lymphocele.   3. 6 mm nonobstructing stone in the right kidney.  Plan surveillance for ovarian cancer every 3 months with physical and .   Indeterminate pulmonary nodule: These were present before the surgery and there was not change with the chemotherapy. Highly unlikely to be a metastatic disease. Will repeat a CT in 3 months to see any change   5/22/14:  6. JOSEMANUEL.  5/17/14; ED visit [facility]. CT abd/pel noted possible chronic partial small bowel obstruction. Colonscopy scheduled for June 6, 2014 locally. Abdominal pain started 5/17/14 and noted pressure without bowel movement and went to ED that night due to increasing pain. In patient 2 day hospital with clear liquids/IV. Mild nausea without vomiting. BM subsequently occurred and pt was discharged. No pain since. Continues with fullness in abdomen. Diet is bland for the most part.  8/25/14:  5. Notes increased abdominal girth and bloating x 2-3 weeks with urine urgency. Worried about recurrence. Is just starting to return to normal exercise and activities. No constipation, diarrhea, pain, vaginal or rectal bleeding, cough or dyspnea. CT to follow indeterminate pulmonary nodules today.   CT cap IMPRESSION:   1. No CT scan findings of the chest,  abdomen, or pelvis to indicate metastatic disease.  2. 2-5 mm pulmonary nodules, stable since 2/11/2014.  3. Nonobstructing 6 mm stone in the right kidney.  12/3/14:  8.. Pt started zoloft for anxiety. Worries about cancer recurrence.   3/2/15:  34  3/5/15: CT C/A/P: Impression:  1. Multiple new small peritoneal and retroperitoneal nodules are suspicious for metastases. Suspicious new left common iliac and central small bowel mesenteric nodes. No abdominal ascites.  2. Multiple pulmonary nodules are stable with no new nodules.  3. 9 mm nonobstructing right lower pole renal calculus.      5/20/15: CT c/a/p showed nodularity throughout the abdomen and pelvis worrisome for malignancy which has increased in size   5/28/15: CT abdomen and pelvis showed stable inumerabble peritoneal nodules scattered throughout the abdomen and pelvis consistent with metastases.    8/4/15 (approximatly): Left ureteral stent was placed.  8/12/15:  from [facility] 303   8/18/15: CT chest Impression showed slight increase in mild, subtle nodularity along the diaphragm which is nonspecific but may represent metastatic disease.   8/18/15: CT abdomen and pelvis Impression showed interval progression of peritoneal metastatic disease.       8/25/15:  on 3/2/15 of 34 prompted CT c/a/p, which showed multiple new small peritoneal and retroperitoneal nodules are suspicious for metastases. Suspicious new left common iliac and central small bowel mesenteric nodes. She participated in [facility] study involving treatment with on clinical trial with vaccine [id number]. She is here today because the Rudy trail failed and the pt pregressed. Her most recent CT c/a/p on 8/25 showed progression of peritoneal metastatic disease. And her most recent  from Rudy on 8/12/15 was 303.      Plan: Carbo/Doxil x 6 cycles.with imaging after 3 cycles.       9/3/15: Cycle #1 Carbo/Doxil.  244.  9/24/15: right IJ thrombus; started on  "Lovenox.   9/26/15: Present to [place] ED. \"presented to the ED this morning with what appears to be a mild drug rash after administering her lovenox this morning. This writer discussed situation with Gyn Onc Fellow Jeanne Moon as well as [facility] Heme/Onc service. Per Heme, a rash of this nature is extremely uncommon with administration of Lovenox. Given the mild nature of this rash and acute need for anticoagulation, recommended the patient continue with Lovenox injections and treat with Benadryl as needed. Advised patient be given precautions to return to the ED immediately if she develops reactive respiratory symptoms. Alternatively patient could be switched to alternative formulation of low molecular weight heparin if she was strongly resistant to continuation of Lovenox.\"      10/1/15: Cycle #2 Carbo/Doxil.  175.      10/28/15: gyn onc visit: pt complains of worsening constipation and tenderness around her right ribs. She is taking senna for her constipation with minimal improvement. She admits that the swelling around her neck after her blood clot has decreased. She also admits to feeling a nodules on her left abdomen. She states her appetite is good but she has not been eating fruits and vegetables. She denies any chemo side effects besides fatigue.       10/28/15:  158  11/23/2015:  133  CT c/a/p IMPRESSION: In this patient with a clinical history of ovarian cancer there is mixed response to therapy:   1. Stable to slightly decreased peritoneal nodularity since 8/18/2015.  2. No significant change regarding the large subdiaphragmatic peritoneal deposit since 9/24/2015.  3. Enlarging soft tissue nodule overlying the abdominal musculature concerning for metastasis since March of 2015.   4. Unchanged, indeterminate hypodensity near the gallbladder fossa since 8/18/2015, however progressively increased in size since  3/5/2015.  5. Bilateral nephroureteral stents. No significant hydronephrosis.  6. " Bilateral pulmonary nodules. Continued followup recommended.      12/23/15: Cycle #5 carboplatin/Doxil/Avastin.  96. To receive Avastin today despite proteinuria per Dr. Aguilar and nephrology.  1/21/16: Cycle #6 carboplatin/Doxil/Avastin, delayed one week secondary to neutropenia.  62.  1/28/16:  63.      2/2/16: Patient had right upper extremity doppler u/s done in [place] due to swollen glands and pain. 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 Pt is currently on lovenox.      2/2/16: US soft tissue neck  Conclusion:  1. Morphologically normal not pathologically enlarged two right carotid chain lymph nodes.  2. Chronic right jugular vein thrombosis, described in detail on  venous doppler exam.       2/2/16: Thyroid Ultrasound  Right lobe: 5.5 x 1.9 x 1.2 cm  Left lobe: 5.0 x 1.5 . 0.9 cm  Both lobes have normal background echotexture.      Conclusion:  1. 3 benign - appearing subcentimeter hypoechoic right mid thyroid nodules.  2. Borderline thyromegaly.      2/2/16: Right upper extremity venous duplex doppler evaluation  Conclusion:  1.) chronic non compressible echogenic right jugular vein thrombus, now 4.3 cm in length.       2/22/16:   IMPRESSION:    1. Ovarian cancer with peritoneal carcinomatosis.  2. Given the increased sensitivity of PET/CT, comparison with prior CT examinations is difficult. In general the abdominal/pelvic metastatic lesions appear to be decreased in size.  3. Persistent right internal jugular DVT, as evidenced by 5 cm filling defect with inferior border at the central venous catheter.  4. Bilateral hydronephrosis without overt caliectasis. Some distal migration of the bilateral double-J ureteral stents, although the  proximal coiled portions continue to be in the renal pelves.      2/24/16: C7 carboplatin/Doxil/Avastin.  56.  3/9/16: Hgb 6.6,  worked up for transfusion reaction (negative). Bleed possibly secondary to   3/23/16: C8 carboplatin/Doxil/Avastin.  44.  4/2016: Hospitalized in [place] x2 weeks for hematuria leading to anemia after ureteral stent exchange  4/20/16: C9D1 carboplatin/Doxil/Avastin. Deferred one week secondary to acute UTI.  35.  4/28/16: C9D1 deferred due to continued bacteruria and ANC 1.3.  5/4/16: C9D1 carboplatin/Doxil/Avastin. Breast lump noted, diagnostic mammogram ordered.  6/1/16: C10D1 carboplatin/Doxil/Avastin.  50.  6/28/16: C11D1 carboplatin/Doxil/Avastin. Avastin held due to bleeding. Carbo/Doxil deferred one week secondary to neutropenia.  43.  7/7/16: C12D1 carboplatin/Doxil. Avastin held due to bleeding.  7/18/16: Bilateral ureteral stent exchange  7/20/16-7/29/16: Hospitalized with urosepsis and blood loss anemia, started on Zosyn and discharged on amoxicillin  9/29/16: C1D1 Gemzar.  85.  10/21/16: C2D1 Gemzar.  106.   11/11/16: C3D1 Gemzar.  111.      12/12/16: CT CAP IMPRESSION: In this patient with a history of metastatic ovarian cancer:  1. Progression of disease as evidenced by a slowly enlarging mesenteric and omental soft tissue foci and slowly enlarging  periesophageal and pericardiophrenic lymph nodes, as detailed above.  2. Stable appearance of bilateral ureteral stents without hydronephrosis.  3. Patient was premedicated for a pre-existing IV contrast allergy. Despite this, she had itchiness on her face poststudy. She should no longer receive IV contrast in the future.        CT AP 1/10/2017: multiple dilated fluid filled small bowel loops with decompressed distal small bowel loops, consistent with obstruction. Transition point is suspected in the pelvis. No pneumatosis or free intraperitoneal gas. Bilateral ureteral stents appear appropriately positioned. There is mild dilation of the bilateral renal collecting systems, left greater than right.      1/23/17:  admitted to clinic for dizziness and heart palpitations   1/27/17: ED admission - leg swelling and GI bleeding       1/27/17: US lower extremity IMPRESSION:  1.  No evidence of right lower extremity deep venous thrombosis.  2.  Dampened waveforms in the right lower extremity, similar to 7/25/2016, though a more central occlusion cannot be excluded.  1/27/17: MRI Brain IMPRESSION:  Normal brain MRI  1/27/17: CT AP IMPRESSION: This patient with a history of metastatic ovarian cancer:  1. No bowel obstruction. Oral contrast progressed to the colon. Extensive colonic stool.  2. Severe right hydronephrosis, new from 12/12/2016, may represent obstruction of the right ureteral stent. No left-sided hydronephrosis.  3. Slight interval progression of diffuse peritoneal metastatic disease.  4. Mildly nodular areas of increased attenuation within the pelvic bowel which may represent loculated malignant ascites or peritoneal implants similar in appearance to 12/12/2016.      1/28/17: colonoscopy- benign       Treatment: Tesaro/Quadra Niraparib study  1/5/17-2/28/17: Cycle #1-3       2/28/17: CT scan ([facility]) IMPRESSION: In this patient with a history of ovarian cancer, there is mild disease progression as follows:  1. Increase in size and number of multiple subcentimeter nodules/intrafissural lymph nodes along the right major and minor  fissures and also bilateral pulmonary nodules, predominantly along the diaphragmatic pleura.   2. Slight increase in size of small lymph nodes in the right internal mammary region and bilateral anterior cardiophrenic region.  3. Stable to slight increase in size of deposits in the perihepatic region. No significant change in the omental deposits in the left  upper quadrant of the abdomen.  4. Stable to slight increase in size of mesenteric deposits/lymph nodes.  5. Bilateral nephroureteric stent in place. Atrophy of the right kidney with interval resolution of right hydronephrosis.  "Unchanged  mild left hydronephrosis. Air within the collecting systems and bladder likely related to prior intervention.  6. Dilation of proximal small bowel loops with interspersed areas of narrowing. No progression of oral contrast into the ileum. Moderate amount of fecal material in the colon. Partial/early small bowel obstruction is possible.  3/30/17: CT cap IMPRESSION: Images patient with a history of ovarian cancer, overall findings of stable disease includin. No acute findings to suggest etiology of severe left flank pain.  2. Unchanged pulmonary nodules/pleural nodularity and thoracic lymph nodes.  3. Unchanged perihepatic and left upper quadrant omental deposits as well as diffuse mesenteric lymphadenopathy.  4. Unchanged hypoattenuating focus in the hepatic dome.  5. Unchanged placement of bilateral nephroureteral stents. Unchanged mild to moderate left hydronephrosis. Stable atrophy of the right kidney.  6. Unchanged 8 mm right flank soft tissue nodule.      17: left splanchnic ganglion block. FNA of lesion in umbilicus completed; results pending.      3/30/17: CT AP IMPRESSION: Images patient with a history of ovarian cancer, overall findings of stable disease includin. No acute findings to suggest etiology of severe left flank pain.  2. Unchanged pulmonary nodules/pleural nodularity and thoracic lymph nodes.  3. Unchanged perihepatic and left upper quadrant omental deposits as well as diffuse mesenteric lymphadenopathy.  4. Unchanged hypoattenuating focus in the hepatic dome.  5. Unchanged placement of bilateral nephroureteral stents. Unchanged mild to moderate left hydronephrosis. Stable atrophy of the right kidney.  6. Unchanged 8 mm right flank soft tissue nodule.     2017: FNA- \"Lump in belly button\", palpation guided fine needle aspiration:   - Positive for Malignancy   - Metastatic adenocarcinoma, consistent with Müllerian origin (see comment)   Specimen Adequacy: Satisfactory " for evaluation.      4/27/17: CT CAP IMPRESSION: In this patient with history of metastatic ovarian carcinoma: Stable disease.  1. Unchanged pulmonary nodules and thoracic lymph nodes.  2. There are two nodules in the right lower quadrant posterior mesentery, which are stable to minimally increased on current study  and slightly increased from 03/15/17. Recommend attention on follow up. Otherwise stable peritoneal tumor metastasis.  3. New fluid attenuation collection in the central presacral pelvis, HU measures 12, likely ascitic fluid, not present on prior study.  Attention on follow-up.  4. Stable bilateral nephroureteral stents and mild hydronephrosis, with left greater than right fullness of the renal hilar region.    Plan to start weekly Paclitaxel.       5/22/17: Cycle #1 weekly Paclitaxel.  pending.         Today she comes to clinic feeling generally well. She states she has some fatigue but is feeling better since being off of the study medication. She does have some mild neuropathy in her fingers and toes but it does not really bother her unless she is thinking about it. She is not taking her Megace but is considering starting it again since she is off of the TESARO study. She has Slo Fe at home but has not been using it; she will have some issues with constipation at times as well as some abdominal discomfort. She denies any vaginal bleeding, no changes in her bladder habits, no new nausea, no emesis, no lower extremity edema, and no difficulties eating or sleeping. She denies any abdominal discomfort/bloating, no fevers or chills, and no chest pain or shortness of breath. She has questions about the new regimen she will be starting as well as when she could expect to see results, specifically the tumor in her umbilicus. She is due for her next stent change on 6/12; she will occasionally see some pink/blood in her urine from her stent.         Review of Systems:    Systemic           no weight  changes; no fever; no chills; no night sweats; no appetite changes; + fatigue  Skin           no rashes, or lesions  Eye           no irritation; no changes in vision  Hue-Laryngeal           no dysphagia; no hoarseness   Pulmonary    no cough; no shortness of breath  Cardiovascular    no chest pain; no palpitations  Gastrointestinal    no diarrhea; + constipation; + abdominal pain; no changes in bowel  habits; no blood in stool; + nausea  Genitourinary   no urinary frequency; no urinary urgency; no dysuria; no pain; no abnormal vaginal discharge; no abnormal vaginal bleeding  Breast    no breast discharge; no breast changes; no breast pain  Musculoskeletal    no myalgias; no arthralgias; no back pain  Psychiatric           no depressed mood; no anxiety    Hematologic               no tender lymph nodes; no noticeable swellings or lumps   Endocrine    no hot flashes; no heat/cold intolerance         Neurological   no tremor;+ numbness and tingling; no headaches; no difficulty  sleeping      Past Medical History:    Past Medical History:   Diagnosis Date     Anemia      History of blood transfusion     MULTIPLE     Hydronephrosis      Hyperlipidemia      Jugular vein thrombosis, right     Persistent right internal jugular DVT     Livedo annularis      Osteoarthritis      Osteopenia      Ovarian cancer (H)      Polymyalgia rheumatica (H)      PONV (postoperative nausea and vomiting)      Primary cancer of peritoneum (H)          Past Surgical History:    Past Surgical History:   Procedure Laterality Date     APPENDECTOMY       BREAST SURGERY      biopsy negative      SECTION       COLONOSCOPY N/A 2017    Procedure: COLONOSCOPY;  Surgeon: Luis Richardson MD;  Location: [facility]     COLONOSCOPY N/A 2017    Procedure: COMBINED COLONOSCOPY, SINGLE OR MULTIPLE BIOPSY/POLYPECTOMY BY BIOPSY;  Surgeon: Luis Richardson MD;  Location: [facility]     COMBINED CYSTOSCOPY, RETROGRADES, EXCHANGE STENT URETER(S)  Bilateral 7/18/2016    Procedure: COMBINED CYSTOSCOPY, RETROGRADES, EXCHANGE STENT URETER(S);  Surgeon: Carmela Alvarez MD;  Location: [facility]     COMBINED CYSTOSCOPY, RETROGRADES, EXCHANGE STENT URETER(S)  4/2016    @ [facility]     COMBINED CYSTOSCOPY, RETROGRADES, EXCHANGE STENT URETER(S) Bilateral 10/17/2016    Procedure: COMBINED CYSTOSCOPY, RETROGRADES, EXCHANGE STENT URETER(S);  Surgeon: Carmela Alvarez MD;  Location: [facility]     COMBINED CYSTOSCOPY, RETROGRADES, EXCHANGE STENT URETER(S) Bilateral 12/7/2016    Procedure: COMBINED CYSTOSCOPY, RETROGRADES, EXCHANGE STENT URETER(S);  Surgeon: Carmela Alvarez MD;  [facility]: [facility]     COMBINED CYSTOSCOPY, RETROGRADES, EXCHANGE STENT URETER(S) Bilateral 2/27/2017    Procedure: COMBINED CYSTOSCOPY, RETROGRADES, EXCHANGE STENT URETER(S);  Surgeon: Carmela Alvarez MD;  [facility]     ESOPHAGOSCOPY, GASTROSCOPY, DUODENOSCOPY (EGD), COMBINED N/A 1/28/2017    Procedure: COMBINED ESOPHAGOSCOPY, GASTROSCOPY, DUODENOSCOPY (EGD);  Surgeon: Luis Richardson MD;  Location: [facility]     HYSTERECTOMY TOTAL ABDOMINAL, BILATERAL SALPINGO-OOPHORECTOMY, NODE DISSECTION, COMBINED  11/7/2013    Procedure: COMBINED HYSTERECTOMY TOTAL ABDOMINAL, SALPINGO-OOPHORECTOMY, NODE DISSECTION;;  Surgeon: Cheri Aguilar MD;  Location: [facility]     INJECT NERVE BLOCK CELIAC PLEXUS Left 4/20/2017    Procedure: INJECT NERVE BLOCK CELIAC PLEXUS;  Left splanchnic nerve block;  Surgeon: Shabbir Valladares MD;  [facility]     LAPAROSCOPIC SALPINGO-OOPHORECTOMY  11/7/2013    Procedure: LAPAROSCOPIC SALPINGO-OOPHORECTOMY;  Diagnostic Laparoscopy, Exploratory Laparotomy, Bilateral Salpingo-Oophorectomy,  Hysterectomy, Omentectomy, Pelvic Washings, Peritoneal staging and biopsies, Pelvic and Periaortic Lymph node Dissection;  Surgeon: Cheri Aguilar MD;  Location: [facility]     LAPAROTOMY, STAGING, COMBINED  11/7/2013    Procedure: COMBINED LAPAROTOMY, STAGING;;   Surgeon: Cheri Aguilar MD;  Location: [facility]     LYMPH NODE BIOPSY  2008    Left posterior chain, beign     OPEN REDUCTION INTERNAL FIXATION TOE(S)  9/3/13    Fifth digit, left foot, with screw fixation      REMOVE PORT PERITONEAL  5/5/2014    Procedure: REMOVE PORT PERITONEAL;  Surgeon: Cheri Aguilar MD;  Location: [facility]         Health Maintenance Due   Topic Date Due     PHQ-9 Q1YR (NO INBASKET)  1955     TETANUS IMMUNIZATION (SYSTEM ASSIGNED)  11/24/1972     HEPATITIS C SCREENING  11/24/1972     LIPID SCREEN Q5 YR FEMALE (SYSTEM ASSIGNED)  11/24/1999       Current Medications:     Current Outpatient Prescriptions   Medication Sig Dispense Refill     LORazepam (ATIVAN) 1 MG tablet Take 1 tablet (1 mg) by mouth every 6 hours as needed (Anxiety, Nausea/Vomiting or Sleep) 30 tablet 2     prochlorperazine (COMPAZINE) 10 MG tablet Take 1 tablet (10 mg) by mouth every 6 hours as needed (Nausea/Vomiting) 30 tablet 2     buprenorphine (BUTRANS) 5 MCG/HR WK patch Place 1 patch onto the skin every 7 days 4 patch 1     enoxaparin (LOVENOX) 40 MG/0.4ML injection INJECT 1 SYRINGE SUBCUTANEOUSLY DAILY 12 mL 1     tolterodine (DETROL LA) 4 MG 24 hr capsule TAKE ONE CAPSULE BY MOUTH DAILY 30 capsule 3     ondansetron (ZOFRAN-ODT) 8 MG ODT tab Place 1 tablet (8 mg) under the tongue every 8 hours as needed for nausea 90 tablet 3     amLODIPine (NORVASC) 2.5 MG tablet Take 1 tablet (2.5 mg) by mouth daily 30 tablet      bisacodyl (DULCOLAX) 10 MG Suppository Place 1 suppository (10 mg) rectally daily as needed for constipation 10 suppository 0     tamsulosin (FLOMAX) 0.4 MG capsule Take 1 capsule (0.4 mg) by mouth daily 60 capsule 6     LORazepam (ATIVAN) 1 MG tablet Take 1 tablet (1 mg) by mouth every 6 hours as needed (nausea/vomiting, anxiety or sleep) 30 tablet 3     ACETAMINOPHEN PO Take 500 mg by mouth every 6 hours as needed for pain Reported on 3/30/2017       Multiple Vitamins-Minerals (ONE DAILY MULTIVITAMIN  "WOMEN) TABS Take 1 tablet by mouth every morning        polyethylene glycol (MIRALAX/GLYCOLAX) powder Take 1 capful by mouth daily as needed        cycloSPORINE (RESTASIS) 0.05 % ophthalmic emulsion Place 1 drop into both eyes 2 times daily        Ranitidine HCl (ZANTAC PO) Take 75 mg by mouth daily        MELATONIN PO Take 1 mg by mouth At Bedtime        EPINEPHrine (EPIPEN) 0.3 MG/0.3ML injection Inject 0.3 mLs (0.3 mg) into the muscle once as needed for anaphylaxis 1 each 0     senna-docusate (SENOKOT-S;PERICOLACE) 8.6-50 MG per tablet Take 1-2 tablets by mouth 2 times daily 7 tablet      Pyridoxine HCl (VITAMIN B6 PO) Take 1 tablet by mouth At Bedtime Reported on 4/27/2017       prochlorperazine (COMPAZINE) 10 MG tablet Take 1 tablet (10 mg) by mouth every 6 hours as needed (nausea/vomiting) 30 tablet 2     megestrol (MEGACE ORAL) 40 MG/ML suspension Take 10 mLs (400 mg) by mouth 2 times daily (Patient not taking: Reported on 5/22/2017) 600 mL 1         Allergies:        Allergies   Allergen Reactions     Contrast Dye Itching and Swelling     Itching/tingling of mouth and nose with sensation of swelling after receiving IV contrast for CT.  This occurred despite steroid premedication. Therefore the patient should not receive intravenous contrast in the future.      Benadryl Allergy Other (See Comments)     Stay awake, restless, \"uncomfortable\", \"feel like I need to run away\"  With  IV dose,  does fine with oral Benadryl.     Lovenox [Enoxaparin] Hives     3/23/16: Okay to receive heparin flushes in port, tolerates well. Patricia Cortez FNP-C     Amoxicillin Rash     Diphenhydramine Anxiety     No Clinical Screening - See Comments Rash     Pollen Extract Rash     Sulfa Drugs Rash        Social History:     Social History   Substance Use Topics     Smoking status: Former Smoker     Smokeless tobacco: Former User      Comment: Quit over 20 years ago     Alcohol use No       History   Drug Use No         Family " "History:     The patient's family history is notable for:    Family History   Problem Relation Age of Onset     Arthritis Father      Myocardial Infarction Father      secondary to anesthesia     Colon Cancer Father      DIABETES Other      Uncle     Hypertension Mother      Other - See Comments Mother      cognitive decline     Skin Cancer Mother      Hypertension Sister      HEART DISEASE Other      unknown valve replacement     Bladder Cancer Sister      Skin Cancer Brother          Physical Exam:     /71  Pulse 98  Temp 98.5  F (36.9  C) (Oral)  Resp 20  Ht 1.676 m (5' 5.98\")  Wt 53.1 kg (117 lb)  SpO2 99%  BMI 18.89 kg/m2  Body mass index is 18.89 kg/(m^2).    General Appearance: healthy and alert, no distress     HEENT: no thyromegaly, no palpable nodules or masses        Cardiovascular: regular rate and rhythm, no gallops, rubs or murmurs     Respiratory: lungs clear, no rales, rhonchi or wheezes, normal diaphragmatic excursion    Musculoskeletal: extremities non tender and without edema    Skin: no lesions or rashes     Neurological: normal gait, no gross defects     Psychiatric: appropriate mood and affect                               Hematological: normal cervical, supraclavicular lymph nodes     Gastrointestinal:       abdomen soft, non-tender, non-distended, no organomegaly or masses    Genitourinary: Not indicated      Assessment:    Margaux Guzmán is a 61 year old woman with a history of primary peritoneal cancer.  She was recently on the Tesaro study but unfortunately had progression. She is here today for follow up and disease management.     35 minutes were spent with this patient, over 50% of that time was spent in symptom management, treatment planning and in counseling and coordination of care.      Plan:     1.)        Ok to proceed with planned chemotherapy pending labs are WNL. She will return for her next cycle in 7 weeks; scheduling will work on her remaining weeks for this " cycle today. reviewed use of prn medications, eating smaller meals more often, adequate hydration, pacing activities, possibility of needing growth factor added in, and monitoring for neuropathy. Pedro Pablo TAM, care coordinator, to discuss Paclitaxel with Margaux. Reviewed signs and symptoms for when she should contact the clinic or seek additional care. Patient to contact the clinic with any questions or concerns in the interim.     2.) Genetic risk factors were assessed and she is negative for mutations in BRCA1, BRCA2, EPCAM, MLH1, MSH2, MSH6, PMS2, PTEN and TP53.    3.) Labs and/or tests ordered include:  Chemo labs. .      4.) Health maintenance issues addressed today include annual health maintenance and non-gynecologic issues with PCP. She is due for a stent change 6/12.    5.)        Neuropathy: has some mild neuropathy in her fingers and toes prior to starting; will continue to monitor this. She was given information regarding B6 and L-glutamine.     6.)        Hypomagnesemia: Mg to be replaced today in infusion; she was given a handout on higher Mg foods. Will continue to monitor and add in oral Mg if needed.     7.)         Anemia: has previously received blood transfusions; has Slo Fe at home but not currently taking but will restart this as well as focus on eating iron rich foods. She has a consent for blood transfusion on file. Will have a type and screen drawn next week at her next visit for possible transfusion 5/31.    JUSTIN Lombardo, NP-BC, ANP-BC  Women's Health Nurse Practitioner  Adult Nurse Pracitioner  Gynecologic Oncology          CC  Patient Care Team:  Aurelia Knox as PCP - General (Family Practice)  Jessica Galvin, RN as Continuity Care Coordinator (Gyn-Onc)  Jessica Galvin, RN as Continuity Care Coordinator (Oncology)  Lizet Truong APRN CNP (Nurse Practitioner - Women's Health)  Derrek Gamble MD as MD (Nephrology)  Patricia Murcia APRN CNP as Nurse Practitioner (Nurse  Practitioner)  Elissa Wheeler MD as MD (Infectious Diseases)  Carmela Alvarez MD as MD (Urology)  Keira Rolle, RN as Registered Nurse (Urology)  Sophia Garcia PA-C as Referring Physician (Physician Assistant)  Aurelia Knox (Family Practice)  Yuliya Perez MD as MD (Internal Medicine)

## 2017-05-22 NOTE — NURSING NOTE
"Oncology Rooming Note    May 22, 2017 8:33 AM   Margaux Guzmán is a 61 year old female who presents for:    Chief Complaint   Patient presents with     Oncology Clinic Visit     f/u prior to tx     Initial Vitals: /71  Pulse 98  Temp 98.5  F (36.9  C) (Oral)  Resp 20  Ht 1.676 m (5' 5.98\")  Wt 53.1 kg (117 lb)  SpO2 99%  BMI 18.89 kg/m2 Estimated body mass index is 18.89 kg/(m^2) as calculated from the following:    Height as of this encounter: 1.676 m (5' 5.98\").    Weight as of this encounter: 53.1 kg (117 lb). Body surface area is 1.57 meters squared.  No Pain (0) Comment: Data Unavailable   No LMP recorded. Patient has had a hysterectomy.  Allergies reviewed: Yes  Medications reviewed: Yes    Medications: Medication refills not needed today.  Pharmacy name entered into [employer]:    [id number] - [place], [place] - [address] AT [facility] [address]  [id number] - [place], [place] - [address] [facility] [address]  [facility] [place], [place] - [address] [facility] [place] [address]  [place] [address] [place] [address] [id number] - [place] - [address] [id number] - [place], [place] - [address] [id number] - [facility] [address] [address] [address] [facility] [address]    Clinical concerns:     8 minutes for nursing intake (face to face time)     MORRO NEAL LPN              "

## 2017-05-22 NOTE — PROGRESS NOTES
"Chemotherapy Education Notes    Met with patient in clinic for chemotherapy education on Taxol. ChemoCare handout was discussed and given to patient to take home.  Reviewed the following with the patient:    Treatment Goal/Regimen/Duration: rationale for strict adherence, specific medication names including pre-treatment medications, and at home scheduled or as-needed medications, medication delivery methods; chemotherapy side effects and management of side effects, including: skin changes/nail changes, anemia, neutropenia, thrombocytopenia, diarrhea/constipation, nausea/vomiting, hair loss, memory changes, mouth sores, taste changes, appetite changes, peripheral neuropathy, fatigue, and myelosuppression.  Infection prevention, and monitoring of lab values, what lab tests and what changes of these values meant, along with the possibility of IV hydration or blood product transfusion, or the need to defer or hold treatment.  Also reviewed signs/symptoms that should be reported to care team or on-call provider immediately, including: temperature of 100.4 degrees or higher, shortness of breath, chest pain, unusual bruising, bleeding symptoms, extreme fatigue, >4-6 episodes of diarrhea in 24 hours, uncontrolled nausea/vomiting, symptoms of dehydration (severe dry mouth/severe thirst, rapid heart beat, dizziness, dark or decreased urination, and lethargy), or symptoms of DVT (sudden onset redness, swelling, tenderness, and warmth of extremity).      General Chemotherapy Information, including: what to do if needing to miss a treatment, and when to call the provider.  Importance of Central line care (port-a-cath) or IV site care.      Written Information: Patient was provided with printouts on possible chemotherapy side effects/ways to cope with side effects, \"When to Call the Doctor,\" and \"Self-Care Tips During Cancer Treatment.\"  Also, contact information given for the following: Oncology Clinic/scheduling, Nurse Triage " Line, RN Care Coordinator, and the after-hours Nurse Advice Line.    No barriers to learning identified. Patient verbalized understanding of all written and verbal information. All questions answered patient s satisfaction.  Learning barriers and method preference are documented in the patient education flowsheet.       Patient instructed to call with further questions or concerns.  Patient states understanding and is in agreement with this plan.       Face to Face Time with Patient: 20 minutes    Pedro Pablo Dunn RN, BSN, OCN  Care Coordinator  [facility]  [phone]

## 2017-05-30 NOTE — MR AVS SNAPSHOT
After Visit Summary   5/30/2017    Margaux Guzmán    MRN: [id number]           Patient Information     Date Of Birth          1955        Visit Information        Provider Department      5/30/2017 2:00 PM BAY [address] [place] [address]        Today's Diagnoses     Other iron deficiency anemia    -  1    Chemotherapy-induced neutropenia (H)        Ovarian cancer, left (H)        Ovarian cancer, right (H)        Primary peritoneal adenocarcinoma (H)        Encounter for long-term current use of medication        Anemia due to other cause           Follow-ups after your visit        Your next 10 appointments already scheduled     May 31, 2017  2:30 PM CDT   Level 2 with BAY 2 INFUSION   [facility]    [address] [place] [address]   [phone]            Jun 05, 2017  8:30 AM CDT   Return Visit with NURSE ONLY CANCER CENTER   [facility] ([facility])    [address] [place] [address]   [phone]            Jun 05, 2017  9:00 AM CDT   Level 2 with [facility] INFUSION   [facility] ([facility])    [address] [place] [address]   [phone]            Jun 12, 2017   Procedure with Carmela Alvarez MD   [facility] ([facility])    [address] [place] [id number] [phone]           Located in the Clinics and Surgery Center at [address] [place] [address].   parking is very convenient and highly recommended.  is a $6 flat rate fee.  Both  and self parkers should enter the main arrival plaza from [facility]; parking attendants will direct you based on your parking preference.            Jun 13, 2017  9:30 AM CDT   Return Visit with NURSE ONLY CANCER CENTER   [facility]  [facility])    [address] [facility] [address]   [phone]            Jun 13, 2017 10:00 AM CDT   Level 2 with BAY 3 INFUSION   [facility] ([facility])    [address] [facility] [address]   [phone]            Jun 19, 2017  9:00 AM CDT   Return Visit with NURSE ONLY CANCER CENTER   [facility] ([facility])    [address] [facility] [address]   [phone]            Jun 19, 2017  9:30 AM CDT   Level 2 with BAY 9 INFUSION   [facility] ([facility])    [address] [facility] [address]   [phone]            Jun 26, 2017  9:00 AM CDT   Return Visit with NURSE ONLY CANCER CENTER   [facility] ([facility])    [address] [facility] [address]   [phone]            Jun 26, 2017  9:30 AM CDT   Level 2 with BAY 8 INFUSION   [facility] ([facility])    [address] [facility] [address]   [phone]              Future tests that were ordered for you today     Open Standing Orders        Priority Remaining Interval Expires Ordered    Red blood cell prepare order unit Routine 99/100 CONDITIONAL (SPECIFY) BLOOD  5/30/2017            Who to contact     If you have questions or need follow up information about today's clinic visit or your schedule please contact [facility] directly at [phone].  Normal or non-critical lab and imaging results will be communicated to you by MyChart, letter or phone within 4 business days after the clinic has received the results. If you do not hear from us within 7 days, please contact the clinic through MyChart or phone. If you have a critical or abnormal lab result, we will notify you by phone as soon as possible.  Submit refill requests through [employer] or call your pharmacy and they will forward the  refill request to us. Please allow 3 business days for your refill to be completed.          Additional Information About Your Visit        [employer] Information     [employer] gives you secure access to your electronic health record. If you see a primary care provider, you can also send messages to your care team and make appointments. If you have questions, please call your primary care clinic.  If you do not have a primary care provider, please call [phone] and they will assist you.      [employer] is an electronic gateway that provides easy, online access to your medical records. With [employer], you can request a clinic appointment, read your test results, renew a prescription or communicate with your care team.     To access your existing account, please contact your [facility] or call [phone] for assistance.        Care EveryWhere ID     This is your Care EveryWhere ID. This could be used by other organizations to access your [place] medical records  [id number]        Your Vitals Were     Pulse Temperature Respirations Pulse Oximetry BMI (Body Mass Index)       95 99.1  F (37.3  C) (Oral) 18 97% 18.7 kg/m2        Blood Pressure from Last 3 Encounters:   05/30/17 134/77   05/22/17 124/71   05/18/17 155/85    Weight from Last 3 Encounters:   05/30/17 52.5 kg (115 lb 12.8 oz)   05/22/17 53.1 kg (117 lb)   05/18/17 51.7 kg (114 lb)              We Performed the Following     ABO/Rh type and screen     Blood component     Transfuse red blood cell unit        Primary Care Provider Office Phone # Fax #    Aurelia MCLAUGHLIN Abilio [phone]       [facility] MEDICAL [facility] [address] [address]        Thank you!     Thank you for choosing [facility]  for your care. Our goal is always to provide you with excellent care. Hearing back from our patients is one way we can continue to improve our services. Please take a few minutes to complete the written  survey that you may receive in the mail after your visit with us. Thank you!             Your Updated Medication List - Protect others around you: Learn how to safely use, store and throw away your medicines at www.disposemymeds.org.          This list is accurate as of: 5/30/17  5:32 PM.  Always use your most recent med list.                   Brand Name Dispense Instructions for use    ACETAMINOPHEN PO      Take 500 mg by mouth every 6 hours as needed for pain Reported on 3/30/2017       amLODIPine 2.5 MG tablet    NORVASC    30 tablet    Take 1 tablet (2.5 mg) by mouth daily       buprenorphine 5 MCG/HR WK patch    BUTRANS    4 patch    Place 1 patch onto the skin every 7 days       COLACE PO      Take 50 mg by mouth daily       cycloSPORINE 0.05 % ophthalmic emulsion    RESTASIS     Place 1 drop into both eyes 2 times daily       enoxaparin 40 MG/0.4ML injection    LOVENOX    12 mL    INJECT 1 SYRINGE SUBCUTANEOUSLY DAILY       EPINEPHrine 0.3 MG/0.3ML injection     1 each    Inject 0.3 mLs (0.3 mg) into the muscle once as needed for anaphylaxis       LORazepam 1 MG tablet    ATIVAN    30 tablet    Take 1 tablet (1 mg) by mouth every 6 hours as needed (Anxiety, Nausea/Vomiting or Sleep)       MELATONIN PO      Take 1 mg by mouth At Bedtime       ondansetron 8 MG ODT tab    ZOFRAN-ODT    90 tablet    Place 1 tablet (8 mg) under the tongue every 8 hours as needed for nausea       ONE DAILY MULTIVITAMIN WOMEN Tabs      Take 1 tablet by mouth every morning       polyethylene glycol powder    MIRALAX/GLYCOLAX     Take 1 capful by mouth daily as needed       prochlorperazine 10 MG tablet    COMPAZINE    30 tablet    Take 1 tablet (10 mg) by mouth every 6 hours as needed (Nausea/Vomiting)       tamsulosin 0.4 MG capsule    FLOMAX    60 capsule    Take 1 capsule (0.4 mg) by mouth daily       tolterodine 4 MG 24 hr capsule    DETROL LA    30 capsule    TAKE ONE CAPSULE BY MOUTH DAILY       VITAMIN B6 PO      Take 1  tablet by mouth At Bedtime Reported on 4/27/2017       ZANTAC PO      Take 75 mg by mouth daily

## 2017-05-30 NOTE — MR AVS SNAPSHOT
After Visit Summary   5/30/2017    Margaux Guzmán    MRN: [id number]           Patient Information     Date Of Birth          1955        Visit Information        Provider Department      5/30/2017 1:30 PM NURSE ONLY CANCER CENTER [facility]        Today's Diagnoses     Other iron deficiency anemia    -  1    Chemotherapy-induced neutropenia (H)        Ovarian cancer, left (H)        Ovarian cancer, right (H)        Primary peritoneal adenocarcinoma (H)           Follow-ups after your visit        Your next 10 appointments already scheduled     May 30, 2017  2:00 PM CDT   Level 2 with BAY 7 INFUSION   [facility] ([facility])    [address] [place] [address]   [phone]            May 31, 2017  2:30 PM CDT   Level 2 with BAY 2 INFUSION   [facility] ([facility])    [address] [place] [address]   [phone]            Jun 05, 2017  8:30 AM CDT   Return Visit with NURSE ONLY CANCER CENTER   [facility] ([facility])    [address] [place] [address]   [phone]            Jun 05, 2017  9:00 AM CDT   Level 2 with BAY 7 INFUSION   [facility] ([facility])    [address] [place] [address]   [phone]            Jun 12, 2017   Procedure with Carmela Alvarez MD   [facility] ([facility] [place])    [address] [place] [address]  [address] [place] [id number] [phone]           Located in the Clinics and Surgery Center at [address] [place] [address].   parking is very convenient and highly recommended.  is a $6 flat rate fee.  Both  and self parkers should enter the main arrival plaza from [facility]; parking attendants will direct you based on your parking preference.            Jun  13, 2017  9:30 AM CDT   Return Visit with NURSE ONLY CANCER CENTER   [facility] ([facility])    [address] [facility] [address]   [phone]            Jun 13, 2017 10:00 AM CDT   Level 2 with BAY 3 INFUSION   [facility] ([facility])    [address] [facility] [address]   [phone]            Jun 19, 2017  9:00 AM CDT   Return Visit with NURSE ONLY CANCER CENTER   [facility] ([facility])    [address] [facility] [address]   [phone]            Jun 19, 2017  9:30 AM CDT   Level 2 with BAY 9 INFUSION   [facility] ([facility])    [address] [facility] [address]   [phone]            Jun 26, 2017  9:00 AM CDT   Return Visit with NURSE ONLY CANCER CENTER   [facility] ([facility])    [address] [place] [address]   [phone]              Who to contact     If you have questions or need follow up information about today's clinic visit or your schedule please contact [facility] directly at [phone].  Normal or non-critical lab and imaging results will be communicated to you by Cater to uhart, letter or phone within 4 business days after the clinic has received the results. If you do not hear from us within 7 days, please contact the clinic through Cater to uhart or phone. If you have a critical or abnormal lab result, we will notify you by phone as soon as possible.  Submit refill requests through Light Harmonic or call your pharmacy and they will forward the refill request to us. Please allow 3 business days for your refill to be completed.          Additional Information About Your Visit        Cater to uharScanNano Information     [employer] gives you secure access to your electronic health record. If you see a primary care provider, you can also send messages to your  care team and make appointments. If you have questions, please call your primary care clinic.  If you do not have a primary care provider, please call [phone] and they will assist you.      [employer] is an electronic gateway that provides easy, online access to your medical records. With [employer], you can request a clinic appointment, read your test results, renew a prescription or communicate with your care team.     To access your existing account, please contact your [facility] or call [phone] for assistance.        Care EveryWhere ID     This is your Care EveryWhere ID. This could be used by other organizations to access your [facility] medical records  [id number]         Blood Pressure from Last 3 Encounters:   05/22/17 124/71   05/18/17 155/85   05/03/17 121/70    Weight from Last 3 Encounters:   05/22/17 53.1 kg (117 lb)   05/18/17 51.7 kg (114 lb)   05/03/17 53.5 kg (118 lb)              Today, you had the following     No orders found for display       Primary Care Provider Office Phone # Fax #    Aurelia Knox [phone]       [facility] [address] [address]        Thank you!     Thank you for choosing [facility]  for your care. Our goal is always to provide you with excellent care. Hearing back from our patients is one way we can continue to improve our services. Please take a few minutes to complete the written survey that you may receive in the mail after your visit with us. Thank you!             Your Updated Medication List - Protect others around you: Learn how to safely use, store and throw away your medicines at www.disposemymeds.org.          This list is accurate as of: 5/30/17  1:49 PM.  Always use your most recent med list.                   Brand Name Dispense Instructions for use    ACETAMINOPHEN PO      Take 500 mg by mouth every 6 hours as needed for pain Reported on 3/30/2017        amLODIPine 2.5 MG tablet    NORVASC    30 tablet    Take 1 tablet (2.5 mg) by mouth daily       bisacodyl 10 MG Suppository    DULCOLAX    10 suppository    Place 1 suppository (10 mg) rectally daily as needed for constipation       buprenorphine 5 MCG/HR WK patch    BUTRANS    4 patch    Place 1 patch onto the skin every 7 days       cycloSPORINE 0.05 % ophthalmic emulsion    RESTASIS     Place 1 drop into both eyes 2 times daily       enoxaparin 40 MG/0.4ML injection    LOVENOX    12 mL    INJECT 1 SYRINGE SUBCUTANEOUSLY DAILY       EPINEPHrine 0.3 MG/0.3ML injection     1 each    Inject 0.3 mLs (0.3 mg) into the muscle once as needed for anaphylaxis       LORazepam 1 MG tablet    ATIVAN    30 tablet    Take 1 tablet (1 mg) by mouth every 6 hours as needed (Anxiety, Nausea/Vomiting or Sleep)       megestrol 40 MG/ML suspension    MEGACE ORAL    600 mL    Take 10 mLs (400 mg) by mouth 2 times daily       MELATONIN PO      Take 1 mg by mouth At Bedtime       ondansetron 8 MG ODT tab    ZOFRAN-ODT    90 tablet    Place 1 tablet (8 mg) under the tongue every 8 hours as needed for nausea       ONE DAILY MULTIVITAMIN WOMEN Tabs      Take 1 tablet by mouth every morning       polyethylene glycol powder    MIRALAX/GLYCOLAX     Take 1 capful by mouth daily as needed       prochlorperazine 10 MG tablet    COMPAZINE    30 tablet    Take 1 tablet (10 mg) by mouth every 6 hours as needed (Nausea/Vomiting)       senna-docusate 8.6-50 MG per tablet    SENOKOT-S;PERICOLACE    7 tablet    Take 1-2 tablets by mouth 2 times daily       tamsulosin 0.4 MG capsule    FLOMAX    60 capsule    Take 1 capsule (0.4 mg) by mouth daily       tolterodine 4 MG 24 hr capsule    DETROL LA    30 capsule    TAKE ONE CAPSULE BY MOUTH DAILY       VITAMIN B6 PO      Take 1 tablet by mouth At Bedtime Reported on 4/27/2017       ZANTAC PO      Take 75 mg by mouth daily

## 2017-05-30 NOTE — PROGRESS NOTES
Reviewed and will receive 1 unit of blood on 5/31/17  Jessica TIMMONS RN, OCN  Care Coordinator   Gynecologic Cancer   Office [phone]  Pager [phone]

## 2017-05-30 NOTE — PROGRESS NOTES
Infusion Nursing Note:  Margaux Guzmán presents today for C1 D8 Taxol and 1 unit PRBC.    Patient seen by provider today: No   present during visit today: Not Applicable.    Note: N/A.    Intravenous Access:  Implanted Port.    Treatment Conditions:  Lab Results   Component Value Date    HGB 7.7 05/30/2017     Lab Results   Component Value Date    WBC 5.4 05/30/2017      Lab Results   Component Value Date    ANEU 4.0 05/30/2017     Lab Results   Component Value Date     05/30/2017      Lab Results   Component Value Date     05/22/2017                   Lab Results   Component Value Date    POTASSIUM 3.5 05/30/2017           Lab Results   Component Value Date    MAG 1.9 05/30/2017            Lab Results   Component Value Date    CR 0.82 05/22/2017                   Lab Results   Component Value Date    PURVI 9.4 05/22/2017                Lab Results   Component Value Date    BILITOTAL 0.2 05/22/2017           Lab Results   Component Value Date    ALBUMIN 3.5 05/22/2017                    Lab Results   Component Value Date    ALT 17 05/22/2017           Lab Results   Component Value Date    AST 13 05/22/2017     Results reviewed, labs MET treatment parameters, ok to proceed with treatment.  Blood transfusion consent signed 4/20/17.      Post Infusion Assessment:  Patient tolerated infusion without incident.  Blood return noted pre and post infusion.  Access discontinued per protocol.    Discharge Plan:   Patient will return 6/5/17 for next appointment.   Patient discharged in stable condition accompanied by: self.  Departure Mode: Ambulatory.    Grace Perry RN

## 2017-05-31 NOTE — TELEPHONE ENCOUNTER
Spoke with pt who has multiple issues  Recent ER visit pt did not get anything for pain medication  Has appt with pain clinic for new patient evaluation in mid May in [facility]  Pt feels nausea is result of pain, has some compazine at home but this does not work as well  opiods are not really working for pain, pain continues at the same level no matter if she takes meds or not  Had used dexamethasone but this only worked for a few days then just made her hyper  Morphine did not provide any pain relief just caused her to be sleepy and became constipated  Oxycodone also did not work and only caused sleepiness  Tramadol did not work at all  Pt states she is currently taking 3000 mg tylenol daily  Pt wonders any other options besides opiods for pain as she does not like how they make her feel especially when there is no relief pain  Pt states she has constipation problems and uses miralax and senna BID  Pt states most of her pain is in left flank area, she does note some in right but nothing compared to left  Pt wonders if urology or nephrology might be option if pain is related to kidney or stents, suggested to pt to call their office to discuss this  Told pt I will check with pain clinic here at [facility] on appts and options

## 2017-06-01 NOTE — TELEPHONE ENCOUNTER
PREVISIT INFORMATION                                                    Margaux Guzmán scheduled for future visit at [facility] specialty clinics.    Patient is scheduled to see Alan on 06/07/2017  Reason for visit: consult stent exchange removal   Referring provider: Carmela Alvarez MD   Has patient seen previous specialist? Yes.  Name of Carmela Rodrigues MD r , Clinic/Facility [facility]  Medical Records:  Available in chart.    Left message for pt to return call to clinic.     REVIEW                                                      New patient packet mailed to patient: No  Medication reconciliation complete: No      PLAN/FOLLOW-UP NEEDED                                                      Patient Reminders Given:    Informed patient to bring an updated list of allergies, medications, pharmacy details and insurance information. Directed patient to come to the 2nd floor, check-in #4 for their appointment. Informed patient to call back if appointment needs to be cancelled or rescheduled at [phone].    Reminded patient to bring any outside records regarding this appointment or have them faxed to clinic at [phone].    Reminded patient to complete and bring in urology questionnaire. Also for patient to please come with a full bladder and to ask , if early to get staff member for sample.

## 2017-06-05 NOTE — PROGRESS NOTES
Infusion Nursing Note:  Margaux Guzmán presents today for Taxol.    Patient seen by provider today: No   present during visit today: Not Applicable.    Note: message sent to Jessica, care coordinator, to arrange hgb check/possible transfusion near home, as patient has had previously.    Intravenous Access:  Implanted Port.    Treatment Conditions:  Results reviewed, labs MET treatment parameters, ok to proceed with treatment.      Post Infusion Assessment:  Patient tolerated infusion without incident.    Discharge Plan:   RTC 6/13 for next chemo .    NARDA MURDOCK RN

## 2017-06-05 NOTE — MR AVS SNAPSHOT
After Visit Summary   6/5/2017    Margaux Guzmán    MRN: [id number]           Patient Information     Date Of Birth          1955        Visit Information        Provider Department      6/5/2017 8:30 AM NURSE ONLY CANCER CENTER [facility]        Today's Diagnoses     Other iron deficiency anemia    -  1    Chemotherapy-induced neutropenia (H)        Ovarian cancer, left (H)        Ovarian cancer, right (H)        Primary peritoneal adenocarcinoma (H)           Follow-ups after your visit        Your next 10 appointments already scheduled     Jun 05, 2017  9:00 AM CDT   Level 2 with [place] 7 INFUSION   [facility] ([facility])    [address] [place] [address]   [phone]            Jun 07, 2017  4:00 PM CDT   New Visit with Andre Phillips MD   [facility])    [address] [place] [address]   [phone]            Jun 12, 2017   Procedure with Carmela Alvarez MD   [facility] ([facility] [place])    [address] [place] [address]  [address]th [place] [id number]   [phone]           Located in the Clinics and Surgery Center at [address] [place] [address].   parking is very convenient and highly recommended.  is a $6 flat rate fee.  Both  and self parkers should enter the main arrival plaza from [facility]; parking attendants will direct you based on your parking preference.            Jun 13, 2017  9:30 AM CDT   Return Visit with NURSE ONLY CANCER CENTER   [facility] ([facility])    [address] [place] [address]   [phone]            Jun 13, 2017 10:00 AM CDT   Level 2 with BAY 3 INFUSION   [facility] ([facility])    [address] [place] [address]   [phone]             Jun 19, 2017  9:00 AM CDT   Return Visit with NURSE ONLY CANCER CENTER   [facility] ([facility])    [address] [facility] [address]   [phone]            Jun 19, 2017  9:30 AM CDT   Level 2 with BAY 9 INFUSION   [facility] ([facility])    [address] [facility] [address]   [phone]            Jun 26, 2017  9:00 AM CDT   Return Visit with NURSE ONLY CANCER CENTER   [facility] ([facility])    [address] [facility] [address]   [phone]            Jun 26, 2017  9:30 AM CDT   Level 2 with BAY 8 INFUSION   [facility] ([facility])    [address] [facility] [address]   [phone]            Jul 10, 2017  8:30 AM CDT   Return Visit with JUSTIN Patrick CNP   [facility] ([facility])    [address] [place] [address]   [phone]              Who to contact     If you have questions or need follow up information about today's clinic visit or your schedule please contact [facility] directly at [phone].  Normal or non-critical lab and imaging results will be communicated to you by MyChart, letter or phone within 4 business days after the clinic has received the results. If you do not hear from us within 7 days, please contact the clinic through MyChart or phone. If you have a critical or abnormal lab result, we will notify you by phone as soon as possible.  Submit refill requests through [employer] Â® or call your pharmacy and they will forward the refill request to us. Please allow 3 business days for your refill to be completed.          Additional Information About Your Visit        [employer] Information     [employer] Â® gives you secure access to your electronic health record. If you see a primary care provider, you can also send  messages to your care team and make appointments. If you have questions, please call your primary care clinic.  If you do not have a primary care provider, please call [phone] and they will assist you.      [employer] is an electronic gateway that provides easy, online access to your medical records. With [employer], you can request a clinic appointment, read your test results, renew a prescription or communicate with your care team.     To access your existing account, please contact your [facility] or call [phone] for assistance.        Care EveryWhere ID     This is your Care EveryWhere ID. This could be used by other organizations to access your [facility] medical records  [id number]         Blood Pressure from Last 3 Encounters:   05/30/17 134/77   05/22/17 124/71   05/18/17 155/85    Weight from Last 3 Encounters:   05/30/17 52.5 kg (115 lb 12.8 oz)   05/22/17 53.1 kg (117 lb)   05/18/17 51.7 kg (114 lb)              We Performed the Following     CBC with platelets differential     Magnesium     Potassium        Primary Care Provider Office Phone # Fax #    Aurelia Knox [phone]       [facility] [address] [facility] [address]        Thank you!     Thank you for choosing [facility]  for your care. Our goal is always to provide you with excellent care. Hearing back from our patients is one way we can continue to improve our services. Please take a few minutes to complete the written survey that you may receive in the mail after your visit with us. Thank you!             Your Updated Medication List - Protect others around you: Learn how to safely use, store and throw away your medicines at www.disposemymeds.org.          This list is accurate as of: 6/5/17  8:44 AM.  Always use your most recent med list.                   Brand Name Dispense Instructions for use    ACETAMINOPHEN PO      Take 500 mg by mouth every 6 hours  as needed for pain Reported on 3/30/2017       amLODIPine 2.5 MG tablet    NORVASC    30 tablet    Take 1 tablet (2.5 mg) by mouth daily       buprenorphine 5 MCG/HR WK patch    BUTRANS    4 patch    Place 1 patch onto the skin every 7 days       COLACE PO      Take 50 mg by mouth daily       cycloSPORINE 0.05 % ophthalmic emulsion    RESTASIS     Place 1 drop into both eyes 2 times daily       enoxaparin 40 MG/0.4ML injection    LOVENOX    12 mL    INJECT 1 SYRINGE SUBCUTANEOUSLY DAILY       EPINEPHrine 0.3 MG/0.3ML injection     1 each    Inject 0.3 mLs (0.3 mg) into the muscle once as needed for anaphylaxis       LORazepam 1 MG tablet    ATIVAN    30 tablet    Take 1 tablet (1 mg) by mouth every 6 hours as needed (Anxiety, Nausea/Vomiting or Sleep)       MELATONIN PO      Take 1 mg by mouth At Bedtime       ondansetron 8 MG ODT tab    ZOFRAN-ODT    90 tablet    Place 1 tablet (8 mg) under the tongue every 8 hours as needed for nausea       ONE DAILY MULTIVITAMIN WOMEN Tabs      Take 1 tablet by mouth every morning       polyethylene glycol powder    MIRALAX/GLYCOLAX     Take 1 capful by mouth daily as needed       prochlorperazine 10 MG tablet    COMPAZINE    30 tablet    Take 1 tablet (10 mg) by mouth every 6 hours as needed (Nausea/Vomiting)       tamsulosin 0.4 MG capsule    FLOMAX    60 capsule    Take 1 capsule (0.4 mg) by mouth daily       tolterodine 4 MG 24 hr capsule    DETROL LA    30 capsule    TAKE ONE CAPSULE BY MOUTH DAILY       VITAMIN B6 PO      Take 1 tablet by mouth At Bedtime Reported on 4/27/2017       ZANTAC PO      Take 75 mg by mouth daily

## 2017-06-05 NOTE — MR AVS SNAPSHOT
After Visit Summary   6/5/2017    Margaux Guzmán    MRN: [id number]           Patient Information     Date Of Birth          1955        Visit Information        Provider Department      6/5/2017 9:00 AM BAY 7 INFUSION [facility]        Today's Diagnoses     Other iron deficiency anemia    -  1    Chemotherapy-induced neutropenia (H)        Ovarian cancer, left (H)        Ovarian cancer, right (H)        Primary peritoneal adenocarcinoma (H)           Follow-ups after your visit        Your next 10 appointments already scheduled     Jun 07, 2017  4:00 PM CDT   New Visit with Andre Phillips MD   [facility] ([facility])    [address] [place] [address] [phone]            Jun 12, 2017   Procedure with Carmela Alvarez MD   [facility] ([facility])    [address] [place] [address]  5th Floor  [facility] [address]   [phone]           Located in the Clinics and Surgery Center at [address] [place] [address].   parking is very convenient and highly recommended.  is a $6 flat rate fee.  Both  and self parkers should enter the main arrival plaza from [facility]; parking attendants will direct you based on your parking preference.            Jun 13, 2017  9:30 AM CDT   Return Visit with [facility] ONLY CANCER CENTER   [facility] ([facility])    [address] [facility] [address] [phone]            Jun 13, 2017 10:00 AM CDT   Level 2 with Vado 3 [facility] ([facility])    [address] [facility] [address]   [phone]            Jun 19, 2017  9:00 AM CDT   Return Visit with [facility]URSE ONLY CANCER CENTER   [facility] ([facility])    [address] [facility] [address] [phone]             Jun 19, 2017  9:30 AM CDT   Level 2 with BAY 9 INFUSION   [facility] ([facility])    [address] [facility] [address]   [phone]            Jun 26, 2017  9:00 AM CDT   Return Visit with [facility]URSE ONLY CANCER CENTER   [facility] ([facility])    [address] [facility] [address]   [phone]            Jun 26, 2017  9:30 AM CDT   Level 2 with BAY 8 INFUSION   [facility] ([facility])    [address] [facility] [address]   [phone]            Jul 10, 2017  8:00 AM CDT   Return Visit with NURSE ONLY CANCER CENTER   [facility] ([facility])    [address] [place] [address]   [phone]            Jul 10, 2017  8:30 AM CDT   Return Visit with JUSTIN Patrick CNP   [facility] ([facility])    [address] [place] [address]   [phone]              Who to contact     If you have questions or need follow up information about today's clinic visit or your schedule please contact [facility] directly at [phone].  Normal or non-critical lab and imaging results will be communicated to you by MyChart, letter or phone within 4 business days after the clinic has received the results. If you do not hear from us within 7 days, please contact the clinic through MyChart or phone. If you have a critical or abnormal lab result, we will notify you by phone as soon as possible.  Submit refill requests through [employer] or call your pharmacy and they will forward the refill request to us. Please allow 3 business days for your refill to be completed.          Additional Information About Your Visit        [employer] Information     [employer] gives you secure access to your electronic health record. If you see a primary care provider, you can also send  messages to your care team and make appointments. If you have questions, please call your primary care clinic.  If you do not have a primary care provider, please call [phone] and they will assist you.      [employer] is an electronic gateway that provides easy, online access to your medical records. With [employer], you can request a clinic appointment, read your test results, renew a prescription or communicate with your care team.     To access your existing account, please contact your [facility] or call [phone] for assistance.        Care EveryWhere ID     This is your Care EveryWhere ID. This could be used by other organizations to access your [facility] medical records  [id number]        Your Vitals Were     Pulse Temperature Respirations Pulse Oximetry BMI (Body Mass Index)       93 98.9  F (37.2  C) (Oral) 16 98% 19.1 kg/m2        Blood Pressure from Last 3 Encounters:   06/05/17 129/68   05/30/17 134/77   05/22/17 124/71    Weight from Last 3 Encounters:   06/05/17 53.7 kg (118 lb 4.8 oz)   05/30/17 52.5 kg (115 lb 12.8 oz)   05/22/17 53.1 kg (117 lb)              Today, you had the following     No orders found for display       Primary Care Provider Office Phone # Fax #    Aurelia Knox [phone]       [facility] [address] [facility] [address]        Thank you!     Thank you for choosing [facility]  for your care. Our goal is always to provide you with excellent care. Hearing back from our patients is one way we can continue to improve our services. Please take a few minutes to complete the written survey that you may receive in the mail after your visit with us. Thank you!             Your Updated Medication List - Protect others around you: Learn how to safely use, store and throw away your medicines at www.disposemymeds.org.          This list is accurate as of: 6/5/17 11:59 PM.  Always use your most recent med  list.                   Brand Name Dispense Instructions for use    ACETAMINOPHEN PO      Take 500 mg by mouth every 6 hours as needed for pain Reported on 3/30/2017       amLODIPine 2.5 MG tablet    NORVASC    30 tablet    Take 1 tablet (2.5 mg) by mouth daily       buprenorphine 5 MCG/HR WK patch    BUTRANS    4 patch    Place 1 patch onto the skin every 7 days       COLACE PO      Take 50 mg by mouth daily       cycloSPORINE 0.05 % ophthalmic emulsion    RESTASIS     Place 1 drop into both eyes 2 times daily       enoxaparin 40 MG/0.4ML injection    LOVENOX    12 mL    INJECT 1 SYRINGE SUBCUTANEOUSLY DAILY       EPINEPHrine 0.3 MG/0.3ML injection     1 each    Inject 0.3 mLs (0.3 mg) into the muscle once as needed for anaphylaxis       LORazepam 1 MG tablet    ATIVAN    30 tablet    Take 1 tablet (1 mg) by mouth every 6 hours as needed (Anxiety, Nausea/Vomiting or Sleep)       MELATONIN PO      Take 1 mg by mouth At Bedtime       ondansetron 8 MG ODT tab    ZOFRAN-ODT    90 tablet    Place 1 tablet (8 mg) under the tongue every 8 hours as needed for nausea       ONE DAILY MULTIVITAMIN WOMEN Tabs      Take 1 tablet by mouth every morning       polyethylene glycol powder    MIRALAX/GLYCOLAX     Take 1 capful by mouth daily as needed       prochlorperazine 10 MG tablet    COMPAZINE    30 tablet    Take 1 tablet (10 mg) by mouth every 6 hours as needed (Nausea/Vomiting)       tamsulosin 0.4 MG capsule    FLOMAX    60 capsule    Take 1 capsule (0.4 mg) by mouth daily       tolterodine 4 MG 24 hr capsule    DETROL LA    30 capsule    TAKE ONE CAPSULE BY MOUTH DAILY       VITAMIN B6 PO      Take 1 tablet by mouth At Bedtime Reported on 4/27/2017       ZANTAC PO      Take 75 mg by mouth daily

## 2017-06-06 NOTE — PROGRESS NOTES
Care Coordinator Note  Called  Mai (Dr Knox office)  and discussed transfusion in [facility]  She said Dr Knox is in the office tomorrow and will make arrangements for transfusion at [facility].   Phone [phone]  Fax [phone]   I unit oked by Dr Mccollum.       Mai verbalized back understanding of the above information discussed.   Jessica TIMMONS RN, OCN  Care Coordinator   Gynecologic Cancer   Office [phone]  Pager [phone] #[phone]

## 2017-06-07 NOTE — NURSING NOTE
"Margaux Guzmán's goals for this visit include:   Chief Complaint   Patient presents with     Consult     stent exchange      She requests these members of her care team be copied on today's visit information: YES - PCP     Initial /63 (BP Location: Left arm, Patient Position: Chair, Cuff Size: Adult Regular)  Pulse 109 Estimated body mass index is 19.1 kg/(m^2) as calculated from the following:    Height as of 5/22/17: 1.676 m (5' 5.98\").    Weight as of 6/5/17: 53.7 kg (118 lb 4.8 oz).  BP completed using cuff size: regular        "

## 2017-06-07 NOTE — TELEPHONE ENCOUNTER
Patient called again 8.2 Hgb and stated her new PA at [facility] did not like that number and concerned with oking her upcoming surgery. Meg Leach LPN Staff Nurse

## 2017-06-07 NOTE — PROGRESS NOTES
6/7/17   Pt notified of the below discussion and Dr Knox office will be calling her regarding the transfusion. .  Jessica TIMMONS RN, OCN  Care Coordinator   Gynecologic Cancer   Office [phone]  Pager [phone] #[phone]

## 2017-06-07 NOTE — TELEPHONE ENCOUNTER
----- Message from Carmela Alvarez MD sent at 6/7/2017  9:07 AM CDT -----  Contact: [phone]  It's okay with me. We're not making incisions or anything so should be fine.   ----- Message -----     From: Jhonny Leach LPN     Sent: 6/7/2017   8:59 AM       To: Carmela Alvarez MD    Patient called wanting to know if it is ok to go forward with chemo on Tuesday  You are exchanging her stent the day before  Next Monday. Is this ok?? I told her to ask chemo staff and they referred back to us ?? jhonny

## 2017-06-07 NOTE — PROGRESS NOTES
Care Coordinator Note  Pt is scheduled for Stent change on Monday and weekly Taxol on Tuesday.   This was reviewed with Dr Mccollum  and she did not want to give the chemotherapy on Tuesday will hold chemo next week because of the risk of infection with the stent change.   Left this as a message and then suman my charted her Dr Mohan recomendation    Jessica TIMMONS RN, OCN  Care Coordinator   Gynecologic Cancer   Office [phone]  Pager [phone] #[phone]

## 2017-06-07 NOTE — TELEPHONE ENCOUNTER
----- Message from Carmela Alvarez MD sent at 6/7/2017  9:07 AM CDT -----  Contact: [phone]  It's okay with me. We're not making incisions or anything so should be fine.   ----- Message -----     From: Jhonny Leach LPN     Sent: 6/7/2017   8:59 AM       To: Carmela Alvarez MD    Patient called wanting to know if it is ok to go forward with chemo on Tuesday  You are exchanging her stent the day before  Next Monday. Is this ok?? I told her to ask chemo staff and they referred back to us ?? jhonny

## 2017-06-07 NOTE — PATIENT INSTRUCTIONS
Surgery Instructions    Always follow your surgeon s instructions. If you don t, your surgery could be cancelled. Please use the following checklist.  Your surgery is on: The surgery scheduler will contact you within 1 week of your consult with the surgeon. If you do not hear from them, please call the clinic or RN Care Coordinator for your provider.    Time: Prearrival times can vary depending on location/type of surgery.  [place] - 1 hour pre-arrival    Note:  These times may change. A nurse will call you before surgery to confirm. If you have not received a call or if you have more questions, please call us on the working day before your surgery:  ? [place]: [phone] or [phone] (9am to 5:30pm)  Prior to surgery  ? Have a pre-op physical exam with your Primary Doctor within 30 days of surgery  - Ask your doctor to send all of your results to the surgery center/hospital before surgery. Your doctor also may ask you to bring the results with you on the day of surgery.  - Tell your doctor if:  - You are allergic to latex or rubber (latex and rubber gloves are often used in medical care).  - You are taking any medicines (including aspirin), vitamins, or herbal products. You may need to stop taking some medicines before surgery.  - You have any medical problems (allergies, diabetes, or heart disease, for example).  - You have a pacemaker or an AICD (automatic implanted cardiac defibrillator). If you do, please bring the ID card with you on the day of surgery.  - You are a smoker. People who smoke have a higher risk of infection after surgery. Ask your doctor how you can quit smoking.  - If you Primary Doctor is not within the [facility] system, you will need to have your pre-op physical faxed to us to be scanned into your chart.  - [place]: [phone] or [phone]  ? Call your insurance company. Ask if you need pre-approval for your surgery. If you do not have insurance, please let us know. If  you wish to speak to the , please alert the clinic staff so this can be arranged.  ? Arrange for someone to drive you home after surgery.  will need to be a responsible adult (18 years or older) that will provide transportation to and from surgery and stay in the waiting room during your surgery. You may not drive yourself or take public transportation to and from surgery.  ? Arrange for someone to stay with you for 24 hours after you go home. This person must be a responsible adult (18 years or older).  ? Call your surgeon or their nurse if there is any change in your health (cold, flu, infections, hospitalizations).  ? Do not smoke, drink alcohol, or take over-the-counter medicine for 24 hours before and after surgery.  ? If you take prescribed drugs, you may need to stop them until after the surgery.  Discuss what medications to take or not take prior to surgery with your Primary Doctor at your pre-op physical. Avoid over-the-counter blood-thinning medications such as Aspirin, Ibuprofen, vitamin E, or fish oil 7 days prior to surgery (unless otherwise directed by your Primary Doctor). Tylenol is a good alternative for mild pain relief prior to surgery.  ? Eating and drinking guidelines prior to surgery:  - Stop all solid food consumption 8 hours prior to surgery  - You may drink clear liquids up to 2 hours prior to surgery (water, fruit juices without pulp, jello, tea/coffee without creamer, sports drinks, clear-fat free broth (bouillon or consomme), popsicles (without milk, bits of fruit, or seeds/nuts)  ? Follow instructions given for showering or bathing before surgery.    - Use 8 ounces of antiseptic surgical soap, like:  - Hibiclens, Scrub Care, or Exidine  - You can find it at your local pharmacy, clinic, or retail store. If you have trouble, ask your pharmacist to help you find the right substitute.  - Please wash with one of the above soaps twice before coming to the hospital for  your surgery. This will decrease bacteria (germs) on your skin. It will also help reduce your chance of infection after surgery.  - Items you will need for showering:  - 4 newly washed washcloths  - 2 newly washed towels  - 8 ounces of one of the above soaps  - Following these instructions:  - The evening before surgery: Shower or bathe as you normally would, using your regular soap and a clean washcloth. Give special attention to places where your incision (surgical cut) or catheters will be. This includes your groin area. Rinse well. You may wash your hair with your regular shampoo. Next, wash your body with 4 ounces of the antiseptic soap. Use a clean, damp washcloth and gently clean your body (from the chin down). If your surgery involves your head, use the special soap on your head and scalp. Rinse well and dry off using a newly washed towel.  - The morning of surgery: Repeat the same process as the evening shower.  - Other suggestions:    Do not shave within 12 inches of your incision (surgical cut) area for at least 3 days before surgery. Shaving can make small cuts in the skin. This puts you at higher risk of infection.    Wear freshly washed pajamas or clothing after your evening shower.    Wear freshly washed clothes the day of surgery.    Wash and change your bed sheets the day before surgery to have clean bed sheets after your shower and when you get home from surgery.    If you have trouble washing all areas, make sure someone helps you.    Don't use any deodorant, lotion or powder after your shower.    Women who are menstruating should wear a fresh sanitary pad to the hospital.  ? Do not wear or add deodorant, cologne, lotion, makeup, nail polish or jewelry to surgery. If you wear fake nails, please remove at least one nail before coming to surgery (an oxygen monitor needs to be placed on your finger during surgery).  ? Bring these items to the surgery center/hospital:  - Insurance card  - Money for  parking and co-pays, if needed  - A list of all the medicines you take. Include vitamins, minerals, herbs, and over-the-counter drugs.  Note any drug allergies.  - A copy of your advance health care directive, if you have one. This tells us what treatment you would want--and who would make health care decisions--if you could no longer speak for yourself. You may request this form in advance or download it from [email or web address].pdf.  - A case for glasses, contact lenses, hearing aids, or dentures.  - Your inhaler or CPAP machine, if you use these at home.  ? Leave extra cash, jewelry, and other valuables at home.  When you arrive  When you get to the surgery center/hospital, you will:  ? Check in. If you are under age 18, you must be with a parent or legal guardian.  ? Sign consent forms, if you haven t already. These forms state that you know the risks and benefits of surgery. When you sign the forms, you give us permission to do the surgery. Do not sign them unless you understand what will happen during and after your surgery. If you have any questions about your surgery, ask to speak with your doctor before you sign the forms. If you don t understand the answers, ask again.  ? Receive a copy of the Patient s Bill of Rights. If you do not receive a copy, please ask for one.  ? Change into hospital clothes. Your belongings will be placed in a bag. We will return them to you after surgery.  ? Meet with the anesthesia provider. He or she will tell you what kind of anesthesia (medicine) will be used to keep you comfortable during surgery.  Remember: it s okay to remind doctors and nurses to wash their hands before touching you.  In most cases, your surgeon will use a marker to write his or her initials on the surgery site. This ensures that the exact site is operated on.  For safety reasons, we will ask you the same questions many times. For example, we may ask your name and birth date over and over again.  Friends  and family can stay with you until it s time for surgery. While you re in surgery, they will be in the waiting area. Please note that cell phones are not allowed in some patient care areas.  If you have questions about what will happen in the operating room, talk to your care team.  After surgery  We will move you to a recovery room, where we will watch you closely. If you have any pain or discomfort, tell your nurse. He or she will try to make you comfortable.  If you are staying overnight, we will move you to your hospital room after you are awake.  If you are going home, we will move you to another room. Friends and family may be able to join you. The length of time you spend in recovery depend on the type of medicine you received, your medical condition, the type of surgery you had, or your response to the anesthesia given during your procedure.  When you are discharged from the recovery room, the nurses will review instructions with you and your caregiver.  ? Please wash your hands every time you touch the wound or change bandages or dressings.  ? Do not submerge the wound in water.  You may not use a bathtub or hot tub until the wound is closed. The wait time frame is generally 2-3 weeks, but any open area can be a source of incoming bacteria, so it is better to be on the safe side and avoid water submersion until your wound is fully healed.  ? You may take a shower 24 hours after surgery. Double check with your surgeon if it is OK for water to run over the wound, whether it has been sutured, stapled, glued, or is open. You may gently wash the wound using the antiseptic soap provided for your pre-surgery showering (do not use a washcloth). Any mild soap will work as well.  ? Many surgical wounds will have small white strips of tape on them called steri-strips.  Do not remove these. The edges will curl and fall off within 7-10 days with normal showering.  ? If you are going home with sutures (stitches) or  staples, you must return to the clinic to have them taken out, usually within 1-2 weeks. Some stitches are dissolvable and do not require removal. Make sure to clarify with your surgeon or surgery nurse reviewing discharge paperwork what kind of sutures you have.  ? Signs and symptoms of infection include:  - Fever, temperature over 101.5   F  - Redness  - Swelling  - Increased pain  - Green or yellow drainage which may or may not have a foul odor  Dealing with pain  A nurse will check your comfort level often during your stay. He or she will work with you to manage your pain.  Remember:  ? All pain is real. There are many ways to control pain. We can help you decide what works best for you.  ? Ask for pain medicine when you need it. Don t try to  tough it out --this can make you feel worse. Always take your medicine as ordered.  ? Medicine doesn t work the same for everyone. If your medicine isn t working, tell your nurse. There may be other medicines or treatments we can try.  Going home  We will let you know when you re ready to leave the surgery center or hospital. Before you leave, we will tell you how to care for yourself at home and prevent infections. If you do not understand something, please say so. We will answer any questions you have. We will then help you get ready to leave.  Remember, you must have a responsible adult (18 years or older) to stay with you 24 hours after you leave the hospital.  Take it easy when you get home. You will need some time to recover--you may be more tired than you realize at first. Rest and relax for at least the first 24 hours at home. You ll feel better and heal faster if you take good care of yourself.  Follow the discharge instructions that are given to you when you leave the surgery center or hospital  Please call the clinic if you experience any problems during regular clinic hours (Monday-Friday 8:00am-5:00pm).  If you experience problems during non-clinic hours,  please call the [facility] on-call line at [phone] and ask the  to page the on-call Provider for your specialty. The on-call Provider will call you back and can triage your symptoms and further advise. If you are having an emergency, always call 911 or seek immediate evaluation at the Emergency Room.  Locations  [facility]  Same-day surgery center - 2nd floor, check-in #5  [address] [place] [address]  [phone]  [email or web address]

## 2017-06-07 NOTE — TELEPHONE ENCOUNTER
Procedure: cystoscopy, bilateral ureteral stent exchange  Facility: [facility]  Length: 45 minute(s)  Surgeon: Andre Phillips MD  Anesthesia: MAC  BMI: There is no height or weight on file to calculate BMI. (If over 43 for general or 45 for MAC cannot be scheduled at  ASC)   Pre-op Appointments needed: YES  Post-op appointments needed: next stent exchange is in 3 months   needed:  No  Surgery packet/instructions given to patient?  Yes   Special Considerations: need 8 Vietnamese x 26cm ureteral stent (need two, one for each kidney).

## 2017-06-07 NOTE — MR AVS SNAPSHOT
After Visit Summary   6/7/2017    Margaux Guzmán    MRN: [id number]           Patient Information     Date Of Birth          1955        Visit Information        Provider Department      6/7/2017 4:00 PM Andre Phillips MD [facility]        Today's Diagnoses     Hydronephrosis, unspecified hydronephrosis type    -  1    Abnormal urine findings          Care Instructions    Surgery Instructions    Always follow your surgeon s instructions. If you don t, your surgery could be cancelled. Please use the following checklist.  Your surgery is on: The surgery scheduler will contact you within 1 week of your consult with the surgeon. If you do not hear from them, please call the clinic or RN Care Coordinator for your provider.    Time: Prearrival times can vary depending on location/type of surgery.  [place] [facility] - 1 hour pre-arrival    Note:  These times may change. A nurse will call you before surgery to confirm. If you have not received a call or if you have more questions, please call us on the working day before your surgery:  ? [facility]: [phone] or [phone] (9am to 5:30pm)  Prior to surgery  ? Have a pre-op physical exam with your Primary Doctor within 30 days of surgery  - Ask your doctor to send all of your results to the surgery center/hospital before surgery. Your doctor also may ask you to bring the results with you on the day of surgery.  - Tell your doctor if:  - You are allergic to latex or rubber (latex and rubber gloves are often used in medical care).  - You are taking any medicines (including aspirin), vitamins, or herbal products. You may need to stop taking some medicines before surgery.  - You have any medical problems (allergies, diabetes, or heart disease, for example).  - You have a pacemaker or an AICD (automatic implanted cardiac defibrillator). If you do, please bring the ID card with you on the day of surgery.  - You are a smoker. People  who smoke have a higher risk of infection after surgery. Ask your doctor how you can quit smoking.  - If you Primary Doctor is not within the [facility] system, you will need to have your pre-op physical faxed to us to be scanned into your chart.  - [facility]: [phone] or [phone]  ? Call your insurance company. Ask if you need pre-approval for your surgery. If you do not have insurance, please let us know. If you wish to speak to the , please alert the clinic staff so this can be arranged.  ? Arrange for someone to drive you home after surgery.  will need to be a responsible adult (18 years or older) that will provide transportation to and from surgery and stay in the waiting room during your surgery. You may not drive yourself or take public transportation to and from surgery.  ? Arrange for someone to stay with you for 24 hours after you go home. This person must be a responsible adult (18 years or older).  ? Call your surgeon or their nurse if there is any change in your health (cold, flu, infections, hospitalizations).  ? Do not smoke, drink alcohol, or take over-the-counter medicine for 24 hours before and after surgery.  ? If you take prescribed drugs, you may need to stop them until after the surgery.  Discuss what medications to take or not take prior to surgery with your Primary Doctor at your pre-op physical. Avoid over-the-counter blood-thinning medications such as Aspirin, Ibuprofen, vitamin E, or fish oil 7 days prior to surgery (unless otherwise directed by your Primary Doctor). Tylenol is a good alternative for mild pain relief prior to surgery.  ? Eating and drinking guidelines prior to surgery:  - Stop all solid food consumption 8 hours prior to surgery  - You may drink clear liquids up to 2 hours prior to surgery (water, fruit juices without pulp, jello, tea/coffee without creamer, sports drinks, clear-fat free broth (bouillon or consomme), popsicles (without milk,  bits of fruit, or seeds/nuts)  ? Follow instructions given for showering or bathing before surgery.    - Use 8 ounces of antiseptic surgical soap, like:  - Hibiclens, Scrub Care, or Exidine  - You can find it at your local pharmacy, clinic, or retail store. If you have trouble, ask your pharmacist to help you find the right substitute.  - Please wash with one of the above soaps twice before coming to the hospital for your surgery. This will decrease bacteria (germs) on your skin. It will also help reduce your chance of infection after surgery.  - Items you will need for showering:  - 4 newly washed washcloths  - 2 newly washed towels  - 8 ounces of one of the above soaps  - Following these instructions:  - The evening before surgery: Shower or bathe as you normally would, using your regular soap and a clean washcloth. Give special attention to places where your incision (surgical cut) or catheters will be. This includes your groin area. Rinse well. You may wash your hair with your regular shampoo. Next, wash your body with 4 ounces of the antiseptic soap. Use a clean, damp washcloth and gently clean your body (from the chin down). If your surgery involves your head, use the special soap on your head and scalp. Rinse well and dry off using a newly washed towel.  - The morning of surgery: Repeat the same process as the evening shower.  - Other suggestions:    Do not shave within 12 inches of your incision (surgical cut) area for at least 3 days before surgery. Shaving can make small cuts in the skin. This puts you at higher risk of infection.    Wear freshly washed pajamas or clothing after your evening shower.    Wear freshly washed clothes the day of surgery.    Wash and change your bed sheets the day before surgery to have clean bed sheets after your shower and when you get home from surgery.    If you have trouble washing all areas, make sure someone helps you.    Don't use any deodorant, lotion or powder after  your shower.    Women who are menstruating should wear a fresh sanitary pad to the hospital.  ? Do not wear or add deodorant, cologne, lotion, makeup, nail polish or jewelry to surgery. If you wear fake nails, please remove at least one nail before coming to surgery (an oxygen monitor needs to be placed on your finger during surgery).  ? Bring these items to the surgery center/hospital:  - Insurance card  - Money for parking and co-pays, if needed  - A list of all the medicines you take. Include vitamins, minerals, herbs, and over-the-counter drugs.  Note any drug allergies.  - A copy of your advance health care directive, if you have one. This tells us what treatment you would want--and who would make health care decisions--if you could no longer speak for yourself. You may request this form in advance or download it from [email or web address].pdf.  - A case for glasses, contact lenses, hearing aids, or dentures.  - Your inhaler or CPAP machine, if you use these at home.  ? Leave extra cash, jewelry, and other valuables at home.  When you arrive  When you get to the surgery center/hospital, you will:  ? Check in. If you are under age 18, you must be with a parent or legal guardian.  ? Sign consent forms, if you haven t already. These forms state that you know the risks and benefits of surgery. When you sign the forms, you give us permission to do the surgery. Do not sign them unless you understand what will happen during and after your surgery. If you have any questions about your surgery, ask to speak with your doctor before you sign the forms. If you don t understand the answers, ask again.  ? Receive a copy of the Patient s Bill of Rights. If you do not receive a copy, please ask for one.  ? Change into hospital clothes. Your belongings will be placed in a bag. We will return them to you after surgery.  ? Meet with the anesthesia provider. He or she will tell you what kind of anesthesia (medicine) will be used to  keep you comfortable during surgery.  Remember: it s okay to remind doctors and nurses to wash their hands before touching you.  In most cases, your surgeon will use a marker to write his or her initials on the surgery site. This ensures that the exact site is operated on.  For safety reasons, we will ask you the same questions many times. For example, we may ask your name and birth date over and over again.  Friends and family can stay with you until it s time for surgery. While you re in surgery, they will be in the waiting area. Please note that cell phones are not allowed in some patient care areas.  If you have questions about what will happen in the operating room, talk to your care team.  After surgery  We will move you to a recovery room, where we will watch you closely. If you have any pain or discomfort, tell your nurse. He or she will try to make you comfortable.  If you are staying overnight, we will move you to your hospital room after you are awake.  If you are going home, we will move you to another room. Friends and family may be able to join you. The length of time you spend in recovery depend on the type of medicine you received, your medical condition, the type of surgery you had, or your response to the anesthesia given during your procedure.  When you are discharged from the recovery room, the nurses will review instructions with you and your caregiver.  ? Please wash your hands every time you touch the wound or change bandages or dressings.  ? Do not submerge the wound in water.  You may not use a bathtub or hot tub until the wound is closed. The wait time frame is generally 2-3 weeks, but any open area can be a source of incoming bacteria, so it is better to be on the safe side and avoid water submersion until your wound is fully healed.  ? You may take a shower 24 hours after surgery. Double check with your surgeon if it is OK for water to run over the wound, whether it has been sutured,  stapled, glued, or is open. You may gently wash the wound using the antiseptic soap provided for your pre-surgery showering (do not use a washcloth). Any mild soap will work as well.  ? Many surgical wounds will have small white strips of tape on them called steri-strips.  Do not remove these. The edges will curl and fall off within 7-10 days with normal showering.  ? If you are going home with sutures (stitches) or staples, you must return to the clinic to have them taken out, usually within 1-2 weeks. Some stitches are dissolvable and do not require removal. Make sure to clarify with your surgeon or surgery nurse reviewing discharge paperwork what kind of sutures you have.  ? Signs and symptoms of infection include:  - Fever, temperature over 101.5   F  - Redness  - Swelling  - Increased pain  - Green or yellow drainage which may or may not have a foul odor  Dealing with pain  A nurse will check your comfort level often during your stay. He or she will work with you to manage your pain.  Remember:  ? All pain is real. There are many ways to control pain. We can help you decide what works best for you.  ? Ask for pain medicine when you need it. Don t try to  tough it out --this can make you feel worse. Always take your medicine as ordered.  ? Medicine doesn t work the same for everyone. If your medicine isn t working, tell your nurse. There may be other medicines or treatments we can try.  Going home  We will let you know when you re ready to leave the surgery center or hospital. Before you leave, we will tell you how to care for yourself at home and prevent infections. If you do not understand something, please say so. We will answer any questions you have. We will then help you get ready to leave.  Remember, you must have a responsible adult (18 years or older) to stay with you 24 hours after you leave the hospital.  Take it easy when you get home. You will need some time to recover--you may be more tired than  you realize at first. Rest and relax for at least the first 24 hours at home. You ll feel better and heal faster if you take good care of yourself.  Follow the discharge instructions that are given to you when you leave the surgery center or hospital  Please call the clinic if you experience any problems during regular clinic hours (Monday-Friday 8:00am-5:00pm).  If you experience problems during non-clinic hours, please call the [facility] on-call line at [phone] and ask the  to page the on-call Provider for your specialty. The on-call Provider will call you back and can triage your symptoms and further advise. If you are having an emergency, always call 911 or seek immediate evaluation at the Emergency Room.  Locations  [facility]  Same-day surgery center - 2nd floor, check-in #5  [address]  [place], [place] [address] [phone]  [email or web address]            Follow-ups after your visit        Your next 10 appointments already scheduled     Jun 12, 2017   Procedure with Carmela Alvarez MD   [facility] ([facility])    [address] [place] [address]  5th Floor  [facility] [address] [phone]           Located in the Clinics and Surgery Center at [address] [place] [address].   parking is very convenient and highly recommended.  is a $6 flat rate fee.  Both  and self parkers should enter the main arrival plaza from [facility]; parking attendants will direct you based on your parking preference.            Jun 19, 2017  9:00 AM CDT   Return Visit with NURSE ONLY CANCER CENTER   [facility]    [address] [facility] [id number] [phone]            Jun 19, 2017  9:30 AM CDT   Level 2 with [address] ([facility])    [address] [place] [address]  [place] [address]   [phone]            Jun 26, 2017  9:00 AM CDT   Return Visit with [facility]SE ONLY CANCER CENTER   [facility]    [address] [facility] [address]   [phone]            Jun 26, 2017  9:30 AM CDT   Level 2 with [address] INFUSION   [facility]    [address] [facility] [address]   [phone]            Jul 10, 2017  8:00 AM CDT   Return Visit with NURSE ONLY CANCER CENTER   [facility]    [address] [facility] [address]   [phone]            Jul 10, 2017  8:30 AM CDT   Return Visit with JUSTIN Patrick CNP   [facility]    [address] [facility] [address]   [phone]            Jul 10, 2017  9:00 AM CDT   Level 2 with BAY 7 INFUSION   [facility]    [address] [facility] [address]   [phone]            Jul 17, 2017  9:00 AM CDT   Return Visit with NURSE ONLY CANCER CENTER   [facility]    [address] [facility] [address]   [phone]            Jul 17, 2017  9:30 AM CDT   Level 2 with BAY 8 INFUSION   [facility]    [address] [facility] [address]   [phone]              Who to contact     If you have questions or need follow up information about today's clinic visit or your schedule please contact [facility] directly at [phone].  Normal or non-critical lab and imaging results will be communicated to you by MyChart, letter or phone within 4 business days after the clinic has received the results. If you do not hear from us within 7 days, please contact the clinic through MyChart or phone. If you have a critical or abnormal  lab result, we will notify you by phone as soon as possible.  Submit refill requests through [employer] or call your pharmacy and they will forward the refill request to us. Please allow 3 business days for your refill to be completed.          Additional Information About Your Visit        [facility] Information     [employer] gives you secure access to your electronic health record. If you see a primary care provider, you can also send messages to your care team and make appointments. If you have questions, please call your primary care clinic.  If you do not have a primary care provider, please call [phone] and they will assist you.      [employer] is an electronic gateway that provides easy, online access to your medical records. With [employer], you can request a clinic appointment, read your test results, renew a prescription or communicate with your care team.     To access your existing account, please contact your [facility] or call [phone] for assistance.        Care EveryWhere ID     This is your Care EveryWhere ID. This could be used by other organizations to access your [facility] medical records  [id number]        Your Vitals Were     Pulse                   109            Blood Pressure from Last 3 Encounters:   06/07/17 120/63   06/05/17 129/68   05/30/17 134/77    Weight from Last 3 Encounters:   06/05/17 53.7 kg (118 lb 4.8 oz)   05/30/17 52.5 kg (115 lb 12.8 oz)   05/22/17 53.1 kg (117 lb)              We Performed the Following     Charline-Operative Worksheet     UA reflex to Microscopic and Culture     Urine Culture Aerobic Bacterial     Urine Microscopic        Primary Care Provider Office Phone # Fax #    Aurelia MCLAUGHLIN Abilio [phone]       [facility] [address] [facility] [address]        Thank you!     Thank you for choosing [facility]  for your care. Our goal is always to provide you with excellent care. Hearing back from  our patients is one way we can continue to improve our services. Please take a few minutes to complete the written survey that you may receive in the mail after your visit with us. Thank you!             Your Updated Medication List - Protect others around you: Learn how to safely use, store and throw away your medicines at www.disposemymeds.org.          This list is accurate as of: 6/7/17  4:28 PM.  Always use your most recent med list.                   Brand Name Dispense Instructions for use    ACETAMINOPHEN PO      Take 500 mg by mouth every 6 hours as needed for pain Reported on 3/30/2017       amLODIPine 2.5 MG tablet    NORVASC    30 tablet    Take 1 tablet (2.5 mg) by mouth daily       buprenorphine 5 MCG/HR WK patch    BUTRANS    4 patch    Place 1 patch onto the skin every 7 days       COLACE PO      Take 50 mg by mouth daily       cycloSPORINE 0.05 % ophthalmic emulsion    RESTASIS     Place 1 drop into both eyes 2 times daily       enoxaparin 40 MG/0.4ML injection    LOVENOX    12 mL    INJECT 1 SYRINGE SUBCUTANEOUSLY DAILY       EPINEPHrine 0.3 MG/0.3ML injection     1 each    Inject 0.3 mLs (0.3 mg) into the muscle once as needed for anaphylaxis       LORazepam 1 MG tablet    ATIVAN    30 tablet    Take 1 tablet (1 mg) by mouth every 6 hours as needed (Anxiety, Nausea/Vomiting or Sleep)       MELATONIN PO      Take 1 mg by mouth At Bedtime       ondansetron 8 MG ODT tab    ZOFRAN-ODT    90 tablet    Place 1 tablet (8 mg) under the tongue every 8 hours as needed for nausea       ONE DAILY MULTIVITAMIN WOMEN Tabs      Take 1 tablet by mouth every morning       polyethylene glycol powder    MIRALAX/GLYCOLAX     Take 1 capful by mouth daily as needed       prochlorperazine 10 MG tablet    COMPAZINE    30 tablet    Take 1 tablet (10 mg) by mouth every 6 hours as needed (Nausea/Vomiting)       tamsulosin 0.4 MG capsule    FLOMAX    60 capsule    Take 1 capsule (0.4 mg) by mouth daily       tolterodine 4 MG  24 hr capsule    DETROL LA    30 capsule    TAKE ONE CAPSULE BY MOUTH DAILY       VITAMIN B6 PO      Take 1 tablet by mouth At Bedtime Reported on 4/27/2017       ZANTAC PO      Take 75 mg by mouth daily

## 2017-06-07 NOTE — PROGRESS NOTES
Patient Care Plan  Emergency Treatment Plan    Has the Patient been seen in the ED 4 or more times in the last 12 months : {Y/N:383249}    Recent Treatments: Please review Epic for recent treatments    Patient/PCP Contract: {PRIMARY CARE STATUS:785528}    Patient Acknowledgement of Plan of Care: {Yes/No:6924252:o}     Presenting Problem Treatment Plan and Discharge Instructions   *** ***   *** ***     The provider seeing you for these problems in the emergency setting may determine that a different course of care is necessary based on the situation.    Team Communication/Sticky Note: (Take items out when done)  Date Noted Care Team Member TO DO/Alerts to be completed                     Health care home/care coordination was discussed with the patient and he/she agrees to participate in care coordination. The patient was introduced to the care coordinator and given a patient packet to review and complete. The care coordinator will contact the patient to start care planning process.  Care coordination start date:      Frequency of care coordination with care team:  Care Coordinator Name  Contact information for updates to this care plan:  1.  Care Coordinator   Phone #   Fax #  2.  Clinic Unit Coordinator/   Phone #   Fax #  This care plan was discussed and reviewed with Margaux Guzmán on ***.  Provider:  Care Coordinator:    SELF MANAGEMENT PLAN  Presenting Problem Signs and Symptoms Treatment Plan                                   Advanced Care Directives:  {ADVANCED CARE DIRECTIVES:722810}  Action Plans on File:  {ACTION PLANS:223375}  Short and Long Term Goals  Goals/Issues My Action Plan Person Responsible Time Frame/Date Completed                                             Margaux Guzmán   [facility] Rec #: [id number]   Health Insurance/Plan:   : 1955   ID: [unfilled]   Primary Care Provider: Aurelia Knox       Date form completed: 2017       Primary Ethnic Culture:  American  "  Primary Language:   English     needed? {Yes/No:247378::\"Yes\"} Language: {Languages spoken:571979}   Name of preferred :   Phone #:   Preferred Mode of Communication: {MODE:525845}   Preferred Method of Communication: {METHOD OF LEARNIN}   Health Care Home Complexity Tier:         Contact Information:   Mother's Name: Data Unavailable   Father's Name: Data Unavailable   Phone: [phone] (home) none (work)   Preferred Contact   Address:   [address] [place] [address]   Siblings/Relatives:   Lives with:      My Story  {MY STORY:587348}  Health Maintenance/Preventative Procedures and Immunizations are addressed in the Health Maintenance List and should be updated  Integrated Medical Plan    Additional Standing Lab Orders (Use Health Maintenance When Possible):  ***    {HC DEVICES:331578}    {[facility] CARE PLAN OPTIONS (Results_Therapy_Interdisciplinary):579523}    My Multidisciplinary Care Team Members  Specialty of Care Team Member Name, Clinic, Address, Phone #, Fax #, E-mail   Primary care provider: Aurelia Knox                          {[facility] SCHOOL OPTION:785741}    Care Givers  Type of Care Giver Phone/Fax E-mail   PCA:        Home Health Agency:       Nurse Name:       :       Guardian:         Persons You Can Contact About Me and My Medical Care  Name Relation Phone/Fax E-mail ANNA Date                                                     This care plan is a documentation of the individual s care as directed by the family, educators, therapists and diverse medical providers.  Contributors to the care plan include the patient, the patient s family and Aurelia Knox and the health care home team at {name of clinic}.  Please indicate any changes made to the care of this patient on this care plan and fax it to the care coordinator above.  Thank you for your attention.  Patient: Margaux TAM" Bergstrom__________________  Parent:***______________________  Tjaden, Aurelia L___________________  June 7, 2017___________________

## 2017-06-08 NOTE — TELEPHONE ENCOUNTER
Per pt mychart message with the following:     Dr. Phillips,     Due to issues with chemotherapy, rides, and anemia, I have decided it would be better to stick with my appointment with Dr. Alvarez on Monday for my stent exchange.  I am planning on doing future stent exchanges with you.     I hope you can cancel the order for my stent.     Thank you so much for your time yesterday.     Marshall Guzmán    Pt has been schedule with Antonio June 12th. Encounter closed.

## 2017-06-09 NOTE — PROGRESS NOTES
Care Coordinator Note  Patient had transfusion locally for Hgb 5.5.  Today Hgb is 8.5.  Patient will have Hgb drawn again on Sunday 6/11/17 at 10 am prior to Urology procedure scheduled for Monday 6/12/17.  [facility] lab ([phone]) will fax results to Gyn Oncology and Urology.  Patient has been instructed to call the Gyn Oncology doctor on call on Sunday if she has not received a call that morning  by 1 pm.      Pt verbalized back understanding of the above information discussed.     Samara Aguiar MSN, RN  Care Coordinator  Gynecologic Cancer   Office:  [phone]  Pager: [phone] #[phone]

## 2017-06-09 NOTE — TELEPHONE ENCOUNTER
Margaux Guzmán contacted Urology triage today regarding low hemoglobin. Patient stated that she crashed yesterday and had to have two units blood transfer. Patient worried this will impact surgery on Monday  Patient's physician: Dr. Alvarez  Plan: Patient to get hemoglobin rechecked today.    Vivienne Velazquez LPN  06/09/17  8:38 AM

## 2017-06-12 NOTE — OP NOTE
OPERATIVE REPORT    PATIENT NAME: Margaux Guzmán  MEDICAL RECORD NUMBER: [id number]  SURGEON: Carmela Alvarez MD  ASSISTANT: Salvatore Dawn MD  PREOPERATIVE DIAGNOSIS: Bilateral ureteral obstruction  POSTOPERATIVE DIAGNOSIS: Same  PROCEDURE PERFORMED: Cystoscopy, removal of indwelling ureteral stents, retrograde pyelogram(s), interpretation of retrograde images, placement of left double J ureteral stent  ANESTHESIA: MAC   COMPLICATIONS: None.   OPERATIVE FINDINGS: Successful left stent exchange; Unable to replace right stent with extravasation noted on RPG  EBL (mL): minimal  INDICATIONS FOR PROCEDURE: Margaux Guzmán is a 61 year old female with a history of primary peritoneal adenocarcinoma with bilateral hydronephrosis and ureteral obstruction managed with indwelling ureteral stents since August 2015. She has previously been managed with a 6 Nicaraguan left stent and a 7 Fr right stent but was later noted to have developed worsening hydronephrosis and bilateral 8Fr stents were placed.  At the time of her last exchange, severe left sided hydronephrosis was noted with the existing 8Fr stent replaced with tandem 6 x 26 stents.  A 6 x 28 stent was placed on the right .  The patient now presents for stent exchange. The various alternatives were explained and patient agreed to proceed.    DETAILS OF PROCEDURE: The risks and benefits of the procedure were explained in detail to patient and informed consent was obtained. The patient was brought to the operating room and placed supine on the operating table where he underwent general anesthesia. he was then positioned in dorsal lithotomy position. The patient was prepped and draped in standard sterile fashion.   After an appropriate timeout, I introduced the rigid cystoscope through the urethra and into the bladder. The indwelling stents were identified as they exited the ureteral orifices. One of the left stents was grasped and pulled to the urethral  meatus with an alligator grasper. A wire was passed through the lumen of the indwelling stent and confirmed to be in the kidney fluoroscopically. The overlying stent was then removed with the wire left in place.  The other left stent was then grasped with alligator grasper and completely removed. A 5 Burmese open ended catheter was passed over the wire into the distal left ureter. A retrograde pyelogram was obtained with mild left hydronephrosis noted. A clean 8 by 26 double J stent was then placed over a wire and confirmed to be in the correct position both radiographically and cystoscopically.  The existing right ureteral stent was then grasped and pulled to the level of the meatus under fluoroscopic guidance.  Imaging revealed that the proximal end of the stent had migrated into the bladder.  In light of this finding the stent was removed.  The bladder was again entered with the cystoscope and the right ureter was intubated with a 5 Burmese open ended catheter.  A sensor wire was then passed up to the collecting system under fluoroscopic guidance.  A retrograde pyelogram revealed notable right sided hydronephrosis.  The 5 Burmese catheter was removed and a 6 x 26 Burmese stent was advanced over the wire under fluoroscopic guidance.  Resistance was encountered in the proximal ureter which was noted to be rather tortuous on RPG.   We subsequently attempted to advance the existing sensor wire into the collecting system without success.  An angle glide wire was also trialed without success.  Repeat RPGs revealed extravasation at the level of the R UPJ with an additional apparently contained collection along the medial aspect of the kidney.  In light of these difficulties and in consideration of the decreased function of the right kidney a decision was made to end the case. The patient tolerated the procedure well and was awakened and transferred to PACU in stable condition.    Postoperative Plan:  -PNT placement  discussed with patient and family  -Given decreased function of the right kidney, we will plan to manage conservatively at this time with observation and close follow up  -Should patient become symptomatic we will pursue right PNT placement

## 2017-06-12 NOTE — DISCHARGE INSTRUCTIONS
[facility]  Home Care Following Anesthesia  For 24 hours after surgery:  1. Get plenty of rest.  A responsible adult must stay with you for at least 24 hours after you leave the surgery center.  2. Do not drive or use heavy equipment.  If you have weakness or tingling, don't drive or use heavy equipment until this feeling goes away.   3. Do not drink alcohol.   4. Avoid strenuous or risky activities.  Ask for help when climbing stairs.  5. You may feel lightheaded.  IF so, sit for a few minutes before standing.  Have someone help you get up.   6. If you have nausea (feel sick to your stomach): Drink only clear liquids such as apple juice, ginger ale, broth or 7-Up.  Rest may also help.  Be sure to drink enough fluids.  Move to a regular diet as you feel able.   7. You may have a slight fever.  Call the doctor if your fever is over 100 F (37.7 C) (taken under the tongue) or lasts longer than 24 hours.  8. You may have a dry mouth, a sore throat, muscle aches or trouble sleeping. These should go away after 24 hours.  9. Do not make important or legal decisions.               Tips for taking pain medications  To get the best pain relief possible, remember these points:    Take pain medications as directed, before pain becomes severe.    Pain medication can upset your stomach: taking it with food may help.    Constipation is a common side effect of pain medication. Drink plenty of  fluids.    Eat foods high in fiber. Take a stool softener if recommended by your doctor or pharmacist.    Do not drink alcohol, drive or operate machinery while taking pain medications.    Ask about other ways to control pain, such as with heat, ice or relaxation.    Call a doctor for any of the followin. Signs of infection (fever, growing tenderness at the surgery site, a large amount of drainage or bleeding, severe pain, foul-smelling drainage, redness, swelling).  2. It has been over 8 to 10 hours  since surgery and you are still not able to urinate (pass water).  3. Headache for over 24 hours.  4. Numbness, tingling or weakness the day after surgery (if you had spinal anesthesia).  Your doctor is:       Dr. Carmela Alvarez, Prostate and Urology: [phone]               Or dial [phone] and ask for the resident on call for:  Prostate Urology  For emergency care, call the:  [facility]:  [phone] (TTY for hearing impaired: [phone])

## 2017-06-12 NOTE — ANESTHESIA PREPROCEDURE EVALUATION
Anesthesia Evaluation     . Pt has had prior anesthetic. Type: General and MAC    History of anesthetic complications   - PONV        ROS/MED HX    ENT/Pulmonary: Comment: History of pulmonary nodules.  No co-existing pulmonary disease.        Neurologic:  - neg neurologic ROS     Cardiovascular: Comment: HTN:  Well-controlled.  On Norvasc 2.5 mg po daily.     (+) hypertension----. : . . . :. . Previous cardiac testing date:results:date: results:ECG reviewed date:06/07/2017 results:Sinus tachycardia. date: results:          METS/Exercise Tolerance:  >4 METS   Hematologic: Comments: Anemia of chronic disease.  Hgb 8.5.      (+) History of blood clots Anemia, -      Musculoskeletal:  - neg musculoskeletal ROS       GI/Hepatic:  - neg GI/hepatic ROS       Renal/Genitourinary:     (+) chronic renal disease, type: CRI, Nephrolithiasis , Pt does not require dialysis, Pt has no history of transplant,       Endo:  - neg endo ROS       Psychiatric:  - neg psychiatric ROS       Infectious Disease:  - neg infectious disease ROS       Malignancy:   (+) Malignancy History of Other  Other CA Active status post Surgery and Chemo         Other:                     Physical Exam  Normal systems: pulmonary and dental    Airway   Mallampati: I  TM distance: >3 FB  Neck ROM: full    Dental     Cardiovascular   Rhythm and rate: regular and normal      Pulmonary                        Lab / Radiology Results:   Reviewed current labs when avail, see EMR for details.      BMP:  Recent Labs   Lab Test  06/05/17   0830   05/22/17   0811   NA   --    --   140   POTASSIUM  3.6   < >  3.4   CHLORIDE   --    --   104   CO2   --    --   27   BUN   --    --   22   CR   --    --   0.82   GLC   --    --   94   PURVI   --    --   9.4    < > = values in this interval not displayed.       LFTs:   Recent Labs   Lab Test  05/22/17   0811   PROTTOTAL  6.6*   ALBUMIN  3.5   BILITOTAL  0.2   ALKPHOS  45   AST  13   ALT  17       CBC:   Recent Labs   Lab  Test 06/09/17   WBC  4.8   HGB  8.5*   PLT  286       Coags:  Recent Labs   Lab Test  04/27/17   0923   07/22/16   0755   INR  0.98   < >  1.08   PTT  26   < >  33   FIBR   --    --   612*    < > = values in this interval not displayed.       Blood Bank:  Lab Results   Component Value Date    ABO O 05/30/2017    RH  Pos 05/30/2017    AS Neg 05/30/2017       Studies:  See EMR for current studies, reviewed when available.      Anesthesia Plan      History & Physical Review  History and physical reviewed and following examination; no interval change.    ASA Status:  3 .    NPO Status:  > 8 hours    Plan for MAC with Intravenous induction. Maintenance will be TIVA.  Reason for MAC:  Deep or markedly invasive procedure (G8)  PONV prophylaxis:  Ondansetron (or other 5HT-3)       Postoperative Care  Postoperative pain management:  Multi-modal analgesia.      Consents  Anesthetic plan, risks, benefits and alternatives discussed with:  Patient.  Use of blood products discussed: No .   .      Paolo Quinn MD  Anesthesiologist  9:34 AM  June 12, 2017                        .

## 2017-06-12 NOTE — DOWNTIME EVENT NOTE
The EMR was down for 5 hours on 6/12/2017 from [id number]    Fish Steward RN was responsible for completing the paper charting during this time period.     The following information was re-entered into the system by Fish Steward: MAR    The following information will remain in the paper chart: All patient assessments.    Fish Norton-Sherri  6/12/2017

## 2017-06-12 NOTE — BRIEF OP NOTE
[facility] Brief Operative Note      Pre-operative diagnosis: Hydronephrosis   Post-operative diagnosis: Same as above   Procedure: Procedure(s):  Cystoscopy, Bilateral Stent Exchange, Bilateral Retrograde Pyelogram - Wound Class: II-Clean Contaminated     Surgeon: Surgeon(s) and Role:     * Carmela Alvarez MD - Primary   Assistant(s): Salvatore Dawn MD - Assistant          Anesthesia: General   Estimated blood loss: None           Drains: Left 8 Lithuanian stent     Specimens: * No specimens in log *   Complications: None   Condition: Patient taken to recovery in stable condition.         Findings: Left tandem stents exchanged for single 8 Lithuanian stent; Right stent removed - unable to place new stent due to tortuosity/obstruction in proximal ureter/UPJ.  Extravasation noted on left RPG     Postop plan: Patient to follow up in clinic later this week - if symptomatic will pursue right PNT placement; Left stent exchange in 3 months

## 2017-06-12 NOTE — IP AVS SNAPSHOT
MRN:[id number]                      After Visit Summary   6/12/2017    Margaux Guzmán    MRN: [id number]           Thank you!     Thank you for choosing [facility] for your care. Our goal is always to provide you with excellent care. Hearing back from our patients is one way we can continue to improve our services. Please take a few minutes to complete the written survey that you may receive in the mail after you visit with us. Thank you!        Patient Information     Date Of Birth          1955        About your hospital stay     You were admitted on:  June 12, 2017 [facility]    You were discharged on:  June 12, 2017       Who to Call     For medical emergencies, please call 911.  For non-urgent questions about your medical care, please call your primary care provider or clinic, [phone]  For questions related to your surgery, please call your surgery clinic        Attending Provider     Provider Carmela Hull MD Urology       Primary Care Provider Office Phone # Fax #    Aurelia Knox [phone]      After Care Instructions     Discharge Instructions       Activity  - You may resume normal activity    Urination    - Some light redness (up to a watermelon-like-pink in color) is expected in the upcoming 7-10days.   - If having hematuria (blood in the urine), make sure to increase your water intake and monitor for improvement  - If you are unable to urinate you should return urgently to the ED or call clinic to try to arrange for an urgent visit same day.   - You are going home with the following tubes or drains: bilateral ureteral stents    Medications  - Over the counter acetaminophen for pain  - Continue tamsulosin and tolterodine for stent related pain and bladder spasms    Follow-Up:  - Call your primary care provider to touch base regarding your recent admission.    - Call or  "return sooner than your regularly scheduled visit if you develop any of the following: fever (greater than 101.5), uncontrolled pain, uncontrolled nausea or vomiting    Phone numbers:   - Monday through Friday 8am to 4:30pm: Call [phone] with questions or to schedule or confirm appointment.    - Nights or weekends: call the after hours emergency pager - [phone] and tell the  \"I would like to page the Urology Resident on call.\"  - For emergencies, call 911                  Your next 10 appointments already scheduled     Jun 19, 2017  9:00 AM CDT   [facility]    [address] [facility] [address]   [phone]            Jun 19, 2017  9:30 AM CDT   Level 2 with BAY 9 INFUSION   [facility]    [address] [facility] [address]   [phone]            Jun 26, 2017  9:00 AM CDT   [facility]   [facility]    [address] [facility] [address]   [phone]            Jun 26, 2017  9:30 AM CDT   Level 2 with BAY 8 INFUSION   [facility]    [address] [facility] [address]   [phone]            Jul 10, 2017  8:00 AM CDT   [facility]   [facility]    [address] [facility] [address]   [phone]            Jul 10, 2017  8:30 AM CDT   [facility] with JUSTIN Patrick CNP   [facility]    [address] [facility] [address]   [phone]            Jul 10, 2017  9:00 AM CDT   Level 2 with BAY 7 INFUSION   [facility]    [address] [facility] [address] "   [phone]            Jul 17, 2017  9:00 AM CDT   Return Visit with [facility] ONLY [facility]   [facility]    [address] [facility] [address]   [phone]            Jul 17, 2017  9:30 AM CDT   Level 2 with BAY 8 INFUSION   [facility]    [address]  [facility] [address]   [phone]            Jul 24, 2017  9:00 AM CDT   Level 2 with BAY 7 INFUSION   [facility]    [address] [facility] [address]   [phone]              Further instructions from your care team       [facility]  Home Care Following Anesthesia  For 24 hours after surgery:  1. Get plenty of rest.  A responsible adult must stay with you for at least 24 hours after you leave the surgery center.  2. Do not drive or use heavy equipment.  If you have weakness or tingling, don't drive or use heavy equipment until this feeling goes away.   3. Do not drink alcohol.   4. Avoid strenuous or risky activities.  Ask for help when climbing stairs.  5. You may feel lightheaded.  IF so, sit for a few minutes before standing.  Have someone help you get up.   6. If you have nausea (feel sick to your stomach): Drink only clear liquids such as apple juice, ginger ale, broth or 7-Up.  Rest may also help.  Be sure to drink enough fluids.  Move to a regular diet as you feel able.   7. You may have a slight fever.  Call the doctor if your fever is over 100 F (37.7 C) (taken under the tongue) or lasts longer than 24 hours.  8. You may have a dry mouth, a sore throat, muscle aches or trouble sleeping. These should go away after 24 hours.  9. Do not make important or legal decisions.               Tips for taking pain medications  To get the best pain relief possible, remember these points:    Take pain medications as directed, before pain  "becomes severe.    Pain medication can upset your stomach: taking it with food may help.    Constipation is a common side effect of pain medication. Drink plenty of  fluids.    Eat foods high in fiber. Take a stool softener if recommended by your doctor or pharmacist.    Do not drink alcohol, drive or operate machinery while taking pain medications.    Ask about other ways to control pain, such as with heat, ice or relaxation.    Call a doctor for any of the followin. Signs of infection (fever, growing tenderness at the surgery site, a large amount of drainage or bleeding, severe pain, foul-smelling drainage, redness, swelling).  2. It has been over 8 to 10 hours since surgery and you are still not able to urinate (pass water).  3. Headache for over 24 hours.  4. Numbness, tingling or weakness the day after surgery (if you had spinal anesthesia).  Your doctor is:       Dr. Carmela Alvarez, Prostate and Urology: [phone]               Or dial [phone] and ask for the resident on call for:  Prostate Urology  For emergency care, call the:  [facility]:  [phone] (TTY for hearing impaired: [phone])      Pending Results     Date and Time Order Name Status Description    2017 0917 XR SURGERY EARNEST FLUORO LESS THAN 5 MIN W STILLS In process             Admission Information     Date & Time Provider Department Dept. Phone    2017 Carmela Alvarez MD [facility] [phone]      Your Vitals Were     Blood Pressure Pulse Temperature Respirations Height Weight    115/66 89 98.5  F (36.9  C) (Oral) 16 1.676 m (5' 6\") 53.8 kg (118 lb 9.6 oz)    Pulse Oximetry BMI (Body Mass Index)                100% 19.14 kg/m2          [employer] Information     [employer] gives you secure access to your electronic health record. If you see a primary care provider, you can also send messages to your care team and make appointments. If you have questions, please call " your primary care clinic.  If you do not have a primary care provider, please call [phone] and they will assist you.      [email or web address] is an electronic gateway that provides easy, online access to your medical records. With [email or web address], you can request a clinic appointment, read your test results, renew a prescription or communicate with your care team.     To access your existing account, please contact your [facility] or call [phone] for assistance.        Care EveryWhere ID     This is your Care EveryWhere ID. This could be used by other organizations to access your [facility] medical records  [id number]           Review of your medicines      CONTINUE these medicines which have NOT CHANGED        Dose / Directions    ACETAMINOPHEN PO        Dose:  500 mg   Take 500 mg by mouth every 6 hours as needed for pain Reported on 3/30/2017   Refills:  0       amLODIPine 2.5 MG tablet   Commonly known as:  NORVASC   Used for:  Primary peritoneal adenocarcinoma (H)        Dose:  2.5 mg   Take 1 tablet (2.5 mg) by mouth daily   Quantity:  30 tablet   Refills:  0       buprenorphine 5 MCG/HR WK patch   Commonly known as:  BUTRANS   Used for:  Cancer associated pain        Dose:  1 patch   Place 1 patch onto the skin every 7 days   Quantity:  4 patch   Refills:  1       COLACE PO        Dose:  50 mg   Take 50 mg by mouth daily   Refills:  0       cycloSPORINE 0.05 % ophthalmic emulsion   Commonly known as:  RESTASIS        Dose:  1 drop   Place 1 drop into both eyes 2 times daily   Refills:  0       enoxaparin 40 MG/0.4ML injection   Commonly known as:  LOVENOX   Used for:  Ovarian cancer, left (H), Ovarian cancer, right (H)        INJECT 1 SYRINGE SUBCUTANEOUSLY DAILY   Quantity:  12 mL   Refills:  1       EPINEPHrine 0.3 MG/0.3ML injection   Used for:  Preventative health care, Primary peritoneal adenocarcinoma (H)        Dose:  0.3 mg   Inject 0.3 mLs (0.3 mg) into the muscle once as needed  for anaphylaxis   Quantity:  1 each   Refills:  0       LORazepam 1 MG tablet   Commonly known as:  ATIVAN   Used for:  Other iron deficiency anemia, Chemotherapy-induced neutropenia (H), Ovarian cancer, left (H), Ovarian cancer, right (H), Primary peritoneal adenocarcinoma (H)        Dose:  1 mg   Take 1 tablet (1 mg) by mouth every 6 hours as needed (Anxiety, Nausea/Vomiting or Sleep)   Quantity:  30 tablet   Refills:  2       MELATONIN PO        Dose:  1 mg   Take 1 mg by mouth At Bedtime   Refills:  0       ondansetron 8 MG ODT tab   Commonly known as:  ZOFRAN-ODT   Used for:  Ovarian cancer, right (H), Nausea        Dose:  8 mg   Place 1 tablet (8 mg) under the tongue every 8 hours as needed for nausea   Quantity:  90 tablet   Refills:  3       ONE DAILY MULTIVITAMIN WOMEN Tabs        Dose:  1 tablet   Take 1 tablet by mouth every morning   Refills:  0       polyethylene glycol powder   Commonly known as:  MIRALAX/GLYCOLAX        Dose:  1 capful   Take 1 capful by mouth daily as needed   Refills:  0       prochlorperazine 10 MG tablet   Commonly known as:  COMPAZINE   Used for:  Other iron deficiency anemia, Chemotherapy-induced neutropenia (H), Ovarian cancer, left (H), Ovarian cancer, right (H), Primary peritoneal adenocarcinoma (H)        Dose:  10 mg   Take 1 tablet (10 mg) by mouth every 6 hours as needed (Nausea/Vomiting)   Quantity:  30 tablet   Refills:  2       tamsulosin 0.4 MG capsule   Commonly known as:  FLOMAX   Used for:  Primary peritoneal adenocarcinoma (H)        Dose:  0.4 mg   Take 1 capsule (0.4 mg) by mouth daily   Quantity:  60 capsule   Refills:  6       tolterodine 4 MG 24 hr capsule   Commonly known as:  DETROL LA   Used for:  Hydronephrosis, unspecified hydronephrosis type        TAKE ONE CAPSULE BY MOUTH DAILY   Quantity:  30 capsule   Refills:  3       ZANTAC PO        Dose:  75 mg   Take 75 mg by mouth daily   Refills:  0                Protect others around you: Learn how to  safely use, store and throw away your medicines at www.disposemymeds.org.             Medication List: This is a list of all your medications and when to take them. Check marks below indicate your daily home schedule. Keep this list as a reference.      Medications           Morning Afternoon Evening Bedtime As Needed    ACETAMINOPHEN PO   Take 500 mg by mouth every 6 hours as needed for pain Reported on 3/30/2017                                amLODIPine 2.5 MG tablet   Commonly known as:  NORVASC   Take 1 tablet (2.5 mg) by mouth daily                                buprenorphine 5 MCG/HR WK patch   Commonly known as:  BUTRANS   Place 1 patch onto the skin every 7 days                                COLACE PO   Take 50 mg by mouth daily                                cycloSPORINE 0.05 % ophthalmic emulsion   Commonly known as:  RESTASIS   Place 1 drop into both eyes 2 times daily                                enoxaparin 40 MG/0.4ML injection   Commonly known as:  LOVENOX   INJECT 1 SYRINGE SUBCUTANEOUSLY DAILY                                EPINEPHrine 0.3 MG/0.3ML injection   Inject 0.3 mLs (0.3 mg) into the muscle once as needed for anaphylaxis                                LORazepam 1 MG tablet   Commonly known as:  ATIVAN   Take 1 tablet (1 mg) by mouth every 6 hours as needed (Anxiety, Nausea/Vomiting or Sleep)                                MELATONIN PO   Take 1 mg by mouth At Bedtime                                ondansetron 8 MG ODT tab   Commonly known as:  ZOFRAN-ODT   Place 1 tablet (8 mg) under the tongue every 8 hours as needed for nausea                                ONE DAILY MULTIVITAMIN WOMEN Tabs   Take 1 tablet by mouth every morning                                polyethylene glycol powder   Commonly known as:  MIRALAX/GLYCOLAX   Take 1 capful by mouth daily as needed                                prochlorperazine 10 MG tablet   Commonly known as:  COMPAZINE   Take 1 tablet (10 mg) by  mouth every 6 hours as needed (Nausea/Vomiting)                                tamsulosin 0.4 MG capsule   Commonly known as:  FLOMAX   Take 1 capsule (0.4 mg) by mouth daily                                tolterodine 4 MG 24 hr capsule   Commonly known as:  DETROL LA   TAKE ONE CAPSULE BY MOUTH DAILY                                ZANTAC PO   Take 75 mg by mouth daily

## 2017-06-12 NOTE — IP AVS SNAPSHOT
[facility]    [address] [place] [address]    Phone:  [phone]    Fax:  [phone]                                       After Visit Summary   6/12/2017    Margaux Guzmán    MRN: [id number]           After Visit Summary Signature Page     I have received my discharge instructions, and my questions have been answered. I have discussed any challenges I see with this plan with the nurse or doctor.    ..........................................................................................................................................  Patient/Patient Representative Signature      ..........................................................................................................................................  Patient Representative Print Name and Relationship to Patient    ..................................................               ................................................  Date                                            Time    ..........................................................................................................................................  Reviewed by Signature/Title    ...................................................              ..............................................  Date                                                            Time

## 2017-06-13 NOTE — ANESTHESIA CARE TRANSFER NOTE
Patient: Margaux Guzmán    Procedure(s):  Cystoscopy, Bilateral Stent Exchange, Bilateral Retrograde Pyelogram - Wound Class: II-Clean Contaminated    Diagnosis: Hydronephrosis  Diagnosis Additional Information: No value filed.    Anesthesia Type:   MAC     Note:  Airway :Room Air  Patient transferred to:Phase II  Comments: Patient to Phase II on  RA/native airway and is awake, alert, and verbal. Report to RN and transfer of care. BP: 139/76 HR: 92 RR: 16 Temp: 97.3 SpO2: 100% in RA      Vitals: (Last set prior to Anesthesia Care Transfer)    CRNA VITALS  6/13/2017 0739 - 6/13/2017 0839      6/13/2017             Pulse: 80    Ht Rate: 82    SpO2: 100 %                Electronically Signed By: JUSTIN Martell CRNA  June 13, 2017  8:50 AM

## 2017-06-13 NOTE — OR NURSING
The electronic medical record was down on 6/12/17 starting at 0930, ending at 1530.    Sona Mallory was responsible for completing the paper charting during this time period.     Event Times and OR RN Verification were re-entered into Epic OpTime by Vera Gibbs  See the scanned medical records in the electronic medical record for additional clinical documentation.

## 2017-06-13 NOTE — TELEPHONE ENCOUNTER
----- Message from Carmela Alvarez MD sent at 6/13/2017  9:13 AM CDT -----  Regarding: RE: clarification on next steps?  I would like see her on Wednesday. We weren't able to get the stent on the right, but that kidney isn't working well so we won't intervene unless she has pain.     ----- Message -----     From: Luz Hansen RN     Sent: 6/12/2017   3:09 PM       To: Carmela Alvarez MD  Subject: clarification on next steps?                     Dr. Alvarez,  Pt calling to get clarification on next steps. She did not really understand what needs to happen? I see that she is to return in 3 months for another stent exchange. Was there anything else you wanted her to do or watch for?  Let me know and I will call her.   Thanks  Luz

## 2017-06-14 NOTE — PROGRESS NOTES
Interventional Radiology Pre-Procedure Sedation Assessment   Time of Assessment: 11:54 AM    Expected Level: Moderate Sedation    Indication: Sedation is required for the following type of Procedure:     Sedation and procedural consent: Risks, benefits and alternatives were discussed with Patient    PO Intake: Appropriately NPO for procedure    ASA Class: Class 3 - SEVERE SYSTEMIC DISEASE, DEFINITE FUNCTIONAL LIMITATIONS.    Mallampati: Grade 2:  Soft palate, base of uvula, tonsillar pillars, and portion of posterior pharyngeal wall visible    Lungs: Lungs Clear with good breath sounds bilaterally    Heart: Normal heart sounds and rate    History and physical reviewed and no updates needed. I have reviewed the lab findings, diagnostic data, medications, and the plan for sedation. I have determined this patient to be an appropriate candidate for the planned sedation and procedure and have reassessed the patient IMMEDIATELY PRIOR to sedation and procedure.    Navin Bean MD

## 2017-06-14 NOTE — PROGRESS NOTES
Interventional Radiology Pre-Procedure Sedation Assessment   Time of Assessment: 11:53 AM    Expected Level: Moderate Sedation    Indication: Sedation is required for the following type of Procedure:     Sedation and procedural consent: Risks, benefits and alternatives were discussed with Patient    PO Intake: Appropriately NPO for procedure    ASA Class: Class 3 - SEVERE SYSTEMIC DISEASE, DEFINITE FUNCTIONAL LIMITATIONS.    Mallampati: Grade 2:  Soft palate, base of uvula, tonsillar pillars, and portion of posterior pharyngeal wall visible    Lungs: Lungs Clear with good breath sounds bilaterally    Heart: Normal heart sounds and rate    History and physical reviewed and no updates needed. I have reviewed the lab findings, diagnostic data, medications, and the plan for sedation. I have determined this patient to be an appropriate candidate for the planned sedation and procedure and have reassessed the patient IMMEDIATELY PRIOR to sedation and procedure.    Meg Umanzor MD, MD

## 2017-06-14 NOTE — ED AVS SNAPSHOT
[facility], Emergency Department    [address] [place] [address]    Phone:  [phone]                                       Margaux Guzmán   MRN: [id number]    Department:  [facility], Emergency Department   Date of Visit:  6/14/2017           After Visit Summary Signature Page     I have received my discharge instructions, and my questions have been answered. I have discussed any challenges I see with this plan with the nurse or doctor.    ..........................................................................................................................................  Patient/Patient Representative Signature      ..........................................................................................................................................  Patient Representative Print Name and Relationship to Patient    ..................................................               ................................................  Date                                            Time    ..........................................................................................................................................  Reviewed by Signature/Title    ...................................................              ..............................................  Date                                                            Time

## 2017-06-14 NOTE — ED PROVIDER NOTES
History     Chief Complaint   Patient presents with     Flank Pain     HPI  Margaux Guzmán is a 61 year old female with a medical history significant for peritoneal adenocarcinoma s/p TAHBSO (2013), multiple SBOs (most recently 3 months ago), pyelonephrosis (left), and bilateral ureteral obstruction with hydronephrosis s/p right ureteral stent removal and left ureteral stent exchange on 6/12 (2 days ago) who presents to the emergency department with a 3 day history of constipation and a 12 hour history of sharp, aching, non-radiating,and progressively worsening right flank pain. Patient relates onset of right flank pain yesterday afternoon/evening and states that it has been constant and worsening since onset. She attributes much of her pain to the right kidney and notes that the right ureteral stent was removed 2 days ago and was unable to be exchanged. Per Op Note, this was due to tortuosity and obstruction in the proximal ureter/UPJ. She notes concomitant nausea, but no vomiting. She is concerned about the possibility of a developing small bowel obstruction as her last bowel movement was 3 days ago. She has taken Senna, Dulcolax, and Miralax BID, as well as mild of magnesia yesterday, without any relief. She is not passing gas but notes that she seldomly does so at baseline. Patient also reports a fever of 101.4 F this morning. She denies chills, dysuria, increased urinary frequency, or urgency. She reports that her PO intake was fairly good until onset of her right flank pain yesterday evening; she has not had much to eat since that time.     CT Abdomen/Pelvis, dated 4/27/17, was available for review today. This demonstrated unchanged pulmonary nodules and thoracic lymph nodes on comparison scan from 3/30/17. There are two nodules in the right lower quadrant posterior mesentery, which are stable to minimally increased on current study and slightly increased from 03/15/17. Recommend attention on follow up.  Otherwise stable peritoneal tumor metastasis. New fluid attenuation collection in the central presacral pelvis, HU measures 12, likely ascitic fluid, not present on prior study. Attention on follow-up. Stable bilateral nephroureteral stents and mild hydronephrosis, with left greater than right fullness of the renal hilar region.    I have reviewed the Medications, Allergies, Past Medical and Surgical History, and Social History in the [facility] system.    PAST MEDICAL HISTORY:   Past Medical History:   Diagnosis Date     Anemia      History of blood transfusion     MULTIPLE     Hydronephrosis      Hyperlipidemia      Jugular vein thrombosis, right     Persistent right internal jugular DVT     Livedo annularis      Osteoarthritis      Osteopenia      Ovarian cancer (H)      Polymyalgia rheumatica (H)      PONV (postoperative nausea and vomiting)      Primary cancer of peritoneum (H)        PAST SURGICAL HISTORY:   Past Surgical History:   Procedure Laterality Date     APPENDECTOMY       BREAST SURGERY      biopsy negative      SECTION       COLONOSCOPY N/A 2017    Procedure: COLONOSCOPY;  Surgeon: Luis Richardson MD;  Location: [facility]     COLONOSCOPY N/A 2017    Procedure: COMBINED COLONOSCOPY, SINGLE OR MULTIPLE BIOPSY/POLYPECTOMY BY BIOPSY;  Surgeon: Luis Richardson MD;  Location: [facility]     COMBINED CYSTOSCOPY, RETROGRADES, EXCHANGE STENT URETER(S) Bilateral 2016    Procedure: COMBINED CYSTOSCOPY, RETROGRADES, EXCHANGE STENT URETER(S);  Surgeon: Carmela Alvarez MD;  [facility]     COMBINED CYSTOSCOPY, RETROGRADES, EXCHANGE STENT URETER(S)  2016    @ [facility]     COMBINED CYSTOSCOPY, RETROGRADES, EXCHANGE STENT URETER(S) Bilateral 10/17/2016    Procedure: COMBINED CYSTOSCOPY, RETROGRADES, EXCHANGE STENT URETER(S);  Surgeon: Carmela Alvarez MD;  [facility]     COMBINED CYSTOSCOPY, RETROGRADES, EXCHANGE STENT URETER(S) Bilateral 2016    Procedure:  COMBINED CYSTOSCOPY, RETROGRADES, EXCHANGE STENT URETER(S);  Surgeon: Carmela Alvarez MD;  [facility]     COMBINED CYSTOSCOPY, RETROGRADES, EXCHANGE STENT URETER(S) Bilateral 2/27/2017    Procedure: COMBINED CYSTOSCOPY, RETROGRADES, EXCHANGE STENT URETER(S);  Surgeon: Carmela Alvarez MD;  [facility]     ESOPHAGOSCOPY, GASTROSCOPY, DUODENOSCOPY (EGD), COMBINED N/A 1/28/2017    Procedure: COMBINED ESOPHAGOSCOPY, GASTROSCOPY, DUODENOSCOPY (EGD);  Surgeon: Luis Richardson MD;  [facility]: [facility]     HYSTERECTOMY TOTAL ABDOMINAL, BILATERAL SALPINGO-OOPHORECTOMY, NODE DISSECTION, COMBINED  11/7/2013    Procedure: COMBINED HYSTERECTOMY TOTAL ABDOMINAL, SALPINGO-OOPHORECTOMY, NODE DISSECTION;;  Surgeon: Cheri Aguilar MD;  [facility]     INJECT NERVE BLOCK CELIAC PLEXUS Left 4/20/2017    Procedure: INJECT NERVE BLOCK CELIAC PLEXUS;  Left splanchnic nerve block;  Surgeon: Shabbir Valladares MD;  [facility]     INJECT NERVE BLOCK CELIAC PLEXUS Left 5/18/2017    Procedure: INJECT NERVE BLOCK CELIAC PLEXUS;  Left Splanchnic Nerve Block;  Surgeon: Shabbir Valladares MD;  [facility]     LAPAROSCOPIC SALPINGO-OOPHORECTOMY  11/7/2013    Procedure: LAPAROSCOPIC SALPINGO-OOPHORECTOMY;  Diagnostic Laparoscopy, Exploratory Laparotomy, Bilateral Salpingo-Oophorectomy,  Hysterectomy, Omentectomy, Pelvic Washings, Peritoneal staging and biopsies, Pelvic and Periaortic Lymph node Dissection;  Surgeon: Cheri Aguilar MD;  [facility]     LAPAROTOMY, STAGING, COMBINED  11/7/2013    Procedure: COMBINED LAPAROTOMY, STAGING;;  Surgeon: Cheri Aguilar MD;  [facility]     LYMPH NODE BIOPSY  2008    Left posterior chain, beign     OPEN REDUCTION INTERNAL FIXATION TOE(S)  9/3/13    Fifth digit, left foot, with screw fixation      REMOVE PORT PERITONEAL  5/5/2014    Procedure: REMOVE PORT PERITONEAL;  Surgeon: Cheri Aguilar MD;  [facility]       FAMILY HISTORY:   Family History   Problem  Relation Age of Onset     Arthritis Father      Myocardial Infarction Father      secondary to anesthesia     Colon Cancer Father      DIABETES Other      Uncle     Hypertension Mother      Other - See Comments Mother      cognitive decline     Skin Cancer Mother      Hypertension Sister      HEART DISEASE Other      unknown valve replacement     Bladder Cancer Sister      Skin Cancer Brother        SOCIAL HISTORY:   Social History   Substance Use Topics     Smoking status: Former Smoker     Smokeless tobacco: Former User      Comment: Quit over 20 years ago     Alcohol use No       Patient's Medications   New Prescriptions    CEFPODOXIME (VANTIN) 200 MG TABLET    Take 1 tablet (200 mg) by mouth 2 times daily for 7 days   Previous Medications    ACETAMINOPHEN PO    Take 500 mg by mouth every 6 hours as needed for pain Reported on 3/30/2017    AMLODIPINE (NORVASC) 2.5 MG TABLET    Take 1 tablet (2.5 mg) by mouth daily    BUPRENORPHINE (BUTRANS) 5 MCG/HR WK PATCH    Place 1 patch onto the skin every 7 days    CYCLOSPORINE (RESTASIS) 0.05 % OPHTHALMIC EMULSION    Place 1 drop into both eyes 2 times daily     DOCUSATE SODIUM (COLACE PO)    Take 50 mg by mouth daily    ENOXAPARIN (LOVENOX) 40 MG/0.4ML INJECTION    INJECT 1 SYRINGE SUBCUTANEOUSLY DAILY    EPINEPHRINE (EPIPEN) 0.3 MG/0.3ML INJECTION    Inject 0.3 mLs (0.3 mg) into the muscle once as needed for anaphylaxis    LORAZEPAM (ATIVAN) 1 MG TABLET    Take 1 tablet (1 mg) by mouth every 6 hours as needed (Anxiety, Nausea/Vomiting or Sleep)    MELATONIN PO    Take 1 mg by mouth At Bedtime     MULTIPLE VITAMINS-MINERALS (ONE DAILY MULTIVITAMIN WOMEN) TABS    Take 1 tablet by mouth every morning     ONDANSETRON (ZOFRAN-ODT) 8 MG ODT TAB    Place 1 tablet (8 mg) under the tongue every 8 hours as needed for nausea    POLYETHYLENE GLYCOL (MIRALAX/GLYCOLAX) POWDER    Take 1 capful by mouth daily as needed     PROCHLORPERAZINE (COMPAZINE) 10 MG TABLET    Take 1 tablet (10  "mg) by mouth every 6 hours as needed (Nausea/Vomiting)    RANITIDINE HCL (ZANTAC PO)    Take 75 mg by mouth daily     TAMSULOSIN (FLOMAX) 0.4 MG CAPSULE    Take 1 capsule (0.4 mg) by mouth daily    TOLTERODINE (DETROL LA) 4 MG 24 HR CAPSULE    TAKE ONE CAPSULE BY MOUTH DAILY   Modified Medications    No medications on file   Discontinued Medications    No medications on file     Allergies   Allergen Reactions     Contrast Dye Itching and Swelling     Itching/tingling of mouth and nose with sensation of swelling after receiving IV contrast for CT.  This occurred despite steroid premedication. Therefore the patient should not receive intravenous contrast in the future.      Benadryl Allergy Other (See Comments)     Stay awake, restless, \"uncomfortable\", \"feel like I need to run away\"  With  IV dose,  does fine with oral Benadryl.     Lovenox [Enoxaparin] Hives     3/23/16: Okay to receive heparin flushes in port, tolerates well. Patricia Cortez FNP-C     Amoxicillin Rash     Diphenhydramine Anxiety     No Clinical Screening - See Comments Rash     Pollen Extract Rash     Sulfa Drugs Rash     Review of Systems   Constitutional: Positive for fever (101.4 this morning). Negative for chills.   HENT: Negative for congestion.    Eyes: Negative for redness.   Respiratory: Negative for cough and shortness of breath.    Cardiovascular: Negative for chest pain.   Gastrointestinal: Positive for abdominal distention and nausea. Negative for abdominal pain and vomiting.   Genitourinary: Positive for flank pain. Negative for difficulty urinating, dysuria, frequency and urgency.   Musculoskeletal: Negative for arthralgias and neck stiffness.   Skin: Negative for color change.   Neurological: Negative for headaches.   Psychiatric/Behavioral: Negative for confusion.   All other systems reviewed and are negative.      Physical Exam   BP: 130/64  Heart Rate: 107  Temp: 99.6  F (37.6  C)  Resp: 18  SpO2: 99 %  Physical Exam "   Constitutional: She appears distressed.   HENT:   Head: Atraumatic.   Mouth/Throat: Oropharynx is clear and moist. No oropharyngeal exudate.   Eyes: Pupils are equal, round, and reactive to light. No scleral icterus.   Cardiovascular: Normal heart sounds and intact distal pulses.    Pulmonary/Chest: Breath sounds normal. No respiratory distress.   Abdominal: Soft. Bowel sounds are decreased. There is no tenderness.   Musculoskeletal: She exhibits no edema.        Back:    Skin: Skin is warm. No rash noted. She is not diaphoretic.       ED Course     ED Course     Procedures       8:06 AM  The patient was seen and examined by Jose Juan Metzger MD in Room 6.      Results for orders placed or performed during the hospital encounter of 06/14/17 (from the past 24 hour(s))   Lactic acid   Result Value Ref Range    Lactic Acid 1.2 0.7 - 2.1 mmol/L   CBC with platelets differential   Result Value Ref Range    WBC 12.4 (H) 4.0 - 11.0 10e9/L    RBC Count 2.70 (L) 3.8 - 5.2 10e12/L    Hemoglobin 8.3 (L) 11.7 - 15.7 g/dL    Hematocrit 25.7 (L) 35.0 - 47.0 %    MCV 95 78 - 100 fl    MCH 30.7 26.5 - 33.0 pg    MCHC 32.3 31.5 - 36.5 g/dL    RDW 17.2 (H) 10.0 - 15.0 %    Platelet Count 339 150 - 450 10e9/L    Diff Method Automated Method     % Neutrophils 85.0 %    % Lymphocytes 4.7 %    % Monocytes 10.0 %    % Eosinophils 0.0 %    % Basophils 0.1 %    % Immature Granulocytes 0.2 %    Nucleated RBCs 0 0 /100    Absolute Neutrophil 10.5 (H) 1.6 - 8.3 10e9/L    Absolute Lymphocytes 0.6 (L) 0.8 - 5.3 10e9/L    Absolute Monocytes 1.2 0.0 - 1.3 10e9/L    Absolute Eosinophils 0.0 0.0 - 0.7 10e9/L    Absolute Basophils 0.0 0.0 - 0.2 10e9/L    Abs Immature Granulocytes 0.0 0 - 0.4 10e9/L    Absolute Nucleated RBC 0.0    Comprehensive metabolic panel   Result Value Ref Range    Sodium 135 133 - 144 mmol/L    Potassium 3.0 (L) 3.4 - 5.3 mmol/L    Chloride 98 94 - 109 mmol/L    Carbon Dioxide 29 20 - 32 mmol/L    Anion Gap 7 3 - 14 mmol/L     Glucose 112 (H) 70 - 99 mg/dL    Urea Nitrogen 18 7 - 30 mg/dL    Creatinine 0.82 0.52 - 1.04 mg/dL    GFR Estimate 71 >60 mL/min/1.7m2    GFR Estimate If Black 85 >60 mL/min/1.7m2    Calcium 9.1 8.5 - 10.1 mg/dL    Bilirubin Total 0.4 0.2 - 1.3 mg/dL    Albumin 3.4 3.4 - 5.0 g/dL    Protein Total 6.7 (L) 6.8 - 8.8 g/dL    Alkaline Phosphatase 43 40 - 150 U/L    ALT 17 0 - 50 U/L    AST 14 0 - 45 U/L   Lipase   Result Value Ref Range    Lipase 80 73 - 393 U/L   Magnesium   Result Value Ref Range    Magnesium 1.7 1.6 - 2.3 mg/dL   Procalcitonin   Result Value Ref Range    Procalcitonin 0.26 ng/ml   CRP inflammation   Result Value Ref Range    CRP Inflammation 33.0 (H) 0.0 - 8.0 mg/L   INR   Result Value Ref Range    INR 1.02 0.86 - 1.14   Urine Culture   Result Value Ref Range    Specimen Description Unspecified Urine     Special Requests Specimen received in preservative     Culture Micro Pending     Micro Report Status Pending    UA with Microscopic   Result Value Ref Range    Color Urine Yellow     Appearance Urine Clear     Glucose Urine Negative NEG mg/dL    Bilirubin Urine Negative NEG    Ketones Urine Negative NEG mg/dL    Specific Gravity Urine 1.015 1.003 - 1.035    Blood Urine Moderate (A) NEG    pH Urine 6.5 5.0 - 7.0 pH    Protein Albumin Urine 30 (A) NEG mg/dL    Urobilinogen mg/dL Normal 0.0 - 2.0 mg/dL    Nitrite Urine Negative NEG    Leukocyte Esterase Urine Moderate (A) NEG    Source       Midstream Urine  CORRECTED ON 06/14 AT 0937: PREVIOUSLY REPORTED AS Urine      WBC Urine 11 (H) 0 - 2 /HPF    RBC Urine 175 (H) 0 - 2 /HPF    Mucous Urine Present (A) NEG /LPF   Abdomen XR, 2 vw, flat and upright    Narrative    XR ABDOMEN 2 VW  6/14/2017 8:36 AM      HISTORY: Abdominal pain, distention, rule out obstruction    COMPARISON: CT 4/27/2017 and radiograph 3/1/2017. Fluoroscopic images  from 6/12/2017.    FINDINGS: Supine and upright radiographs of the abdomen were obtained.  Multiple surgical clips in  the lower abdomen and pelvis.  Nonobstructive bowel gas pattern. Left double-J nephroureteral stent  in place. Moderate to severe right hydronephrosis and hydroureter with  residual contrast secondary to recent retrograde pyelogram. Filling  defects within the collecting system and ureter on the supine image  become nondependent on the upright, most compatible with air bubbles.  No free intraperitoneal air. Calcified granuloma in the right lung  base. Partially visualized right [id number]. Lung bases are  otherwise clear.      Impression    IMPRESSION:   1.  Nonobstructive bowel gas pattern. No free intraperitoneal air.  2.  Moderate to severe right hydronephrosis and hydroureter with  residual contrast secondary to recent retrograde pyelogram.   3.  Left double-J nephroureteral stent in place.    DAPHNE LIMA MD   Blood Culture ONE site   Result Value Ref Range    Specimen Description Blood Portacath     Culture Micro Pending     Micro Report Status Pending    UA with Microscopic reflex to Culture   Result Value Ref Range    Color Urine Yellow     Appearance Urine Cloudy     Glucose Urine Negative NEG mg/dL    Bilirubin Urine Negative NEG    Ketones Urine Negative NEG mg/dL    Specific Gravity Urine 1.018 1.003 - 1.035    Blood Urine Moderate (A) NEG    pH Urine 7.0 5.0 - 7.0 pH    Protein Albumin Urine 100 (A) NEG mg/dL    Urobilinogen mg/dL Normal 0.0 - 2.0 mg/dL    Nitrite Urine Negative NEG    Leukocyte Esterase Urine Large (A) NEG    Source Unspecified Urine  RIGHT RENAL PELVIS       WBC Urine >182 (H) 0 - 2 /HPF    RBC Urine 152 (H) 0 - 2 /HPF   IR Nephrostomy Tube Placement Right    Narrative    PROCEDURES 6/14/2017:  Fluoroscopic guided percutaneous nephrostomy tube placement.    Clinical History: Peritoneal cancer with bilateral hydronephrosis  which has been managed with chronic indwelling stents. The left stent  was exchanged on Monday without difficulty. Attempts to place a  right-sided stent were  unsuccessful. Patient presented today with  right flank pain and hydronephrosis.    Comparisons: 6/14/2017, 6/12/2017, 4/27/2017    Staff Radiologist: PAULA Porter M.D.    I, OSWALDO PORTER MD, attest that I was present for all critical  portions of the procedure and was immediately available to provide  guidance and assistance during the remainder of the procedure.    Fellow: ALBERT Bean MD    Medications:   1. 100 mcg Fentanyl  2. 2 mg Versed    Moderate sedation administered by the IR nurse at the supervision of  the attending. Vital signs and oxygenation continuously monitored. The  patient remained stable throughout the procedure.    Sedation time: 15 minutes    Fluoroscopy time: 4.2 mins    Contrast: 5 cc Visipaque 320    PROCEDURE: The patient understood the limitations, alternatives, and  risks of the procedure and requested the procedure be performed. Both  written and oral consent were obtained.    Patient placed prone on the interventional table. Limited  preprocedural ultrasound performed for target localization  demonstrating significant right hydronephrosis. The right flank was  prepped and draped in the usual sterile fashion. Ten to one volume  mixture of 1% lidocaine without epinephrine buffered with 8.4%  bicarbonate solution was used for local anesthesia.     Under ultrasound guidance, 21 gauge needle nephrostomy was made into  and inferior posterior calyx.     Needle exchanged over 0.018 guidewire for the [employer]  AccuStick II sheath/cannula. Cannula and inner coaxial 4 Taiwanese  dilator removed over guidewire. 6 Taiwanese sheath removed over 0.035 and  0.018 guidewires. 0.018 guidewire left as a safety wire. Track dilated  over 0.035 guidewire to 8 Taiwanese size. 8 Taiwanese [facility]  locking pigtail catheter was advanced over the guidewire into the  collecting system under fluoroscopic guidance. Guidewire removed.  Pigtail formed and locked in the renal pelvis. Position documented  with  contrast. 2-0 nylon catheter-retaining suture and sterile  dressing applied. Catheter to gravity drainage. No immediate  complication.      Impression    IMPRESSION:   Successful 8 Portuguese [facility] locking pigtail catheter  placed as right percutaneous nephrostomy tube.     Plan:   - Catheter to gravity drainage.  - Bed rest and observation for 1 hour post procedure.  - Should the catheter remain in place, routine exchange is recommended  at 3 months. If desired, internalization could be attempted.    I have personally reviewed the examination and initial interpretation  and I agree with the findings.    OSWALDO PORTER MD       Results for orders placed or performed during the hospital encounter of 06/14/17 (from the past 24 hour(s))   Lactic acid   Result Value Ref Range    Lactic Acid 1.2 0.7 - 2.1 mmol/L   CBC with platelets differential   Result Value Ref Range    WBC 12.4 (H) 4.0 - 11.0 10e9/L    RBC Count 2.70 (L) 3.8 - 5.2 10e12/L    Hemoglobin 8.3 (L) 11.7 - 15.7 g/dL    Hematocrit 25.7 (L) 35.0 - 47.0 %    MCV 95 78 - 100 fl    MCH 30.7 26.5 - 33.0 pg    MCHC 32.3 31.5 - 36.5 g/dL    RDW 17.2 (H) 10.0 - 15.0 %    Platelet Count 339 150 - 450 10e9/L    Diff Method Automated Method     % Neutrophils 85.0 %    % Lymphocytes 4.7 %    % Monocytes 10.0 %    % Eosinophils 0.0 %    % Basophils 0.1 %    % Immature Granulocytes 0.2 %    Nucleated RBCs 0 0 /100    Absolute Neutrophil 10.5 (H) 1.6 - 8.3 10e9/L    Absolute Lymphocytes 0.6 (L) 0.8 - 5.3 10e9/L    Absolute Monocytes 1.2 0.0 - 1.3 10e9/L    Absolute Eosinophils 0.0 0.0 - 0.7 10e9/L    Absolute Basophils 0.0 0.0 - 0.2 10e9/L    Abs Immature Granulocytes 0.0 0 - 0.4 10e9/L    Absolute Nucleated RBC 0.0    Comprehensive metabolic panel   Result Value Ref Range    Sodium 135 133 - 144 mmol/L    Potassium 3.0 (L) 3.4 - 5.3 mmol/L    Chloride 98 94 - 109 mmol/L    Carbon Dioxide 29 20 - 32 mmol/L    Anion Gap 7 3 - 14 mmol/L    Glucose 112 (H) 70 - 99  mg/dL    Urea Nitrogen 18 7 - 30 mg/dL    Creatinine 0.82 0.52 - 1.04 mg/dL    GFR Estimate 71 >60 mL/min/1.7m2    GFR Estimate If Black 85 >60 mL/min/1.7m2    Calcium 9.1 8.5 - 10.1 mg/dL    Bilirubin Total 0.4 0.2 - 1.3 mg/dL    Albumin 3.4 3.4 - 5.0 g/dL    Protein Total 6.7 (L) 6.8 - 8.8 g/dL    Alkaline Phosphatase 43 40 - 150 U/L    ALT 17 0 - 50 U/L    AST 14 0 - 45 U/L   Lipase   Result Value Ref Range    Lipase 80 73 - 393 U/L   Magnesium   Result Value Ref Range    Magnesium 1.7 1.6 - 2.3 mg/dL   Procalcitonin   Result Value Ref Range    Procalcitonin 0.26 ng/ml   CRP inflammation   Result Value Ref Range    CRP Inflammation 33.0 (H) 0.0 - 8.0 mg/L   INR   Result Value Ref Range    INR 1.02 0.86 - 1.14   Urine Culture   Result Value Ref Range    Specimen Description Unspecified Urine     Special Requests Specimen received in preservative     Culture Micro Pending     Micro Report Status Pending    UA with Microscopic   Result Value Ref Range    Color Urine Yellow     Appearance Urine Clear     Glucose Urine Negative NEG mg/dL    Bilirubin Urine Negative NEG    Ketones Urine Negative NEG mg/dL    Specific Gravity Urine 1.015 1.003 - 1.035    Blood Urine Moderate (A) NEG    pH Urine 6.5 5.0 - 7.0 pH    Protein Albumin Urine 30 (A) NEG mg/dL    Urobilinogen mg/dL Normal 0.0 - 2.0 mg/dL    Nitrite Urine Negative NEG    Leukocyte Esterase Urine Moderate (A) NEG    Source       Midstream Urine  CORRECTED ON 06/14 AT 0937: PREVIOUSLY REPORTED AS Urine      WBC Urine 11 (H) 0 - 2 /HPF    RBC Urine 175 (H) 0 - 2 /HPF    Mucous Urine Present (A) NEG /LPF   Abdomen XR, 2 vw, flat and upright    Narrative    XR ABDOMEN 2 VW  6/14/2017 8:36 AM      HISTORY: Abdominal pain, distention, rule out obstruction    COMPARISON: CT 4/27/2017 and radiograph 3/1/2017. Fluoroscopic images  from 6/12/2017.    FINDINGS: Supine and upright radiographs of the abdomen were obtained.  Multiple surgical clips in the lower abdomen and  pelvis.  Nonobstructive bowel gas pattern. Left double-J nephroureteral stent  in place. Moderate to severe right hydronephrosis and hydroureter with  residual contrast secondary to recent retrograde pyelogram. Filling  defects within the collecting system and ureter on the supine image  become nondependent on the upright, most compatible with air bubbles.  No free intraperitoneal air. Calcified granuloma in the right lung  base. Partially visualized right [id number]. Lung bases are  otherwise clear.      Impression    IMPRESSION:   1.  Nonobstructive bowel gas pattern. No free intraperitoneal air.  2.  Moderate to severe right hydronephrosis and hydroureter with  residual contrast secondary to recent retrograde pyelogram.   3.  Left double-J nephroureteral stent in place.    DAPHNE LIMA MD   Blood Culture ONE site   Result Value Ref Range    Specimen Description Blood Portacath     Culture Micro Pending     Micro Report Status Pending    UA with Microscopic reflex to Culture   Result Value Ref Range    Color Urine Yellow     Appearance Urine Cloudy     Glucose Urine Negative NEG mg/dL    Bilirubin Urine Negative NEG    Ketones Urine Negative NEG mg/dL    Specific Gravity Urine 1.018 1.003 - 1.035    Blood Urine Moderate (A) NEG    pH Urine 7.0 5.0 - 7.0 pH    Protein Albumin Urine 100 (A) NEG mg/dL    Urobilinogen mg/dL Normal 0.0 - 2.0 mg/dL    Nitrite Urine Negative NEG    Leukocyte Esterase Urine Large (A) NEG    Source Unspecified Urine  RIGHT RENAL PELVIS       WBC Urine >182 (H) 0 - 2 /HPF    RBC Urine 152 (H) 0 - 2 /HPF   IR Nephrostomy Tube Placement Right    Narrative    PROCEDURES 6/14/2017:  Fluoroscopic guided percutaneous nephrostomy tube placement.    Clinical History: Peritoneal cancer with bilateral hydronephrosis  which has been managed with chronic indwelling stents. The left stent  was exchanged on Monday without difficulty. Attempts to place a  right-sided stent were unsuccessful. Patient  presented today with  right flank pain and hydronephrosis.    Comparisons: 6/14/2017, 6/12/2017, 4/27/2017    Staff Radiologist: PAULA Porter M.D.    I, OSWALDO PORTER MD, attest that I was present for all critical  portions of the procedure and was immediately available to provide  guidance and assistance during the remainder of the procedure.    Fellow: ALBERT Bean MD    Medications:   1. 100 mcg Fentanyl  2. 2 mg Versed    Moderate sedation administered by the IR nurse at the supervision of  the attending. Vital signs and oxygenation continuously monitored. The  patient remained stable throughout the procedure.    Sedation time: 15 minutes    Fluoroscopy time: 4.2 mins    Contrast: 5 cc Visipaque 320    PROCEDURE: The patient understood the limitations, alternatives, and  risks of the procedure and requested the procedure be performed. Both  written and oral consent were obtained.    Patient placed prone on the interventional table. Limited  preprocedural ultrasound performed for target localization  demonstrating significant right hydronephrosis. The right flank was  prepped and draped in the usual sterile fashion. Ten to one volume  mixture of 1% lidocaine without epinephrine buffered with 8.4%  bicarbonate solution was used for local anesthesia.     Under ultrasound guidance, 21 gauge needle nephrostomy was made into  and inferior posterior calyx.     Needle exchanged over 0.018 guidewire for the [employer]  AccuStick II sheath/cannula. Cannula and inner coaxial 4 Citizen of [place]  dilator removed over guidewire. 6 Citizen of [place] sheath removed over 0.035 and  0.018 guidewires. 0.018 guidewire left as a safety wire. Track dilated  over 0.035 guidewire to 8 Citizen of [place] size. 8 Citizen of [place] [facility]  locking pigtail catheter was advanced over the guidewire into the  collecting system under fluoroscopic guidance. Guidewire removed.  Pigtail formed and locked in the renal pelvis. Position documented  with contrast. 2-0 nylon  catheter-retaining suture and sterile  dressing applied. Catheter to gravity drainage. No immediate  complication.      Impression    IMPRESSION:   Successful 8 [place] [facility] locking pigtail catheter  placed as right percutaneous nephrostomy tube.     Plan:   - Catheter to gravity drainage.  - Bed rest and observation for 1 hour post procedure.  - Should the catheter remain in place, routine exchange is recommended  at 3 months. If desired, internalization could be attempted.    I have personally reviewed the examination and initial interpretation  and I agree with the findings.    OSWALDO PORTER MD            Labs Ordered and Resulted from Time of ED Arrival Up to the Time of Departure from the ED   CBC WITH PLATELETS DIFFERENTIAL - Abnormal; Notable for the following:        Result Value    WBC 12.4 (*)     RBC Count 2.70 (*)     Hemoglobin 8.3 (*)     Hematocrit 25.7 (*)     RDW 17.2 (*)     Absolute Neutrophil 10.5 (*)     Absolute Lymphocytes 0.6 (*)     All other components within normal limits   COMPREHENSIVE METABOLIC PANEL - Abnormal; Notable for the following:     Potassium 3.0 (*)     Glucose 112 (*)     Protein Total 6.7 (*)     All other components within normal limits   CRP INFLAMMATION - Abnormal; Notable for the following:     CRP Inflammation 33.0 (*)     All other components within normal limits   ROUTINE UA WITH MICROSCOPIC - Abnormal; Notable for the following:     Blood Urine Moderate (*)     Protein Albumin Urine 30 (*)     Leukocyte Esterase Urine Moderate (*)     WBC Urine 11 (*)     RBC Urine 175 (*)     Mucous Urine Present (*)     All other components within normal limits   LACTIC ACID WHOLE BLOOD   LIPASE   MAGNESIUM   PROCALCITONIN   INR   CENTRAL VENOUS CATHETER   BLOOD CULTURE   URINE CULTURE AEROBIC BACTERIAL          Medications   sodium chloride (PF) 0.9% PF flush 10-20 mL (not administered)   heparin lock flush 10 UNIT/ML injection 5-10 mL (not administered)   heparin  lock flush 10 UNIT/ML injection 5-10 mL (not administered)   heparin 100 UNIT/ML injection 5 mL (not administered)   sodium chloride (PF) 0.9% PF flush 10-20 mL (not administered)   ondansetron (ZOFRAN) injection 4 mg (4 mg Intravenous Given 6/14/17 0843)   0.9% sodium chloride BOLUS (0 mLs Intravenous Stopped 6/14/17 1056)   fentaNYL Citrate (PF) (SUBLIMAZE) injection  mcg (50 mcg Intravenous Given 6/14/17 1138)   cefTRIAXone (ROCEPHIN) 1 g vial to attach to  mL bag for ADULTS or NS 50 mL bag for PEDS (0 g Intravenous Stopped 6/14/17 1001)   potassium chloride (KLOR-CON) Packet 40 mEq (40 mEq Oral Given 6/14/17 1001)   ondansetron (ZOFRAN) injection 4 mg (4 mg Intravenous Given 6/14/17 1007)   clindamycin (CLEOCIN) infusion 900 mg (0 mg Intravenous Stopped 6/14/17 1250)   prochlorperazine (COMPAZINE) injection 5 mg (5 mg Intravenous Given 6/14/17 1138)   iodixanol (VISIPAQUE 320) injection 50 mL (10 mLs NEPHROSTOMY TUBE Given 6/14/17 1226)       Assessments & Plan (with Medical Decision Making)   61-year-old female with known ovarian cancer and ureteral narrowing presents secondary to worsening right flank pain with fever and history of progressive hydronephrosis.  Patient was noted on exam to have right flank tenderness and be in mild distress.  Laboratories were obtained revealing a CBC was slightly elevated white blood count at 12.4 and known anemia close to baseline.  Copy and some metabolic panel was normal with exception of low potassium of 3 and elevated glucose of 112 consistent with stress response.  Lipase, magnesium, Procalcitonin were all normal.  The case was discussed at length with urology and the patient was sent to interventional radiology for percutaneous nephrostomy tube.  She had received Compazine, Rocephin for infection, fentanyl and Zofran.  Urinalysis following percutaneous nephrostomy tube placement revealed pyuria.  The case was discussed again with urology and it was  recommended that she be placed on Vantin pending culture results.  Patient was observed for more than 2 hours following placement of her to and felt improved.  She will be discharged following percutaneous nephrostomy tube care teaching to follow up with urology.  I have reviewed the nursing notes.    I have reviewed the findings, diagnosis, plan and need for follow up with the patient.    New Prescriptions    CEFPODOXIME (VANTIN) 200 MG TABLET    Take 1 tablet (200 mg) by mouth 2 times daily for 7 days       Final diagnoses:   Right flank pain     ITong, am serving as a trained medical scribe to document services personally performed by Jose Juan Metzger MD, based on the provider's statements to me.   I, Jose Juan Metzger MD, was physically present and have reviewed and verified the accuracy of this note documented by Tong Brand.  6/14/2017   [facility], [place], EMERGENCY DEPARTMENT     Jose Juan Metzger MD  06/14/17 8588

## 2017-06-14 NOTE — PROGRESS NOTES
Interventional Radiology Intra-procedural Nursing Note    Patient Name: Margaux Guzmán  Medical Record Number: [id number]  Today's Date: June 14, 2017    Start Time: 1210  End of procedure time: 1225  Procedure: Placement of Right Nephrostomy Tube  Report given to: Akin RN, Emergency Department  Time pt departs:  1235    Attending  Present at Time Outy:  Dr Austin Landry  Procedurlaist:  Dr Navin Bean     Other Notes:     Assumed care of patient.  Confirmed procedure.  Patient already positioned prone and prepped by IR Technologist.  8 Fr [employer] Right Nephrostomy tube placed.  Urine specimen collected and sent to lab.  Patient tolerated procedure without incident.    JEREMY HESS RN

## 2017-06-14 NOTE — BRIEF OP NOTE
PROCEDURES 6/14/2017:  Fluoroscopic guided percutaneous nephrostomy tube placement.    Clinical History: Peritoneal cancer with bilateral hydronephrosis which has been managed with chronic indwelling stents. The left stent was exchanged on Monday without difficulty. Attempts to place a right-sided stent were unsuccessful. Patient presented today with right flank pain and hydronephrosis.    Comparisons: 6/14/2017, 6/12/2017, 4/27/2017    Staff Radiologist: PAULA Landry M.D.    Fellow: ALBERT Bean MD    Medications:   1. 100 mcg Fentanyl  2. 2 mg Versed    Moderate sedation administered by the IR nurse at the supervision of the attending. Vital signs and oxygenation continuously monitored. The patient remained stable throughout the procedure.    Sedation time: 15 minutes    Fluoroscopy time: 4.2 mins    Contrast: 5 cc Visipaque 320    PROCEDURE: The patient understood the limitations, alternatives, and risks of the procedure and requested the procedure be performed. Both written and oral consent were obtained.    Patient placed prone on the interventional table. Limited preprocedural ultrasound performed for target localization demonstrating significant right hydronephrosis. The right flank was prepped and draped in the usual sterile fashion. Ten to one volume mixture of 1% lidocaine without epinephrine buffered with 8.4% bicarbonate solution was used for local anesthesia.     Under ultrasound guidance, 21 gauge needle nephrostomy was made into and inferior posterior calyx.     Needle exchanged over 0.018 guidewire for the [employer] AccuStick II sheath/cannula. Cannula and inner coaxial 4 Armenian dilator removed over guidewire. 6 Armenian sheath removed over 0.035 and 0.018 guidewires. 0.018 guidewire left as a safety wire. Track dilated over 0.035 guidewire to 8 Armenian size. 8 Armenian [facility] locking pigtail catheter was advanced over the guidewire into the collecting system under fluoroscopic guidance.  Guidewire removed. Pigtail formed and locked in the renal pelvis. Position documented with contrast. 2-0 nylon catheter-retaining suture and sterile dressing applied. Catheter to gravity drainage. No immediate complication.    IMPRESSION:   Successful 8 [place] [facility] locking pigtail catheter placed as right percutaneous nephrostomy tube. Catheter to gravity drainage.

## 2017-06-14 NOTE — ED NOTES
Pt arrives from home for complaint of flank pain, abdominal pain, nausea, and vomiting. PT states that on Monday she was supposed to have bilateral kidney stents replaced but they were only able to replace one stent. Pt also states that she has abdominal pain that feels similar to bowel obstructions that she has had in the past.

## 2017-06-14 NOTE — CONSULTS
Urology Consult    Name: Margaux Guzmán    MRN: [id number]   YOB: 1955       We were asked to see Margaux Guzmán at the request of the ER for evaluation and treatment of the following chief complaint.        Chief Complaint:   Flank pain  Nausea and vomiting     History is obtained from the patient and chart review          History of Present Illness:   Margaux Guzmán is a 61 year old female with a history of primary peritoneal cancer with associated bilateral hydronephrosis managed with chronic indwelling stents.  She presents for stent exchange on Monday of this week.  Left sided exchange was unremarkable.  Notable hydronephrosis was noted on the right side and attempts to place a right sided stent were unsuccessful with some contrast extravasation noted.  Given the minimal right sided function on previous renogram a decision was made to DC patient to home with plan to later pursue PNT placement should patient become symptomatic.  The patient did well initially however developed new onset R flank pain with associated fever and nausea early this morning prompting presentation to the ER.    On presentation she was found to be afebrile and hemodynamically stable.  Labs revealed leukocytosis with imaging showing notable R sided hydronephrosis with retained contrast.    Of note, the patient reports last bowel movement 3-4 days ago.  She reports minimal flatus however this represents no change from her baseline.            Past Medical History:     Past Medical History:   Diagnosis Date     Anemia      History of blood transfusion     MULTIPLE     Hydronephrosis      Hyperlipidemia      Jugular vein thrombosis, right     Persistent right internal jugular DVT     Livedo annularis      Osteoarthritis      Osteopenia      Ovarian cancer (H)      Polymyalgia rheumatica (H)      PONV (postoperative nausea and vomiting)      Primary cancer of peritoneum (H)             Past Surgical History:      Past Surgical History:   Procedure Laterality Date     APPENDECTOMY       BREAST SURGERY      biopsy negative      SECTION       COLONOSCOPY N/A 2017    Procedure: COLONOSCOPY;  Surgeon: Luis Richardson MD;  Location: [facility]     COLONOSCOPY N/A 2017    Procedure: COMBINED COLONOSCOPY, SINGLE OR MULTIPLE BIOPSY/POLYPECTOMY BY BIOPSY;  Surgeon: Luis Richardson MD;  Location: [facility]     COMBINED CYSTOSCOPY, RETROGRADES, EXCHANGE STENT URETER(S) Bilateral 2016    Procedure: COMBINED CYSTOSCOPY, RETROGRADES, EXCHANGE STENT URETER(S);  Surgeon: Carmela Alvarez MD;  Location: [facility]     COMBINED CYSTOSCOPY, RETROGRADES, EXCHANGE STENT URETER(S)  2016    @ [facility]     COMBINED CYSTOSCOPY, RETROGRADES, EXCHANGE STENT URETER(S) Bilateral 10/17/2016    Procedure: COMBINED CYSTOSCOPY, RETROGRADES, EXCHANGE STENT URETER(S);  Surgeon: Carmela Alvarez MD;  [facility]: [facility]     COMBINED CYSTOSCOPY, RETROGRADES, EXCHANGE STENT URETER(S) Bilateral 2016    Procedure: COMBINED CYSTOSCOPY, RETROGRADES, EXCHANGE STENT URETER(S);  Surgeon: Carmela Alvarez MD;  [facility]     COMBINED CYSTOSCOPY, RETROGRADES, EXCHANGE STENT URETER(S) Bilateral 2017    Procedure: COMBINED CYSTOSCOPY, RETROGRADES, EXCHANGE STENT URETER(S);  Surgeon: Carmela Alvarez MD;  [facility]     ESOPHAGOSCOPY, GASTROSCOPY, DUODENOSCOPY (EGD), COMBINED N/A 2017    Procedure: COMBINED ESOPHAGOSCOPY, GASTROSCOPY, DUODENOSCOPY (EGD);  Surgeon: Luis Richardson MD;  [facility]: [facility]     HYSTERECTOMY TOTAL ABDOMINAL, BILATERAL SALPINGO-OOPHORECTOMY, NODE DISSECTION, COMBINED  2013    Procedure: COMBINED HYSTERECTOMY TOTAL ABDOMINAL, SALPINGO-OOPHORECTOMY, NODE DISSECTION;;  Surgeon: Cheri Aguilar MD;  [facility]: [facility]     INJECT NERVE BLOCK CELIAC PLEXUS Left 2017    Procedure: INJECT NERVE BLOCK CELIAC PLEXUS;  Left splanchnic nerve block;  Surgeon: Jacquelyn  Shabbir RIVERS MD;  [facility]     INJECT NERVE BLOCK CELIAC PLEXUS Left 5/18/2017    Procedure: INJECT NERVE BLOCK CELIAC PLEXUS;  Left Splanchnic Nerve Block;  Surgeon: Shabbir Valladares MD;  [facility]     LAPAROSCOPIC SALPINGO-OOPHORECTOMY  11/7/2013    Procedure: LAPAROSCOPIC SALPINGO-OOPHORECTOMY;  Diagnostic Laparoscopy, Exploratory Laparotomy, Bilateral Salpingo-Oophorectomy,  Hysterectomy, Omentectomy, Pelvic Washings, Peritoneal staging and biopsies, Pelvic and Periaortic Lymph node Dissection;  Surgeon: Cheri Aguilar MD;  [facility]     LAPAROTOMY, STAGING, COMBINED  11/7/2013    Procedure: COMBINED LAPAROTOMY, STAGING;;  Surgeon: Cheri Aguilar MD;  [facility]     LYMPH NODE BIOPSY  2008    Left posterior chain, beign     OPEN REDUCTION INTERNAL FIXATION TOE(S)  9/3/13    Fifth digit, left foot, with screw fixation      REMOVE PORT PERITONEAL  5/5/2014    Procedure: REMOVE PORT PERITONEAL;  Surgeon: Cheri Aguilar MD;  Location: [facility]            Social History:     Social History   Substance Use Topics     Smoking status: Former Smoker     Smokeless tobacco: Former User      Comment: Quit over 20 years ago     Alcohol use No            Family History:     Family History   Problem Relation Age of Onset     Arthritis Father      Myocardial Infarction Father      secondary to anesthesia     Colon Cancer Father      DIABETES Other      Uncle     Hypertension Mother      Other - See Comments Mother      cognitive decline     Skin Cancer Mother      Hypertension Sister      HEART DISEASE Other      unknown valve replacement     Bladder Cancer Sister      Skin Cancer Brother             Allergies:     Allergies   Allergen Reactions     Contrast Dye Itching and Swelling     Itching/tingling of mouth and nose with sensation of swelling after receiving IV contrast for CT.  This occurred despite steroid premedication. Therefore the patient should not receive intravenous contrast in the future.   "    Benadryl Allergy Other (See Comments)     Stay awake, restless, \"uncomfortable\", \"feel like I need to run away\"  With  IV dose,  does fine with oral Benadryl.     Lovenox [Enoxaparin] Hives     3/23/16: Okay to receive heparin flushes in port, tolerates well. Patricia Cortez FNP-C     Amoxicillin Rash     Diphenhydramine Anxiety     No Clinical Screening - See Comments Rash     Pollen Extract Rash     Sulfa Drugs Rash            Medications:     Current Facility-Administered Medications   Medication     0.9% sodium chloride BOLUS     fentaNYL Citrate (PF) (SUBLIMAZE) injection  mcg     cefTRIAXone (ROCEPHIN) 1 g vial to attach to  mL bag for ADULTS or NS 50 mL bag for PEDS     potassium chloride (KLOR-CON) Packet 40 mEq     Current Outpatient Prescriptions   Medication Sig     Docusate Sodium (COLACE PO) Take 50 mg by mouth daily     LORazepam (ATIVAN) 1 MG tablet Take 1 tablet (1 mg) by mouth every 6 hours as needed (Anxiety, Nausea/Vomiting or Sleep)     prochlorperazine (COMPAZINE) 10 MG tablet Take 1 tablet (10 mg) by mouth every 6 hours as needed (Nausea/Vomiting)     buprenorphine (BUTRANS) 5 MCG/HR WK patch Place 1 patch onto the skin every 7 days     enoxaparin (LOVENOX) 40 MG/0.4ML injection INJECT 1 SYRINGE SUBCUTANEOUSLY DAILY     tolterodine (DETROL LA) 4 MG 24 hr capsule TAKE ONE CAPSULE BY MOUTH DAILY     ondansetron (ZOFRAN-ODT) 8 MG ODT tab Place 1 tablet (8 mg) under the tongue every 8 hours as needed for nausea     amLODIPine (NORVASC) 2.5 MG tablet Take 1 tablet (2.5 mg) by mouth daily     tamsulosin (FLOMAX) 0.4 MG capsule Take 1 capsule (0.4 mg) by mouth daily     ACETAMINOPHEN PO Take 500 mg by mouth every 6 hours as needed for pain Reported on 3/30/2017     Multiple Vitamins-Minerals (ONE DAILY MULTIVITAMIN WOMEN) TABS Take 1 tablet by mouth every morning      polyethylene glycol (MIRALAX/GLYCOLAX) powder Take 1 capful by mouth daily as needed      cycloSPORINE (RESTASIS) 0.05 % " ophthalmic emulsion Place 1 drop into both eyes 2 times daily      Ranitidine HCl (ZANTAC PO) Take 75 mg by mouth daily      MELATONIN PO Take 1 mg by mouth At Bedtime      EPINEPHrine (EPIPEN) 0.3 MG/0.3ML injection Inject 0.3 mLs (0.3 mg) into the muscle once as needed for anaphylaxis             Review of Systems:    ROS: 10 point ROS neg other than the symptoms noted above in the HPI           Physical Exam:   VS:  T: 99.6    HR: Data Unavailable    BP: 130/64    RR: 18   GEN:  AOx3.  NAD.      LUNGS: Non-labored breathing.   BACK:  No midline or right CVA tenderness.  ABD:  Soft.  NT.  Moderately distended abdomen.  No rebound or guarding.  No masses.  EXT:  Warm, well perfused.          Data:   All laboratory data reviewed:      Recent Labs  Lab 06/14/17  0809 06/09/17   WBC 12.4* 4.8   HGB 8.3* 8.5*    286       Recent Labs  Lab 06/14/17  0809      POTASSIUM 3.0*   CHLORIDE 98   CO2 29   BUN 18   CR 0.82   *   PURVI 9.1   MAG 1.7       Recent Labs  Lab 06/07/17  1554   COLOR Yellow   APPEARANCE Slightly Cloudy   URINEGLC Negative   URINEBILI Negative   URINEKETONE Negative   SG 1.016   URINEPH 6.0   PROTEIN 100*   NITRITE Negative   LEUKEST Large*   RBCU >100*   WBCU *       All pertinent imaging reviewed:    CT scan of the abdomen:                 Impression and Plan:   Impression:   Margaux Guzmán is a 61 year old female with known ureteral obstruction presents with new onset R flank pain with associated leukocytosis.  In light of both bothersome symptoms and concern for evolving infection, it would be prudent to pursue PNT placement at this time.  IR contacted and alerted of last lovenox injection yesterday evening.       -Given antibiotics in ER  -NPO  -Continue to monitor vitals  -Awaiting IR assessment    This patient's exam findings, labs, and imaging discussed with urology staff surgeon Dr. Flowers, who developed the treatment plan.    SARA Dawn MD  Urology Resident

## 2017-06-14 NOTE — ED AVS SNAPSHOT
[facility], Emergency Department    [address] [address]    Phone:  [phone]                                       Margaux Guzmán   MRN: [id number]    Department:  [facility], Emergency Department   Date of Visit:  6/14/2017           Patient Information     Date Of Birth          1955        Your diagnoses for this visit were:     Right flank pain        You were seen by Jose Juan Metzger MD.      Follow-up Information     Follow up with Carmela Alvarez MD.    Specialty:  Urology    Contact information:    [facility]  [address]  [facility] [address]  [phone]        Discharge References/Attachments     IP DC INSTRUCTIONS [place] DI INSERTION OF PERCUTANEOUS NEPHROSTOMY TUBE    NEPHROSTOMY, PERCUTANEOUS (ENGLISH)    NEPHROSTOMY, PERCUTANEOUS, DISCHARGE INSTRUCTIONS (ENGLISH)      Future Appointments        Provider Department Dept Phone Center    6/19/2017 9:00 AM Only Cancer Nurse [facility] [phone] [place]    6/19/2017 9:30 AM [address] [phone] [place]    6/26/2017 9:00 AM Only Cancer Nurse [facility] [phone] [place]    6/26/2017 9:30 AM Infusion [address] [phone] [place]    7/10/2017 8:00 AM Only Cancer Nurse [place] [phone] [place]    7/10/2017 8:30 AM [facility] [phone] [place]    7/10/2017 9:00 AM Infusion [place] [phone] [place]    7/17/2017 9:00 AM Only Cancer Nurse [facility] [phone] [place]    7/17/2017 9:30 AM Infusion [place] [phone] [place]    7/24/2017 8:30 AM Only Cancer Nurse [facility] [phone] [place]    7/24/2017 9:00 AM Infusion [address] [phone] [place]     7/31/2017 9:00 AM Only Cancer Nurse [facility] [phone] [place]    7/31/2017 9:30 AM Infusion [address] [phone] [place]    8/7/2017 8:30 AM Only Cancer Nurse [facility] [phone] [place]    8/7/2017 9:00 AM Infusion [facility] [phone] [place]    8/14/2017 9:00 AM Only Cancer Nurse [facility] [phone] [place]    8/14/2017 9:30 AM Infusion [address] [phone] [place]    11/1/2017 10:30 AM Lab [facility] Lab [phone] [facility]    11/1/2017 11:35 AM Mackenzie Ca MD [facility] Nephrology [phone] [facility]      24 Hour Appointment Hotline       To make an appointment at any [facility], call [phone] ([phone]). If you don't have a family doctor or clinic, we will help you find one. [place] clinics are conveniently located to serve the needs of you and your family.             Review of your medicines      START taking        Dose / Directions Last dose taken    cefpodoxime 200 MG tablet   Commonly known as:  VANTIN   Dose:  200 mg   Quantity:  14 tablet        Take 1 tablet (200 mg) by mouth 2 times daily for 7 days   Refills:  0          Our records show that you are taking the medicines listed below. If these are incorrect, please call your family doctor or clinic.        Dose / Directions Last dose taken    ACETAMINOPHEN PO   Dose:  500 mg        Take 500 mg by mouth every 6 hours as needed for pain Reported on 3/30/2017   Refills:  0        amLODIPine 2.5 MG tablet   Commonly known as:  NORVASC   Dose:  2.5 mg   Quantity:  30 tablet        Take 1 tablet (2.5 mg) by mouth daily   Refills:  0        buprenorphine 5 MCG/HR WK patch   Commonly known as:  BUTRANS   Dose:  1 patch   Quantity:  4 patch        Place 1 patch onto the skin every 7 days   Refills:  1        COLACE PO   Dose:  50 mg        Take 50 mg by  mouth daily   Refills:  0        cycloSPORINE 0.05 % ophthalmic emulsion   Commonly known as:  RESTASIS   Dose:  1 drop        Place 1 drop into both eyes 2 times daily   Refills:  0        enoxaparin 40 MG/0.4ML injection   Commonly known as:  LOVENOX   Quantity:  12 mL        INJECT 1 SYRINGE SUBCUTANEOUSLY DAILY   Refills:  1        EPINEPHrine 0.3 MG/0.3ML injection   Dose:  0.3 mg   Quantity:  1 each        Inject 0.3 mLs (0.3 mg) into the muscle once as needed for anaphylaxis   Refills:  0        LORazepam 1 MG tablet   Commonly known as:  ATIVAN   Dose:  1 mg   Quantity:  30 tablet        Take 1 tablet (1 mg) by mouth every 6 hours as needed (Anxiety, Nausea/Vomiting or Sleep)   Refills:  2        MELATONIN PO   Dose:  1 mg        Take 1 mg by mouth At Bedtime   Refills:  0        ondansetron 8 MG ODT tab   Commonly known as:  ZOFRAN-ODT   Dose:  8 mg   Quantity:  90 tablet        Place 1 tablet (8 mg) under the tongue every 8 hours as needed for nausea   Refills:  3        ONE DAILY MULTIVITAMIN WOMEN Tabs   Dose:  1 tablet        Take 1 tablet by mouth every morning   Refills:  0        polyethylene glycol powder   Commonly known as:  MIRALAX/GLYCOLAX   Dose:  1 capful        Take 1 capful by mouth daily as needed   Refills:  0        prochlorperazine 10 MG tablet   Commonly known as:  COMPAZINE   Dose:  10 mg   Quantity:  30 tablet        Take 1 tablet (10 mg) by mouth every 6 hours as needed (Nausea/Vomiting)   Refills:  2        tamsulosin 0.4 MG capsule   Commonly known as:  FLOMAX   Dose:  0.4 mg   Quantity:  60 capsule        Take 1 capsule (0.4 mg) by mouth daily   Refills:  6        tolterodine 4 MG 24 hr capsule   Commonly known as:  DETROL LA   Quantity:  30 capsule        TAKE ONE CAPSULE BY MOUTH DAILY   Refills:  3        ZANTAC PO   Dose:  75 mg        Take 75 mg by mouth daily   Refills:  0                Prescriptions were sent or printed at these locations (1 Prescription)                    [facility] [place], [place] - [address], [address]   [address], [address], [facility] [address]    Telephone:  [phone]   Fax:  [phone]   Hours:                  Printed at Department/Unit printer (1 of 1)         cefpodoxime (VANTIN) 200 MG tablet                Procedures and tests performed during your visit     Abdomen XR, 2 vw, flat and upright    Blood Culture ONE site    CBC with platelets differential    CRP inflammation    Central Venous Catheter: implanted port (Port-A-Cath, Power Port)    Comprehensive metabolic panel    INR    IR Nephrostomy Tube Placement Right    Lactic acid    Lipase    Magnesium    Procalcitonin    UA with Microscopic    UA with Microscopic reflex to Culture    Urine Culture    Urine Culture Aerobic Bacterial      Orders Needing Specimen Collection     None      Pending Results     Date and Time Order Name Status Description    6/14/2017 1210 Urine Culture Aerobic Bacterial In process     6/14/2017 0915 Urine Culture Preliminary     6/14/2017 0822 Blood Culture ONE site Preliminary             Pending Culture Results     Date and Time Order Name Status Description    6/14/2017 1210 Urine Culture Aerobic Bacterial In process     6/14/2017 0915 Urine Culture Preliminary     6/14/2017 0822 Blood Culture ONE site Preliminary             Pending Results Instructions     If you had any lab results that were not finalized at the time of your Discharge, you can call the ED Lab Result RN at [phone]. You will be contacted by this team for any positive Lab results or changes in treatment. The nurses are available 7 days a week from 10A to 6:30P.  You can leave a message 24 hours per day and they will return your call.        Thank you for choosing [facility]       Thank you for choosing [facility] for your care. Our goal is always to provide you with excellent care. Hearing back from our patients is one way we can continue to improve our  services. Please take a few minutes to complete the written survey that you may receive in the mail after you visit with us. Thank you!        [employer] Information     [employer] gives you secure access to your electronic health record. If you see a primary care provider, you can also send messages to your care team and make appointments. If you have questions, please call your primary care clinic.  If you do not have a primary care provider, please call [phone] and they will assist you.        Care EveryWhere ID     This is your Care EveryWhere ID. This could be used by other organizations to access your [place] medical records  [id number]        After Visit Summary       This is your record. Keep this with you and show to your community pharmacist(s) and doctor(s) at your next visit.

## 2017-06-15 NOTE — ED PROVIDER NOTES
Received call from lab that patient's blood culture grew out GPC in clusters.  Called patient and recommended she present to the ED for repeat blood cultures and likely IV antibiotics as she is on chemotherapy.  Patient expressed understanding and will plan to present to her local ED.       Ankita Juarez MD  06/15/17 3806

## 2017-06-15 NOTE — TELEPHONE ENCOUNTER
Margaux with + blood culture from [facility] ED visit on 6/14/17.  Margaux was contacted and told to go to nearest ED for IV abx.  Margaux at [facility], reports staff unable to view blood culture, but is able to see all other records.  Per request, did fax blood culture report directly to [place] ED at [phone] at 6:52pm.  Transmission successful.       Additional Information    Negative: [1] Caller is not with the adult (patient) AND [2] reporting urgent symptoms    Negative: Lab result questions    Negative: Medication questions    Negative: Caller cannot be reached by phone    Negative: Caller has already spoken to PCP or another triager    Negative: RN needs further essential information from caller in order to complete triage    Negative: Requesting regular office appointment    Negative: [1] Caller requesting NON-URGENT health information AND [2] PCP's office is the best resource    [1] Follow-up call to recent contact AND [2] information only call, no triage required    Protocols used: INFORMATION ONLY CALL-ADULT-

## 2017-06-16 NOTE — TELEPHONE ENCOUNTER
Ms. 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 A left ureteral stent exchange had been uneventful with Dr. Alvarez on 6/12/17 but a right stent was unable to be placed on that date.     On 6/14/17 the patient presented to the ED with right flank pain, Urology was consulted and recommended a right PNT be placed on that date with IR. Urine and blood cultures were collected and the patient was discharged from the ED on cefpodoxime with a new right PNT.     Today I was paged by Aurelia Nayak, ED RN, for  Urology recommendations regarding Ms. Guzmán's urine culture from the 14th which shows coag neg staph (resistant to cefpodoxime).  I noted and we discussed that the patient's blood cultures collected via Port-A-Cath on that date were also positive growing staph epidermidis.    And I recommended that Ms. Nayak contact the patient to have her come to the ED for gram positive bacteremia with possible PORT infection, likely requiring IV antibiotics.      Tova Gomez PA-C  Urology

## 2017-06-16 NOTE — PROGRESS NOTES
Care Coordinator Note  Patient called by ER nurse in follow up to urine culture and needing antibiotic change.  ER nurse deferred patient to Gyn Oncology regarding treatment.  Patient to stop current antibiotic and start Macrobid.  Script sent to local pharmacy.  Patient concerned her Hgb may be going down.  She has an appointment with [facility] on 6/18/17 and will have CBC drawn at main lab that day.  Gyn on call to contact patient regarding results.  Reviewed to call if temp or any concerns.  Reviewed on call contact information.  Patient will go to local ER if any emergent needs.      Pt verbalized back understanding of the above information discussed.     Samara Aguiar MSN, RN  Care Coordinator  Gynecologic Cancer   Office:  [phone]  Pager: [phone] #[phone]

## 2017-06-16 NOTE — TELEPHONE ENCOUNTER
[place] [facility]  Emergency Department Lab result notification [Adult-Female]    [facility] ED lab result protocol used  Urine Culture    Reason for call  Notify of lab results, assess symptoms,  review ED providers recommendations/discharge instructions (if necessary) and advise per ED lab result f/u protocol    Lab Result (including Rx patient on, if applicable)  Final Urine Culture Report on 6/16/17  [place] ED discharge antibiotic: Cefpodoxime (Vantin) 200 MG tablet, Take 1 tablet (200 mg) by mouth 2 times daily for 7 days  #1. Bacteria, >100,000 colonies/mL Coagulase negative Staphylococcus,  is [ RESISTANT] to ED discharge antibiotic.   Change in treatment as per [place] ED Lab result protocol.    Information table from ED Provider visit on 6/14/17  ED diagnosis   Right flank pain   ED provider   Jose Juan Metzger MD       Symptoms reported at ED visit (Chief complaint, HPI) Margaux Guzmán is a 61 year old female with a medical history significant for peritoneal adenocarcinoma s/p TAHBSO (2013), multiple SBOs (most recently 3 months ago), pyelonephrosis (left), and bilateral ureteral obstruction with hydronephrosis s/p right ureteral stent removal and left ureteral stent exchange on 6/12 (2 days ago) who presents to the emergency department with a 3 day history of constipation and a 12 hour history of sharp, aching, non-radiating,and progressively worsening right flank pain. Patient relates onset of right flank pain yesterday afternoon/evening and states that it has been constant and worsening since onset. She attributes much of her pain to the right kidney and notes that the right ureteral stent was removed 2 days ago and was unable to be exchanged. Per Op Note, this was due to tortuosity and obstruction in the proximal ureter/UPJ. She notes concomitant nausea, but no vomiting. She is concerned about the possibility of a developing small bowel obstruction as her last bowel movement was 3 days ago. She has  taken Senna, Dulcolax, and Miralax BID, as well as mild of magnesia yesterday, without any relief. She is not passing gas but notes that she seldomly does so at baseline. Patient also reports a fever of 101.4 F this morning. She denies chills, dysuria, increased urinary frequency, or urgency. She reports that her PO intake was fairly good until onset of her right flank pain yesterday evening; she has not had much to eat since that time.      CT Abdomen/Pelvis, dated 4/27/17, was available for review today. This demonstrated unchanged pulmonary nodules and thoracic lymph nodes on comparison scan from 3/30/17. There are two nodules in the right lower quadrant posterior mesentery, which are stable to minimally increased on current study and slightly increased from 03/15/17. Recommend attention on follow up. Otherwise stable peritoneal tumor metastasis. New fluid attenuation collection in the central presacral pelvis, HU measures 12, likely ascitic fluid, not present on prior study. Attention on follow-up. Stable bilateral nephroureteral stents and mild hydronephrosis, with left greater than right fullness of the renal hilar region.      ED providers Impression and Plan (applicable information) 61-year-old female with known ovarian cancer and ureteral narrowing presents secondary to worsening right flank pain with fever and history of progressive hydronephrosis.  Patient was noted on exam to have right flank tenderness and be in mild distress.  Laboratories were obtained revealing a CBC was slightly elevated white blood count at 12.4 and known anemia close to baseline.  Copy and some metabolic panel was normal with exception of low potassium of 3 and elevated glucose of 112 consistent with stress response.  Lipase, magnesium, Procalcitonin were all normal.  The case was discussed at length with urology and the patient was sent to interventional radiology for percutaneous nephrostomy tube.  She had received Compazine,  "Rocephin for infection, fentanyl and Zofran.  Urinalysis following percutaneous nephrostomy tube placement revealed pyuria.  The case was discussed again with urology and it was recommended that she be placed on Vantin pending culture results.  Patient was observed for more than 2 hours following placement of her to and felt improved.  She will be discharged following percutaneous nephrostomy tube care teaching to follow up with urology.   Significant Medical hx, if applicable ovarian cancer, abdominal pain generalized, DVT, N/V, proteinuria, pyelonephritis, SBO, sterile pyuria, ureteral stent, hypomagnesemia, dehydration, chemo induced neutropenia   Coumadin/Warfarin [Yes /No] No   Creatinine Level (mg/dl) 0.82   Creatinine clearance (ml/min), if applicable 59.7   Pregnant (Yes/No/NA) No   Breastfeeding (Yes/No/NA) No   Allergies Contrast dye, benadryl, Lovenox, Amoxicillin, pollen extract, sulfa drugs   Weight, if applicable 118#      RN Assessment (Patient s current Symptoms), include time called.  [Insert Left message here if message left]  \"I'm doing okay\", did present to [place] ED yesterday (6/15/17) for IV Vanco, reports IV was \"a one time thing\".   No problems/concerns with neph tube, but will go in for teaching on Sunday with RN.  No fever, no nausea/vomiting, abdominal pain and constipation have resolved.  Coming in on Sunday for neph tube teaching with RN, has OV with urologist Dr. Alvarez on 6/28/17.  Margaux updated on recommendations, [facility] evaluation.    [place] Emergency Department Provider Name & Recommendations (included time consulted)  Did consult with Obs provider, JUSTIN Campuzano at 12:05 pm.  Per Amanda, patient should probably be admitted, but to consult urology for further recommendations.   Per MERVAT Gomez PA-C, Margaux at great risk due to immunosuppression, should present back to [facility]  for IV abx even though she is overall improved symptomatically.  "        Please Contact your PCP clinic or return to the Emergency department if your:    Symptoms return.    Symptoms worsen or other concerning symptom's.    PCP follow-up Questions asked: NO    Tamanna Nayak, MK    [facility] RN  Lung Nodule and ED Lab Results F/U RN  Epic pool (ED late result f/u RN) : P [id number]   # [phone]    Copy of Lab result   Order   Urine Culture Aerobic Bacterial [[id number]] (Order [id number])   Exam Information   Exam Date Exam Time Accession # Results    6/14/17 12:10 PM [id number]    Component Results   Component Collected Lab   Specimen Description 06/14/2017 12:10 PM 75   Catheterized Urine Renal pelvic Right   Special Requests 06/14/2017 12:10 PM 75   ALSO RECVD ANER TUBE   Culture Micro (Abnormal) 06/14/2017 12:10 PM 75   >100,000 colonies/mL Coagulase negative Staphylococcus   Micro Report Status 06/14/2017 12:10 PM 75   FINAL 06/16/2017   Organism: 06/14/2017 12:10    >100,000 colonies/mL Coagulase negative Staphylococcus   Culture & Susceptibility   >100,000 COLONIES/ML COAGULASE NEGATIVE STAPHYLOCOCCUS (ADRIÁN)   Antibiotic Sensitivity Unit Status   CIPROFLOXACIN 4 Resistant ug/mL Final   GENTAMICIN <=0.5 Susceptible ug/mL Final   LEVOFLOXACIN 4 Resistant ug/mL Final   NITROFURANTOIN <=16 Susceptible ug/mL Final   OXACILLIN >=4 Resistant ug/mL Final   PENICILLIN >=0.5 Resistant ug/mL Final   TETRACYCLINE <=1 Susceptible ug/mL Final   VANCOMYCIN 1 Susceptible ug/mL Final

## 2017-06-16 NOTE — ANESTHESIA POSTPROCEDURE EVALUATION
Patient: Margaux Guzmán    Procedure(s):  Cystoscopy, Bilateral Stent Exchange, Bilateral Retrograde Pyelogram - Wound Class: II-Clean Contaminated    Diagnosis:Hydronephrosis  Diagnosis Additional Information: No value filed.    Anesthesia Type:  MAC    Note:  Anesthesia Post Evaluation    Patient location during evaluation: Phase 2  Patient participation: Able to fully participate in evaluation  Level of consciousness: awake and alert  Pain management: adequate  Airway patency: patent  Cardiovascular status: acceptable and hemodynamically stable  Respiratory status: acceptable  Hydration status: acceptable  PONV: none     Anesthetic complications: None          Last vitals:  Vitals:    06/12/17 0900   BP: 115/66   Pulse: 89   Resp: 16   Temp: 36.9  C (98.5  F)   SpO2: 100%         Electronically Signed By: Paolo Quinn MD  June 16, 2017  9:50 AM

## 2017-06-18 NOTE — PROGRESS NOTES
06/18/17 9612     Kerline Buchanan-Registered Nurse (Nursing)  Patient and daughter attended PNT class. States PNT was placed on 6/14 and she was sent home from ER with no care instructions. Has been emptying bag only at home. Original dressing intact. Daughter RD on model with all cares including site care, anchoring tube and recording drainage. Received written material related to all content reviewed. Asked many relevant questions. Answered all teach-back questions appropriate.States she understands all information presented.

## 2017-06-19 NOTE — MR AVS SNAPSHOT
After Visit Summary   6/19/2017    Margaux Guzmán    MRN: [id number]           Patient Information     Date Of Birth          1955        Visit Information        Provider Department      6/19/2017 9:00 AM [facility]        Today's Diagnoses     Other iron deficiency anemia    -  1    Chemotherapy-induced neutropenia (H)        Ovarian cancer, left (H)        Ovarian cancer, right (H)        Primary peritoneal adenocarcinoma (H)           Follow-ups after your visit        Your next 10 appointments already scheduled     Jun 26, 2017  9:00 AM CDT   Return Visit with NURSE ONLY CANCER CENTER   [facility] ([facility])    [address] [place] [address]   [phone]            Jun 26, 2017  9:30 AM CDT   Level 2 with [place] 8 [facility] ([facility])    [address] [place] [address]   [phone]            Jun 28, 2017 10:15 AM CDT   (Arrive by 10:00 AM)   Return Visit with Carmela Alvarez MD   [facility] Urologic Cancers ([facility])    [address] [place] [address]  [address]th [facility] [address] [phone]            Jul 10, 2017  8:00 AM CDT   Return Visit with NURSE ONLY CANCER CENTER   [facility] ([facility])    [address] [place] [address]   [phone]            Jul 10, 2017  8:30 AM CDT   Return Visit with JUSTIN Patrick CNP   [facility] ([facility])    [address] [place] [address]   [phone]            Jul 10, 2017  9:00 AM CDT   Level 2 with [place] 7 INFUSION   [facility] ([facility])    [address] [place] [address]   [phone]            Jul 17, 2017  9:00 AM CDT   Return Visit with  NURSE ONLY CANCER CENTER   [facility] ([facility])    [address] [facility] [address]   [phone]            Jul 17, 2017  9:30 AM CDT   Level 2 with [place] 8 INFUSION   [facility] ([facility])    [address] [facility] [address]   [phone]            Jul 24, 2017  8:30 AM CDT   Return Visit with NURSE ONLY CANCER CENTER   [facility] ([facility])    [address] [facility] [address]   [phone]            Jul 24, 2017  9:00 AM CDT   Level 2 with BAY 7 INFUSION   [facility] ([facility])    [address] [address]   [phone]              Who to contact     If you have questions or need follow up information about today's clinic visit or your schedule please contact [facility] directly at [phone].  Normal or non-critical lab and imaging results will be communicated to you by for; to (do) Centershart, letter or phone within 4 business days after the clinic has received the results. If you do not hear from us within 7 days, please contact the clinic through [employer] or phone. If you have a critical or abnormal lab result, we will notify you by phone as soon as possible.  Submit refill requests through [employer] or call your pharmacy and they will forward the refill request to us. Please allow 3 business days for your refill to be completed.          Additional Information About Your Visit        for; to (do) CentersharPivto Information     [employer] gives you secure access to your electronic health record. If you see a primary care provider, you can also send messages to your care team and make appointments. If you have questions, please call your primary care clinic.  If you do not have a primary care provider, please call [phone] and they will assist you.      [employer] is an electronic gateway that provides easy,  online access to your medical records. With [employer], you can request a clinic appointment, read your test results, renew a prescription or communicate with your care team.     To access your existing account, please contact your [facility] or call [phone] for assistance.        Care EveryWhere ID     This is your Care EveryWhere ID. This could be used by other organizations to access your [facility] medical records  [id number]         Blood Pressure from Last 3 Encounters:   06/14/17 118/55   06/12/17 115/66   06/07/17 120/63    Weight from Last 3 Encounters:   06/12/17 53.8 kg (118 lb 9.6 oz)   06/05/17 53.7 kg (118 lb 4.8 oz)   05/30/17 52.5 kg (115 lb 12.8 oz)              We Performed the Following     CBC with platelets differential     Magnesium     Potassium        Primary Care Provider Office Phone # Fax #    Aurelia Knox [phone]       [facility] [address] [place] [address]        Thank you!     Thank you for choosing [facility]  for your care. Our goal is always to provide you with excellent care. Hearing back from our patients is one way we can continue to improve our services. Please take a few minutes to complete the written survey that you may receive in the mail after your visit with us. Thank you!             Your Updated Medication List - Protect others around you: Learn how to safely use, store and throw away your medicines at www.disposemymeds.org.          This list is accurate as of: 6/19/17  9:34 AM.  Always use your most recent med list.                   Brand Name Dispense Instructions for use    ACETAMINOPHEN PO      Take 500 mg by mouth every 6 hours as needed for pain Reported on 3/30/2017       amLODIPine 2.5 MG tablet    NORVASC    30 tablet    Take 1 tablet (2.5 mg) by mouth daily       buprenorphine 5 MCG/HR WK patch    BUTRANS    4 patch    Place 1 patch onto the skin every 7 days        cefpodoxime 200 MG tablet    VANTIN    14 tablet    Take 1 tablet (200 mg) by mouth 2 times daily for 7 days       COLACE PO      Take 50 mg by mouth daily       cycloSPORINE 0.05 % ophthalmic emulsion    RESTASIS     Place 1 drop into both eyes 2 times daily       enoxaparin 40 MG/0.4ML injection    LOVENOX    12 mL    INJECT 1 SYRINGE SUBCUTANEOUSLY DAILY       EPINEPHrine 0.3 MG/0.3ML injection     1 each    Inject 0.3 mLs (0.3 mg) into the muscle once as needed for anaphylaxis       LORazepam 1 MG tablet    ATIVAN    30 tablet    Take 1 tablet (1 mg) by mouth every 6 hours as needed (Anxiety, Nausea/Vomiting or Sleep)       MELATONIN PO      Take 1 mg by mouth At Bedtime       nitrofurantoin (macrocrystal-monohydrate) 100 MG capsule    MACROBID    20 capsule    Take 1 capsule (100 mg) by mouth 2 times daily for 10 days       ondansetron 8 MG ODT tab    ZOFRAN-ODT    90 tablet    Place 1 tablet (8 mg) under the tongue every 8 hours as needed for nausea       ONE DAILY MULTIVITAMIN WOMEN Tabs      Take 1 tablet by mouth every morning       polyethylene glycol powder    MIRALAX/GLYCOLAX     Take 1 capful by mouth daily as needed       prochlorperazine 10 MG tablet    COMPAZINE    30 tablet    Take 1 tablet (10 mg) by mouth every 6 hours as needed (Nausea/Vomiting)       tamsulosin 0.4 MG capsule    FLOMAX    60 capsule    Take 1 capsule (0.4 mg) by mouth daily       tolterodine 4 MG 24 hr capsule    DETROL LA    30 capsule    TAKE ONE CAPSULE BY MOUTH DAILY       ZANTAC PO      Take 75 mg by mouth daily

## 2017-06-19 NOTE — TELEPHONE ENCOUNTER
Saw patient in outpatient infusion bay at [place]. Patient had been seen in ER in [place] as told to go there due to + blood culture at [facility] at time of placement of PNT. She had Staph Epidermidis. Repeat blood cultures at [place] were negative. PNT placed on 6/14. Had been given Cephalosporin however UCX returned >100,000 coag neg staph not sensitive to drug given. Staff had called in Macrobid (sensitive to this drug) for her she started taking this on Friday. Recommend she complete 10 days of the Macrobid then be off drug x 48 hours to make sure afebrile then plan chemo for next Wednesday 6/28. She will see Lizet Truong prior to that planned weekly taxol. Patient aware of plan and in agreement.  Patient denies fever at home, feeling better.    Jia Mccollum MD    Department of Ob/Gyn and Women's Health  Division of Gynecologic Oncology  [facility]  [phone]

## 2017-06-19 NOTE — MR AVS SNAPSHOT
After Visit Summary   6/19/2017    Margaux Guzmán    MRN: [id number]           Patient Information     Date Of Birth          1955        Visit Information        Provider Department      6/19/2017 9:30 AM [place] [facility]        Today's Diagnoses     Ovarian cancer, right (H)    -  1    Ovarian cancer, left (H)           Follow-ups after your visit        Your next 10 appointments already scheduled     Jun 28, 2017 10:15 AM CDT   (Arrive by 10:00 AM)   Return Visit with Carmela Alvarez MD   [facility]rologic [facility] ([facility])    [address] [facility]  4th Floor  [facility] [address]   [phone]            Jun 28, 2017  1:00 PM CDT   Return Visit with NURSE ONLY CANCER CENTER   [facility] ([facility]    [address] [facility] [address]   [phone]            Jun 28, 2017  1:30 PM CDT   Return Visit with JUSTIN Patrick CNP   [facility] ([facility])    [address] [facility] [address]   [phone]            Jun 28, 2017  2:00 PM CDT   Level 2 with [place] [facility] ([facility])    [address] [facility] [address]   [phone]            Jul 10, 2017  8:00 AM CDT   Return Visit with NURSE ONLY CANCER CENTER   [facility] ([facility])    [address] [facility] [address]   [phone]            Jul 10, 2017  8:30 AM CDT   Return Visit with JUSTIN Patrick CNP   [facility] ([facility])    [address] [facility] [address]   [phone]            Jul 10, 2017  9:00 AM CDT   Level 2 with [place] [facility] ([facility])    [address]  [address]   [phone]            Jul 17, 2017  9:00 AM CDT   Return Visit with NURSE ONLY CANCER CENTER   [facility] ([facility])    [address] [facility] [address]   [phone]            Jul 17, 2017  9:30 AM CDT   Level 2 with [place] 8 INFUSION   [facility] ([facility])    [address] [facility] [address]   [phone]            Jul 24, 2017  9:00 AM CDT   Level 2 with [place] 7 INFUSION   [facility] ([facility])    [address] [facility] [address]   [phone]              Who to contact     If you have questions or need follow up information about today's clinic visit or your schedule please contact [facility] directly at [phone].  Normal or non-critical lab and imaging results will be communicated to you by Platform[employer] System[employer]art, letter or phone within 4 business days after the clinic has received the results. If you do not hear from us within 7 days, please contact the clinic through itravel or phone. If you have a critical or abnormal lab result, we will notify you by phone as soon as possible.  Submit refill requests through itravel or call your pharmacy and they will forward the refill request to us. Please allow 3 business days for your refill to be completed.          Additional Information About Your Visit        Platform9 SystemsharMc4 Information     itravel gives you secure access to your electronic health record. If you see a primary care provider, you can also send messages to your care team and make appointments. If you have questions, please call your primary care clinic.  If you do not have a primary care provider, please call [phone] and they will assist you.      itravel is an electronic gateway that provides easy, online access to your medical records. With itravel, you can request a clinic appointment, read your test results, renew a  prescription or communicate with your care team.     To access your existing account, please contact your [facility] or call [phone] for assistance.        Care EveryWhere ID     This is your Care EveryWhere ID. This could be used by other organizations to access your [place] medical records  [id number]         Blood Pressure from Last 3 Encounters:   06/14/17 118/55   06/12/17 115/66   06/07/17 120/63    Weight from Last 3 Encounters:   06/12/17 53.8 kg (118 lb 9.6 oz)   06/05/17 53.7 kg (118 lb 4.8 oz)   05/30/17 52.5 kg (115 lb 12.8 oz)              Today, you had the following     No orders found for display       Primary Care Provider Office Phone # Fax #    Aurelia Knox [phone]       [facility] [address] [facility] [address]        Thank you!     Thank you for choosing [facility]  for your care. Our goal is always to provide you with excellent care. Hearing back from our patients is one way we can continue to improve our services. Please take a few minutes to complete the written survey that you may receive in the mail after your visit with us. Thank you!             Your Updated Medication List - Protect others around you: Learn how to safely use, store and throw away your medicines at www.disposemymeds.org.          This list is accurate as of: 6/19/17  4:39 PM.  Always use your most recent med list.                   Brand Name Dispense Instructions for use    ACETAMINOPHEN PO      Take 500 mg by mouth every 6 hours as needed for pain Reported on 3/30/2017       amLODIPine 2.5 MG tablet    NORVASC    30 tablet    Take 1 tablet (2.5 mg) by mouth daily       buprenorphine 5 MCG/HR WK patch    BUTRANS    4 patch    Place 1 patch onto the skin every 7 days       cefpodoxime 200 MG tablet    VANTIN    14 tablet    Take 1 tablet (200 mg) by mouth 2 times daily for 7 days       COLACE PO      Take 50 mg by mouth daily        cycloSPORINE 0.05 % ophthalmic emulsion    RESTASIS     Place 1 drop into both eyes 2 times daily       enoxaparin 40 MG/0.4ML injection    LOVENOX    12 mL    INJECT 1 SYRINGE SUBCUTANEOUSLY DAILY       EPINEPHrine 0.3 MG/0.3ML injection     1 each    Inject 0.3 mLs (0.3 mg) into the muscle once as needed for anaphylaxis       LORazepam 1 MG tablet    ATIVAN    30 tablet    Take 1 tablet (1 mg) by mouth every 6 hours as needed (Anxiety, Nausea/Vomiting or Sleep)       MELATONIN PO      Take 1 mg by mouth At Bedtime       nitrofurantoin (macrocrystal-monohydrate) 100 MG capsule    MACROBID    20 capsule    Take 1 capsule (100 mg) by mouth 2 times daily for 10 days       ondansetron 8 MG ODT tab    ZOFRAN-ODT    90 tablet    Place 1 tablet (8 mg) under the tongue every 8 hours as needed for nausea       ONE DAILY MULTIVITAMIN WOMEN Tabs      Take 1 tablet by mouth every morning       polyethylene glycol powder    MIRALAX/GLYCOLAX     Take 1 capful by mouth daily as needed       prochlorperazine 10 MG tablet    COMPAZINE    30 tablet    Take 1 tablet (10 mg) by mouth every 6 hours as needed (Nausea/Vomiting)       tamsulosin 0.4 MG capsule    FLOMAX    60 capsule    Take 1 capsule (0.4 mg) by mouth daily       tolterodine 4 MG 24 hr capsule    DETROL LA    30 capsule    TAKE ONE CAPSULE BY MOUTH DAILY       ZANTAC PO      Take 75 mg by mouth daily

## 2017-06-19 NOTE — TELEPHONE ENCOUNTER
----- Message from Carmela Alvarez MD sent at 6/19/2017  8:50 AM CDT -----  Regarding: RE: Sooner appt Dr Alvarez  Contact: [phone]  Can she come on Friday?   ----- Message -----     From: Vivienne Velazquez LPN     Sent: 6/16/2017   3:46 PM       To: Carmela Alvarez MD  Subject: FW: Sooner appt Dr Alvarez                           ----- Message -----     From: Keira Rolle, RN     Sent: 6/15/2017  10:58 AM       To: Vivienne Velazquez LPN  Subject: RE: Sooner appt Dr Alvarez                       See below messages. When do you want to see her back in clinic?  ----- Message -----     From: Vivienne Velazquez LPN     Sent: 6/15/2017  10:56 AM       To: Keira Rolle RN  Subject: FW: Sooner appt Dr Antonio Crockett,    Can you ask Dr. Alvarez (tomorrow) if she wants to see this patient sooner?    -Vivienne Velazquez LPN  ----- Message -----     From: Yenifer Styles     Sent: 6/15/2017  10:47 AM       To: [facility] Triage-  Subject: Sooner appt Dr Alvarez                           Pt was scheduled to see Dr. Alvarez yesterday, 06/14/2017. Pt cancelled the appt because she went to the ED. I have scheduled her an appt on 06/28/2017 but she feels that she needs to be seen sooner, please review pt records and messaging from 06/13/2017. Please call back pt at [phone] to discuss options. Thanks    MB    Please DO NOT send this message and/or reply back to sender.  Call Center Representatives DO NOT respond to messages.

## 2017-06-19 NOTE — TELEPHONE ENCOUNTER
Received call from patient to check on lab result from this afternoon. Has had general malaise. Attributes to anemia but with recent infection wanted to know about WBC today. Did have isolated temp to 100.4 at home, immediately rechecked and was lower. Has generally had temps ~99. Recently had PNTs placed, continues to have pain from insertion but no new urinary symptoms. No chest pain. Baseline SOB from anemia. No cough. No nausea, vomiting. Notes that she has chemo appt tomorrow.    Discussed with patient that if she begins to feel worse or spikes more fevers, would recommend evaluation. Did follow up CBC with her and noted her WBC was 5.8, HGB 8.6, Plt 350. Relayed this information to patient and she was very much reassured. Had been feeling quite anxious with temp of 100.4 but states that it was very brief and that she is feeling much better now.    Patient also had question about use of Ranitidine with Macrobid. Discussed with pharmacy and recommended using Ranitidine about 4 hours or more after Macrobid to assure absorption. Relayed this to patient and she stated understanding.    Susan Nunez PGY3  6/18/2017 7:53 PM

## 2017-06-19 NOTE — PROGRESS NOTES
"Patient's name and  were verified.  See Doc Flowsheet - IV assess for details.  IVAD accessed with 20G 3/4\" sahu gripper plus needle  blood return positive: YES  Site without redness, tenderness or swelling: YES  flushed with 20cc NS and 5cc 100u/ml heparin  Needle: left intact for chemotherapy  Comments: Labs drawn.  Patient tolerated procedure without incident.    Fish Aparicio  RN, BSN      "

## 2017-06-19 NOTE — PROGRESS NOTES
"Margaux Guzmán is a 61 year old female with a history of primary peritoneal adenocarcinoma with bilateral hydronephrosis and ureteral obstruction managed with indwelling ureteral stents since August 2015. She has previously been managed with a 6 [place] left stent and a 7 Fr right stent but was later noted to have developed worsening hydronephrosis and bilateral 8Fr stents were placed.  At the time of her last exchange 2/27/17 these stents were placed. Left: Tandem 6Fr x 26 cm double J ureteral stents  Right: 6Fr x 28 cm double J ureteral stent.  Pt requests the 8Fr stents be replaced instead of the tandem setup.    History of poor right renal function.    History of hematuria after stent exchange requiring blood transfusion.  Felt to be associated with anticoagulation.  Taking lovenox.    History of \"decreased right renal function\" in the past.  9/15/16 lasix renogram:  Impression:  1. Normal functioning left kidney with no evidence of obstruction.  2. Decreased radiotracer uptake in the right kidney with no evidence  of obstruction consistent with decreased renal function.    3. The split functional measurements are: 80.9% function on the left  as compared to 19.1% function on the right.    We'll plan replacement of the stents at her convenience.  She wants to do at  for convenience in getting a ride.    15 min visit, over 50% face to face in counseling/discussion of above issues  Laxmi LEYVA        "

## 2017-06-19 NOTE — PROGRESS NOTES
Patient reports she is not back to baseline due to UTI.  Urine culture from 6/14/17 >100,000 colonies/ml Coagulase negative Staphylococcus. Patient started Macrobid 100mg for 10 days on 6/16/17.  Blood culture from 6/14/17 positive for Staphylococcus Epidermidis (resistant to methicillin).  On 6/15/17, patient was instructed to go to [place] ED.  Blood cultures repeated and patient was given Vancomycin 750 mg IV.   Blood cultures done at [facility] on 6/15/17 were negative.   Dr. Mccollum came to infusion to see patient. Taxol will be deferred until 6/28/17. Patient will complete the 10 days of Macrobid.

## 2017-06-23 NOTE — TELEPHONE ENCOUNTER
Refill request    Medication: buprenorphine (BUTRANS) 5 MCG/HR WK patch  Place 1 patch onto the skin every 7 days          MNPMP Checked:     buprenorphine (BUTRANS) 5 MCG/HR WK patch   - Last refilled 5/31/17 for 4 patches      Refilled: YES, dated to be refilled 30 days since last refill    Last clinic appointment: 5/3/17  Next clinic appointment: MD recommended after procedure or in July     Patient requested to:      Sent to pharmacy

## 2017-06-26 PROBLEM — R50.9 TEMPERATURE ELEVATED: Status: ACTIVE | Noted: 2017-01-01

## 2017-06-28 NOTE — MR AVS SNAPSHOT
After Visit Summary   6/28/2017    Margaux Guzmán    MRN: [id number]           Patient Information     Date Of Birth          1955        Visit Information        Provider Department      6/28/2017 10:15 AM Carmela Alvarez MD [facility] Urology and [facility] for Prostate and Urologic Cancers        Today's Diagnoses     Hydronephrosis, unspecified hydronephrosis type    -  1    Urinary frequency        Dysuria          Care Instructions    Schedule surgery with Dr. Alvarez.    It was a pleasure meeting with you today.  Thank you for allowing me and my team the privilege of caring for you today.  YOU are the reason we are here, and I truly hope we provided you with the excellent service you deserve.  Please let us know if there is anything else we can do for you so that we can be sure you are leaving completely satisfied with your care experience.      Maryse Maldonado, SARA          Follow-ups after your visit        Additional Services     PAC Visit Referral (For [facility] Only)       Does this visit require an Anesthesia consult?  No -  If this visit is not for evaluation for co-morbidity (such as patient request due to anxiety), what is the purpose of the visit?: H and P     H&P done by:  N/A      Please be aware that coverage of these services is subject to the terms and limitations of your health insurance plan.  Call member services at your health plan with any benefit or coverage questions.      Please bring the following to your appointment:  >>   Any x-rays, CTs or MRIs which have been performed.  Contact the facility where they were done to arrange for  prior to your scheduled appointment.  Any new CT, MRI or other procedures ordered by your specialist must be performed at a [facility] facility or coordinated by your clinic's referral office.    >>   List of current medications  >>   This referral request   >>   Any documents/labs given to you for this referral                   Your next 10 appointments already scheduled     Jun 28, 2017  2:00 PM CDT   Level 2 with BAY 9 INFUSION   [facility]    [address] [facility] [address]   [phone]            Jul 10, 2017  8:00 AM CDT   Return Visit with NURSE ONLY CANCER CENTER   [facility])    [address] [facility] [address]   [phone]            Jul 10, 2017  8:30 AM CDT   Return Visit with JUSTIN [facility] ([facility])    [address] [facility] [address]   [phone]            Jul 10, 2017  9:00 AM CDT   Level 2 with BAY 7 INFUSION   [facility]    [address] [facility] [address]   [phone]            Jul 17, 2017  9:00 AM CDT   Return Visit with NURSE ONLY CANCER CENTER   [facility])    [address] [facility] [address]   [phone]            Jul 17, 2017  9:30 AM CDT   Level 2 with BAY 8 INFUSION   [facility])    [address] [facility] [address]   [phone]            Jul 24, 2017  8:30 AM CDT   Return Visit with NURSE ONLY CANCER CENTER   [facility])    [address] [facility] [address]   [phone]            Jul 24, 2017   Procedure with Carmela Alvarez MD   [facility] ([facility])    [address] [place] [address]  [address] [facility] [address] [phone]           Located in the Clinics and Surgery Center at [address] [place] [address].   parking is very convenient and highly recommended.  is a $6 flat rate fee.  Both  and self parkers should enter the main arrival plaza from [address]; parking attendants will direct you based on your parking preference.            Jul 24, 2017  9:00 AM CDT   Level 2 with BAY 7 [facility] ([facility])    [address] [place] [address] [phone]            Jul 31, 2017  9:00 AM CDT   Return Visit with NURSE [place] CANCER CENTER   [facility] ([facility])    [address] [address] [address] [phone]              Who to contact     Please call your clinic at [phone] to:    Ask questions about your health    Make or cancel appointments    Discuss your medicines    Learn about your test results    Speak to your doctor   If you have compliments or concerns about an experience at your clinic, or if you wish to file a complaint, please contact [facility] Patient Relations at [phone] or email us at [email or web address]         Additional Information About Your Visit        Vetteryharre[employer]wip Information     [employer] gives you secure access to your electronic health record. If you see a primary care provider, you can also send messages to your care team and make appointments. If you have questions, please call your primary care clinic.  If you do not have a primary care provider, please call [phone] and they will assist you.      [employer] is an electronic gateway that provides easy, online access to your medical records. With [employer], you can request a clinic appointment, read your test results, renew a prescription or communicate with your care team.     To access your existing account, please contact your [facility] or call [phone] for assistance.        Care EveryWhere ID     This is your Care EveryWhere ID. This could be used by other organizations to access your [facility] medical records  [id number]        Your Vitals Were     Pulse Height BMI (Body Mass Index)        "      97 1.676 m (5' 6\") 18.85 kg/m2          Blood Pressure from Last 3 Encounters:   06/28/17 141/80   06/28/17 138/75   06/14/17 118/55    Weight from Last 3 Encounters:   06/28/17 52.2 kg (115 lb)   06/28/17 53 kg (116 lb 12.8 oz)   06/12/17 53.8 kg (118 lb 9.6 oz)              We Performed the Following     PAC Visit Referral (For [facility] Only)     Charline-Operative Worksheet  (Urology General)     Routine UA with micro reflex to culture     Routine UA with micro reflex to culture     Urine Culture Aerobic Bacterial        Primary Care Provider Office Phone # Fax #    Aurelia Knox [phone]       [facility] [address] [facility] [address]        Equal Access to Services     TARVIS PHILLIPS : Marcel verma Sojonathan, waaxgary luqadaha, qaybpablito kaalmagary dela cruz, stacy buchanan. So [facility] [phone].    ATENCIÓN: Si habla español, tiene a almodovar disposición servicios gratuitos de asistencia lingüística. [facility] [phone].    We comply with applicable federal civil rights laws and [place] laws. We do not discriminate on the basis of race, color, national origin, age, disability sex, sexual orientation or gender identity.            Thank you!     Thank you for choosing [facility] UROLOGY AND [facility] FOR PROSTATE AND UROLOGIC CANCERS  for your care. Our goal is always to provide you with excellent care. Hearing back from our patients is one way we can continue to improve our services. Please take a few minutes to complete the written survey that you may receive in the mail after your visit with us. Thank you!             Your Updated Medication List - Protect others around you: Learn how to safely use, store and throw away your medicines at www.disposemymeds.org.          This list is accurate as of: 6/28/17  1:58 PM.  Always use your most recent med list.                   Brand Name Dispense Instructions for use Diagnosis    ACETAMINOPHEN PO      Take 500 mg " by mouth every 6 hours as needed for pain Reported on 3/30/2017        amLODIPine 2.5 MG tablet    NORVASC    30 tablet    Take 1 tablet (2.5 mg) by mouth daily    Primary peritoneal adenocarcinoma (H)       buprenorphine 5 MCG/HR WK patch    BUTRANS    4 patch    Place 1 patch onto the skin every 7 days    Cancer associated pain       COLACE PO      Take 50 mg by mouth daily        cycloSPORINE 0.05 % ophthalmic emulsion    RESTASIS     Place 1 drop into both eyes 2 times daily        enoxaparin 40 MG/0.4ML injection    LOVENOX    12 mL    INJECT 1 SYRINGE SUBCUTANEOUSLY DAILY    Ovarian cancer, left (H), Ovarian cancer, right (H)       EPINEPHrine 0.3 MG/0.3ML injection     1 each    Inject 0.3 mLs (0.3 mg) into the muscle once as needed for anaphylaxis    Preventative health care, Primary peritoneal adenocarcinoma (H)       LORazepam 1 MG tablet    ATIVAN    30 tablet    Take 1 tablet (1 mg) by mouth every 6 hours as needed (Anxiety, Nausea/Vomiting or Sleep)    Other iron deficiency anemia, Chemotherapy-induced neutropenia (H), Ovarian cancer, left (H), Ovarian cancer, right (H), Primary peritoneal adenocarcinoma (H)       MELATONIN PO      Take 1 mg by mouth At Bedtime        ondansetron 8 MG ODT tab    ZOFRAN-ODT    90 tablet    Place 1 tablet (8 mg) under the tongue every 8 hours as needed for nausea    Ovarian cancer, right (H), Nausea       ONE DAILY MULTIVITAMIN WOMEN Tabs      Take 1 tablet by mouth every morning        polyethylene glycol powder    MIRALAX/GLYCOLAX     Take 1 capful by mouth daily as needed        prochlorperazine 10 MG tablet    COMPAZINE    30 tablet    Take 1 tablet (10 mg) by mouth every 6 hours as needed (Nausea/Vomiting)    Other iron deficiency anemia, Chemotherapy-induced neutropenia (H), Ovarian cancer, left (H), Ovarian cancer, right (H), Primary peritoneal adenocarcinoma (H)       tamsulosin 0.4 MG capsule    FLOMAX    60 capsule    Take 1 capsule (0.4 mg) by mouth daily     Primary peritoneal adenocarcinoma (H)       tolterodine 4 MG 24 hr capsule    DETROL LA    30 capsule    TAKE ONE CAPSULE BY MOUTH DAILY    Hydronephrosis, unspecified hydronephrosis type       ZANTAC PO      Take 75 mg by mouth daily

## 2017-06-28 NOTE — MR AVS SNAPSHOT
After Visit Summary   6/28/2017    Margaux Guzmán    MRN: [id number]           Patient Information     Date Of Birth          1955        Visit Information        Provider Department      6/28/2017 11:15 AM Nurse, [facility] Urology and [facility] for Prostate and Urologic Cancers        Today's Diagnoses     Kidney stones    -  1       Follow-ups after your visit        Your next 10 appointments already scheduled     Jun 28, 2017  1:00 PM CDT   Return Visit with NURSE ONLY CANCER CENTER   [facility] ([facility])    [address] [facility] [address]   [phone]            Jun 28, 2017  1:30 PM CDT   Return Visit with JUSTIN Patrick CNP   [facility] ([facility])    [address] [facility] [address]   [phone]            Jun 28, 2017  2:00 PM CDT   Level 2 with BAY 9 INFUSION   [facility] ([facility])    [address] [facility] [address]   [phone]            Jul 10, 2017  8:00 AM CDT   Return Visit with NURSE ONLY CANCER CENTER   [facility] ([facility])    [address] [facility] [address]   [phone]            Jul 10, 2017  8:30 AM CDT   Return Visit with JUSTIN Patrick CNP   [facility] ([facility])    [address] [facility] [address]   [phone]            Jul 10, 2017  9:00 AM CDT   Level 2 with BAY 7 INFUSION   [facility] ([facility])    [address] [facility] [address]   [phone]            Jul 17, 2017  9:00 AM CDT   Return Visit with NURSE ONLY CANCER CENTER   [facility] ([facility])    [address] [facility] [address]   [phone]            Jul 17, 2017  9:30 AM CDT   Level 2 with  BAY 8 INFUSION   [facility])    [address] [facility] [address] [phone]            Jul 24, 2017  8:30 AM CDT   Return Visit with NURSE ONLY CANCER CENTER   [facility] ([facility])    [address] [facility] [address]   [phone]            Jul 24, 2017   Procedure with Carmela Alvarez MD   [facility] ([facility])    [address] [place] [address]  5th Floor  [facility] [address] [phone]           Located in the Clinics and Surgery Center at [address] [place] [address].   parking is very convenient and highly recommended.  is a $6 flat rate fee.  Both  and self parkers should enter the main arrival plaza from [facility]; parking attendants will direct you based on your parking preference.              Future tests that were ordered for you today     Open Future Orders        Priority Expected Expires Ordered    Routine UA with micro reflex to culture Routine  6/28/2018 6/28/2017    Routine UA with micro reflex to culture Routine  6/28/2018 6/28/2017    Urine Culture Aerobic Bacterial Routine  6/28/2018 6/28/2017            Who to contact     Please call your clinic at [phone] to:    Ask questions about your health    Make or cancel appointments    Discuss your medicines    Learn about your test results    Speak to your doctor   If you have compliments or concerns about an experience at your clinic, or if you wish to file a complaint, please contact [facility] Patient Relations at [phone] or email us at [email or web address]         Additional Information About Your Visit        MyChart Information     [employer] gives you secure access to your electronic health record. If you see a primary care provider, you can also send messages to your care team and make  appointments. If you have questions, please call your primary care clinic.  If you do not have a primary care provider, please call [phone] and they will assist you.      [employer] is an electronic gateway that provides easy, online access to your medical records. With [employer], you can request a clinic appointment, read your test results, renew a prescription or communicate with your care team.     To access your existing account, please contact your [facility] or call [phone] for assistance.        Care EveryWhere ID     This is your Care EveryWhere ID. This could be used by other organizations to access your [facility] medical records  [id number]         Blood Pressure from Last 3 Encounters:   06/28/17 138/75   06/14/17 118/55   06/12/17 115/66    Weight from Last 3 Encounters:   06/28/17 53 kg (116 lb 12.8 oz)   06/12/17 53.8 kg (118 lb 9.6 oz)   06/05/17 53.7 kg (118 lb 4.8 oz)              Today, you had the following     No orders found for display       Primary Care Provider Office Phone # Fax #    Aurelia L Johnaden [phone]       [facility] [address] [facility] [address]        Equal Access to Services     TRAVIS PHILLIPS : Hadii kelly ku hadasho Soomaali, waaxda luqadaha, qaybta kaalmada adeegyada, stacy buchanan. So [facility] [phone].    ATENCIÓN: Si habla español, tiene a almodovar disposición servicios gratuitos de asistencia lingüística. Llame al [phone].    We comply with applicable federal civil rights laws and [place] laws. We do not discriminate on the basis of race, color, national origin, age, disability sex, sexual orientation or gender identity.            Thank you!     Thank you for choosing [facility] UROLOGY AND [email or web address] FOR PROSTATE AND UROLOGIC CANCERS  for your care. Our goal is always to provide you with excellent care. Hearing back from our patients is one way we can continue to improve our  services. Please take a few minutes to complete the written survey that you may receive in the mail after your visit with us. Thank you!             Your Updated Medication List - Protect others around you: Learn how to safely use, store and throw away your medicines at www.disposemymeds.org.          This list is accurate as of: 6/28/17 11:56 AM.  Always use your most recent med list.                   Brand Name Dispense Instructions for use Diagnosis    ACETAMINOPHEN PO      Take 500 mg by mouth every 6 hours as needed for pain Reported on 3/30/2017        amLODIPine 2.5 MG tablet    NORVASC    30 tablet    Take 1 tablet (2.5 mg) by mouth daily    Primary peritoneal adenocarcinoma (H)       buprenorphine 5 MCG/HR WK patch    BUTRANS    4 patch    Place 1 patch onto the skin every 7 days    Cancer associated pain       COLACE PO      Take 50 mg by mouth daily        cycloSPORINE 0.05 % ophthalmic emulsion    RESTASIS     Place 1 drop into both eyes 2 times daily        enoxaparin 40 MG/0.4ML injection    LOVENOX    12 mL    INJECT 1 SYRINGE SUBCUTANEOUSLY DAILY    Ovarian cancer, left (H), Ovarian cancer, right (H)       EPINEPHrine 0.3 MG/0.3ML injection     1 each    Inject 0.3 mLs (0.3 mg) into the muscle once as needed for anaphylaxis    Preventative health care, Primary peritoneal adenocarcinoma (H)       LORazepam 1 MG tablet    ATIVAN    30 tablet    Take 1 tablet (1 mg) by mouth every 6 hours as needed (Anxiety, Nausea/Vomiting or Sleep)    Other iron deficiency anemia, Chemotherapy-induced neutropenia (H), Ovarian cancer, left (H), Ovarian cancer, right (H), Primary peritoneal adenocarcinoma (H)       MELATONIN PO      Take 1 mg by mouth At Bedtime        ondansetron 8 MG ODT tab    ZOFRAN-ODT    90 tablet    Place 1 tablet (8 mg) under the tongue every 8 hours as needed for nausea    Ovarian cancer, right (H), Nausea       ONE DAILY MULTIVITAMIN WOMEN Tabs      Take 1 tablet by mouth every morning         polyethylene glycol powder    MIRALAX/GLYCOLAX     Take 1 capful by mouth daily as needed        prochlorperazine 10 MG tablet    COMPAZINE    30 tablet    Take 1 tablet (10 mg) by mouth every 6 hours as needed (Nausea/Vomiting)    Other iron deficiency anemia, Chemotherapy-induced neutropenia (H), Ovarian cancer, left (H), Ovarian cancer, right (H), Primary peritoneal adenocarcinoma (H)       tamsulosin 0.4 MG capsule    FLOMAX    60 capsule    Take 1 capsule (0.4 mg) by mouth daily    Primary peritoneal adenocarcinoma (H)       tolterodine 4 MG 24 hr capsule    DETROL LA    30 capsule    TAKE ONE CAPSULE BY MOUTH DAILY    Hydronephrosis, unspecified hydronephrosis type       ZANTAC PO      Take 75 mg by mouth daily

## 2017-06-28 NOTE — NURSING NOTE
Pre Op Teaching Flowsheet       Pre and Post op Patient Education  Relevant Diagnosis:  Stent exchange  Teaching Topic:  Pre and post op teaching  Person Involved in teaching: Margaux Guzmán    Motivation Level:  Asks Questions: Yes  Eager to Learn:  Yes  Cooperative: Yes  Receptive (willing/able to accept information):  Yes  Patient demonstrates understanding of the following:  Date and time of surgery:  July 24th ASC  Location of surgery:  [facility]- 5th Floor  History and Physical and any other testing necessary prior to surgery: Patient is having it done with [facility]  Required time line for completion of History and Physical and any pre-op testing: Yes    NPO Guidelines: Nothing to eat 8hrs prior, Nothing to drink 2hrs prior    Patient demonstrates understanding of the following:  Pre-op bowel prep:  N/A  Pre-op showering/scrub information with PCMX Soap: Yes  Medications to take the day of surgery:  Per PCP  Blood thinner medications discussed and when to stop (if applicable):  Yes  Diabetes medication management (if applicable):  N/A  Discussed pain control after surgery: pain medications  Infection Prevention: Patient demonstrates understanding of the following:  Patient instructed on hand hygiene:  Yes  Surgical procedure site care taught: N/A  Signs and symptoms of infection taught:  Yes  Wound care will be taught at the time of discharge.  Central venous catheter care will be taught at the time of discharge (if applicable).    Post-op follow-up:  Discussed how to contact the hospital, nurse, and clinic scheduling staff if necessary.    Instructional materials used/given/mailed:  [place] [facility] Booklet, post op teaching sheet, Map, Soap, and arrival/location information.    Surgical instructions packet given to patient in office:  Yes  Keira Rolle RN  .

## 2017-06-28 NOTE — MR AVS SNAPSHOT
After Visit Summary   6/28/2017    Margaux Guzmán    MRN: [id number]           Patient Information     Date Of Birth          1955        Visit Information        Provider Department      6/28/2017 1:30 PM Lizet Truong APRN CNP [facility]        Today's Diagnoses     Ovarian cancer, left (H)    -  1    Ovarian cancer, right (H)        Encounter for long-term current use of medication        Anemia associated with chemotherapy           Follow-ups after your visit        Your next 10 appointments already scheduled     Jul 07, 2017 12:00 PM CDT   LAB with LAB ONC [facility] ([facility])    [address] [facility] [address] [phone]           Patient must bring picture ID.  Patient should be prepared to give a urine specimen  Please do not eat 10-12 hours before your appointment if you are coming in fasting for labs on lipids, cholesterol, or glucose (sugar).  Pregnant women should follow their Care Team instructions. Water with medications is okay. Do not drink coffee or other fluids.   If you have concerns about taking  your medications, please ask at office or if scheduling via [email or web address], send a message by clicking on Secure Messaging, Message Your Care Team.            Jul 07, 2017  1:15 PM CDT   Level 2 with [place] [facility] ([facility])    [address] [facility] [address] [phone]            Jul 10, 2017  8:00 AM CDT   Return Visit with NURSE ONLY CANCER CENTER   [facility])    [address] [facility] [address] [phone]            Jul 10, 2017  8:30 AM CDT   Return Visit with JUSTIN Patrick CNP   [facility] ([facility])    [address] [facility] [address]   [phone]            Jul 10, 2017  9:00 AM CDT    Level 2 with BAY 7 INFUSION   [facility]    [address] [facility] [address]   [phone]            Jul 17, 2017  9:00 AM CDT   Return Visit with NURSE ONLY CANCER CENTER   [facility])    [address] [facility] [address]   [phone]            Jul 17, 2017  9:30 AM CDT   Level 2 with BAY 8 INFUSION   [facility])    [address] [facility] [address]   [phone]            Jul 24, 2017  8:30 AM CDT   Return Visit with NURSE ONLY CANCER CENTER   [facility] ([facility])    [address] [facility] [address]   [phone]            Jul 24, 2017   Procedure with Carmela Alvarez MD   [facility] ([facility])    [address] [place] [address]  [address] [place] [id number]   [phone]           Located in the Clinics and Surgery Center at [address] [place] [address].   parking is very convenient and highly recommended.  is a $6 flat rate fee.  Both  and self parkers should enter the main arrival plaza from [facility]; parking attendants will direct you based on your parking preference.            Jul 24, 2017  9:00 AM CDT   Level 2 with BAY 7 INFUSION   [facility] ([facility])    [address] [facility] [address]   [phone]              Future tests that were ordered for you today     Open Future Orders        Priority Expected Expires Ordered    CBC with platelets differential Routine  6/28/2018 6/28/2017            Who to contact     If you have questions or need follow up information about today's clinic visit or your schedule please contact [facility] directly at [phone].  Normal or non-critical lab and imaging  "results will be communicated to you by MyChart, letter or phone within 4 business days after the clinic has received the results. If you do not hear from us within 7 days, please contact the clinic through [employer] or phone. If you have a critical or abnormal lab result, we will notify you by phone as soon as possible.  Submit refill requests through [employer] or call your pharmacy and they will forward the refill request to us. Please allow 3 business days for your refill to be completed.          Additional Information About Your Visit        Localisto Information     [employer] gives you secure access to your electronic health record. If you see a primary care provider, you can also send messages to your care team and make appointments. If you have questions, please call your primary care clinic.  If you do not have a primary care provider, please call [phone] and they will assist you.      Local[employer] is an electronic gateway that provides easy, online access to your medical records. With [employer], you can request a clinic appointment, read your test results, renew a prescription or communicate with your care team.     To access your existing account, please contact your [facility] or call [phone] for assistance.        Care EveryWhere ID     This is your Care EveryWhere ID. This could be used by other organizations to access your [place] medical records  [id number]        Your Vitals Were     Pulse Temperature Respirations Height Pulse Oximetry BMI (Body Mass Index)    100 98.6  F (37  C) (Oral) 18 1.676 m (5' 5.98\") 99% 18.57 kg/m2       Blood Pressure from Last 3 Encounters:   06/28/17 136/67   06/28/17 141/80   06/28/17 138/75    Weight from Last 3 Encounters:   06/28/17 52.2 kg (115 lb)   06/28/17 53 kg (116 lb 12.8 oz)   06/12/17 53.8 kg (118 lb 9.6 oz)               Primary Care Provider Office Phone # Fax #    Aurelia Knox [phone]       [facility] " [facility] [address] [place] [id number]        Equal Access to Services     JORGE LTRUPTI ALAN : Hadii kelly bunn anhchandra Roxannajonathan, wadominicgary willson, jerrypablito randolphmarquisgary dela cruz, stacy muñiztriciayolis buchanan. So [facility] [phone].    ATENCIÓN: Si habla español, tiene a almodovar disposición servicios gratuitos de asistencia lingüística. Llame al [phone].    We comply with applicable federal civil rights laws and [place] laws. We do not discriminate on the basis of race, color, national origin, age, disability sex, sexual orientation or gender identity.            Thank you!     Thank you for choosing [facility]  for your care. Our goal is always to provide you with excellent care. Hearing back from our patients is one way we can continue to improve our services. Please take a few minutes to complete the written survey that you may receive in the mail after your visit with us. Thank you!             Your Updated Medication List - Protect others around you: Learn how to safely use, store and throw away your medicines at www.disposemymeds.org.          This list is accurate as of: 6/28/17  4:12 PM.  Always use your most recent med list.                   Brand Name Dispense Instructions for use Diagnosis    ACETAMINOPHEN PO      Take 500 mg by mouth every 6 hours as needed for pain Reported on 3/30/2017        amLODIPine 2.5 MG tablet    NORVASC    30 tablet    Take 1 tablet (2.5 mg) by mouth daily    Primary peritoneal adenocarcinoma (H)       buprenorphine 5 MCG/HR WK patch    BUTRANS    4 patch    Place 1 patch onto the skin every 7 days    Cancer associated pain       COLACE PO      Take 50 mg by mouth daily        cycloSPORINE 0.05 % ophthalmic emulsion    RESTASIS     Place 1 drop into both eyes 2 times daily        enoxaparin 40 MG/0.4ML injection    LOVENOX    12 mL    INJECT 1 SYRINGE SUBCUTANEOUSLY DAILY    Ovarian cancer, left (H), Ovarian cancer, right (H)       EPINEPHrine 0.3  MG/0.3ML injection     1 each    Inject 0.3 mLs (0.3 mg) into the muscle once as needed for anaphylaxis    Preventative health care, Primary peritoneal adenocarcinoma (H)       LORazepam 1 MG tablet    ATIVAN    30 tablet    Take 1 tablet (1 mg) by mouth every 6 hours as needed (Anxiety, Nausea/Vomiting or Sleep)    Other iron deficiency anemia, Chemotherapy-induced neutropenia (H), Ovarian cancer, left (H), Ovarian cancer, right (H), Primary peritoneal adenocarcinoma (H)       MELATONIN PO      Take 1 mg by mouth At Bedtime        ondansetron 8 MG ODT tab    ZOFRAN-ODT    90 tablet    Place 1 tablet (8 mg) under the tongue every 8 hours as needed for nausea    Ovarian cancer, right (H), Nausea       ONE DAILY MULTIVITAMIN WOMEN Tabs      Take 1 tablet by mouth every morning        polyethylene glycol powder    MIRALAX/GLYCOLAX     Take 1 capful by mouth daily as needed        prochlorperazine 10 MG tablet    COMPAZINE    30 tablet    Take 1 tablet (10 mg) by mouth every 6 hours as needed (Nausea/Vomiting)    Other iron deficiency anemia, Chemotherapy-induced neutropenia (H), Ovarian cancer, left (H), Ovarian cancer, right (H), Primary peritoneal adenocarcinoma (H)       tamsulosin 0.4 MG capsule    FLOMAX    60 capsule    Take 1 capsule (0.4 mg) by mouth daily    Primary peritoneal adenocarcinoma (H)       tolterodine 4 MG 24 hr capsule    DETROL LA    30 capsule    TAKE ONE CAPSULE BY MOUTH DAILY    Hydronephrosis, unspecified hydronephrosis type       ZANTAC PO      Take 75 mg by mouth daily

## 2017-06-28 NOTE — MR AVS SNAPSHOT
After Visit Summary   6/28/2017    Margaux Guzmán    MRN: [id number]           Patient Information     Date Of Birth          1955        Visit Information        Provider Department      6/28/2017 1:00 PM [facility]UR[facility] ONLY CANCER CENTER [facility]        Today's Diagnoses     Temperature elevated    -  1    Other iron deficiency anemia        Chemotherapy-induced neutropenia (H)        Ovarian cancer, left (H)        Ovarian cancer, right (H)        Primary peritoneal adenocarcinoma (H)           Follow-ups after your visit        Your next 10 appointments already scheduled     Jun 28, 2017  2:00 PM CDT   Level 2 with BAY 9 INFUSION   [facility] ([facility])    [address] [facility] [address]   [phone]            Jul 10, 2017  8:00 AM CDT   Return Visit with NURSE ONLY CANCER CENTER   [facility] ([facility])    [address] [facility] [address]   [phone]            Jul 10, 2017  8:30 AM CDT   Return Visit with JUSTIN Patrick [facility] ([facility])    [address] [facility] [address]   [phone]            Jul 10, 2017  9:00 AM CDT   Level 2 with BAY 7 INFUSION   [facility] ([facility])    [address] [facility] [address]   [phone]            Jul 17, 2017  9:00 AM CDT   Return Visit with NURSE ONLY CANCER CENTER   [facility] ([facility])    [address] [facility] [address]   [phone]            Jul 17, 2017  9:30 AM CDT   Level 2 with BAY 8 INFUSION   [facility] ([facility])    [address] [facility] [address]   [phone]            Jul 24, 2017  8:30 AM CDT   Return Visit with NURSE ONLY CANCER CENTER   [facility] ([facility]  [facility])    [address] [place] [id number] [phone]            Jul 24, 2017   Procedure with Carmela Alvarez MD   [facility] ([facility])    [address] [facility] [address]  5th Floor  [facility] [address] [phone]           Located in the Clinics and Surgery Center at [address] [place] [address].   parking is very convenient and highly recommended.  is a $6 flat rate fee.  Both  and self parkers should enter the main arrival plaza from [facility]; parking attendants will direct you based on your parking preference.            Jul 24, 2017  9:00 AM CDT   Level 2 with [address] ([facility])    [address] [place] [id number] [phone]            Jul 31, 2017  9:00 AM CDT   Return Visit with NURSE ONLY CANCER CENTER   [facility])    [address] [place] [address] [phone]              Who to contact     If you have questions or need follow up information about today's clinic visit or your schedule please contact [facility] directly at [phone].  Normal or non-critical lab and imaging results will be communicated to you by Giftx[employer]art, letter or phone within 4 business days after the clinic has received the results. If you do not hear from us within 7 days, please contact the clinic through [employer] or phone. If you have a critical or abnormal lab result, we will notify you by phone as soon as possible.  Submit refill requests through [employer] or call your pharmacy and they will forward the refill request to us. Please allow 3 business days for your refill to be completed.          Additional Information About Your Visit        CSDN Information     [employer] gives you secure access to your electronic health record. If you see a primary  care provider, you can also send messages to your care team and make appointments. If you have questions, please call your primary care clinic.  If you do not have a primary care provider, please call [phone] and they will assist you.      [employer] is an electronic gateway that provides easy, online access to your medical records. With [employer], you can request a clinic appointment, read your test results, renew a prescription or communicate with your care team.     To access your existing account, please contact your [facility] or call [phone] for assistance.        Care EveryWhere ID     This is your Care EveryWhere ID. This could be used by other organizations to access your [facility] medical records  [id number]         Blood Pressure from Last 3 Encounters:   06/28/17 138/75   06/14/17 118/55   06/12/17 115/66    Weight from Last 3 Encounters:   06/28/17 53 kg (116 lb 12.8 oz)   06/12/17 53.8 kg (118 lb 9.6 oz)   06/05/17 53.7 kg (118 lb 4.8 oz)              We Performed the Following     ABO/Rh type and screen     Blood culture     Blood culture     CBC with platelets differential     Magnesium     Potassium        Primary Care Provider Office Phone # Fax #    Aurelia L [facility] [phone]       [facility] [address] [facility] [address]        Equal Access to Services     TRAVIS PHILLIPS : Hadii aad ku hadasho Soomaali, waaxda luqadaha, qaybta kaalmada adeegyada, stacy buchanan. So [facility] [place] [phone].    ATENCIÓN: Si habla español, tiene a almodovar disposición servicios gratuitos de asistencia lingüística. Llame al [phone].    We comply with applicable federal civil rights laws and [place] laws. We do not discriminate on the basis of race, color, national origin, age, disability sex, sexual orientation or gender identity.            Thank you!     Thank you for choosing [facility]  for your care. Our  goal is always to provide you with excellent care. Hearing back from our patients is one way we can continue to improve our services. Please take a few minutes to complete the written survey that you may receive in the mail after your visit with us. Thank you!             Your Updated Medication List - Protect others around you: Learn how to safely use, store and throw away your medicines at www.disposemymeds.org.          This list is accurate as of: 6/28/17  1:32 PM.  Always use your most recent med list.                   Brand Name Dispense Instructions for use Diagnosis    ACETAMINOPHEN PO      Take 500 mg by mouth every 6 hours as needed for pain Reported on 3/30/2017        amLODIPine 2.5 MG tablet    NORVASC    30 tablet    Take 1 tablet (2.5 mg) by mouth daily    Primary peritoneal adenocarcinoma (H)       buprenorphine 5 MCG/HR WK patch    BUTRANS    4 patch    Place 1 patch onto the skin every 7 days    Cancer associated pain       COLACE PO      Take 50 mg by mouth daily        cycloSPORINE 0.05 % ophthalmic emulsion    RESTASIS     Place 1 drop into both eyes 2 times daily        enoxaparin 40 MG/0.4ML injection    LOVENOX    12 mL    INJECT 1 SYRINGE SUBCUTANEOUSLY DAILY    Ovarian cancer, left (H), Ovarian cancer, right (H)       EPINEPHrine 0.3 MG/0.3ML injection     1 each    Inject 0.3 mLs (0.3 mg) into the muscle once as needed for anaphylaxis    Preventative health care, Primary peritoneal adenocarcinoma (H)       LORazepam 1 MG tablet    ATIVAN    30 tablet    Take 1 tablet (1 mg) by mouth every 6 hours as needed (Anxiety, Nausea/Vomiting or Sleep)    Other iron deficiency anemia, Chemotherapy-induced neutropenia (H), Ovarian cancer, left (H), Ovarian cancer, right (H), Primary peritoneal adenocarcinoma (H)       MELATONIN PO      Take 1 mg by mouth At Bedtime        ondansetron 8 MG ODT tab    ZOFRAN-ODT    90 tablet    Place 1 tablet (8 mg) under the tongue every 8 hours as needed for nausea     Ovarian cancer, right (H), Nausea       ONE DAILY MULTIVITAMIN WOMEN Tabs      Take 1 tablet by mouth every morning        polyethylene glycol powder    MIRALAX/GLYCOLAX     Take 1 capful by mouth daily as needed        prochlorperazine 10 MG tablet    COMPAZINE    30 tablet    Take 1 tablet (10 mg) by mouth every 6 hours as needed (Nausea/Vomiting)    Other iron deficiency anemia, Chemotherapy-induced neutropenia (H), Ovarian cancer, left (H), Ovarian cancer, right (H), Primary peritoneal adenocarcinoma (H)       tamsulosin 0.4 MG capsule    FLOMAX    60 capsule    Take 1 capsule (0.4 mg) by mouth daily    Primary peritoneal adenocarcinoma (H)       tolterodine 4 MG 24 hr capsule    DETROL LA    30 capsule    TAKE ONE CAPSULE BY MOUTH DAILY    Hydronephrosis, unspecified hydronephrosis type       ZANTAC PO      Take 75 mg by mouth daily

## 2017-06-28 NOTE — NURSING NOTE
"Oncology Rooming Note    June 28, 2017 1:32 PM   Margaux Guzmán is a 61 year old female who presents for:    Chief Complaint   Patient presents with     Oncology Clinic Visit     f/u prior to tx     Initial Vitals: There were no vitals taken for this visit. Estimated body mass index is 18.85 kg/(m^2) as calculated from the following:    Height as of an earlier encounter on 6/28/17: 1.676 m (5' 6\").    Weight as of an earlier encounter on 6/28/17: 53 kg (116 lb 12.8 oz). There is no height or weight on file to calculate BSA.  Data Unavailable Comment: Data Unavailable   No LMP recorded. Patient has had a hysterectomy.  Allergies reviewed: Yes  Medications reviewed: Yes    Medications: Medication refills not needed today.  Pharmacy name entered into [employer]:    [id number] - [place], [place] - [address] AT [facility] [address]  [id number] - [place], [place] - [address]  [facility] [place], [place] - [address] [facility] [place], [place] - [address] [facility] [phone]  [place], [place] - [address] [id number] - [place], [place] - [address] [id number] [place] - [address] [facility] - [place], [place] - [address] [place] [address] [address]    Clinical concerns:    8 minutes for nursing intake (face to face time)     MORRO NEAL LPN              "

## 2017-06-28 NOTE — NURSING NOTE
"Chief Complaint   Patient presents with     RECHECK     Hospital follow up- hydronephrosis       Initial /75 (BP Location: Right arm, Patient Position: Chair, Cuff Size: Adult Small)  Pulse 97  Ht 1.676 m (5' 6\")  Wt 53 kg (116 lb 12.8 oz)  BMI 18.85 kg/m2 Estimated body mass index is 18.85 kg/(m^2) as calculated from the following:    Height as of this encounter: 1.676 m (5' 6\").    Weight as of this encounter: 53 kg (116 lb 12.8 oz).  Medication Reconciliation: complete     Yanelis Woodward, student    "

## 2017-06-28 NOTE — PATIENT INSTRUCTIONS
Schedule surgery with Dr. Alvarez.    It was a pleasure meeting with you today.  Thank you for allowing me and my team the privilege of caring for you today.  YOU are the reason we are here, and I truly hope we provided you with the excellent service you deserve.  Please let us know if there is anything else we can do for you so that we can be sure you are leaving completely satisfied with your care experience.      EDWARD Ellis

## 2017-06-28 NOTE — LETTER
2017       RE: Margaux Guzmán  [address] [facility] [address]     Dear Colleague,    Thank you for referring your patient, Margaux Guzmán, to the [facility] UROLOGY AND INST FOR PROSTATE AND UROLOGIC CANCERS at [facility]. Please see a copy of my visit note below.    Office Visit Note      UROLOGIC DIAGNOSES:   Hydronephrosis     CURRENT INTERVENTIONS:   Ureteral stents     HISTORY:   Patient with history of primary peritoneal carcinoma found to have bilateral renal obstruction. Has had multiple stent exchanges, often complicated by hematuria.   Right with poor function. Left side shows some hydronephrosis and patient notes intermittent left flank pain that has improved with upsizing.    After most recent stent exchange, we could not replace the stent on the right side due to likely UPJO. Patient had no issues at first but then developed right flank pain. Had PNT placed.      We discussed antegrade placement of ureteral stent on the right using PNT access. Also discussed regular stent exchanges.     Patient inquired about nephrectomy for poorly functioning right kidney, we discussed this option as well.   PAST MEDICAL HISTORY:   Past Medical History:   Diagnosis Date     Anemia      History of blood transfusion     MULTIPLE     Hydronephrosis      Hyperlipidemia      Jugular vein thrombosis, right     Persistent right internal jugular DVT     Livedo annularis      Osteoarthritis      Osteopenia      Ovarian cancer (H)      Polymyalgia rheumatica (H)      PONV (postoperative nausea and vomiting)      Primary cancer of peritoneum (H)        PAST SURGICAL HISTORY:   Past Surgical History:   Procedure Laterality Date     APPENDECTOMY       BREAST SURGERY      biopsy negative      SECTION       COLONOSCOPY N/A 2017    Procedure: COLONOSCOPY;  Surgeon: Luis Richardson MD;  Location:  GI     COLONOSCOPY N/A 2017    Procedure: COMBINED  COLONOSCOPY, SINGLE OR MULTIPLE BIOPSY/POLYPECTOMY BY BIOPSY;  Surgeon: Luis Richardson MD;  [facility]: [facility]     COMBINED CYSTOSCOPY, RETROGRADES, EXCHANGE STENT URETER(S) Bilateral 7/18/2016    Procedure: COMBINED CYSTOSCOPY, RETROGRADES, EXCHANGE STENT URETER(S);  Surgeon: Carmela Alvarez MD;  [facility]     COMBINED CYSTOSCOPY, RETROGRADES, EXCHANGE STENT URETER(S)  4/2016    @ [facility]     COMBINED CYSTOSCOPY, RETROGRADES, EXCHANGE STENT URETER(S) Bilateral 10/17/2016    Procedure: COMBINED CYSTOSCOPY, RETROGRADES, EXCHANGE STENT URETER(S);  Surgeon: Carmela Alvarez MD;  [facility]     COMBINED CYSTOSCOPY, RETROGRADES, EXCHANGE STENT URETER(S) Bilateral 12/7/2016    Procedure: COMBINED CYSTOSCOPY, RETROGRADES, EXCHANGE STENT URETER(S);  Surgeon: Carmela Alvarez MD;  [facility]     COMBINED CYSTOSCOPY, RETROGRADES, EXCHANGE STENT URETER(S) Bilateral 2/27/2017    Procedure: COMBINED CYSTOSCOPY, RETROGRADES, EXCHANGE STENT URETER(S);  Surgeon: Carmela Alvarez MD;  [facility]     COMBINED CYSTOSCOPY, RETROGRADES, EXCHANGE STENT URETER(S) Bilateral 6/12/2017    Procedure: COMBINED CYSTOSCOPY, RETROGRADES, EXCHANGE STENT URETER(S);  Cystoscopy, Bilateral Stent Exchange, Bilateral Retrograde Pyelogram;  Surgeon: Carmela Alvarez MD;  [facility]     ESOPHAGOSCOPY, GASTROSCOPY, DUODENOSCOPY (EGD), COMBINED N/A 1/28/2017    Procedure: COMBINED ESOPHAGOSCOPY, GASTROSCOPY, DUODENOSCOPY (EGD);  Surgeon: Luis Richardson MD;  [facility]     HYSTERECTOMY TOTAL ABDOMINAL, BILATERAL SALPINGO-OOPHORECTOMY, NODE DISSECTION, COMBINED  11/7/2013    Procedure: COMBINED HYSTERECTOMY TOTAL ABDOMINAL, SALPINGO-OOPHORECTOMY, NODE DISSECTION;;  Surgeon: Cheri Aguilar MD;  [facility]     INJECT NERVE BLOCK CELIAC PLEXUS Left 4/20/2017    Procedure: INJECT NERVE BLOCK CELIAC PLEXUS;  Left splanchnic nerve block;  Surgeon: Shabbir Valladares MD;   [facility]     INJECT NERVE BLOCK CELIAC PLEXUS Left 5/18/2017    Procedure: INJECT NERVE BLOCK CELIAC PLEXUS;  Left Splanchnic Nerve Block;  Surgeon: Shabbir Valladares MD;  [facility]     LAPAROSCOPIC SALPINGO-OOPHORECTOMY  11/7/2013    Procedure: LAPAROSCOPIC SALPINGO-OOPHORECTOMY;  Diagnostic Laparoscopy, Exploratory Laparotomy, Bilateral Salpingo-Oophorectomy,  Hysterectomy, Omentectomy, Pelvic Washings, Peritoneal staging and biopsies, Pelvic and Periaortic Lymph node Dissection;  Surgeon: Cheri Aguilar MD;  [facility]     LAPAROTOMY, STAGING, COMBINED  11/7/2013    Procedure: COMBINED LAPAROTOMY, STAGING;;  Surgeon: Cheri Aguilar MD;  [facility]     LYMPH NODE BIOPSY  2008    Left posterior chain, beign     OPEN REDUCTION INTERNAL FIXATION TOE(S)  9/3/13    Fifth digit, left foot, with screw fixation      REMOVE PORT PERITONEAL  5/5/2014    Procedure: REMOVE PORT PERITONEAL;  Surgeon: Cheri Aguilar MD;  [facility]       FAMILY HISTORY:   Family History   Problem Relation Age of Onset     Arthritis Father      Myocardial Infarction Father      secondary to anesthesia     Colon Cancer Father      DIABETES Other      Uncle     Hypertension Mother      Other - See Comments Mother      cognitive decline     Skin Cancer Mother      Hypertension Sister      HEART DISEASE Other      unknown valve replacement     Bladder Cancer Sister      Skin Cancer Brother        SOCIAL HISTORY:   Social History   Substance Use Topics     Smoking status: Former Smoker     Smokeless tobacco: Former User      Comment: Quit over 20 years ago     Alcohol use No       Current Outpatient Prescriptions   Medication     buprenorphine (BUTRANS) 5 MCG/HR WK patch     Docusate Sodium (COLACE PO)     LORazepam (ATIVAN) 1 MG tablet     prochlorperazine (COMPAZINE) 10 MG tablet     enoxaparin (LOVENOX) 40 MG/0.4ML injection     tolterodine (DETROL LA) 4 MG 24 hr capsule     ondansetron (ZOFRAN-ODT) 8 MG ODT tab      "amLODIPine (NORVASC) 2.5 MG tablet     tamsulosin (FLOMAX) 0.4 MG capsule     ACETAMINOPHEN PO     Multiple Vitamins-Minerals (ONE DAILY MULTIVITAMIN WOMEN) TABS     polyethylene glycol (MIRALAX/GLYCOLAX) powder     cycloSPORINE (RESTASIS) 0.05 % ophthalmic emulsion     Ranitidine HCl (ZANTAC PO)     MELATONIN PO     EPINEPHrine (EPIPEN) 0.3 MG/0.3ML injection     No current facility-administered medications for this visit.      Facility-Administered Medications Ordered in Other Visits   Medication     heparin 100 UNIT/ML injection 5 mL     sodium chloride (PF) 0.9% PF flush 10-20 mL     PHYSICAL EXAM:  /75 (BP Location: Right arm, Patient Position: Chair, Cuff Size: Adult Small)  Pulse 97  Ht 1.676 m (5' 6\")  Wt 53 kg (116 lb 12.8 oz)  BMI 18.85 kg/m2    HEENT: Normocephalic and atraumatic   Cardiac: Not done  Back/Flank: Not done  CNS/PNS: Not done  Respiratory: Normal non-labored breathing  Abdomen: Soft nontender and nondistended  Peripheral Vascular: Not done  Mental Status: Not done    Penis: Not done  Scrotal Skin: Not done  Testicles: Not done  Epididymis: Not done  Digital Rectal Exam:     Cystoscopy: Not done    Imaging: None    Urinalysis: [facility] RESULTS:  Recent Labs   Lab Test  10/28/16   1150   COLOR  Yellow   APPEARANCE  Slightly Cloudy   URINEGLC  Negative   URINEBILI  Negative   URINEKETONE  Negative   SG  1.018   UBLD  Large*   URINEPH  6.0   PROTEIN  100*   NITRITE  Negative   LEUKEST  Moderate*   RBCU  168*   WBCU  8*       PSA:     Post Void Residual:     Other labs: None today      IMPRESSION:  62 y/o F with bilateral hydronephrosis     PLAN:  Will schedule for left stent exchange, antegrade right stent placement       Total Time: 15 minutes                                 Total in Consultation: greater than 50%     Discussed hydro, PNT and PNT care, stent placement and exchange     Again, thank you for allowing me to participate in the care of your patient.      Sincerely,    Carmela" Naila Alvarez MD

## 2017-06-28 NOTE — PROGRESS NOTES
Office Visit Note      UROLOGIC DIAGNOSES:   Hydronephrosis     CURRENT INTERVENTIONS:   Ureteral stents     HISTORY:   Patient with history of primary peritoneal carcinoma found to have bilateral renal obstruction. Has had multiple stent exchanges, often complicated by hematuria.   Right with poor function. Left side shows some hydronephrosis and patient notes intermittent left flank pain that has improved with upsizing.    After most recent stent exchange, we could not replace the stent on the right side due to likely UPJO. Patient had no issues at first but then developed right flank pain. Had PNT placed.      We discussed antegrade placement of ureteral stent on the right using PNT access. Also discussed regular stent exchanges.     Patient inquired about nephrectomy for poorly functioning right kidney, we discussed this option as well.   PAST MEDICAL HISTORY:   Past Medical History:   Diagnosis Date     Anemia      History of blood transfusion     MULTIPLE     Hydronephrosis      Hyperlipidemia      Jugular vein thrombosis, right     Persistent right internal jugular DVT     Livedo annularis      Osteoarthritis      Osteopenia      Ovarian cancer (H)      Polymyalgia rheumatica (H)      PONV (postoperative nausea and vomiting)      Primary cancer of peritoneum (H)        PAST SURGICAL HISTORY:   Past Surgical History:   Procedure Laterality Date     APPENDECTOMY       BREAST SURGERY      biopsy negative      SECTION       COLONOSCOPY N/A 2017    Procedure: COLONOSCOPY;  Surgeon: Luis Richardson MD;  [facility]     COLONOSCOPY N/A 2017    Procedure: COMBINED COLONOSCOPY, SINGLE OR MULTIPLE BIOPSY/POLYPECTOMY BY BIOPSY;  Surgeon: Luis Richardson MD;  [facility]     COMBINED CYSTOSCOPY, RETROGRADES, EXCHANGE STENT URETER(S) Bilateral 2016    Procedure: COMBINED CYSTOSCOPY, RETROGRADES, EXCHANGE STENT URETER(S);  Surgeon: Carmela Alvarez MD;  [facility]      COMBINED CYSTOSCOPY, RETROGRADES, EXCHANGE STENT URETER(S)  4/2016    @ [facility]     COMBINED CYSTOSCOPY, RETROGRADES, EXCHANGE STENT URETER(S) Bilateral 10/17/2016    Procedure: COMBINED CYSTOSCOPY, RETROGRADES, EXCHANGE STENT URETER(S);  Surgeon: Carmela Alvarez MD;  [facility]     COMBINED CYSTOSCOPY, RETROGRADES, EXCHANGE STENT URETER(S) Bilateral 12/7/2016    Procedure: COMBINED CYSTOSCOPY, RETROGRADES, EXCHANGE STENT URETER(S);  Surgeon: Carmela Alvarez MD;  [facility]     COMBINED CYSTOSCOPY, RETROGRADES, EXCHANGE STENT URETER(S) Bilateral 2/27/2017    Procedure: COMBINED CYSTOSCOPY, RETROGRADES, EXCHANGE STENT URETER(S);  Surgeon: Carmela Alvarez MD;  [facility]     COMBINED CYSTOSCOPY, RETROGRADES, EXCHANGE STENT URETER(S) Bilateral 6/12/2017    Procedure: COMBINED CYSTOSCOPY, RETROGRADES, EXCHANGE STENT URETER(S);  Cystoscopy, Bilateral Stent Exchange, Bilateral Retrograde Pyelogram;  Surgeon: Carmela Alvarez MD;  [facility]     ESOPHAGOSCOPY, GASTROSCOPY, DUODENOSCOPY (EGD), COMBINED N/A 1/28/2017    Procedure: COMBINED ESOPHAGOSCOPY, GASTROSCOPY, DUODENOSCOPY (EGD);  Surgeon: Luis Richardson MD;  [facility]     HYSTERECTOMY TOTAL ABDOMINAL, BILATERAL SALPINGO-OOPHORECTOMY, NODE DISSECTION, COMBINED  11/7/2013    Procedure: COMBINED HYSTERECTOMY TOTAL ABDOMINAL, SALPINGO-OOPHORECTOMY, NODE DISSECTION;;  Surgeon: Cheri Aguilar MD;  [facility]     INJECT NERVE BLOCK CELIAC PLEXUS Left 4/20/2017    Procedure: INJECT NERVE BLOCK CELIAC PLEXUS;  Left splanchnic nerve block;  Surgeon: Shabbir Valladares MD;  [facility]     INJECT NERVE BLOCK CELIAC PLEXUS Left 5/18/2017    Procedure: INJECT NERVE BLOCK CELIAC PLEXUS;  Left Splanchnic Nerve Block;  Surgeon: Shabbir Valladares MD;  [facility]     LAPAROSCOPIC SALPINGO-OOPHORECTOMY  11/7/2013    Procedure: LAPAROSCOPIC SALPINGO-OOPHORECTOMY;  Diagnostic Laparoscopy, Exploratory  Laparotomy, Bilateral Salpingo-Oophorectomy,  Hysterectomy, Omentectomy, Pelvic Washings, Peritoneal staging and biopsies, Pelvic and Periaortic Lymph node Dissection;  Surgeon: Cheri Aguilar MD;  [facility]     LAPAROTOMY, STAGING, COMBINED  11/7/2013    Procedure: COMBINED LAPAROTOMY, STAGING;;  Surgeon: Cheri Aguliar MD;  [facility]     LYMPH NODE BIOPSY  2008    Left posterior chain, beign     OPEN REDUCTION INTERNAL FIXATION TOE(S)  9/3/13    Fifth digit, left foot, with screw fixation      REMOVE PORT PERITONEAL  5/5/2014    Procedure: REMOVE PORT PERITONEAL;  Surgeon: Cheri Aguilar MD;  [facility]       FAMILY HISTORY:   Family History   Problem Relation Age of Onset     Arthritis Father      Myocardial Infarction Father      secondary to anesthesia     Colon Cancer Father      DIABETES Other      Uncle     Hypertension Mother      Other - See Comments Mother      cognitive decline     Skin Cancer Mother      Hypertension Sister      HEART DISEASE Other      unknown valve replacement     Bladder Cancer Sister      Skin Cancer Brother        SOCIAL HISTORY:   Social History   Substance Use Topics     Smoking status: Former Smoker     Smokeless tobacco: Former User      Comment: Quit over 20 years ago     Alcohol use No       Current Outpatient Prescriptions   Medication     buprenorphine (BUTRANS) 5 MCG/HR WK patch     Docusate Sodium (COLACE PO)     LORazepam (ATIVAN) 1 MG tablet     prochlorperazine (COMPAZINE) 10 MG tablet     enoxaparin (LOVENOX) 40 MG/0.4ML injection     tolterodine (DETROL LA) 4 MG 24 hr capsule     ondansetron (ZOFRAN-ODT) 8 MG ODT tab     amLODIPine (NORVASC) 2.5 MG tablet     tamsulosin (FLOMAX) 0.4 MG capsule     ACETAMINOPHEN PO     Multiple Vitamins-Minerals (ONE DAILY MULTIVITAMIN WOMEN) TABS     polyethylene glycol (MIRALAX/GLYCOLAX) powder     cycloSPORINE (RESTASIS) 0.05 % ophthalmic emulsion     Ranitidine HCl (ZANTAC PO)     MELATONIN PO      "EPINEPHrine (EPIPEN) 0.3 MG/0.3ML injection     No current facility-administered medications for this visit.      Facility-Administered Medications Ordered in Other Visits   Medication     heparin 100 UNIT/ML injection 5 mL     sodium chloride (PF) 0.9% PF flush 10-20 mL         PHYSICAL EXAM:    /75 (BP Location: Right arm, Patient Position: Chair, Cuff Size: Adult Small)  Pulse 97  Ht 1.676 m (5' 6\")  Wt 53 kg (116 lb 12.8 oz)  BMI 18.85 kg/m2    HEENT: Normocephalic and atraumatic   Cardiac: Not done  Back/Flank: Not done  CNS/PNS: Not done  Respiratory: Normal non-labored breathing  Abdomen: Soft nontender and nondistended  Peripheral Vascular: Not done  Mental Status: Not done    Penis: Not done  Scrotal Skin: Not done  Testicles: Not done  Epididymis: Not done  Digital Rectal Exam:     Cystoscopy: Not done    Imaging: None    Urinalysis: [facility] RESULTS:  Recent Labs   Lab Test  10/28/16   1150   COLOR  Yellow   APPEARANCE  Slightly Cloudy   URINEGLC  Negative   URINEBILI  Negative   URINEKETONE  Negative   SG  1.018   UBLD  Large*   URINEPH  6.0   PROTEIN  100*   NITRITE  Negative   LEUKEST  Moderate*   RBCU  168*   WBCU  8*       PSA:     Post Void Residual:     Other labs: None today      IMPRESSION:  60 y/o F with bilateral hydronephrosis     PLAN:  Will schedule for left stent exchange, antegrade right stent placement       Total Time: 15 minutes                                 Total in Consultation: greater than 50%     Discussed hydro, PNT and PNT care, stent placement and exchange             "

## 2017-06-28 NOTE — PROGRESS NOTES
Infusion Nursing Note:  Margaux Guzmán presents today for D22C1 Taxol.    Patient seen by provider today: Yes: Lizet Truong NP   present during visit today: Not Applicable.    Note: N/A.    Intravenous Access:  Implanted Port.    Treatment Conditions:  Lab Results   Component Value Date    HGB 8.6 06/28/2017     Lab Results   Component Value Date    WBC 7.8 06/28/2017      Lab Results   Component Value Date    ANEU 5.9 06/28/2017     Lab Results   Component Value Date     06/28/2017      Lab Results   Component Value Date     06/14/2017                   Lab Results   Component Value Date    POTASSIUM 3.6 06/28/2017           Lab Results   Component Value Date    MAG 1.9 06/28/2017            Lab Results   Component Value Date    CR 0.82 06/14/2017                   Lab Results   Component Value Date    PURVI 9.1 06/14/2017                Lab Results   Component Value Date    BILITOTAL 0.4 06/14/2017           Lab Results   Component Value Date    ALBUMIN 3.4 06/14/2017                    Lab Results   Component Value Date    ALT 17 06/14/2017           Lab Results   Component Value Date    AST 14 06/14/2017     Results reviewed, labs MET treatment parameters, ok to proceed with treatment.      Post Infusion Assessment:  Patient tolerated infusion without incident.  Blood return noted pre and post infusion.  Site patent and intact, free from redness, edema or discomfort.  No evidence of extravasations.  Access discontinued per protocol.    Discharge Plan:   Patient will return 7/10/17 for next appointment.  Patient discharged in stable condition accompanied by: daughter.  Departure Mode: Ambulatory.    Delmi James RN

## 2017-06-28 NOTE — PROGRESS NOTES
Follow Up Notes on Referred Patient    Date: 2017        RE: Margaux Guzmán  : 1955  MELANI: 2017      Margaux Guzmán is a 61 year old woman with a history of primary peritoneal cancer.  She was recently on the Tesaro study but unfortunately had progression. She is here today for follow up and disease management.      Brief Oncology History:  The patient is a 61 year old  female who initially presented for evaluation of pelvic and abdominal pain. On exam, she had mild right adnexal tenderness and slightly enlarged right ovary freely mobile and smooth. This prompted an abdominal ultrasound, which was normal, and a pelvic US that showed anechoic right ovarian cyst measuring 4.3 x 3.8 x 4.0 cm. Her  was elevated at 74 on 10/8/13. We reviewed the possible diagnosis including ovarian cancer versus benign ovarian cyst. Approach to surgery, alternatives to surgery and plan for possible extended open surgical staging based on the operative findings was discussed. In light of the size of the ovary we are offering an initial approach with minimally invasive surgery. After discussion of risks and benefits, consent was obtained.  13: Diagnostic laparoscopy converted to exploratory laparotomy, bilateral salpingo-oophorectomy, total abdominal hysterectomy, omentectomy, staging biopsies, bilateral pelvic and periaortic lymph node dissection and washings. Stage IIIB  13: Cycle #1 IV/IP Taxol/CDDP  14: Cycle #2 IV/IP PCP.  - 14  14: Cycle 3 IV/IP.  - 7  14:   7. CT C/A/P Impression:  1. Multiple bilateral pulmonary nodules as described in full report measuring up to 3 mm along the right major fissure. These are indeterminate given lack of comparison and followup is recommended.  2. No definite evidence for recurrent or metastatic disease in the abdomen or the pelvis. There is a rounded hypodense focus abutting the left external iliac artery and  the adjacent sigmoid colon which measures 1 cm, this could represent a fluid-filled sigmoid diverticulum or a hypodense node, further attention to this area on subsequent examinations is recommended.  3. There is a 7 mm nonobstructing stone in the inferior collecting system of the right kidney.  2/12/14-3/26/14: Cycle #4-6 IV/IP  7, 7, 7.  4/21/14: CT C/A/P Impression:   1. Unchanged indeterminate pulmonary nodules. Recommend continued surveillance.   2. No definite evidence of metastatic ovarian cancer in the abdomen or pelvis. Small amount of subtle increased thickening and fluid is noted along the right lymph node dissection, nonspecific, but possibly a tiny developing lymphocele.   3. 6 mm nonobstructing stone in the right kidney.  Plan surveillance for ovarian cancer every 3 months with physical and .   Indeterminate pulmonary nodule: These were present before the surgery and there was not change with the chemotherapy. Highly unlikely to be a metastatic disease. Will repeat a CT in 3 months to see any change   5/22/14:  6. JOSEMANUEL.  5/17/14; ED visit [facility]. CT abd/pel noted possible chronic partial small bowel obstruction. Colonscopy scheduled for June 6, 2014 locally. Abdominal pain started 5/17/14 and noted pressure without bowel movement and went to ED that night due to increasing pain. In patient 2 day hospital with clear liquids/IV. Mild nausea without vomiting. BM subsequently occurred and pt was discharged. No pain since. Continues with fullness in abdomen. Diet is bland for the most part.  8/25/14:  5. Notes increased abdominal girth and bloating x 2-3 weeks with urine urgency. Worried about recurrence. Is just starting to return to normal exercise and activities. No constipation, diarrhea, pain, vaginal or rectal bleeding, cough or dyspnea. CT to follow indeterminate pulmonary nodules today.   CT cap IMPRESSION:   1. No CT scan findings of the chest,  abdomen, or pelvis to indicate metastatic disease.  2. 2-5 mm pulmonary nodules, stable since 2/11/2014.  3. Nonobstructing 6 mm stone in the right kidney.  12/3/14:  8.. Pt started zoloft for anxiety. Worries about cancer recurrence.   3/2/15:  34  3/5/15: CT C/A/P: Impression:  1. Multiple new small peritoneal and retroperitoneal nodules are suspicious for metastases. Suspicious new left common iliac and central small bowel mesenteric nodes. No abdominal ascites.  2. Multiple pulmonary nodules are stable with no new nodules.  3. 9 mm nonobstructing right lower pole renal calculus.      5/20/15: CT c/a/p showed nodularity throughout the abdomen and pelvis worrisome for malignancy which has increased in size   5/28/15: CT abdomen and pelvis showed stable inumerabble peritoneal nodules scattered throughout the abdomen and pelvis consistent with metastases.    8/4/15 (approximatly): Left ureteral stent was placed.  8/12/15:  from [place] 303   8/18/15: CT chest Impression showed slight increase in mild, subtle nodularity along the diaphragm which is nonspecific but may represent metastatic disease.   8/18/15: CT abdomen and pelvis Impression showed interval progression of peritoneal metastatic disease.       8/25/15:  on 3/2/15 of 34 prompted CT c/a/p, which showed multiple new small peritoneal and retroperitoneal nodules are suspicious for metastases. Suspicious new left common iliac and central small bowel mesenteric nodes. She participated in [facility] study involving treatment with on clinical trial with vaccine [id number]. She is here today because the [place] trail failed and the pt pregressed. Her most recent CT c/a/p on 8/25 showed progression of peritoneal metastatic disease. And her most recent  from [place] on 8/12/15 was 303.      Plan: Carbo/Doxil x 6 cycles.with imaging after 3 cycles.       9/3/15: Cycle #1 Carbo/Doxil.  244.  9/24/15: right IJ thrombus; started on  "Lovenox.   9/26/15: Present to [place] ED. \"presented to the ED this morning with what appears to be a mild drug rash after administering her lovenox this morning. This writer discussed situation with Gyn Onc Fellow Jeanne Moon as well as [facility] Heme/Onc service. Per Heme, a rash of this nature is extremely uncommon with administration of Lovenox. Given the mild nature of this rash and acute need for anticoagulation, recommended the patient continue with Lovenox injections and treat with Benadryl as needed. Advised patient be given precautions to return to the ED immediately if she develops reactive respiratory symptoms. Alternatively patient could be switched to alternative formulation of low molecular weight heparin if she was strongly resistant to continuation of Lovenox.\"      10/1/15: Cycle #2 Carbo/Doxil.  175.      10/28/15: gyn onc visit: pt complains of worsening constipation and tenderness around her right ribs. She is taking senna for her constipation with minimal improvement. She admits that the swelling around her neck after her blood clot has decreased. She also admits to feeling a nodules on her left abdomen. She states her appetite is good but she has not been eating fruits and vegetables. She denies any chemo side effects besides fatigue.       10/28/15:  158  11/23/2015:  133  CT c/a/p IMPRESSION: In this patient with a clinical history of ovarian cancer there is mixed response to therapy:   1. Stable to slightly decreased peritoneal nodularity since 8/18/2015.  2. No significant change regarding the large subdiaphragmatic peritoneal deposit since 9/24/2015.  3. Enlarging soft tissue nodule overlying the abdominal musculature concerning for metastasis since March of 2015.   4. Unchanged, indeterminate hypodensity near the gallbladder fossa since 8/18/2015, however progressively increased in size since  3/5/2015.  5. Bilateral nephroureteral stents. No significant hydronephrosis.  6. " Bilateral pulmonary nodules. Continued followup recommended.      12/23/15: Cycle #5 carboplatin/Doxil/Avastin.  96. To receive Avastin today despite proteinuria per Dr. Aguilar and nephrology.  1/21/16: Cycle #6 carboplatin/Doxil/Avastin, delayed one week secondary to neutropenia.  62.  1/28/16:  63.      2/2/16: Patient had right upper extremity doppler u/s done in [place] due to swollen glands and pain. 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 Pt is currently on lovenox.      2/2/16: US soft tissue neck  Conclusion:  1. Morphologically normal not pathologically enlarged two right carotid chain lymph nodes.  2. Chronic right jugular vein thrombosis, described in detail on  venous doppler exam.       2/2/16: Thyroid Ultrasound  Right lobe: 5.5 x 1.9 x 1.2 cm  Left lobe: 5.0 x 1.5 . 0.9 cm  Both lobes have normal background echotexture.      Conclusion:  1. 3 benign - appearing subcentimeter hypoechoic right mid thyroid nodules.  2. Borderline thyromegaly.      2/2/16: Right upper extremity venous duplex doppler evaluation  Conclusion:  1.) chronic non compressible echogenic right jugular vein thrombus, now 4.3 cm in length.       2/22/16:   IMPRESSION:    1. Ovarian cancer with peritoneal carcinomatosis.  2. Given the increased sensitivity of PET/CT, comparison with prior CT examinations is difficult. In general the abdominal/pelvic metastatic lesions appear to be decreased in size.  3. Persistent right internal jugular DVT, as evidenced by 5 cm filling defect with inferior border at the central venous catheter.  4. Bilateral hydronephrosis without overt caliectasis. Some distal migration of the bilateral double-J ureteral stents, although the  proximal coiled portions continue to be in the renal pelves.      2/24/16: Cycle #7 carboplatin/Doxil/Avastin.  56.  3/9/16:  Hgb 6.6, worked up for transfusion reaction (negative). Bleed possibly secondary to   3/23/16: Cycle #8 carboplatin/Doxil/Avastin.  44.  4/2016: Hospitalized in [place] x2 weeks for hematuria leading to anemia after ureteral stent exchange  4/20/16: Cycle #9 carboplatin/Doxil/Avastin. Deferred one week secondary to acute UTI.  35.   4/28/16: Cycle #9 deferred due to continued bacteruria and ANC 1.3.  5/4/16: Cycle #9 carboplatin/Doxil/Avastin. Breast lump noted, diagnostic mammogram ordered.  6/1/16: Cycle #10 carboplatin/Doxil/Avastin.  50.  6/28/16: Cycle #11 carboplatin/Doxil/Avastin. Avastin held due to bleeding. Carbo/Doxil deferred one week secondary to neutropenia.  43.  7/7/16: Cycle #12 carboplatin/Doxil. Avastin held due to bleeding.  7/18/16: Bilateral ureteral stent exchange  7/20/16-7/29/16: Hospitalized with urosepsis and blood loss anemia, started on Zosyn and discharged on amoxicillin  9/29/16-11/11/16: Cycle #1-3 Gemzar.  85, 106, 111.      12/12/16: CT CAP IMPRESSION: In this patient with a history of metastatic ovarian cancer:  1. Progression of disease as evidenced by a slowly enlarging mesenteric and omental soft tissue foci and slowly enlarging  periesophageal and pericardiophrenic lymph nodes, as detailed above.  2. Stable appearance of bilateral ureteral stents without hydronephrosis.  3. Patient was premedicated for a pre-existing IV contrast allergy. Despite this, she had itchiness on her face poststudy. She should no longer receive IV contrast in the future.        CT AP 1/10/2017: multiple dilated fluid filled small bowel loops with decompressed distal small bowel loops, consistent with obstruction. Transition point is suspected in the pelvis. No pneumatosis or free intraperitoneal gas. Bilateral ureteral stents appear appropriately positioned. There is mild dilation of the bilateral renal collecting systems, left greater than right.      1/23/17: admitted  to clinic for dizziness and heart palpitations   1/27/17: ED admission - leg swelling and GI bleeding       1/27/17: US lower extremity IMPRESSION:  1.  No evidence of right lower extremity deep venous thrombosis.  2.  Dampened waveforms in the right lower extremity, similar to 7/25/2016, though a more central occlusion cannot be excluded.  1/27/17: MRI Brain IMPRESSION:  Normal brain MRI  1/27/17: CT AP IMPRESSION: This patient with a history of metastatic ovarian cancer:  1. No bowel obstruction. Oral contrast progressed to the colon. Extensive colonic stool.  2. Severe right hydronephrosis, new from 12/12/2016, may represent obstruction of the right ureteral stent. No left-sided hydronephrosis.  3. Slight interval progression of diffuse peritoneal metastatic disease.  4. Mildly nodular areas of increased attenuation within the pelvic bowel which may represent loculated malignant ascites or peritoneal implants similar in appearance to 12/12/2016.      1/28/17: colonoscopy- benign       Treatment: Tesaro/Quadra Niraparib study  1/5/17-2/28/17: Cycle #1-3       2/28/17: CT scan ([facility]) IMPRESSION: In this patient with a history of ovarian cancer, there is mild disease progression as follows:  1. Increase in size and number of multiple subcentimeter nodules/intrafissural lymph nodes along the right major and minor  fissures and also bilateral pulmonary nodules, predominantly along the diaphragmatic pleura.   2. Slight increase in size of small lymph nodes in the right internal mammary region and bilateral anterior cardiophrenic region.  3. Stable to slight increase in size of deposits in the perihepatic region. No significant change in the omental deposits in the left  upper quadrant of the abdomen.  4. Stable to slight increase in size of mesenteric deposits/lymph nodes.  5. Bilateral nephroureteric stent in place. Atrophy of the right kidney with interval resolution of right hydronephrosis. Unchanged  mild  "left hydronephrosis. Air within the collecting systems and bladder likely related to prior intervention.  6. Dilation of proximal small bowel loops with interspersed areas of narrowing. No progression of oral contrast into the ileum. Moderate amount of fecal material in the colon. Partial/early small bowel obstruction is possible.  3/30/17: CT cap IMPRESSION: Images patient with a history of ovarian cancer, overall findings of stable disease includin. No acute findings to suggest etiology of severe left flank pain.  2. Unchanged pulmonary nodules/pleural nodularity and thoracic lymph nodes.  3. Unchanged perihepatic and left upper quadrant omental deposits as well as diffuse mesenteric lymphadenopathy.  4. Unchanged hypoattenuating focus in the hepatic dome.  5. Unchanged placement of bilateral nephroureteral stents. Unchanged mild to moderate left hydronephrosis. Stable atrophy of the right kidney.  6. Unchanged 8 mm right flank soft tissue nodule.       17: left splanchnic ganglion block. FNA of lesion in umbilicus completed; results pending.       3/30/17: CT AP IMPRESSION: Images patient with a history of ovarian cancer, overall findings of stable disease includin. No acute findings to suggest etiology of severe left flank pain.  2. Unchanged pulmonary nodules/pleural nodularity and thoracic lymph nodes.  3. Unchanged perihepatic and left upper quadrant omental deposits as well as diffuse mesenteric lymphadenopathy.  4. Unchanged hypoattenuating focus in the hepatic dome.  5. Unchanged placement of bilateral nephroureteral stents. Unchanged mild to moderate left hydronephrosis. Stable atrophy of the right kidney.  6. Unchanged 8 mm right flank soft tissue nodule.      2017: FNA- \"Lump in belly button\", palpation guided fine needle aspiration:   - Positive for Malignancy   - Metastatic adenocarcinoma, consistent with Müllerian origin (see comment)   Specimen Adequacy: Satisfactory for evaluation. " "      4/27/17: CT CAP IMPRESSION: In this patient with history of metastatic ovarian carcinoma: Stable disease.  1. Unchanged pulmonary nodules and thoracic lymph nodes.  2. There are two nodules in the right lower quadrant posterior mesentery, which are stable to minimally increased on current study  and slightly increased from 03/15/17. Recommend attention on follow up. Otherwise stable peritoneal tumor metastasis.  3. New fluid attenuation collection in the central presacral pelvis, HU measures 12, likely ascitic fluid, not present on prior study.  Attention on follow-up.  4. Stable bilateral nephroureteral stents and mild hydronephrosis, with left greater than right fullness of the renal hilar region.     Plan to start weekly Paclitaxel.         5/22/17: Cycle #1 weekly Paclitaxel.   743. Received 3 weeks, then off 2 weeks, then given one week.  5/30/17: Transfusion 1 unit RBC  6/8/17: Hgb 6.5; received 2 units RBC outside hospital  6/9/17: Hgb 8.5  6/12/17: Cystoscopy, Bilateral Stent Exchange, Bilateral Retrograde Pyelogram  6/14/17: PNT placed. On Macrobid x 10 days (completed 6/26)  6/28/17: Cycle #1 D22 Paclitaxel.         Today she comes to clinic reporting she completed her antibiotic on Monday. She states she does feel fatigued and is wondering what her Hgb is. She states she received two units of blood earlier this month as she \"crashed\". She is not taking the iron supplement as this causes her constipation. She reports she is passing small amounts of stool and is taking Miralax BID as well as Colace BID now in attempts to have a larger BM. She does have some constipation from this at times. She also has noticed another \"lump/tumor\" in her upper abdomen and reports the tumor in her umbilicus is getting a little larger but is not draining. She denies any vaginal bleeding, no changes in her bladder (PNT draining; she has some residual flank discomfort at times) habits, no new nausea/emesis, no lower " extremity edema, and no difficulties eating or sleeping. She denies any abdominal discomfort/bloating, no fevers or chills, and no chest pain or shortness of breath. She has a BP cuff at home and reports readings of 110-120's/60's.         Review of Systems:    Systemic           no weight changes; no fever; no chills; no night sweats; no appetite changes  Skin           no rashes, or lesions  Eye           no irritation; no changes in vision  Hue-Laryngeal           no dysphagia; no hoarseness   Pulmonary    no cough; no shortness of breath  Cardiovascular    no chest pain; no palpitations  Gastrointestinal    no diarrhea; + constipation; + abdominal pain; no changes in bowel habits; no blood in stool  Genitourinary   no urinary frequency; no urinary urgency; no dysuria; no pain; no abnormal vaginal discharge; no abnormal vaginal bleeding  Breast    no breast discharge; no breast changes; no breast pain  Musculoskeletal    no myalgias; no arthralgias; no back pain  Psychiatric           no depressed mood; no anxiety    Hematologic               no tender lymph nodes; no noticeable swellings or lumps   Endocrine    no hot flashes; no heat/cold intolerance         Neurological   no tremor; no numbness and tingling; no headaches; no difficulty sleeping      Past Medical History:    Past Medical History:   Diagnosis Date     Anemia      History of blood transfusion     MULTIPLE     Hydronephrosis      Hyperlipidemia      Jugular vein thrombosis, right     Persistent right internal jugular DVT     Livedo annularis      Osteoarthritis      Osteopenia      Ovarian cancer (H)      Polymyalgia rheumatica (H)      PONV (postoperative nausea and vomiting)      Primary cancer of peritoneum (H)          Past Surgical History:    Past Surgical History:   Procedure Laterality Date     APPENDECTOMY       BREAST SURGERY      biopsy negative      SECTION       COLONOSCOPY N/A 2017    Procedure: COLONOSCOPY;   Surgeon: Luis Richardson MD;  Location: [facility]     COLONOSCOPY N/A 1/28/2017    Procedure: COMBINED COLONOSCOPY, SINGLE OR MULTIPLE BIOPSY/POLYPECTOMY BY BIOPSY;  Surgeon: Luis Richardson MD;  Location: [facility]     COMBINED CYSTOSCOPY, RETROGRADES, EXCHANGE STENT URETER(S) Bilateral 7/18/2016    Procedure: COMBINED CYSTOSCOPY, RETROGRADES, EXCHANGE STENT URETER(S);  Surgeon: Carmela Alvarez MD;  Location: [facility]     COMBINED CYSTOSCOPY, RETROGRADES, EXCHANGE STENT URETER(S)  4/2016    @ [facility]     COMBINED CYSTOSCOPY, RETROGRADES, EXCHANGE STENT URETER(S) Bilateral 10/17/2016    Procedure: COMBINED CYSTOSCOPY, RETROGRADES, EXCHANGE STENT URETER(S);  Surgeon: Carmela Alvarez MD;  [facility]: [facility]     COMBINED CYSTOSCOPY, RETROGRADES, EXCHANGE STENT URETER(S) Bilateral 12/7/2016    Procedure: COMBINED CYSTOSCOPY, RETROGRADES, EXCHANGE STENT URETER(S);  Surgeon: Carmela Alvarez MD;  [facility]     COMBINED CYSTOSCOPY, RETROGRADES, EXCHANGE STENT URETER(S) Bilateral 2/27/2017    Procedure: COMBINED CYSTOSCOPY, RETROGRADES, EXCHANGE STENT URETER(S);  Surgeon: Carmela Alvarez MD;  [facility]     COMBINED CYSTOSCOPY, RETROGRADES, EXCHANGE STENT URETER(S) Bilateral 6/12/2017    Procedure: COMBINED CYSTOSCOPY, RETROGRADES, EXCHANGE STENT URETER(S);  Cystoscopy, Bilateral Stent Exchange, Bilateral Retrograde Pyelogram;  Surgeon: Carmela Alavrez MD;  [facility]     ESOPHAGOSCOPY, GASTROSCOPY, DUODENOSCOPY (EGD), COMBINED N/A 1/28/2017    Procedure: COMBINED ESOPHAGOSCOPY, GASTROSCOPY, DUODENOSCOPY (EGD);  Surgeon: Luis Richardson MD;  Location: [facility]     HYSTERECTOMY TOTAL ABDOMINAL, BILATERAL SALPINGO-OOPHORECTOMY, NODE DISSECTION, COMBINED  11/7/2013    Procedure: COMBINED HYSTERECTOMY TOTAL ABDOMINAL, SALPINGO-OOPHORECTOMY, NODE DISSECTION;;  Surgeon: Cheri Aguilar MD;  [facility]: [facility]     INJECT NERVE BLOCK CELIAC PLEXUS Left 4/20/2017    Procedure:  INJECT NERVE BLOCK CELIAC PLEXUS;  Left splanchnic nerve block;  Surgeon: Shabbir Valladares MD;  [facility]     INJECT NERVE BLOCK CELIAC PLEXUS Left 5/18/2017    Procedure: INJECT NERVE BLOCK CELIAC PLEXUS;  Left Splanchnic Nerve Block;  Surgeon: Shabbir Valladares MD;  [facility]     LAPAROSCOPIC SALPINGO-OOPHORECTOMY  11/7/2013    Procedure: LAPAROSCOPIC SALPINGO-OOPHORECTOMY;  Diagnostic Laparoscopy, Exploratory Laparotomy, Bilateral Salpingo-Oophorectomy,  Hysterectomy, Omentectomy, Pelvic Washings, Peritoneal staging and biopsies, Pelvic and Periaortic Lymph node Dissection;  Surgeon: Cheri Aguilar MD;  [facility]: [facility]     LAPAROTOMY, STAGING, COMBINED  11/7/2013    Procedure: COMBINED LAPAROTOMY, STAGING;;  Surgeon: Cheri Aguilar MD;  [facility]: [facility]     LYMPH NODE BIOPSY  2008    Left posterior chain, beign     OPEN REDUCTION INTERNAL FIXATION TOE(S)  9/3/13    Fifth digit, left foot, with screw fixation      REMOVE PORT PERITONEAL  5/5/2014    Procedure: REMOVE PORT PERITONEAL;  Surgeon: Cheri Aguilar MD;  Location: [facility]         Health Maintenance Due   Topic Date Due     PHQ-9 Q1YR  1955     TETANUS IMMUNIZATION (SYSTEM ASSIGNED)  11/24/1972     HEPATITIS C SCREENING  11/24/1972     LIPID SCREEN Q5 YR FEMALE (SYSTEM ASSIGNED)  11/24/1999       Current Medications:     Current Outpatient Prescriptions   Medication Sig Dispense Refill     buprenorphine (BUTRANS) 5 MCG/HR WK patch Place 1 patch onto the skin every 7 days 4 patch 3     Docusate Sodium (COLACE PO) Take 50 mg by mouth daily       LORazepam (ATIVAN) 1 MG tablet Take 1 tablet (1 mg) by mouth every 6 hours as needed (Anxiety, Nausea/Vomiting or Sleep) 30 tablet 2     prochlorperazine (COMPAZINE) 10 MG tablet Take 1 tablet (10 mg) by mouth every 6 hours as needed (Nausea/Vomiting) 30 tablet 2     enoxaparin (LOVENOX) 40 MG/0.4ML injection INJECT 1 SYRINGE SUBCUTANEOUSLY DAILY 12 mL 1     tolterodine (DETROL LA) 4 MG 24 hr  "capsule TAKE ONE CAPSULE BY MOUTH DAILY 30 capsule 3     ondansetron (ZOFRAN-ODT) 8 MG ODT tab Place 1 tablet (8 mg) under the tongue every 8 hours as needed for nausea 90 tablet 3     amLODIPine (NORVASC) 2.5 MG tablet Take 1 tablet (2.5 mg) by mouth daily 30 tablet      tamsulosin (FLOMAX) 0.4 MG capsule Take 1 capsule (0.4 mg) by mouth daily 60 capsule 6     ACETAMINOPHEN PO Take 500 mg by mouth every 6 hours as needed for pain Reported on 3/30/2017       Multiple Vitamins-Minerals (ONE DAILY MULTIVITAMIN WOMEN) TABS Take 1 tablet by mouth every morning        polyethylene glycol (MIRALAX/GLYCOLAX) powder Take 1 capful by mouth daily as needed        cycloSPORINE (RESTASIS) 0.05 % ophthalmic emulsion Place 1 drop into both eyes 2 times daily        Ranitidine HCl (ZANTAC PO) Take 75 mg by mouth daily        MELATONIN PO Take 1 mg by mouth At Bedtime        EPINEPHrine (EPIPEN) 0.3 MG/0.3ML injection Inject 0.3 mLs (0.3 mg) into the muscle once as needed for anaphylaxis 1 each 0         Allergies:        Allergies   Allergen Reactions     Contrast Dye Itching and Swelling     Itching/tingling of mouth and nose with sensation of swelling after receiving IV contrast for CT.  This occurred despite steroid premedication. Therefore the patient should not receive intravenous contrast in the future.      Benadryl Allergy Other (See Comments)     Stay awake, restless, \"uncomfortable\", \"feel like I need to run away\"  With  IV dose,  does fine with oral Benadryl.     Lovenox [Enoxaparin] Hives     3/23/16: Okay to receive heparin flushes in port, tolerates well. Patricia Cortez FNP-C     Amoxicillin Rash     Diphenhydramine Anxiety     No Clinical Screening - See Comments Rash     Pollen Extract Rash     Sulfa Drugs Rash        Social History:     Social History   Substance Use Topics     Smoking status: Former Smoker     Smokeless tobacco: Former User      Comment: Quit over 20 years ago     Alcohol use No       History " "  Drug Use No         Family History:     The patient's family history is notable for:    Family History   Problem Relation Age of Onset     Arthritis Father      Myocardial Infarction Father      secondary to anesthesia     Colon Cancer Father      DIABETES Other      Uncle     Hypertension Mother      Other - See Comments Mother      cognitive decline     Skin Cancer Mother      Hypertension Sister      HEART DISEASE Other      unknown valve replacement     Bladder Cancer Sister      Skin Cancer Brother          Physical Exam:     /80  Pulse 100  Temp 98.6  F (37  C) (Oral)  Resp 18  Ht 1.676 m (5' 5.98\")  Wt 52.2 kg (115 lb)  SpO2 99%  BMI 18.57 kg/m2  Body mass index is 18.57 kg/(m^2).    General Appearance: healthy and alert, no distress     HEENT: no thyromegaly, no palpable nodules or masses        Cardiovascular: regular rate and rhythm, no gallops, rubs or murmurs     Respiratory: lungs clear, no rales, rhonchi or wheezes, normal diaphragmatic excursion    Musculoskeletal: extremities non tender and without edema    Skin: no lesions or rashes     Neurological: normal gait, no gross defects     Psychiatric: appropriate mood and affect                               Hematological: normal cervical, supraclavicular lymph nodes     Gastrointestinal:       abdomen soft, non-tender, non-distended, no organomegaly or masses    Genitourinary: not indicated      Assessment:    Margaux Guzmán is a 61 year old woman with a diagnosis of history of primary peritoneal cancer.  She was recently on the Tesaro study but unfortunately had progression. She is here today for follow up and disease management.    25 minutes were spent with this patient, over 50% of that time was spent in symptom management, treatment planning and in counseling and coordination of care.      Plan:     1.)        Ok to proceed with planned chemotherapy pending labs are WNL. Will have her check her CBC closer to home next week and " tentatively plan for a blood transfusion of one unit 7/7; she will see her PCP sooner if she feels like her Hgb has dropped. Will have her have a week off and then return for her next cycle to start 7/10. Reviewed signs and symptoms for when she should contact the clinic or seek additional care. Patient to contact the clinic with any questions or concerns in the interim.     2.) Genetic risk factors were assessed and she is negative for mutations in BRCA1, BRCA2, EPCAM, MLH1, MSH2, MSH6, PMS2, PTEN and TP53.    3.) Labs and/or tests ordered include: chemo labs.      4.) Health maintenance issues addressed today include annual health maintenance and non-gynecologic issues with PCP. Reviewed importance of eating iron rich foods.    5.)        HTN: to have BP rechecked in infusion.  Continue to check BP at home.     JUSTIN Lombardo, WHNP-BC, ANP-BC  Women's Health Nurse Practitioner  Adult Nurse Pracitioner  Gynecologic Oncology          CC  Patient Care Team:  Aurelia Knox as PCP - General (Family Practice)  Jessica Galvin, RN as Continuity Care Coordinator (Gyn-Onc)  Jessica Galvin, RN as Continuity Care Coordinator (Oncology)  Lizet Truong APRN CNP (Nurse Practitioner - Women's Health)  Derrek Gamble MD as MD (Nephrology)  Patricia Murcia APRN CNP as Nurse Practitioner (Nurse Practitioner)  Elissa Wheeler MD as MD (Infectious Diseases)  Carmela Alvarez MD as MD (Urology)  Keira Rolle, RN as Registered Nurse (Urology)  Sophia Garcia PA-C as Referring Physician (Physician Assistant)  Aurelia Knox (Family Practice)  Yuliya Perez MD as MD (Internal Medicine)

## 2017-06-28 NOTE — MR AVS SNAPSHOT
After Visit Summary   6/28/2017    Margaux Guzmán    MRN: [id number]           Patient Information     Date Of Birth          1955        Visit Information        Provider Department      6/28/2017 2:00 PM [address]        Today's Diagnoses     Temperature elevated    -  1    Other iron deficiency anemia        Chemotherapy-induced neutropenia (H)        Ovarian cancer, left (H)        Ovarian cancer, right (H)        Primary peritoneal adenocarcinoma (H)           Follow-ups after your visit        Your next 10 appointments already scheduled     Jul 07, 2017 12:00 PM CDT   LAB with LAB ONC [facility] ([facility])    [address] [place] [id number] [phone]           Patient must bring picture ID.  Patient should be prepared to give a urine specimen  Please do not eat 10-12 hours before your appointment if you are coming in fasting for labs on lipids, cholesterol, or glucose (sugar).  Pregnant women should follow their Care Team instructions. Water with medications is okay. Do not drink coffee or other fluids.   If you have concerns about taking  your medications, please ask at office or if scheduling via [employer], send a message by clicking on Secure Messaging, Message Your Care Team.            Jul 07, 2017  1:15 PM CDT   Level 2 with [address] ([facility])    [address] [facility] [address] [phone]            Jul 10, 2017  8:00 AM CDT   Return Visit with NURSE ONLY CANCER CENTER   [facility])    [address] [facility] [address] [phone]            Jul 10, 2017  8:30 AM CDT   Return Visit with JUSTIN Patrick CNP   [facility])    [address] [facility] [id number] [phone]             Jul 10, 2017  9:00 AM CDT   Level 2 with BAY 7 INFUSION   [facility] ([facility])    [address] [facility] [address]   [phone]            Jul 17, 2017  9:00 AM CDT   Return Visit with [facility]SE ONLY CANCER CENTER   [facility] ([facility])    [address] [facility] [address]   [phone]            Jul 17, 2017  9:30 AM CDT   Level 2 with BAY 8 INFUSION   [facility] ([facility])    [address] [facility] [address]   [phone]            Jul 24, 2017  8:30 AM CDT   Return Visit with NURSE ONLY CANCER CENTER   [facility] ([facility])    [address] [facility] [address]   [phone]            Jul 24, 2017   Procedure with Carmela Alvarez MD   [facility] ([facility])    [address] [place] [address]  [address] [place] [id number] [phone]           Located in the Clinics and Surgery Center at [address] [place] [address].   parking is very convenient and highly recommended.  is a $6 flat rate fee.  Both  and self parkers should enter the main arrival plaza from [facility]; parking attendants will direct you based on your parking preference.            Jul 24, 2017  9:00 AM CDT   Level 2 with BAY 7 INFUSION   [facility] ([facility])    [address] [facility] [address] [phone]              Future tests that were ordered for you today     Open Future Orders        Priority Expected Expires Ordered    CBC with platelets differential Routine  6/28/2018 6/28/2017            Who to contact     If you have questions or need follow up information about today's clinic visit or your schedule please contact [facility] directly at [phone].  Normal or  non-critical lab and imaging results will be communicated to you by [employer], letter or phone within 4 business days after the clinic has received the results. If you do not hear from us within 7 days, please contact the clinic through [employer] or phone. If you have a critical or abnormal lab result, we will notify you by phone as soon as possible.  Submit refill requests through [employer] or call your pharmacy and they will forward the refill request to us. Please allow 3 business days for your refill to be completed.          Additional Information About Your Visit        [employer] Information     [employer] gives you secure access to your electronic health record. If you see a primary care provider, you can also send messages to your care team and make appointments. If you have questions, please call your primary care clinic.  If you do not have a primary care provider, please call [phone] and they will assist you.      [employer] is an electronic gateway that provides easy, online access to your medical records. With [employer], you can request a clinic appointment, read your test results, renew a prescription or communicate with your care team.     To access your existing account, please contact your [facility] or call [phone] for assistance.        Care EveryWhere ID     This is your Care EveryWhere ID. This could be used by other organizations to access your [facility] medical records  [id number]        Your Vitals Were     Pulse Temperature Respirations             93 99.2  F (37.3  C) (Oral) 18          Blood Pressure from Last 3 Encounters:   06/28/17 125/68   06/28/17 141/80   06/28/17 138/75    Weight from Last 3 Encounters:   06/28/17 52.2 kg (115 lb)   06/28/17 53 kg (116 lb 12.8 oz)   06/12/17 53.8 kg (118 lb 9.6 oz)              Today, you had the following     No orders found for display       Primary Care Provider Office Phone # Fax #    Aurelia MCLAUGHLIN Johnrodríguez [phone]  [phone]       [facility] [address] [facility] [address]        Equal Access to Services     TRAVSI PHILLIPS : Hadii kelly bunn bayron Helm, nikiada ricardohudson, felecia dela cruz, stacy johnnain hayaajose rafael overtonfestus lubin laAtulrosalind buchanan. So [facility] [phone].    ATENCIÓN: Si habla español, tiene a almodovar disposición servicios gratuitos de asistencia lingüística. Llame al [phone].    We comply with applicable federal civil rights laws and [place] laws. We do not discriminate on the basis of race, color, national origin, age, disability sex, sexual orientation or gender identity.            Thank you!     Thank you for choosing [facility]  for your care. Our goal is always to provide you with excellent care. Hearing back from our patients is one way we can continue to improve our services. Please take a few minutes to complete the written survey that you may receive in the mail after your visit with us. Thank you!             Your Updated Medication List - Protect others around you: Learn how to safely use, store and throw away your medicines at www.disposemymeds.org.          This list is accurate as of: 6/28/17  4:31 PM.  Always use your most recent med list.                   Brand Name Dispense Instructions for use Diagnosis    ACETAMINOPHEN PO      Take 500 mg by mouth every 6 hours as needed for pain Reported on 3/30/2017        amLODIPine 2.5 MG tablet    NORVASC    30 tablet    Take 1 tablet (2.5 mg) by mouth daily    Primary peritoneal adenocarcinoma (H)       buprenorphine 5 MCG/HR WK patch    BUTRANS    4 patch    Place 1 patch onto the skin every 7 days    Cancer associated pain       COLACE PO      Take 50 mg by mouth daily        cycloSPORINE 0.05 % ophthalmic emulsion    RESTASIS     Place 1 drop into both eyes 2 times daily        enoxaparin 40 MG/0.4ML injection    LOVENOX    12 mL    INJECT 1 SYRINGE SUBCUTANEOUSLY DAILY    Ovarian cancer, left (H), Ovarian cancer, right  (H)       EPINEPHrine 0.3 MG/0.3ML injection     1 each    Inject 0.3 mLs (0.3 mg) into the muscle once as needed for anaphylaxis    Preventative health care, Primary peritoneal adenocarcinoma (H)       LORazepam 1 MG tablet    ATIVAN    30 tablet    Take 1 tablet (1 mg) by mouth every 6 hours as needed (Anxiety, Nausea/Vomiting or Sleep)    Other iron deficiency anemia, Chemotherapy-induced neutropenia (H), Ovarian cancer, left (H), Ovarian cancer, right (H), Primary peritoneal adenocarcinoma (H)       MELATONIN PO      Take 1 mg by mouth At Bedtime        ondansetron 8 MG ODT tab    ZOFRAN-ODT    90 tablet    Place 1 tablet (8 mg) under the tongue every 8 hours as needed for nausea    Ovarian cancer, right (H), Nausea       ONE DAILY MULTIVITAMIN WOMEN Tabs      Take 1 tablet by mouth every morning        polyethylene glycol powder    MIRALAX/GLYCOLAX     Take 1 capful by mouth daily as needed        prochlorperazine 10 MG tablet    COMPAZINE    30 tablet    Take 1 tablet (10 mg) by mouth every 6 hours as needed (Nausea/Vomiting)    Other iron deficiency anemia, Chemotherapy-induced neutropenia (H), Ovarian cancer, left (H), Ovarian cancer, right (H), Primary peritoneal adenocarcinoma (H)       tamsulosin 0.4 MG capsule    FLOMAX    60 capsule    Take 1 capsule (0.4 mg) by mouth daily    Primary peritoneal adenocarcinoma (H)       tolterodine 4 MG 24 hr capsule    DETROL LA    30 capsule    TAKE ONE CAPSULE BY MOUTH DAILY    Hydronephrosis, unspecified hydronephrosis type       ZANTAC PO      Take 75 mg by mouth daily

## 2017-06-28 NOTE — PROGRESS NOTES
"Patient's name and  were verified.  See Doc Flowsheet - IV assess for details.  IVAD accessed with 20G 3/4\" sahu gripper plus needle  blood return positive: YES  Site without redness, tenderness or swelling: YES  flushed with 30cc NS and 5cc 100u/ml heparin  Needle: Left accessed for infusion    Comments: Labs drawn.  Patient tolerated procedure without incident.    Ree Estrada  RN, BSN, OCN        "

## 2017-07-05 NOTE — PROGRESS NOTES
Order for CBC faxed to [facility] at fax number [phone] as per patient's request.  Patient would like to have this drawn closer to home this week.      Pedro Pablo Dunn RN, BSN, OCN  Care Coordinator  [facility]  [phone]

## 2017-07-06 NOTE — PROGRESS NOTES
Received CBC results from [facility].  Patient's hemoglobin today is 8.3.  Provider Lizet Truong CNP was notified - per Lizet, PRBC transfusion not needed this week.  Infusion appointment for 7/7 cancelled, patient will RTC on Monday 7/10 as scheduled.  Call placed to patient with results and plan.  Patient verbalizes understanding.      Pedro Pablo Dunn, RN, BSN, OCN  Care Coordinator  [facility]  [phone]

## 2017-07-10 PROBLEM — Z51.11 ENCOUNTER FOR ANTINEOPLASTIC CHEMOTHERAPY: Status: ACTIVE | Noted: 2017-01-01

## 2017-07-10 NOTE — NURSING NOTE
"Oncology Rooming Note    July 10, 2017 8:29 AM   Margaux Guzmán is a 61 year old female who presents for:    Chief Complaint   Patient presents with     Oncology Clinic Visit     f/u prior to tx     Initial Vitals: There were no vitals taken for this visit. Estimated body mass index is 18.57 kg/(m^2) as calculated from the following:    Height as of 6/28/17: 1.676 m (5' 5.98\").    Weight as of 6/28/17: 52.2 kg (115 lb). There is no height or weight on file to calculate BSA.  Data Unavailable Comment: Data Unavailable   No LMP recorded. Patient has had a hysterectomy.  Allergies reviewed: Yes  Medications reviewed: Yes    Medications: Medication refills not needed today.  Pharmacy name entered into [employer]:    [id number] - [place], [place] - [address] AT [facility] [address]  [id number] - [place], [place] - [address]  [facility] [place], [place] - [address] [facility] [place] [address]  [place] [address] [place] [address] [id number] [place] - [address] [id number] [place] - [address] [id number] - [place], [place] - [address] [facility] [address]    Clinical concerns:     8 minutes for nursing intake (face to face time)     MORRO NEAL LPN              "

## 2017-07-10 NOTE — PROGRESS NOTES
Infusion Nursing Note:  Margaux Guzmán presents today for C2 D1 Taxol and 1 unit PRBC.    Patient seen by provider today: Yes: Lizet Truong NP   present during visit today: Not Applicable.    Note: N/A.    Intravenous Access:  Implanted Port.    Treatment Conditions:  Lab Results   Component Value Date    HGB 7.6 07/10/2017     Lab Results   Component Value Date    WBC 6.6 07/10/2017      Lab Results   Component Value Date    ANEU 5.1 07/10/2017     Lab Results   Component Value Date     07/10/2017      Lab Results   Component Value Date     07/10/2017                   Lab Results   Component Value Date    POTASSIUM 3.5 07/10/2017           Lab Results   Component Value Date    MAG 1.8 07/10/2017            Lab Results   Component Value Date    CR 0.78 07/10/2017                   Lab Results   Component Value Date    PURVI 9.1 07/10/2017                Lab Results   Component Value Date    BILITOTAL 0.2 07/10/2017           Lab Results   Component Value Date    ALBUMIN 2.6 07/10/2017                    Lab Results   Component Value Date    ALT 12 07/10/2017           Lab Results   Component Value Date    AST 8 07/10/2017     Results reviewed, labs MET treatment parameters, ok to proceed with treatment.  Blood transfusion consent signed 4/20/17.      Post Infusion Assessment:  Patient tolerated infusion without incident.  Blood return noted pre and post infusion.  Access discontinued per protocol.    Discharge Plan:   Patient will return 7/17/17 for next appointment.   Patient discharged in stable condition accompanied by: self.  Departure Mode: Ambulatory.    Grace Perry RN

## 2017-07-10 NOTE — MR AVS SNAPSHOT
After Visit Summary   7/10/2017    Margaux Guzmán    MRN: [id number]           Patient Information     Date Of Birth          1955        Visit Information        Provider Department      7/10/2017 8:30 AM Lizet Truong APRN CNP [facility]        Today's Diagnoses     Ovarian cancer, left (H)    -  1    Ovarian cancer, right (H)        Encounter for long-term current use of medication        Encounter for antineoplastic chemotherapy        Chemotherapy-induced neutropenia (H)        Primary peritoneal adenocarcinoma (H)        Other iron deficiency anemia           Follow-ups after your visit        Your next 10 appointments already scheduled     Jul 17, 2017  9:00 AM CDT   Return Visit with NURSE ONLY CANCER CENTER   [facility] ([facility]    [address] [place] [address]   [phone]            Jul 17, 2017  9:30 AM CDT   Level 2 with BAY 8 INFUSION   [facility] ([facility])    [address] [place] [address]   [phone]            Jul 24, 2017  8:30 AM CDT   Return Visit with NURSE ONLY CANCER CENTER   [facility] ([facility])    [address] [facility] [address]   [phone]            Jul 24, 2017  9:00 AM CDT   Level 2 with BAY 7 INFUSION   [facility] ([facility])    [address] [place] [address]   [phone]            Jul 24, 2017   Procedure with Carmela Alvarez MD   [facility] ([facility])    [address] [place], [place] [address] [phone]           Located in the Clinics and Surgery Center at [address] [place] [address].   parking is very convenient and highly recommended.  is a $6 flat rate fee.  Both  and self parkers should enter  the main arrival plaza from [facility]; parking attendants will direct you based on your parking preference.            Jul 31, 2017  9:00 AM CDT   Return Visit with NURSE ONLY CANCER CENTER   [facility] ([facility])    [address] [facility] [address]   [phone]            Jul 31, 2017  9:30 AM CDT   Level 2 with BAY 8 INFUSION   [facility] ([facility])    [address] [facility] [address] [phone]            Aug 07, 2017  8:30 AM CDT   Return Visit with NURSE ONLY CANCER CENTER   [facility] ([facility])    [address] [facility] [address] [phone]            Aug 07, 2017  9:00 AM CDT   Level 2 with BAY 6 INFUSION   [facility] ([facility])    [address] [facility] [address] [phone]            Aug 14, 2017  9:00 AM CDT   Return Visit with NURSE ONLY CANCER CENTER   [facility] ([facility])    [address] [facility] [address] [phone]              Future tests that were ordered for you today     Open Standing Orders        Priority Remaining Interval Expires Ordered    Red blood cell prepare order unit Routine 99/100 CONDITIONAL (SPECIFY) BLOOD  7/10/2017            Who to contact     If you have questions or need follow up information about today's clinic visit or your schedule please contact [facility] directly at [phone].  Normal or non-critical lab and imaging results will be communicated to you by MyChart, letter or phone within 4 business days after the clinic has received the results. If you do not hear from us within 7 days, please contact the clinic through MyChart or phone. If you have a critical or abnormal lab result, we will notify you by phone as soon as possible.  Submit refill requests through [employer]  "or call your pharmacy and they will forward the refill request to us. Please allow 3 business days for your refill to be completed.          Additional Information About Your Visit        [employer] Information     [employer] gives you secure access to your electronic health record. If you see a primary care provider, you can also send messages to your care team and make appointments. If you have questions, please call your primary care clinic.  If you do not have a primary care provider, please call [phone] and they will assist you.      [employer] is an electronic gateway that provides easy, online access to your medical records. With [employer], you can request a clinic appointment, read your test results, renew a prescription or communicate with your care team.     To access your existing account, please contact your [facility] or call [phone] for assistance.        Care EveryWhere ID     This is your Care EveryWhere ID. This could be used by other organizations to access your [facility] medical records  [id number]        Your Vitals Were     Pulse Temperature Respirations Height Pulse Oximetry BMI (Body Mass Index)    106 98.7  F (37.1  C) (Oral) 18 1.676 m (5' 5.98\") 100% 18.57 kg/m2       Blood Pressure from Last 3 Encounters:   07/10/17 114/68   07/10/17 113/67   06/28/17 125/68    Weight from Last 3 Encounters:   07/10/17 52.2 kg (115 lb)   06/28/17 52.2 kg (115 lb)   06/28/17 53 kg (116 lb 12.8 oz)              Today, you had the following     No orders found for display       Primary Care Provider Office Phone # Fax #    Aurelia L Johnaden [phone]       [facility] [address] [facility] [address]        Equal Access to Services     TRAVIS PHILLIPS : Marcel Helm, jillian willson, stacy reddy. So [facility] [phone].    ATENCIÓN: Si habla español, tiene a almodovar disposición servicios " lopez de asistencia lingüística. Mariely myers [phone].    We comply with applicable federal civil rights laws and [place] laws. We do not discriminate on the basis of race, color, national origin, age, disability sex, sexual orientation or gender identity.            Thank you!     Thank you for choosing [facility]  for your care. Our goal is always to provide you with excellent care. Hearing back from our patients is one way we can continue to improve our services. Please take a few minutes to complete the written survey that you may receive in the mail after your visit with us. Thank you!             Your Updated Medication List - Protect others around you: Learn how to safely use, store and throw away your medicines at www.disposemymeds.org.          This list is accurate as of: 7/10/17  2:21 PM.  Always use your most recent med list.                   Brand Name Dispense Instructions for use Diagnosis    ACETAMINOPHEN PO      Take 500 mg by mouth every 6 hours as needed for pain Reported on 3/30/2017        amLODIPine 2.5 MG tablet    NORVASC    30 tablet    Take 1 tablet (2.5 mg) by mouth daily    Primary peritoneal adenocarcinoma (H)       buprenorphine 5 MCG/HR WK patch    BUTRANS    4 patch    Place 1 patch onto the skin every 7 days    Cancer associated pain       COLACE PO      Take 50 mg by mouth daily        cycloSPORINE 0.05 % ophthalmic emulsion    RESTASIS     Place 1 drop into both eyes 2 times daily        enoxaparin 40 MG/0.4ML injection    LOVENOX    12 mL    INJECT 1 SYRINGE SUBCUTANEOUSLY DAILY    Ovarian cancer, left (H), Ovarian cancer, right (H)       EPINEPHrine 0.3 MG/0.3ML injection     1 each    Inject 0.3 mLs (0.3 mg) into the muscle once as needed for anaphylaxis    Preventative health care, Primary peritoneal adenocarcinoma (H)       LORazepam 1 MG tablet    ATIVAN    30 tablet    Take 1 tablet (1 mg) by mouth every 6 hours as needed (Anxiety, Nausea/Vomiting  or Sleep)    Other iron deficiency anemia, Chemotherapy-induced neutropenia (H), Ovarian cancer, left (H), Ovarian cancer, right (H), Primary peritoneal adenocarcinoma (H)       MELATONIN PO      Take 1 mg by mouth At Bedtime        ondansetron 8 MG ODT tab    ZOFRAN-ODT    90 tablet    Place 1 tablet (8 mg) under the tongue every 8 hours as needed for nausea    Ovarian cancer, right (H), Nausea       ONE DAILY MULTIVITAMIN WOMEN Tabs      Take 1 tablet by mouth every morning        polyethylene glycol powder    MIRALAX/GLYCOLAX     Take 1 capful by mouth daily as needed        prochlorperazine 10 MG tablet    COMPAZINE    30 tablet    Take 1 tablet (10 mg) by mouth every 6 hours as needed (Nausea/Vomiting)    Other iron deficiency anemia, Chemotherapy-induced neutropenia (H), Ovarian cancer, left (H), Ovarian cancer, right (H), Primary peritoneal adenocarcinoma (H)       tamsulosin 0.4 MG capsule    FLOMAX    60 capsule    Take 1 capsule (0.4 mg) by mouth daily    Primary peritoneal adenocarcinoma (H)       tolterodine 4 MG 24 hr capsule    DETROL LA    30 capsule    TAKE ONE CAPSULE BY MOUTH DAILY    Hydronephrosis, unspecified hydronephrosis type       ZANTAC PO      Take 75 mg by mouth daily

## 2017-07-10 NOTE — MR AVS SNAPSHOT
After Visit Summary   7/10/2017    Margaux Guzmán    MRN: [id number]           Patient Information     Date Of Birth          1955        Visit Information        Provider Department      7/10/2017 9:00 AM [facility]        Today's Diagnoses     Temperature elevated    -  1    Other iron deficiency anemia        Chemotherapy-induced neutropenia (H)        Ovarian cancer, left (H)        Ovarian cancer, right (H)        Primary peritoneal adenocarcinoma (H)        Encounter for long-term current use of medication        Anemia due to other cause           Follow-ups after your visit        Your next 10 appointments already scheduled     Jul 17, 2017  9:00 AM CDT   Return Visit with NURSE ONLY CANCER CENTER   [facility] ([facility])    [address] [place] [address]   [phone]            Jul 17, 2017  9:30 AM CDT   Level 2 with BAY 8 INFUSION   [facility] ([facility])    [address] [place] [address]   [phone]            Jul 24, 2017  8:30 AM CDT   Return Visit with NURSE ONLY CANCER CENTER   [facility] ([facility])    [address] [facility] [address]   [phone]            Jul 24, 2017  9:00 AM CDT   Level 2 with [facility] ([facility])    [address] [facility] [address]   [phone]            Jul 24, 2017   Procedure with Carmela Alvarez MD   [facility] ([facility])    [address] [place], [place] [address] [phone]           Located in the Clinics and Surgery Center at [address] [place] [address].   parking is very convenient and highly recommended.  is a $6 flat rate fee.  Both  and self parkers should enter the  main arrival chelle from [facility]; parking attendants will direct you based on your parking preference.            Jul 31, 2017  9:00 AM CDT   Return Visit with NURSE ONLY CANCER CENTER   [facility] ([facility])    [address] [facility] [place] [facility] [address]   [phone]            Jul 31, 2017  9:30 AM CDT   Level 2 with BAY 8 INFUSION   [facility] ([facility])    [address] [facility] [address] [phone]            Aug 07, 2017  8:30 AM CDT   Return Visit with NURSE ONLY CANCER CENTER   [facility] ([facility])    [address] [facility] [address] [phone]            Aug 07, 2017  9:00 AM CDT   Level 2 with BAY 6 INFUSION   [facility] ([facility])    [address] [facility] [address] [phone]            Aug 14, 2017  9:00 AM CDT   Return Visit with NURSE ONLY CANCER CENTER   [facility] ([facility])    [address] [facility] [address] [phone]              Future tests that were ordered for you today     Open Standing Orders        Priority Remaining Interval Expires Ordered    Red blood cell prepare order unit Routine 99/100 CONDITIONAL (SPECIFY) BLOOD  7/10/2017            Who to contact     If you have questions or need follow up information about today's clinic visit or your schedule please contact [facility] directly at [phone].  Normal or non-critical lab and imaging results will be communicated to you by MyChart, letter or phone within 4 business days after the clinic has received the results. If you do not hear from us within 7 days, please contact the clinic through MyChart or phone. If you have a critical or abnormal lab result, we will notify you by phone as soon as possible.  Submit refill requests through [employer] or  call your pharmacy and they will forward the refill request to us. Please allow 3 business days for your refill to be completed.          Additional Information About Your Visit        [employer] Information     [employer] gives you secure access to your electronic health record. If you see a primary care provider, you can also send messages to your care team and make appointments. If you have questions, please call your primary care clinic.  If you do not have a primary care provider, please call [phone] and they will assist you.      [employer] is an electronic gateway that provides easy, online access to your medical records. With [employer], you can request a clinic appointment, read your test results, renew a prescription or communicate with your care team.     To access your existing account, please contact your [facility] or call [phone] for assistance.        Care EveryWhere ID     This is your Care EveryWhere ID. This could be used by other organizations to access your [facility] medical records  [id number]        Your Vitals Were     Pulse Temperature Respirations Pulse Oximetry          91 99  F (37.2  C) 18 93%         Blood Pressure from Last 3 Encounters:   07/10/17 114/68   07/10/17 113/67   06/28/17 125/68    Weight from Last 3 Encounters:   07/10/17 52.2 kg (115 lb)   06/28/17 52.2 kg (115 lb)   06/28/17 53 kg (116 lb 12.8 oz)              We Performed the Following     ABO/Rh type and screen     Blood component     Transfuse red blood cell unit        Primary Care Provider Office Phone # Fax #    Aurelia L [facility] [phone]       [facility] [address] [facility] [address]        Equal Access to Services     [facility] ALAN : Hadii aad ku hadasho Sojonathan, waaxda luqadaha, qaybta kaalmada lanie, stacy buchanan. So [facility] [phone].    ATENCIÓN: Si habla español, tiene a almodovar disposición servicios gratuitos de  asistencia lingüística. Mariely al [phone].    We comply with applicable federal civil rights laws and [place] laws. We do not discriminate on the basis of race, color, national origin, age, disability sex, sexual orientation or gender identity.            Thank you!     Thank you for choosing [facility]  for your care. Our goal is always to provide you with excellent care. Hearing back from our patients is one way we can continue to improve our services. Please take a few minutes to complete the written survey that you may receive in the mail after your visit with us. Thank you!             Your Updated Medication List - Protect others around you: Learn how to safely use, store and throw away your medicines at www.disposemymeds.org.          This list is accurate as of: 7/10/17  1:38 PM.  Always use your most recent med list.                   Brand Name Dispense Instructions for use Diagnosis    ACETAMINOPHEN PO      Take 500 mg by mouth every 6 hours as needed for pain Reported on 3/30/2017        amLODIPine 2.5 MG tablet    NORVASC    30 tablet    Take 1 tablet (2.5 mg) by mouth daily    Primary peritoneal adenocarcinoma (H)       buprenorphine 5 MCG/HR WK patch    BUTRANS    4 patch    Place 1 patch onto the skin every 7 days    Cancer associated pain       COLACE PO      Take 50 mg by mouth daily        cycloSPORINE 0.05 % ophthalmic emulsion    RESTASIS     Place 1 drop into both eyes 2 times daily        enoxaparin 40 MG/0.4ML injection    LOVENOX    12 mL    INJECT 1 SYRINGE SUBCUTANEOUSLY DAILY    Ovarian cancer, left (H), Ovarian cancer, right (H)       EPINEPHrine 0.3 MG/0.3ML injection     1 each    Inject 0.3 mLs (0.3 mg) into the muscle once as needed for anaphylaxis    Preventative health care, Primary peritoneal adenocarcinoma (H)       LORazepam 1 MG tablet    ATIVAN    30 tablet    Take 1 tablet (1 mg) by mouth every 6 hours as needed (Anxiety, Nausea/Vomiting or Sleep)     Other iron deficiency anemia, Chemotherapy-induced neutropenia (H), Ovarian cancer, left (H), Ovarian cancer, right (H), Primary peritoneal adenocarcinoma (H)       MELATONIN PO      Take 1 mg by mouth At Bedtime        ondansetron 8 MG ODT tab    ZOFRAN-ODT    90 tablet    Place 1 tablet (8 mg) under the tongue every 8 hours as needed for nausea    Ovarian cancer, right (H), Nausea       ONE DAILY MULTIVITAMIN WOMEN Tabs      Take 1 tablet by mouth every morning        polyethylene glycol powder    MIRALAX/GLYCOLAX     Take 1 capful by mouth daily as needed        prochlorperazine 10 MG tablet    COMPAZINE    30 tablet    Take 1 tablet (10 mg) by mouth every 6 hours as needed (Nausea/Vomiting)    Other iron deficiency anemia, Chemotherapy-induced neutropenia (H), Ovarian cancer, left (H), Ovarian cancer, right (H), Primary peritoneal adenocarcinoma (H)       tamsulosin 0.4 MG capsule    FLOMAX    60 capsule    Take 1 capsule (0.4 mg) by mouth daily    Primary peritoneal adenocarcinoma (H)       tolterodine 4 MG 24 hr capsule    DETROL LA    30 capsule    TAKE ONE CAPSULE BY MOUTH DAILY    Hydronephrosis, unspecified hydronephrosis type       ZANTAC PO      Take 75 mg by mouth daily

## 2017-07-10 NOTE — PROGRESS NOTES
Follow Up Notes on Referred Patient    Date: 7/10/2017        RE: Margaux Guzmán  : 1955  MELANI: 7/10/2017      Margaux Guzmán is a 61 year old woman with a history of primary peritoneal cancer.  She was recently on the Tesaro study but unfortunately had progression. She is here today for follow up and disease management.       Brief Oncology History:  The patient is a 61 year old  female who initially presented for evaluation of pelvic and abdominal pain. On exam, she had mild right adnexal tenderness and slightly enlarged right ovary freely mobile and smooth. This prompted an abdominal ultrasound, which was normal, and a pelvic US that showed anechoic right ovarian cyst measuring 4.3 x 3.8 x 4.0 cm. Her  was elevated at 74 on 10/8/13. We reviewed the possible diagnosis including ovarian cancer versus benign ovarian cyst. Approach to surgery, alternatives to surgery and plan for possible extended open surgical staging based on the operative findings was discussed. In light of the size of the ovary we are offering an initial approach with minimally invasive surgery. After discussion of risks and benefits, consent was obtained.  13: Diagnostic laparoscopy converted to exploratory laparotomy, bilateral salpingo-oophorectomy, total abdominal hysterectomy, omentectomy, staging biopsies, bilateral pelvic and periaortic lymph node dissection and washings. Stage IIIB  13: Cycle #1 IV/IP Taxol/CDDP  14: Cycle #2 IV/IP PCP.  - 14  14: Cycle 3 IV/IP.  - 7  14:   7. CT C/A/P Impression:  1. Multiple bilateral pulmonary nodules as described in full report measuring up to 3 mm along the right major fissure. These are indeterminate given lack of comparison and followup is recommended.  2. No definite evidence for recurrent or metastatic disease in the abdomen or the pelvis. There is a rounded hypodense focus abutting the left external iliac artery and  the adjacent sigmoid colon which measures 1 cm, this could represent a fluid-filled sigmoid diverticulum or a hypodense node, further attention to this area on subsequent examinations is recommended.  3. There is a 7 mm nonobstructing stone in the inferior collecting system of the right kidney.  2/12/14-3/26/14: Cycle #4-6 IV/IP  7, 7, 7.  4/21/14: CT C/A/P Impression:   1. Unchanged indeterminate pulmonary nodules. Recommend continued surveillance.   2. No definite evidence of metastatic ovarian cancer in the abdomen or pelvis. Small amount of subtle increased thickening and fluid is noted along the right lymph node dissection, nonspecific, but possibly a tiny developing lymphocele.   3. 6 mm nonobstructing stone in the right kidney.  Plan surveillance for ovarian cancer every 3 months with physical and .   Indeterminate pulmonary nodule: These were present before the surgery and there was not change with the chemotherapy. Highly unlikely to be a metastatic disease. Will repeat a CT in 3 months to see any change   5/22/14:  6. JOSEMANUEL.  5/17/14; ED visit [facility]. CT abd/pel noted possible chronic partial small bowel obstruction. Colonscopy scheduled for June 6, 2014 locally. Abdominal pain started 5/17/14 and noted pressure without bowel movement and went to ED that night due to increasing pain. In patient 2 day hospital with clear liquids/IV. Mild nausea without vomiting. BM subsequently occurred and pt was discharged. No pain since. Continues with fullness in abdomen. Diet is bland for the most part.  8/25/14:  5. Notes increased abdominal girth and bloating x 2-3 weeks with urine urgency. Worried about recurrence. Is just starting to return to normal exercise and activities. No constipation, diarrhea, pain, vaginal or rectal bleeding, cough or dyspnea. CT to follow indeterminate pulmonary nodules today.   CT cap IMPRESSION:   1. No CT scan findings of the chest,  abdomen, or pelvis to indicate metastatic disease.  2. 2-5 mm pulmonary nodules, stable since 2/11/2014.  3. Nonobstructing 6 mm stone in the right kidney.  12/3/14:  8.. Pt started zoloft for anxiety. Worries about cancer recurrence.   3/2/15:  34  3/5/15: CT C/A/P: Impression:  1. Multiple new small peritoneal and retroperitoneal nodules are suspicious for metastases. Suspicious new left common iliac and central small bowel mesenteric nodes. No abdominal ascites.  2. Multiple pulmonary nodules are stable with no new nodules.  3. 9 mm nonobstructing right lower pole renal calculus.      5/20/15: CT c/a/p showed nodularity throughout the abdomen and pelvis worrisome for malignancy which has increased in size   5/28/15: CT abdomen and pelvis showed stable inumerabble peritoneal nodules scattered throughout the abdomen and pelvis consistent with metastases.    8/4/15 (approximatly): Left ureteral stent was placed.  8/12/15:  from [place] 303   8/18/15: CT chest Impression showed slight increase in mild, subtle nodularity along the diaphragm which is nonspecific but may represent metastatic disease.   8/18/15: CT abdomen and pelvis Impression showed interval progression of peritoneal metastatic disease.       8/25/15:  on 3/2/15 of 34 prompted CT c/a/p, which showed multiple new small peritoneal and retroperitoneal nodules are suspicious for metastases. Suspicious new left common iliac and central small bowel mesenteric nodes. She participated in [facility] study involving treatment with on clinical trial with vaccine [id number]. She is here today because the [place] trail failed and the pt pregressed. Her most recent CT c/a/p on 8/25 showed progression of peritoneal metastatic disease. And her most recent  from [place] on 8/12/15 was 303.      Plan: Carbo/Doxil x 6 cycles.with imaging after 3 cycles.       9/3/15: Cycle #1 Carbo/Doxil.  244.  9/24/15: right IJ thrombus; started on  "Lovenox.   9/26/15: Present to [facility] ED. \"presented to the ED this morning with what appears to be a mild drug rash after administering her lovenox this morning. This writer discussed situation with Gyn Onc Fellow Jeanne Moon as well as [facility] Heme/Onc service. Per Heme, a rash of this nature is extremely uncommon with administration of Lovenox. Given the mild nature of this rash and acute need for anticoagulation, recommended the patient continue with Lovenox injections and treat with Benadryl as needed. Advised patient be given precautions to return to the ED immediately if she develops reactive respiratory symptoms. Alternatively patient could be switched to alternative formulation of low molecular weight heparin if she was strongly resistant to continuation of Lovenox.\"      10/1/15: Cycle #2 Carbo/Doxil.  175.      10/28/15: gyn onc visit: pt complains of worsening constipation and tenderness around her right ribs. She is taking senna for her constipation with minimal improvement. She admits that the swelling around her neck after her blood clot has decreased. She also admits to feeling a nodules on her left abdomen. She states her appetite is good but she has not been eating fruits and vegetables. She denies any chemo side effects besides fatigue.       10/28/15:  158  11/23/2015:  133  CT c/a/p IMPRESSION: In this patient with a clinical history of ovarian cancer there is mixed response to therapy:   1. Stable to slightly decreased peritoneal nodularity since 8/18/2015.  2. No significant change regarding the large subdiaphragmatic peritoneal deposit since 9/24/2015.  3. Enlarging soft tissue nodule overlying the abdominal musculature concerning for metastasis since March of 2015.   4. Unchanged, indeterminate hypodensity near the gallbladder fossa since 8/18/2015, however progressively increased in size since  3/5/2015.  5. Bilateral nephroureteral stents. No significant hydronephrosis.  6. " Bilateral pulmonary nodules. Continued followup recommended.      12/23/15: Cycle #5 carboplatin/Doxil/Avastin.  96. To receive Avastin today despite proteinuria per Dr. Aguilar and nephrology.  1/21/16: Cycle #6 carboplatin/Doxil/Avastin, delayed one week secondary to neutropenia.  62.  1/28/16:  63.      2/2/16: Patient had right upper extremity doppler u/s done in [place] due to swollen glands and pain. 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 Pt is currently on lovenox.      2/2/16: US soft tissue neck  Conclusion:  1. Morphologically normal not pathologically enlarged two right carotid chain lymph nodes.  2. Chronic right jugular vein thrombosis, described in detail on  venous doppler exam.       2/2/16: Thyroid Ultrasound  Right lobe: 5.5 x 1.9 x 1.2 cm  Left lobe: 5.0 x 1.5 . 0.9 cm  Both lobes have normal background echotexture.      Conclusion:  1. 3 benign - appearing subcentimeter hypoechoic right mid thyroid nodules.  2. Borderline thyromegaly.      2/2/16: Right upper extremity venous duplex doppler evaluation  Conclusion:  1.) chronic non compressible echogenic right jugular vein thrombus, now 4.3 cm in length.       2/22/16:   IMPRESSION:    1. Ovarian cancer with peritoneal carcinomatosis.  2. Given the increased sensitivity of PET/CT, comparison with prior CT examinations is difficult. In general the abdominal/pelvic metastatic lesions appear to be decreased in size.  3. Persistent right internal jugular DVT, as evidenced by 5 cm filling defect with inferior border at the central venous catheter.  4. Bilateral hydronephrosis without overt caliectasis. Some distal migration of the bilateral double-J ureteral stents, although the  proximal coiled portions continue to be in the renal pelves.      2/24/16: Cycle #7 carboplatin/Doxil/Avastin.   56.  3/9/16: Hgb 6.6, worked up for transfusion reaction (negative). Bleed possibly secondary to   3/23/16: Cycle #8 carboplatin/Doxil/Avastin.  44.  4/2016: Hospitalized in [place] x2 weeks for hematuria leading to anemia after ureteral stent exchange  4/20/16: Cycle #9 carboplatin/Doxil/Avastin. Deferred one week secondary to acute UTI.  35.   4/28/16: Cycle #9 deferred due to continued bacteruria and ANC 1.3.  5/4/16: Cycle #9 carboplatin/Doxil/Avastin. Breast lump noted, diagnostic mammogram ordered.  6/1/16: Cycle #10 carboplatin/Doxil/Avastin.  50.  6/28/16: Cycle #11 carboplatin/Doxil/Avastin. Avastin held due to bleeding. Carbo/Doxil deferred one week secondary to neutropenia.  43.  7/7/16: Cycle #12 carboplatin/Doxil. Avastin held due to bleeding.  7/18/16: Bilateral ureteral stent exchange  7/20/16-7/29/16: Hospitalized with urosepsis and blood loss anemia, started on Zosyn and discharged on amoxicillin  9/29/16-11/11/16: Cycle #1-3 Gemzar.  85, 106, 111.      12/12/16: CT CAP IMPRESSION: In this patient with a history of metastatic ovarian cancer:  1. Progression of disease as evidenced by a slowly enlarging mesenteric and omental soft tissue foci and slowly enlarging  periesophageal and pericardiophrenic lymph nodes, as detailed above.  2. Stable appearance of bilateral ureteral stents without hydronephrosis.  3. Patient was premedicated for a pre-existing IV contrast allergy. Despite this, she had itchiness on her face poststudy. She should no longer receive IV contrast in the future.        CT AP 1/10/2017: multiple dilated fluid filled small bowel loops with decompressed distal small bowel loops, consistent with obstruction. Transition point is suspected in the pelvis. No pneumatosis or free intraperitoneal gas. Bilateral ureteral stents appear appropriately positioned. There is mild dilation of the bilateral renal collecting systems, left greater than  right.      1/23/17: admitted to clinic for dizziness and heart palpitations   1/27/17: ED admission - leg swelling and GI bleeding       1/27/17: US lower extremity IMPRESSION:  1.  No evidence of right lower extremity deep venous thrombosis.  2.  Dampened waveforms in the right lower extremity, similar to 7/25/2016, though a more central occlusion cannot be excluded.  1/27/17: MRI Brain IMPRESSION:  Normal brain MRI  1/27/17: CT AP IMPRESSION: This patient with a history of metastatic ovarian cancer:  1. No bowel obstruction. Oral contrast progressed to the colon. Extensive colonic stool.  2. Severe right hydronephrosis, new from 12/12/2016, may represent obstruction of the right ureteral stent. No left-sided hydronephrosis.  3. Slight interval progression of diffuse peritoneal metastatic disease.  4. Mildly nodular areas of increased attenuation within the pelvic bowel which may represent loculated malignant ascites or peritoneal implants similar in appearance to 12/12/2016.      1/28/17: colonoscopy- benign       Treatment: Tesaro/Quadra Niraparib study  1/5/17-2/28/17: Cycle #1-3       2/28/17: [facility]) IMPRESSION: In this patient with a history of ovarian cancer, there is mild disease progression as follows:  1. Increase in size and number of multiple subcentimeter nodules/intrafissural lymph nodes along the right major and minor  fissures and also bilateral pulmonary nodules, predominantly along the diaphragmatic pleura.   2. Slight increase in size of small lymph nodes in the right internal mammary region and bilateral anterior cardiophrenic region.  3. Stable to slight increase in size of deposits in the perihepatic region. No significant change in the omental deposits in the left  upper quadrant of the abdomen.  4. Stable to slight increase in size of mesenteric deposits/lymph nodes.  5. Bilateral nephroureteric stent in place. Atrophy of the right kidney with interval resolution of right  "hydronephrosis. Unchanged  mild left hydronephrosis. Air within the collecting systems and bladder likely related to prior intervention.  6. Dilation of proximal small bowel loops with interspersed areas of narrowing. No progression of oral contrast into the ileum. Moderate amount of fecal material in the colon. Partial/early small bowel obstruction is possible.  3/30/17: CT cap IMPRESSION: Images patient with a history of ovarian cancer, overall findings of stable disease includin. No acute findings to suggest etiology of severe left flank pain.  2. Unchanged pulmonary nodules/pleural nodularity and thoracic lymph nodes.  3. Unchanged perihepatic and left upper quadrant omental deposits as well as diffuse mesenteric lymphadenopathy.  4. Unchanged hypoattenuating focus in the hepatic dome.  5. Unchanged placement of bilateral nephroureteral stents. Unchanged mild to moderate left hydronephrosis. Stable atrophy of the right kidney.  6. Unchanged 8 mm right flank soft tissue nodule.       17: left splanchnic ganglion block. FNA of lesion in umbilicus completed; results pending.       3/30/17: CT AP IMPRESSION: Images patient with a history of ovarian cancer, overall findings of stable disease includin. No acute findings to suggest etiology of severe left flank pain.  2. Unchanged pulmonary nodules/pleural nodularity and thoracic lymph nodes.  3. Unchanged perihepatic and left upper quadrant omental deposits as well as diffuse mesenteric lymphadenopathy.  4. Unchanged hypoattenuating focus in the hepatic dome.  5. Unchanged placement of bilateral nephroureteral stents. Unchanged mild to moderate left hydronephrosis. Stable atrophy of the right kidney.  6. Unchanged 8 mm right flank soft tissue nodule.      2017: FNA- \"Lump in belly button\", palpation guided fine needle aspiration:   - Positive for Malignancy   - Metastatic adenocarcinoma, consistent with Müllerian origin (see comment)   Specimen " "Adequacy: Satisfactory for evaluation.       4/27/17: CT CAP IMPRESSION: In this patient with history of metastatic ovarian carcinoma: Stable disease.  1. Unchanged pulmonary nodules and thoracic lymph nodes.  2. There are two nodules in the right lower quadrant posterior mesentery, which are stable to minimally increased on current study  and slightly increased from 03/15/17. Recommend attention on follow up. Otherwise stable peritoneal tumor metastasis.  3. New fluid attenuation collection in the central presacral pelvis, HU measures 12, likely ascitic fluid, not present on prior study.  Attention on follow-up.  4. Stable bilateral nephroureteral stents and mild hydronephrosis, with left greater than right fullness of the renal hilar region.      Plan to start weekly Paclitaxel.           5/22/17: Cycle #1 weekly Paclitaxel.   743. Received 3 weeks, then off 2 weeks, then given one week.  5/30/17: Transfusion 1 unit RBC  6/8/17: Hgb 6.5; received 2 units RBC outside hospital  6/9/17: Hgb 8.5  6/12/17: Cystoscopy, Bilateral Stent Exchange, Bilateral Retrograde Pyelogram  6/14/17: PNT placed. On Macrobid x 10 days (completed 6/26)  6/28/17: Cycle #1 D22 Paclitaxel.   7/10/17: Cycle #2 weekly Paclitaxel.  pending.         Today she comes to clinic reporting she is feeling \"cruddy\"; she is once again feeling light headed and dizzy at times and feels weak. She states that she feels this way when her Hgb is below 9;she is not able to tolerate any iron supplements secondary to constipation. She reports she is very sensitive to mediations and that her bowels react to many medications; she tends to go between constipation and having softer stools. She has noticed also feeling SOB at times but mostly PASTRANA. She continues to have bouts of nausea, but denies emesis, for which she is taking Compazine the in the morning and Ativan at bedtime. She does not wish to take more than this due to constipation. She has tired " Decadron which made her too wired to sleep, Zofran which leads to more constipation, and if she takes Compazine more than once a day she gets constipation. She is also drinking a ginger ale with real ginger in it. She states she is able to eat foods but in small amounts; she states that hamburger and [employer] lasagna are two foods that she tolerates very well. She denies any GERD. She is also feeling like her tumors are growing more and she does have some discomfort in her RUQ at times especially when she takes a deep breath. She does not need any medication refills.         Review of Systems:    Systemic           no weight changes; no fever; no chills; no night sweats; no appetite changes  Skin           no rashes, or lesions  Eye           no irritation; no changes in vision  Hue-Laryngeal           no dysphagia; no hoarseness   Pulmonary    no cough; no shortness of breath; + PASTRANA  Cardiovascular    no chest pain; no palpitations  Gastrointestinal    no diarrhea; + constipation; no abdominal pain; no changes in bowel habits; no blood in stool; + nausea  Genitourinary   no urinary frequency; no urinary urgency; no dysuria; no pain; no abnormal vaginal discharge; no abnormal vaginal bleeding  Breast    no breast discharge; no breast changes; no breast pain  Musculoskeletal    no myalgias; no arthralgias; no back pain  Psychiatric           no depressed mood; no anxiety    Hematologic              no tender lymph nodes; no noticeable swellings or lumps   Endocrine    no hot flashes; no heat/cold intolerance         Neurological   no tremor; no numbness and tingling; no headaches; no difficulty sleeping      Past Medical History:    Past Medical History:   Diagnosis Date     Anemia      History of blood transfusion     MULTIPLE     Hydronephrosis      Hyperlipidemia      Jugular vein thrombosis, right     Persistent right internal jugular DVT     Livedo annularis      Osteoarthritis      Osteopenia      Ovarian  cancer (H)      Polymyalgia rheumatica (H)      PONV (postoperative nausea and vomiting)      Primary cancer of peritoneum (H)          Past Surgical History:    Past Surgical History:   Procedure Laterality Date     APPENDECTOMY       BREAST SURGERY      biopsy negative      SECTION       COLONOSCOPY N/A 2017    Procedure: COLONOSCOPY;  Surgeon: Luis Richardson MD;  Location: [facility]     COLONOSCOPY N/A 2017    Procedure: COMBINED COLONOSCOPY, SINGLE OR MULTIPLE BIOPSY/POLYPECTOMY BY BIOPSY;  Surgeon: Luis Richardson MD;  Location: [facility]     COMBINED CYSTOSCOPY, RETROGRADES, EXCHANGE STENT URETER(S) Bilateral 2016    Procedure: COMBINED CYSTOSCOPY, RETROGRADES, EXCHANGE STENT URETER(S);  Surgeon: Carmela Alvarez MD;  Location: [facility]     COMBINED CYSTOSCOPY, RETROGRADES, EXCHANGE STENT URETER(S)  2016    @ [facility]     COMBINED CYSTOSCOPY, RETROGRADES, EXCHANGE STENT URETER(S) Bilateral 10/17/2016    Procedure: COMBINED CYSTOSCOPY, RETROGRADES, EXCHANGE STENT URETER(S);  Surgeon: Carmela Alvarez MD;  [facility]: [facility]     COMBINED CYSTOSCOPY, RETROGRADES, EXCHANGE STENT URETER(S) Bilateral 2016    Procedure: COMBINED CYSTOSCOPY, RETROGRADES, EXCHANGE STENT URETER(S);  Surgeon: Carmela Alvarez MD;  [facility]     COMBINED CYSTOSCOPY, RETROGRADES, EXCHANGE STENT URETER(S) Bilateral 2017    Procedure: COMBINED CYSTOSCOPY, RETROGRADES, EXCHANGE STENT URETER(S);  Surgeon: Carmela Alvarez MD;  [facility]     COMBINED CYSTOSCOPY, RETROGRADES, EXCHANGE STENT URETER(S) Bilateral 2017    Procedure: COMBINED CYSTOSCOPY, RETROGRADES, EXCHANGE STENT URETER(S);  Cystoscopy, Bilateral Stent Exchange, Bilateral Retrograde Pyelogram;  Surgeon: Carmela Alvarez MD;  [facility]     ESOPHAGOSCOPY, GASTROSCOPY, DUODENOSCOPY (EGD), COMBINED N/A 2017    Procedure: COMBINED ESOPHAGOSCOPY, GASTROSCOPY, DUODENOSCOPY  (EGD);  Surgeon: Luis Richardson MD;  Location: [facility]     HYSTERECTOMY TOTAL ABDOMINAL, BILATERAL SALPINGO-OOPHORECTOMY, NODE DISSECTION, COMBINED  11/7/2013    Procedure: COMBINED HYSTERECTOMY TOTAL ABDOMINAL, SALPINGO-OOPHORECTOMY, NODE DISSECTION;;  Surgeon: Cheri Aguilar MD;  [facility]     INJECT NERVE BLOCK CELIAC PLEXUS Left 4/20/2017    Procedure: INJECT NERVE BLOCK CELIAC PLEXUS;  Left splanchnic nerve block;  Surgeon: Shabbir Valladares MD;  [facility]     INJECT NERVE BLOCK CELIAC PLEXUS Left 5/18/2017    Procedure: INJECT NERVE BLOCK CELIAC PLEXUS;  Left Splanchnic Nerve Block;  Surgeon: Shabbir Valladares MD;  [facility]     LAPAROSCOPIC SALPINGO-OOPHORECTOMY  11/7/2013    Procedure: LAPAROSCOPIC SALPINGO-OOPHORECTOMY;  Diagnostic Laparoscopy, Exploratory Laparotomy, Bilateral Salpingo-Oophorectomy,  Hysterectomy, Omentectomy, Pelvic Washings, Peritoneal staging and biopsies, Pelvic and Periaortic Lymph node Dissection;  Surgeon: Cheri Aguilar MD;  [facility]: [facility]     LAPAROTOMY, STAGING, COMBINED  11/7/2013    Procedure: COMBINED LAPAROTOMY, STAGING;;  Surgeon: Cheri Aguilar MD;  [facility]: [facility]     LYMPH NODE BIOPSY  2008    Left posterior chain, beign     OPEN REDUCTION INTERNAL FIXATION TOE(S)  9/3/13    Fifth digit, left foot, with screw fixation      REMOVE PORT PERITONEAL  5/5/2014    Procedure: REMOVE PORT PERITONEAL;  Surgeon: Cheri Aguilar MD;  Location: [facility]         Health Maintenance Due   Topic Date Due     PHQ-9 Q1YR  1955     TETANUS IMMUNIZATION (SYSTEM ASSIGNED)  11/24/1972     HEPATITIS C SCREENING  11/24/1972     LIPID SCREEN Q5 YR FEMALE (SYSTEM ASSIGNED)  11/24/1999       Current Medications:     Current Outpatient Prescriptions   Medication Sig Dispense Refill     enoxaparin (LOVENOX) 40 MG/0.4ML injection INJECT 1 SYRINGE SUBCUTANEOUSLY DAILY 12 mL 0     buprenorphine (BUTRANS) 5 MCG/HR WK patch Place 1 patch onto the skin every 7 days 4 patch 3      "Docusate Sodium (COLACE PO) Take 50 mg by mouth daily       LORazepam (ATIVAN) 1 MG tablet Take 1 tablet (1 mg) by mouth every 6 hours as needed (Anxiety, Nausea/Vomiting or Sleep) 30 tablet 2     prochlorperazine (COMPAZINE) 10 MG tablet Take 1 tablet (10 mg) by mouth every 6 hours as needed (Nausea/Vomiting) 30 tablet 2     tolterodine (DETROL LA) 4 MG 24 hr capsule TAKE ONE CAPSULE BY MOUTH DAILY 30 capsule 3     ondansetron (ZOFRAN-ODT) 8 MG ODT tab Place 1 tablet (8 mg) under the tongue every 8 hours as needed for nausea 90 tablet 3     amLODIPine (NORVASC) 2.5 MG tablet Take 1 tablet (2.5 mg) by mouth daily 30 tablet      tamsulosin (FLOMAX) 0.4 MG capsule Take 1 capsule (0.4 mg) by mouth daily 60 capsule 6     ACETAMINOPHEN PO Take 500 mg by mouth every 6 hours as needed for pain Reported on 3/30/2017       Multiple Vitamins-Minerals (ONE DAILY MULTIVITAMIN WOMEN) TABS Take 1 tablet by mouth every morning        polyethylene glycol (MIRALAX/GLYCOLAX) powder Take 1 capful by mouth daily as needed        cycloSPORINE (RESTASIS) 0.05 % ophthalmic emulsion Place 1 drop into both eyes 2 times daily        Ranitidine HCl (ZANTAC PO) Take 75 mg by mouth daily        MELATONIN PO Take 1 mg by mouth At Bedtime        EPINEPHrine (EPIPEN) 0.3 MG/0.3ML injection Inject 0.3 mLs (0.3 mg) into the muscle once as needed for anaphylaxis 1 each 0         Allergies:        Allergies   Allergen Reactions     Contrast Dye Itching and Swelling     Itching/tingling of mouth and nose with sensation of swelling after receiving IV contrast for CT.  This occurred despite steroid premedication. Therefore the patient should not receive intravenous contrast in the future.      Benadryl Allergy Other (See Comments)     Stay awake, restless, \"uncomfortable\", \"feel like I need to run away\"  With  IV dose,  does fine with oral Benadryl.     Lovenox [Enoxaparin] Hives     3/23/16: Okay to receive heparin flushes in port, tolerates well. " "Patricia Cortez FNP-C     Amoxicillin Rash     Diphenhydramine Anxiety     No Clinical Screening - See Comments Rash     Pollen Extract Rash     Sulfa Drugs Rash        Social History:     Social History   Substance Use Topics     Smoking status: Former Smoker     Smokeless tobacco: Former User      Comment: Quit over 20 years ago     Alcohol use No       History   Drug Use No         Family History:     The patient's family history is notable for:    Family History   Problem Relation Age of Onset     Arthritis Father      Myocardial Infarction Father      secondary to anesthesia     Colon Cancer Father      DIABETES Other      Uncle     Hypertension Mother      Other - See Comments Mother      cognitive decline     Skin Cancer Mother      Hypertension Sister      HEART DISEASE Other      unknown valve replacement     Bladder Cancer Sister      Skin Cancer Brother          Physical Exam:     /67  Pulse 106  Temp 98.7  F (37.1  C) (Oral)  Resp 18  Ht 1.676 m (5' 5.98\")  Wt 52.2 kg (115 lb)  SpO2 100%  BMI 18.57 kg/m2  Body mass index is 18.57 kg/(m^2).    General Appearance: pale and alert, no acute distress     HEENT: no thyromegaly, no palpable nodules or masses        Cardiovascular: regular rate and rhythm, no gallops, rubs or murmurs     Respiratory: lungs clear, no rales, rhonchi or wheezes, normal diaphragmatic excursion    Musculoskeletal: extremities non tender and without edema    Skin: no lesions or rashes     Neurological: normal gait, no gross defects     Psychiatric: appropriate mood and affect                               Hematological: normal cervical, supraclavicular lymph nodes     Gastrointestinal:       abdomen soft, slightly tender, non-distended, palpable masses periumbilical, left of umbilicus and superior to umbilicus    Genitourinary: Not indicated.      Assessment:    Margaux Guzmán is a 61 year old woman with a history of primary peritoneal cancer.  She was recently on the " Erinaro study but unfortunately had progression. She is here today for follow up and disease management.      30 minutes were spent with this patient, over 50% of that time was spent in symptom management, treatment planning and in counseling and coordination of care.      Plan:     1.)       Margaux would like to proceed with treatment today. Ok to proceed with planned chemotherapy pending labs are WNL. Will add in Aloxi to her treatment plan to see if this helps with her nausea. Reviewed signs and symptoms for when she should contact the clinic or seek additional care. Patient to contact the clinic with any questions or concerns in the interim. Dr. Mccollum aware of how patient is doing and will await results of  to see if a treatment change is warranted.      2.) Genetic risk factors were assessed and she is negative for mutations in BRCA1, BRCA2, EPCAM, MLH1, MSH2, MSH6, PMS2, PTEN and TP53.    3.)        Anemia: to get one unit of blood today.     4.) Labs and/or tests ordered include:  Chemo labs. .      5.) Health maintenance issues addressed today include annual health maintenance and non-gynecologic issues with PCP.    JUSTIN Lombardo, WHNP-BC, ANP-BC  Women's Health Nurse Practitioner  Adult Nurse Pracitioner  Gynecologic Oncology          CC  Patient Care Team:  Aurelia Knox as PCP - General (Family Practice)  Jessica Galvin, RN as Continuity Care Coordinator (Gyn-Onc)  Jessica Galvin, RN as Continuity Care Coordinator (Oncology)  Lizet Truong APRN CNP (Nurse Practitioner - Women's Health)  Derrek Gamble MD as MD (Nephrology)  Patricia Murcia APRN CNP as Nurse Practitioner (Nurse Practitioner)  Elissa Wheeler MD as MD (Infectious Diseases)  Carmela Alvarez MD as MD (Urology)  Keira Rolle, RN as Registered Nurse (Urology)  Sophia Garcia PA-C as Referring Physician (Physician Assistant)  Aurelia Knox (Family Practice)  Yuliya Perez MD as MD  (Internal Medicine)

## 2017-07-10 NOTE — MR AVS SNAPSHOT
After Visit Summary   7/10/2017    Margaux Guzmán    MRN: [id number]           Patient Information     Date Of Birth          1955        Visit Information        Provider Department      7/10/2017 8:00 AM NURSE ONLY CANCER CENTER [facility]        Today's Diagnoses     Temperature elevated    -  1    Other iron deficiency anemia        Chemotherapy-induced neutropenia (H)        Ovarian cancer, left (H)        Ovarian cancer, right (H)        Primary peritoneal adenocarcinoma (H)           Follow-ups after your visit        Your next 10 appointments already scheduled     Jul 10, 2017  9:00 AM CDT   Level 2 with [place] 7 INFUSION   [facility] ([facility])    [address] [place] [address]   [phone]            Jul 17, 2017  9:00 AM CDT   Return Visit with NURSE ONLY CANCER CENTER   [facility] ([facility])    [address] [place] [address]   [phone]            Jul 17, 2017  9:30 AM CDT   Level 2 with [place] 8 INFUSION   [facility] ([facility])    [address] [place] [address]   [phone]            Jul 24, 2017  8:30 AM CDT   Return Visit with NURSE ONLY CANCER CENTER   [facility] ([facility])    [address] [place] [address]   [phone]            Jul 24, 2017   Procedure with Carmela Alvarez MD   [facility] ([facility])    [address] [place] [address]  [address]th [place] [id number] [phone]           Located in the Clinics and Surgery Center at [address] [place] [address].   parking is very convenient and highly recommended.  is a $6 flat rate fee.  Both  and self parkers should enter the main arrival plaza from [facility]; parking attendants will direct you based  on your parking preference.            Jul 24, 2017  9:00 AM CDT   Level 2 with [place] 7 INFUSION   [facility] ([facility])    [address] [facility] [address]   [phone]            Jul 31, 2017  9:00 AM CDT   Return Visit with NURSE ONLY CANCER CENTER   [facility] ([facility])    [address] [facility] [address]   [phone]            Jul 31, 2017  9:30 AM CDT   Level 2 with [place] 8 INFUSION   [facility] ([facility])    [address] [facility] [address]   [phone]            Aug 07, 2017  8:30 AM CDT   Return Visit with NURSE ONLY CANCER CENTER   [facility] ([facility])    [address] [facility] [address]   [phone]            Aug 07, 2017  9:00 AM CDT   Level 2 with [place] 6 INFUSION   [facility] ([facility])    [address] [facility] [address]   [phone]              Who to contact     If you have questions or need follow up information about today's clinic visit or your schedule please contact [facility] directly at [phone].  Normal or non-critical lab and imaging results will be communicated to you by MyChart, letter or phone within 4 business days after the clinic has received the results. If you do not hear from us within 7 days, please contact the clinic through Savedailyhart or phone. If you have a critical or abnormal lab result, we will notify you by phone as soon as possible.  Submit refill requests through BareedEE or call your pharmacy and they will forward the refill request to us. Please allow 3 business days for your refill to be completed.          Additional Information About Your Visit        BareedEE Information     BareedEE gives you secure access to your electronic health record. If you see a primary care provider, you can  also send messages to your care team and make appointments. If you have questions, please call your primary care clinic.  If you do not have a primary care provider, please call [phone] and they will assist you.      Shopdeca is an electronic gateway that provides easy, online access to your medical records. With Shopdeca, you can request a clinic appointment, read your test results, renew a prescription or communicate with your care team.     To access your existing account, please contact your [facility] or call [phone] for assistance.        Care EveryWhere ID     This is your Care EveryWhere ID. This could be used by other organizations to access your [facility] medical records  [id number]         Blood Pressure from Last 3 Encounters:   06/28/17 125/68   06/28/17 141/80   06/28/17 138/75    Weight from Last 3 Encounters:   06/28/17 52.2 kg (115 lb)   06/28/17 53 kg (116 lb 12.8 oz)   06/12/17 53.8 kg (118 lb 9.6 oz)              We Performed the Following     CBC with platelets differential     Comprehensive metabolic panel     Magnesium        Primary Care Provider Office Phone # Fax #    Aurelia L Johnaden [phone]       [facility] [address] [facility] [address]        Equal Access to Services     TRAVIS PHILLIPS : Hadii aad ku hadasho Soomaali, waaxda luqadaha, qaybta kaalmada adeegyada, stacy buchanan. So [facility] [phone].    ATENCIÓN: Si habla español, tiene a almodovar disposición servicios gratuitos de asistencia lingüística. Llame al [phone].    We comply with applicable federal civil rights laws and [place] laws. We do not discriminate on the basis of race, color, national origin, age, disability sex, sexual orientation or gender identity.            Thank you!     Thank you for choosing [facility]  for your care. Our goal is always to provide you with excellent care. Hearing back from our  patients is one way we can continue to improve our services. Please take a few minutes to complete the written survey that you may receive in the mail after your visit with us. Thank you!             Your Updated Medication List - Protect others around you: Learn how to safely use, store and throw away your medicines at www.disposemymeds.org.          This list is accurate as of: 7/10/17  8:38 AM.  Always use your most recent med list.                   Brand Name Dispense Instructions for use Diagnosis    ACETAMINOPHEN PO      Take 500 mg by mouth every 6 hours as needed for pain Reported on 3/30/2017        amLODIPine 2.5 MG tablet    NORVASC    30 tablet    Take 1 tablet (2.5 mg) by mouth daily    Primary peritoneal adenocarcinoma (H)       buprenorphine 5 MCG/HR WK patch    BUTRANS    4 patch    Place 1 patch onto the skin every 7 days    Cancer associated pain       COLACE PO      Take 50 mg by mouth daily        cycloSPORINE 0.05 % ophthalmic emulsion    RESTASIS     Place 1 drop into both eyes 2 times daily        enoxaparin 40 MG/0.4ML injection    LOVENOX    12 mL    INJECT 1 SYRINGE SUBCUTANEOUSLY DAILY    Ovarian cancer, left (H), Ovarian cancer, right (H)       EPINEPHrine 0.3 MG/0.3ML injection     1 each    Inject 0.3 mLs (0.3 mg) into the muscle once as needed for anaphylaxis    Preventative health care, Primary peritoneal adenocarcinoma (H)       LORazepam 1 MG tablet    ATIVAN    30 tablet    Take 1 tablet (1 mg) by mouth every 6 hours as needed (Anxiety, Nausea/Vomiting or Sleep)    Other iron deficiency anemia, Chemotherapy-induced neutropenia (H), Ovarian cancer, left (H), Ovarian cancer, right (H), Primary peritoneal adenocarcinoma (H)       MELATONIN PO      Take 1 mg by mouth At Bedtime        ondansetron 8 MG ODT tab    ZOFRAN-ODT    90 tablet    Place 1 tablet (8 mg) under the tongue every 8 hours as needed for nausea    Ovarian cancer, right (H), Nausea       ONE DAILY MULTIVITAMIN WOMEN  Tabs      Take 1 tablet by mouth every morning        polyethylene glycol powder    MIRALAX/GLYCOLAX     Take 1 capful by mouth daily as needed        prochlorperazine 10 MG tablet    COMPAZINE    30 tablet    Take 1 tablet (10 mg) by mouth every 6 hours as needed (Nausea/Vomiting)    Other iron deficiency anemia, Chemotherapy-induced neutropenia (H), Ovarian cancer, left (H), Ovarian cancer, right (H), Primary peritoneal adenocarcinoma (H)       tamsulosin 0.4 MG capsule    FLOMAX    60 capsule    Take 1 capsule (0.4 mg) by mouth daily    Primary peritoneal adenocarcinoma (H)       tolterodine 4 MG 24 hr capsule    DETROL LA    30 capsule    TAKE ONE CAPSULE BY MOUTH DAILY    Hydronephrosis, unspecified hydronephrosis type       ZANTAC PO      Take 75 mg by mouth daily

## 2017-07-17 NOTE — MR AVS SNAPSHOT
After Visit Summary   7/17/2017    Margaux Guzmán    MRN: [id number]           Patient Information     Date Of Birth          1955        Visit Information        Provider Department      7/17/2017 9:00 AM NURSE ONLY CANCER CENTER [facility]        Today's Diagnoses     Temperature elevated    -  1    Other iron deficiency anemia        Chemotherapy-induced neutropenia (H)        Ovarian cancer, left (H)        Ovarian cancer, right (H)        Primary peritoneal adenocarcinoma (H)           Follow-ups after your visit        Your next 10 appointments already scheduled     Jul 17, 2017  9:30 AM CDT   Level 2 with [address] ([facility])    [address] [place] [address]   [phone]            Jul 24, 2017   Procedure with Carmela Alvarez MD   [facility] ([facility])    [address] [place] [address]  [address]th [facility] [address] [phone]           Located in the Clinics and Surgery Center at [address] [place] [address].   parking is very convenient and highly recommended.  is a $6 flat rate fee.  Both  and self parkers should enter the main arrival plaza from [facility]; parking attendants will direct you based on your parking preference.            Jul 26, 2017 11:15 AM CDT   Return Visit with Gricelda Grullon MD   [facility] ([facility])    [address] [place] [address]   [phone]            Jul 31, 2017  9:00 AM CDT   Return Visit with NURSE ONLY CANCER CENTER   [facility] ([facility])    [address] [place] [address]   [phone]            Jul 31, 2017  9:30 AM CDT   Level 2 with [address] ([facility])    [address] [place] [address]  [facility] [address]   [phone]            Aug 07, 2017  8:30 AM CDT   Return Visit with NURSE ONLY CANCER CENTER   [facility] ([facility])    [address] [facility] [address]   [phone]            Aug 07, 2017  9:00 AM CDT   Level 2 with [place] 6 INFUSION   [facility] ([facility])    [address] [facility] [address]   [phone]            Aug 14, 2017  9:00 AM CDT   Return Visit with NURSE ONLY CANCER CENTER   [facility] ([facility])    [address] [facility] [address]   [phone]            Aug 14, 2017  9:30 AM CDT   Level 2 with BAY 8 INFUSION   [facility] ([facility])    [address] [facility] [address]   [phone]            Dec 06, 2017 10:00 AM CST   Lab with  LAB   [facility] ([facility])    [address] [place] [address] [phone]              Who to contact     If you have questions or need follow up information about today's clinic visit or your schedule please contact [facility] directly at [phone].  Normal or non-critical lab and imaging results will be communicated to you by MyChart, letter or phone within 4 business days after the clinic has received the results. If you do not hear from us within 7 days, please contact the clinic through Med[employer] or phone. If you have a critical or abnormal lab result, we will notify you by phone as soon as possible.  Submit refill requests through Azimuth or call your pharmacy and they will forward the refill request to us. Please allow 3 business days for your refill to be completed.          Additional Information About Your Visit        Med[employer] Biotech[employer]CafeMom Information     Azimuth gives you secure access to your electronic health record. If you see a primary care provider, you can  also send messages to your care team and make appointments. If you have questions, please call your primary care clinic.  If you do not have a primary care provider, please call [phone] and they will assist you.      Yoomba is an electronic gateway that provides easy, online access to your medical records. With Yoomba, you can request a clinic appointment, read your test results, renew a prescription or communicate with your care team.     To access your existing account, please contact your [facility] or call [phone] for assistance.        Care EveryWhere ID     This is your Care EveryWhere ID. This could be used by other organizations to access your [facility] medical records  [id number]         Blood Pressure from Last 3 Encounters:   07/10/17 114/68   07/10/17 113/67   06/28/17 125/68    Weight from Last 3 Encounters:   07/10/17 52.2 kg (115 lb)   06/28/17 52.2 kg (115 lb)   06/28/17 53 kg (116 lb 12.8 oz)              We Performed the Following     CBC with platelets differential     Magnesium     Potassium        Primary Care Provider Office Phone # Fax #    Aurelia Knox [phone]       [facility] [address] [facility] [address]        Equal Access to Services     TRAVIS PHILLIPS : Hadii kelly ku hadasho Soomaali, waaxda luqadaha, qaybta kaalmada adeegyada, stacy buchanan. So [facility] [phone].    ATENCIÓN: Si habla español, tiene a almodovar disposición servicios gratuitos de asistencia lingüística. Llame al [phone].    We comply with applicable federal civil rights laws and [place] laws. We do not discriminate on the basis of race, color, national origin, age, disability sex, sexual orientation or gender identity.            Thank you!     Thank you for choosing [facility]  for your care. Our goal is always to provide you with excellent care. Hearing back from our patients is one way we can  continue to improve our services. Please take a few minutes to complete the written survey that you may receive in the mail after your visit with us. Thank you!             Your Updated Medication List - Protect others around you: Learn how to safely use, store and throw away your medicines at www.disposemymeds.org.          This list is accurate as of: 7/17/17  9:18 AM.  Always use your most recent med list.                   Brand Name Dispense Instructions for use Diagnosis    ACETAMINOPHEN PO      Take 500 mg by mouth every 6 hours as needed for pain Reported on 3/30/2017        amLODIPine 2.5 MG tablet    NORVASC    30 tablet    Take 1 tablet (2.5 mg) by mouth daily    Primary peritoneal adenocarcinoma (H)       buprenorphine 5 MCG/HR WK patch    BUTRANS    4 patch    Place 1 patch onto the skin every 7 days    Cancer associated pain       COLACE PO      Take 50 mg by mouth daily        cycloSPORINE 0.05 % ophthalmic emulsion    RESTASIS     Place 1 drop into both eyes 2 times daily        enoxaparin 40 MG/0.4ML injection    LOVENOX    12 mL    INJECT 1 SYRINGE SUBCUTANEOUSLY DAILY    Ovarian cancer, left (H), Ovarian cancer, right (H)       EPINEPHrine 0.3 MG/0.3ML injection 2-pack    EPIPEN/ADRENACLICK/or ANY BX GENERIC EQUIV    1 each    Inject 0.3 mLs (0.3 mg) into the muscle once as needed for anaphylaxis    Preventative health care, Primary peritoneal adenocarcinoma (H)       LORazepam 1 MG tablet    ATIVAN    30 tablet    Take 1 tablet (1 mg) by mouth every 6 hours as needed (Anxiety, Nausea/Vomiting or Sleep)    Other iron deficiency anemia, Chemotherapy-induced neutropenia (H), Ovarian cancer, left (H), Ovarian cancer, right (H), Primary peritoneal adenocarcinoma (H)       MELATONIN PO      Take 1 mg by mouth At Bedtime        ondansetron 8 MG ODT tab    ZOFRAN-ODT    90 tablet    Place 1 tablet (8 mg) under the tongue every 8 hours as needed for nausea    Ovarian cancer, right (H), Nausea       ONE  DAILY MULTIVITAMIN WOMEN Tabs      Take 1 tablet by mouth every morning        polyethylene glycol powder    MIRALAX/GLYCOLAX     Take 1 capful by mouth daily as needed        prochlorperazine 10 MG tablet    COMPAZINE    30 tablet    Take 1 tablet (10 mg) by mouth every 6 hours as needed (Nausea/Vomiting)    Other iron deficiency anemia, Chemotherapy-induced neutropenia (H), Ovarian cancer, left (H), Ovarian cancer, right (H), Primary peritoneal adenocarcinoma (H)       tamsulosin 0.4 MG capsule    FLOMAX    60 capsule    Take 1 capsule (0.4 mg) by mouth daily    Primary peritoneal adenocarcinoma (H)       tolterodine 4 MG 24 hr capsule    DETROL LA    30 capsule    TAKE ONE CAPSULE BY MOUTH DAILY    Hydronephrosis, unspecified hydronephrosis type       ZANTAC PO      Take 75 mg by mouth daily

## 2017-07-17 NOTE — PROGRESS NOTES
Infusion Nursing Note:  Margaux Guzmán presents today for C2 D8 taxol.    Patient seen by provider today: No   present during visit today: Not Applicable.    Note: Patient denied need for compazine prn today.  Message to Samara Richardson regarding intermittent vagingal drainage that has started again per patient report.    Intravenous Access:  Implanted Port.    Treatment Conditions:  Lab Results   Component Value Date    HGB 9.1 07/17/2017     Lab Results   Component Value Date    WBC 5.9 07/17/2017      Lab Results   Component Value Date    ANEU 4.2 07/17/2017     Lab Results   Component Value Date     07/17/2017      Results reviewed, labs MET treatment parameters, ok to proceed with treatment.  Potassium & Magnesium wnl.      Post Infusion Assessment:  Patient tolerated infusion without incident.  Blood return noted pre infusion.  Site patent and intact, free from redness, edema or discomfort.  No evidence of extravasations.  Access discontinued per protocol.    Discharge Plan:   Patient discharged in stable condition accompanied by: self.  Departure Mode: Ambulatory.  Verbally reviewed next appointment for chemotherapy 7/31 (having stents replaced on 7/24).  Message to Samara Richardson RN regarding plan for next week.    Fatimah Rodriguez, RN

## 2017-07-17 NOTE — MR AVS SNAPSHOT
After Visit Summary   7/17/2017    Margaux Guzmán    MRN: [id number]           Patient Information     Date Of Birth          1955        Visit Information        Provider Department      7/17/2017 9:30 AM [address]        Today's Diagnoses     Temperature elevated    -  1    Other iron deficiency anemia        Chemotherapy-induced neutropenia (H)        Ovarian cancer, left (H)        Ovarian cancer, right (H)        Primary peritoneal adenocarcinoma (H)           Follow-ups after your visit        Your next 10 appointments already scheduled     Jul 24, 2017   Procedure with Carmela Alvarez MD   [facility] ([facility])    [address] [place] [address]  5th Floor  [facility] [address]   [phone]           Located in the Clinics and Surgery Center at [address] [place] [address].   parking is very convenient and highly recommended.  is a $6 flat rate fee.  Both  and self parkers should enter the main arrival plaza from [facility]; parking attendants will direct you based on your parking preference.            Jul 26, 2017 11:15 AM CDT   Return Visit with Gricelda Grullon MD   [facility] ([facility])    [address] [facility] [address]   [phone]            Jul 31, 2017  9:00 AM CDT   Return Visit with [facility]   [facility] ([facility])    [address] [facility] [address]   [phone]            Jul 31, 2017  9:30 AM CDT   Level 2 with [address] ([facility])    [address] [facility] [address]   [phone]            Aug 07, 2017  8:30 AM CDT   Return Visit with [facility]   [facility] ([facility])    [address] [place] [address]  [facility] [address]   [phone]            Aug 07, 2017  9:00 AM CDT   Level 2 with BAY 6 INFUSION   [facility] ([facility])    [address] [facility] [address]   [phone]            Aug 14, 2017  9:00 AM CDT   Return Visit with [facility]   [facility] ([facility])    [address] [facility] [address]   [phone]            Aug 14, 2017  9:30 AM CDT   Level 2 with BAY 8 INFUSION   [facility] ([facility])    [address] [facility] [address]   [phone]            Dec 06, 2017 10:00 AM CST   Lab with [facility]   [facility] ([facility])    [address]  1st Floor  [facility] [id number] [phone]            Dec 06, 2017 10:45 AM CST   (Arrive by 10:15 AM)   Return Visit with Mackenzie Ca MD   [facility] ([facility])    [address]  3rd Floor  [facility] [id number] [phone]              Who to contact     If you have questions or need follow up information about today's clinic visit or your schedule please contact [facility] directly at [phone].  Normal or non-critical lab and imaging results will be communicated to you by MyChart, letter or phone within 4 business days after the clinic has received the results. If you do not hear from us within 7 days, please contact the clinic through [employer] or phone. If you have a critical or abnormal lab result, we will notify you by phone as soon as possible.  Submit refill requests through [employer] or call your pharmacy and they will forward the refill request to us. Please allow 3 business days for your refill to be completed.          Additional Information About Your Visit        [employer] Information     [employer] gives you secure access to your electronic health record. If you see a  primary care provider, you can also send messages to your care team and make appointments. If you have questions, please call your primary care clinic.  If you do not have a primary care provider, please call [phone] and they will assist you.      [employer] is an electronic gateway that provides easy, online access to your medical records. With [employer], you can request a clinic appointment, read your test results, renew a prescription or communicate with your care team.     To access your existing account, please contact your [facility] or call [phone] for assistance.        Care EveryWhere ID     This is your Care EveryWhere ID. This could be used by other organizations to access your [place] medical records  [id number]        Your Vitals Were     Pulse Temperature Respirations Pulse Oximetry          95 97.5  F (36.4  C) (Oral) 16 97%         Blood Pressure from Last 3 Encounters:   07/17/17 125/72   07/10/17 114/68   07/10/17 113/67    Weight from Last 3 Encounters:   07/10/17 52.2 kg (115 lb)   06/28/17 52.2 kg (115 lb)   06/28/17 53 kg (116 lb 12.8 oz)              Today, you had the following     No orders found for display       Primary Care Provider Office Phone # Fax #    Aurelia Knox [phone]       [facility] [address] [facility] [address]        Equal Access to Services     TRAVIS PHILLIPS : Hadii kelly bunn hadasho Sojonathan, waaxda luqadaha, qaybta kaalmada lanie, stacy vásquez . So [facility] [phone].    ATENCIÓN: Si habla español, tiene a almodovar disposición servicios gratuitos de asistencia lingüística. Mariely al [phone].    We comply with applicable federal civil rights laws and [place] laws. We do not discriminate on the basis of race, color, national origin, age, disability sex, sexual orientation or gender identity.            Thank you!     Thank you for choosing [facility]   for your care. Our goal is always to provide you with excellent care. Hearing back from our patients is one way we can continue to improve our services. Please take a few minutes to complete the written survey that you may receive in the mail after your visit with us. Thank you!             Your Updated Medication List - Protect others around you: Learn how to safely use, store and throw away your medicines at www.disposemymeds.org.          This list is accurate as of: 7/17/17 11:44 AM.  Always use your most recent med list.                   Brand Name Dispense Instructions for use Diagnosis    ACETAMINOPHEN PO      Take 500 mg by mouth every 6 hours as needed for pain Reported on 3/30/2017        amLODIPine 2.5 MG tablet    NORVASC    30 tablet    Take 1 tablet (2.5 mg) by mouth daily    Primary peritoneal adenocarcinoma (H)       buprenorphine 5 MCG/HR WK patch    BUTRANS    4 patch    Place 1 patch onto the skin every 7 days    Cancer associated pain       COLACE PO      Take 50 mg by mouth daily        cycloSPORINE 0.05 % ophthalmic emulsion    RESTASIS     Place 1 drop into both eyes 2 times daily        enoxaparin 40 MG/0.4ML injection    LOVENOX    12 mL    INJECT 1 SYRINGE SUBCUTANEOUSLY DAILY    Ovarian cancer, left (H), Ovarian cancer, right (H)       EPINEPHrine 0.3 MG/0.3ML injection 2-pack    EPIPEN/ADRENACLICK/or ANY BX GENERIC EQUIV    1 each    Inject 0.3 mLs (0.3 mg) into the muscle once as needed for anaphylaxis    Preventative health care, Primary peritoneal adenocarcinoma (H)       LORazepam 1 MG tablet    ATIVAN    30 tablet    Take 1 tablet (1 mg) by mouth every 6 hours as needed (Anxiety, Nausea/Vomiting or Sleep)    Other iron deficiency anemia, Chemotherapy-induced neutropenia (H), Ovarian cancer, left (H), Ovarian cancer, right (H), Primary peritoneal adenocarcinoma (H)       MELATONIN PO      Take 1 mg by mouth At Bedtime        ondansetron 8 MG ODT tab    ZOFRAN-ODT    90 tablet     Place 1 tablet (8 mg) under the tongue every 8 hours as needed for nausea    Ovarian cancer, right (H), Nausea       ONE DAILY MULTIVITAMIN WOMEN Tabs      Take 1 tablet by mouth every morning        polyethylene glycol powder    MIRALAX/GLYCOLAX     Take 1 capful by mouth daily as needed        prochlorperazine 10 MG tablet    COMPAZINE    30 tablet    Take 1 tablet (10 mg) by mouth every 6 hours as needed (Nausea/Vomiting)    Other iron deficiency anemia, Chemotherapy-induced neutropenia (H), Ovarian cancer, left (H), Ovarian cancer, right (H), Primary peritoneal adenocarcinoma (H)       tamsulosin 0.4 MG capsule    FLOMAX    60 capsule    Take 1 capsule (0.4 mg) by mouth daily    Primary peritoneal adenocarcinoma (H)       tolterodine 4 MG 24 hr capsule    DETROL LA    30 capsule    TAKE ONE CAPSULE BY MOUTH DAILY    Hydronephrosis, unspecified hydronephrosis type       ZANTAC PO      Take 75 mg by mouth daily

## 2017-07-17 NOTE — PROGRESS NOTES
Labs met parameters for treatment today.  See Infusion notes dated 7/17/17 for details.    Pedro Pablo Dunn RN, BSN, OCN

## 2017-07-20 NOTE — OR NURSING
Spoke with pt who is requesting a different time for procedure on Monday.  Message left for [facility] scheduler for Dr Alvarez to call pt.  Pt states she will be available after 1200 today for call

## 2017-07-20 NOTE — PROGRESS NOTES
Dr. Alvarez would like Margaux to hold her lovenox for 2 days prior to her surgery. Patient informed and agrees with plan. Keira Rolle RN

## 2017-07-21 NOTE — OR NURSING
Patient would appreciate really being careful with time changes.  Difficult for her to arrange a ride.

## 2017-07-23 NOTE — ANESTHESIA PREPROCEDURE EVALUATION
Anesthesia Evaluation     . Pt has had prior anesthetic. Type: General and MAC    History of anesthetic complications   - PONV        ROS/MED HX    ENT/Pulmonary: Comment: History of pulmonary nodules.  No co-existing pulmonary disease.        Neurologic:  - neg neurologic ROS     Cardiovascular: Comment: HTN:  Well-controlled.  On Norvasc 2.5 mg po daily.     (+) hypertension----. : . . . :. . Previous cardiac testing date:results:date: results:ECG reviewed date:06/07/2017 results:Sinus tachycardia. date: results:          METS/Exercise Tolerance:  >4 METS   Hematologic: Comments: Anemia of chronic disease.  Hgb 8.5.      (+) History of blood clots Anemia, -      Musculoskeletal:  - neg musculoskeletal ROS       GI/Hepatic:  - neg GI/hepatic ROS       Renal/Genitourinary:     (+) chronic renal disease, type: CRI, Nephrolithiasis , Pt does not require dialysis, Pt has no history of transplant,       Endo:  - neg endo ROS       Psychiatric:  - neg psychiatric ROS       Infectious Disease:  - neg infectious disease ROS       Malignancy:   (+) Malignancy History of Other  Other CA Active status post Surgery and Chemo         Other: Comment: buprenorphine   (+) H/O Chronic Pain,                   Physical Exam  Normal systems: pulmonary and dental    Airway   Mallampati: I  TM distance: >3 FB  Neck ROM: full    Dental     Cardiovascular   Rhythm and rate: regular and normal      Pulmonary                            Lab / Radiology Results:   Reviewed current labs when avail, see EMR for details.      BMP:  Recent Labs   Lab Test  06/05/17   0830   05/22/17   0811   NA   --    --   140   POTASSIUM  3.6   < >  3.4   CHLORIDE   --    --   104   CO2   --    --   27   BUN   --    --   22   CR   --    --   0.82   GLC   --    --   94   PURVI   --    --   9.4    < > = values in this interval not displayed.       LFTs:   Recent Labs   Lab Test  05/22/17   0811   PROTTOTAL  6.6*   ALBUMIN  3.5   BILITOTAL  0.2   ALKPHOS  45   AST   13   ALT  17       CBC:   Recent Labs   Lab Test 06/09/17   WBC  4.8   HGB  8.5*   PLT  286       Coags:  Recent Labs   Lab Test  04/27/17   0923   07/22/16   0755   INR  0.98   < >  1.08   PTT  26   < >  33   FIBR   --    --   612*    < > = values in this interval not displayed.       Blood Bank:  Lab Results   Component Value Date    ABO O 05/30/2017    RH  Pos 05/30/2017    AS Neg 05/30/2017       Studies:  See EMR for current studies, reviewed when available.      Anesthesia Plan      History & Physical Review  History and physical reviewed and following examination; no interval change.    ASA Status:  3 .    NPO Status:  > 2 hours and > 6 hours    Plan for General and LMA with Intravenous induction. Maintenance will be TIVA.    PONV prophylaxis:  Ondansetron (or other 5HT-3)  Discussed risks, benefits, and alternatives of general anesthesia with the patient. Risks discussed included nausea/vomiting, sore throat, dental damage, soft tissue damage, problems with heart, brain, lungs, and rare allergic reactions. Patient was given a chance to ask questions. Patient consents to procedure.           Postoperative Care  Postoperative pain management:  Multi-modal analgesia.      Consents  Anesthetic plan, risks, benefits and alternatives discussed with:  Patient.  Use of blood products discussed: No .   .      Paolo Quinn MD  Anesthesiologist  9:34 AM  June 12, 2017                        .

## 2017-07-24 NOTE — DISCHARGE INSTRUCTIONS
[facility]  Home Care Following Anesthesia  For 24 hours after surgery:  1. Get plenty of rest.  A responsible adult must stay with you for at least 24 hours after you leave the surgery center.  2. Do not drive or use heavy equipment.  If you have weakness or tingling, don't drive or use heavy equipment until this feeling goes away.   3. Do not drink alcohol.   4. Avoid strenuous or risky activities.  Ask for help when climbing stairs.  5. You may feel lightheaded.  IF so, sit for a few minutes before standing.  Have someone help you get up.   6. If you have nausea (feel sick to your stomach): Drink only clear liquids such as apple juice, ginger ale, broth or 7-Up.  Rest may also help.  Be sure to drink enough fluids.  Move to a regular diet as you feel able.   7. You may have a slight fever.  Call the doctor if your fever is over 100 F (37.7 C) (taken under the tongue) or lasts longer than 24 hours.  8. You may have a dry mouth, a sore throat, muscle aches or trouble sleeping. These should go away after 24 hours.  9. Do not make important or legal decisions.     Tips for taking pain medications  To get the best pain relief possible, remember these points:    Take pain medications as directed, before pain becomes severe.    Pain medication can upset your stomach: taking it with food may help.    Constipation is a common side effect of pain medication. Drink plenty of  fluids.    Eat foods high in fiber. Take a stool softener if recommended by your doctor or pharmacist.    Do not drink alcohol, drive or operate machinery while taking pain medications.    Ask about other ways to control pain, such as with heat, ice or relaxation.    Tylenol/Acetaminophen Consumption  To help encourage the safe use of acetaminophen, the makers of TYLENOL  have lowered the maximum daily dose for single-ingredient Extra Strength TYLENOL  (acetaminophen) products sold in the [place] from 8 pills per day  (4,000 mg) to 6 pills per day (3,000 mg). The dosing interval has also changed from 2 pills every 4-6 hours to 2 pills every 6 hours.    If you feel your pain relief is insufficient, you may take Tylenol/Acetaminophen in addition to your narcotic pain medication.     Be careful not to exceed 3,000 mg of Tylenol/Acetaminophen in a 24 hour period from all sources.    If you are taking extra strength Tylenol/acetaminophen (500 mg), the maximum dose is 6 tablets in 24 hours.    If you are taking regular strength acetaminophen (325 mg), the maximum dose is 9 tablets in 24 hours.    Call a doctor for any of the followin. Signs of infection (fever, growing tenderness at the surgery site, a large amount of drainage or bleeding, severe pain, foul-smelling drainage, redness, swelling).  2. It has been over 8 to 10 hours since surgery and you are still not able to urinate (pass water).  3. Headache for over 24 hours.    Your doctor is:       Dr. Carmela Alvarez, Prostate and Urology: [phone]               Or dial [phone] and ask for the resident on call for:  Prostate Urology  For emergency care, call the:  [facility]:  [phone] (TTY for hearing impaired: [phone])    Discharge Instructions Following a Cystoscopy, Stent and/or Ureteroscopy    Drainage/Urination:    Urine may be slightly bloody but should taper off in a few days.    You may have some frequency and urgency for a few days.    Comfort:    A burning sensation when urinating is common. Drinking extra fluids helps decrease this burning feeling and also helps to clear the bloody urine.    Mild abdominal cramps may occur for a few days.    Baths:    Daily tub baths aide in passing urine.    Frequent use of bubble bath is discouraged since it encourages urinary tract infections.    Report to your doctor at once if you experience:    Inability to pass your urine    Continuous or heavy bleeding    Severe Pain    Elevated  temperature > than 101.5 for 24 hours    Home Activity:    On the day of surgery spend a quiet evening at home.    Increase activity as tolerated.

## 2017-07-24 NOTE — ANESTHESIA CARE TRANSFER NOTE
Patient: Margaux Guzmán    Procedure(s):  Cystoscopy, Left Stent Exchange, Left retrograde pyelogram, Right Retrograde Stent Placement on the Right, Removal of nephrostomy tube - Wound Class: II-Clean Contaminated    Diagnosis: Hydronephrosis  Diagnosis Additional Information: No value filed.    Anesthesia Type:   General, LMA     Note:  Airway :Room Air  Patient transferred to:PACU  Comments: To pacu on room air vss report to RN    118/65, 16, 90, 96%      Vitals: (Last set prior to Anesthesia Care Transfer)    CRNA VITALS  7/24/2017 1426 - 7/24/2017 1500      7/24/2017             Pulse: 86    SpO2: 100 %    Resp Rate (set): 10                Electronically Signed By: JUSTIN Arroyo CRNA  July 24, 2017  3:00 PM

## 2017-07-24 NOTE — OP NOTE
OPERATIVE REPORT    PREOPERATIVE DIAGNOSIS: Bilateral ureteral obstruction, history of peritoneal adenocarcinoma     POSTOPERATIVE DIAGNOSIS:  Same    PROCEDURES PERFORMED:   1. Cystourethroscopy with bilateral retrograde pyelography  2. Exchange of left ureteral stent.  3. Placement of right ureteral stent   4. Intraoperative interpretation of fluoroscopic imaging.   5. Removal of right percutaneous nephrostomy tube     STAFF SURGEON: Dr. Caremla Alvarez, present for entire case.     RESIDENT: Carmela Velazquez MD    ANESTHESIA: General    ESTIMATED BLOOD LOSS: 0 mL.     DRAINS:  Bilateral 8 Malaysian x 26 cm double J ureteral stent       OPERATIVE INDICATIONS:   Margaux Guzmán is a 61 year old female with a history of primary peritoneal adenocarcinoma with bilateral hydronephrosis and ureteral obstruction managed with indwelling ureteral stents since August 2015. She has previously been managed with a 6 Malaysian left stent and a 7 Fr right stent but was later noted to have developed worsening hydronephrosis and bilateral 8Fr stents were placed.  At the time of her last exchange, they were unable to place a right ureteral stent and thus a right percutaneous nephrostomy tube was placed. The patient presents today for the above procedure. After discussion of all risks, benefits and alternatives the patient elected for the procedure as listed above.     DESCRIPTION OF PROCEDURE:    After informed consent was obtained, the patient was taken to the operating room, and moved to the operating table.  After adequate anesthesia was induced, the patient was repositioned in dorsal lithotomy position and prepped and draped in the usual sterile fashion. A timeout was taken to confirm correct patient, procedure and laterality.     A 22-Malaysian cystoscope was inserted into a well lubricated urethra. The urethra was unremarkable.  The bladder was free of tumors, stones or diverticuli.  The media was clear.  The left ureteral stent curl was  seen emanating from the left ureteral orifice and grasped with an alligator forcep and brought to the urethral meatus. The stent was then cannulated with a sensor wire. The wire went with ease into the upper pole of the left kidney. A retrograde pyelogram was performed which showed calyceal dilation but no overt hydronephrosis or hydroureter. The sensor wire was then replaced and the cystoscope was advanced into the bladder over the wire. A 8 Belarusian x 26 cm left ureteral double J stent was advanced over the guidewire under direct and fluorscopic guidance with a good curl in the renal pelvis and bladder.  The stent passed up easily with no appreciable resistance.     We then performed the above steps on the right side including placement of a wire into the right renal pelvis with the aid of a 5 Omani open ended. Performance of right retrograde with demonstrated mild/moderate hydronephrosis. A 8 Omani x 26 cm right ureteral stent was advanced over the guidewire under direct and fluoroscopic guidance with a good curl seen in the renal pelvis and bladder.  The bladder was then drained and the cystoscope was removed.     The patient tolerated the procedure well.  There were no complications.         PLAN:   - Stable to PACU   - Patient may discharge home once meeting criteria   - Plan for bilateral stent exchange in 3 months     Carmela Velazquez MD   Urology Resident, PGY-3

## 2017-07-24 NOTE — IP AVS SNAPSHOT
MRN:[id number]                      After Visit Summary   7/24/2017    Margaux Guzmán    MRN: [id number]           Thank you!     Thank you for choosing [facility] for your care. Our goal is always to provide you with excellent care. Hearing back from our patients is one way we can continue to improve our services. Please take a few minutes to complete the written survey that you may receive in the mail after you visit with us. Thank you!        Patient Information     Date Of Birth          1955        About your hospital stay     You were admitted on:  July 24, 2017 [facility]    You were discharged on:  July 24, 2017       Who to Call     For medical emergencies, please call 911.  For non-urgent questions about your medical care, please call your primary care provider or clinic, [phone]  For questions related to your surgery, please call your surgery clinic        Attending Provider     Provider Carmela Hull MD Urology       Primary Care Provider Office Phone # Fax #    Aurelia Knox [phone]      After Care Instructions     Diet Instructions       Resume pre procedure diet            Discharge Instructions       Activity  - Do not strain with bowel movements.  - Do not drive until you can press the brake pedal quickly and fully without pain.   - Do not operate a motor vehicle while taking narcotic pain medications.     Stents  - You have bilateral ureteral stents in place. Stents can cause some discomfort, including some blood in your urine (which is normal), the feeling or urge to urinate frequently, burning with urination and pressure/discomfort in your back while voiding. Stay well hydrated to keep your urine as clear as possible.    Medications  - Flomax - to decrease stent discomfort   - Transition from narcotic pain medications to tylenol (acetaminophen) as you are able.  " Wean yourself off all pain medications as you are able.  - Some pain medications contain both tylenol (acetaminophen) and a narcotic (Norco, vicodin, percocet), do not take more than 4,000mg of Tylenol (acetaminophen) from all sources in any 24 hour period.  - Narcotics can make you constipated.  Take over the counter fiber (metamucil or benefiber) and stool softeners (miralax, docusate or senna) while taking narcotic pain medications, but stop if you develop diarrhea.  - No driving or operating machinery while taking narcotic pain medications     Follow-Up:  - Follow up with Dr. Alvarez in 3 months for ureteral stents exchange.  - Call your primary care provider to touch base regarding your recent procedure.   - Call or return sooner than your regularly scheduled visit if you develop any of the following: fever (greater than 101.5), uncontrolled pain, uncontrolled nausea or vomiting, as well as increased redness, swelling, or drainage from your wound.     Phone numbers:   - Monday through Friday 8am to 4:30pm: Call [phone] with questions or to schedule or confirm appointment.    - Nights or weekends: call the after hours emergency pager - [phone] and tell the  \"I would like to page the Urology Resident on call.\"  - For emergencies, call [phone]            Encourage fluids       Encourage fluids at home to keep urine clear to light pink                  Your next 10 appointments already scheduled     Jul 26, 2017  8:15 AM CDT   Return Visit with Gricelda Grullon MD   [facility])    [address] [place] [address]   [phone]            Jul 31, 2017  9:00 AM CDT   Return Visit with [facility] ONLY [facility]   [facility]    [address] [place] [address]   [phone]            Jul 31, 2017  9:30 AM CDT   Level 2 with [place] [facility] (" [facility])    [address] [facility] [address]   [phone]            Aug 07, 2017  8:30 AM CDT   Return Visit with NURSE ONLY CANCER CENTER   [facility]    [address] [facility] [address]   [phone]            Aug 07, 2017  9:00 AM CDT   Level 2 with BAY [address] INFUSION   [facility])    [address] [facility] [address]   [phone]            Aug 14, 2017  9:00 AM CDT   Return Visit with [facility]URSE ONLY CANCER CENTER   [facility] ([facility])    [address] [facility] [address]   [phone]            Aug 14, 2017  9:30 AM CDT   Level 2 with BAY 8 INFUSION   [facility] ([facility])    [address] [facility] [address]   [phone]            Dec 06, 2017 10:00 AM CST   Lab with  LAB   [facility] ([facility])    [address]  1st Floor  [facility] [id number] [phone]            Dec 06, 2017 10:45 AM CST   (Arrive by 10:15 AM)   Return Visit with Mackenzie Ca MD   [facility] ([facility])    [address]  3rd Floor  [facility] [id number] [phone]              Further instructions from your care team       [facility]  Home Care Following Anesthesia  For 24 hours after surgery:  1. Get plenty of rest.  A responsible adult must stay with you for at least 24 hours after you leave the surgery center.  2. Do not drive or use heavy equipment.  If you have weakness or tingling, don't drive or use heavy equipment until this feeling goes away.   3. Do not drink alcohol.   4. Avoid strenuous or risky activities.  Ask for help when climbing stairs.  5. You may feel lightheaded.  IF so, sit for a few minutes before standing.  Have  someone help you get up.   6. If you have nausea (feel sick to your stomach): Drink only clear liquids such as apple juice, ginger ale, broth or 7-Up.  Rest may also help.  Be sure to drink enough fluids.  Move to a regular diet as you feel able.   7. You may have a slight fever.  Call the doctor if your fever is over 100 F (37.7 C) (taken under the tongue) or lasts longer than 24 hours.  8. You may have a dry mouth, a sore throat, muscle aches or trouble sleeping. These should go away after 24 hours.  9. Do not make important or legal decisions.     Tips for taking pain medications  To get the best pain relief possible, remember these points:    Take pain medications as directed, before pain becomes severe.    Pain medication can upset your stomach: taking it with food may help.    Constipation is a common side effect of pain medication. Drink plenty of  fluids.    Eat foods high in fiber. Take a stool softener if recommended by your doctor or pharmacist.    Do not drink alcohol, drive or operate machinery while taking pain medications.    Ask about other ways to control pain, such as with heat, ice or relaxation.    Tylenol/Acetaminophen Consumption  To help encourage the safe use of acetaminophen, the makers of TYLENOL  have lowered the maximum daily dose for single-ingredient Extra Strength TYLENOL  (acetaminophen) products sold in the [place] from 8 pills per day (4,000 mg) to 6 pills per day (3,000 mg). The dosing interval has also changed from 2 pills every 4-6 hours to 2 pills every 6 hours.    If you feel your pain relief is insufficient, you may take Tylenol/Acetaminophen in addition to your narcotic pain medication.     Be careful not to exceed 3,000 mg of Tylenol/Acetaminophen in a 24 hour period from all sources.    If you are taking extra strength Tylenol/acetaminophen (500 mg), the maximum dose is 6 tablets in 24 hours.    If you are taking regular strength acetaminophen (325 mg), the maximum dose is 9  tablets in 24 hours.    Call a doctor for any of the followin. Signs of infection (fever, growing tenderness at the surgery site, a large amount of drainage or bleeding, severe pain, foul-smelling drainage, redness, swelling).  2. It has been over 8 to 10 hours since surgery and you are still not able to urinate (pass water).  3. Headache for over 24 hours.    Your doctor is:       Dr. Carmela Alvarez, Prostate and Urology: [phone]               Or dial [phone] and ask for the resident on call for:  Prostate Urology  For emergency care, call the:  [place] [facility]:  [phone] (TTY for hearing impaired: [phone])    Discharge Instructions Following a Cystoscopy, Stent and/or Ureteroscopy    Drainage/Urination:    Urine may be slightly bloody but should taper off in a few days.    You may have some frequency and urgency for a few days.    Comfort:    A burning sensation when urinating is common. Drinking extra fluids helps decrease this burning feeling and also helps to clear the bloody urine.    Mild abdominal cramps may occur for a few days.    Baths:    Daily tub baths aide in passing urine.    Frequent use of bubble bath is discouraged since it encourages urinary tract infections.    Report to your doctor at once if you experience:    Inability to pass your urine    Continuous or heavy bleeding    Severe Pain    Elevated temperature > than 101.5 for 24 hours    Home Activity:    On the day of surgery spend a quiet evening at home.    Increase activity as tolerated.                    Additional Information     If you use hormonal birth control (such as the pill, patch, ring or implants): You'll need a second form of birth control for 7 days (condoms, a diaphragm or contraceptive foam). While in the hospital, you received a medicine called Bridion. Your normal birth control will not work as well for a week after taking this medicine.          Pending Results     No orders found  "from 7/22/2017 to 7/25/2017.            Admission Information     Date & Time Provider Department Dept. Phone    7/24/2017 Carmela Alvarez MD [facility] [phone]      Your Vitals Were     Blood Pressure Pulse Temperature Respirations Height Weight    133/70 105 98.2  F (36.8  C) (Temporal) 16 1.676 m (5' 6\") 49.9 kg (110 lb)    Pulse Oximetry BMI (Body Mass Index)                100% 17.75 kg/m2          MyChart Information     [facility] gives you secure access to your electronic health record. If you see a primary care provider, you can also send messages to your care team and make appointments. If you have questions, please call your primary care clinic.  If you do not have a primary care provider, please call [phone] and they will assist you.      [employer] is an electronic gateway that provides easy, online access to your medical records. With [employer], you can request a clinic appointment, read your test results, renew a prescription or communicate with your care team.     To access your existing account, please contact your [facility] or call [phone] for assistance.        Care EveryWhere ID     This is your Care EveryWhere ID. This could be used by other organizations to access your [facility] medical records  [id number]        Equal Access to Services     TRAVIS PHILLIPS : Marcel verma Sojonathan, waaxda luqadaha, qaybta kaalmada lanie, stacy buchanan. So [facility] [phone].    ATENCIÓN: Si habla español, tiene a almodovar disposición servicios gratuitos de asistencia lingüística. Llame al [phone].    We comply with applicable federal civil rights laws and [place] laws. We do not discriminate on the basis of race, color, national origin, age, disability sex, sexual orientation or gender identity.               Review of your medicines      START taking        Dose / Directions    oxyCODONE 5 MG IR " tablet   Commonly known as:  ROXICODONE   Used for:  Hydronephrosis due to obstruction of ureter        Dose:  5-10 mg   Take 1-2 tablets (5-10 mg) by mouth every 3 hours as needed for other (Moderate to Severe Pain)   Quantity:  15 tablet   Refills:  0         CONTINUE these medicines which have NOT CHANGED        Dose / Directions    ACETAMINOPHEN PO        Dose:  500 mg   Take 500 mg by mouth every 6 hours as needed for pain Reported on 3/30/2017   Refills:  0       amLODIPine 2.5 MG tablet   Commonly known as:  NORVASC   Used for:  Primary peritoneal adenocarcinoma (H)        Dose:  2.5 mg   Take 1 tablet (2.5 mg) by mouth daily   Quantity:  30 tablet   Refills:  0       buprenorphine 5 MCG/HR WK patch   Commonly known as:  BUTRANS   Used for:  Cancer associated pain        Dose:  1 patch   Place 1 patch onto the skin every 7 days   Quantity:  4 patch   Refills:  3       COLACE PO        Dose:  50 mg   Take 50 mg by mouth daily   Refills:  0       cycloSPORINE 0.05 % ophthalmic emulsion   Commonly known as:  RESTASIS        Dose:  1 drop   Place 1 drop into both eyes 2 times daily   Refills:  0       enoxaparin 40 MG/0.4ML injection   Commonly known as:  LOVENOX   Used for:  Ovarian cancer, left (H), Ovarian cancer, right (H)        INJECT 1 SYRINGE SUBCUTANEOUSLY DAILY   Quantity:  12 mL   Refills:  0       EPINEPHrine 0.3 MG/0.3ML injection 2-pack   Commonly known as:  EPIPEN/ADRENACLICK/or ANY BX GENERIC EQUIV   Used for:  Preventative health care, Primary peritoneal adenocarcinoma (H)        Dose:  0.3 mg   Inject 0.3 mLs (0.3 mg) into the muscle once as needed for anaphylaxis   Quantity:  1 each   Refills:  0       LORazepam 1 MG tablet   Commonly known as:  ATIVAN   Used for:  Other iron deficiency anemia, Chemotherapy-induced neutropenia (H), Ovarian cancer, left (H), Ovarian cancer, right (H), Primary peritoneal adenocarcinoma (H)        Dose:  1 mg   Take 1 tablet (1 mg) by mouth every 6 hours as needed  (Anxiety, Nausea/Vomiting or Sleep)   Quantity:  30 tablet   Refills:  2       MELATONIN PO        Dose:  1 mg   Take 1 mg by mouth At Bedtime   Refills:  0       ondansetron 8 MG ODT tab   Commonly known as:  ZOFRAN-ODT   Used for:  Ovarian cancer, right (H), Nausea        Dose:  8 mg   Place 1 tablet (8 mg) under the tongue every 8 hours as needed for nausea   Quantity:  90 tablet   Refills:  3       ONE DAILY MULTIVITAMIN WOMEN Tabs        Dose:  1 tablet   Take 1 tablet by mouth every morning   Refills:  0       polyethylene glycol powder   Commonly known as:  MIRALAX/GLYCOLAX        Dose:  1 capful   Take 1 capful by mouth daily as needed   Refills:  0       prochlorperazine 10 MG tablet   Commonly known as:  COMPAZINE   Used for:  Other iron deficiency anemia, Chemotherapy-induced neutropenia (H), Ovarian cancer, left (H), Ovarian cancer, right (H), Primary peritoneal adenocarcinoma (H)        Dose:  10 mg   Take 1 tablet (10 mg) by mouth every 6 hours as needed (Nausea/Vomiting)   Quantity:  30 tablet   Refills:  2       tamsulosin 0.4 MG capsule   Commonly known as:  FLOMAX   Used for:  Primary peritoneal adenocarcinoma (H)        Dose:  0.4 mg   Take 1 capsule (0.4 mg) by mouth daily   Quantity:  60 capsule   Refills:  6       tolterodine 4 MG 24 hr capsule   Commonly known as:  DETROL LA   Used for:  Hydronephrosis, unspecified hydronephrosis type        TAKE ONE CAPSULE BY MOUTH DAILY   Quantity:  30 capsule   Refills:  3       ZANTAC PO        Dose:  75 mg   Take 75 mg by mouth daily   Refills:  0            Where to get your medicines      Some of these will need a paper prescription and others can be bought over the counter. Ask your nurse if you have questions.     Bring a paper prescription for each of these medications     oxyCODONE 5 MG IR tablet                Protect others around you: Learn how to safely use, store and throw away your medicines at www.disposemymeds.org.             Medication  List: This is a list of all your medications and when to take them. Check marks below indicate your daily home schedule. Keep this list as a reference.      Medications           Morning Afternoon Evening Bedtime As Needed    ACETAMINOPHEN PO   Take 500 mg by mouth every 6 hours as needed for pain Reported on 3/30/2017   Last time this was given:  975 mg on 7/24/2017 12:53 PM                                amLODIPine 2.5 MG tablet   Commonly known as:  NORVASC   Take 1 tablet (2.5 mg) by mouth daily                                buprenorphine 5 MCG/HR WK patch   Commonly known as:  BUTRANS   Place 1 patch onto the skin every 7 days                                COLACE PO   Take 50 mg by mouth daily                                cycloSPORINE 0.05 % ophthalmic emulsion   Commonly known as:  RESTASIS   Place 1 drop into both eyes 2 times daily                                enoxaparin 40 MG/0.4ML injection   Commonly known as:  LOVENOX   INJECT 1 SYRINGE SUBCUTANEOUSLY DAILY                                EPINEPHrine 0.3 MG/0.3ML injection 2-pack   Commonly known as:  EPIPEN/ADRENACLICK/or ANY BX GENERIC EQUIV   Inject 0.3 mLs (0.3 mg) into the muscle once as needed for anaphylaxis                                LORazepam 1 MG tablet   Commonly known as:  ATIVAN   Take 1 tablet (1 mg) by mouth every 6 hours as needed (Anxiety, Nausea/Vomiting or Sleep)                                MELATONIN PO   Take 1 mg by mouth At Bedtime                                ondansetron 8 MG ODT tab   Commonly known as:  ZOFRAN-ODT   Place 1 tablet (8 mg) under the tongue every 8 hours as needed for nausea                                ONE DAILY MULTIVITAMIN WOMEN Tabs   Take 1 tablet by mouth every morning                                oxyCODONE 5 MG IR tablet   Commonly known as:  ROXICODONE   Take 1-2 tablets (5-10 mg) by mouth every 3 hours as needed for other (Moderate to Severe Pain)                                 polyethylene glycol powder   Commonly known as:  MIRALAX/GLYCOLAX   Take 1 capful by mouth daily as needed                                prochlorperazine 10 MG tablet   Commonly known as:  COMPAZINE   Take 1 tablet (10 mg) by mouth every 6 hours as needed (Nausea/Vomiting)                                tamsulosin 0.4 MG capsule   Commonly known as:  FLOMAX   Take 1 capsule (0.4 mg) by mouth daily                                tolterodine 4 MG 24 hr capsule   Commonly known as:  DETROL LA   TAKE ONE CAPSULE BY MOUTH DAILY                                ZANTAC PO   Take 75 mg by mouth daily

## 2017-07-24 NOTE — ANESTHESIA POSTPROCEDURE EVALUATION
Patient: Margaux Guzmán    Procedure(s):  Cystoscopy, Left Stent Exchange, Left retrograde pyelogram, Right Retrograde Stent Placement on the Right, Removal of nephrostomy tube - Wound Class: II-Clean Contaminated    Diagnosis:Hydronephrosis  Diagnosis Additional Information: No value filed.    Anesthesia Type:  General, LMA    Note:  Anesthesia Post Evaluation    Patient location during evaluation: PACU  Patient participation: Able to fully participate in evaluation  Level of consciousness: awake and alert  Pain management: adequate  Airway patency: patent  Cardiovascular status: acceptable  Respiratory status: acceptable  Hydration status: acceptable  PONV: none     Anesthetic complications: None          Last vitals:  Vitals:    07/24/17 1231 07/24/17 1500 07/24/17 1515   BP: 149/71 118/65 133/70   Pulse: 105     Resp: 16 16 16   Temp: 37.1  C (98.7  F) 37.1  C (98.7  F) 36.8  C (98.2  F)   SpO2: 99% 96% 100%         Electronically Signed By: Elissa Goode MD  July 24, 2017  3:45 PM

## 2017-07-24 NOTE — BRIEF OP NOTE
[facility]    Brief Operative Note    Pre-operative diagnosis: Bilateral Hydronephrosis  Post-operative diagnosis Same as above  Procedure: Procedure(s):  Cystoscopy, Left Stent Exchange, Left retrograde pyelogram, Right Retrograde Stent Placement on the Right, Removal of nephrostomy tube - Wound Class: II-Clean Contaminated  Surgeon: Surgeon(s) and Role:     * Carmela Alvarez MD - Primary   * Carmela Velazquez MD - Assisting  Anesthesia: General   Estimated blood loss: Minimal  Drains: 8 x 26 cm ureteral stent bilateral   Specimens: * No specimens in log *  Findings:   None.  Complications: None.  Implants: None.

## 2017-07-24 NOTE — IP AVS SNAPSHOT
[facility]    [address] [place] [address]    Phone:  [phone]    Fax:  [phone]                                       After Visit Summary   7/24/2017    Margaux Guzmán    MRN: [id number]           After Visit Summary Signature Page     I have received my discharge instructions, and my questions have been answered. I have discussed any challenges I see with this plan with the nurse or doctor.    ..........................................................................................................................................  Patient/Patient Representative Signature      ..........................................................................................................................................  Patient Representative Print Name and Relationship to Patient    ..................................................               ................................................  Date                                            Time    ..........................................................................................................................................  Reviewed by Signature/Title    ...................................................              ..............................................  Date                                                            Time

## 2017-07-26 NOTE — TELEPHONE ENCOUNTER
[phone]  [phone]       Confirmed with Sonia Obrien at [facility] that  they can service [facility] and that they can provide a . They will contact Margaux to perform an informational visit.

## 2017-07-26 NOTE — MR AVS SNAPSHOT
After Visit Summary   7/26/2017    Margaux Guzmán    MRN: [id number]           Patient Information     Date Of Birth          1955        Visit Information        Provider Department      7/26/2017 8:15 AM Gricelda Grullon MD [facility]        Today's Diagnoses     Neoplastic malignant related fatigue    -  1    Malignant neoplasm of ovary, unspecified laterality (H)        Cancer associated pain          Care Instructions    Patient Instructions      Expand All Collapse All    Thank you for coming into the Palliative Care Clinic today.     1. Fatigue:  - start Ritalin (methylphenidate): take 2.5mg (1/2 tablet) in the morning.        If you tolerate this well (sleep well at night), you can add a second dose of 2.5mg (1/2 tablet) midday. Do not take after 2pm.  Please call us at least one week before you need a refill so we can get the prescription ready for you.      2. Pain:  - Please call your pain clinic to taper off the buprenorphine patch  - continue acetaminophen three times a day  - continue acupuncture    - Start gabapentin (neurontin) as follows:   Gabapentin 100 mg capsules:  Day 1: take one capsule at night.   --Do this for 3 days total  Day 4: take one capsule in morning and one at night.  --Do this for 3 days total  Day 7: take one capsule, three times a day (morning, afternoon, and night - you don't need to be exact).   --Continue taking this until you talk with us.       Treatment plan:  We sent out a referral to Hospice. They will call you to make an appointment for an informational visit.      Return to clinic in about one month for a follow up.     You can reach the Palliative Care Team during business hours at the following number:    - [phone]    To reach the Palliative Care Provider on-call After-hours or on holidays and weekends, call: [phone].  Please note that we are not able to provide pain medication refills on evenings or  weekends.                     Follow-ups after your visit        Additional Services     HOSPICE REFERRAL       **Order classes of: [facility], [facility] and [facility] will route to the Home Care and Hospice Referral Pool.  Home Care or Hospice will then contact the patient to schedule their appointment.**    If you do not hear from Home Care and Hospice, or you would like to call to schedule, please call the referring place of service that your provider has listed below.  ______________________________________________________________________    Your provider has referred you to: Hospice    Extended Emergency Contact Information  Primary Emergency Contact: Mayela Calrk  Address: [address] [address] [place] [address] [facility]  Home Phone: [phone]  Work Phone: [employer]  Mobile Phone: [phone]  Relation: Daughter  Secondary Emergency Contact: Jenifer Clark   [facility]  Home Phone: none  Work Phone: none  Mobile Phone: [phone]  Relation: Daughter    Patient Anticipated Discharge Date: 7/26/2017     RN, PT, HHA to begin 24 - 48 hours after discharge.  PLEASE EVALUATE AND TREAT (Evaluation timeline is 24 - 48 hrs. Please call if there is need for a variance to this timeline).    REASON FOR REFERRAL: Hospice - Diagnosis: metastatic ovarian cancer    Current Outpatient Prescriptions:  acetaminophen (TYLENOL) 500 MG tablet, Take 2 tablets (1,000 mg) by mouth 3 times daily, Disp: 100 tablet, Rfl: 0  methylphenidate (RITALIN) 5 MG tablet, Take 2.5mg (1/2 tablet) in the morning x3-5 days, then take 2.5mg (1/2 tablet) in the morning and midday., Disp: 30 tablet, Rfl: 0  enoxaparin (LOVENOX) 40 MG/0.4ML injection, INJECT 1 SYRINGE SUBCUTANEOUSLY DAILY, Disp: 12 mL, Rfl: 0  buprenorphine (BUTRANS) 5 MCG/HR WK patch, Place 1 patch onto the skin every 7 days, Disp: 4 patch, Rfl: 3  Docusate Sodium (COLACE PO), Take 50 mg by mouth daily, Disp: , Rfl:   LORazepam (ATIVAN) 1 MG tablet, Take 1 tablet  (1 mg) by mouth every 6 hours as needed (Anxiety, Nausea/Vomiting or Sleep), Disp: 30 tablet, Rfl: 2  prochlorperazine (COMPAZINE) 10 MG tablet, Take 1 tablet (10 mg) by mouth every 6 hours as needed (Nausea/Vomiting), Disp: 30 tablet, Rfl: 2  tolterodine (DETROL LA) 4 MG 24 hr capsule, TAKE ONE CAPSULE BY MOUTH DAILY, Disp: 30 capsule, Rfl: 3  ondansetron (ZOFRAN-ODT) 8 MG ODT tab, Place 1 tablet (8 mg) under the tongue every 8 hours as needed for nausea, Disp: 90 tablet, Rfl: 3  amLODIPine (NORVASC) 2.5 MG tablet, Take 1 tablet (2.5 mg) by mouth daily, Disp: 30 tablet, Rfl:   tamsulosin (FLOMAX) 0.4 MG capsule, Take 1 capsule (0.4 mg) by mouth daily, Disp: 60 capsule, Rfl: 6  ACETAMINOPHEN PO, Take 500 mg by mouth every 6 hours as needed for pain Reported on 3/30/2017, Disp: , Rfl:   Multiple Vitamins-Minerals (ONE DAILY MULTIVITAMIN WOMEN) TABS, Take 1 tablet by mouth every morning , Disp: , Rfl:   polyethylene glycol (MIRALAX/GLYCOLAX) powder, Take 1 capful by mouth daily as needed , Disp: , Rfl:   cycloSPORINE (RESTASIS) 0.05 % ophthalmic emulsion, Place 1 drop into both eyes 2 times daily , Disp: , Rfl:   Ranitidine HCl (ZANTAC PO), Take 75 mg by mouth daily , Disp: , Rfl:   MELATONIN PO, Take 1 mg by mouth At Bedtime , Disp: , Rfl:   EPINEPHrine (EPIPEN) 0.3 MG/0.3ML injection, Inject 0.3 mLs (0.3 mg) into the muscle once as needed for anaphylaxis, Disp: 1 each, Rfl: 0      Patient Active Problem List:     Primary peritoneal adenocarcinoma (H)     Recurrent cold sores     Pulmonary nodules     Encounter for long-term current use of medication     DVT (deep venous thrombosis) (H)     Family history of peritoneal cancer     Protein in urine     Ovarian cancer, left (H)     Ovarian cancer, right (H)     Chemotherapy-induced neutropenia (H)     Anemia     Pain due to ureteral stent (H)     Sterile pyuria     Pyelonephritis     Small bowel obstruction (H)     Dehydration     Patient in cancer related research  study     Rectal bleeding     Confusion     Adjustment disorder with anxious mood     Nausea with vomiting     Abdominal pain, generalized     ACP (advance care planning)     Hypomagnesemia     Anemia associated with chemotherapy     Chemotherapy-induced peripheral neuropathy (H)     Temperature elevated     Encounter for antineoplastic chemotherapy      Documentation of Face to Face and Certification for Home Health Services      Physician/Provider to provide follow up care: Aurelia Knox    Please be aware that coverage of these services is subject to the terms and limitations of your health insurance plan.  Call member services at your health plan with any benefit or coverage questions.                  Your next 10 appointments already scheduled     Aug 07, 2017  8:30 AM CDT   [facility]   [facility]    [address] [facility] [address]   [phone]            Aug 07, 2017  8:30 AM CDT   Return Visit with Jia Mccollum MD   [facility])    [address] [facility] [address]   [phone]            Aug 07, 2017  9:00 AM CDT   Level 2 with BAY 6 INFUSION   [facility]    [address] [facility] [address]   [phone]            Aug 14, 2017  9:00 AM CDT   Return Visit with [facility]   [facility]    [address] [facility] [address]   [phone]            Aug 14, 2017  9:30 AM CDT   Level 2 with BAY 8 INFUSION   [facility])    [address] [facility] [address]   [phone]            Aug 23, 2017 10:15 AM CDT   Return Visit with Gricelda Grullon MD   [facility])    [address] [place] [address]  [facility] [address]   [phone]            Dec 06, 2017 10:00 AM CST   Lab with  LAB   [facility] ([facility])    [address]  1st Floor  [facility] [id number] [phone]            Dec 06, 2017 10:45 AM CST   (Arrive by 10:15 AM)   Return Visit with Mackenzie Ca MD   [facility] ([facility])    [address]  3rd Floor  [facility] [id number] [phone]              Who to contact     If you have questions or need follow up information about today's clinic visit or your schedule please contact [facility] directly at [phone].  Normal or non-critical lab and imaging results will be communicated to you by MyChart, letter or phone within 4 business days after the clinic has received the results. If you do not hear from us within 7 days, please contact the clinic through [facility] or phone. If you have a critical or abnormal lab result, we will notify you by phone as soon as possible.  Submit refill requests through [employer] or call your pharmacy and they will forward the refill request to us. Please allow 3 business days for your refill to be completed.          Additional Information About Your Visit        [employer] Information     [employer] gives you secure access to your electronic health record. If you see a primary care provider, you can also send messages to your care team and make appointments. If you have questions, please call your primary care clinic.  If you do not have a primary care provider, please call [phone] and they will assist you.      [employer] is an electronic gateway that provides easy, online access to your medical records. With [employer], you can request a clinic appointment, read your test results, renew a prescription or communicate with your care team.     To access your existing account, please contact your [facility] or call  "[phone] for assistance.        Care EveryWhere ID     This is your Care EveryWhere ID. This could be used by other organizations to access your [facility] medical records  [id number]        Your Vitals Were     Pulse Temperature Respirations Height Pulse Oximetry BMI (Body Mass Index)    106 98.9  F (37.2  C) (Oral) 20 1.676 m (5' 5.98\") 97% 17.44 kg/m2       Blood Pressure from Last 3 Encounters:   08/01/17 144/73   07/31/17 133/74   07/31/17 133/74    Weight from Last 3 Encounters:   08/01/17 48.5 kg (107 lb)   07/26/17 49 kg (108 lb)   07/24/17 49.9 kg (110 lb)              We Performed the Following     HOSPICE REFERRAL          Today's Medication Changes          These changes are accurate as of: 7/26/17 11:59 PM.  If you have any questions, ask your nurse or doctor.               Start taking these medicines.        Dose/Directions    gabapentin 100 MG capsule   Commonly known as:  NEURONTIN   Used for:  Cancer associated pain   Started by:  Gricelda Grullon MD        Gabapentin 100 mg capsules: Day 1: take one capsule at night.  --Do this for 3 days total Day 4: take one capsule in morning and one at night. --Do this for 3 days total Day 7: take one capsule, three times a day (morning, afternoon, and night - you don't need to be exact).  --Continue taking this until you talk with us.   Quantity:  90 capsule   Refills:  1       methylphenidate 5 MG tablet   Commonly known as:  RITALIN   Used for:  Neoplastic malignant related fatigue   Started by:  Gricelda Grullon MD        Take 2.5mg (1/2 tablet) in the morning x3-5 days, then take 2.5mg (1/2 tablet) in the morning and midday.   Quantity:  30 tablet   Refills:  0         Stop taking these medicines if you haven't already. Please contact your care team if you have questions.     oxyCODONE 5 MG IR tablet   Commonly known as:  ROXICODONE   Stopped by:  Gricelda Grullon MD                Where to get your medicines      Some of " these will need a paper prescription and others can be bought over the counter.  Ask your nurse if you have questions.     Bring a paper prescription for each of these medications     gabapentin 100 MG capsule    methylphenidate 5 MG tablet                Primary Care Provider Office Phone # Fax #    Aurelia Knox [phone]       [facility] [address] [facility] [address]        Equal Access to Services     TRAVIS PHILLIPS : Hadii aad ku hadasho Soomaali, waaxda luqadaha, qaybta kaalmada adeegyada, waxay idiin hayaan adeeg tristian latrista . So [facility] [place] [facility] [phone].    ATENCIÓN: Si habla español, tiene a almodovar disposición servicios gratuitos de asistencia lingüística. Llame al [phone].    We comply with applicable federal civil rights laws and [place] laws. We do not discriminate on the basis of race, color, national origin, age, disability sex, sexual orientation or gender identity.            Thank you!     Thank you for choosing [facility]  for your care. Our goal is always to provide you with excellent care. Hearing back from our patients is one way we can continue to improve our services. Please take a few minutes to complete the written survey that you may receive in the mail after your visit with us. Thank you!             Your Updated Medication List - Protect others around you: Learn how to safely use, store and throw away your medicines at www.disposemymeds.org.          This list is accurate as of: 7/26/17 11:59 PM.  Always use your most recent med list.                   Brand Name Dispense Instructions for use Diagnosis    amLODIPine 2.5 MG tablet    NORVASC    30 tablet    Take 1 tablet (2.5 mg) by mouth daily    Primary peritoneal adenocarcinoma (H)       buprenorphine 5 MCG/HR WK patch    BUTRANS    4 patch    Place 1 patch onto the skin every 7 days    Cancer associated pain       COLACE PO      Take 50 mg by mouth daily        cycloSPORINE 0.05 %  ophthalmic emulsion    RESTASIS     Place 1 drop into both eyes 2 times daily        enoxaparin 40 MG/0.4ML injection    LOVENOX    12 mL    INJECT 1 SYRINGE SUBCUTANEOUSLY DAILY    Ovarian cancer, left (H), Ovarian cancer, right (H)       EPINEPHrine 0.3 MG/0.3ML injection 2-pack    EPIPEN/ADRENACLICK/or ANY BX GENERIC EQUIV    1 each    Inject 0.3 mLs (0.3 mg) into the muscle once as needed for anaphylaxis    Preventative health care, Primary peritoneal adenocarcinoma (H)       gabapentin 100 MG capsule    NEURONTIN    90 capsule    Gabapentin 100 mg capsules: Day 1: take one capsule at night.  --Do this for 3 days total Day 4: take one capsule in morning and one at night. --Do this for 3 days total Day 7: take one capsule, three times a day (morning, afternoon, and night - you don't need to be exact).  --Continue taking this until you talk with us.    Cancer associated pain       LORazepam 1 MG tablet    ATIVAN    30 tablet    Take 1 tablet (1 mg) by mouth every 6 hours as needed (Anxiety, Nausea/Vomiting or Sleep)    Other iron deficiency anemia, Chemotherapy-induced neutropenia (H), Ovarian cancer, left (H), Ovarian cancer, right (H), Primary peritoneal adenocarcinoma (H)       MELATONIN PO      Take 1 mg by mouth At Bedtime        methylphenidate 5 MG tablet    RITALIN    30 tablet    Take 2.5mg (1/2 tablet) in the morning x3-5 days, then take 2.5mg (1/2 tablet) in the morning and midday.    Neoplastic malignant related fatigue       ondansetron 8 MG ODT tab    ZOFRAN-ODT    90 tablet    Place 1 tablet (8 mg) under the tongue every 8 hours as needed for nausea    Ovarian cancer, right (H), Nausea       ONE DAILY MULTIVITAMIN WOMEN Tabs      Take 1 tablet by mouth every morning        polyethylene glycol powder    MIRALAX/GLYCOLAX     Take 1 capful by mouth daily as needed        prochlorperazine 10 MG tablet    COMPAZINE    30 tablet    Take 1 tablet (10 mg) by mouth every 6 hours as needed (Nausea/Vomiting)     Other iron deficiency anemia, Chemotherapy-induced neutropenia (H), Ovarian cancer, left (H), Ovarian cancer, right (H), Primary peritoneal adenocarcinoma (H)       tamsulosin 0.4 MG capsule    FLOMAX    60 capsule    Take 1 capsule (0.4 mg) by mouth daily    Primary peritoneal adenocarcinoma (H)       tolterodine 4 MG 24 hr capsule    DETROL LA    30 capsule    TAKE ONE CAPSULE BY MOUTH DAILY    Hydronephrosis, unspecified hydronephrosis type       TYLENOL 500 MG tablet   Generic drug:  acetaminophen     100 tablet    Take 2 tablets (1,000 mg) by mouth 3 times daily        ZANTAC PO      Take 75 mg by mouth daily

## 2017-07-26 NOTE — PATIENT INSTRUCTIONS
Patient Instructions      Expand All Collapse All    Thank you for coming into the Palliative Care Clinic today.     1. Fatigue:  - start Ritalin (methylphenidate): take 2.5mg (1/2 tablet) in the morning.        If you tolerate this well (sleep well at night), you can add a second dose of 2.5mg (1/2 tablet) midday. Do not take after 2pm.  Please call us at least one week before you need a refill so we can get the prescription ready for you.      2. Pain:  - Please call your pain clinic to taper off the buprenorphine patch  - continue acetaminophen three times a day  - continue acupuncture    - Start gabapentin (neurontin) as follows:   Gabapentin 100 mg capsules:  Day 1: take one capsule at night.   --Do this for 3 days total  Day 4: take one capsule in morning and one at night.  --Do this for 3 days total  Day 7: take one capsule, three times a day (morning, afternoon, and night - you don't need to be exact).   --Continue taking this until you talk with us.       Treatment plan:  We sent out a referral to Hospice. They will call you to make an appointment for an informational visit.      Return to clinic in about one month for a follow up.     You can reach the Palliative Care Team during business hours at the following number:    - [phone]    To reach the Palliative Care Provider on-call After-hours or on holidays and weekends, call: [phone].  Please note that we are not able to provide pain medication refills on evenings or weekends.

## 2017-07-26 NOTE — PROGRESS NOTES
SPIRITUAL HEALTH SERVICES  SPIRITUAL ASSESSMENT Progress Note  [facility]    PRIMARY FOCUS:     Goals of care    Symptom/pain management    Emotional/spiritual/Church distress    Support for coping    ILLNESS CIRCUMSTANCES:     Context of Serious Illness/Symptom(s) - Cancer    Resources for Support - Daughter -    DISTRESS:     Emotional/Spiritual/Existential Distress - Pt voiced both deep concern about the current political situation and questions about life after death.   validated her concerns and talked with her about the death/life cycle pointing to the possibility of life after death.  She commented that, as a science lover, that was helpful to her.   said if she chose to go into hospice she would probably have access to a supportive  through hospice.    Yazidism Distress - Pt is frustrated with organized Pentecostalism and is struggling with her own beliefs.    Social/Cultural/Economic Distress - Not Discussed    SPIRITUAL/Jehovah's witness COPING:     Temple/Lexus - None, but pt is very spiritual.    Spiritual Practice(s) - Reflects on the beauty in nature.  Pt welcomed a prayer, which  provided.     Emotional/Relational/Existential Connections - Not Discussed    GOALS OF CARE:    Goals of Care - palliative care    Meaning/Sense-Making - Pt talked about trying to sort it all out.    PLAN:  is available for pt/family needs.    Malro De Santiago M.Div., [facility]  Staff   Office tel: [phone]

## 2017-07-26 NOTE — NURSING NOTE
"Oncology Rooming Note    July 26, 2017 8:27 AM   Margaux Guzmán is a 61 year old female who presents for:    Chief Complaint   Patient presents with     Oncology Clinic Visit     NEW     Initial Vitals: There were no vitals taken for this visit. Estimated body mass index is 17.75 kg/(m^2) as calculated from the following:    Height as of 7/24/17: 1.676 m (5' 6\").    Weight as of 7/24/17: 49.9 kg (110 lb). There is no height or weight on file to calculate BSA.  Data Unavailable Comment: Data Unavailable   No LMP recorded. Patient has had a hysterectomy.  Allergies reviewed: Yes  Medications reviewed: Yes    Medications: Medication refills not needed today.  Pharmacy name entered into [facility]:    [id number] - [place], [place] - [address] AT [facility] [address]  [id number] [place] [address]  [facility] [place], [place] - [address] [facility] [place] [address]  [place] [address] [place] [address] [id number] - [place] - [address] [id number] - [place], [place] - [address] [facility] [address]  [id number] - [place], [place] - [address] AT [facility] [address]    Clinical concerns:     8 minutes for nursing intake (face to face time)     MORRO NEAL LPN              "

## 2017-07-26 NOTE — PROGRESS NOTES
"Palliative Care Outpatient Clinic Consultation Note    Patient:  Margaux Guzmán    Chief Complaint:   Margaux Guzmán 61 year old female who is presenting to the palliative medicine clinic today at the request of JUSTIN San, Gyn/Onc for a palliative care consultation secondary to Ovarian Cancer.  The patient's primary care provider is:  Aurelia Knox.   Gyn/Onc: Dr Mccollum    History of Present Illness:  Margaux is visiting with her daughter Mayela today.     She is aware of the role of palliative care, has met our team while admitted at [facility] in the past.     Margaux has several issues and questions she wants to address today:  1. She feels extremely fatigued and \"foggy\". States that she \"used to be\" smart and quick thinking, but now struggles with grasping stuff and thinking through things. She also has very low physical energy. Unable to do much exertion due to pain.    2. Pain: mainly in her flanks associated with her ureteral stents. Describes as sharp, steady, worse with any movement. Gets some relief with acupuncture, heat and acetaminophen. No response to opioids, including buprenorphine. No effect of lidocaine cream. Hasn't tried any neuropathic agent, can't take NSAIDs due to being on anticoagulation.  She also has abdominal pain at site of cancer mets now, which is milder than her flank pain and seems to be responding to all medications better.  Had percutaneous nephrostomy in the past, caused more pain than the ureteral stents, so stent was replaced.      Reports intermittent constipation and diarrhea, is adjusting her laxatives herself. We agree to address this once the general treatment plan, including whether she continues chemo, is clearer.    3. Questions about treatment plan / options: She was started on paclitaxel with the goal of symptom relief, understands that her cancer won't be cured. She knows her cancer has progressed slowly on the paclitaxel and reports worsened " "symptoms. She has been wondering whether to stop chemo. We discuss the scope and practise of hospice. She is worried that her cancer will \"speed up\" when she stops chemo. We discuss the uncertainty, also the possibility of an overall improvement in her energy and symptoms with a transition to hospice.   She is interested in an informational visit from hospice. She wants to meet Dr Mccollum prior to stopping chemo.    4. Location of care: Margaux loves her home that is \"surrounded by nature\". However, driving is becoming more difficult which affects her ability to come to appointments with us, Onc, acupuncture. She has not been able to find any transport companies or other sources of support in that sense.   She wants to stay at home as long as possible, but expects she wants to go to a facility to die there. This would be for increased support as well as to maintain the good memories her daughter has of the house.     5. Coping: She struggles with the drastic change in her physical and cognitive abilities. Used to hike much and was \"very analytical\". Now barely able to walk, slowed mind and difficulties concentrating. She is terrified at the thought of \"abandoning\" her daughter. States that in her family people tend to live well into their nineties. She acknowledges that cancer is random, doesn't assign great meaning to her becoming sick, but understandably struggles with the adjustments.   She gets solace from being in nature, her dog, her daughter. She does feel connected to spirituality, but is somewhat at a loss. She grew up Pentecostal, but now feels she can't identify with that Scientologist anymore. Also some apprehension around teachings she received regarding the afterlife, and uncertainty as to what she believes awaits her. A friend knows a nun that has become disillusioned with Hindu and recommended they meet. She is interested in that, also would like to meet Marlo, our .       Onc Hx:   - Nov 2013 " "diagnosed with ovarian cancer stage IIIB  -  Started Chemo Dec 13  - April 14 - March 15: no radiologic evidence of disease, anxiety around recurrence of cancer  - March 15: CT concerning for intraabdominal recurrence  - Sept 15: starts chemo (Carbo, Doxil, Avastin) complicated by neutropenia, jugular vein thrombosis, bleeding w anemia, urosepsis  - Sept 16 - now: multiple other systemic treatments, currently Paclitaxel    Patient's Disease Understanding: excellent    Social History  Living Situation: lives by herself    Children: has 2 daughters. Mayela with her today. Raiza listed on [facility].    Actual/Potential Caregiver(s): likely Mayela    Support System: family    Hobbies: being outside, close to nature    Spiritual Background: grew up [employer], \"disillusioned\". States her belief system now is joint from a variety of sources.     Spiritual Concerns/Needs: wondering what awaits us after death. Seems concerned with also some curiosity.   Would like to discuss this in more detail.     Social History   Substance Use Topics     Smoking status: Former Smoker     Smokeless tobacco: Former User      Comment: Quit over 20 years ago     Alcohol use No       Family History  Family History   Problem Relation Age of Onset     Arthritis Father      Myocardial Infarction Father      secondary to anesthesia     Colon Cancer Father      DIABETES Other      Uncle     Hypertension Mother      Other - See Comments Mother      cognitive decline     Skin Cancer Mother      Hypertension Sister      HEART DISEASE Other      unknown valve replacement     Bladder Cancer Sister      Skin Cancer Brother        Advance Care Planning:  Advance Directive:    Dated Nov 2013  Health Care Agent Contact Information: anjana Pedro and raiza  LINCOLN:   Dated Jan 2017: DNR, limited treatments    Allergies   Allergen Reactions     Contrast Dye Itching and Swelling     Itching/tingling of mouth and nose with sensation of swelling after receiving IV " "contrast for CT.  This occurred despite steroid premedication. Therefore the patient should not receive intravenous contrast in the future.      Benadryl Allergy Other (See Comments)     Stay awake, restless, \"uncomfortable\", \"feel like I need to run away\"  With  IV dose,  does fine with oral Benadryl.     Lovenox [Enoxaparin] Hives     3/23/16: Okay to receive heparin flushes in port, tolerates well. Patricia Cortez FNP-C     Amoxicillin Rash     Diphenhydramine Anxiety     No Clinical Screening - See Comments Rash     Pollen Extract Rash     Sulfa Drugs Rash     Medications:  Buprenorphine 5mcg/hr weekly  Oxycodone 5-10 q3h prn    Prochlorperazine 10mg 6h prn  Ondansetron 8mg tid prn    Melatonin 1mg HS prn  miralax daily prn    Lorazepam 1mg q6h prn    Tamsulosin, tolterodine    Past Medical History:   Diagnosis Date     Anemia      History of blood transfusion     MULTIPLE     Hydronephrosis      Hyperlipidemia      Jugular vein thrombosis, right     Persistent right internal jugular DVT     Livedo annularis      Osteoarthritis      Osteopenia      Ovarian cancer (H)      Polymyalgia rheumatica (H)      PONV (postoperative nausea and vomiting)      Primary cancer of peritoneum (H)      Past Surgical History:   Procedure Laterality Date     APPENDECTOMY       BREAST SURGERY      biopsy negative      SECTION       COLONOSCOPY N/A 2017    Procedure: COLONOSCOPY;  Surgeon: Luis Richardson MD;  Location: [facility]     COLONOSCOPY N/A 2017    Procedure: COMBINED COLONOSCOPY, SINGLE OR MULTIPLE BIOPSY/POLYPECTOMY BY BIOPSY;  Surgeon: Luis Richardson MD;  Location: [facility]     COMBINED CYSTOSCOPY, RETROGRADES, EXCHANGE STENT URETER(S) Bilateral 2016    Procedure: COMBINED CYSTOSCOPY, RETROGRADES, EXCHANGE STENT URETER(S);  Surgeon: Carmlea Alvarez MD;  [facility]: [facility]     COMBINED CYSTOSCOPY, RETROGRADES, EXCHANGE STENT URETER(S)  2016    @ [facility]     COMBINED CYSTOSCOPY, " RETROGRADES, EXCHANGE STENT URETER(S) Bilateral 10/17/2016    Procedure: COMBINED CYSTOSCOPY, RETROGRADES, EXCHANGE STENT URETER(S);  Surgeon: Carmela Alvarez MD;  [facility]: [facility]     COMBINED CYSTOSCOPY, RETROGRADES, EXCHANGE STENT URETER(S) Bilateral 12/7/2016    Procedure: COMBINED CYSTOSCOPY, RETROGRADES, EXCHANGE STENT URETER(S);  Surgeon: Carmela Alvarez MD;  [facility]     COMBINED CYSTOSCOPY, RETROGRADES, EXCHANGE STENT URETER(S) Bilateral 2/27/2017    Procedure: COMBINED CYSTOSCOPY, RETROGRADES, EXCHANGE STENT URETER(S);  Surgeon: Carmela Alvarez MD;  [facility]     COMBINED CYSTOSCOPY, RETROGRADES, EXCHANGE STENT URETER(S) Bilateral 6/12/2017    Procedure: COMBINED CYSTOSCOPY, RETROGRADES, EXCHANGE STENT URETER(S);  Cystoscopy, Bilateral Stent Exchange, Bilateral Retrograde Pyelogram;  Surgeon: Carmela Alvarez MD;  [facility]     COMBINED CYSTOSCOPY, RETROGRADES, EXCHANGE STENT URETER(S) N/A 7/24/2017    Procedure: COMBINED CYSTOSCOPY, RETROGRADES, EXCHANGE STENT URETER(S);  Cystoscopy, Left Stent Exchange, Left retrograde pyelogram, Right Retrograde Stent Placement on the Right, Removal of nephrostomy tube;  Surgeon: Carmela Alvarez MD;  [facility]     ESOPHAGOSCOPY, GASTROSCOPY, DUODENOSCOPY (EGD), COMBINED N/A 1/28/2017    Procedure: COMBINED ESOPHAGOSCOPY, GASTROSCOPY, DUODENOSCOPY (EGD);  Surgeon: Luis Richardson MD;  [facility]: [facility]     HYSTERECTOMY TOTAL ABDOMINAL, BILATERAL SALPINGO-OOPHORECTOMY, NODE DISSECTION, COMBINED  11/7/2013    Procedure: COMBINED HYSTERECTOMY TOTAL ABDOMINAL, SALPINGO-OOPHORECTOMY, NODE DISSECTION;;  Surgeon: Cheri Aguilar MD;  [facility]     INJECT NERVE BLOCK CELIAC PLEXUS Left 4/20/2017    Procedure: INJECT NERVE BLOCK CELIAC PLEXUS;  Left splanchnic nerve block;  Surgeon: Shabbir Valladares MD;  [facility]     INJECT NERVE BLOCK CELIAC PLEXUS Left 5/18/2017    Procedure: INJECT NERVE BLOCK  CELIAC PLEXUS;  Left Splanchnic Nerve Block;  Surgeon: Shabbir Valladares MD;  [facility]     LAPAROSCOPIC SALPINGO-OOPHORECTOMY  11/7/2013    Procedure: LAPAROSCOPIC SALPINGO-OOPHORECTOMY;  Diagnostic Laparoscopy, Exploratory Laparotomy, Bilateral Salpingo-Oophorectomy,  Hysterectomy, Omentectomy, Pelvic Washings, Peritoneal staging and biopsies, Pelvic and Periaortic Lymph node Dissection;  Surgeon: Cheri Aguilar MD;  [facility]     LAPAROTOMY, STAGING, COMBINED  11/7/2013    Procedure: COMBINED LAPAROTOMY, STAGING;;  Surgeon: Cheri Aguilar MD;  [facility]     LYMPH NODE BIOPSY  2008    Left posterior chain, beign     OPEN REDUCTION INTERNAL FIXATION TOE(S)  9/3/13    Fifth digit, left foot, with screw fixation      REMOVE PORT PERITONEAL  5/5/2014    Procedure: REMOVE PORT PERITONEAL;  Surgeon: Cheir Aguilar MD;  [facility]       REVIEW OF SYSTEMS:   ROS: 10 point ROS neg other than the symptoms noted above in the HPI and here:  Palliative Symptom Review (0=no symptom/no concern, 1=mild, 2=moderate, 3=severe):      Pain: 3      Fatigue: 3      Nausea: 0      Constipation: 2      Diarrhea: 2      Depressive Symptoms: 0      Anxiety: 1      Drowsiness: 0      Poor Appetite: 1      Shortness of Breath: 0      Insomnia: 1      Other: 0      Overall (0 good/no concerns, 3 very poor):  2           Physical Exam:   Vitals were reviewed  Temp: 98.9  F (37.2  C) Temp src: Oral BP: 119/68 Pulse: 106   Resp: 20 SpO2: 97 %        CONSTIT: awake, appears comfortable when sitting still. Very thin  EENT: MMM, EOMI, no icterus  RESP: reg, nl effort  CARDIOVASC: RRR, no m/r/g  MSK:moves x4  SKIN:  warm, no rash, no obvious lesions  NEURO: alert, oriented x3  PSYCH: appropriate affect, memory and thought process intact      Data Reviewed:  LABS: : rising (285 in Feb, 831 July)    Impressions:  Palliative Performance Score:  60  Decision Making Capacity:  intact        Recommendations &  Counseliny/o with metastatic ovarian cancer, course complicated by poorly controlled pain.    Pain: mainly associated with renal stents, now also developing abd pain associated with tumor per se. Doesn't tolerate opioids, also doesn't seem to get relief with these. On anticoagulation, so NSAIDs contraindicated.  - she will call pain clinic to advise on tapering off buprenorphine  - scheduled acetaminophen  - continue acupuncture  - start gabapentin with slow taper to 100mg tid    Fatigue: multifactorial due to malignancy, chemo, medications, pain, likely also psychogenic component  - methylphenidate 2.5mg daily, increase to bid after a few days if tolerated    Treatment plan: patient interested in finding out more about hospice services.  - referred to hospice for informational meeting  - she will make an appointment with Dr Mccollum to discuss further treatment with chemo vs hospice      Return to clinic in about 1 month. Pt unable to come on  to meet with our  since those days she gets acupuncture in [facility].      Gricelda Grullon MD  Palliative Medicine  Pager [phone]

## 2017-07-28 NOTE — TELEPHONE ENCOUNTER
"Patient sent a my chart message requesting that she trial off the Butrans patch since she is feeling tired and \"groggy\" and she is wondering if it is the medication that is making her feel this way. After consulting with the pharmacist here in the clinic it was decided that the patient can stop her patch since she is at the lowest dose of the medication. The patient was told the half life of the medication is 26 hours, so if she was going to start feeling pain after the patch was removed it would occur in one to two days after removal. This is also when withdrawal symptoms would show. Pharmacist stated that withdrawal symptoms are unlikely with the removal of the low dose patch and this was told to the patient.Withdrawl sysmptoms were gone over with the patient even though they are unlikely with this medication change.  It was explained to patient hat she can restart the patch at anytime, but it will take about three days for the medication to reach peak effect again. Patient did report that she does have some oxycodone left from a procedure on Monday that she could take if she has breakthrough pain if she needs to  It was suggested by the pharmacist that she doesn't remove the patch until Monday if she does have withdrawal symptoms or increased pain she is able to contact the clinic. This information was relayed to the patient and she has decided that she will not stop it until Monday. This RN will call the patient on Wednesday of next week to see how she was progressing with the drug taper.  "

## 2017-07-31 NOTE — PROGRESS NOTES
Follow Up Notes on Referred Patient    Date: 2017        RE: Margaux Guzmán  : 1955  MELANI: 2017      Margaux Guzmán is a 61 year old woman with a history of primary peritoneal cancer.  She was recently on the Tesaro study but unfortunately had progression.  She is currently receiving weekly Paclitaxel. She is here today for an acute visit related to constipation.      Oncology History:  The patient is a 61 year old  female who initially presented for evaluation of pelvic and abdominal pain. On exam, she had mild right adnexal tenderness and slightly enlarged right ovary freely mobile and smooth. This prompted an abdominal ultrasound, which was normal, and a pelvic US that showed anechoic right ovarian cyst measuring 4.3 x 3.8 x 4.0 cm. Her  was elevated at 74 on 10/8/13. We reviewed the possible diagnosis including ovarian cancer versus benign ovarian cyst. Approach to surgery, alternatives to surgery and plan for possible extended open surgical staging based on the operative findings was discussed. In light of the size of the ovary we are offering an initial approach with minimally invasive surgery. After discussion of risks and benefits, consent was obtained.  13: Diagnostic laparoscopy converted to exploratory laparotomy, bilateral salpingo-oophorectomy, total abdominal hysterectomy, omentectomy, staging biopsies, bilateral pelvic and periaortic lymph node dissection and washings. Stage IIIB  13: Cycle #1 IV/IP Taxol/CDDP  14: Cycle #2 IV/IP PCP.  - 14  14: Cycle 3 IV/IP.  - 7  14:   7. CT C/A/P Impression:  1. Multiple bilateral pulmonary nodules as described in full report measuring up to 3 mm along the right major fissure. These are indeterminate given lack of comparison and followup is recommended.  2. No definite evidence for recurrent or metastatic disease in the abdomen or the pelvis. There is a rounded hypodense  focus abutting the left external iliac artery and the adjacent sigmoid colon which measures 1 cm, this could represent a fluid-filled sigmoid diverticulum or a hypodense node, further attention to this area on subsequent examinations is recommended.  3. There is a 7 mm nonobstructing stone in the inferior collecting system of the right kidney.  2/12/14-3/26/14: Cycle #4-6 IV/IP  7, 7, 7.  4/21/14: CT C/A/P Impression:   1. Unchanged indeterminate pulmonary nodules. Recommend continued surveillance.   2. No definite evidence of metastatic ovarian cancer in the abdomen or pelvis. Small amount of subtle increased thickening and fluid is noted along the right lymph node dissection, nonspecific, but possibly a tiny developing lymphocele.   3. 6 mm nonobstructing stone in the right kidney.  Plan surveillance for ovarian cancer every 3 months with physical and .   Indeterminate pulmonary nodule: These were present before the surgery and there was not change with the chemotherapy. Highly unlikely to be a metastatic disease. Will repeat a CT in 3 months to see any change   5/22/14:  6. JOSEMANUEL.  5/17/14; ED visit [facility]. CT abd/pel noted possible chronic partial small bowel obstruction. Colonscopy scheduled for June 6, 2014 locally. Abdominal pain started 5/17/14 and noted pressure without bowel movement and went to ED that night due to increasing pain. In patient 2 day hospital with clear liquids/IV. Mild nausea without vomiting. BM subsequently occurred and pt was discharged. No pain since. Continues with fullness in abdomen. Diet is bland for the most part.  8/25/14:  5. Notes increased abdominal girth and bloating x 2-3 weeks with urine urgency. Worried about recurrence. Is just starting to return to normal exercise and activities. No constipation, diarrhea, pain, vaginal or rectal bleeding, cough or dyspnea. CT to follow indeterminate pulmonary nodules today.   CT cap  IMPRESSION:   1. No CT scan findings of the chest, abdomen, or pelvis to indicate metastatic disease.  2. 2-5 mm pulmonary nodules, stable since 2/11/2014.  3. Nonobstructing 6 mm stone in the right kidney.  12/3/14:  8.. Pt started zoloft for anxiety. Worries about cancer recurrence.   3/2/15:  34  3/5/15: CT C/A/P: Impression:  1. Multiple new small peritoneal and retroperitoneal nodules are suspicious for metastases. Suspicious new left common iliac and central small bowel mesenteric nodes. No abdominal ascites.  2. Multiple pulmonary nodules are stable with no new nodules.  3. 9 mm nonobstructing right lower pole renal calculus.      5/20/15: CT c/a/p showed nodularity throughout the abdomen and pelvis worrisome for malignancy which has increased in size   5/28/15: CT abdomen and pelvis showed stable inumerabble peritoneal nodules scattered throughout the abdomen and pelvis consistent with metastases.    8/4/15 (approximatly): Left ureteral stent was placed.  8/12/15:  from [place] 303   8/18/15: CT chest Impression showed slight increase in mild, subtle nodularity along the diaphragm which is nonspecific but may represent metastatic disease.   8/18/15: CT abdomen and pelvis Impression showed interval progression of peritoneal metastatic disease.       8/25/15:  on 3/2/15 of 34 prompted CT c/a/p, which showed multiple new small peritoneal and retroperitoneal nodules are suspicious for metastases. Suspicious new left common iliac and central small bowel mesenteric nodes. She participated in [facility] study involving treatment with on clinical trial with vaccine [id number]. She is here today because the [place] trail failed and the pt pregressed. Her most recent CT c/a/p on 8/25 showed progression of peritoneal metastatic disease. And her most recent  from [place] on 8/12/15 was 303.      Plan: Carbo/Doxil x 6 cycles.with imaging after 3 cycles.       9/3/15: Cycle #1 Carbo/Doxil. CA  "125 244.  9/24/15: right IJ thrombus; started on Lovenox.   9/26/15: Present to [facility] ED. \"presented to the ED this morning with what appears to be a mild drug rash after administering her lovenox this morning. This writer discussed situation with Gyn Onc Fellow Jeanne Moon as well as [facility] Heme/Onc service. Per Heme, a rash of this nature is extremely uncommon with administration of Lovenox. Given the mild nature of this rash and acute need for anticoagulation, recommended the patient continue with Lovenox injections and treat with Benadryl as needed. Advised patient be given precautions to return to the ED immediately if she develops reactive respiratory symptoms. Alternatively patient could be switched to alternative formulation of low molecular weight heparin if she was strongly resistant to continuation of Lovenox.\"      10/1/15: Cycle #2 Carbo/Doxil.  175.      10/28/15: gyn onc visit: pt complains of worsening constipation and tenderness around her right ribs. She is taking senna for her constipation with minimal improvement. She admits that the swelling around her neck after her blood clot has decreased. She also admits to feeling a nodules on her left abdomen. She states her appetite is good but she has not been eating fruits and vegetables. She denies any chemo side effects besides fatigue.       10/28/15:  158  11/23/2015:  133  CT c/a/p IMPRESSION: In this patient with a clinical history of ovarian cancer there is mixed response to therapy:   1. Stable to slightly decreased peritoneal nodularity since 8/18/2015.  2. No significant change regarding the large subdiaphragmatic peritoneal deposit since 9/24/2015.  3. Enlarging soft tissue nodule overlying the abdominal musculature concerning for metastasis since March of 2015.   4. Unchanged, indeterminate hypodensity near the gallbladder fossa since 8/18/2015, however progressively increased in size since  3/5/2015.  5. Bilateral " nephroureteral stents. No significant hydronephrosis.  6. Bilateral pulmonary nodules. Continued followup recommended.      12/23/15: Cycle #5 carboplatin/Doxil/Avastin.  96. To receive Avastin today despite proteinuria per Dr. Aguilar and nephrology.  1/21/16: Cycle #6 carboplatin/Doxil/Avastin, delayed one week secondary to neutropenia.  62.  1/28/16:  63.      2/2/16: Patient had right upper extremity doppler u/s done in [place] due to swollen glands and pain. 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 Pt is currently on lovenox.      2/2/16: US soft tissue neck  Conclusion:  1. Morphologically normal not pathologically enlarged two right carotid chain lymph nodes.  2. Chronic right jugular vein thrombosis, described in detail on  venous doppler exam.       2/2/16: Thyroid Ultrasound  Right lobe: 5.5 x 1.9 x 1.2 cm  Left lobe: 5.0 x 1.5 . 0.9 cm  Both lobes have normal background echotexture.      Conclusion:  1. 3 benign - appearing subcentimeter hypoechoic right mid thyroid nodules.  2. Borderline thyromegaly.      2/2/16: Right upper extremity venous duplex doppler evaluation  Conclusion:  1.) chronic non compressible echogenic right jugular vein thrombus, now 4.3 cm in length.       2/22/16:   IMPRESSION:    1. Ovarian cancer with peritoneal carcinomatosis.  2. Given the increased sensitivity of PET/CT, comparison with prior CT examinations is difficult. In general the abdominal/pelvic metastatic lesions appear to be decreased in size.  3. Persistent right internal jugular DVT, as evidenced by 5 cm filling defect with inferior border at the central venous catheter.  4. Bilateral hydronephrosis without overt caliectasis. Some distal migration of the bilateral double-J ureteral stents, although the  proximal coiled portions continue to be in the renal pelves.      2/24/16:  Cycle #7 carboplatin/Doxil/Avastin.  56.  3/9/16: Hgb 6.6, worked up for transfusion reaction (negative). Bleed possibly secondary to   3/23/16: Cycle #8 carboplatin/Doxil/Avastin.  44.  4/2016: Hospitalized in [place] x2 weeks for hematuria leading to anemia after ureteral stent exchange  4/20/16: Cycle #9 carboplatin/Doxil/Avastin. Deferred one week secondary to acute UTI.  35.   4/28/16: Cycle #9 deferred due to continued bacteruria and ANC 1.3.  5/4/16: Cycle #9 carboplatin/Doxil/Avastin. Breast lump noted, diagnostic mammogram ordered.  6/1/16: Cycle #10 carboplatin/Doxil/Avastin.  50.  6/28/16: Cycle #11 carboplatin/Doxil/Avastin. Avastin held due to bleeding. Carbo/Doxil deferred one week secondary to neutropenia.  43.  7/7/16: Cycle #12 carboplatin/Doxil. Avastin held due to bleeding.  7/18/16: Bilateral ureteral stent exchange  7/20/16-7/29/16: Hospitalized with urosepsis and blood loss anemia, started on Zosyn and discharged on amoxicillin  9/29/16-11/11/16: Cycle #1-3 Gemzar.  85, 106, 111.      12/12/16: CT CAP IMPRESSION: In this patient with a history of metastatic ovarian cancer:  1. Progression of disease as evidenced by a slowly enlarging mesenteric and omental soft tissue foci and slowly enlarging  periesophageal and pericardiophrenic lymph nodes, as detailed above.  2. Stable appearance of bilateral ureteral stents without hydronephrosis.  3. Patient was premedicated for a pre-existing IV contrast allergy. Despite this, she had itchiness on her face poststudy. She should no longer receive IV contrast in the future.        CT AP 1/10/2017: multiple dilated fluid filled small bowel loops with decompressed distal small bowel loops, consistent with obstruction. Transition point is suspected in the pelvis. No pneumatosis or free intraperitoneal gas. Bilateral ureteral stents appear appropriately positioned. There is mild dilation of the bilateral renal collecting  systems, left greater than right.      1/23/17: admitted to clinic for dizziness and heart palpitations   1/27/17: ED admission - leg swelling and GI bleeding       1/27/17: US lower extremity IMPRESSION:  1.  No evidence of right lower extremity deep venous thrombosis.  2.  Dampened waveforms in the right lower extremity, similar to 7/25/2016, though a more central occlusion cannot be excluded.  1/27/17: MRI Brain IMPRESSION:  Normal brain MRI  1/27/17: CT AP IMPRESSION: This patient with a history of metastatic ovarian cancer:  1. No bowel obstruction. Oral contrast progressed to the colon. Extensive colonic stool.  2. Severe right hydronephrosis, new from 12/12/2016, may represent obstruction of the right ureteral stent. No left-sided hydronephrosis.  3. Slight interval progression of diffuse peritoneal metastatic disease.  4. Mildly nodular areas of increased attenuation within the pelvic bowel which may represent loculated malignant ascites or peritoneal implants similar in appearance to 12/12/2016.      1/28/17: colonoscopy- benign       Treatment: Tesaro/Quadra Niraparib study  1/5/17-2/28/17: Cycle #1-3       2/28/17: CT scan ([facility]) IMPRESSION: In this patient with a history of ovarian cancer, there is mild disease progression as follows:  1. Increase in size and number of multiple subcentimeter nodules/intrafissural lymph nodes along the right major and minor  fissures and also bilateral pulmonary nodules, predominantly along the diaphragmatic pleura.   2. Slight increase in size of small lymph nodes in the right internal mammary region and bilateral anterior cardiophrenic region.  3. Stable to slight increase in size of deposits in the perihepatic region. No significant change in the omental deposits in the left  upper quadrant of the abdomen.  4. Stable to slight increase in size of mesenteric deposits/lymph nodes.  5. Bilateral nephroureteric stent in place. Atrophy of the right kidney with  "interval resolution of right hydronephrosis. Unchanged  mild left hydronephrosis. Air within the collecting systems and bladder likely related to prior intervention.  6. Dilation of proximal small bowel loops with interspersed areas of narrowing. No progression of oral contrast into the ileum. Moderate amount of fecal material in the colon. Partial/early small bowel obstruction is possible.  3/30/17: CT cap IMPRESSION: Images patient with a history of ovarian cancer, overall findings of stable disease includin. No acute findings to suggest etiology of severe left flank pain.  2. Unchanged pulmonary nodules/pleural nodularity and thoracic lymph nodes.  3. Unchanged perihepatic and left upper quadrant omental deposits as well as diffuse mesenteric lymphadenopathy.  4. Unchanged hypoattenuating focus in the hepatic dome.  5. Unchanged placement of bilateral nephroureteral stents. Unchanged mild to moderate left hydronephrosis. Stable atrophy of the right kidney.  6. Unchanged 8 mm right flank soft tissue nodule.       17: left splanchnic ganglion block. FNA of lesion in umbilicus completed; results pending.       3/30/17: CT AP IMPRESSION: Images patient with a history of ovarian cancer, overall findings of stable disease includin. No acute findings to suggest etiology of severe left flank pain.  2. Unchanged pulmonary nodules/pleural nodularity and thoracic lymph nodes.  3. Unchanged perihepatic and left upper quadrant omental deposits as well as diffuse mesenteric lymphadenopathy.  4. Unchanged hypoattenuating focus in the hepatic dome.  5. Unchanged placement of bilateral nephroureteral stents. Unchanged mild to moderate left hydronephrosis. Stable atrophy of the right kidney.  6. Unchanged 8 mm right flank soft tissue nodule.      2017: FNA- \"Lump in belly button\", palpation guided fine needle aspiration:   - Positive for Malignancy   - Metastatic adenocarcinoma, consistent with Müllerian origin " "(see comment)   Specimen Adequacy: Satisfactory for evaluation.       4/27/17: CT CAP IMPRESSION: In this patient with history of metastatic ovarian carcinoma: Stable disease.  1. Unchanged pulmonary nodules and thoracic lymph nodes.  2. There are two nodules in the right lower quadrant posterior mesentery, which are stable to minimally increased on current study  and slightly increased from 03/15/17. Recommend attention on follow up. Otherwise stable peritoneal tumor metastasis.  3. New fluid attenuation collection in the central presacral pelvis, HU measures 12, likely ascitic fluid, not present on prior study.  Attention on follow-up.  4. Stable bilateral nephroureteral stents and mild hydronephrosis, with left greater than right fullness of the renal hilar region.      Plan to start weekly Paclitaxel.    5/22/17: Cycle #1 weekly Paclitaxel.   743. Received 3 weeks, then off 2 weeks, then given one week.  5/30/17: Transfusion 1 unit RBC  6/8/17: Hgb 6.5; received 2 units RBC outside hospital  6/9/17: Hgb 8.5  6/12/17: Cystoscopy, Bilateral Stent Exchange, Bilateral Retrograde Pyelogram  6/14/17: PNT placed. On Macrobid x 10 days (completed 6/26)  6/28/17: Cycle #1 D22 Paclitaxel.   7/10/17: Cycle #2 weekly Paclitaxel.  pending.   7/31/17: Abdominal xray verbal report: stents look good, no dilated loops of bowel, moderate amount of stool, no free air, no SBO        Today she comes to clinic reporting she has not had a bowel movement since a week ago. She is currently trying varying amounts of Milk of Mag (15 ml last Friday and 30 cc on Saturday,none yesterday), Miralax (a capful/day), Colace every day, and Senna she added in one pill/day starting last Thursday). She does have an enema and suppository at home but has not used them. She reports she had two small liquid BMs yesterday; she states she is not eating very much food as she gets full quite fast so she has not been having formed or \"normal\" " stools for quite some time. The amount of food she is currently eating has not changed over the past few weeks. She did not have her weekly chemotherapy last week and reports that her nausea was improved due to this; she had her stents changed last week and she is recovering ok from this. She is able to drink fluids ok. She has noticed another area on her abdomen which feels like another tumor and she is wondering if her current treatment is working. She did meet with Palliative Care last week and hospice was discussed; she is currently awaiting a call from hospice so they can provide an information visit. She does have questions regarding hospice and what is expected of her family and what hospice entails.         Review of Systems:    Systemic           no weight changes; no fever; no chills; no night sweats; no appetite changes; + fatigue  Skin           no rashes, or lesions  Eye           no irritation; no changes in vision  Hue-Laryngeal           no dysphagia; no hoarseness   Pulmonary    no cough; no shortness of breath  Cardiovascular    no chest pain; no palpitations; + HTN  Gastrointestinal    no diarrhea; + constipation; + abdominal pain; no changes in bowel  habits; no blood in stool  Genitourinary   no urinary frequency; no urinary urgency; no dysuria; no pain; no abnormal vaginal discharge; no abnormal vaginal bleeding  Breast    no breast discharge; no breast changes; no breast pain  Musculoskeletal    no myalgias; no arthralgias; no back pain  Psychiatric           no depressed mood; no anxiety    Hematologic               no tender lymph nodes; no noticeable swellings or lumps   Endocrine    no hot flashes; no heat/cold intolerance         Neurological   no tremor; no numbness and tingling; no headaches; no difficulty  sleeping      Past Medical History:    Past Medical History:   Diagnosis Date     Anemia      History of blood transfusion     MULTIPLE     Hydronephrosis      Hyperlipidemia       Jugular vein thrombosis, right     Persistent right internal jugular DVT     Livedo annularis      Osteoarthritis      Osteopenia      Ovarian cancer (H)      Polymyalgia rheumatica (H)      PONV (postoperative nausea and vomiting)      Primary cancer of peritoneum (H)          Past Surgical History:    Past Surgical History:   Procedure Laterality Date     APPENDECTOMY       BREAST SURGERY      biopsy negative      SECTION       COLONOSCOPY N/A 2017    Procedure: COLONOSCOPY;  Surgeon: Luis Richardson MD;  Location: [facility]     COLONOSCOPY N/A 2017    Procedure: COMBINED COLONOSCOPY, SINGLE OR MULTIPLE BIOPSY/POLYPECTOMY BY BIOPSY;  Surgeon: Luis Richardson MD;  Location: [facility]     COMBINED CYSTOSCOPY, RETROGRADES, EXCHANGE STENT URETER(S) Bilateral 2016    Procedure: COMBINED CYSTOSCOPY, RETROGRADES, EXCHANGE STENT URETER(S);  Surgeon: Carmela Alvarez MD;  Location: [facility]     COMBINED CYSTOSCOPY, RETROGRADES, EXCHANGE STENT URETER(S)  2016    @ [facility]     COMBINED CYSTOSCOPY, RETROGRADES, EXCHANGE STENT URETER(S) Bilateral 10/17/2016    Procedure: COMBINED CYSTOSCOPY, RETROGRADES, EXCHANGE STENT URETER(S);  Surgeon: Carmela Alvarez MD;  [facility]: [facility]     COMBINED CYSTOSCOPY, RETROGRADES, EXCHANGE STENT URETER(S) Bilateral 2016    Procedure: COMBINED CYSTOSCOPY, RETROGRADES, EXCHANGE STENT URETER(S);  Surgeon: Carmela Alvarez MD;  [facility]     COMBINED CYSTOSCOPY, RETROGRADES, EXCHANGE STENT URETER(S) Bilateral 2017    Procedure: COMBINED CYSTOSCOPY, RETROGRADES, EXCHANGE STENT URETER(S);  Surgeon: Carmela Alvarez MD;  [facility]     COMBINED CYSTOSCOPY, RETROGRADES, EXCHANGE STENT URETER(S) Bilateral 2017    Procedure: COMBINED CYSTOSCOPY, RETROGRADES, EXCHANGE STENT URETER(S);  Cystoscopy, Bilateral Stent Exchange, Bilateral Retrograde Pyelogram;  Surgeon: Carmela Alvarez MD;  [facility]      COMBINED CYSTOSCOPY, RETROGRADES, EXCHANGE STENT URETER(S) N/A 7/24/2017    Procedure: COMBINED CYSTOSCOPY, RETROGRADES, EXCHANGE STENT URETER(S);  Cystoscopy, Left Stent Exchange, Left retrograde pyelogram, Right Retrograde Stent Placement on the Right, Removal of nephrostomy tube;  Surgeon: Carmela Alvarez MD;  [facility]     ESOPHAGOSCOPY, GASTROSCOPY, DUODENOSCOPY (EGD), COMBINED N/A 1/28/2017    Procedure: COMBINED ESOPHAGOSCOPY, GASTROSCOPY, DUODENOSCOPY (EGD);  Surgeon: Luis Richardson MD;  Location: [facility]     HYSTERECTOMY TOTAL ABDOMINAL, BILATERAL SALPINGO-OOPHORECTOMY, NODE DISSECTION, COMBINED  11/7/2013    Procedure: COMBINED HYSTERECTOMY TOTAL ABDOMINAL, SALPINGO-OOPHORECTOMY, NODE DISSECTION;;  Surgeon: Cheri Aguilar MD;  [facility]     INJECT NERVE BLOCK CELIAC PLEXUS Left 4/20/2017    Procedure: INJECT NERVE BLOCK CELIAC PLEXUS;  Left splanchnic nerve block;  Surgeon: Shabbir Valladares MD;  [facility]     INJECT NERVE BLOCK CELIAC PLEXUS Left 5/18/2017    Procedure: INJECT NERVE BLOCK CELIAC PLEXUS;  Left Splanchnic Nerve Block;  Surgeon: Shabbir Valladares MD;  [facility]     LAPAROSCOPIC SALPINGO-OOPHORECTOMY  11/7/2013    Procedure: LAPAROSCOPIC SALPINGO-OOPHORECTOMY;  Diagnostic Laparoscopy, Exploratory Laparotomy, Bilateral Salpingo-Oophorectomy,  Hysterectomy, Omentectomy, Pelvic Washings, Peritoneal staging and biopsies, Pelvic and Periaortic Lymph node Dissection;  Surgeon: Cheri Aguilar MD;  [facility]: [facility]     LAPAROTOMY, STAGING, COMBINED  11/7/2013    Procedure: COMBINED LAPAROTOMY, STAGING;;  Surgeon: Cheri Aguilar MD;  [facility]     LYMPH NODE BIOPSY  2008    Left posterior chain, beign     OPEN REDUCTION INTERNAL FIXATION TOE(S)  9/3/13    Fifth digit, left foot, with screw fixation      REMOVE PORT PERITONEAL  5/5/2014    Procedure: REMOVE PORT PERITONEAL;  Surgeon: Cheri Aguilar MD;  Location: [facility]         Health Maintenance Due   Topic  "Date Due     TETANUS IMMUNIZATION (SYSTEM ASSIGNED)  11/24/1972     PHQ-9 Q1YR  11/24/1972     HEPATITIS C SCREENING  11/24/1972     LIPID SCREEN Q5 YR FEMALE (SYSTEM ASSIGNED)  11/24/1999       Current Medications:     No current outpatient prescriptions on file.         Allergies:        Allergies   Allergen Reactions     Contrast Dye Itching and Swelling     Itching/tingling of mouth and nose with sensation of swelling after receiving IV contrast for CT.  This occurred despite steroid premedication. Therefore the patient should not receive intravenous contrast in the future.      Benadryl Allergy Other (See Comments)     Stay awake, restless, \"uncomfortable\", \"feel like I need to run away\"  With  IV dose,  does fine with oral Benadryl.     Lovenox [Enoxaparin] Hives     3/23/16: Okay to receive heparin flushes in [place], tolerates well. Patricia Cortez FNP-C  8/1/17: To avoid hives, pre-treatment with ranitidine is required.      Amoxicillin Rash     Diphenhydramine Anxiety     No Clinical Screening - See Comments Rash     Pollen Extract Rash     Sulfa Drugs Rash        Social History:     Social History   Substance Use Topics     Smoking status: Former Smoker     Smokeless tobacco: Former User      Comment: Quit over 20 years ago     Alcohol use No       History   Drug Use No         Family History:     The patient's family history is notable for:    Family History   Problem Relation Age of Onset     Arthritis Father      Myocardial Infarction Father      secondary to anesthesia     Colon Cancer Father      DIABETES Other      Uncle     Hypertension Mother      Other - See Comments Mother      cognitive decline     Skin Cancer Mother      Hypertension Sister      HEART DISEASE Other      unknown valve replacement     Bladder Cancer Sister      Skin Cancer Brother          Physical Exam:     /74  Pulse 93  Temp 99.3  F (37.4  C)  Resp 18  There is no height or weight on file to calculate BMI.    General " Appearance: healthy and alert, no distress     HEENT: no thyromegaly, no palpable nodules or masses        Cardiovascular: regular rate and rhythm, no gallops, rubs or murmurs     Respiratory: lungs clear, no rales, rhonchi or wheezes, normal diaphragmatic excursion    Musculoskeletal: extremities non tender and without edema    Skin: no lesions or rashes     Neurological: normal gait, no gross defects     Psychiatric: appropriate mood and affect                               Hematological: normal cervical, supraclavicular and inguinal lymph nodes     Gastrointestinal:       abdomen soft with the exception of at least 4 palpable tumors: umbilicus tumor now protruding through, one to the left of umbilicus, one superior to umbilicus,and one in the lower right abdomen    Genitourinary: Not indicated      Assessment:    Margaux Guzmán is a 61 year old woman with a history of primary peritoneal cancer.  She was recently on the Tesaro study but unfortunately had progression.  She is currently receiving weekly Paclitaxel. She is here today for an acute visit related to constipation    20 minutes were spent with this patient, over 50% of that time was spent in symptom management, treatment planning and in counseling and coordination of care.      Plan:     1.)        Reviewed preliminary verbal report of xray not indicated an obstruction. Discussed being admitted for further evaluation but she is declining. Discussed increasing her bowel regimen to include a suppository or enema. Discussed holding off on her weekly Paclitaxel this week given how she is feeling. She was offered iv hydration today but she declined. She will return to see Dr. Mccollum next week or she will contact the clinic if her symptoms worsen. Encouraged to contact Palliative Care if she does not hear from hospice by Wednesday so that an informational visit can be done prior to her coming back next week. Answered all of her questions to the best of my  ability. Reviewed signs and symptoms for when she should contact the clinic or seek additional care. Patient to contact the clinic with any questions or concerns in the interim.     2.) Genetic risk factors were assessed and she is negative for mutations in BRCA1, BRCA2, EPCAM, MLH1, MSH2, MSH6, PMS2, PTEN and TP53.    3.) Labs and/or tests ordered include:  Abdominal xray.      4.) Health maintenance issues addressed today include annual health maintenance and non-gynecologic issues with PCP.    JUSTIN Lombardo, WHNP-BC, ANP-BC  Women's Health Nurse Practitioner  Adult Nurse Pracitioner  Gynecologic Oncology          CC  Patient Care Team:  Aurelia Knox as PCP - General (Family Practice)  Jessica Galvin, MK as Continuity Care Coordinator (Gyn-Onc)  Jessica Galvin RN as Continuity Care Coordinator (Oncology)  Lizet Truong APRN CNP (Nurse Practitioner - [facility])  Derrek Gamble MD as MD (Nephrology)  Patricia Murcia APRN CNP as Nurse Practitioner (Nurse Practitioner)  Elissa Wheeler MD as MD (Infectious Diseases)  Carmela Alvarez MD as MD (Urology)  Keira Rolle RN as Registered Nurse (Urology)  Sophia Garcia PA-C as Referring Physician (Physician Assistant)  Aurelia Knox (Family Practice)  Yuliya Perez MD as MD (Internal Medicine)

## 2017-07-31 NOTE — MR AVS SNAPSHOT
After Visit Summary   7/31/2017    Margaux Guzmán    MRN: [id number]           Patient Information     Date Of Birth          1955        Visit Information        Provider Department      7/31/2017 9:30 AM BAY 8 INFUSION [facility]        Today's Diagnoses     Ovarian cancer, left (H)    -  1    Ovarian cancer, right (H)        Primary peritoneal adenocarcinoma (H)           Follow-ups after your visit        Your next 10 appointments already scheduled     Jul 31, 2017  1:05 PM CDT   XR ABDOMEN 2 VIEWS with MGXR1   [facility])    [address] [facility] [address]   [phone]           Please bring a list of your current medicines to your exam. (Include vitamins, minerals and over-thecounter medicines.) Leave your valuables at home.  Tell your doctor if there is a chance you may be pregnant.  You do not need to do anything special for this exam.            Aug 07, 2017  8:30 AM CDT   Return Visit with NURSE ONLY CANCER CENTER   [facility])    [address] [facility] [address]   [phone]            Aug 07, 2017  8:30 AM CDT   Return Visit with Jia Mccollum MD   [facility])    [address] [facility] [address]   [phone]            Aug 07, 2017  9:00 AM CDT   Level 2 with BAY 6 INFUSION   [facility])    [address] [facility] [address]   [phone]            Aug 14, 2017  9:00 AM CDT   Return Visit with NURSE ONLY CANCER CENTER   [facility] ([facility])    [address] [facility] [address]   [phone]            Aug 14, 2017  9:30 AM CDT   Level 2 with BAY 8 INFUSION   [facility])    [address]  [address]   [phone]            Aug 23, 2017 10:15 AM CDT   Return Visit with Gricelda Grullon MD   [facility] ([facility])    [address] [place] [address] [phone]            Dec 06, 2017 10:00 AM CST   Lab with UC LAB   [facility] ([facility])    [address]  1st Floor  [facility] [id number] [phone]            Dec 06, 2017 10:45 AM CST   (Arrive by 10:15 AM)   Return Visit with Mackenzie Ca MD   [facility] Nephrology ([facility])    [address]  3rd Floor  [facility] [id number] [phone]              Who to contact     If you have questions or need follow up information about today's clinic visit or your schedule please contact [facility] directly at [phone].  Normal or non-critical lab and imaging results will be communicated to you by WhoSayhart, letter or phone within 4 business days after the clinic has received the results. If you do not hear from us within 7 days, please contact the clinic through WhoSayhart or phone. If you have a critical or abnormal lab result, we will notify you by phone as soon as possible.  Submit refill requests through VIPAAR or call your pharmacy and they will forward the refill request to us. Please allow 3 business days for your refill to be completed.          Additional Information About Your Visit        WhoSayharMedStartr Information     VIPAAR gives you secure access to your electronic health record. If you see a primary care provider, you can also send messages to your care team and make appointments. If you have questions, please call your primary care clinic.  If you do not have a primary care provider, please call [phone] and they will assist you.      VIPAAR is an electronic gateway that provides easy, online access to your medical records. With VIPAAR, you can request a clinic  appointment, read your test results, renew a prescription or communicate with your care team.     To access your existing account, please contact your [facility] or call [phone] for assistance.        Care EveryWhere ID     This is your Care EveryWhere ID. This could be used by other organizations to access your [place] medical records  [id number]        Your Vitals Were     Pulse Temperature Respirations             93 99.3  F (37.4  C) 18          Blood Pressure from Last 3 Encounters:   07/31/17 133/74   07/31/17 133/74   07/26/17 119/68    Weight from Last 3 Encounters:   07/26/17 49 kg (108 lb)   07/24/17 49.9 kg (110 lb)   07/10/17 52.2 kg (115 lb)               Primary Care Provider Office Phone # Fax #    Aurelia Knox [phone]       [facility] [address] [facility] [address]        Equal Access to Services     TRAVIS PHILLIPS : Hadii kelly bunn hadasho Soomaali, waaxda luqadaha, qaybta kaalmada adeegyada, stacy vásquez . So [facility] [phone].    ATENCIÓN: Si habla español, tiene a almodovar disposición servicios gratuitos de asistencia lingüística. Llame al [phone].    We comply with applicable federal civil rights laws and [place] laws. We do not discriminate on the basis of race, color, national origin, age, disability sex, sexual orientation or gender identity.            Thank you!     Thank you for choosing [facility]  for your care. Our goal is always to provide you with excellent care. Hearing back from our patients is one way we can continue to improve our services. Please take a few minutes to complete the written survey that you may receive in the mail after your visit with us. Thank you!             Your Updated Medication List - Protect others around you: Learn how to safely use, store and throw away your medicines at www.disposemymeds.org.          This list is accurate as of:  7/31/17 12:25 PM.  Always use your most recent med list.                   Brand Name Dispense Instructions for use Diagnosis    amLODIPine 2.5 MG tablet    NORVASC    30 tablet    Take 1 tablet (2.5 mg) by mouth daily    Primary peritoneal adenocarcinoma (H)       buprenorphine 5 MCG/HR WK patch    BUTRANS    4 patch    Place 1 patch onto the skin every 7 days    Cancer associated pain       COLACE PO      Take 50 mg by mouth daily        cycloSPORINE 0.05 % ophthalmic emulsion    RESTASIS     Place 1 drop into both eyes 2 times daily        enoxaparin 40 MG/0.4ML injection    LOVENOX    12 mL    INJECT 1 SYRINGE SUBCUTANEOUSLY DAILY    Ovarian cancer, left (H), Ovarian cancer, right (H)       EPINEPHrine 0.3 MG/0.3ML injection 2-pack    EPIPEN/ADRENACLICK/or ANY BX GENERIC EQUIV    1 each    Inject 0.3 mLs (0.3 mg) into the muscle once as needed for anaphylaxis    Preventative health care, Primary peritoneal adenocarcinoma (H)       gabapentin 100 MG capsule    NEURONTIN    90 capsule    Gabapentin 100 mg capsules: Day 1: take one capsule at night.  --Do this for 3 days total Day 4: take one capsule in morning and one at night. --Do this for 3 days total Day 7: take one capsule, three times a day (morning, afternoon, and night - you don't need to be exact).  --Continue taking this until you talk with us.    Cancer associated pain       LORazepam 1 MG tablet    ATIVAN    30 tablet    Take 1 tablet (1 mg) by mouth every 6 hours as needed (Anxiety, Nausea/Vomiting or Sleep)    Other iron deficiency anemia, Chemotherapy-induced neutropenia (H), Ovarian cancer, left (H), Ovarian cancer, right (H), Primary peritoneal adenocarcinoma (H)       MELATONIN PO      Take 1 mg by mouth At Bedtime        methylphenidate 5 MG tablet    RITALIN    30 tablet    Take 2.5mg (1/2 tablet) in the morning x3-5 days, then take 2.5mg (1/2 tablet) in the morning and midday.    Neoplastic malignant related fatigue       ondansetron 8 MG ODT  tab    ZOFRAN-ODT    90 tablet    Place 1 tablet (8 mg) under the tongue every 8 hours as needed for nausea    Ovarian cancer, right (H), Nausea       ONE DAILY MULTIVITAMIN WOMEN Tabs      Take 1 tablet by mouth every morning        polyethylene glycol powder    MIRALAX/GLYCOLAX     Take 1 capful by mouth daily as needed        prochlorperazine 10 MG tablet    COMPAZINE    30 tablet    Take 1 tablet (10 mg) by mouth every 6 hours as needed (Nausea/Vomiting)    Other iron deficiency anemia, Chemotherapy-induced neutropenia (H), Ovarian cancer, left (H), Ovarian cancer, right (H), Primary peritoneal adenocarcinoma (H)       tamsulosin 0.4 MG capsule    FLOMAX    60 capsule    Take 1 capsule (0.4 mg) by mouth daily    Primary peritoneal adenocarcinoma (H)       tolterodine 4 MG 24 hr capsule    DETROL LA    30 capsule    TAKE ONE CAPSULE BY MOUTH DAILY    Hydronephrosis, unspecified hydronephrosis type       TYLENOL 500 MG tablet   Generic drug:  acetaminophen     100 tablet    Take 2 tablets (1,000 mg) by mouth 3 times daily        ZANTAC PO      Take 75 mg by mouth daily

## 2017-07-31 NOTE — MR AVS SNAPSHOT
After Visit Summary   7/31/2017    Margaux Guzmán    MRN: [id number]           Patient Information     Date Of Birth          1955        Visit Information        Provider Department      7/31/2017 10:00 AM Lizet Truong APRN CNP [facility]         Follow-ups after your visit        Follow-up notes from your care team     Return if symptoms worsen or fail to improve.      Your next 10 appointments already scheduled     Jul 31, 2017  1:05 PM CDT   XR ABDOMEN 2 VIEWS with MGXR1   [facility])    [address] [facility] [address]   [phone]           Please bring a list of your current medicines to your exam. (Include vitamins, minerals and over-thecounter medicines.) Leave your valuables at home.  Tell your doctor if there is a chance you may be pregnant.  You do not need to do anything special for this exam.            Aug 07, 2017  8:30 AM CDT   Return Visit with NURSE ONLY CANCER CENTER   [facility])    [address] [facility] [address]   [phone]            Aug 07, 2017  8:30 AM CDT   Return Visit with Jia Mccollum MD   [facility])    [address] [facility] [address]   [phone]            Aug 07, 2017  9:00 AM CDT   Level 2 with BAY 6 INFUSION   [facility])    [address] [facility] [address]   [phone]            Aug 14, 2017  9:00 AM CDT   Return Visit with NURSE ONLY CANCER CENTER   [facility] ([facility])    [address] [facility] [address]   [phone]            Aug 14, 2017  9:30 AM CDT   Level 2 with BAY 8 INFUSION   [facility])    [address] [facility] [address]   [phone]             Aug 23, 2017 10:15 AM CDT   Return Visit with Gricelda Grullon MD   [facility] ([facility])    [address] [place] [address] [phone]            Dec 06, 2017 10:00 AM CST   Lab with  LAB   [facility] ([facility])    [address]  1st Floor  [facility] [address] [phone]            Dec 06, 2017 10:45 AM CST   (Arrive by 10:15 AM)   Return Visit with Mackenzie Ca MD   [facility] Nephrology ([facility])    [address]  3rd Floor  [facility] [address] [phone]              Who to contact     If you have questions or need follow up information about today's clinic visit or your schedule please contact [facility] directly at [phone].  Normal or non-critical lab and imaging results will be communicated to you by Root Metricshart, letter or phone within 4 business days after the clinic has received the results. If you do not hear from us within 7 days, please contact the clinic through Elephantit or phone. If you have a critical or abnormal lab result, we will notify you by phone as soon as possible.  Submit refill requests through Billogram or call your pharmacy and they will forward the refill request to us. Please allow 3 business days for your refill to be completed.          Additional Information About Your Visit        Root MetricsharRota dos Concursos Information     Billogram gives you secure access to your electronic health record. If you see a primary care provider, you can also send messages to your care team and make appointments. If you have questions, please call your primary care clinic.  If you do not have a primary care provider, please call [phone] and they will assist you.      Billogram is an electronic gateway that provides easy, online access to your medical records. With Billogram, you can request a clinic appointment, read your test results,  renew a prescription or communicate with your care team.     To access your existing account, please contact your [facility] or call [phone] for assistance.        Care EveryWhere ID     This is your Care EveryWhere ID. This could be used by other organizations to access your [place] medical records  [id number]        Your Vitals Were     Pulse Temperature Respirations             93 99.3  F (37.4  C) 18          Blood Pressure from Last 3 Encounters:   07/31/17 133/74   07/31/17 133/74   07/26/17 119/68    Weight from Last 3 Encounters:   07/26/17 49 kg (108 lb)   07/24/17 49.9 kg (110 lb)   07/10/17 52.2 kg (115 lb)              Today, you had the following     No orders found for display       Primary Care Provider Office Phone # Fax #    Aurelia Knox [phone]       [facility] [address] [facility] [address]        Equal Access to Services     TRAVIS PHILLIPS : Hadii kelly ku hadasho Soomaali, waaxda luqadaha, qaybta kaalmada adeegyada, waxay johnnain jane vásquez . So [facility] [phone].    ATENCIÓN: Si habla español, tiene a almodovar disposición servicios gratuitos de asistencia lingüística. Llame al [phone].    We comply with applicable federal civil rights laws and [place] laws. We do not discriminate on the basis of race, color, national origin, age, disability sex, sexual orientation or gender identity.            Thank you!     Thank you for choosing [facility]  for your care. Our goal is always to provide you with excellent care. Hearing back from our patients is one way we can continue to improve our services. Please take a few minutes to complete the written survey that you may receive in the mail after your visit with us. Thank you!             Your Updated Medication List - Protect others around you: Learn how to safely use, store and throw away your medicines at www.disposemymeds.org.          This  list is accurate as of: 7/31/17 12:15 PM.  Always use your most recent med list.                   Brand Name Dispense Instructions for use Diagnosis    amLODIPine 2.5 MG tablet    NORVASC    30 tablet    Take 1 tablet (2.5 mg) by mouth daily    Primary peritoneal adenocarcinoma (H)       buprenorphine 5 MCG/HR WK patch    BUTRANS    4 patch    Place 1 patch onto the skin every 7 days    Cancer associated pain       COLACE PO      Take 50 mg by mouth daily        cycloSPORINE 0.05 % ophthalmic emulsion    RESTASIS     Place 1 drop into both eyes 2 times daily        enoxaparin 40 MG/0.4ML injection    LOVENOX    12 mL    INJECT 1 SYRINGE SUBCUTANEOUSLY DAILY    Ovarian cancer, left (H), Ovarian cancer, right (H)       EPINEPHrine 0.3 MG/0.3ML injection 2-pack    EPIPEN/ADRENACLICK/or ANY BX GENERIC EQUIV    1 each    Inject 0.3 mLs (0.3 mg) into the muscle once as needed for anaphylaxis    Preventative health care, Primary peritoneal adenocarcinoma (H)       gabapentin 100 MG capsule    NEURONTIN    90 capsule    Gabapentin 100 mg capsules: Day 1: take one capsule at night.  --Do this for 3 days total Day 4: take one capsule in morning and one at night. --Do this for 3 days total Day 7: take one capsule, three times a day (morning, afternoon, and night - you don't need to be exact).  --Continue taking this until you talk with us.    Cancer associated pain       LORazepam 1 MG tablet    ATIVAN    30 tablet    Take 1 tablet (1 mg) by mouth every 6 hours as needed (Anxiety, Nausea/Vomiting or Sleep)    Other iron deficiency anemia, Chemotherapy-induced neutropenia (H), Ovarian cancer, left (H), Ovarian cancer, right (H), Primary peritoneal adenocarcinoma (H)       MELATONIN PO      Take 1 mg by mouth At Bedtime        methylphenidate 5 MG tablet    RITALIN    30 tablet    Take 2.5mg (1/2 tablet) in the morning x3-5 days, then take 2.5mg (1/2 tablet) in the morning and midday.    Neoplastic malignant related fatigue        ondansetron 8 MG ODT tab    ZOFRAN-ODT    90 tablet    Place 1 tablet (8 mg) under the tongue every 8 hours as needed for nausea    Ovarian cancer, right (H), Nausea       ONE DAILY MULTIVITAMIN WOMEN Tabs      Take 1 tablet by mouth every morning        polyethylene glycol powder    MIRALAX/GLYCOLAX     Take 1 capful by mouth daily as needed        prochlorperazine 10 MG tablet    COMPAZINE    30 tablet    Take 1 tablet (10 mg) by mouth every 6 hours as needed (Nausea/Vomiting)    Other iron deficiency anemia, Chemotherapy-induced neutropenia (H), Ovarian cancer, left (H), Ovarian cancer, right (H), Primary peritoneal adenocarcinoma (H)       tamsulosin 0.4 MG capsule    FLOMAX    60 capsule    Take 1 capsule (0.4 mg) by mouth daily    Primary peritoneal adenocarcinoma (H)       tolterodine 4 MG 24 hr capsule    DETROL LA    30 capsule    TAKE ONE CAPSULE BY MOUTH DAILY    Hydronephrosis, unspecified hydronephrosis type       TYLENOL 500 MG tablet   Generic drug:  acetaminophen     100 tablet    Take 2 tablets (1,000 mg) by mouth 3 times daily        ZANTAC PO      Take 75 mg by mouth daily

## 2017-07-31 NOTE — PROGRESS NOTES
"Last BM small amount liquid last night. Had some loose runny stool Saturday after milk of mag (Took dose Friday and Saturday). Pt states feeling like she still has to go. Passing gas. Positive BS x4 Feeling bloated, \"full\", having shortness of breath with activity.     New stents  3 15oz glasses of water per day. States concerned not making enough urine.     Not eating much. Only small amounts at meals. Feels full after eating a few bits.     Pt met with Manuela Truong NP for assessment. Chemo held today. F/U with Dr Mccollum next week, as scheduled.    Bita Multani RN    "

## 2017-07-31 NOTE — MR AVS SNAPSHOT
After Visit Summary   7/31/2017    Margaux Guzmán    MRN: [id number]           Patient Information     Date Of Birth          1955        Visit Information        Provider Department      7/31/2017 9:00 AM NURSE ONLY CANCER CENTER [facility]        Today's Diagnoses     Temperature elevated    -  1    Other iron deficiency anemia        Chemotherapy-induced neutropenia (H)        Ovarian cancer, left (H)        Ovarian cancer, right (H)        Primary peritoneal adenocarcinoma (H)           Follow-ups after your visit        Your next 10 appointments already scheduled     Jul 31, 2017  9:30 AM CDT   Level 2 with BAY 8 INFUSION   [facility] ([facility])    [address] [facility] [address]   [phone]            Aug 07, 2017  8:30 AM CDT   Return Visit with NURSE ONLY CANCER CENTER   [facility] ([facility])    [address] [facility] [address]   [phone]            Aug 07, 2017  8:30 AM CDT   Return Visit with Jia Mccollum MD   [facility] ([facility])    [address] [facility] [address]   [phone]            Aug 07, 2017  9:00 AM CDT   Level 2 with BAY 6 INFUSION   [facility] ([facility])    [address] [facility] [address]   [phone]            Aug 14, 2017  9:00 AM CDT   Return Visit with NURSE ONLY CANCER CENTER   [facility] ([facility])    [address] [facility] [address]   [phone]            Aug 14, 2017  9:30 AM CDT   Level 2 with BAY 8 INFUSION   [facility] ([facility])    [address] [facility] [address]   [phone]            Aug 23, 2017 10:15 AM CDT   Return Visit with Gricelda Grullon MD   [facility] ([facility]  [facility])    [address] [place] [id number] [phone]            Dec 06, 2017 10:00 AM CST   Lab with UC LAB   [facility] ([facility])    [address] [facility]  1st Floor  [facility] [address] [phone]            Dec 06, 2017 10:45 AM CST   (Arrive by 10:15 AM)   Return Visit with Mackenzie Ca MD   [facility] ([facility])    [address] [place] [address]  3rd [facility] [address] [phone]              Who to contact     If you have questions or need follow up information about today's clinic visit or your schedule please contact [facility] directly at [phone].  Normal or non-critical lab and imaging results will be communicated to you by Jooobz!hart, letter or phone within 4 business days after the clinic has received the results. If you do not hear from us within 7 days, please contact the clinic through Jooobz!hart or phone. If you have a critical or abnormal lab result, we will notify you by phone as soon as possible.  Submit refill requests through SETVI or call your pharmacy and they will forward the refill request to us. Please allow 3 business days for your refill to be completed.          Additional Information About Your Visit        Jooobz!hart Information     SETVI gives you secure access to your electronic health record. If you see a primary care provider, you can also send messages to your care team and make appointments. If you have questions, please call your primary care clinic.  If you do not have a primary care provider, please call [phone] and they will assist you.      SETVI is an electronic gateway that provides easy, online access to your medical records. With SETVI, you can request a clinic appointment, read your test results, renew a prescription or communicate with your care team.     To access your existing account, please contact your [facility]  [facility] or call [phone] for assistance.        Care EveryWhere ID     This is your Care EveryWhere ID. This could be used by other organizations to access your [facility] [place] medical records  [id number]         Blood Pressure from Last 3 Encounters:   07/26/17 119/68   07/24/17 142/85   07/17/17 125/72    Weight from Last 3 Encounters:   07/26/17 49 kg (108 lb)   07/24/17 49.9 kg (110 lb)   07/10/17 52.2 kg (115 lb)              We Performed the Following     CBC with platelets differential     Magnesium     Potassium        Primary Care Provider Office Phone # Fax #    Aurelia MCLAUGHLIN Tjaden [phone]       [facility] [address] [facility] [address]        Equal Access to Services     TRAVIS PHILLIPS : Hadii aad ku hadasho Soomaali, waaxda luqadaha, qaybta kaalmada adeegyada, stacy buchanan. So [facility] [phone].    ATENCIÓN: Si habla español, tiene a almodovar disposición servicios gratuitos de asistencia lingüística. Llame al [phone].    We comply with applicable federal civil rights laws and [place] laws. We do not discriminate on the basis of race, color, national origin, age, disability sex, sexual orientation or gender identity.            Thank you!     Thank you for choosing [facility]  for your care. Our goal is always to provide you with excellent care. Hearing back from our patients is one way we can continue to improve our services. Please take a few minutes to complete the written survey that you may receive in the mail after your visit with us. Thank you!             Your Updated Medication List - Protect others around you: Learn how to safely use, store and throw away your medicines at www.disposemymeds.org.          This list is accurate as of: 7/31/17  9:17 AM.  Always use your most recent med list.                   Brand Name Dispense Instructions for use Diagnosis    * ACETAMINOPHEN PO      Take 500 mg by  mouth every 6 hours as needed for pain Reported on 3/30/2017        * TYLENOL 500 MG tablet   Generic drug:  acetaminophen     100 tablet    Take 2 tablets (1,000 mg) by mouth 3 times daily        amLODIPine 2.5 MG tablet    NORVASC    30 tablet    Take 1 tablet (2.5 mg) by mouth daily    Primary peritoneal adenocarcinoma (H)       buprenorphine 5 MCG/HR WK patch    BUTRANS    4 patch    Place 1 patch onto the skin every 7 days    Cancer associated pain       COLACE PO      Take 50 mg by mouth daily        cycloSPORINE 0.05 % ophthalmic emulsion    RESTASIS     Place 1 drop into both eyes 2 times daily        enoxaparin 40 MG/0.4ML injection    LOVENOX    12 mL    INJECT 1 SYRINGE SUBCUTANEOUSLY DAILY    Ovarian cancer, left (H), Ovarian cancer, right (H)       EPINEPHrine 0.3 MG/0.3ML injection 2-pack    EPIPEN/ADRENACLICK/or ANY BX GENERIC EQUIV    1 each    Inject 0.3 mLs (0.3 mg) into the muscle once as needed for anaphylaxis    Preventative health care, Primary peritoneal adenocarcinoma (H)       gabapentin 100 MG capsule    NEURONTIN    90 capsule    Gabapentin 100 mg capsules: Day 1: take one capsule at night.  --Do this for 3 days total Day 4: take one capsule in morning and one at night. --Do this for 3 days total Day 7: take one capsule, three times a day (morning, afternoon, and night - you don't need to be exact).  --Continue taking this until you talk with us.    Cancer associated pain       LORazepam 1 MG tablet    ATIVAN    30 tablet    Take 1 tablet (1 mg) by mouth every 6 hours as needed (Anxiety, Nausea/Vomiting or Sleep)    Other iron deficiency anemia, Chemotherapy-induced neutropenia (H), Ovarian cancer, left (H), Ovarian cancer, right (H), Primary peritoneal adenocarcinoma (H)       MELATONIN PO      Take 1 mg by mouth At Bedtime        methylphenidate 5 MG tablet    RITALIN    30 tablet    Take 2.5mg (1/2 tablet) in the morning x3-5 days, then take 2.5mg (1/2 tablet) in the morning and midday.     Neoplastic malignant related fatigue       ondansetron 8 MG ODT tab    ZOFRAN-ODT    90 tablet    Place 1 tablet (8 mg) under the tongue every 8 hours as needed for nausea    Ovarian cancer, right (H), Nausea       ONE DAILY MULTIVITAMIN WOMEN Tabs      Take 1 tablet by mouth every morning        polyethylene glycol powder    MIRALAX/GLYCOLAX     Take 1 capful by mouth daily as needed        prochlorperazine 10 MG tablet    COMPAZINE    30 tablet    Take 1 tablet (10 mg) by mouth every 6 hours as needed (Nausea/Vomiting)    Other iron deficiency anemia, Chemotherapy-induced neutropenia (H), Ovarian cancer, left (H), Ovarian cancer, right (H), Primary peritoneal adenocarcinoma (H)       tamsulosin 0.4 MG capsule    FLOMAX    60 capsule    Take 1 capsule (0.4 mg) by mouth daily    Primary peritoneal adenocarcinoma (H)       tolterodine 4 MG 24 hr capsule    DETROL LA    30 capsule    TAKE ONE CAPSULE BY MOUTH DAILY    Hydronephrosis, unspecified hydronephrosis type       ZANTAC PO      Take 75 mg by mouth daily        * Notice:  This list has 2 medication(s) that are the same as other medications prescribed for you. Read the directions carefully, and ask your doctor or other care provider to review them with you.

## 2017-08-01 PROBLEM — C56.9 OVARIAN CANCER (H): Status: ACTIVE | Noted: 2017-01-01

## 2017-08-01 NOTE — ED NOTES
Coming in with increasing abdominal which started during the night. She is concerned about a bowel obstruction.

## 2017-08-01 NOTE — IP AVS SNAPSHOT
MRN:[id number]                      After Visit Summary   8/1/2017    Margaux Guzmán    MRN: [id number]           Thank you!     Thank you for choosing [facility] for your care. Our goal is always to provide you with excellent care. Hearing back from our patients is one way we can continue to improve our services. Please take a few minutes to complete the written survey that you may receive in the mail after you visit with us. Thank you!        Patient Information     Date Of Birth          1955        Designated Caregiver       Most Recent Value    Caregiver    Will someone help with your care after discharge? yes    Name of designated caregiver adelfo Pedro    Phone number of caregiver [phone]    Caregiver address [place]      About your hospital stay     You were admitted on:  August 1, 2017 You last received care in the:  [address] [facility]    You were discharged on:  August 11, 2017        Reason for your hospital stay       Bowel obstruction                  Who to Call     For medical emergencies, please call 911.  For non-urgent questions about your medical care, please call your primary care provider or clinic, [phone]  For questions related to your surgery, please call your surgery clinic        Attending Provider     Provider Specialty    Jose Juan Metzger MD Emergency Medicine    Natalie Omalley MD Gyn-Onc    Lynn, Durga Mckeon MD Oncology       Primary Care Provider Office Phone # Fax #    Aurelia Knox [phone]       When to contact your care team       PATIENT INSTRUCTIONS FOLLOW-UP:    Persistent nausea and vomiting  Severe uncontrolled pain  Difficulty breathing, headache or visual disturbances  Any other questions or concerns you may have after discharge    QUESTIONS:  Please feel free to call your physician or the hospital  if you have any questions, and they will be glad to assist you.                  After Care  Instructions     Activity       Your activity upon discharge: activity as tolerated            Diet       Follow this diet upon discharge: Liquid Diet/Pureed Diet            Tubes and drains       You are going home with the following tubes or drains: G tube for decompression. Hospice RN to remove T-tag suture in 2 weeks.                  Your next 10 appointments already scheduled     Aug 23, 2017 10:15 AM CDT   Return Visit with Gricelda Grullon MD   [facility] ([facility])    [address] [place] [id number] [phone]            Dec 06, 2017 10:00 AM CST   Lab with  LAB   [facility] ([facility])    [address] [place] [address]  1st [facility] [id number] [phone]            Dec 06, 2017 10:45 AM CST   (Arrive by 10:15 AM)   Return Visit with Mackenzie Ca MD   [facility] ([facility])    [address] [place] [address]  3rd [facility] [id number] [phone]              Additional Services     HOSPICE REFERRAL       **Order classes of: [facility], [facility] and [facility] will route to the Home Care and Hospice Referral Pool.  Home Care or Hospice will then contact the patient to schedule their appointment.**    If you do not hear from Home Care and Hospice, or you would like to call to schedule, please call the referring place of service that your provider has listed below.  ______________________________________________________________________    Your provider has referred you to: Home Hospice    Extended Emergency Contact Information  Primary Emergency Contact: Mayela Clark  Address: [address] [place], [place] [address] [place] [address]  Home Phone: [phone]  Work Phone: none  Mobile Phone: [phone]  Relation: Daughter  Secondary Emergency Contact: Jenifer Clark   [facility]  Home Phone: none  Work Phone: none  Mobile Phone:  [phone]  Relation: Daughter    Patient Anticipated Discharge Date: 8/11/2017   RN, PT, HHA to begin 24 - 48 hours after discharge.  PLEASE EVALUATE AND TREAT (Evaluation timeline is 24 - 48 hrs. Please call if there is need for a variance to this timeline).    REASON FOR REFERRAL: Hospice - Diagnosis: Recurrent Peritoneal Cancer    OTHER PERTINENT INFORMATION: Patient was last seen by provider on 8/11/2017 for Recurrent peritoneal cancer with bowel obstruction.    No current outpatient prescriptions on file.    Patient Active Problem List:     Primary peritoneal adenocarcinoma (H)     Recurrent cold sores     Pulmonary nodules     Encounter for long-term current use of medication     DVT (deep venous thrombosis) (H)     Family history of peritoneal cancer     Protein in urine     Ovarian cancer, left (H)     Ovarian cancer, right (H)     Chemotherapy-induced neutropenia (H)     Anemia     Pain due to ureteral stent (H)     Sterile pyuria     Pyelonephritis     Small bowel obstruction (H)     Dehydration     Patient in cancer related research study     Rectal bleeding     Confusion     Adjustment disorder with anxious mood     Nausea with vomiting     Abdominal pain, generalized     ACP (advance care planning)     Hypomagnesemia     Anemia associated with chemotherapy     Chemotherapy-induced peripheral neuropathy (H)     Temperature elevated     Encounter for antineoplastic chemotherapy     Ovarian cancer (H)     Primary peritoneal carcinomatosis (H)      Documentation of Face to Face and Certification for Home Health Services    I certify that patient, Margaux Guzmán is under my care and that I, or a Nurse Practitioner or Physician's Assistant working with me, had a face-to-face encounter that meets the physician face-to-face encounter requirements with this patient on: 8/11/2017.    This encounter with the patient was in whole, or in part, for the following medical condition, which is the primary reason  for Home Health Care: Hospice support.    I certify that, based on my findings, the following services are medically necessary Home Health Services: Hospice    My clinical findings support the need for the above services because: Advance peritoneal cancer    Further, I certify that my clinical findings support that this patient is homebound (i.e. absences from home require considerable and taxing effort and are for medical reasons or [facility] services or infrequently or of short duration when for other reasons) because: see above    Based on the above findings, I certify that this patient is confined to the home and needs intermittent skilled nursing care, physical therapy and/or speech therapy.  The patient is under my care, and I have initiated the establishment of the plan of care.  This patient will be followed by a physician who will periodically review the plan of care.    Physician/Provider to provide follow up care: Aurelia Knox certified Physician at time of discharge: Dr Mccollum    Please be aware that coverage of these services is subject to the terms and limitations of your health insurance plan.  Call member services at your health plan with any benefit or coverage questions.                  Additional Information     If you use hormonal birth control (such as the pill, patch, ring or implants): You'll need a second form of birth control for 7 days (condoms, a diaphragm or contraceptive foam). While in the hospital, you received a medicine called Bridion. Your normal birth control will not work as well for a week after taking this medicine.          Pending Results     No orders found from 7/30/2017 to 8/2/2017.            Statement of Approval     Ordered          08/11/17 1113  I have reviewed and agree with all the recommendations and orders detailed in this document.  EFFECTIVE NOW     Approved and electronically signed by:  Nicole Naranjo APRN CNP            "  Admission Information     Date & Time Provider Department Dept. Phone    8/1/2017 Durga Bailey MD [address] [facility] [phone]      Your Vitals Were     Blood Pressure Pulse Temperature Respirations Height Weight    125/65 (BP Location: Left arm) 89 97.7  F (36.5  C) (Oral) 16 1.676 m (5' 6\") 53.8 kg (118 lb 8 oz)    Pulse Oximetry BMI (Body Mass Index)                94% 19.13 kg/m2          [facility] Information     [employer] gives you secure access to your electronic health record. If you see a primary care provider, you can also send messages to your care team and make appointments. If you have questions, please call your primary care clinic.  If you do not have a primary care provider, please call [phone] and they will assist you.        Care EveryWhere ID     This is your Care EveryWhere ID. This could be used by other organizations to access your [place] medical records  [id number]        Equal Access to Services     TRAVIS PHILLIPS AH: Hadii kelly kamarao Sojonathan, waaxda luqadaha, qaybta kaalmada adeegyagary, stacy vásquez . So [facility] [phone].    ATENCIÓN: Si habla español, tiene a almodovar disposición servicios gratuitos de asistencia lingüística. Llame al [phone].    We comply with applicable federal civil rights laws and [place] laws. We do not discriminate on the basis of race, color, national origin, age, disability sex, sexual orientation or gender identity.               Review of your medicines      START taking        Dose / Directions    acetaminophen 32 mg/mL solution   Commonly known as:  TYLENOL   Used for:  Abdominal pain, generalized   Replaces:  TYLENOL 500 MG tablet        Dose:  650 mg   Take 20.3 mLs (650 mg) by mouth every 6 hours   Quantity:  400 mL   Refills:  0       ibuprofen 600 MG tablet   Commonly known as:  ADVIL/MOTRIN   Used for:  Abdominal pain, generalized        Dose:  600 mg   Take 1 tablet (600 mg) by mouth every 6 hours "   Quantity:  30 tablet   Refills:  0       methadone 5 MG/5ML solution   Commonly known as:  DOLPHINE   Used for:  Abdominal pain, generalized        Dose:  2.5 mg   Take 2.5 mLs (2.5 mg) by mouth 2 times daily   Quantity:  100 mL   Refills:  0       morphine sulfate HIGH CONCENTRATE 100 MG/5ML (HIGH CONC) solution   Commonly known as:  ROXANOL *CONCENTRATED*   Used for:  Abdominal pain, generalized        Dose:  7.5-15 mg   Take 0.375-0.75 mLs (7.5-15 mg) by mouth every 2 hours as needed for moderate to severe pain   Quantity:  45 mL   Refills:  0         CONTINUE these medicines which may have CHANGED, or have new prescriptions. If we are uncertain of the size of tablets/capsules you have at home, strength may be listed as something that might have changed.        Dose / Directions    gabapentin 300 MG capsule   Commonly known as:  NEURONTIN   This may have changed:    - medication strength  - how much to take  - how to take this  - when to take this  - additional instructions   Used for:  Abdominal pain, generalized        Dose:  300 mg   Take 1 capsule (300 mg) by mouth At Bedtime   Quantity:  30 capsule   Refills:  0       methylphenidate 5 MG tablet   Commonly known as:  RITALIN   This may have changed:    - medication strength  - how much to take   Used for:  Abdominal pain, generalized        Dose:  5 mg   Take 1 tablet (5 mg) by mouth 2 times daily   Quantity:  60 tablet   Refills:  0       ondansetron 8 MG ODT tab   Commonly known as:  ZOFRAN-ODT   This may have changed:  Another medication with the same name was removed. Continue taking this medication, and follow the directions you see here.        Dose:  8 mg   Take 8 mg by mouth every 8 hours as needed for nausea   Refills:  0         CONTINUE these medicines which have NOT CHANGED        Dose / Directions    COLACE PO        Dose:  50 mg   Take 50 mg by mouth daily as needed   Refills:  0       cycloSPORINE 0.05 % ophthalmic emulsion   Commonly known  as:  RESTASIS        Dose:  1 drop   Place 1 drop into both eyes 2 times daily   Refills:  0       enoxaparin 40 MG/0.4ML injection   Commonly known as:  LOVENOX   Used for:  Ovarian cancer, left (H), Ovarian cancer, right (H)        INJECT 1 SYRINGE SUBCUTANEOUSLY DAILY   Quantity:  12 mL   Refills:  0       EPINEPHrine 0.3 MG/0.3ML injection 2-pack   Commonly known as:  EPIPEN/ADRENACLICK/or ANY BX GENERIC EQUIV   Used for:  Preventative health care, Primary peritoneal adenocarcinoma (H)        Dose:  0.3 mg   Inject 0.3 mLs (0.3 mg) into the muscle once as needed for anaphylaxis   Quantity:  1 each   Refills:  0       LORazepam 1 MG tablet   Commonly known as:  ATIVAN   Used for:  Other iron deficiency anemia, Chemotherapy-induced neutropenia (H), Ovarian cancer, left (H), Ovarian cancer, right (H), Primary peritoneal adenocarcinoma (H)        Dose:  1 mg   Take 1 tablet (1 mg) by mouth every 6 hours as needed (Anxiety, Nausea/Vomiting or Sleep)   Quantity:  30 tablet   Refills:  2       MELATONIN PO        Dose:  1 mg   Take 1 mg by mouth At Bedtime   Refills:  0       polyethylene glycol powder   Commonly known as:  MIRALAX/GLYCOLAX        Dose:  1 capful   Take 1 capful by mouth daily as needed   Refills:  0       tamsulosin 0.4 MG capsule   Commonly known as:  FLOMAX   Used for:  Primary peritoneal adenocarcinoma (H)        Dose:  0.4 mg   Take 1 capsule (0.4 mg) by mouth daily   Quantity:  60 capsule   Refills:  6       tolterodine 4 MG 24 hr capsule   Commonly known as:  DETROL LA   Used for:  Hydronephrosis, unspecified hydronephrosis type        TAKE ONE CAPSULE BY MOUTH DAILY   Quantity:  30 capsule   Refills:  3       ZANTAC PO        Dose:  75 mg   Take 75 mg by mouth daily   Refills:  0         STOP taking     amLODIPine 2.5 MG tablet   Commonly known as:  NORVASC           buprenorphine 5 MCG/HR WK patch   Commonly known as:  BUTRANS           ONE DAILY MULTIVITAMIN WOMEN Tabs           OXYCODONE HCL  PO           prochlorperazine 10 MG tablet   Commonly known as:  COMPAZINE           TYLENOL 500 MG tablet   Generic drug:  acetaminophen   Replaced by:  acetaminophen 32 mg/mL solution                Where to get your medicines      These medications were sent to [facility] - [place], [place] - [address]  [address], [facility] [place] [address]     Phone:  [phone]     acetaminophen 32 mg/mL solution    gabapentin 300 MG capsule    ibuprofen 600 MG tablet         Some of these will need a paper prescription and others can be bought over the counter. Ask your nurse if you have questions.     Bring a paper prescription for each of these medications     methadone 5 MG/5ML solution    methylphenidate 5 MG tablet    morphine sulfate HIGH CONCENTRATE 100 MG/5ML (HIGH CONC) solution                Protect others around you: Learn how to safely use, store and throw away your medicines at www.disposemymeds.org.             Medication List: This is a list of all your medications and when to take them. Check marks below indicate your daily home schedule. Keep this list as a reference.      Medications           Morning Afternoon Evening Bedtime As Needed    acetaminophen 32 mg/mL solution   Commonly known as:  TYLENOL   Take 20.3 mLs (650 mg) by mouth every 6 hours   Last time this was given:  650 mg on 8/10/2017  5:41 PM                                COLACE PO   Take 50 mg by mouth daily as needed                                cycloSPORINE 0.05 % ophthalmic emulsion   Commonly known as:  RESTASIS   Place 1 drop into both eyes 2 times daily   Last time this was given:  1 drop on 8/11/2017  9:21 AM                                enoxaparin 40 MG/0.4ML injection   Commonly known as:  LOVENOX   INJECT 1 SYRINGE SUBCUTANEOUSLY DAILY   Last time this was given:  40 mg on 8/7/2017  9:19 PM                                EPINEPHrine 0.3 MG/0.3ML injection 2-pack   Commonly known as:   EPIPEN/ADRENACLICK/or ANY BX GENERIC EQUIV   Inject 0.3 mLs (0.3 mg) into the muscle once as needed for anaphylaxis                                gabapentin 300 MG capsule   Commonly known as:  NEURONTIN   Take 1 capsule (300 mg) by mouth At Bedtime   Last time this was given:  300 mg on 8/6/2017  8:48 PM                                ibuprofen 600 MG tablet   Commonly known as:  ADVIL/MOTRIN   Take 1 tablet (600 mg) by mouth every 6 hours                                LORazepam 1 MG tablet   Commonly known as:  ATIVAN   Take 1 tablet (1 mg) by mouth every 6 hours as needed (Anxiety, Nausea/Vomiting or Sleep)   Last time this was given:  1 mg on 8/3/2017  9:26 PM                                MELATONIN PO   Take 1 mg by mouth At Bedtime   Last time this was given:  1 mg on 8/3/2017  9:26 PM                                methadone 5 MG/5ML solution   Commonly known as:  DOLPHINE   Take 2.5 mLs (2.5 mg) by mouth 2 times daily   Last time this was given:  2.5 mg on 8/11/2017  9:20 AM                                methylphenidate 5 MG tablet   Commonly known as:  RITALIN   Take 1 tablet (5 mg) by mouth 2 times daily   Last time this was given:  5 mg on 8/11/2017  9:21 AM                                morphine sulfate HIGH CONCENTRATE 100 MG/5ML (HIGH CONC) solution   Commonly known as:  ROXANOL *CONCENTRATED*   Take 0.375-0.75 mLs (7.5-15 mg) by mouth every 2 hours as needed for moderate to severe pain   Last time this was given:  15 mg on 8/10/2017  8:30 PM                                ondansetron 8 MG ODT tab   Commonly known as:  ZOFRAN-ODT   Take 8 mg by mouth every 8 hours as needed for nausea   Last time this was given:  8 mg on 8/2/2017  8:01 AM                                polyethylene glycol powder   Commonly known as:  MIRALAX/GLYCOLAX   Take 1 capful by mouth daily as needed                                tamsulosin 0.4 MG capsule   Commonly known as:  FLOMAX   Take 1 capsule (0.4 mg) by mouth  daily   Last time this was given:  0.4 mg on 8/5/2017  8:02 PM                                tolterodine 4 MG 24 hr capsule   Commonly known as:  DETROL LA   TAKE ONE CAPSULE BY MOUTH DAILY   Last time this was given:  4 mg on 8/3/2017  8:05 PM                                ZANTAC PO   Take 75 mg by mouth daily

## 2017-08-01 NOTE — DISCHARGE SUMMARY
Gynecologic Oncology Discharge Summary    Margaux Guzmán  [id number]    Admit Date: 8/1/2017  Discharge Date: 8/11/2017  Admitting Provider: Natalie Omalley MD   Discharge Provider: Durga Bailey MD    Admission Dx:   - Recurrent Stage IIIB primary peritoneal carcinoma   - Chronic pain   - Constipation   - Hydronephrosis with b/l ureteral stents in place   - Hypertension   - Anxiety   - History of DVT  - Bowel Obstruction  Discharge Dx:  - Recurrent Stage IIIB primary peritoneal carcinoma   - Chronic pain   - Constipation   - Hydronephrosis with b/l ureteral stents in place   - Hypertension   - Anxiety   - History of DVT  - Bowel obstruction, s/p venting G tube placement     Patient Active Problem List   Diagnosis     Primary peritoneal adenocarcinoma (H)     Recurrent cold sores     Pulmonary nodules     Encounter for long-term current use of medication     DVT (deep venous thrombosis) (H)     Family history of peritoneal cancer     Protein in urine     Ovarian cancer, left (H)     Ovarian cancer, right (H)     Chemotherapy-induced neutropenia (H)     Anemia     Pain due to ureteral stent (H)     Sterile pyuria     Pyelonephritis     Small bowel obstruction (H)     Dehydration     Patient in cancer related research study     Rectal bleeding     Confusion     Adjustment disorder with anxious mood     Nausea with vomiting     Abdominal pain, generalized     ACP (advance care planning)     Hypomagnesemia     Anemia associated with chemotherapy     Chemotherapy-induced peripheral neuropathy (H)     Temperature elevated     Encounter for antineoplastic chemotherapy     Ovarian cancer (H)       Procedures:   - NGT placement and removal   - G tube placement    Prior to Admission Medications:  Prescriptions Prior to Admission   Medication Sig Dispense Refill Last Dose     METHYLPHENIDATE HCL PO Take 2.5 mg by mouth 2 times daily   7/31/2017 at 1400     OXYCODONE HCL PO Take 5 mg by mouth every 8 hours   Past Month at  prn     ondansetron (ZOFRAN-ODT) 8 MG ODT tab Take 8 mg by mouth every 8 hours as needed for nausea   Past Month at prn     acetaminophen (TYLENOL) 500 MG tablet Take 2 tablets (1,000 mg) by mouth 3 times daily 100 tablet 0 8/1/2017 at 2000     gabapentin (NEURONTIN) 100 MG capsule Gabapentin 100 mg capsules:  Day 1: take one capsule at night.   --Do this for 3 days total  Day 4: take one capsule in morning and one at night.  --Do this for 3 days total  Day 7: take one capsule, three times a day (morning, afternoon, and night - you don't need to be exact).   --Continue taking this until you talk with us. 90 capsule 1 7/31/2017 at 2000     enoxaparin (LOVENOX) 40 MG/0.4ML injection INJECT 1 SYRINGE SUBCUTANEOUSLY DAILY 12 mL 0 7/31/2017 at 2000     buprenorphine (BUTRANS) 5 MCG/HR WK patch Place 1 patch onto the skin every 7 days (Patient taking differently: Place 1 patch onto the skin every 7 days New patch placed on Fridays.) 4 patch 3 8/1/2017 at 900     Docusate Sodium (COLACE PO) Take 50 mg by mouth daily as needed    Past Week at prn     LORazepam (ATIVAN) 1 MG tablet Take 1 tablet (1 mg) by mouth every 6 hours as needed (Anxiety, Nausea/Vomiting or Sleep) 30 tablet 2 7/31/2017 at pm     tolterodine (DETROL LA) 4 MG 24 hr capsule TAKE ONE CAPSULE BY MOUTH DAILY 30 capsule 3 7/31/2017 at 2000     amLODIPine (NORVASC) 2.5 MG tablet Take 1 tablet (2.5 mg) by mouth daily 30 tablet  8/1/2017 at 2000     tamsulosin (FLOMAX) 0.4 MG capsule Take 1 capsule (0.4 mg) by mouth daily 60 capsule 6 7/31/2017 at 2000     Multiple Vitamins-Minerals (ONE DAILY MULTIVITAMIN WOMEN) TABS Take 1 tablet by mouth every morning    7/31/2017 at 900     polyethylene glycol (MIRALAX/GLYCOLAX) powder Take 1 capful by mouth daily as needed    Past Week at prn     cycloSPORINE (RESTASIS) 0.05 % ophthalmic emulsion Place 1 drop into both eyes 2 times daily    7/31/2017 at 2000     Ranitidine HCl (ZANTAC PO) Take 75 mg by mouth daily     7/31/2017 at 2000     MELATONIN PO Take 1 mg by mouth At Bedtime    7/31/2017 at 2200     EPINEPHrine (EPIPEN) 0.3 MG/0.3ML injection Inject 0.3 mLs (0.3 mg) into the muscle once as needed for anaphylaxis 1 each 0  at prn       Discharge Medications:   Margaux Guzmán   Home Medication Instructions AMIE:[id number]    Printed on:08/11/17 4633   Medication Information                      acetaminophen (TYLENOL) 32 mg/mL solution  Take 20.3 mLs (650 mg) by mouth every 6 hours             cycloSPORINE (RESTASIS) 0.05 % ophthalmic emulsion  Place 1 drop into both eyes 2 times daily              Docusate Sodium (COLACE PO)  Take 50 mg by mouth daily as needed              enoxaparin (LOVENOX) 40 MG/0.4ML injection  INJECT 1 SYRINGE SUBCUTANEOUSLY DAILY             EPINEPHrine (EPIPEN) 0.3 MG/0.3ML injection  Inject 0.3 mLs (0.3 mg) into the muscle once as needed for anaphylaxis             gabapentin (NEURONTIN) 300 MG capsule  Take 1 capsule (300 mg) by mouth At Bedtime             ibuprofen (ADVIL/MOTRIN) 600 MG tablet  Take 1 tablet (600 mg) by mouth every 6 hours             LORazepam (ATIVAN) 1 MG tablet  Take 1 tablet (1 mg) by mouth every 6 hours as needed (Anxiety, Nausea/Vomiting or Sleep)             MELATONIN PO  Take 1 mg by mouth At Bedtime              methadone (DOLPHINE) 5 MG/5ML solution  Take 2.5 mLs (2.5 mg) by mouth 2 times daily             methylphenidate (RITALIN) 5 MG tablet  Take 1 tablet (5 mg) by mouth 2 times daily             morphine sulfate HIGH CONCENTRATE (ROXANOL *CONCENTRATED*) 100 MG/5ML (HIGH CONC) solution  Take 0.375-0.75 mLs (7.5-15 mg) by mouth every 2 hours as needed for moderate to severe pain             ondansetron (ZOFRAN-ODT) 8 MG ODT tab  Take 8 mg by mouth every 8 hours as needed for nausea             polyethylene glycol (MIRALAX/GLYCOLAX) powder  Take 1 capful by mouth daily as needed              Ranitidine HCl (ZANTAC PO)  Take 75 mg by mouth daily             "  tamsulosin (FLOMAX) 0.4 MG capsule  Take 1 capsule (0.4 mg) by mouth daily             tolterodine (DETROL LA) 4 MG 24 hr capsule  TAKE ONE CAPSULE BY MOUTH DAILY                   Consultations:   Palliative care  Interventional radiology  Urology  GI    Brief History of Illness:  Margaux Guzmán is a 61 year old female with PMHx recurrent stage IIIB primary peritoneal adenocarcinoma, hx of SBO, with bilateral ureteral last exchanged 7/24/17 who presents with acute on chronic abdominal pain and flank pain worsening over the past 24hrs. Describes pain as \"worst pain of her life. Little improvement in pain when sitting vs supine. Endorses decreased appetite and PO intake over past day as well as recent constipation w/ most recent BM on 7/30 passing minimal stool and a larger BM 8 days ago. Tried suppository and enema at home yesterday with no results.     Was seen in clinic yesterday for symptoms. Abd XR showed moderate stool burden, but no signs of obstruction. Was due for chemotherapy infusion yesterday but chemo was held due to sxs. Reports difficulty ambulating and preparing meals in her home due to the pain. She saw palliative last week and discussed interest in hospice. Plan was to follow-up with Dr. Mccollum to make decision about pursuing further treatment vs. hospice.    Hospital Course:  Dz:   - Recurrent stage IIIB primary peritoneal cancer. S/p GUSTAVO, BSO, omentectomy, PPALND, staging. Followed by 3/3 cycles Gemcitabine (9/29/2016-1/5/2017), 11/12 cycles Doxil (9/3/2015 - 9/26/2016), 6/6 cycles cisplatin/paclitaxel (12/4/2013 - 3/12/2015); Tesaro/niraparib (1-2/2017). Most recently undergoing weekly taxol.  Patient desires to stop treatment and pursue home hospice.   FEN:   - She was continued on a regular diet during her admission.  On HD#3, she developed nausea and vomiting.  She was initially made NPO for management of bowel obstruction, but an NGT was placed on HD#4 due to ongoing emesis.  A G tube " was recommended and placed on HD#10 by GI. See below for more details.  By discharge, she was tolerating a liquid diet without nausea and vomiting with venting G tube PRN and able to maintain her hydration without IVF supplementation.  Pain:   - She presented with acute on chronic pain secondary to her disease.  She was initially continued on her home regimen without relief. Palliative care was consulted to assist with pain management and hospice transition. Patient tried many different pain medications while inpatient. Pain was eventually controlled with the following regimen: methadone, morphine, gabapentin, tylenol and volteran. Her pain was well controlled on this and she was discharged home with these medications.  CV:   - She has a history of hypertension and was continued on home amlodipine.  Her vital signs were stable while in house and she had no acute CV issues.  PULM:   - She has no history of pulmonary issues.  CT on HD#7 with pleural disease and new likely malignant moderate/small bilateral effusions.   HEME:   - She has anemia of chronic disease, Hgb was stable at 9.1 on admission.  She was continued on ppx lovenox for history of R IJ DVT during admission. She had no other acute heme issues while in house.  GI:   - She was continued on a regular diet at time of admission.  She also presented with constipation.  Recent AXR was negative for obstruction.  She received an aggressive bowel regimen with pink lady enema, magnesium citrate, miralax, and senokot. Constipation persisted, increased dose of magnesium citrate encouraged.  On HD#3, she developed nausea and vomiting.  AXR was repeated and notable for possible adynamic ileus.  She failed management with strict NPO, as she continued to vomit. A CT was done 8/7 which showed increased tumor burden and ascites. IR was not comfortable placing the Gtube so GI was consulted.  NGT was placed on HD#4 and G tube placement was recommended. She underwent  therapeutic paracentesis on 8/9 with 1L drained prior to Gtube placement. GI placed a G tube on HD#10.    :    - The patient has a history of bilateral hydronephrosis with ureteral stents in place, last changed in 6/2017.  She was continued on her home tamsulosin and tolterodine during her admission.  She had no acute  issues while in house. Discussed with patient that stents can be removed to alleviate the pain. However, the patient will likely develop renal failure without the stents, which may not be painful. As a result, this will likely worsen her prognosis. Ultimately, the patient decided not to pursue this.  Repeat AXR revealed new kidney stones 2 and 3mm and one old 1 cm stone.  Urology was consulted and did not recommend further intervention for either stent pain or renal stones.  ID:   - The patient was AF during her hospitalization.  UA was contaminated on admission and urine culture was negative.   ENDO:   - No issues  PSYCH/NEURO:   - She was continued on PRN ativan for anxiety.  Home melatonin and ritalin were continued as well.   PPX:    - She was given SCDs and lovenox during her hospital course.  She tolerated these prophylactic interventions without incident.  She will continued her home lovenox on discharge.       Discharge Instructions and Follow up:  Ms. Margaux Guzmán was discharged from the hospital with follow up for     Discharge Diet: Liquid to pureed diet  Discharge Activity: Activity as tolerated  Discharge Follow up: with Hospice    Discharge Disposition:  Discharged to home    Discharge Staff: Dr Lynn Naranjo, CNP  8/11/2017 2:05 PM

## 2017-08-01 NOTE — PHARMACY-ADMISSION MEDICATION HISTORY
Admission medication history interview status for the 8/1/2017 admission is complete. See Epic admission navigator for allergy information, pharmacy, prior to admission medications and immunization status.     Medication history interview sources:  Patient    Changes made to PTA medication list (reason)  Added: Oxycodone  Deleted: None  Changed: Buprenorphine patch has updated day of the week for new placement.     Additional medication history information (including reliability of information, actions taken by pharmacist):    1. Patient is currently wearing buprenorphine patch.   2. No medications have been taken today yet; all medications were taken as scheduled yesterday.   3. Doses and medications were cross checked in care everywhere before patient interview. Patient states she no longer uses [facility] and it was confirmed that these orders are NOT currently active, such as dexamethasone. She is now receiving all of her chemo through [facility] and not [facility].   4. When discussing the lovenox drug allergy listed in Epic, patient states that lovenox gives her hives UNLESS she pre-treats with ranitidine before every dose.   5. Patient states that she has oxycodone at home and has taken it in the past month, but she does not take it often because it doesn't work for her. She stated that only IV opioids worked for her.   6. For her gabapentin taper, patients stated she took 100mg twice yesterday and believes she should be starting 100mg three times daily today.       Prior to Admission medications    Medication Sig Last Dose Taking? Auth Provider   METHYLPHENIDATE HCL PO Take 2.5 mg by mouth 2 times daily 7/31/2017 at 1400 Yes Unknown, Entered By History   OXYCODONE HCL PO Take 5 mg by mouth every 8 hours Past Month at prn Yes Unknown, Entered By History   ondansetron (ZOFRAN-ODT) 8 MG ODT tab Take 8 mg by mouth every 8 hours as needed for nausea Past Month at prn Yes Unknown, Entered By History    PROCHLORPERAZINE MALEATE PO Take 10 mg by mouth every 6 hours as needed for nausea or vomiting Past Month at prn Yes Unknown, Entered By History   acetaminophen (TYLENOL) 500 MG tablet Take 2 tablets (1,000 mg) by mouth 3 times daily 7/31/2017 at 2000 Yes Gricelda Grullon MD   gabapentin (NEURONTIN) 100 MG capsule Gabapentin 100 mg capsules:  Day 1: take one capsule at night.   --Do this for 3 days total  Day 4: take one capsule in morning and one at night.  --Do this for 3 days total  Day 7: take one capsule, three times a day (morning, afternoon, and night - you don't need to be exact).   --Continue taking this until you talk with us. 7/31/2017 at 2000 Yes Gricelda Grullon MD   enoxaparin (LOVENOX) 40 MG/0.4ML injection INJECT 1 SYRINGE SUBCUTANEOUSLY DAILY 7/31/2017 at 2000 Yes Jia Mccollum MD   buprenorphine (BUTRANS) 5 MCG/HR WK patch Place 1 patch onto the skin every 7 days  Patient taking differently: Place 1 patch onto the skin every 7 days New patch placed on Fridays. 7/28/2017 at 900 Yes Shabbir Valladares MD   Docusate Sodium (COLACE PO) Take 50 mg by mouth daily as needed  Past Week at prn Yes Reported, Patient   LORazepam (ATIVAN) 1 MG tablet Take 1 tablet (1 mg) by mouth every 6 hours as needed (Anxiety, Nausea/Vomiting or Sleep) 7/31/2017 at pm Yes Jia Mccollum MD   tolterodine (DETROL LA) 4 MG 24 hr capsule TAKE ONE CAPSULE BY MOUTH DAILY 7/31/2017 at 2000 Yes Carmela Alvarez MD   amLODIPine (NORVASC) 2.5 MG tablet Take 1 tablet (2.5 mg) by mouth daily 7/31/2017 at 2000 Yes Nicole Naranjo APRN CNP   tamsulosin (FLOMAX) 0.4 MG capsule Take 1 capsule (0.4 mg) by mouth daily 7/31/2017 at 2000 Yes Carmela Alvarez MD   Multiple Vitamins-Minerals (ONE DAILY MULTIVITAMIN WOMEN) TABS Take 1 tablet by mouth every morning  7/31/2017 at 900 Yes Unknown, Entered By History   polyethylene glycol (MIRALAX/GLYCOLAX) powder Take 1 capful by mouth daily as  needed  Past Week at prn Yes Reported, Patient   cycloSPORINE (RESTASIS) 0.05 % ophthalmic emulsion Place 1 drop into both eyes 2 times daily  7/31/2017 at 2000 Yes Reported, Patient   Ranitidine HCl (ZANTAC PO) Take 75 mg by mouth daily  7/31/2017 at 2000 Yes Reported, Patient   MELATONIN PO Take 1 mg by mouth At Bedtime  7/31/2017 at 2200 Yes Reported, Patient   EPINEPHrine (EPIPEN) 0.3 MG/0.3ML injection Inject 0.3 mLs (0.3 mg) into the muscle once as needed for anaphylaxis  at prn Yes Lizet Truong, APRN CNP         Medication history completed by: Yaritza Alfonso, PharmD Student

## 2017-08-01 NOTE — H&P
"[facility] History and Physical    Margaux Guzmán MRN# [id number]   Age: 61 year old YOB: 1955     Date of Admission:  8/1/2017    Primary care provider: Aurelia Knox             Chief Complaint:   Abdominal pain         History of Present Illness:   This patient is a 61 year old female with PMHx recurrent stage IIIB primary peritoneal adenocarcinoma, hx of SBO, with bilateral ureteral last exchanged 7/24/17 who presents with acute on chronic abdominal pain and flank pain worsening over the past 24hrs. Describes pain as \"worst pain of her life. Little improvement in pain when sitting vs supine. Endorses decreased appetite and PO intake over past day as well as recent constipation w/ most recent BM on 7/30 passing minimal stool and a larger BM 8 days ago. Tried suppository and enema at home yesterday with no results.    Was seen in clinic yesterday for symptoms. Abd XR showed moderate stool burden, but no signs of obstruction. Was due for chemotherapy infusion yesterday but chemo was held due to sxs. Reports difficulty ambulating and preparing meals in her home due to the pain. She saw palliative last week and discussed interest in hospice. Plan was to follow-up with Dr. Mccollum to make decision about pursuing further treatment vs. hospice.    In the ED, CBC, CMP were normal. Rectal exam showed no stool in the vault. [place] enema was administered with no results. Soap suds enema was attempted but not tolerated.     She denies fevers/chills, nausea/vomiting, dysuria, hematuria.         Cancer Treatment History:   The patient is a 61 year old  female who initially presented for evaluation of pelvic and abdominal pain. On exam, she had mild right adnexal tenderness and slightly enlarged right ovary freely mobile and smooth. This prompted an abdominal ultrasound, which was normal, and a pelvic US that showed anechoic right ovarian cyst measuring 4.3 x 3.8 x 4.0 cm. Her  was " elevated at 74 on 10/8/13. We reviewed the possible diagnosis including ovarian cancer versus benign ovarian cyst. Approach to surgery, alternatives to surgery and plan for possible extended open surgical staging based on the operative findings was discussed. In light of the size of the ovary we are offering an initial approach with minimally invasive surgery. After discussion of risks and benefits, consent was obtained.  11/7/13: Diagnostic laparoscopy converted to exploratory laparotomy, bilateral salpingo-oophorectomy, total abdominal hysterectomy, omentectomy, staging biopsies, bilateral pelvic and periaortic lymph node dissection and washings. Stage IIIB  12/4/13: Cycle #1 IV/IP Taxol/CDDP  1/2/14: Cycle #2 IV/IP PCP.  - 14  1/23/14: Cycle 3 IV/IP.  - 7  2/11/14:   7. CT C/A/P Impression:  1. Multiple bilateral pulmonary nodules as described in full report measuring up to 3 mm along the right major fissure. These are indeterminate given lack of comparison and followup is recommended.  2. No definite evidence for recurrent or metastatic disease in the abdomen or the pelvis. There is a rounded hypodense focus abutting the left external iliac artery and the adjacent sigmoid colon which measures 1 cm, this could represent a fluid-filled sigmoid diverticulum or a hypodense node, further attention to this area on subsequent examinations is recommended.  3. There is a 7 mm nonobstructing stone in the inferior collecting system of the right kidney.  2/12/14-3/26/14: Cycle #4-6 IV/IP  7, 7, 7.  4/21/14: CT C/A/P Impression:   1. Unchanged indeterminate pulmonary nodules. Recommend continued surveillance.   2. No definite evidence of metastatic ovarian cancer in the abdomen or pelvis. Small amount of subtle increased thickening and fluid is noted along the right lymph node dissection, nonspecific, but possibly a tiny developing lymphocele.   3. 6 mm nonobstructing stone in the right kidney.  Plan  surveillance for ovarian cancer every 3 months with physical and .   Indeterminate pulmonary nodule: These were present before the surgery and there was not change with the chemotherapy. Highly unlikely to be a metastatic disease. Will repeat a CT in 3 months to see any change   5/22/14:  6. JOSEMANUEL.  5/17/14; ED visit [facility] [place]. CT abd/pel noted possible chronic partial small bowel obstruction. Colonscopy scheduled for June 6, 2014 locally. Abdominal pain started 5/17/14 and noted pressure without bowel movement and went to ED that night due to increasing pain. In patient 2 day hospital with clear liquids/IV. Mild nausea without vomiting. BM subsequently occurred and pt was discharged. No pain since. Continues with fullness in abdomen. Diet is bland for the most part.  8/25/14:  5. Notes increased abdominal girth and bloating x 2-3 weeks with urine urgency. Worried about recurrence. Is just starting to return to normal exercise and activities. No constipation, diarrhea, pain, vaginal or rectal bleeding, cough or dyspnea. CT to follow indeterminate pulmonary nodules today.   CT cap IMPRESSION:   1. No CT scan findings of the chest, abdomen, or pelvis to indicate metastatic disease.  2. 2-5 mm pulmonary nodules, stable since 2/11/2014.  3. Nonobstructing 6 mm stone in the right kidney.  12/3/14:  8.. Pt started zoloft for anxiety. Worries about cancer recurrence.   3/2/15:  34  3/5/15: CT C/A/P: Impression:  1. Multiple new small peritoneal and retroperitoneal nodules are suspicious for metastases. Suspicious new left common iliac and central small bowel mesenteric nodes. No abdominal ascites.  2. Multiple pulmonary nodules are stable with no new nodules.  3. 9 mm nonobstructing right lower pole renal calculus.      5/20/15: CT c/a/p showed nodularity throughout the abdomen and pelvis worrisome for malignancy which has increased in size   5/28/15: CT abdomen and  "pelvis showed stable inumerabble peritoneal nodules scattered throughout the abdomen and pelvis consistent with metastases.    8/4/15 (approximatly): Left ureteral stent was placed.  8/12/15:  from [facility] 303   8/18/15: CT chest Impression showed slight increase in mild, subtle nodularity along the diaphragm which is nonspecific but may represent metastatic disease.   8/18/15: CT abdomen and pelvis Impression showed interval progression of peritoneal metastatic disease.       8/25/15:  on 3/2/15 of 34 prompted CT c/a/p, which showed multiple new small peritoneal and retroperitoneal nodules are suspicious for metastases. Suspicious new left common iliac and central small bowel mesenteric nodes. She participated in [facility] study involving treatment with on clinical trial with vaccine [id number]. She is here today because the [place] trail failed and the pt pregressed. Her most recent CT c/a/p on 8/25 showed progression of peritoneal metastatic disease. And her most recent  from [place] on 8/12/15 was 303.      Plan: Carbo/Doxil x 6 cycles.with imaging after 3 cycles.       9/3/15: Cycle #1 Carbo/Doxil.  244.  9/24/15: right IJ thrombus; started on Lovenox.   9/26/15: Present to [place] ED. \"presented to the ED this morning with what appears to be a mild drug rash after administering her lovenox this morning. This writer discussed situation with Gyn Onc Fellow Jeanne Moon as well as [facility] Heme/Onc service. Per Heme, a rash of this nature is extremely uncommon with administration of Lovenox. Given the mild nature of this rash and acute need for anticoagulation, recommended the patient continue with Lovenox injections and treat with Benadryl as needed. Advised patient be given precautions to return to the ED immediately if she develops reactive respiratory symptoms. Alternatively patient could be switched to alternative formulation of low molecular weight heparin if she was strongly resistant to " "continuation of Lovenox.\"      10/1/15: Cycle #2 Carbo/Doxil.  175.      10/28/15: gyn onc visit: pt complains of worsening constipation and tenderness around her right ribs. She is taking senna for her constipation with minimal improvement. She admits that the swelling around her neck after her blood clot has decreased. She also admits to feeling a nodules on her left abdomen. She states her appetite is good but she has not been eating fruits and vegetables. She denies any chemo side effects besides fatigue.       10/28/15:  158  11/23/2015:  133  CT c/a/p IMPRESSION: In this patient with a clinical history of ovarian cancer there is mixed response to therapy:   1. Stable to slightly decreased peritoneal nodularity since 8/18/2015.  2. No significant change regarding the large subdiaphragmatic peritoneal deposit since 9/24/2015.  3. Enlarging soft tissue nodule overlying the abdominal musculature concerning for metastasis since March of 2015.   4. Unchanged, indeterminate hypodensity near the gallbladder fossa since 8/18/2015, however progressively increased in size since  3/5/2015.  5. Bilateral nephroureteral stents. No significant hydronephrosis.  6. Bilateral pulmonary nodules. Continued followup recommended.      12/23/15: Cycle #5 carboplatin/Doxil/Avastin.  96. To receive Avastin today despite proteinuria per Dr. Aguilar and nephrology.  1/21/16: Cycle #6 carboplatin/Doxil/Avastin, delayed one week secondary to neutropenia.  62.  1/28/16:  63.      2/2/16: Patient had right upper extremity doppler u/s done in [place] due to swollen glands and pain. 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 Pt is currently on lovenox.      2/2/16: US soft tissue neck  Conclusion:  1. Morphologically normal not pathologically enlarged two right carotid chain lymph nodes.  2. " Chronic right jugular vein thrombosis, described in detail on  venous doppler exam.       2/2/16: Thyroid Ultrasound  Right lobe: 5.5 x 1.9 x 1.2 cm  Left lobe: 5.0 x 1.5 . 0.9 cm  Both lobes have normal background echotexture.      Conclusion:  1. 3 benign - appearing subcentimeter hypoechoic right mid thyroid nodules.  2. Borderline thyromegaly.      2/2/16: Right upper extremity venous duplex doppler evaluation  Conclusion:  1.) chronic non compressible echogenic right jugular vein thrombus, now 4.3 cm in length.       2/22/16:   IMPRESSION:    1. Ovarian cancer with peritoneal carcinomatosis.  2. Given the increased sensitivity of PET/CT, comparison with prior CT examinations is difficult. In general the abdominal/pelvic metastatic lesions appear to be decreased in size.  3. Persistent right internal jugular DVT, as evidenced by 5 cm filling defect with inferior border at the central venous catheter.  4. Bilateral hydronephrosis without overt caliectasis. Some distal migration of the bilateral double-J ureteral stents, although the  proximal coiled portions continue to be in the renal pelves.      2/24/16: Cycle #7 carboplatin/Doxil/Avastin.  56.  3/9/16: Hgb 6.6, worked up for transfusion reaction (negative). Bleed possibly secondary to   3/23/16: Cycle #8 carboplatin/Doxil/Avastin.  44.  4/2016: Hospitalized in [place] x2 weeks for hematuria leading to anemia after ureteral stent exchange  4/20/16: Cycle #9 carboplatin/Doxil/Avastin. Deferred one week secondary to acute UTI.  35.   4/28/16: Cycle #9 deferred due to continued bacteruria and ANC 1.3.  5/4/16: Cycle #9 carboplatin/Doxil/Avastin. Breast lump noted, diagnostic mammogram ordered.  6/1/16: Cycle #10 carboplatin/Doxil/Avastin.  50.  6/28/16: Cycle #11 carboplatin/Doxil/Avastin. Avastin held due to bleeding. Carbo/Doxil deferred one week secondary to neutropenia.  43.  7/7/16: Cycle #12 carboplatin/Doxil.  Avastin held due to bleeding.  7/18/16: Bilateral ureteral stent exchange  7/20/16-7/29/16: Hospitalized with urosepsis and blood loss anemia, started on Zosyn and discharged on amoxicillin  9/29/16-11/11/16: Cycle #1-3 Gemzar.  85, 106, 111.      12/12/16: CT CAP IMPRESSION: In this patient with a history of metastatic ovarian cancer:  1. Progression of disease as evidenced by a slowly enlarging mesenteric and omental soft tissue foci and slowly enlarging  periesophageal and pericardiophrenic lymph nodes, as detailed above.  2. Stable appearance of bilateral ureteral stents without hydronephrosis.  3. Patient was premedicated for a pre-existing IV contrast allergy. Despite this, she had itchiness on her face poststudy. She should no longer receive IV contrast in the future.        CT AP 1/10/2017: multiple dilated fluid filled small bowel loops with decompressed distal small bowel loops, consistent with obstruction. Transition point is suspected in the pelvis. No pneumatosis or free intraperitoneal gas. Bilateral ureteral stents appear appropriately positioned. There is mild dilation of the bilateral renal collecting systems, left greater than right.      1/23/17: admitted to clinic for dizziness and heart palpitations   1/27/17: ED admission - leg swelling and GI bleeding       1/27/17: US lower extremity IMPRESSION:  1.  No evidence of right lower extremity deep venous thrombosis.  2.  Dampened waveforms in the right lower extremity, similar to 7/25/2016, though a more central occlusion cannot be excluded.  1/27/17: MRI Brain IMPRESSION:  Normal brain MRI  1/27/17: CT AP IMPRESSION: This patient with a history of metastatic ovarian cancer:  1. No bowel obstruction. Oral contrast progressed to the colon. Extensive colonic stool.  2. Severe right hydronephrosis, new from 12/12/2016, may represent obstruction of the right ureteral stent. No left-sided hydronephrosis.  3. Slight interval progression of diffuse  peritoneal metastatic disease.  4. Mildly nodular areas of increased attenuation within the pelvic bowel which may represent loculated malignant ascites or peritoneal implants similar in appearance to 2016.      17: colonoscopy- benign       Treatment: Tesaro/Quadra Niraparib study  17-17: Cycle #1-3       17: CT scan ([facility]) IMPRESSION: In this patient with a history of ovarian cancer, there is mild disease progression as follows:  1. Increase in size and number of multiple subcentimeter nodules/intrafissural lymph nodes along the right major and minor  fissures and also bilateral pulmonary nodules, predominantly along the diaphragmatic pleura.   2. Slight increase in size of small lymph nodes in the right internal mammary region and bilateral anterior cardiophrenic region.  3. Stable to slight increase in size of deposits in the perihepatic region. No significant change in the omental deposits in the left  upper quadrant of the abdomen.  4. Stable to slight increase in size of mesenteric deposits/lymph nodes.  5. Bilateral nephroureteric stent in place. Atrophy of the right kidney with interval resolution of right hydronephrosis. Unchanged  mild left hydronephrosis. Air within the collecting systems and bladder likely related to prior intervention.  6. Dilation of proximal small bowel loops with interspersed areas of narrowing. No progression of oral contrast into the ileum. Moderate amount of fecal material in the colon. Partial/early small bowel obstruction is possible.  3/30/17: CT cap IMPRESSION: Images patient with a history of ovarian cancer, overall findings of stable disease includin. No acute findings to suggest etiology of severe left flank pain.  2. Unchanged pulmonary nodules/pleural nodularity and thoracic lymph nodes.  3. Unchanged perihepatic and left upper quadrant omental deposits as well as diffuse mesenteric lymphadenopathy.  4. Unchanged hypoattenuating  "focus in the hepatic dome.  5. Unchanged placement of bilateral nephroureteral stents. Unchanged mild to moderate left hydronephrosis. Stable atrophy of the right kidney.  6. Unchanged 8 mm right flank soft tissue nodule.       17: left splanchnic ganglion block. FNA of lesion in umbilicus completed; results pending.       3/30/17: CT AP IMPRESSION: Images patient with a history of ovarian cancer, overall findings of stable disease includin. No acute findings to suggest etiology of severe left flank pain.  2. Unchanged pulmonary nodules/pleural nodularity and thoracic lymph nodes.  3. Unchanged perihepatic and left upper quadrant omental deposits as well as diffuse mesenteric lymphadenopathy.  4. Unchanged hypoattenuating focus in the hepatic dome.  5. Unchanged placement of bilateral nephroureteral stents. Unchanged mild to moderate left hydronephrosis. Stable atrophy of the right kidney.  6. Unchanged 8 mm right flank soft tissue nodule.      2017: FNA- \"Lump in belly button\", palpation guided fine needle aspiration:   - Positive for Malignancy   - Metastatic adenocarcinoma, consistent with Müllerian origin (see comment)   Specimen Adequacy: Satisfactory for evaluation.       17: CT CAP IMPRESSION: In this patient with history of metastatic ovarian carcinoma: Stable disease.  1. Unchanged pulmonary nodules and thoracic lymph nodes.  2. There are two nodules in the right lower quadrant posterior mesentery, which are stable to minimally increased on current study  and slightly increased from 03/15/17. Recommend attention on follow up. Otherwise stable peritoneal tumor metastasis.  3. New fluid attenuation collection in the central presacral pelvis, HU measures 12, likely ascitic fluid, not present on prior study.  Attention on follow-up.  4. Stable bilateral nephroureteral stents and mild hydronephrosis, with left greater than right fullness of the renal hilar region.      Plan to start weekly " Paclitaxel.           17: Cycle #1 weekly Paclitaxel.   743. Received 3 weeks, then off 2 weeks, then given one week.  17: Transfusion 1 unit RBC  17: Hgb 6.5; received 2 units RBC outside hospital  17: Hgb 8.5  17: Cystoscopy, Bilateral Stent Exchange, Bilateral Retrograde Pyelogram  17: PNT placed. On Macrobid x 10 days (completed )  17: Cycle #1 D22 Paclitaxel.   7/10/17: Cycle #2 weekly Paclitaxel.  pending.             Past Medical History:     Past Medical History:   Diagnosis Date     Anemia      History of blood transfusion     MULTIPLE     Hydronephrosis      Hyperlipidemia      Jugular vein thrombosis, right     Persistent right internal jugular DVT     Livedo annularis      Osteoarthritis      Osteopenia      Ovarian cancer (H)      Polymyalgia rheumatica (H)      PONV (postoperative nausea and vomiting)      Primary cancer of peritoneum (H)             Past Surgical History:      Past Surgical History:   Procedure Laterality Date     APPENDECTOMY       BREAST SURGERY      biopsy negative      SECTION       COLONOSCOPY N/A 2017    Procedure: COLONOSCOPY;  Surgeon: Luis Richardson MD;  Location: [facility]     COLONOSCOPY N/A 2017    Procedure: COMBINED COLONOSCOPY, SINGLE OR MULTIPLE BIOPSY/POLYPECTOMY BY BIOPSY;  Surgeon: Luis Richardson MD;  Location: [facility]     COMBINED CYSTOSCOPY, RETROGRADES, EXCHANGE STENT URETER(S) Bilateral 2016    Procedure: COMBINED CYSTOSCOPY, RETROGRADES, EXCHANGE STENT URETER(S);  Surgeon: Carmela Alvarez MD;  Location: [facility]     COMBINED CYSTOSCOPY, RETROGRADES, EXCHANGE STENT URETER(S)  2016    @ [facility]     COMBINED CYSTOSCOPY, RETROGRADES, EXCHANGE STENT URETER(S) Bilateral 10/17/2016    Procedure: COMBINED CYSTOSCOPY, RETROGRADES, EXCHANGE STENT URETER(S);  Surgeon: Carmela Alvarez MD;  [facility]: [facility]     COMBINED CYSTOSCOPY, RETROGRADES, EXCHANGE STENT URETER(S)  Bilateral 12/7/2016    Procedure: COMBINED CYSTOSCOPY, RETROGRADES, EXCHANGE STENT URETER(S);  Surgeon: Carmela Alvarez MD;  [facility]     COMBINED CYSTOSCOPY, RETROGRADES, EXCHANGE STENT URETER(S) Bilateral 2/27/2017    Procedure: COMBINED CYSTOSCOPY, RETROGRADES, EXCHANGE STENT URETER(S);  Surgeon: Carmela Alvarez MD;  [facility]     COMBINED CYSTOSCOPY, RETROGRADES, EXCHANGE STENT URETER(S) Bilateral 6/12/2017    Procedure: COMBINED CYSTOSCOPY, RETROGRADES, EXCHANGE STENT URETER(S);  Cystoscopy, Bilateral Stent Exchange, Bilateral Retrograde Pyelogram;  Surgeon: Carmela Alvarez MD;  [facility]     COMBINED CYSTOSCOPY, RETROGRADES, EXCHANGE STENT URETER(S) N/A 7/24/2017    Procedure: COMBINED CYSTOSCOPY, RETROGRADES, EXCHANGE STENT URETER(S);  Cystoscopy, Left Stent Exchange, Left retrograde pyelogram, Right Retrograde Stent Placement on the Right, Removal of nephrostomy tube;  Surgeon: Carmela Alvarez MD;  [facility]     ESOPHAGOSCOPY, GASTROSCOPY, DUODENOSCOPY (EGD), COMBINED N/A 1/28/2017    Procedure: COMBINED ESOPHAGOSCOPY, GASTROSCOPY, DUODENOSCOPY (EGD);  Surgeon: Luis Richardson MD;  Location: [facility]     HYSTERECTOMY TOTAL ABDOMINAL, BILATERAL SALPINGO-OOPHORECTOMY, NODE DISSECTION, COMBINED  11/7/2013    Procedure: COMBINED HYSTERECTOMY TOTAL ABDOMINAL, SALPINGO-OOPHORECTOMY, NODE DISSECTION;;  Surgeon: Cheri Aguilar MD;  Location: [facility]     INJECT NERVE BLOCK CELIAC PLEXUS Left 4/20/2017    Procedure: INJECT NERVE BLOCK CELIAC PLEXUS;  Left splanchnic nerve block;  Surgeon: Shabbir Valladares MD;  [facility]     INJECT NERVE BLOCK CELIAC PLEXUS Left 5/18/2017    Procedure: INJECT NERVE BLOCK CELIAC PLEXUS;  Left Splanchnic Nerve Block;  Surgeon: Shabbir Valladares MD;  [facility]     LAPAROSCOPIC SALPINGO-OOPHORECTOMY  11/7/2013    Procedure: LAPAROSCOPIC SALPINGO-OOPHORECTOMY;  Diagnostic Laparoscopy, Exploratory Laparotomy, Bilateral  "Salpingo-Oophorectomy,  Hysterectomy, Omentectomy, Pelvic Washings, Peritoneal staging and biopsies, Pelvic and Periaortic Lymph node Dissection;  Surgeon: Cheri Aguilar MD;  Location: [facility]     LAPAROTOMY, STAGING, COMBINED  11/7/2013    Procedure: COMBINED LAPAROTOMY, STAGING;;  Surgeon: Cheri Aguilar MD;  Location: [facility]     LYMPH NODE BIOPSY  2008    Left posterior chain, beign     OPEN REDUCTION INTERNAL FIXATION TOE(S)  9/3/13    Fifth digit, left foot, with screw fixation      REMOVE PORT PERITONEAL  5/5/2014    Procedure: REMOVE PORT PERITONEAL;  Surgeon: Cheri Aguilar MD;  Location: [facility]            Social History:     Social History   Substance Use Topics     Smoking status: Former Smoker     Smokeless tobacco: Former User      Comment: Quit over 20 years ago     Alcohol use No            Family History:     Family History   Problem Relation Age of Onset     Arthritis Father      Myocardial Infarction Father      secondary to anesthesia     Colon Cancer Father      DIABETES Other      Uncle     Hypertension Mother      Other - See Comments Mother      cognitive decline     Skin Cancer Mother      Hypertension Sister      HEART DISEASE Other      unknown valve replacement     Bladder Cancer Sister      Skin Cancer Brother             Immunizations:     Immunization History   Administered Date(s) Administered     Influenza Vaccine IM 3yrs+ 4 Valent IIV4 10/28/2015            Allergies:     Allergies   Allergen Reactions     Contrast Dye Itching and Swelling     Itching/tingling of mouth and nose with sensation of swelling after receiving IV contrast for CT.  This occurred despite steroid premedication. Therefore the patient should not receive intravenous contrast in the future.      Benadryl Allergy Other (See Comments)     Stay awake, restless, \"uncomfortable\", \"feel like I need to run away\"  With  IV dose,  does fine with oral Benadryl.     Lovenox [Enoxaparin] Hives     3/23/16: Okay to receive " heparin flushes in port, tolerates well. Patricia Cortez FNP-C     Amoxicillin Rash     Diphenhydramine Anxiety     No Clinical Screening - See Comments Rash     Pollen Extract Rash     Sulfa Drugs Rash            Medications:     Current Facility-Administered Medications   Medication     HYDROmorphone (DILAUDID) injection 1 mg     Current Outpatient Prescriptions   Medication Sig     acetaminophen (TYLENOL) 500 MG tablet Take 2 tablets (1,000 mg) by mouth 3 times daily     methylphenidate (RITALIN) 5 MG tablet Take 2.5mg (1/2 tablet) in the morning x3-5 days, then take 2.5mg (1/2 tablet) in the morning and midday.     gabapentin (NEURONTIN) 100 MG capsule Gabapentin 100 mg capsules:  Day 1: take one capsule at night.   --Do this for 3 days total  Day 4: take one capsule in morning and one at night.  --Do this for 3 days total  Day 7: take one capsule, three times a day (morning, afternoon, and night - you don't need to be exact).   --Continue taking this until you talk with us.     enoxaparin (LOVENOX) 40 MG/0.4ML injection INJECT 1 SYRINGE SUBCUTANEOUSLY DAILY     buprenorphine (BUTRANS) 5 MCG/HR WK patch Place 1 patch onto the skin every 7 days     Docusate Sodium (COLACE PO) Take 50 mg by mouth daily     LORazepam (ATIVAN) 1 MG tablet Take 1 tablet (1 mg) by mouth every 6 hours as needed (Anxiety, Nausea/Vomiting or Sleep)     prochlorperazine (COMPAZINE) 10 MG tablet Take 1 tablet (10 mg) by mouth every 6 hours as needed (Nausea/Vomiting) (Patient not taking: Reported on 7/31/2017)     tolterodine (DETROL LA) 4 MG 24 hr capsule TAKE ONE CAPSULE BY MOUTH DAILY     ondansetron (ZOFRAN-ODT) 8 MG ODT tab Place 1 tablet (8 mg) under the tongue every 8 hours as needed for nausea (Patient not taking: Reported on 7/31/2017)     amLODIPine (NORVASC) 2.5 MG tablet Take 1 tablet (2.5 mg) by mouth daily     tamsulosin (FLOMAX) 0.4 MG capsule Take 1 capsule (0.4 mg) by mouth daily     Multiple Vitamins-Minerals (ONE DAILY  MULTIVITAMIN WOMEN) TABS Take 1 tablet by mouth every morning      polyethylene glycol (MIRALAX/GLYCOLAX) powder Take 1 capful by mouth daily as needed      cycloSPORINE (RESTASIS) 0.05 % ophthalmic emulsion Place 1 drop into both eyes 2 times daily      Ranitidine HCl (ZANTAC PO) Take 75 mg by mouth daily      MELATONIN PO Take 1 mg by mouth At Bedtime      EPINEPHrine (EPIPEN) 0.3 MG/0.3ML injection Inject 0.3 mLs (0.3 mg) into the muscle once as needed for anaphylaxis (Patient not taking: Reported on 7/31/2017)            Review of Systems:   CONSTITUTIONAL:  See HPI  RESPIRATORY:  No cough, no SOB  CARDIOVASCULAR:  No chest pain  GASTROINTESTINAL:  See HPI  GENITOURINARY:  See HPI         Physical Exam:     Vitals:    08/01/17 1333 08/01/17 1353 08/01/17 1403 08/01/17 1406   BP: 159/79      Pulse:       Resp:       Temp:       TempSrc:       SpO2:  95% 96% 98%   Weight:       Height:         General: Visibly uncomfortable, restless sitting and lying  CV: RRR, S1, S2, no murmurs or gallops  Resp: Upper and lower lung fields clear to auscultation bilaterally   Abdomen: Nondistended w/ palpable mass, visible tumor protruding through umbilicus  Back: CVA tenderness bilaterally  Extremities: no trace edema, warm and well-perfused         Data:     Results for orders placed or performed during the hospital encounter of 08/01/17 (from the past 24 hour(s))   CBC with platelets differential   Result Value Ref Range    WBC 7.7 4.0 - 11.0 10e9/L    RBC Count 3.12 (L) 3.8 - 5.2 10e12/L    Hemoglobin 9.1 (L) 11.7 - 15.7 g/dL    Hematocrit 28.2 (L) 35.0 - 47.0 %    MCV 90 78 - 100 fl    MCH 29.2 26.5 - 33.0 pg    MCHC 32.3 31.5 - 36.5 g/dL    RDW 16.4 (H) 10.0 - 15.0 %    Platelet Count 419 150 - 450 10e9/L    Diff Method Automated Method     % Neutrophils 83.7 %    % Lymphocytes 7.4 %    % Monocytes 8.4 %    % Eosinophils 0.0 %    % Basophils 0.4 %    % Immature Granulocytes 0.1 %    Nucleated RBCs 0 0 /100    Absolute  Neutrophil 6.5 1.6 - 8.3 10e9/L    Absolute Lymphocytes 0.6 (L) 0.8 - 5.3 10e9/L    Absolute Monocytes 0.7 0.0 - 1.3 10e9/L    Absolute Eosinophils 0.0 0.0 - 0.7 10e9/L    Absolute Basophils 0.0 0.0 - 0.2 10e9/L    Abs Immature Granulocytes 0.0 0 - 0.4 10e9/L    Absolute Nucleated RBC 0.0    Comprehensive metabolic panel   Result Value Ref Range    Sodium 135 133 - 144 mmol/L    Potassium 3.5 3.4 - 5.3 mmol/L    Chloride 100 94 - 109 mmol/L    Carbon Dioxide 24 20 - 32 mmol/L    Anion Gap 12 3 - 14 mmol/L    Glucose 95 70 - 99 mg/dL    Urea Nitrogen 14 7 - 30 mg/dL    Creatinine 0.59 0.52 - 1.04 mg/dL    GFR Estimate >90  Non  GFR Calc   >60 mL/min/1.7m2    GFR Estimate If Black >90   GFR Calc   >60 mL/min/1.7m2    Calcium 9.2 8.5 - 10.1 mg/dL    Bilirubin Total 0.4 0.2 - 1.3 mg/dL    Albumin 3.1 (L) 3.4 - 5.0 g/dL    Protein Total 7.0 6.8 - 8.8 g/dL    Alkaline Phosphatase 58 40 - 150 U/L    ALT 12 0 - 50 U/L    AST 15 0 - 45 U/L   Lipase   Result Value Ref Range    Lipase 105 73 - 393 U/L   Lactic acid   Result Value Ref Range    Lactic Acid 1.2 0.7 - 2.1 mmol/L             Assessment and Plan:   Assessment: 61 year old female with PMHx stage IIIb peritoneal adenocarcinoma, s/p cystouroscopy w/ stenting on 6/12/17 admitted for abdominal and flank pain worsening over past 24hrs w/ decreased PO intake and difficulty performing ADLs.     Plan:  Dz: Recurrent peritoneal cancer and s/p ureteral stenting. Patient plans to seek hospice for symptoms and interested in discontinuing care for her cancer. Plan to consult palliative tomorrow.  FEN: PO solids and fluids  Pain: PO Oxycodone 5-10mg q3 prn, Tylenol 650 mg, Gabapentin 100mg TID, Buprenorphine patch. Plan to consult palliatve tomorrow.  Heme: No signs of bleeding, 7/31 Hgb 9.1 DVT w/ PTA Lovenox. Continue PTA Lovenox 40mg IM   CV: Hemodynamically stable w/ vitals within kassi limits, h/o hyperlipidemia, HTN. Home amlodipine.  Pulm:  Breathing comfortably on RA  GI: Constipation. Dulcolax suppository x1. Senokot. Mg Citrate. Miralax. Milk of Magnesia. Zofran prn. GERD: Home Zantac.  : UA + for ketones, mod blood, leukocyte esterase and WBC, similar to UA month ago. Will follow urine cx. Flank pain. Home tamsulosin and tolerodine for urinary symptoms.  ID: See .  Endocrine: No issues  Psych/Neuro: NI  PPX: SCDs, home lovenox.  Dispo:  Transition to home hospice.    Caitlyn Henning MD  GYN/ONC Resident PGY1  8/1/2017 2:37 PM       I saw and evaluated Ms Guzmán.   Recurrent plat resistant disease, declining on chemo. 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   Constipation: Will plan more mag citrate today. Consider methylnaltrexone although I am not sure if this is purely opioid induced  Pain: As documented in resident note. Will consider sublingual as well. Appreciate palliative care. We did discuss stent removal today if this is causing more pain it may be reasonable to remove. Although this may cause earlier death due to renal failure. I want her to know this is an option as she feels much of her pain is coming from her stents  Long discussion today about goals of care.   She has a good udnerstanding of her disease process and is wishing to proceed with hospice care. Will help arrange    Natalie Omalley MD  Gynecologic Oncology  Pager [phone]

## 2017-08-01 NOTE — ED PROVIDER NOTES
[place] EMERGENCY DEPARTMENT ([facility])  17   History     Chief Complaint   Patient presents with     Abdominal Pain     HPI  Margaux Guzmán is a 61 year old female with a history of left and right ovarian cancer, SBO, generalized abdominal pain, pain due to ureteral stent, hematuria, s/p appendectomy, s/p total abdominal hysterectomy, s/p cystourethroscopy w/ insertion of indwelling ureteral stent and s/p  section who presents to the Emergency Department for evaluation of generalized abdominal pain. Patient was supposed to get chemotherapy treatment yesterday (2017); however, due to her abdominal pain and constipation, patient did not received treatment. Patient reports not having a bowel movement since 2017. Her last bowel movement though was a very small amount and was watery. She was concerned though because prior to this, she had not had a bowel movement for 6 days. Patient reports having bowel obstructions in the past, but notes that his pain is different. Patient has tried many techniques to help her have a bowel movement, including Mag Citrate, prescription suppositories, fleet enema, MiraLax,  etc., all of which were recommended to her by her Oncologist; however, none of them helped. Patient notes feeling more bloated than usual.  She also notes increased flank pain that radiates up to her shoulder blades. She describes her abdominal pain and flank pain as constant and sharp. She also notes that this pain is the worst pain she has ever experienced; she did reports having a similar pain in the past when she would eat after getting treatment with Taxol. However, this pain would resolve itself quickly. Patient had an x-ray done yesterday to look for any obstructions and any problems with the stents. This x-ray showed nothing of concern. Some stool was found. Patient does complain of some associated nausea but denies any vomiting, fever, chills, diaphoresis or urinary  symptoms. Patient notes that opioids do not help with her pain unless they are given through IV. She currently has buprenorphine patch, but she does not feel this helps with her pain. Patient has been taking Tylenol for pain, but denies taking any this morning. She also has not taken any medications for nausea due to a history of making her constipated.    XR Abdomen 2 Views (2017)  IMPRESSION: Nonobstructive bowel gas pattern. Small right pleural  effusion. Right nephrolithiasis.    I have reviewed the Medications, Allergies, Past Medical and Surgical History, and Social History in the [employer] system.    Past Medical History:   Diagnosis Date     Anemia      History of blood transfusion     MULTIPLE     Hydronephrosis      Hyperlipidemia      Jugular vein thrombosis, right     Persistent right internal jugular DVT     Livedo annularis      Osteoarthritis      Osteopenia      Ovarian cancer (H)      Polymyalgia rheumatica (H)      PONV (postoperative nausea and vomiting)      Primary cancer of peritoneum (H)        Past Surgical History:   Procedure Laterality Date     APPENDECTOMY       BREAST SURGERY      biopsy negative      SECTION       COLONOSCOPY N/A 2017    Procedure: COLONOSCOPY;  Surgeon: Luis Richardson MD;  Location: [facility]     COLONOSCOPY N/A 2017    Procedure: COMBINED COLONOSCOPY, SINGLE OR MULTIPLE BIOPSY/POLYPECTOMY BY BIOPSY;  Surgeon: Luis Richardson MD;  Location: [facility]     COMBINED CYSTOSCOPY, RETROGRADES, EXCHANGE STENT URETER(S) Bilateral 2016    Procedure: COMBINED CYSTOSCOPY, RETROGRADES, EXCHANGE STENT URETER(S);  Surgeon: Carmela Alvarez MD;  [facility]: [facility]     COMBINED CYSTOSCOPY, RETROGRADES, EXCHANGE STENT URETER(S)  2016    @ [facility]     COMBINED CYSTOSCOPY, RETROGRADES, EXCHANGE STENT URETER(S) Bilateral 10/17/2016    Procedure: COMBINED CYSTOSCOPY, RETROGRADES, EXCHANGE STENT URETER(S);  Surgeon: Carmela Alvarez MD;   [facility]     COMBINED CYSTOSCOPY, RETROGRADES, EXCHANGE STENT URETER(S) Bilateral 12/7/2016    Procedure: COMBINED CYSTOSCOPY, RETROGRADES, EXCHANGE STENT URETER(S);  Surgeon: Carmela Alvarez MD;  [facility]     COMBINED CYSTOSCOPY, RETROGRADES, EXCHANGE STENT URETER(S) Bilateral 2/27/2017    Procedure: COMBINED CYSTOSCOPY, RETROGRADES, EXCHANGE STENT URETER(S);  Surgeon: Carmela Alvarez MD;  [facility]     COMBINED CYSTOSCOPY, RETROGRADES, EXCHANGE STENT URETER(S) Bilateral 6/12/2017    Procedure: COMBINED CYSTOSCOPY, RETROGRADES, EXCHANGE STENT URETER(S);  Cystoscopy, Bilateral Stent Exchange, Bilateral Retrograde Pyelogram;  Surgeon: Carmela Alvarez MD;  [facility]     COMBINED CYSTOSCOPY, RETROGRADES, EXCHANGE STENT URETER(S) N/A 7/24/2017    Procedure: COMBINED CYSTOSCOPY, RETROGRADES, EXCHANGE STENT URETER(S);  Cystoscopy, Left Stent Exchange, Left retrograde pyelogram, Right Retrograde Stent Placement on the Right, Removal of nephrostomy tube;  Surgeon: Carmela Alvarez MD;  [facility]     ESOPHAGOSCOPY, GASTROSCOPY, DUODENOSCOPY (EGD), COMBINED N/A 1/28/2017    Procedure: COMBINED ESOPHAGOSCOPY, GASTROSCOPY, DUODENOSCOPY (EGD);  Surgeon: Luis Richardson MD;  [facility]     HYSTERECTOMY TOTAL ABDOMINAL, BILATERAL SALPINGO-OOPHORECTOMY, NODE DISSECTION, COMBINED  11/7/2013    Procedure: COMBINED HYSTERECTOMY TOTAL ABDOMINAL, SALPINGO-OOPHORECTOMY, NODE DISSECTION;;  Surgeon: Cheri Aguilar MD;  [facility]     INJECT NERVE BLOCK CELIAC PLEXUS Left 4/20/2017    Procedure: INJECT NERVE BLOCK CELIAC PLEXUS;  Left splanchnic nerve block;  Surgeon: Shabbir Valladares MD;  [facility]     INJECT NERVE BLOCK CELIAC PLEXUS Left 5/18/2017    Procedure: INJECT NERVE BLOCK CELIAC PLEXUS;  Left Splanchnic Nerve Block;  Surgeon: Shabbir Valladares MD;  [facility]     LAPAROSCOPIC SALPINGO-OOPHORECTOMY  11/7/2013    Procedure: LAPAROSCOPIC  SALPINGO-OOPHORECTOMY;  Diagnostic Laparoscopy, Exploratory Laparotomy, Bilateral Salpingo-Oophorectomy,  Hysterectomy, Omentectomy, Pelvic Washings, Peritoneal staging and biopsies, Pelvic and Periaortic Lymph node Dissection;  Surgeon: Cheri Aguilar MD;  [facility]     LAPAROTOMY, STAGING, COMBINED  11/7/2013    Procedure: COMBINED LAPAROTOMY, STAGING;;  Surgeon: Cheri Aguilar MD;  [facility]     LYMPH NODE BIOPSY  2008    Left posterior chain, beign     OPEN REDUCTION INTERNAL FIXATION TOE(S)  9/3/13    Fifth digit, left foot, with screw fixation      REMOVE PORT PERITONEAL  5/5/2014    Procedure: REMOVE PORT PERITONEAL;  Surgeon: Cheri Aguilar MD;  [facility]       Family History   Problem Relation Age of Onset     Arthritis Father      Myocardial Infarction Father      secondary to anesthesia     Colon Cancer Father      DIABETES Other      Uncle     Hypertension Mother      Other - See Comments Mother      cognitive decline     Skin Cancer Mother      Hypertension Sister      HEART DISEASE Other      unknown valve replacement     Bladder Cancer Sister      Skin Cancer Brother        Social History   Substance Use Topics     Smoking status: Former Smoker     Smokeless tobacco: Former User      Comment: Quit over 20 years ago     Alcohol use No       Current Facility-Administered Medications   Medication     0.9% sodium chloride BOLUS     Current Outpatient Prescriptions   Medication     METHYLPHENIDATE HCL PO     OXYCODONE HCL PO     ondansetron (ZOFRAN-ODT) 8 MG ODT tab     PROCHLORPERAZINE MALEATE PO     acetaminophen (TYLENOL) 500 MG tablet     gabapentin (NEURONTIN) 100 MG capsule     enoxaparin (LOVENOX) 40 MG/0.4ML injection     buprenorphine (BUTRANS) 5 MCG/HR WK patch     Docusate Sodium (COLACE PO)     LORazepam (ATIVAN) 1 MG tablet     tolterodine (DETROL LA) 4 MG 24 hr capsule     amLODIPine (NORVASC) 2.5 MG tablet     tamsulosin (FLOMAX) 0.4 MG capsule     Multiple  "Vitamins-Minerals (ONE DAILY MULTIVITAMIN WOMEN) TABS     polyethylene glycol (MIRALAX/GLYCOLAX) powder     cycloSPORINE (RESTASIS) 0.05 % ophthalmic emulsion     Ranitidine HCl (ZANTAC PO)     MELATONIN PO     EPINEPHrine (EPIPEN) 0.3 MG/0.3ML injection        Allergies   Allergen Reactions     Contrast Dye Itching and Swelling     Itching/tingling of mouth and nose with sensation of swelling after receiving IV contrast for CT.  This occurred despite steroid premedication. Therefore the patient should not receive intravenous contrast in the future.      Benadryl Allergy Other (See Comments)     Stay awake, restless, \"uncomfortable\", \"feel like I need to run away\"  With  IV dose,  does fine with oral Benadryl.     Lovenox [Enoxaparin] Hives     3/23/16: Okay to receive heparin flushes in port, tolerates well. Patricia Cortez FNP-C  8/1/17: To avoid hives, pre-treatment with ranitidine is required.      Amoxicillin Rash     Diphenhydramine Anxiety     No Clinical Screening - See Comments Rash     Pollen Extract Rash     Sulfa Drugs Rash         Review of Systems   Constitutional: Negative for chills, diaphoresis and fever.   HENT: Negative for congestion.    Eyes: Negative for redness.   Respiratory: Negative for shortness of breath.    Cardiovascular: Negative for chest pain.   Gastrointestinal: Positive for abdominal pain and constipation.   Genitourinary: Positive for flank pain. Negative for difficulty urinating, dysuria, frequency, hematuria and urgency.   Musculoskeletal: Positive for back pain (shoulder blades, radiating from flanks). Negative for arthralgias and neck stiffness.   Skin: Negative for color change.   Neurological: Negative for headaches.   Psychiatric/Behavioral: Negative for confusion.   All other systems reviewed and are negative.      Physical Exam   BP: 121/56  Pulse: 97  Temp: 98.2  F (36.8  C)  Resp: 16  Height: 167.6 cm (5' 6\")  Weight: 48.5 kg (107 lb)  SpO2: 99 %  Physical Exam "   Constitutional: No distress.   HENT:   Head: Atraumatic.   Mouth/Throat: Oropharynx is clear and moist. No oropharyngeal exudate.   Eyes: Pupils are equal, round, and reactive to light. No scleral icterus.   Cardiovascular: Normal heart sounds and intact distal pulses.    Pulmonary/Chest: Breath sounds normal. No respiratory distress.   Abdominal: Soft. Bowel sounds are normal. There is no tenderness.   Musculoskeletal: She exhibits no edema or tenderness.   Skin: Skin is warm. No rash noted. She is not diaphoretic.       ED Course   11:24 AM  The patient was seen and examined by Jose Juan Metzger MD in Room ED19.     ED Course     Procedures        Results for orders placed or performed during the hospital encounter of 08/01/17   CBC with platelets differential   Result Value Ref Range    WBC 7.7 4.0 - 11.0 10e9/L    RBC Count 3.12 (L) 3.8 - 5.2 10e12/L    Hemoglobin 9.1 (L) 11.7 - 15.7 g/dL    Hematocrit 28.2 (L) 35.0 - 47.0 %    MCV 90 78 - 100 fl    MCH 29.2 26.5 - 33.0 pg    MCHC 32.3 31.5 - 36.5 g/dL    RDW 16.4 (H) 10.0 - 15.0 %    Platelet Count 419 150 - 450 10e9/L    Diff Method Automated Method     % Neutrophils 83.7 %    % Lymphocytes 7.4 %    % Monocytes 8.4 %    % Eosinophils 0.0 %    % Basophils 0.4 %    % Immature Granulocytes 0.1 %    Nucleated RBCs 0 0 /100    Absolute Neutrophil 6.5 1.6 - 8.3 10e9/L    Absolute Lymphocytes 0.6 (L) 0.8 - 5.3 10e9/L    Absolute Monocytes 0.7 0.0 - 1.3 10e9/L    Absolute Eosinophils 0.0 0.0 - 0.7 10e9/L    Absolute Basophils 0.0 0.0 - 0.2 10e9/L    Abs Immature Granulocytes 0.0 0 - 0.4 10e9/L    Absolute Nucleated RBC 0.0    Comprehensive metabolic panel   Result Value Ref Range    Sodium 135 133 - 144 mmol/L    Potassium 3.5 3.4 - 5.3 mmol/L    Chloride 100 94 - 109 mmol/L    Carbon Dioxide 24 20 - 32 mmol/L    Anion Gap 12 3 - 14 mmol/L    Glucose 95 70 - 99 mg/dL    Urea Nitrogen 14 7 - 30 mg/dL    Creatinine 0.59 0.52 - 1.04 mg/dL    GFR Estimate >90  Non African  American GFR Calc   >60 mL/min/1.7m2    GFR Estimate If Black >90   GFR Calc   >60 mL/min/1.7m2    Calcium 9.2 8.5 - 10.1 mg/dL    Bilirubin Total 0.4 0.2 - 1.3 mg/dL    Albumin 3.1 (L) 3.4 - 5.0 g/dL    Protein Total 7.0 6.8 - 8.8 g/dL    Alkaline Phosphatase 58 40 - 150 U/L    ALT 12 0 - 50 U/L    AST 15 0 - 45 U/L   Lipase   Result Value Ref Range    Lipase 105 73 - 393 U/L   Lactic acid   Result Value Ref Range    Lactic Acid 1.2 0.7 - 2.1 mmol/L     Medications   0.9% sodium chloride BOLUS ( Intravenous Canceled Entry 8/1/17 1453)   0.9% sodium chloride BOLUS (0 mLs Intravenous Stopped 8/1/17 1316)   prochlorperazine (COMPAZINE) injection 10 mg (10 mg Intravenous Given 8/1/17 1153)   ondansetron (ZOFRAN) injection 4 mg (4 mg Intravenous Given 8/1/17 1205)   pink lady enema (COMPOUNDED: docusate, magnesium citrate, mineral oil, sodium phosphate) (286 mLs Rectal Given 8/1/17 1231)   HYDROmorphone (DILAUDID) injection 1 mg (1 mg Intravenous Given 8/1/17 1438)   0.9% sodium chloride BOLUS (1,000 mLs Intravenous New Bag 8/1/17 1439)            Labs Ordered and Resulted from Time of ED Arrival Up to the Time of Departure from the ED   CBC WITH PLATELETS DIFFERENTIAL - Abnormal; Notable for the following:        Result Value    RBC Count 3.12 (*)     Hemoglobin 9.1 (*)     Hematocrit 28.2 (*)     RDW 16.4 (*)     Absolute Lymphocytes 0.6 (*)     All other components within normal limits   COMPREHENSIVE METABOLIC PANEL - Abnormal; Notable for the following:     Albumin 3.1 (*)     All other components within normal limits   LIPASE   LACTIC ACID WHOLE BLOOD   ROUTINE UA WITH MICROSCOPIC REFLEX TO CULTURE   SOAP SUDS ENEMA   IP ASSIGN PROVIDER TEAM TO TREATMENT TEAM       Consults  Obstetrics/Gynecology: Responded (08/01/17 2609)  Other (Comment): Responded (Nurse Practitioner) (08/01/17 1310)    Assessments & Plan (with Medical Decision Making)   61-year-old female with a history of primary peritoneal  adenocarcinoma requiring bilateral ureteral stents secondary to obstruction as well as chronic stent and abdominal pain presents for evaluation of worsening discomfort and decrease in bowel movements.  Differential included obstruction, stent related pain, urinary tract infection, pancreatitis, constipation.  Exam revealed decreased bowel sounds but no evidence of distention, hepatomegaly but with diffuse tenderness.  Laboratories were obtained revealing a CBC at baseline revealing anemia.  Comprehensive metabolic panel, lipase and lactic acid were all unremarkable.  IV fluids were initiated and patient was given a pink lady enema based on results of x-ray of the abdomen obtained yesterday revealing moderate stool and ureteral stents in proper position.  Pink lady enema produced no results.  Rectal exam following this revealed no stool in the rectal vault.  Soapsuds enema was ordered but not tolerated.  The case was discussed at length with gynecology oncology nurse practitioner and resident.  It was felt the patient would be best served by being admitted for further evaluation and management.    I have reviewed the nursing notes.    I have reviewed the findings, diagnosis, plan and need for follow up with the patient.    Current Discharge Medication List          Final diagnoses:   Abdominal pain, generalized     IBruce, am serving as a trained medical scribe to document services personally performed by Jose Juan Metzger MD, based on the provider's statements to me.   IJose Juan MD, was physically present and have reviewed and verified the accuracy of this note documented by Bruce Monte.    8/1/2017   [facility], EMERGENCY DEPARTMENT     Jose Juan Metzger MD  08/01/17 4752

## 2017-08-01 NOTE — IP AVS SNAPSHOT
[address] [address]    Phone:  [phone]                                       After Visit Summary   8/1/2017    Margaux Guzmán    MRN: [id number]           After Visit Summary Signature Page     I have received my discharge instructions, and my questions have been answered. I have discussed any challenges I see with this plan with the nurse or doctor.    ..........................................................................................................................................  Patient/Patient Representative Signature      ..........................................................................................................................................  Patient Representative Print Name and Relationship to Patient    ..................................................               ................................................  Date                                            Time    ..........................................................................................................................................  Reviewed by Signature/Title    ...................................................              ..............................................  Date                                                            Time

## 2017-08-01 NOTE — PROGRESS NOTES
"Gynecology Oncology Progress Note  08/02/17    HD#2 admitted for poor pain control, constipation     Disease: Recurrent stage IIIB primary peritoneal cancer       24 hour events:   - Admitted for above stated reason   - Received aggressive bowel regimen (s/p MOM, pink lady enema, miralax) without BM    Subjective: Patient is feeling well, slept overnight.  Pain is controlled with oral medications, received PO oxycodone x2 overnight.  Worries about her pain being related to her stents and a worsening hydronephrosis. Tolerating small amounts of regular diet without nausea and vomiting.  Denies having much of an appetite though.  No flatus, did not have BM and wants to try more MOM this morning.  Voiding spontaneously. Denies chest pain, SOB, calf tenderness.     Objective:   Vitals:    08/01/17 1615 08/01/17 1622 08/01/17 2013 08/01/17 2235   BP:  172/73 133/68 139/70   BP Location:  Left arm Left arm Left arm   Pulse:  100  100   Resp:  16  16   Temp:  97.9  F (36.6  C)  98.7  F (37.1  C)   TempSrc:  Oral  Oral   SpO2: 95% 97%  97%   Weight:  51.2 kg (112 lb 14.4 oz)     Height:  1.676 m (5' 6\")         General: NAD, appears comfortable in bed  CV: RRR, no m/r/g, chest port c/d/i  Resp: CTAB, no wheezes  Abdomen: soft except firmness due to tumors noted in lower abdomen up to level immediately superior to umbilicus, non-tender, mildly distended due to mass effect  Back: no CVA tenderness  Extremities: warm, well-perfused, nontender, no edema      24 hour: IN - 120 ml PO // OUT - unmeasured void x2  Since MN: IN - Not recorded // OUT - unmeasured void x1    Lines/Drains: R chest port-a-cath    New labs/imaging: None     Assessment: Margaux Guzmán is a 61 year old with recurrent Stage IIIB primary peritoneal carcinoma, HD#2 admitted for poor pain control, constipation and transition to home hospice.     Active Problem list:  - Recurrent Stage IIIB primary peritoneal carcinoma   - Chronic pain   - Constipation   - " Hydronephrosis with b/l ureteral stents in place   - Hypertension   - Anxiety     Plan:    Disease: Recurrent stage IIIB primary peritoneal cancer. S/p dx lsc converted to XL, BSO, GUSTAVO, omentectomy, staging biopsies, PPALND, washings. 12/13-3/14 IV/IP Taxol/CDDPx6. 8/2015 CT w/peritoneal and reteroperitoneal, L common iliac, small bowel mesenteric mets. Bilateral ureteral stents placed 8/2015. 9/15-7/16 Carbo/Doxil/Avastin x 12. 9/16-11/16 Gemzar x 3. Tesaro/Quadra niraparib study x3 cycles.. Weekly taxol (since 5/2017, last 7/17). Last CT (4/17) w/ pelvic peritoneal nodules, suspicious pulmonary/thoracic lymph nodes. Currently, desires transition to hospice cares.   FEN: Regular diet, saline lock.   Pain: Acute on chronic pain secondary to disease.  Home buprenorphine patch, gabapentin.  Scheduled tylenol, PRN oxycodone. Palliative care consult today for assistance with pain management and transition to hospice.   Heme: Anemia of chronic disease, Hgb 9.1 on admission.  History of R IJ DVT, on prophylactic lovenox.   CV: Hypertension, home amlodipine continued.   Resp: No issues.   GI: Reported constipation, AXR (7/31) with stool burden, without obstruction. S/p pink lady enema and magnesium citrate without results.  Declined suppository overnight and senokot, encouraged use.  Continued on miralax.  GERD, home zantac. PRN anti-emetics.    : Bilateral ureteral stents in place for severe hydronephrosis/obstruction, last stent exchange 7/24/17.  Follows with urology. Chronic flank pain/irritation, home tamsulosin and tolterodine continued. Cr 0.59 on admission. No CVA tenderness and patient voiding so low suspicion for worsening hydro.   MSK: No issues.   Neuro/Psych: Anxiety, home ativan.  Home melatonin and ritalin.   Endocrine: No issues.   ID: Afebrile, no leukocytosis.  UA contaminated, evidence of stents.  UCx pending.   PPx: SCDs, lovenox  Disposition: anticipate discharge home with home hospice when pain  control is improved     Nicole Blanca MD  OBGYN PGY-3  Pager [phone]

## 2017-08-02 NOTE — PROGRESS NOTES
"SPIRITUAL HEALTH SERVICES  SPIRITUAL ASSESSMENT Progress Note (Palliative Focus)  [facility] ([place]) U7C    REFERRAL SOURCE: Request to see Margaux made by Alanna of Palliative care team.    Margaux shared that she is struggling with the Voodoo view of death.  She goes back and fourth of not wanting there view of death to be part of her death and her journey.  As she has stated, \"all my life I have been part of natural world when I die I don't want to lose that.\"  Her question \"where does one go if you do not follow the Voodoo view.\"  Together Margaux and I unraveled her fears about death and ones life energy when one dies and how one/we are all connected on a vibration level.  We were not able to finish our conversation as she received her pain medication and became very sleepy.  We agreered to a follow up visit on 8/3.  I will inform the palliative  of care provided.    Radha Donald  Chaplain Resident  Pager [phone]  "

## 2017-08-02 NOTE — PLAN OF CARE
Problem: Goal Outcome Summary  Goal: Goal Outcome Summary  Outcome: No Change  Focus: Admission  D: Patient transferred at 4:20 PM from ED to  admitted for abdominal/flank pain, constipation and possible transition to hospice (per MD team). Pain rated as tolerable upon arrival. Pt denied nausea and SOB. Chest port accessed, not infusing.   I: Oriented patient to room, call light and phone. Admission flow sheet completed. Pt declined PO pain meds, stating they cause constipation, requesting IV narcotics only. Education provided to the pt that IV medication has equal site effects and that MD would like to try a regimen that can control pt's pain outpatient as well and per best practice advisory if diet is tolerated that PO meds are to be administered first. Warm packs provided. Bowel regimen meds provided.  A: Patient tolerated transfer. Pt appears comfortable and does not show signs/symptoms of pain/discomfort.   P: Continue to assess and monitor.

## 2017-08-02 NOTE — PLAN OF CARE
"Problem: Goal Outcome Summary  Goal: Goal Outcome Summary  Outcome: No Change  [id number] Pain remains unchanged. Pt reports sharp bilateral flank pain. On scheduled Tylenol and PRN 5 mg Oxycodone given x2. Hot packs applied. Educated pt of oxycodone range available, can have up to 10 mg of Oxycodone q3h. However, pt declined and reports, \"What I take at home already makes me drowsy.\" Passing flatus, no BM. Ativan given x1 before bed. Pt complains of an \"upset stomach\" but declined Zofran. Voiding spontaneously. Regular diet. Up ad mack. Pt slept well between cares.       "

## 2017-08-02 NOTE — PROGRESS NOTES
Gynecology Oncology Progress Note  08/03/17    HD#3 admitted for poor pain control, constipation      Disease: Recurrent stage IIIB primary peritoneal cancer       24 hour events:   - S/p palliative care consult, pain medications adjusted (discontinued butran patch, started PO methadone and sublingual dilaudid for breakthrough)  - Continued adjustment of bowel regimen with miralax, senna  - Urine culture with 10-50K mixed wilton     Subjective: Patient is feeling okay, pain is coming back.  Pain is overall improved with changing regimen.  Tolerating very little PO intake, does not have appetite.  Did have episode of emesis x1 yesterday afternoon.  Doesn't normally pass flatus.  Has not had BM. Voiding spontaneously.  Denies chest pain and SOB.     Objective:   Vitals:    08/01/17 2235 08/02/17 0734 08/02/17 1525 08/02/17 2358   BP: 139/70 125/60 126/48 132/61   BP Location: Left arm Left arm Left arm Left arm   Pulse: 100 97 99 101   Resp: 16 16 16 14   Temp: 98.7  F (37.1  C) 98.7  F (37.1  C) 98.4  F (36.9  C) 97.2  F (36.2  C)   TempSrc: Oral Oral Oral Oral   SpO2: 97% 97% 94% 94%   Weight:       Height:           General: NAD, appears comfortable in bed  CV: RRR, no m/r/g, chest port c/d/i  Resp: CTAB, no wheezes  Abdomen: soft in upper abdomen, firmness noted in lower abdomen up to level of umbilicus, non-tender, mildly distended due to mass effect from tumor  Extremities: warm, well-perfused, nontender, no edema    24 hour:  OUT - unmeasured voids, 40 ml emesis     Lines/Drains: R chest port-a-cath    New labs/imaging: None     Assessment: Margaux Guzmán is a 61 year old with recurrent Stage IIIB primary peritoneal carcinoma, HD#3 admitted for poor pain control, constipation and transition to home hospice.      Active Problem list:  - Recurrent Stage IIIB primary peritoneal carcinoma   - Chronic pain   - Constipation   - Hydronephrosis with b/l ureteral stents in place   - Hypertension   - Anxiety       Plan:    Disease: Recurrent stage IIIB primary peritoneal cancer. S/p dx lsc converted to XL, BSO, GUSTAVO, omentectomy, staging biopsies, PPALND, washings. 12/13-3/14 IV/IP Taxol/CDDPx6. 8/2015 CT w/peritoneal and reteroperitoneal, L common iliac, small bowel mesenteric mets. Bilateral ureteral stents placed 8/2015. 9/15-7/16 Carbo/Doxil/Avastin x 12. 9/16-11/16 Gemzar x 3. Tesaro/Quadra niraparib study x3 cycles.. Weekly taxol (since 5/2017, last 7/17). Last CT (4/17) w/ pelvic peritoneal nodules, suspicious pulmonary/thoracic lymph nodes. Currently, desires transition to hospice cares.   FEN: Regular diet, saline lock.   Pain: Acute on chronic pain secondary to disease.  Palliative care assisting with medication adjustment, discontinued home buprenorphine patch.  Continue tylenol, gabapentin.  Added methadone and PRN sublingual dilaudid.   Heme: Anemia of chronic disease, Hgb 9.1 on admission.  History of R IJ DVT, on prophylactic lovenox.   CV: Hypertension, home amlodipine continued.   Resp: No issues.   GI: Constipation, AXR (7/31) with stool burden, without obstruction. S/p pink lady enema and magnesium citrate.  Miralax increased, senna 4 times daily.  GERD, home zantac. PRN anti-emetics.  Can try methylnaltrexone injections today.   : Bilateral ureteral stents in place for severe hydronephrosis/obstruction, last stent exchange 7/24/17.  Follows with urology. Chronic flank pain/irritation, home tamsulosin and tolterodine continued. Cr 0.59 on admission. Can remove stent if moving toward hospice, discussed with patient.    MSK: No issues.   Neuro/Psych: Anxiety, home ativan.  Home melatonin and ritalin.   Endocrine: No issues.   ID: Afebrile, no leukocytosis.  UA contaminated, evidence of stents.  UCx negative.   PPx: SCDs, lovenox  Disposition: anticipate discharge home with home hospice when pain control is improved and has had return of bowel function     Nicole Blanca MD  OBGYN PGY-3  Pager  [phone]    Provider Disclosure:   I agree with above History, Review of Systems, Physical exam and Plan. I have reviewed the content of the documentation and have edited it as needed. I have personally performed the services documented here and the documentation accurately represents those services and the decisions I have made.     Electronically signed by:   Hector Sapp MD   Gynecologic Oncology   [facility]

## 2017-08-02 NOTE — CONSULTS
"[facility]  Palliative Care Consultation         Margaux Guzmán MRN# [id number]   Age: 61 year old YOB: 1955   Date of Admission: 8/1/2017    Reason for consult: Pain management  Goals of care  Patient and family support       Requesting physician/service: Nicole Blanca MD  Gyn/Onc       Recommendations        Pain: Pain is uncontrolled and is making basic ADLs quite difficult. Pain is worst in flank area and is aggravated by walking. Heat brings some relief. Has tried multiple pain medications in the past without success. Has failed Morphine concentrated oral solution and Oxycodone. Has not noticed any improvement with the addition of Butran patch.    -Discontinue Butran patch  -Methadone 2.5 mg PO q HS. If tolerating after 5 days and pain not well controlled, escalate dose to 2.5 mg BID  -Hydromorphone 2-4 mg PO/SL q3h prn breakthrough pain  -Continue scheduled Acetaminophen      Coping/Support: Patient tearful during the majority of the visit, and expressed great fear about what comes after death. She believes in being kind and accepting of all people, enjoys being in nature, and tries to be a good person. Margaux has distanced herself from organized Confucianism due to political beliefs, and now worries \"what if they are right and now I will go to eternal burning.\"   -Requested involvement from Radha  , as Margaux appears to be in great spiritual distress; appreciate her involvement.       Constipation: Remains constipated despite aggressive bowel regimen. Is tolerating small amounts of PO intakes with nausea or vomiting.   -Discontinue Senna-Docusate 2 tabs BID  -Senna 4 tabs BID  -Increase Miralax to BID    Goals of Care:   -Discharge home with hospice and do not rehospitalize  -DNR/DNI    Disease Process/es & Symptoms     Recurrent stage IIIB primary peritoneal cancer--dx in 2013 and recurrence in 2015  Pain--related to urethral stents and " "hydronephrosis  Nausea  Constipation  Spiritual distress     Support/Coping     How do you make sense of this? Really doesn't--has lived a pretty healthy lifestyle and family has lived in their 90's, so expected to live to be 100  Are you Sikh? Spiritual? Not so much? \"It's complicated. Definitely spiritual, but disappointed by organized Adventist lately.\"  Are you at peace? No, but is trying to get there  What are your concerns, medical and non-medical, that are not being addressed? Worries about what will happen after her death; both for her and her daughters  Who are your \"go-to\" people when you need support? DaughterMayela    Plan for psychosocial/spiritual support/follow-up from palliative team: Spiritual Health involved for spiritual distress, and Clinical SW involved for additional psychosocial support.      Decision-Making & Goals of Care     Discussion/counseling today about prognosis/goals of care/decisions:   Yes, see discussion above  Patient has a completed health care directive available in the chart (Y/N): Yes  Health care agent (only if patient has an available, complete HCD): Mayela Clark (daughter)  Physician orders for life-sustaining treatment (POLST) form indicates no aggressive treatment  Code Status: DNR/DNI   Patient has decision-making capacity (Y/N): Yes    Coordination of Care     Findings & plan of care discussed with: Gyn/Onc Team  Follow-up plan from palliative team: will continue to follow, but patient may discharge prior to next visit  Thank you for involving us in the patient's care.     JUSTIN De La Torre CNP  Palliative Care Consult Team  Pager: [phone]    145 minutes spent with patient, with >50% counseling and in care coordination.  Face-to-face: [id number] and [id number]          Chief Complaint     \"I've tried many different medicines and nothing works for me.\"         History of Present Illness     History obtained from: primary team, chart review, patient " interview    Margaux Guzmán is a 61 year old female with stage IIIB peritoneal carcinoma, diagnosed in , with recurrence in . She was admitted with uncontrolled pain and constipation. She is ready to transition to hospice. Palliative Care was consulted to assist with symptom burden as well as assist with transition to hospice.           Past Medical History:   This patient  has a past medical history of Anemia; History of blood transfusion; Hydronephrosis; Hyperlipidemia; Jugular vein thrombosis, right; Livedo annularis; Osteoarthritis; Osteopenia; Ovarian cancer (H); Polymyalgia rheumatica (H); PONV (postoperative nausea and vomiting); and Primary cancer of peritoneum (H).           Past Surgical History:   This patient  has a past surgical history that includes appendectomy ();  section (); Open reduction internal fixation toe(s) (9/3/13); lymph node biopsy (); Breast surgery; Laparoscopic salpingo-oophorectomy (2013); Hysterectomy total abdominal, bilateral salpingo-oophorectomy, node dissection, combined (2013); Laparotomy, staging, combined (2013); Remove port peritoneal (2014); Combined cystoscopy, retrogrades, exchange stent ureter(s) (Bilateral, 2016); Combined cystoscopy, retrogrades, exchange stent ureter(s) (2016); Combined cystoscopy, retrogrades, exchange stent ureter(s) (Bilateral, 10/17/2016); Combined cystoscopy, retrogrades, exchange stent ureter(s) (Bilateral, 2016); Colonoscopy (N/A, 2017); Esophagoscopy, gastroscopy, duodenoscopy (EGD), combined (N/A, 2017); Colonoscopy (N/A, 2017); Combined cystoscopy, retrogrades, exchange stent ureter(s) (Bilateral, 2017); Inject Nerve Block Celiac Plexus (Left, 2017); Inject Nerve Block Celiac Plexus (Left, 2017); Combined cystoscopy, retrogrades, exchange stent ureter(s) (Bilateral, 2017); and Combined cystoscopy, retrogrades, exchange stent ureter(s) (N/A,  7/24/2017).            Social/Spiritual History:     Living situation: alone  Family system: 2 daughters  Functional status (needs help with ADLs or IADLs): previously independent  Activities/interests: nature, bird watching  History of substance use/abuse: none            Family History:   The family history includes Arthritis in her father; Bladder Cancer in her sister; Colon Cancer in her father; DIABETES in an other family member; HEART DISEASE in an other family member; Hypertension in her mother and sister; Myocardial Infarction in her father; Other - See Comments in her mother; Skin Cancer in her brother and mother.               Allergies:   This patient is allergic to contrast dye; benadryl allergy; lovenox [enoxaparin]; amoxicillin; diphenhydramine; no clinical screening - see comments; pollen extract; and sulfa drugs.           Medications:   I have reviewed this patient's medication profile and medications during this hospitalization.           Review of Systems:   The comprehensive review of systems is negative other than noted here and in the HPI.    Palliative Symptom Review (0=no symptom/no concern, 1=mild, 2=moderate, 3=severe):      Pain: 2      Fatigue: 2      Nausea: 0-1      Constipation: 3      Diarrhea: 0      Depressive Symptoms: 1      Anxiety: 3      Drowsiness: 1          Physical Exam:   Vitals were reviewed  Temp: 98.7  F (37.1  C) Temp src: Oral BP: 125/60 Pulse: 97   Resp: 16 SpO2: 97 % O2 Device: None (Room air)    Constitutional: Seen in hospital bed. Frail appearing.   Head: Normocephalic. Atraumatic. Face symmetrical. PERRL. EOMI. MMM.   Cardiac: Pale, warm, dry.   Extremities: well perfused.  Pulm: Non-labored breathing. Speaking in complete sentences.   Musculoskeletal: TUTTLE.   Skin: No concerning rashes or lesions on exposed areas.   Neuropsych: A/O x 4. Cranial Nerves ll-Xll appear grossly intact. Speech clear. Memory and insight intact. Decreased mood. Appears anxious. Tearful.           Data Reviewed:     CMP  Recent Labs  Lab 08/01/17  1150 07/31/17  0905     --    POTASSIUM 3.5 3.5   CHLORIDE 100  --    CO2 24  --    ANIONGAP 12  --    GLC 95  --    BUN 14  --    CR 0.59  --    GFRESTIMATED >90Non  GFR Calc  --    GFRESTBLACK >90African American GFR Calc  --    PURVI 9.2  --    MAG  --  1.9   PROTTOTAL 7.0  --    ALBUMIN 3.1*  --    BILITOTAL 0.4  --    ALKPHOS 58  --    AST 15  --    ALT 12  --      CBC  Recent Labs  Lab 08/01/17  1150 07/31/17  0905   WBC 7.7 6.9   RBC 3.12* 3.03*   HGB 9.1* 9.0*   HCT 28.2* 28.0*   MCV 90 92   MCH 29.2 29.7   MCHC 32.3 32.1   RDW 16.4* 16.9*    425

## 2017-08-02 NOTE — PLAN OF CARE
"Problem: Goal Outcome Summary  Goal: Goal Outcome Summary  Outcome: Therapy, progress toward functional goals is gradual  Pt c/o feeling \"queasy\" this AM, some relief from Zofran.  Also c/o left flank pain but declined to take oxycodone as \"it makes me sick\".  Palliative care consult ordered and pt seen by them for assessment.  1 mg of dilaudid IV given per Gyn-Onc MD order.  Pt had relief from this.  Stated it made her feel \"loopy\" and she didn't like that feeling.  Pt resting comfortably in bed.  Pt will be transitioning to hospice at home.  S/w teary as she talked about this but feels \"it is time\".  Continue to monitor.      "

## 2017-08-02 NOTE — PROGRESS NOTES
Palliative Care Inpatient Clinical Social Work Assessment    Patient Information:  Margaux is a 61 year old female diagnosed with and receiving treatment for recurrent stage IIIB primary peritoneal cancer. She is known to me from a previous hospitalization. Palliative Care is following to assist with symptom mangement, goals of care and to offer additional patient support. I met with Margaux this afternoon in her room along with Alanna Reynoso NP with Palliative Care.     Relevant Symptoms/Concerns     Physical:  Margaux's pain has persisted despite trying several different pain medications. Constipation has been a frustration and difficult to manage. She is no longer interested in chemotherapy or further cancer treatment as she reports it has diminished her quality of life.    Psychological/Emotional/Existential:  She was tearful today throughout our visit. She expressed several existential concerns, worry about her daughters, and reflected on difficult times in her life. She is having difficulty finding meaning in her circumstances given her healthy lifestyle.    Family/Social/Caregiver:    She expressed worry related to leaving her children. She reviewed complicated family dynamics and strained relationships. She currently feels isolated and shared that she doesn't have many friends, involved family members, or neighbors.    Developmental:     Mental Health:      End of Life:   She acknowledged her understanding that she may be coming to the end of her life. She noted that there are several unknowns that exist that are scary.    Cultural/Taoism/Spiritual:  Described Rastafarian distress, concern for judgement, and fear related to the afterlife.    Grief/Loss:   She recognized and processed several losses and related grief. Of note, her independence, health, and quality of life.   Concurrent Stressors:  Limited support network, social isolation, complicated family dynamics, her home is outside of the [facility] and  she is no longer able to drive.      Comments:       Strengths     Physical:  Awake, alert, verbal and engaged despite pain.   Psychological/Emotional/Existential:  Open to recommendations (Re: symptom management) despite several unsuccessful attempts. She is receptive to processing thoughts and emotions verbally.    Family/Social/Caregiver:   Her daughter Mayela is caring, involved and supportive.    Developmental:      Mental Health:      End of Life:  She is interested in hospice care at home (and perhaps in a transition to a facility at a later date). She agreed that the hospice philosophy of care aligns with her current goals and welcomes additional interdisciplinary support.     Cultural/Judaism/Spiritual:   She welcomed a spiritual health visit.    Grief/Loss:         Comments:       Goals/Decision Making/Advance Care Planning   Preferences:   DNR/DNI. She would like to return home and enjoy nature.    Concerns:      Documents:  POLST has been completed   Decision Making Issues:       Comments:      Resource Needs     Discharge Planning:  Per Unit/Program  and/or Care Coordinator   Other:      Comments:       Sources of Information   Patient:  JOJO Damico   Family:      Staff:  X    Chart Review:  X   Other:       Intervention (Check all that apply)    X   Assessment of palliative specific issues      X   Introduction of Palliative clinical social work interventions    X   Adjustment to illness counseling        Advanced care planning        Attended/participated in care conference        Behavioral interventions for symptom management    X   Facilitation of processing of thoughts/feelings        Family communication facilitated    X   Grief counseling     X   Goals of care discussion/facilitation        Life legacy work        Life review facilitation     X   Psychoeducation    X   Re-framing        Resource referral        Other:       Comments:  I met with Margaux virgilio ospina in her room. The  above interventions were provided. She presented as tearful, but engaged and appreciative of the visit.       Plan:  I will continue with assessment and intervention as indicated. Plan to follow up tomorrow.    EDGARD Vivar, [facility]  Palliative Care    Pager: [phone]

## 2017-08-03 NOTE — PROGRESS NOTES
"Social Work: Assessment with Discharge Plan    Patient Name:  Margaux Guzmán  :  1955  Age:  61 year old  MRN:  [id number]  Risk/Complexity Score:     Completed assessment with:  Pt, [facility] ([phone]), Palliative team (Rajeev PARRISH: [phone]),     Presenting Information   Reason for Referral:  Discharge plan  Date of Intake:  August 3, 2017  Referral Source:  Physician  Decision Maker:  Pt   Alternate Decision Maker:  Pt's dtrMayela  Health Care Directive:  Copy in Chart  Living Situation:  House; 4 miles from her dtr's home  Previous Functional Status:  Independent  Patient and family understanding of hospitalization:  Pt demonstrated a good understanding of hospitalization  Cultural/Language/Spiritual Considerations:  Pt received spiritual health services  Adjustment to Illness:  Pt discussed being tired of undergoing chemo and the toll it takes on her body. Pt talked about quality of life and her hope that hospice can help her \"rebound\" a little to enjoy things she likes to do. Pt mentioned that she wanted to have one more conversation with her oncologist, but again stated that she would like to go home on hospice. Pt said she is using [facility].     Physical Health  Reason for Admission:    1. Abdominal pain, generalized      Services Needed/Recommended: Home with Hospice    Mental Health/Chemical Dependency  Diagnosis:  None reported  Support/Services in Place:  None reported  Services Needed/Recommended:  Palliative SW (Pauline) following    Support System  Significant relationship at present time:  ; pt's dtr (Mayela: [phone])  Family of origin is available for support:  Pt said her dtr Mayela is her only support  Other support available:  None reported.   Gaps in support system:  Pt lives alone and only has dtr for support.  Patient is caregiver to:  None     Provider Information   Primary Care Physician:  Aurelia Knox" [phone]   Clinic:  [facility] [id number] / [facility] [address]      :  None identified.     Financial   Income Source:  Retired  Financial Concerns:  None reported  Insurance:    Payor/Plan Subscriber Name Rel Member # Group #   BLUE PLUS - BLUE PLUS* LILIANA MCKEON*  [id number]      [address] [address]   MEDICARE - MEDICARE P* LILIANA MCKEON*  [id number]       ATTN CLAIMS, [address]       Discharge Plan   Patient and family discharge goal:  Pt would like to d/c home with [facility].   Provided education on discharge plan:  YES  Patient agreeable to discharge plan:  YES  A list of Medicare Certified Facilities was provided to the patient and/or family to encourage patient choice. Patient's choices for facility are:  N/a (pt will d/c home).   Barriers to discharge:  Medical stability    Discharge Recommendations   Anticipated Disposition:  Home with services  Transportation Needs:  Family:  Pt's dtr (Mayela)  Name of Transportation Company and Phone:  NA    Additional comments   GARRY spoke with Palliative NP (Rajeev: [phone]) who said that pt has done initial meeting with [facility] and that he has coordinated with Sonia Obrien there.    SW met with pt. Discussed wishes around care and hospice. Pt seemed to still be processing decision to dc Tx, but voiced that she would like to d/c with hospice services. Pt said she is working with [facility].     SW called [facility] ([phone]; w/e phone: [phone]; fax: [phone]) who stated that they have not done an intake yet with pt and have been trying to set this up. GARRY informed of pt's wishes to d/c from hospital on hospice and was told that they can come to pt's house over the w/e or on Monday morning. Asked that SW contact tomorrow to inform of d/c date and to fax Facesheet, MAR, and H&P.    EDGARD Vargas, LGSW  7C Surgical Oncology Unit  Phone: [phone]  Pager: [phone]

## 2017-08-03 NOTE — PLAN OF CARE
Problem: Goal Outcome Summary  Goal: Goal Outcome Summary  Outcome: No Change  DNR/ DNI. VSS on room air, not requiring supplemental oxygen. Up ad mack. Port heparin locked. Declined any nausea or pain medications, however reports discomfort that she says is normal for her in her back, however started her methadone per palliative recommendations and scheduled tylenol. Voiding spontaneously, not saving still feels like she is only urinating in small amounts, no bowel movement this shift.      Waiting return of bowel function and further evaluation of urological status.

## 2017-08-03 NOTE — PROGRESS NOTES
"Notified by RN that patient is reporting increase of back pain and abdominal pain. Patient reports her pain is starting to increase, just received a dose of sublingual dilaudid. Previously had been trying to avoid. Feels like she can't get comfortable. No emesis. Wanting further work-up to evaluate kidneys/worries her stents are blocked. Previously had been requesting no IV fluids and no intervention. Feels like she is not urinating much. Previously felt like she wasn't taking in much PO, now states she has increased fluid intake today but urine intake didn't increase. Has not passed gas or had BM, but states she does not \"ever\" have flatus so this is not new for her.    O:   Vitals:    08/02/17 1525 08/02/17 2358 08/03/17 0740 08/03/17 1542   BP: 126/48 132/61 130/60 123/64   BP Location: Left arm Left arm Left arm Left arm   Pulse: 99 101 104 110   Resp: 16 14 16 18   Temp: 98.4  F (36.9  C) 97.2  F (36.2  C) 99  F (37.2  C) 98.7  F (37.1  C)   TempSrc: Oral Oral Oral Oral   SpO2: 94% 94% 95% 98%   Weight:       Height:          General: resting comfortably when I first entered the room, increasing level of anxiety/discomfort during our discussion  CV: pale but well-perfused  Abdomen: firm consistent with palpable tumor and constipation    A/P: 61 year old with stage IIIB primary peritoneal carcinoma, HD#3 admitted for pain control, constipation. Now reporting increase in back pain, abdominal pain. Pain may be secondary to constipation/recent mag citrate and milk of magnesium, tumor burden with anxiety component, or potentially from stent obstruction (although would be unlikely to have bilateral   - Creatinine to evaluate kidney function. If abnormal will consider imaging to r/o stent obstruction, although unlikely. However, elevation may also be caused by dehydration.  - NS at 100 ml/hr for fluid resuscitation  - Ativan 0.5 mg IV now, will give additional ativan or dilaudid if not helping with patient's pain  - " Continue to monitor closely.    Gyn Onc fellow updated and aware.  Latasha Nieves MD  Gyn Oncology Service  PGY4 Resident

## 2017-08-03 NOTE — PLAN OF CARE
"Problem: Goal Outcome Summary  Goal: Goal Outcome Summary  Outcome: No Change  Pain controlled with oral dilaudid.  Patient with bloody urine (stent change 8/2), marginal urine volumes.  No bowel movement, attempting milk of magnesia and mag citrate.  Patient having intermittent nausea, declined zofran, given aromatherapy. Generalized pain.    P:  Pain control.  Monitor for return of bowel function.  Possiblity of Hospice in near future.    1500:  Pain appears to be escalating, MD notified, see next RN note.  Attempted bladder scan, scanner not working, patient states \"this is like when I had hydronephrosis\".  Patient voiding agnes, malodorous urine, MDs aware.    P:  Continue to monitor/assess.  "

## 2017-08-03 NOTE — PROGRESS NOTES
Focus: Palliative Massage Therapy    D: Pain relief focused therapeutic massage therapy for Margaux. This was our first session.    I: 20 minute massage: b/l hands, b/l feet, b/l jaw and cheeks    A: B/l tight masseter muscles. Margaux says that she feels like she has been clenching her jaw. After the massage, she said that she felt more relaxed and that it helped to take her mind off of her back pain.    P: None currently, Margaux is expecting to d/c prior to my next visit.

## 2017-08-03 NOTE — PLAN OF CARE
Problem: Goal Outcome Summary  Goal: Goal Outcome Summary  Outcome: Therapy, progress toward functional goals is gradual  VSS. Pain managed- declined interventions. Port hep locked. Denies nausea on a regular diet. Voiding spontaneously. Up ad mack. Pt slept well between cares. Pt will be transitioning to hospice at home.

## 2017-08-03 NOTE — PROGRESS NOTES
"Palliative Care Inpatient Clinical Social Work Visit    Patient Information:  Margaux is a 61 year old female diagnosed with and receiving treatment for recurrent stage IIIB primary peritoneal cancer. She is known to me from a previous hospitalization. Palliative Care is following to assist with symptom mangement, goals of care and to offer additional patient support. I met with Margaux this morning in her room along with Alanna Reynoso NP with Palliative Care.     Relevant Symptoms/Concerns  - New information since last visit   Physical:  Pain and constipation continue. She is frustrated by changes in her ability to concentrate and complete tasks.    Psychological/Emotional/Existential:   Margaux was again emotional today during out visit. She named that she is feeling overwhelmed by all that she would like to accomplish.    Family/Social/Caregiver:   She reviewed several complicated dynamics within the family and verbally processed ways in which she is impacted by these dynamics.    Developmental:      Mental Health:      End of Life:   She expressed feeling \"scared\" about the end of her life and the unknowns that exist.    Cultural/Yazidi/Spiritual:      Grief/Loss:      Concurrent Stressors:        Comments:       Strengths - New information since last visit    Physical:  Was able to experience some relief over night. The palliative care massage therapist provided a massage that per Margaux's report was helpful.    Psychological/Emotional/Existential:  Receptive to verbally processing thoughts, feelings. She recognized and named ways in which she has been coping and discussed strategies she may employ as she moved forward in terms of coping. She is open to further interdisciplinary support from the hospice team.    Family/Social/Caregiver:      Developmental:      Mental Health:      End of Life:      Cultural/Yazidi/Spiritual:      Grief/Loss:         Comments:       Goals/Decision Making/Advance Care Planning " - New information since last visit   Preferences:      Concerns:      Documents:      Decision Making Issues:        Comments:       Resource Needs     Discharge Planning:  Per Unit/Program  and/or Care Coordinator   Other:      Comments:       Intervention (Check all that apply)    X   Assessment of palliative specific issues          Introduction of Palliative clinical social work interventions    X   Adjustment to illness counseling        Advanced care planning        Attended/participated in care conference        Behavioral interventions for symptom management    X   Facilitation of processing of thoughts/feelings        Family communication facilitated    X   Grief counseling        Goals of care discussion/facilitation        Life legacy work        Life review facilitation        Psychoeducation        Re-framing        Resource referral        X   Other: Placed palliative care massage referral for pain relief and coping purposes.      Comments:  I met with Margaux this morning in her room. The above interventions were provided. She was tearful, but engaged, verbal, and receptive.       Plan:  I will continue with assessment and intervention as indicated. Plan to follow up tomorrow.    EDGARD Vivar, [facility]  Palliative Care    Pager: [phone]

## 2017-08-03 NOTE — PROGRESS NOTES
[facility], [place]   Palliative Care Daily Progress Note          Recommendations, Patient/Family Counseling & Coordination     Pain:  -Menthol Patch 5% 1 patch q8h   -Voltaren topical gel 1% QID  -Will involve Palliative massage therapist for pain and relaxation    Constipation/Nausea:  -d/c Ondansetron as it can decrease GI motility   -Encouraged use of Lorazepam for nausea  -Encouraged use of prn Milk of Magnesia   -Encouraged increased fluid intake, to assist with osmotic laxatives     Spiritual Distress/Coping:  -Continue to involve Chaplain Renny  -Continue to involve Pauline Leach Palliative Clinical SW    Goals of Care:  -Patient consistently endorses wishes to discharge with home hospice, once medically stable and not be re-hospitalized. Per patient, hospice organization will meet with her today for informational meeting.        Thank you for the opportunity to continue to participate in the care of this patient and family.  Please feel free to contact on-call palliative provider with any emergent needs.  We can be reached via team pager [phone] (answered 8-4:30 Monday-Friday); after-hours answering service ([phone]).    JUSTIN De La Torre CNP  Palliative Care Consult Team  Pager: [phone] face-to-face time  65 minutes spent with patient, with >50% counseling and in care coordination.      Assessment      1) Diagnoses & symptoms:        Recurrent stage IIIB primary peritoneal cancer--dx in 2013 and recurrence in 2015  Pain--related to urethral stents and hydronephrosis  Nausea  Constipation  Spiritual distress     2)  Psychosocial/Spiritual Needs:   Ongoing: SW and Chaplaincy involved. No new needs identified today.               Interval History:   Ongoing constipation and unmanageable pain. Tolerating addition of Methadone and prn SL Hydromorphone well.            Review of Systems:   Palliative Symptom Review (0=no symptom/no concern,  1=mild, 2=moderate, 3=severe):      Pain: 2      Fatigue: 2      Nausea: 1      Constipation: 3      Diarrhea: 0      Depressive Symptoms: 1      Anxiety: 2      Drowsiness: 1      Poor Appetite: 3      Shortness of Breath: 0      Insomnia: 0          Medications:   I have reviewed this patient's medication profile and medications given in past 24 hours.    Of note:  Methadone 2.5 q HS  Gabapentin 300 mg q HS  Hydromorphone 2-4 mg q3h prn--x2 (4 mg total)   Hydromorphone 1 mg IV q1h prn--x1           Physical Exam:   Vitals were reviewed  Temp: 99  F (37.2  C) Temp src: Oral BP: 130/60 Pulse: 104   Resp: 16 SpO2: 95 % O2 Device: None (Room air)    Constitutional: Seen in hospital bed. Frail appearing.   Head: Normocephalic. Atraumatic. Face symmetrical. PERRL. EOMI. MMM.   Cardiac: Pale, warm, dry.   Extremities: well perfused.  Pulm: Non-labored breathing. Speaking in complete sentences.   Musculoskeletal: TUTTLE.   Skin: No concerning rashes or lesions on exposed areas.   Neuropsych: A/O x 4. Cranial Nerves ll-Xll appear grossly intact. Speech clear. Memory and insight intact. Tearful at times, but mood appears improved from yesterday.            Data Reviewed:     CMP  Recent Labs  Lab 08/01/17  1150 07/31/17  0905     --    POTASSIUM 3.5 3.5   CHLORIDE 100  --    CO2 24  --    ANIONGAP 12  --    GLC 95  --    BUN 14  --    CR 0.59  --    GFRESTIMATED >90Non  GFR Calc  --    GFRESTBLACK >90African American GFR Calc  --    PURVI 9.2  --    MAG  --  1.9   PROTTOTAL 7.0  --    ALBUMIN 3.1*  --    BILITOTAL 0.4  --    ALKPHOS 58  --    AST 15  --    ALT 12  --      CBC  Recent Labs  Lab 08/01/17  1150 07/31/17  0905   WBC 7.7 6.9   RBC 3.12* 3.03*   HGB 9.1* 9.0*   HCT 28.2* 28.0*   MCV 90 92   MCH 29.2 29.7   MCHC 32.3 32.1   RDW 16.4* 16.9*    425

## 2017-08-03 NOTE — PROGRESS NOTES
Clinical Nutrition Services- Brief Note/+ admission screen: unintentional loss of 10 lbs or more in the past 2 mos, reduced oral intake over the last month    Reviewed chart and discussed patient in rounds. Patient likely to discharge w/ hospice. Above admission screen not appropriate at this time. Will continue to monitor need for nutritional assessment pending C.    Priscilla Taylor RD, LD  Pager: [phone]

## 2017-08-04 NOTE — PLAN OF CARE
Problem: Goal Outcome Summary  Goal: Goal Outcome Summary  Pt continues to have abdominal pain and nausea. Pain intensity became worse this afternoon, followed by emesis. Compazine given x 1 and team notified. Pt c/o nausea with dilaudid administration. Dilaudid changed to morphine, and valium ordered. Pt remains NPO. IV fluids infusing. Pt voiding spont. Ambulating with sba. Walked in halls x 1. Sleeping between cares. Questionable NG placement? AVSS. Urology consult done. Continue with poc.     Team notified at 1510 for low UOP. Awaiting orders.

## 2017-08-04 NOTE — PROGRESS NOTES
Social Work Services Progress Note    Hospital Day: 4  Date of Initial Social Work Evaluation:  08/04/17  Collaborated with:  [facility] (fax: [phone])    Data:  Margaux Guzmán is a 61 year old with recurrent Stage IIIB primary peritoneal carcinoma admitted for poor pain control, constipation and transition to home hospice. [facility] following. Awaiting ROBF and pain management for d/c.     Intervention:  GARRY spoke with Promise (Hospice Director at [facility]: [phone]) and updated that pt will not d/c today, but will likely over the w/e. Promise asked that the w/e SW call her at [phone].  GARRY faxed referral to Promise.     GARRY spoke with pt. Pt stated that she may not go to her oncology appt on Monday, as she has decided that she does not want to do any more chemo. SW let pt know that [facility] can meet her at home to do intake after d/c. Pt stated that she will need a hospital bed and hand rails in her shower. GARRY updated Promise with [facility].      Assessment:  Pt has stated that she does not want any further treatment and is okay with going home on hospice. If pt d/c's over w/e, please coordinate with [facility] Director (Promise: [phone]).    Plan:    Anticipated Disposition:  Home with services    Barriers to d/c plan:  Medical stability    Follow Up:  GARRY will continue to follow to assist with d/c plan.    EDGARD Vargas, LGSW  7C Surgical Oncology Unit  Phone: [phone]  Pager: [phone]

## 2017-08-04 NOTE — PROGRESS NOTES
"Palliative Care Inpatient Clinical Social Work Visit    Patient Information:  Margaux is a 61 year old female diagnosed with and receiving treatment for recurrent stage IIIB primary peritoneal cancer. She is known to me from a previous hospitalization. Palliative Care is following to assist with symptom mangement, goals of care and to offer additional patient support. I met with Margaux this morning in her room along with Alanna Reynoso NP with Palliative Care.     Relevant Symptoms/Concerns  - New information since last visit   Physical:  She again commented that she is very sensitive to medications and has been \"out of it\" following medication changes. Per consultation with her bedside nurse, she has also been nauseous. She is hoping to meet with her Oncologist to discuss further treatment options, yet says that she doesn't think she can tolerate more chemotherapy.    Psychological/Emotional/Existential:  She indicated that she remains uncertain and scared about the next steps, yet did not want to discuss further today.    Family/Social/Caregiver:       Developmental:      Mental Health:      End of Life: She has questions and concerns about hospice care and specifically how her symptoms would be managed if they became severe.  She plans to meet with hospice at her home over the weekend or early next week.      Cultural/[employer]ist/Spiritual:      Grief/Loss:  Reflected that she will likely have to stop driving given the medications she is currently requiring.    Concurrent Stressors:  Noted that her car is low on oil and she hopes her daughter will fix this issue.      Comments:      Strengths - New information since last visit    Physical: Margaux said she had a better night and was able to get some sleep. Although her pain persists, she has been more comfortable at times. Reflected that with time she has started to adjust to medication changes.    Psychological/Emotional/Existential:     Family/Social/Caregiver:  " "Daughter Mayela plans to visit later this afternoon and a friend will visit this evening. She is looking forward to these visits.    Developmental:      Mental Health:      End of Life:      Cultural/Yarsani/Spiritual:      Grief/Loss:         Comments:       Goals/Decision Making/Advance Care Planning - New information since last visit   Preferences:   Interested in hospice care, likely not interested in further cancer treatment but she would like to speak with her oncologist if possible.    Concerns:      Documents:      Decision Making Issues:        Comments:       Resource Needs     Discharge Planning:  Per Unit/Program  and/or Care Coordinator   Other:      Comments:       Intervention (Check all that apply)    X   Assessment of palliative specific issues          Introduction of Palliative clinical social work interventions    X   Adjustment to illness counseling        Advanced care planning        Attended/participated in care conference        Behavioral interventions for symptom management    X   Facilitation of processing of thoughts/feelings        Family communication facilitated    X   Grief counseling        Goals of care discussion/facilitation        Life legacy work        Life review facilitation        Psychoeducation        Re-framing    X   Resource3 referral: Provided her with a copy of [facility]'s \"Choosing Hospice: A Consumer's guide.\"            Other     Comments:   I met with Margaux this morning in her room. The above interventions were provided. She was less tearful today (vs. Earlier this week), however she did not wish to discuss in detail emotional topics. She remains receptive to support.      Plan:   I will continue with assessment and intervention as indicated. Plan to follow up next week.    EDGARD Vivar, [facility]  Palliative Care   Pager: [phone]      "

## 2017-08-04 NOTE — CONSULTS
"Urology Consult History and Physical    Name: Margaux Guzmán    MRN: [id number]   YOB: 1955       We were asked to see Margaux Guzmán at the request of Gynecology-Oncology for evaluation and treatment of the following chief complaint.          Chief Complaint:   - Worsening abdominal pain    History is obtained from patient and review of records          History of Present Illness:   Margaux Guzmán is a 61 year old female with recurrent stage IIIB primary peritoneal cancer on palliative chemo who was admitted 8/1/17 for severe abdominal pain and left flank pain.  She is considering hospice.  The Palliative has been consulted and is following.     While inpatient she been found to have severe constipation.  Has had emesis and has been made NPO with AXR today showing low suspicion for obstruction but small bowel air-fluid levels were seen suspicious for \"enteritis with associated adynamic ileus\" per the Radiology report.  The xray also showed stable bilateral ureteral stents, which were placed initially 7/18/16 and last changed by Dr. Alvarez on 7/24/17 as well as a stable 1cm right lower pole stone and possible new small 2-3cm upper tract stones. Urology has been consulted to explore whether the stents might be contributing to pain.      - Patient states pain is in BL flanks (L>R).  The left pain radiates to her shoulder blade.  It was better yesterday but it is worse today.  It was better before the stent change but has been worse since.  The right kidney is known to have poorer fxn thus was left unstented on 6/12 when stent was unable to be replaced (see op note for details).  But 2 days later R pain became severe and she ended up getting a R PNT which she tells me didn't actually improve the pain.  Abd pain is generalized.     - Voiding slow with decreased UOP over the last few days.  But PVR is ~110mL by BladderScan today (per patient report).   - On flomax (for stent pain) and Detrol " (for recent bladder spasms)    Creatinine  - 0.62  UA  - WBC 60, RBC 27, neg nitrites --> UCx 10-50 mixed  organisms.   No new upper tract imaging.          Past Medical History:     Past Medical History:   Diagnosis Date     Anemia      History of blood transfusion     MULTIPLE     Hydronephrosis      Hyperlipidemia      Jugular vein thrombosis, right     Persistent right internal jugular DVT     Livedo annularis      Osteoarthritis      Osteopenia      Ovarian cancer (H)      Polymyalgia rheumatica (H)      PONV (postoperative nausea and vomiting)      Primary cancer of peritoneum (H)             Past Surgical History:     Past Surgical History:   Procedure Laterality Date     APPENDECTOMY       BREAST SURGERY      biopsy negative      SECTION       COLONOSCOPY N/A 2017    Procedure: COLONOSCOPY;  Surgeon: Luis Richardson MD;  [facility]: [facility]     COLONOSCOPY N/A 2017    Procedure: COMBINED COLONOSCOPY, SINGLE OR MULTIPLE BIOPSY/POLYPECTOMY BY BIOPSY;  Surgeon: Luis Richardson MD;  [facility]     COMBINED CYSTOSCOPY, RETROGRADES, EXCHANGE STENT URETER(S) Bilateral 2016    Procedure: COMBINED CYSTOSCOPY, RETROGRADES, EXCHANGE STENT URETER(S);  Surgeon: Carmela Alvarez MD;  [facility]     COMBINED CYSTOSCOPY, RETROGRADES, EXCHANGE STENT URETER(S)  2016    @ [facility]     COMBINED CYSTOSCOPY, RETROGRADES, EXCHANGE STENT URETER(S) Bilateral 10/17/2016    Procedure: COMBINED CYSTOSCOPY, RETROGRADES, EXCHANGE STENT URETER(S);  Surgeon: Carmela Alvarez MD;  [facility]     COMBINED CYSTOSCOPY, RETROGRADES, EXCHANGE STENT URETER(S) Bilateral 2016    Procedure: COMBINED CYSTOSCOPY, RETROGRADES, EXCHANGE STENT URETER(S);  Surgeon: Carmela Alvarez MD;  [facility]     COMBINED CYSTOSCOPY, RETROGRADES, EXCHANGE STENT URETER(S) Bilateral 2017    Procedure: COMBINED CYSTOSCOPY, RETROGRADES, EXCHANGE STENT URETER(S);   Surgeon: Carmela Alvarez MD;  [facility]     COMBINED CYSTOSCOPY, RETROGRADES, EXCHANGE STENT URETER(S) Bilateral 6/12/2017    Procedure: COMBINED CYSTOSCOPY, RETROGRADES, EXCHANGE STENT URETER(S);  Cystoscopy, Bilateral Stent Exchange, Bilateral Retrograde Pyelogram;  Surgeon: Carmela Alvarez MD;  [facility]     COMBINED CYSTOSCOPY, RETROGRADES, EXCHANGE STENT URETER(S) N/A 7/24/2017    Procedure: COMBINED CYSTOSCOPY, RETROGRADES, EXCHANGE STENT URETER(S);  Cystoscopy, Left Stent Exchange, Left retrograde pyelogram, Right Retrograde Stent Placement on the Right, Removal of nephrostomy tube;  Surgeon: Carmela Alvarez MD;  [facility]     ESOPHAGOSCOPY, GASTROSCOPY, DUODENOSCOPY (EGD), COMBINED N/A 1/28/2017    Procedure: COMBINED ESOPHAGOSCOPY, GASTROSCOPY, DUODENOSCOPY (EGD);  Surgeon: Luis Richardson MD;  [facility]     HYSTERECTOMY TOTAL ABDOMINAL, BILATERAL SALPINGO-OOPHORECTOMY, NODE DISSECTION, COMBINED  11/7/2013    Procedure: COMBINED HYSTERECTOMY TOTAL ABDOMINAL, SALPINGO-OOPHORECTOMY, NODE DISSECTION;;  Surgeon: Cheri Aguilar MD;  [facility]     INJECT NERVE BLOCK CELIAC PLEXUS Left 4/20/2017    Procedure: INJECT NERVE BLOCK CELIAC PLEXUS;  Left splanchnic nerve block;  Surgeon: Shabbir Valladares MD;  [facility]     INJECT NERVE BLOCK CELIAC PLEXUS Left 5/18/2017    Procedure: INJECT NERVE BLOCK CELIAC PLEXUS;  Left Splanchnic Nerve Block;  Surgeon: Shabbir Valladares MD;  [facility]     LAPAROSCOPIC SALPINGO-OOPHORECTOMY  11/7/2013    Procedure: LAPAROSCOPIC SALPINGO-OOPHORECTOMY;  Diagnostic Laparoscopy, Exploratory Laparotomy, Bilateral Salpingo-Oophorectomy,  Hysterectomy, Omentectomy, Pelvic Washings, Peritoneal staging and biopsies, Pelvic and Periaortic Lymph node Dissection;  Surgeon: Cheri Aguilar MD;  [facility]     LAPAROTOMY, STAGING, COMBINED  11/7/2013    Procedure: COMBINED LAPAROTOMY, STAGING;;  Surgeon: Cheri Aguilar  "MD;  [facility]     LYMPH NODE BIOPSY  2008    Left posterior chain, beign     OPEN REDUCTION INTERNAL FIXATION TOE(S)  9/3/13    Fifth digit, left foot, with screw fixation      REMOVE PORT PERITONEAL  5/5/2014    Procedure: REMOVE PORT PERITONEAL;  Surgeon: Cheri Aguilar MD;  [facility]            Social History:     Social History   Substance Use Topics     Smoking status: Former Smoker     Smokeless tobacco: Former User      Comment: Quit over 20 years ago     Alcohol use No            Family History:     Family History   Problem Relation Age of Onset     Arthritis Father      Myocardial Infarction Father      secondary to anesthesia     Colon Cancer Father      DIABETES Other      Uncle     Hypertension Mother      Other - See Comments Mother      cognitive decline     Skin Cancer Mother      Hypertension Sister      HEART DISEASE Other      unknown valve replacement     Bladder Cancer Sister      Skin Cancer Brother             Allergies:     Allergies   Allergen Reactions     Contrast Dye Itching and Swelling     Itching/tingling of mouth and nose with sensation of swelling after receiving IV contrast for CT.  This occurred despite steroid premedication. Therefore the patient should not receive intravenous contrast in the future.      Benadryl Allergy Other (See Comments)     Stay awake, restless, \"uncomfortable\", \"feel like I need to run away\"  With  IV dose,  does fine with oral Benadryl.     Lovenox [Enoxaparin] Hives     3/23/16: Okay to receive heparin flushes in port, tolerates well. Patricia Cortez FNP-C  8/1/17: To avoid hives, pre-treatment with ranitidine is required.      Amoxicillin Rash     Diphenhydramine Anxiety     No Clinical Screening - See Comments Rash     Pollen Extract Rash     Sulfa Drugs Rash            Medications:     Current Facility-Administered Medications   Medication     morphine (PF) injection 5 mg     diazepam (VALIUM) injection 5-10 mg     senna-docusate " (SENOKOT-S;PERICOLACE) 8.6-50 MG per tablet 3 tablet     methylnaltrexone (RELISTOR) injection 8 mg     menthol (ICY HOT) 5 % patch 1 patch     diclofenac (VOLTAREN) 1 % topical gel 4 g     menthol (ICY HOT) patch REMOVAL     menthol (ICY HOT) Patch in Place     0.9% sodium chloride infusion     prochlorperazine (COMPAZINE) injection 5 mg     gabapentin (NEURONTIN) capsule 300 mg     methadone (DOLPHINE) solution 2.5 mg     polyethylene glycol (MIRALAX/GLYCOLAX) Packet 17 g     0.9% sodium chloride BOLUS     acetaminophen (TYLENOL) tablet 1,000 mg     amLODIPine (NORVASC) tablet 2.5 mg     cycloSPORINE (RESTASIS) 0.05 % ophthalmic emulsion 1 drop     enoxaparin (LOVENOX) injection 40 mg     EPINEPHrine (ADRENALIN) kit 0.3 mg     LORazepam (ATIVAN) tablet 1 mg     melatonin tablet 1 mg     methylphenidate (RITALIN) half-tab 2.5 mg     ranitidine (ZANTAC) tablet 75 mg     tamsulosin (FLOMAX) capsule 0.4 mg     tolterodine (DETROL LA) 24 hr capsule 4 mg     naloxone (NARCAN) injection 0.1-0.4 mg     Patient is already receiving mechanical prophylaxis     bisacodyl (DULCOLAX) Suppository 10 mg     magnesium hydroxide (MILK OF MAGNESIA) suspension 30 mL     lidocaine 1 % 1 mL     lidocaine (LMX4) kit     sodium chloride (PF) 0.9% PF flush 10-20 mL     heparin lock flush 10 UNIT/ML injection 5-10 mL     heparin lock flush 10 UNIT/ML injection 5-10 mL     heparin 100 UNIT/ML injection 5 mL     sodium chloride (PF) 0.9% PF flush 10-20 mL             Review of Systems:   ROS: See HPI for pertinent details.  Remainder of 10-point ROS negative.          Physical Exam:   VS:  T: 98.1    HR: 102    BP: 138/77    RR: 16   GEN:  AOx3.  Appears in pain.  Eyes intermittently closed  LUNGS: Non-labored breathing.  BACK:  No costoverterbral tenderness BL.  ABD: Firm, distended, with generalized tenderness.  No guarding.  Multiple surgical scars well healed.  No masses.     EXT:  Warm, well perfused.  no lower extremity edema  bilaterally  SKIN:  Warm.  Dry.  No rashes.  NEURO:  CN grossly intact.           Data:   All laboratory data reviewed:      Recent Labs  Lab 08/04/17  0431 08/01/17  1150 07/31/17  0905   WBC 8.2 7.7 6.9   HGB 9.0* 9.1* 9.0*   * 419 425       Recent Labs  Lab 08/04/17  0431 08/03/17  1606 08/01/17  1150 07/31/17  0905   *  --  135  --    POTASSIUM 3.6  --  3.5 3.5   CHLORIDE 93*  --  100  --    CO2 27  --  24  --    BUN 22  --  14  --    CR 0.62 0.73 0.59  --    GLC 95  --  95  --    PURVI 9.2  --  9.2  --    MAG  --   --   --  1.9       Recent Labs  Lab 08/01/17  1552   COLOR Light Yellow   APPEARANCE Slightly Cloudy   URINEGLC Negative   URINEBILI Negative   URINEKETONE 40*   SG 1.008   URINEPH 7.5*   PROTEIN 100*   NITRITE Negative   LEUKEST Large*   RBCU 27*   WBCU 60*     Results for orders placed or performed during the hospital encounter of 08/01/17   Urine Culture Aerobic Bacterial   Result Value Ref Range    Specimen Description Unspecified Urine     Special Requests Specimen received in preservative     Culture Micro       10,000 to 50,000 colonies/mL mixed urogenital [place] Susceptibility testing not   routinely done      Micro Report Status FINAL 08/02/2017    Results for orders placed or performed in visit on 06/28/17   Urine Culture Aerobic Bacterial   Result Value Ref Range    Specimen Description Catheterized Urine NEPH TUBE     Special Requests Specimen received in preservative     Culture Micro No growth     Micro Report Status FINAL 06/29/2017        All pertinent imaging reviewed:  None recent relevant         Impression and Plan:   Impression / Plan:   Margaux Guzmán is a 61 year old female admitted with flank pain (L>R) and abdominal pain secondary to peritoneal cancer.  Considering hospice. From a urologic standpoint she has:  -  Chronic stents (last changed 7/24) due to hydronephrosis from extrinsic compression of ureters (d/t tumor)  - A persistent 1cm right lower pole stone  and question of new 2mm and 3mm collecting system stones BL (seen on AXR).    - Urine stream is slow and she empties incompletely (PVR~110mL)  - Constipation is severe.      - Per discussions with patient, in the past she has had stents, no stents, as well as a percutaneous nephrostomy tube --> and despite these interventions continues to have varying degrees of flank pain.  We have no US to tell us whether patient does or does not have hydronephrosis today but her creatinine is completely normal.  There is no evidence of UTI (based on UCx).  She may have some tiny new stones in her kidneys, but these wouldn't cause pain unless they were ureteric and resulting in obstruction.  It is difficult to make a case for stone obstruction with properly positioned bilateral stents and normal creatinine.   It is my suspicion that the pain will be persistent no matter what we do with the stents.   - Her urine stream is slow but patient reports the PVR as 110mL, which isn't horrible but could potentially contribute to urinary frequency, constipation and some increased abdominal pressure, especially since Ms. Guzmán already has abdominal mass effect.  Could try a Delgado to see if symptoms improve, but would only recommend if symptomatic benefit.   - Would continue the Flomax (if it helps)  - Patient believes that the Detrol is helping with bladder spasms,  but it might also be increasing urinary retention and most certainly is worsening constipation.  Could try to stop it.  Alternatively, if spasms persist could try oxytrol patch (which has minimal constipation side effect) or Myrbetriq (which has no constipation side effect) rather than detrol .   - Could try judicious use of antiinflammatories - toradol here in the hospital.  Other NSAIDS for at home.  NSAIDS are wonderful for kidney pain (if that is what she has).   - At this point would not recommend stent removal, exchange or replacement with PNTs (given minimal effect in  the past) unless patient develops 1) acute renal failure suggestive of stent failure or 2) signs of pyelonephritis.   - Continue aggressive pain mgmt program.     Urology will sign off but please call with questions    Will discuss with Dr. Alvarez    Thank you for the opportunity to participate in the care of Margaux Guzmán.     Tova Gomez PA-C  Urology Physician Assistant  Personal Pager: [phone]    Please call Job Code:   [phone] to reach the Urology resident or PA on call - Weekdays  [phone] to reach the Urology resident or PA on call - Weeknights and weekends

## 2017-08-04 NOTE — PROGRESS NOTES
SPIRITUAL HEALTH SERVICES  SPIRITUAL ASSESSMENT Progress Note (Palliative Focus)  [facility] U7C    REFERRAL SOURCE:  follow-up.    Margaux was having cares and family was bedside.  I asked to come back later now was not a good time. I will inform the palliative  of care provided.    Radha Donald  Chaplain Resident  Pager [phone]

## 2017-08-04 NOTE — PLAN OF CARE
Problem: Goal Outcome Summary  Goal: Goal Outcome Summary  Outcome: No Change  VSS. Pain medications offered but pt declined, stated 'is comfortable'. Denies nausea at this time. Port infusing MIVF. Up ad mack to bathroom, no BM on this shift. NPO at this time. Slept between cares. Continue with POC.     7:03 AM 08/04/17  500ml NS Bolus started and IV fluids increased.     7:23 AM 08/04/17  Pt nauseated, compazine given.

## 2017-08-04 NOTE — PROGRESS NOTES
"Gyn/Onc Interim Progress Note     Patient seen at bedside after notified by RN of worsening nausea.  Notes that it started after she took her senna, which irritates her stomach.  She notes worsening of pain today in her back and lower abdomen.  She received compazine IV but this has not started to work yet.  She denies flatus or BM today despite drinking some mag citrate, having MOM.  During interview patient had episode of emesis approximately 300 cc.     Vital signs:  Temp: 98.3  F (36.8  C) Temp src: Oral BP: 144/62 Pulse: 111   Resp: 16 SpO2: 97 % O2 Device: None (Room air)   Height: 167.6 cm (5' 6\") Weight: 51.2 kg (112 lb 14.4 oz)  Estimated body mass index is 18.22 kg/(m^2) as calculated from the following:    Height as of this encounter: 1.676 m (5' 6\").    Weight as of this encounter: 51.2 kg (112 lb 14.4 oz).    Gen: A&O, appears comfortable   Abd: firm, distended throughout abdomen, minimal tenderness with palpation, +BS noted   Ext: warm, well-perfused    NPO, with mIVFs running.  Plan for AXR now to evaluate for obstruction.  May need NGT.  Continue PRN anti-emetics, avoiding reglan due to obstruction concerns.  Will use compazine but may need zofran (will try to avoid due to constipation). PRN pain medications, can use IV dilaudid during nausea.     Nicole Blanca MD   OB/GYN PGY-3  "

## 2017-08-04 NOTE — PROGRESS NOTES
[facility], [place]   Palliative Care Daily Progress Note          Recommendations, Patient/Family Counseling & Coordination     Pain: 2/2 new nephrolithiasis  -d/c PO/SL Hydromorphone prn  -Morphine 5 mg IV q3h prn (can increase range if patient tolerating)  -Diazepam 5-10 mg IV q4h prn  -Continue Methadone 2.5 mg q HS    Constipation:   -Constipation has not been thought to be opioid induced, but consider unsuccessful nature of previous treatment, it would be reasonable to give a one time dose of Methylnaltrexone 8 mg SubQ  -Change Senna 4 tabs BID to Senna-Docusate 3 tabs BID, per patient preference    Spiritual Distress/Coping:  -Continue to involve Chaplain Renny  -Continue to involve Rebeca Vivar    Goals of Care:  -Patient consistently endorses wishes to discharge with home hospice, once medically stable. She does not want to be re-hospitalized.        Thank you for the opportunity to continue to participate in the care of this patient and family.  Please feel free to contact on-call palliative provider with any emergent needs.  We can be reached via team pager [phone] (answered 8-4:30 Monday-Friday); after-hours answering service ([phone]).    JUSTIN De La Torre CNP  Palliative Care Consult Team  Pager: [phone]    45 minutes spent with patient, with >50% counseling and in care coordination.      Assessment      1) Diagnoses & symptoms:        Recurrent stage IIIB primary peritoneal cancer--dx in 2013 and recurrence in 2015  Pain--related to urethral stents and hydronephrosis  Nausea  Constipation  Spiritual distress     2)  Psychosocial/Spiritual Needs:   Ongoing: SW and Chaplaincy involved.               Interval History:   Increased nausea and flank pain overnight. emesis x 2. Now with ileus and new nephrolithiasis.            Review of Systems:   Palliative Symptom Review (0=no symptom/no concern, 1=mild, 2=moderate,  3=severe):      Pain: 2      Fatigue: 2      Nausea: 1      Constipation: 3      Diarrhea: 0      Depressive Symptoms: 1      Anxiety: 1      Drowsiness: 1      Poor Appetite: 3      Shortness of Breath: 0      Insomnia: 0          Medications:   I have reviewed this patient's medication profile and medications given in past 24 hours.    Of note:  Methadone 2.5 q HS  Gabapentin 300 mg q HS  Hydromorphone 2-4 mg q3h prn  Hydromorphone 1 mg IV q1h prn           Physical Exam:   Vitals were reviewed  Temp: 98.1  F (36.7  C) Temp src: Oral BP: 138/77 Pulse: 102   Resp: 16 SpO2: 98 % O2 Device: None (Room air)    Constitutional: Seen in hospital bed. Frail appearing.   Head: Normocephalic. Atraumatic. Face symmetrical. PERRL. EOMI. MMM.   Cardiac: Pale, warm, dry.   Extremities: well perfused.  Pulm: Non-labored breathing. Speaking in complete sentences.   Musculoskeletal: TUTTLE.   Skin: No concerning rashes or lesions on exposed areas.   Neuropsych: A/O x 4. Cranial Nerves ll-Xll appear grossly intact. Speech clear. Memory and insight intact. Tearful at times, but mood appears improved from yesterday.            Data Reviewed:     CMP    Recent Labs  Lab 08/04/17  0431 08/03/17  1606 08/01/17  1150 07/31/17  0905   *  --  135  --    POTASSIUM 3.6  --  3.5 3.5   CHLORIDE 93*  --  100  --    CO2 27  --  24  --    ANIONGAP 10  --  12  --    GLC 95  --  95  --    BUN 22  --  14  --    CR 0.62 0.73 0.59  --    GFRESTIMATED >90Non  GFR Calc 80 >90Non  GFR Calc  --    GFRESTBLACK >90African American GFR Calc >90African American GFR Calc >90African American GFR Calc  --    PURVI 9.2  --  9.2  --    MAG  --   --   --  1.9   PROTTOTAL  --   --  7.0  --    ALBUMIN  --   --  3.1*  --    BILITOTAL  --   --  0.4  --    ALKPHOS  --   --  58  --    AST  --   --  15  --    ALT  --   --  12  --      CBC    Recent Labs  Lab 08/04/17  0431 08/01/17  1150 07/31/17  0905   WBC 8.2 7.7 6.9   RBC 3.07* 3.12*  3.03*   HGB 9.0* 9.1* 9.0*   HCT 28.0* 28.2* 28.0*   MCV 91 90 92   MCH 29.3 29.2 29.7   MCHC 32.1 32.3 32.1   RDW 16.6* 16.4* 16.9*   * [phone]

## 2017-08-04 NOTE — PROVIDER NOTIFICATION
Notified Resident at 2145 PM regarding extreme nausea and increasing abdominal pain.      Spoke with: Nicole Blanca MD    Orders were obtained.    Comments: MD came to assess patient at bedside, changed to NPO and X-Ray ordered stat

## 2017-08-04 NOTE — PLAN OF CARE
Problem: Goal Outcome Summary  Goal: Goal Outcome Summary  Outcome: Declining  VSS. Up ad mack. MIVF started this evening -  Port infusing, blood return noted. Pt reports unrelieved nausea, with extreme dry heaving, emesis of appx 300 at 2145 - MD present. STAT X-Ray ordered. Pt reports increasing pain, IV dilaudid given X 2, oral prn dilaudid given when available, in addition to scheduled medications. Voiding in small amounts, not always saving, creatinine stable - hydronephrosis not likely, PVR appx 111. Pt given many bowel medications with no success - not passing gas, next step pending after X - Ray results. Pt in misery most of the night with discomfort/ pain/ nausea/ vomiting, not tolerating much activity.      Plan: assess and intervene on pain & nausea/ vomiting, STAT X-Ray, waiting return of bowel function.

## 2017-08-04 NOTE — PROGRESS NOTES
Gynecology Oncology Progress Note  08/04/17    HD#4 admitted for poor pain control, constipation       Disease: Recurrent stage IIIB primary peritoneal cancer       24 hour events:   - Continued adjustments in pain regimen; palliative care added menthol patch and volteran ointment   - S/p magnesium citrate, on miralax and senna   - Ongoing discussions regarding goals of care/home hospice   - Concern for stent pain, Cr 0.79 on repeat   - Two episodes of emesis overnight (300, 200 cc), patient made NPO and AXR performed with possible adynamic ileus and new nephrolithiasis   - Borderline UOP overnight    Subjective: Patient is feeling better than last night.  Still feels bloated.  Pain is okay with medications.  NPO, denies nausea and vomiting this morning. No BM yet, never passes flatus. Voiding spontaneously, still feels like smaller amounts. Denies chest pain, SOB, calf tenderness.     Objective:   Vitals:    08/03/17 0740 08/03/17 1542 08/03/17 1723 08/03/17 2257   BP: 130/60 123/64 144/62 127/66   BP Location: Left arm Left arm Left arm Left arm   Pulse: 104 110 111 102   Resp: 16 18 16 18   Temp: 99  F (37.2  C) 98.7  F (37.1  C) 98.3  F (36.8  C) 97.2  F (36.2  C)   TempSrc: Oral Oral Oral Oral   SpO2: 95% 98% 97% 92%   Weight:       Height:           General: NAD, appears comfortable in bed  CV: RRR, no m/r/g, port c/d/i  Resp: CTAB, no wheezes  Abdomen: diffuse firmness, non-tender, mildly distended due to mass effect from tumor, dried blood noted on umbilicus  Extremities: warm, well-perfused, nontender, no edema, SCDs in place      24 hour: IN - 960 ml PO, 653 ml IVF // OUT - 250 ml UOP with several unmeasured, 500 ml emesis  Since MN: IN - 20 ml IVF // OUT - 100 ml UOP    Lines/Drains: R chest port-a-cath    New labs/imaging:  AM CBC, BMP    Assessment: Margaux Guzmán is a 61 year old with recurrent Stage IIIB primary peritoneal carcinoma, HD#4 admitted for poor pain control, constipation and transition  to home hospice.       Active Problem list:  - Recurrent Stage IIIB primary peritoneal carcinoma   - Chronic pain  - Nausea/vomiting    - Constipation   - Hydronephrosis with b/l ureteral stents in place   - Hypertension   - Anxiety   - Tachycardia       Plan:    Disease: Recurrent stage IIIB primary peritoneal cancer. S/p dx lsc converted to XL, BSO, GUSTAVO, omentectomy, staging biopsies, PPALND, washings. 12/13-3/14 IV/IP Taxol/CDDPx6. 8/2015 CT w/peritoneal and reteroperitoneal, L common iliac, small bowel mesenteric mets. Bilateral ureteral stents placed 8/2015. 9/15-7/16 Carbo/Doxil/Avastin x 12. 9/16-11/16 Gemzar x 3. Tesaro/Quadra niraparib study x3 cycles. Weekly taxol (since 5/2017, last 7/17). Last CT (4/17) w/ pelvic peritoneal nodules, suspicious pulmonary/thoracic lymph nodes. Considering transition to hospice cares, having ongoing discussion.   FEN: NPO, NS @ 75 cc/hr.   Pain: Acute on chronic pain secondary to disease.  Palliative care assisting with medication adjustment. Continue tylenol, gabapentin, methadone and PRN sublingual dilaudid. Added menthol patch and volteran cream.   Heme: Anemia of chronic disease, Hgb 9.1 on admission.  History of R IJ DVT, on prophylactic lovenox.   CV: Hypertension, home amlodipine continued. Mild tachycardia, likely secondary to pain and volume status.   Resp: No issues.   GI: Constipation, AXR (7/31) with stool burden, without obstruction. Continued aggressive bowel regimen without results.  On miralax, senna, PRN MOM.  S/p mag citrate yesterday.  GERD, home zantac. PRN anti-emetics.  Can consider methylnaltrexone injections.  Emesis overnight; AXR repeated with possible adynamic ileus.  NPO, will place NGT if continues to have emesis.   : Bilateral ureteral stents in place for severe hydronephrosis/obstruction, last stent exchange 7/24/17.  Follows with urology. Chronic flank pain/irritation, home tamsulosin and tolterodine continued. Cr 0.7.9, reassured by stable  creatinine.  New renal stones visualized on XR, consider discussing with urology v further imaging.  MSK: No issues.   Neuro/Psych: Anxiety, home ativan.  Home melatonin and ritalin.   Endocrine: No issues.   ID: Afebrile, no leukocytosis.  UA contaminated, evidence of stents.  UCx negative.   PPx: SCDs, lovenox  Disposition: anticipate discharge home when pain control is improved, has return of bowel function, and tolerating diet     Nicole Blanca MD  OBGYN PGY-3  Pager [phone]    Attending attestation  I saw and evaluated the patient. I agree with the findings and the plan of care as documented in Dr. Blanca 's note.   Continue bowel regimen, methylnaltrexone today  NGT if ongoing emesis, may need palliative g-tube given bowel dysfunction  Pain control per palliative care, agree with regimen.   Discuss with urology if any additional treatment warrented for stones in the context of her worsening pain.     Natalie Omalley MD  Gynecologic Oncology  Pager [phone]

## 2017-08-04 NOTE — PROGRESS NOTES
Interim Progress Note    S: Patient with worsened nausea and 1 episode of 100 cc emesis this afternoon. No bowel movement.    O:  Vitals:    08/03/17 1723 08/03/17 2257 08/04/17 0710 08/04/17 1520   BP: 144/62 127/66 138/77 148/77   BP Location: Left arm Left arm Left arm Left arm   Pulse: 111 102 102 110   Resp: 16 18 16 16   Temp: 98.3  F (36.8  C) 97.2  F (36.2  C) 98.1  F (36.7  C) 98.4  F (36.9  C)   TempSrc: Oral Oral Oral Oral   SpO2: 97% 92% 98% 94%   Weight:       Height:           Gen: pale, overall comfortable  Cardio: regular rate  Resp: non-labored breathing  Abdomen: distended, tympanic, tense, high-pitched bowel sounds    I/O last 3 completed shifts:  In: 673.33 [I.V.:673.33]  Out: 1000 [Urine:400; Emesis/NG output:600]    ROUTINE IP LABS (Last four results)  BMP  Recent Labs  Lab 08/04/17  0431 08/03/17  1606 08/01/17  1150 07/31/17  0905   *  --  135  --    POTASSIUM 3.6  --  3.5 3.5   CHLORIDE 93*  --  100  --    PURVI 9.2  --  9.2  --    CO2 27  --  24  --    BUN 22  --  14  --    CR 0.62 0.73 0.59  --    GLC 95  --  95  --      CBC  Recent Labs  Lab 08/04/17  0431 08/01/17  1150 07/31/17  0905   WBC 8.2 7.7 6.9   RBC 3.07* 3.12* 3.03*   HGB 9.0* 9.1* 9.0*   HCT 28.0* 28.2* 28.0*   MCV 91 90 92   MCH 29.3 29.2 29.7   MCHC 32.1 32.3 32.1   RDW 16.6* 16.4* 16.9*   * 419 425     INRNo lab results found in last 7 days.    A/P: Patient with worsened abdominal distension and further emesis concerning for tumor ileus vs SBO. Continue NPO and add NG tube for decompression. Patient agreed with this plan as she feels it will help relieve her symptoms.     Caitlyn Henning MD  Gyn Onc, PGY1  8/4/2017 5:48 PM

## 2017-08-05 NOTE — PROVIDER NOTIFICATION
MD paged about patient's oral meds at 2000. Pt has new NG tube and MD was asked if pt should still receive orals. Was told to hold off on orals this shift. Will reassess in the AM.

## 2017-08-05 NOTE — PROGRESS NOTES
"Social Work Services Progress Note    Hospital Day: 5  Date of Initial Social Work Evaluation:  8/3/17  Collaborated with:  Pt    Data:  Pt is a 61 year old female being followed by SW for possible discharge to home with hospice care.    Intervention:  SW met with pt briefly to address any questions or concerns with receiving hospice at home.  Pt states that she has no questions or concerns.  Pt confirmed that she would like to work with [facility] for care.  Pt has had some contact with this agency but has not enrolled in services.  Pt states her plan was to meet with a hospice RN to learn more about the program.  SW offered to contact agency to have the education meeting completed here at [facility].  Pt declined stating she wanted \"time\" to get home and would prefer to meet them at home.  Pt stating no needs with a discharge to home at this time.    Assessment:  Pt adjusting appropriately in meeting today.  No needs identified at time of meeting.    Plan:    Anticipated Disposition:  Home with services    Barriers to d/c plan:  Medical stability    Follow Up:  Weekend SW to follow up tomorrow on any further home safety needs for hospice care.      TIMOTEO White, APSW  Weekend Adult Acute Care   Pager [phone]  Care Management Dept [phone]        "

## 2017-08-05 NOTE — PROGRESS NOTES
Gynecology Oncology Progress Note  08/05/17    HD#5 admitted for poor pain control, constipation       Disease: Recurrent stage IIIB primary peritoneal cancer       24 hour events:   - S/p urology consult: unlikely stone obstruction, pain possibly due to incomplete emptying. Recommended consider verdugo for symptomatic relief. Stent exchange if PAULINE or pyelo.   - NGT placed, 1000mL output. Continued NPO status. PO meds held (amlodipine, PO pain meds, senna)  - Palliative recs: d/c hydromorphone, add IV morphine, diazepam prn. Methylnaltrexone for constipation  - Desires home hospice versus hospice facility     Subjective: Patient is feeling better than last night. States NG tube is helping her symptoms. Pain is better with medications. Denies nausea and vomiting this morning. Passed loose stool. Denies flatus. Voiding spontaneously. Denies chest pain, SOB, calf tenderness.     Objective:   Vitals:    08/04/17 0710 08/04/17 1520 08/04/17 2209 08/05/17 0620   BP: 138/77 148/77 131/61 134/69   BP Location: Left arm Left arm Left arm Left arm   Pulse: 102 110 103 103   Resp: 16 16 16 16   Temp: 98.1  F (36.7  C) 98.4  F (36.9  C) 97.9  F (36.6  C) 98.5  F (36.9  C)   TempSrc: Oral Oral Oral Oral   SpO2: 98% 94% 92% 94%   Weight:       Height:         General: NAD, appears comfortable in bed  CV: RRR, no m/r/g, port c/d/i  Resp: CTAB, no wheezes  Abdomen: diffuse firmness, non-tender, mildly distended due to mass effect from tumor but improved from yesterday  Extremities: warm, well-perfused, nontender, no edema, SCDs in place    24 hour: IN - 620 ml IVF // OUT - 550 ml UOP with several unmeasured, 100 ml emesis, 1000 NG tube  Since MN: IN - 500 ml IVF // OUT - 250 ml UOP    Lines/Drains: R chest port-a-cath    New labs/imaging:  AM CBC, BMP    Assessment: Margaux Guzmán is a 61 year old with recurrent Stage IIIB primary peritoneal carcinoma, HD#5 admitted for poor pain control, constipation with consideration of  transitioning to home hospice.       Active Problem list:  - Recurrent Stage IIIB primary peritoneal carcinoma   - Chronic pain  - Nausea/vomiting, resolved with NGT    - Constipation   - Hydronephrosis with b/l ureteral stents in place   - Hypertension   - Anxiety   - Tachycardia       Plan:    Disease: Recurrent stage IIIB primary peritoneal cancer. S/p dx lsc converted to XL, BSO, GUSTAVO, omentectomy, staging biopsies, PPALND, washings. 12/13-3/14 IV/IP Taxol/CDDPx6. 8/2015 CT w/peritoneal and reteroperitoneal, L common iliac, small bowel mesenteric mets. Bilateral ureteral stents placed 8/2015. 9/15-7/16 Carbo/Doxil/Avastin x 12. 9/16-11/16 Gemzar x 3. Tesaro/Quadra niraparib study x3 cycles. Weekly taxol (since 5/2017, last 7/17). Last CT (4/17) w/ pelvic peritoneal nodules, suspicious pulmonary/thoracic lymph nodes. Considering transition to hospice cares, having ongoing discussion.   FEN: NPO, NS @ 125 cc/hr.   Pain: Acute on chronic pain secondary to disease.  Palliative care assisting with medication adjustment. Continue tylenol, gabapentin, methadone and PRN sublingual dilaudid-- PO pain meds held while NPO and added IV morphine and diltiazam. Added menthol patch and volteran cream; 15 mg IV Toradol x1 per urology.   Heme: Anemia of chronic disease, Hgb 9.1 on admission.  History of R IJ DVT, on prophylactic lovenox.   CV: Hypertension, home amlodipine held while NPO. Mild tachycardia, likely secondary to pain and volume status.   Resp: No issues.   GI:   - Adynamic ileus. NPO, NGT placed which will remain in place until Monday. Will consult IR for possible G-tube placement on 8/7/17.   - Constipation, On miralax, senna (held while NPO), PRN MOM.   - GERD, home zantac. PRN anti-emetics.  Can consider methylnaltrexone injections.    :    - Bilateral ureteral stents in place for severe hydronephrosis/obstruction, last stent exchange 7/24/17. Follows with urology. As pain improving, will hold on placement of  verdugo.   - Chronic flank pain/irritation, home tamsulosin continued.   - Tolterodine held as side effect is constipation. Cr 0.62, reassured by stable creatinine.   MSK: No issues.   Neuro/Psych: Anxiety, continue home ativan.  Home melatonin and ritalin.   Endocrine: No issues.   ID: Afebrile, no leukocytosis. UA contaminated, UCx negative.   PPx: SCDs, lovenox  Disposition: Here through weekend; possible G-tube on Monday and then home following    Lou Coleman MD, MHS  Ob/Gyn PGY2  08/05/17

## 2017-08-05 NOTE — PLAN OF CARE
Problem: Goal Outcome Summary  Goal: Goal Outcome Summary  Outcome: Improving  AVSS. Pain control with scheduled tylenol, toradol and prn morphine. Denies nausea since NG was placed last evening. Low urine outputs at beginning of shift, Bolus given. Voiding with good outputs now. Pt just started passing flatus and having multiple BM's during the night. NPO. R Port infusing IVMF. NG to LIS with green output. Up SBA. Continue to monitor.

## 2017-08-05 NOTE — PLAN OF CARE
"Problem: Goal Outcome Summary  Goal: Goal Outcome Summary  Outcome: No Change  Pt c/o's nausea releved with ativan IV and pain in abd relieved with IV morphine. NGT placed with immediate return of  600cc dark green liquid, pt stated she felt \"some relief\". Showered this shift. Adeq uop. Sodium level is low, MD made aware-no change in orders. IVF infusing at 75cc/hr. NPO.  P: continue to monitor pain, bowel functions, drains and continue with POC.      "

## 2017-08-06 NOTE — PROGRESS NOTES
"Brief Palliative On-call note:   Contacted re: Margaux's pain management plan.  She has been experiencing abdominal pain and has been resistant to using available medications due to side effects (particularly grogginess).  [place] [facility] wondering about using fentanyl patch instead. Has been taking methadone 2.5mg QHS, started 5 days ago.     Chart was reviewed.  Has been seen by the [facility] in the past.  Per Dr. Mensah's note dated 4/12/17,     \"Medications tried:  -  MS contin 15 mg tried about 2 weeks ago and had severe constipation and it didn't work, so she stopped using it.   - Dilaudid hasn't worked well so she stopped using it. She also experienced significant constipation and grogginess with dilaudid in the past.   - Dexamethasone \"was awful\"-only worked for a few days and kept her awake  - Tramadol only worked for a bit, and had some grogginess  - Oxycodone didn't work, and had constipation/grogginess  - Ibuprofen in the past as helped, but can't use  - Icy Hot hasn't helped  - Tylenol-taking 1000mg TID, helps \"a tiny bit\"  - Marinol for appetite (panic attacks)     Medications she hasn't tried:  - Muscle relaxants  - Fentanyl, methadone  - medical cannabis (too expensive)  - gabapentin  - topical lidocaine/NSAIDS/ketamine\"    Also has been on methylphenidate 2.5mg BID, started by Dr. Grullon in outpatient Palliative Care clinic.     Cr 0.6, AST/ALT recently normal.     Recommendations:  Starting fentanyl would require rotation off of methadone, and I am not sure that this medication has been given a fair try as it is only QHS dosing.   -Rather than starting a fentanyl patch today, I would recommend increasing the methadone to 2.5mg BID.    -Along with this, she could take IV morphine 2.5-5mg q3h PRN pain.   -If grogginess persists as a concern, can increase methylphenidate to 5mg BID (second dose not after 2PM).  This medication can be used to counteract sedation from opioids.     Please " page with questions.  We will plan to see tomorrow.     Arlet Corcoran MD   Palliative Medicine on-call

## 2017-08-06 NOTE — PROGRESS NOTES
Discussed patient's pain regimen with palliative care. They recommend having a range for morphine 2.5-5 mg q3hr prn. They also recommend increasing methadone to 2.5 mg BID.  Since patient is feeling sleepy with pain medications her ritalin can be increased to 5 mg BID but the 2nd dose should not be given after 1400 as the patient may have difficulty going to sleep then.     Tonya Rod MD PhD  Ob/Gyn PGY-2  8/6/2017 1:12 PM

## 2017-08-06 NOTE — PROGRESS NOTES
"CLINICAL NUTRITION SERVICES     Nutrition Prescription    RECOMMENDATIONS FOR MDs/PROVIDERS TO ORDER:  If POC changes and if pt desires an aggressive POC, then rec consult nutrition.      Received nutrition robe < 3 consult for \"no solids in 5 days.\"    Per chart, NPO since 8/3 and pt was on a regular diet 8/1-8/3. NGT remains in place and there are possible plans for a G-tube 8/7 (adynamic ileus per team). Per team today (8/6), pt would desire home and work with [facility].     INTERVENTIONS:  Implementation:  Collaboration with other providers: Paged team. Due to POC and plans for hospice, nutrition assessment will not be completed. Team in agreement with this plan. Asked team to consult nutrition if POC changes and if desire nutrition interventions.     Follow up/Monitoring:  Signing off.     Yakelin Ovalles, MS, RD, LD, [facility]   Saturday/Sunday Pgr:  [phone]      [address] pager: [phone]         "

## 2017-08-06 NOTE — PROGRESS NOTES
Gynecology Oncology Progress Note  08/06/17    HD#6 admitted for poor pain control, constipation       Disease: Recurrent stage IIIB primary peritoneal cancer       24 hour events:   - NGT in place, NPO con't  - Desires discharge home and then to [facility] in [facility]    Subjective: Patient is doing okay, Increased pain this morning. Denies nausea and vomiting this morning. Feels really hungry and wondering when she can have her NGT out. Continues to be passing watery stools. Voiding spontaneously. Denies chest pain, SOB, calf tenderness.     Objective:   Vitals:    08/04/17 2209 08/05/17 0620 08/05/17 1112 08/05/17 1525   BP: 131/61 134/69 134/62 153/71   BP Location: Left arm Left arm Left arm Left arm   Pulse: 103 103 103 96   Resp: 16 16 16 16   Temp: 97.9  F (36.6  C) 98.5  F (36.9  C) 99.2  F (37.3  C) 98.5  F (36.9  C)   TempSrc: Oral Oral Oral Oral   SpO2: 92% 94% 94% 93%   Weight:       Height:         General: NAD, appears comfortable in bed  CV: RRR, no m/r/g, port c/d/i  Resp: CTAB, no wheezes  Abdomen: diffuse firmness, non-tender, mildly distended due to mass effect from tumor but improved from yesterday  Extremities: warm, well-perfused, nontender, no edema, SCDs in place    24 hour: IN - 1500 IVF // OUT - 550 UOP with several unmeasured, 200 emesis, 600 NG tube  Since MN: IN - 1020 IVF // OUT - 700 UOP    Lines/Drains: R chest port-a-cath    New labs/imaging:  AM Hgb, BMP    Assessment: Margaux Guzmán is a 61 year old with recurrent Stage IIIB primary peritoneal carcinoma, HD#6 admitted for poor pain control, constipation with consideration of transitioning to home hospice.       Active Problem list:  - Recurrent Stage IIIB primary peritoneal carcinoma   - Chronic pain  - Tumor ileus  - Constipation   - Hydronephrosis with b/l ureteral stents in place   - Hypertension   - Anxiety   - Tachycardia       Plan:    Disease: Recurrent stage IIIB primary peritoneal cancer. S/p dx  lsc converted to XL, BSO, GUSTAVO, omentectomy, staging biopsies, PPALND, washings. 12/13-3/14 IV/IP Taxol/CDDPx6. 8/2015 CT w/peritoneal and reteroperitoneal, L common iliac, small bowel mesenteric mets. Bilateral ureteral stents placed 8/2015. 9/15-7/16 Carbo/Doxil/Avastin x 12. 9/16-11/16 Gemzar x 3. Tesaro/Quadra niraparib study x3 cycles. Weekly taxol (since 5/2017, last 7/17). Last CT (4/17) w/ pelvic peritoneal nodules, suspicious pulmonary/thoracic lymph nodes. Considering transition to hospice cares, having ongoing discussion.   FEN: NPO, NS @ 125 cc/hr. Hypokalemia 3.3 s/p ERT> AM level pending  Pain: Acute on chronic pain secondary to disease. Palliative care assisting with medication adjustment. Continue tylenol, gabapentin, methadone and PRN sublingual dilaudid-- PO pain meds held while NPO and added IV morphine and diltiazam. Added menthol patch and volteran cream; s/p 15 mg IV Toradol x1 per urology.   Heme: Anemia of chronic disease, Hgb 9.1 on admission.  History of R IJ DVT, on prophylactic lovenox.   CV: Hypertension, home amlodipine held while NPO. Mild tachycardia, likely secondary to pain and volume status.   Resp: No issues.   GI:   - Adynamic ileus. NPO, NGT placed which will remain in place until Monday. Will consult IR for possible G-tube placement on 8/7/17.   - Constipation, On miralax, senna (held while NPO), PRN MOM.   - GERD, home zantac. PRN anti-emetics.  Can consider methylnaltrexone injections.    :    - Bilateral ureteral stents in place for severe hydronephrosis/obstruction, last stent exchange 7/24/17. Follows with urology. As pain improving, will hold on placement of verdugo.   - Chronic flank pain/irritation, home tamsulosin continued.   - Tolterodine held as side effect is constipation. Cr 0.60, reassured by stable creatinine.   MSK: No issues.   Neuro/Psych: Anxiety, continue home ativan.  Home melatonin and ritalin.   Endocrine: No issues.   ID: Afebrile, no leukocytosis. UA  "contaminated, UCx negative.   PPx: SCDs, lovenox  Disposition: Here through weekend; plan on placement of G-tube on Monday and then discharge to home and home hospice    Jaiden Ulloa MD  OB/GYN Resident, PGY-2  8/6/2017    I agree with the resident's findings, assessment and plan. I interviewed and examined the patient.    Ms. Guzmán is doing \"ok.\" Doesn't feel great, but improved from admission. Pain controlled with IV morphine and methadone. On diazepam prn. Doesn't want to increase b/c makes her sedated. Had 3 small, liquid BMs yesterday. Vitals: afebrile, little tachy (103), normal BPs and O2 sat. , dark brown. Labs: K+ 3.2, hgb 8.3    Her pain regimen was adjusted and seems to be moderately controlling pain. She doesn't want to increase at this time. As far as her obstruction, NGT remains in place with 600 out yesterday. Will continue with NPO and NGT today and possible G-tube tomorrow per IR. For disposition, still would desire home and work with [facility]. Would like a conversation with Dr. Mccollum.    Discussed with Dr. Omalley.  Maki Patricia MD  Gynecologic Oncology Fellow  8/6/2017 10:48 AM           "

## 2017-08-06 NOTE — PLAN OF CARE
Problem: Goal Outcome Summary  Goal: Goal Outcome Summary  Outcome: No Change  AVSS. Pain controlled with PRN morphine, scheduled methadone, scheduled tylenol and warm packs. Denies nausea. NG to LIS with dark brown output. 400mL out this shift. Port infusing IVMF. NPO. Medium sized loose BM this shift. Up independently. Voiding with good outputs. Slept most of the night. Continue with plan.

## 2017-08-06 NOTE — PLAN OF CARE
"Problem: Goal Outcome Summary  Goal: Goal Outcome Summary  Outcome: Improving  VSS. Pain in abd controlled with prn IV morphine, warm pack and scheduled tylenol tabs.NGT started to have brownish output this agustina of moderate amount, MD notified-will continue to monitor. Very faint BS, abd distention improving. No flatus or BM this shift. Voiding adeq amount, some not saved. NPO. IVF infusing at 125ml/hr. Potassium replaced, recheck ordered for AM draw. Ambulating in hallways independently, verbalized being weak \" no food in 5days\", Nutrition consult placed.   P: continue to monitor VS, pain, drains, bowel functions and gen status and continue with POC.      "

## 2017-08-06 NOTE — PLAN OF CARE
"Problem: Goal Outcome Summary  Goal: Goal Outcome Summary  Outcome: No Change  VSS. C/o's pain in \"flanks\", takes scheduled meds, no IV morphine given this shift, methadone increase to BID. C/o's sore throat from NGT relieved with throat spray. NGT to LIS with moderate amount of green/brown liq output. Abd remains distended, faint BS, no flatus but had 3 small loose stools(unwitnessed). C/o's nausea relieved with compazine. Adeq uop. UAL. Showered. BG was 67 in the morning, MD notified- IVF change to D5NS at 75cc/hr, infusing to port. Potassium replaced.  P: continue to monitor pain, drains, bowel functions and gen status and continue with POC.      "

## 2017-08-07 NOTE — PROGRESS NOTES
[facility], [place]   Palliative Care Daily Progress Note          Recommendations, Patient/Family Counseling & Coordination     Pain: Patient is agreeable to continuing current regimen, with the goal of optimizing doses before switching drugs. She understands Methadone can only be increased every 5 days.   -Continue Methadone 2.5 mg BID (increased from q HS to BID on 8/6)  -Continue Morphine for breakthrough pain 2.5-5 mg q3h prn. Please use 2.5 mg unless pain is severe, as patient reports sedation as side effect.      Constipation and Nausea: CT revealed prominent dilated loops of small bowel, concerning for SBO.   -Please d/c Methylnaltrexone 8 mg QOD due to increased risk for bowel perforation  -Venting G tube placement planned for tomorrow    Spiritual Distress/Coping: Reports coping better and improved mood, but appeared frustrated by complications during visit.   -Continue to involve Chaplain Renny  -Continue to involve Pauline Leach Palliative Clinical SW    Goals of Care:  -Patient consistently endorses wishes to transition to hospice; she does not wish to be re-hospitalized once discharged.   -Patient is agreeable to hospice facility is needed, but prefers to discharge home with hospice, if at all possible.        Thank you for the opportunity to continue to participate in the care of this patient and family.  Please feel free to contact on-call palliative provider with any emergent needs.  We can be reached via team pager [phone] (answered 8-4:30 Monday-Friday); after-hours answering service ([phone]).    JUSTIN De La Torre CNP  Palliative Care Consult Team  Pager: [phone]    65 minutes spent with patient, with >50% counseling and in care coordination.  Face-to-face: [phone]      Assessment      1) Diagnoses & symptoms:        Recurrent stage IIIB primary peritoneal cancer--dx in 2013 and recurrence in 2015  Pain--now with methadone  BID  Nausea--improved with Ng tube and compazine  Constipation--with concern for SBO--Venting G tube placement planned for 8/8  Spiritual distress     2)  Psychosocial/Spiritual Needs:   Ongoing: SW and Chaplaincy involved.               Interval History:   Concern for new SBO. Ongoing nausea and flank pain. Has Ng tube, with plan to place venting G tube tomorrow. Methadone now BID.            Review of Systems:   Palliative Symptom Review (0=no symptom/no concern, 1=mild, 2=moderate, 3=severe):      Pain: 2 (flank and abdominal)      Fatigue: 2      Nausea: 1 (improves with Compazine)      Constipation: 3 (small infrequent liquid stools)      Diarrhea: 0      Depressive Symptoms: 1      Anxiety: 1      Drowsiness: 3 (worst after morphine and compazine)      Poor Appetite: N/A (NPO)      Shortness of Breath: 0      Insomnia: 0          Medications:   I have reviewed this patient's medication profile and medications given in past 24 hours.    Of note:  Methadone 2.5 mg BID  Gabapentin 300 mg q HS  Menthol patch  Ritalin 5 mg BID  Morphine 2.5-5 mg IV q3h prn           Physical Exam:   Vitals were reviewed  Temp: 97.6  F (36.4  C) Temp src: Oral BP: 130/59 Pulse: 98   Resp: 16 SpO2: 91 % O2 Device: None (Room air)    Constitutional: Seen in hospital bed. Frail appearing.   Head: Normocephalic. Atraumatic. Face symmetrical. PERRL. EOMI. MMM.   Cardiac: Pale, warm, dry.   Extremities: well perfused.  Pulm: Non-labored breathing. Speaking in complete sentences.   Musculoskeletal: TUTTLE.   Skin: No concerning rashes or lesions on exposed areas.   Neuropsych: A/O x 4. Cranial Nerves ll-Xll appear grossly intact. Speech clear. Memory and insight intact.           Data Reviewed:     CMP    Recent Labs  Lab 08/07/17  0237 08/06/17  0718 08/05/17  2359 08/05/17  0545 08/04/17  0431 08/03/17  1606 08/01/17  1150   NA  --  139  --  138 131*  --  135   POTASSIUM 3.5 3.2* 3.4 3.3* 3.6  --  3.5   CHLORIDE  --  106  --  103 93*  --   100   CO2  --  22  --  22 27  --  24   ANIONGAP  --  12  --  14 10  --  12   GLC  --  67*  --  76 95  --  95   BUN  --  14  --  23 22  --  14   CR  --  0.60  --  0.60 0.62 0.73 0.59   GFRESTIMATED  --  >90Non  GFR Calc  --  >90Non  GFR Calc >90Non  GFR Calc 80 >90Non  GFR Calc   GFRESTBLACK  --  >90African American GFR Calc  --  >90African American GFR Calc >90African American GFR Calc >90African American GFR Calc >90African American GFR Calc   PURVI  --  8.3*  --  8.9 9.2  --  9.2   MAG  --   --   --  2.1  --   --   --    PROTTOTAL  --   --   --   --   --   --  7.0   ALBUMIN  --   --   --   --   --   --  3.1*   BILITOTAL  --   --   --   --   --   --  0.4   ALKPHOS  --   --   --   --   --   --  58   AST  --   --   --   --   --   --  15   ALT  --   --   --   --   --   --  12     CBC    Recent Labs  Lab 08/06/17  0718 08/05/17  0545 08/04/17  0431 08/01/17  1150   WBC  --  7.5 8.2 7.7   RBC  --  3.04* 3.07* 3.12*   HGB 8.3* 9.0* 9.0* 9.1*   HCT  --  28.5* 28.0* 28.2*   MCV  --  94 91 90   MCH  --  29.6 29.3 29.2   MCHC  --  31.6 32.1 32.3   RDW  --  17.0* 16.6* 16.4*   PLT  --  415 464* 419

## 2017-08-07 NOTE — PROGRESS NOTES
Gynecology Oncology Progress Note  08/07/17    HD#7 admitted for poor pain control, constipation       Disease: Recurrent stage IIIB primary peritoneal cancer       24 hour events:   - NGT in place, considering Gtube today  - 1x hypoglycemia, resolved with D5LR  - adjusted pain regimen     Subjective:  Pain:  improved controlled with methadone BID and 5mg morphine PRN  PO: NPO  Voiding: spontaneously  Bowel fxn: watery stools  Amb: no problems    Denies Denies dizziness, lightheadedness, chest pain, SOB.    Objective:   Vitals:    08/06/17 1232 08/06/17 1555 08/06/17 2050 08/06/17 2320   BP: 146/76 143/64 156/66 142/72   BP Location: Left arm Left arm Left arm Left arm   Pulse: 95 98 105 103   Resp:  16  16   Temp: 97.8  F (36.6  C) 96.2  F (35.7  C)  98.8  F (37.1  C)   TempSrc: Oral Axillary  Oral   SpO2: 93% 95%  92%   Weight:       Height:         General: NAD, appears comfortable in bed  CV: RRR, no m/r/g, port c/d/i  Resp: CTAB, no wheezes  Abdomen: diffuse firmness, non-tender, mildly distended due to mass effect from tumor but mildly improved from yesterday  Extremities: warm, well-perfused, nontender, no edema, SCDs in place       Intake/Output Summary (Last 24 hours) at 08/07/17 0454  Last data filed at 08/07/17 0442   Gross per 24 hour   Intake          2458.75 ml   Output             3150 ml   Net          -691.25 ml   IN - 2100 IVF // OUT - 1700 UOP with several unmeasured, 1500 NG tube  Since MN: IN -  600 IVF // OUT - 250 UOP,     Lines/Drains: R chest port-a-cath    New labs/imaging:  AM Hgb, BMP    Assessment: Margaux Guzmán is a 61 year old with recurrent Stage IIIB primary peritoneal carcinoma, HD#7 admitted for poor pain control, constipation with consideration of transitioning to home hospice.       Active Problem list:  - Recurrent Stage IIIB primary peritoneal carcinoma   - Chronic pain  - Tumor ileus  - Constipation   - Hydronephrosis with b/l ureteral stents in place   - Hypertension   -  Anxiety   - Tachycardia       Plan:    Disease: Recurrent stage IIIB primary peritoneal cancer. S/p dx lsc converted to XL, BSO, GUSTAVO, omentectomy, staging biopsies, PPALND, washings. 12/13-3/14 IV/IP Taxol/CDDPx6. 8/2015 CT w/peritoneal and reteroperitoneal, L common iliac, small bowel mesenteric mets. Bilateral ureteral stents placed 8/2015. 9/15-7/16 Carbo/Doxil/Avastin x 12. 9/16-11/16 Gemzar x 3. Tesaro/Quadra niraparib study x3 cycles. Weekly taxol (since 5/2017, last 7/17). Last CT (4/17) w/ pelvic peritoneal nodules, suspicious pulmonary/thoracic lymph nodes. Considering transition to hospice cares, having ongoing discussion.     FEN: NPO, D5NS @ 125 cc/hr. Hypokalemia 3.3 s/p ERT> AM level pending    Pain: Acute on chronic pain secondary to disease. Palliative care assisting with medication adjustment. Continue tylenol, gabapentin, methadone and PRN sublingual dilaudid-- PO pain meds held while NPO and added IV morphine and diltiazam. Added menthol patch and volteran cream; s/p 15 mg IV Toradol x1 per urology.     Heme: Anemia of chronic disease, Hgb 9.1 on admission.  History of R IJ DVT, on prophylactic lovenox.     CV: Hypertension, home amlodipine held while NPO. Mild tachycardia, likely secondary to pain and volume status.     Resp: No issues.     GI:   - Adynamic ileus. NPO, NGT placed which will remain in place until Monday. Will consult IR for possible G-tube placement on 8/7/17.   - Constipation, On miralax, senna (held while NPO), PRN MOM.   - GERD, home zantac. PRN anti-emetics.  Can consider methylnaltrexone injections.      :    - Bilateral ureteral stents in place for severe hydronephrosis/obstruction, last stent exchange 7/24/17. Follows with urology. As pain improving, will hold on placement of verdugo.   - Chronic flank pain/irritation, home tamsulosin continued.   - Tolterodine held as side effect is constipation. Cr 0.60, reassured by stable creatinine.     MSK: No issues.     Neuro/Psych:  Anxiety, continue home ativan.  Home melatonin and ritalin.     Endocrine: No issues.     ID: Afebrile, no leukocytosis. UA contaminated, UCx negative.     PPx: SCDs, lovenox    Disposition: Here through weekend; plan on placement of G-tube on Monday or Tuesday and then discharge to home and home hospice    Dionna Guajardo MD  OB/GYN Resident, PGY-2  8/7/2017    Pt seen and examined with the team this AM - I agree with the above findings and plan.    Durga Bailey

## 2017-08-07 NOTE — PLAN OF CARE
Problem: Goal Outcome Summary  Goal: Goal Outcome Summary  Outcome: Improving  Hypertensive, 140s-150s/60s-70s-- scheduled Amlodipine given. Slightly tachycardic with HR max 105. OVSS. At 1630 Pt tearful and in intolerable pain-- IV Morphine given, effective. Reinforced importance of staying on top of PRN pain medications for better pain control, Pt verbalizes understanding. IV Morphine q3hrs, scheduled Methadone. NPO. Intermittent nausea despite NGT to LIS-- PRN Compazine and Ativan given. NGT clamped for meds. Hurricane spray for throat discomfort. Voiding spont, adequate UOP. Not passing flatus, no BM this shift. Per Pt, she has had small loose BM x3 today-- Senna and Miralax held. Bowel sounds active, abdomen distended. Port infusing MIVF. UAL, steady on feet. Pt gave herself Lovenox injection, proper technique demonstrated. Plan for palliative G-tube placement tomorrow. Emotional support provided. Continue with POC.

## 2017-08-07 NOTE — PROGRESS NOTES
"Palliative Care Inpatient Clinical Social Work Visit    Patient Information:  Margaux is a 61 year old female diagnosed with and receiving treatment for recurrent stage IIIB primary peritoneal cancer. She is known to me from a previous hospitalization. Palliative Care is following to assist with symptom mangement, goals of care and to offer additional patient support. I met with Margaux this afternoon in her room along with Alanna Reynoso NP with Palliative Care.      Relevant Symptoms/Concerns  - New information since last visit   Physical:  Margaux reported feeling somewhat \"loopy\" due to medications she has required for pain management. Medical providers continue to attempt to find the right combination of medications that will promote Margaux's comfort, alertness, and not cause constipation or other negative side effects.    Psychological/Emotional/Existential:  She expressed some uncertainty about the future and some worry related to how symptoms will be managed outside of the hospital. She is also hoping to feel adequately prepared to managed her venting G tube prior to discharge.    Family/Social/Caregiver:       Developmental:      Mental Health:      End of Life:      Cultural/Adventist/Spiritual:      Grief/Loss:      Concurrent Stressors:        Comments:       Strengths - New information since last visit    Physical: She continues to be open to suggestions from providers regarding symptom management and to trying a variety of medications.    Psychological/Emotional/Existential:  Margaux expressed feeling emotionally better today (vs. Last week). She noted that with time she has made some adjustments and these adjustments have supported her ability to cope. She remains open to processing thoughts and feelings and to continued support.    Family/Social/Caregiver:  Hey daughter, Mayela and a friend visited last week. Margaux reported increased closeness with Mayela that has developed over the last two weeks. She " also expressed that she thinks Mayela has a better understanding of her current circumstances now than she did before this hospitalization and has been more realistic about the future.    Developmental:      Mental Health:      End of Life:      Cultural/Baptism/Spiritual:      Grief/Loss:         Comments:       Goals/Decision Making/Advance Care Planning - New information since last visit   Preferences:      Concerns:      Documents:      Decision Making Issues:        Comments:       Resource Needs     Discharge Planning:  Per Unit/Program  and/or Care Coordinator   Other:      Comments:       Intervention (Check all that apply)    X   Assessment of palliative specific issues          Introduction of Palliative clinical social work interventions    X   Adjustment to illness counseling        Advanced care planning        Attended/participated in care conference        Behavioral interventions for symptom management    X   Facilitation of processing of thoughts/feelings        Family communication facilitated        Grief counseling        Goals of care discussion/facilitation        Life legacy work        Life review facilitation        Psychoeducation        Re-framing        Resource referral            Other     Comments:  I met with Margaux this morning in her room. The above interventions were provided. She was engaged, verbal, and receptive to continued support.       Plan:  I will continue with assessment and intervention as indicated. Plan to follow up later this week.    EDGARD Vivar, [facility]  Palliative Care   Pager: [phone]

## 2017-08-07 NOTE — PLAN OF CARE
Problem: Goal Outcome Summary  Goal: Goal Outcome Summary  Outcome: No Change  Slightly hypertensive, on scheduled amlodipine. OVSS. Pain control with PRN IV morphine q3hr and scheduled methadone. Nausea control with PRN IV compazine and ativan. Port infusing D5 NS at 75mL/hr. NG to LIS. Voids with good outputs. No gas or BM this shift. Up independently. NPO. Possible G-tube placement today. Unsure of time. Continue with plan.

## 2017-08-07 NOTE — PROGRESS NOTES
"Gynecology Oncology Progress Note  08/08/17    HD#8 admitted for poor pain control, constipation       Disease: Recurrent stage IIIB primary peritoneal cancer         24 hour events:   - CT scan showed worsening disease -> IR not safely able to place Gtube, GI to be consulted  - CT showed new pleural effusions, O2 sats low 90s  - new RLE edema and pain -> dopplers neg for DVT    Subjective:  Pain: improved control with methadone BID and 5mg morphine PRN and avoidance of laxatives.   PO: NPO with NG, nausea improved  Voiding: spontaneously  Bowel fxn: watery stools  Amb: minimally      Denies Denies dizziness, lightheadedness, chest pain, SOB.    Objective:   Vitals:    08/06/17 2050 08/06/17 2320 08/07/17 0636 08/07/17 1417   BP: 156/66 142/72 130/59 128/53   BP Location: Left arm Left arm Left arm    Pulse: 105 103 98 93   Resp:  16 16 16   Temp:  98.8  F (37.1  C) 97.6  F (36.4  C) 98  F (36.7  C)   TempSrc:  Oral Oral Oral   SpO2:  92% 91% 92%   Weight:       Height:         General: NAD, appears comfortable in bed  CV: RRR, no m/r/g, port c/d/i  Resp: CTAB, no wheezes  Abdomen: diffuse firmness, non-tender, mildly distended due to mass effect from tumor but improved from yesterday  Extremities: warm, well-perfused, nontender, Mild edema of RLE to mid thigh, SCDs in place    24 hour: IN - 1600 IVF // OUT -1100 UOP with several unmeasured, 1075 NG tube    Lines/Drains: R chest port-a-cath    New labs/imaging:  AM Hgb, BMP    CT A/P 8/7:       \"IMPRESSION: In this patient with history of metastatic ovarian cancer:  1. There has been significant progression of peritoneal carcinomatosis  with numerous new enlarging peritoneal and retroperitoneal nodules and  lymph nodes. Small volume of presumably malignant ascites.  2. Extensive new pleural metastatic disease. New moderate right and  small left, presumably malignant, pleural effusions.  3. Prominent dilated loops of small bowel concerning for small " "bowel  obstruction.   4. Unchanged right renal atrophy. Mild to moderate right  hydronephrosis, new since prior CT. Bilateral nephroureteral stents  remain in place.\"    Assessment:Margaux Guzmán is a 61 year old with recurrent Stage IIIB primary peritoneal carcinoma, HD#8 admitted for poor pain control, constipation (now CT concerning for SBO). considering transitioning to home hospice.       Active Problem list:  - Recurrent Stage IIIB primary peritoneal carcinoma   - Chronic pain  - Tumor ileus, SBO  - Hydronephrosis with b/l ureteral stents in place   - Hypertension   - Anxiety   - Tachycardia   - hypoxia 2/2 malignant pleural effusions      Plan:      Dz: Recurrent stage IIIB primary peritoneal cancer. S/p dx lsc converted to XL, BSO, GUSTAVO, omentectomy, staging biopsies, PPALND, washings. 12/13-3/14 IV/IP Taxol/CDDPx6. 8/2015 CT w/peritoneal and reteroperitoneal, L common iliac, small bowel mesenteric mets. Bilateral ureteral stents placed 8/2015. 9/15-7/16 Carbo/Doxil/Avastin x 12. 9/16-11/16 Gemzar x 3. Tesaro/Quadra niraparib study x3 cycles.. Weekly taxol (since 5/2017, last 7/17). CT 8/7 with worsening disease in abdomen and new malignant pleural effusions. S/p palliative consult. Coordinating hospice.     FEN: D5NS at 75 ml/h. NG tube. NPO. K 3.3. ERP  Pain:  S/p buprenorphine patch. Palliative recs- d/c dilaudid (2-4 mg sublingual), start morphine and valium. Continue Methadone 2.5mg BID, gapatentin 300 qhs, scheduled tylenol, volteran  Heme: Chronic anemia, Hgb 9.1. Hx of R IJ DVT, on ppx lovenox. Dopplers neg for DVT  8/8  CV: HTN, home amlodipine. Hx HLD.   Pulm: O2 sats low 90s, CT with pleural disease and new likely malignant moderate/small bilateral effusions.   GI: AXR (7/31): stool burden, nonobstructive. Constipation. S/p pink lady enema, mag citrate, MOM. Declined suppository. Scheduled senna, miralax. Per palliative, ativan PRN for nausea and avoid zofran. GERD: Home Zantac prn. Emesis x2 " (8/3) > AXR (8/4): possible adynamic ileus ->NPO with Gtube. CT A/P 8/7 with SBO and worsening carcinomatosis, involving anterior peritoneum -> IR recommend GI to place palliative G-tube. [ ]  GI to see pt on 8/8.  : B/l ureteral stents in place for severe hydro, last exchange 7/24/17. Chronic flank pain/irritation, home tamsulosin. Home tolterodine held per Urology for constipation. Cr 0.59>0.73>0.62 (essentially solitary kidney, R doesn't function).  AXR w/ new renal collecting system stones (2, 3 mm), stable 1 cm R stone; NTD per urology.   ID: UA contaminated, UCx 10-50K mixed wilton. AF, WBC 7.7.  Endo: NI   Psych/Neuro: Anxiety, ativan and valium. Home melatonin, ritalin.   MSK: RLE swelling, dopplers neg for DVT on 8/8.   PPX: SCDs, home lovenox  Consults: palliative care, urology (signed off)  Drains/Lines: R port  Dispo: [facility]. Will consult at home after d/c    Dionna Guajardo MD  OB/GYN Resident, PGY-2  8/8/2017         Pt seen with the team this AM.  I agree with the above note.    Durga Bailey

## 2017-08-07 NOTE — PLAN OF CARE
"Problem: Goal Outcome Summary  Goal: Goal Outcome Summary  Outcome: No Change  VSS. Pt c/o's pain in abd, tylenol and given in elixir form, tried to wait out for IV morphine when offered but asked for it when pain got intense-given 2.5mg;pt said the dose is \"too strong for me\"- Palliative NP notified. NGT to LIS with moderate amount of green/light brown output. NPO but taking ice chips for mouth comfort and NGT tube clamp for meds. Nausea relieved with compazine. Port infusing with IVF. UAL in room. Abd CT done. Pt mostly quiet and stayed in bed in between cares.   P: continue to monitor bowel functions, provide emotional support and continue with POC.      "

## 2017-08-08 NOTE — PLAN OF CARE
Problem: Goal Outcome Summary  Goal: Goal Outcome Summary  Outcome: No Change  Abdomen firm and distended, no gas or stool. NG to lis. Voiding spont, Morphine Sulfate iv for abdominal pain with relief. Plan for g-tube placement on 8/10.Potassium level is 3.2 potassium replacement infusing, recheck scheduled for 1500.

## 2017-08-08 NOTE — PLAN OF CARE
Problem: Goal Outcome Summary  Goal: Goal Outcome Summary  Outcome: No Change  VSS. Pain controlled with PRN Morphine. NG to LIS. NPO with ice chips. No complaints of nausea overnight. R leg swollen since yesterday agustina, US clear. UAL. Slept comfortably between cares overnight. Plan for GI consult today regarding G-tube placement.

## 2017-08-08 NOTE — PROGRESS NOTES
Pt noticed new R leg swelling along with pain/numbess in her R leg when she was taking a shower this evening. On exam the R lower extremity is swollen to the upper thigh, no erythema. Will order RLE doppler to r/o DVT.    Casandra R2

## 2017-08-08 NOTE — PLAN OF CARE
Problem: Goal Outcome Summary  Goal: Goal Outcome Summary  Outcome: No Change  DNR/ DNI. VSS - not requiring supplemental oxygen to maintain oxygen saturations. Port needle removed for shower and changed afterwards per patient request/ would have been due to be changed tomorrow (08/08/17), blood return noted, infusing MIVF. Up ad mack. Shower completed independently, linens changed. NPO - NG - LIS - moderate brown/ green output. Pt still reports nausea, alternating ativan/ compazine for management. Pt reports abdominal pain improving with IV morphine 2.5 mg, reports increased groin pain on right side, and swelling - MD notified, continuing to monitor and ultrasound to rule out clot, new numbness and tingling also noted. Baseline neuropathy unchanged in fingertips, bothersome at times to patient. Patient independently administered lovenox. Abdomen distended, firm, with irregular contour, not passing gas, no BM. Voiding spontaneously in adequate amounts. Pt reports fatigue and concern about not eating, and would like the bowel obstruction to resolve sooner rather than later. Resident came to bedside and discussed GI consult for placement of G-Tube due to CT scan results, consult expected tomorrow for OR placement of g-tube ideally sooner rather than later, pt also given a copy of CT scan results to refer back to.      Plan: GI consult for G-Tube placement, assess and intervene on pain/ nausea, provide consistent emotional support, awaiting full return of bowel function, continue plan of care.        2300 Addendum - Ultrasound has not been completed at this time, re-notified MD  NG output for the shift as of 2300 = 575 mL

## 2017-08-08 NOTE — PROVIDER NOTIFICATION
Notified Resident at 2015 PM regarding right leg swelling and groin pain more than left.      Spoke with: Dionna Guajardo MD    Orders were obtained.    Comments: Bilateral lower extremity ultrasound ordered to rule out DVT/ clot

## 2017-08-08 NOTE — PROGRESS NOTES
"[facility], [place]   Palliative Care Daily Progress Note          Recommendations, Patient/Family Counseling & Coordination     Pain: Discussed transitioning Morphine IV to SL and patient is agreeable in the future, but prefers to continue current regimen today, as she is finally comfortable.   -Continue Methadone 2.5 mg BID (increased from q HS to BID on 8/6)  -Continue Morphine for breakthrough pain 2.5-5 mg IV q3h prn--can transition to SL tomorrow if pain remains under control.     Spiritual Distress/Coping: Reports improved mood, after having visitors yesterday. Also said she is starting to \"wrap her head around\" her diagnosis and come to terms with it. Denied worries or concerns she wanted to discuss today.   -Continue to involve Chaplain Renny  -Continue to involve Pauline Leach, Palliative Clinical SW       Thank you for the opportunity to continue to participate in the care of this patient and family.  Please feel free to contact on-call palliative provider with any emergent needs.  We can be reached via team pager [phone] (answered 8-4:30 Monday-Friday); after-hours answering service ([phone]).    JUSTIN De La Torre CNP  Palliative Care Consult Team  Pager: [phone]    40 minutes spent with patient, with >50% counseling and in care coordination.      Assessment      1) Diagnoses & symptoms:        Recurrent stage IIIB primary peritoneal cancer--dx in 2013 and recurrence in 2015  Pain--now with methadone BID  Nausea--improved with Ng tube and compazine  Constipation--with concern for SBO--surgical placement of venting G tube planned  Spiritual distress--improved     2)  Psychosocial/Spiritual Needs:   Ongoing: SW and Chaplaincy involved.               Interval History:   Pain improved. Plan to place venting G tube surgically Wednesday or Thursday. Mood improved.            Review of Systems:   Palliative Symptom Review (0=no symptom/no concern, 1=mild, " 2=moderate, 3=severe):      Pain: 1 (flank and abdominal)      Fatigue: 1      Nausea: 1 (improved with Ng tube and Compazine)      Constipation: 3      Diarrhea: 0      Depressive Symptoms: 1      Anxiety: 0      Drowsiness: 1 (worst after morphine and compazine)      Poor Appetite: 0      Shortness of Breath: 0      Insomnia: 0          Medications:   I have reviewed this patient's medication profile and medications given in past 24 hours.    Of note:  Methadone 2.5 mg BID  Gabapentin 300 mg q HS  Menthol patch  Ritalin 5 mg BID  Morphine 2.5-5 mg IV q3h prn--4  Compazine 5 mg q6h prn--x2           Physical Exam:   Vitals were reviewed  Temp: 98.6  F (37  C) Temp src: Oral BP: 127/66 Pulse: 91   Resp: 16 SpO2: 92 % O2 Device: None (Room air)    Constitutional: Seen in hospital bed. Frail appearing.   Head: Ng tube in place. Face symmetrical. PERRL. EOMI. Mucous membranes dry but intact.   Cardiac: Pale, warm, dry.   Extremities: well perfused.  Pulm: Non-labored breathing. Speaking in complete sentences.   Musculoskeletal: TUTTLE.   Skin: No concerning rashes or lesions on exposed areas.   Neuropsych: A/O x 4. Cranial Nerves ll-Xll appear grossly intact. Speech clear. Memory and insight intact.           Data Reviewed:     CMP    Recent Labs  Lab 08/08/17  0612 08/07/17  0237 08/06/17  0718 08/05/17  2359 08/05/17  0545 08/04/17  0431  08/01/17  1150     --  139  --  138 131*  --  135   POTASSIUM 3.2* 3.5 3.2* 3.4 3.3* 3.6  --  3.5   CHLORIDE 108  --  106  --  103 93*  --  100   CO2 28  --  22  --  22 27  --  24   ANIONGAP 5  --  12  --  14 10  --  12   *  --  67*  --  76 95  --  95   BUN 13  --  14  --  23 22  --  14   CR 0.65  --  0.60  --  0.60 0.62  < > 0.59   GFRESTIMATED >90Non  GFR Calc  --  >90Non  GFR Calc  --  >90Non  GFR Calc >90Non  GFR Calc  < > >90Non  GFR Calc   GFRESTBLACK >90African American GFR Calc  --   >90African American GFR Calc  --  >90African American GFR Calc >90African American GFR Calc  < > >90African American GFR Calc   PURVI 8.6  --  8.3*  --  8.9 9.2  --  9.2   MAG  --   --   --   --  2.1  --   --   --    PROTTOTAL  --   --   --   --   --   --   --  7.0   ALBUMIN  --   --   --   --   --   --   --  3.1*   BILITOTAL  --   --   --   --   --   --   --  0.4   ALKPHOS  --   --   --   --   --   --   --  58   AST  --   --   --   --   --   --   --  15   ALT  --   --   --   --   --   --   --  12   < > = values in this interval not displayed.  CBC    Recent Labs  Lab 08/08/17  0612 08/06/17  0718 08/05/17  0545 08/04/17  0431 08/01/17  1150   WBC  --   --  7.5 8.2 7.7   RBC  --   --  3.04* 3.07* 3.12*   HGB 8.6* 8.3* 9.0* 9.0* 9.1*   HCT  --   --  28.5* 28.0* 28.2*   MCV  --   --  94 91 90   MCH  --   --  29.6 29.3 29.2   MCHC  --   --  31.6 32.1 32.3   RDW  --   --  17.0* 16.6* 16.4*   PLT  --   --  415 464* 419

## 2017-08-08 NOTE — CONSULTS
GASTROENTEROLOGY CONSULTATION      Date of Admission:  2017  Reason for Admission: Abdominal pain  Date of Consult  2017   Requesting Physician:  Natalie Omalley MD         Reason for Consultation:   Placement of venting G tube           History of Present Illness:   Patient seen and examined at 1100. History is obtained from the patient.    PEG INDICATION: SBO, PEG for venting      BMI: 19.13  Previous PEG(J): none    The patient does not currently have evidence of intra-abdominal infection, abdominal wall infection, sepsis and is hemodynamically stable.     Previous Procedures:  Past Surgical History:   Procedure Laterality Date     APPENDECTOMY       BREAST SURGERY      biopsy negative      SECTION       COLONOSCOPY N/A 2017    Procedure: COLONOSCOPY;  Surgeon: Luis Rihcardson MD;  Location: [facility]     COLONOSCOPY N/A 2017    Procedure: COMBINED COLONOSCOPY, SINGLE OR MULTIPLE BIOPSY/POLYPECTOMY BY BIOPSY;  Surgeon: Luis Richardson MD;  Location: [facility]     COMBINED CYSTOSCOPY, RETROGRADES, EXCHANGE STENT URETER(S) Bilateral 2016    Procedure: COMBINED CYSTOSCOPY, RETROGRADES, EXCHANGE STENT URETER(S);  Surgeon: Carmela Alvarez MD;  [facility]: [facility]     COMBINED CYSTOSCOPY, RETROGRADES, EXCHANGE STENT URETER(S)  2016    @ [facility]     COMBINED CYSTOSCOPY, RETROGRADES, EXCHANGE STENT URETER(S) Bilateral 10/17/2016    Procedure: COMBINED CYSTOSCOPY, RETROGRADES, EXCHANGE STENT URETER(S);  Surgeon: Carmela Alvarez MD;  [facility]     COMBINED CYSTOSCOPY, RETROGRADES, EXCHANGE STENT URETER(S) Bilateral 2016    Procedure: COMBINED CYSTOSCOPY, RETROGRADES, EXCHANGE STENT URETER(S);  Surgeon: Carmela Alvarez MD;  [facility]     COMBINED CYSTOSCOPY, RETROGRADES, EXCHANGE STENT URETER(S) Bilateral 2017    Procedure: COMBINED CYSTOSCOPY, RETROGRADES, EXCHANGE STENT URETER(S);  Surgeon: Carmela Alvarez MD;   [facility]     COMBINED CYSTOSCOPY, RETROGRADES, EXCHANGE STENT URETER(S) Bilateral 6/12/2017    Procedure: COMBINED CYSTOSCOPY, RETROGRADES, EXCHANGE STENT URETER(S);  Cystoscopy, Bilateral Stent Exchange, Bilateral Retrograde Pyelogram;  Surgeon: Carmela Alvarez MD;  [facility]     COMBINED CYSTOSCOPY, RETROGRADES, EXCHANGE STENT URETER(S) N/A 7/24/2017    Procedure: COMBINED CYSTOSCOPY, RETROGRADES, EXCHANGE STENT URETER(S);  Cystoscopy, Left Stent Exchange, Left retrograde pyelogram, Right Retrograde Stent Placement on the Right, Removal of nephrostomy tube;  Surgeon: Carmela Alvarez MD;  [facility]     ESOPHAGOSCOPY, GASTROSCOPY, DUODENOSCOPY (EGD), COMBINED N/A 1/28/2017    Procedure: COMBINED ESOPHAGOSCOPY, GASTROSCOPY, DUODENOSCOPY (EGD);  Surgeon: Luis Richardson MD;  [facility]: [facility]     HYSTERECTOMY TOTAL ABDOMINAL, BILATERAL SALPINGO-OOPHORECTOMY, NODE DISSECTION, COMBINED  11/7/2013    Procedure: COMBINED HYSTERECTOMY TOTAL ABDOMINAL, SALPINGO-OOPHORECTOMY, NODE DISSECTION;;  Surgeon: Cheri Aguilar MD;  [facility]     INJECT NERVE BLOCK CELIAC PLEXUS Left 4/20/2017    Procedure: INJECT NERVE BLOCK CELIAC PLEXUS;  Left splanchnic nerve block;  Surgeon: Shabbir Valladares MD;  [facility]     INJECT NERVE BLOCK CELIAC PLEXUS Left 5/18/2017    Procedure: INJECT NERVE BLOCK CELIAC PLEXUS;  Left Splanchnic Nerve Block;  Surgeon: Shabbir Valladares MD;  [facility]     LAPAROSCOPIC SALPINGO-OOPHORECTOMY  11/7/2013    Procedure: LAPAROSCOPIC SALPINGO-OOPHORECTOMY;  Diagnostic Laparoscopy, Exploratory Laparotomy, Bilateral Salpingo-Oophorectomy,  Hysterectomy, Omentectomy, Pelvic Washings, Peritoneal staging and biopsies, Pelvic and Periaortic Lymph node Dissection;  Surgeon: Cheri Aguilar MD;  [facility]     LAPAROTOMY, STAGING, COMBINED  11/7/2013    Procedure: COMBINED LAPAROTOMY, STAGING;;  Surgeon: Cheri Aguilar MD;  [facility]     LYMPH NODE  BIOPSY  2008    Left posterior chain, beign     OPEN REDUCTION INTERNAL FIXATION TOE(S)  9/3/13    Fifth digit, left foot, with screw fixation      REMOVE PORT PERITONEAL  5/5/2014    Procedure: REMOVE PORT PERITONEAL;  Surgeon: Cheri Aguilar MD;  [facility]: [facility]                     Review of Systems:   A complete review of systems was performed and is negative except as noted in the HPI             Physical Exam:   Temp: 98.6  F (37  C) Temp src: Oral BP: 127/66 Pulse: 91   Resp: 16 SpO2: 92 % O2 Device: None (Room air)    Wt:   Wt Readings from Last 2 Encounters:   08/06/17 53.8 kg (118 lb 8 oz)   07/26/17 49 kg (108 lb)        General: Thin female in NAD.  Answers appropriately.    Oropharynx is clear, moist and w/o exudate or lesions.  Lungs: Clear to auscultation bilaterally.  No wheezes, rhonchi or crackles.    Heart: Regular rate and rhythm.  No murmurs, gallops or rubs.  Normal S1 and S2.  Abdomen: Soft, distended, non-tender  BS hypoactive.  No hepatosplenomegaly. No rebound or peritoneal signs  Neurologic: Grossly non-focal.  CN 2-12 grossly intact.            Data:   Labs and imaging below were independently reviewed and interpreted    LAB WORK:    BMP  Recent Labs  Lab 08/08/17  0612 08/07/17  0237 08/06/17  0718 08/05/17  2359 08/05/17  0545 08/04/17  0431     --  139  --  138 131*   POTASSIUM 3.2* 3.5 3.2* 3.4 3.3* 3.6   CHLORIDE 108  --  106  --  103 93*   PURVI 8.6  --  8.3*  --  8.9 9.2   CO2 28  --  22  --  22 27   BUN 13  --  14  --  23 22   CR 0.65  --  0.60  --  0.60 0.62   *  --  67*  --  76 95     CBC  Recent Labs  Lab 08/08/17  0612  08/05/17  0545 08/04/17  0431 08/01/17  1150   WBC  --   --  7.5 8.2 7.7   RBC  --   --  3.04* 3.07* 3.12*   HGB 8.6*  < > 9.0* 9.0* 9.1*   HCT  --   --  28.5* 28.0* 28.2*   MCV  --   --  94 91 90   MCH  --   --  29.6 29.3 29.2   MCHC  --   --  31.6 32.1 32.3   RDW  --   --  17.0* 16.6* 16.4*   PLT  --   --  415 464* 419   < > = values in this  interval not displayed.  INRNo lab results found in last 7 days.  Albumin  Recent Labs  Lab 08/01/17  1150   ALBUMIN 3.1*          IMAGING:  EXAMINATION: CT ABDOMEN PELVIS W/O CONTRAST, 8/7/2017 2:11 PM     TECHNIQUE:  Helical CT images from the lung bases through the  symphysis pubis were obtained without IV contrast.      COMPARISON: CT 4/27/2017     HISTORY: eval prior to g tube placement; peritoneal adenocarcinoma     FINDINGS:     Abdomen and pelvis:   NG/G-tube tip is in the stomach.     When compared to prior CT 4/27/2017, the patient's numerous peritoneal  and retroperitoneal soft tissue implants and nodules have increased. 2  soft tissue nodules in the right retroperitoneum can be seen on series  2, image 101 measuring 1.5 and 1.0 cm, previously 0.8 and 0.4 cm  respectively. Extensive mesenteric adenopathy is also increased.  Conglomerate adenopathy can be seen in the mid abdomen (series 2,  image 96, increased since prior. Significant nodularity along the  right hemidiaphragm, best appreciated on the coronal series, for  example series 3 image 49. Progressive nodularity anteriorly along the  peritoneum (series 2, image 72). Prominent internal iliac chain  adenopathy bilaterally.     There are several loops of mildly dilated small bowel which contain  air-fluid levels, new since the prior examination.     Right kidney is somewhat atrophic and there is moderate  hydronephrosis, increased since prior. Right nephroureteral stent  remains in place. Relatively normal appearance of the unenhanced left  kidney. Left nephroureteral stent remains in place. Urinary bladder is  partially distended.     Unenhanced liver is unremarkable; hypodense lesion at the right  hepatic dome is favored as relating to the patient's peritoneal  carcinomatosa. Gallbladder is distended; no dense gallstones. Spleen  is normal. Negative adrenal glands. Pancreas is normal. No abdominal  aortic aneurysm. Postoperative changes of  hysterectomy and bilateral  salpingo-oophorectomy. Retroperitoneal lymphadenectomy.     Lung bases: Progression of pleural-based metastatic disease most  evident in the right hemithorax. Moderate right and small left pleural  effusions, new since the prior exam. Associated atelectasis. Calcified  granulomas in the lung bases.     Bones and soft tissues:No suspicious lesions in the bones.          IMPRESSION: In this patient with history of metastatic ovarian cancer:  1. There has been significant progression of peritoneal carcinomatosis  with numerous new enlarging peritoneal and retroperitoneal nodules and  lymph nodes. Small volume of presumably malignant ascites.  2. Extensive new pleural metastatic disease. New moderate right and  small left, presumably malignant, pleural effusions.  3. Prominent dilated loops of small bowel concerning for small bowel  obstruction.   4. Unchanged right renal atrophy. Mild to moderate right  hydronephrosis, new since prior CT. Bilateral nephroureteral stents  remain in place.           ASSESSMENT AND RECOMMENDATIONS:   Assessment:  61 year old female with a history of recurrent stage IIIB primary peritoneal cancer who is admitted for pain control and SBO. NG placed. Primary team requesting venting G-tube placement. IR unable to place. Patient is in agreement with this plan.    Patient will need ascites tapped before procedure on Thursday     Recommendations:  Plan for PEG Thursday with Dr. Guru Valderrama  Therapeutic paracentesis prior to procedure (tomorrow)  Anticoagulation to be held: Lovenox pm dose (will need to be held at least 24 hours after procedure as well)  NPO Wednesday at midnight    Discussed with primary team      Thank you for involving us in this patient's care. Please do not hesitate to contact the GI service with any questions or concerns.     Pt care plan discussed with Dr. Valderrama, GI staff physician.    Latasha Howe PA-C  Therapeutic  Endoscopy/Pancreaticobiliary [facility]  [facility]  Pager *[phone]  =======================================================================

## 2017-08-09 NOTE — PROCEDURES
Procedure Note    Attending:Satya Obrien  Resident: Joan Hollis  Procedure: Diagnostic and therapeutic paracentesis  Indication: pre G-tube  Pre-procedure diagnosis: peritoneal carcinomatosis  Post-procedure diagnosis: peritoneal carcinomatosis     The risks and benefits of the procedure were explained to the patient  who expressed understanding and opted to proceed.  Consent was obtained and placed in the chart.  A time out was performed.  An area of ascites was located and marked in the left lower quadrant; the area was prepped and draped in the usual sterile fashion.  4 ml of 1% lidocaine was instilled and ascites located.  A small incision was made with an 11 blade.  The  paracentesis catheter and needle were inserted until ascites obtained then the needle removed.  The apparatus was connected to vacuum bottles and a total of 1050 ml removed.   A specimen was not sent for analysis. The catheter was withdrawn and the area dressed.    Patient tolerated the procedure well with  immediate complications.  The primary team was informed of the procedure.     I was present for the entire procedure.   Satya Obrien M.D.  Hospitalist  Internal Medicine and Pediatrics  [phone]  DOS:  8/9/17

## 2017-08-09 NOTE — PLAN OF CARE
Problem: Goal Outcome Summary  Goal: Goal Outcome Summary  Outcome: Therapy, progress toward functional goals as expected  AVSS. NPO. NG to LIS with a small amount of greenish output. Ativan and Compazine given for nausea. Pain managed with scheduled Methadone and prn Morphine. Up independently. Voiding spontaneously. Bowel sounds faint. Not passing gas. Patient had a paracentesis at the bedside, site covered with a bandage and CDI. Scheduled to have g-tube placement tomorrow. Pre-op shower #1 completed. Continue with plan of care.

## 2017-08-09 NOTE — PLAN OF CARE
Problem: Goal Outcome Summary  Goal: Goal Outcome Summary  Outcome: No Change  VSS. Pain controlled with PRN Morphine. NG to LIS. NPO with ice chips. Intermittent nasuea. Abdomen distended and firm. Not passing gas, faint BS. R leg edema present, improving. UAL. Slept comfortably between cares overnight. Plan for bedside paracentesis today. OR placement of Gtube on Thursday.

## 2017-08-09 NOTE — PLAN OF CARE
VSS on RA. Up ad mack, ambulated in [place]. IVF infusing into port @ 75 mL/hr. NPO. NG to LIS with 200 mL out. Had a BM yesterday. Abdomen firm, not passing flatus. Voiding adequately. K re-check 3.7. Pain managed with IV morphine PRN. Plan for paracentesis tomorrow and G tube placement on Thursday - Lovenox being held until after procedures. Continue with POC.

## 2017-08-09 NOTE — PROGRESS NOTES
Focus:  Palliative Care    D:  Margaux is a 61 year old female diagnosed with and receiving treatment for recurrent stage IIIB primary peritoneal cancer. She is known to me from a previous hospitalization. Palliative Care is following to assist with symptom mangement, goals of care and to offer additional patient support.     I:  I met with Margaux this afternoon in her room along with Alanna Reynoso NP with Palliative Care and medical student, Cabrera. I offered emotional support and facilitated the processing of thoughts and feelings. Grief counseling was also provided.      A:  Margaux's affect was brighter today and her mood was more upbeat. She reported that pain management has improved. She is hungry and thirsty and is looking forward to eating once her venting G tube is placed (likely tomorrow). She reviewed her plans and hopes related to discharge and reflected on her adjustment to her current cisrcumstances. She expressed no further needs, concerns, or questions this afternoon.     P:  I will continue with assessment and intervention as indicated. Plan to follow up later this week.    EDGARD Vivar, [facility]  Palliative Care   Pager: [phone]

## 2017-08-09 NOTE — PROGRESS NOTES
Social Work Services Progress Note    Hospital Day: 9  Date of Initial Social Work Evaluation:  08/04/17  Collaborated with:  Pt, [facility] ([facility]: [phone]), medical team (rounds)    Data:  Margaux is a 61 year old female diagnosed with and receiving treatment for recurrent stage IIIB primary peritoneal cancer.    Intervention:  SW left VM with Promise at [facility] letting her know that pt will likely d/c on Friday.    SW spoke with pt who stated that she prefers that [facility] come out on Saturday if she d/c's on Friday.    SW updated Promise with [facility].    Assessment:  Pt in agreement with [facility] coming to her home to meet with her after d/c.     Plan:    Anticipated Disposition:  Home with services (hospice)    Barriers to d/c plan:  Medical stability    Follow Up:  SW will continue to follow to assist with d/c plan.    EDGARD Vargas, LGSW  7C Surgical Oncology Unit  Phone: [phone]  Pager: [phone]

## 2017-08-09 NOTE — PROGRESS NOTES
SPIRITUAL HEALTH SERVICES  SPIRITUAL ASSESSMENT Progress Note (Palliative Focus)  [facility] ([place]) U7C    REFERRAL SOURCE:  follow-up.    Attempted visit with Margaux who was sleeping very soundly. I will inform the palliative  of care provided.    Radha Donald  Chaplain Resident  Pager [phone]

## 2017-08-10 PROBLEM — C48.2 PRIMARY PERITONEAL CARCINOMATOSIS (H): Status: ACTIVE | Noted: 2017-01-01

## 2017-08-10 NOTE — PLAN OF CARE
Problem: Goal Outcome Summary  Goal: Goal Outcome Summary  Outcome: Therapy, progress toward functional goals as expected  AVSS. NPO. NG to LIS. Compazine given for nausea. Pain managed with scheduled Methadone. Up with SBA. Patient with generalized weakness due to being NPO for 11 days. IV Zantac given. IV fluids via port. Report given to 3C nurse.

## 2017-08-10 NOTE — PROGRESS NOTES
"Gynecologic Oncology Progress Note  8/10/2017     HD#10 admitted for poor pain control, constipation       Disease: Recurrent stage IIIB primary peritoneal cancer         24 hour events:  - s/p paracentesis with 1L fluid drained   - feels more weak and shakey, especially when moving  S:   Pain: overall well controlled   PO: NPO with NG, nausea continues at baseline but controlled with antiemetic  Voiding: spontaneously  Bowel fxn: no significant stool  Amb: minimally       Denies Denies dizziness, lightheadedness, chest pain, SOB.    O:  Vitals:    08/08/17 2250 08/09/17 0728 08/09/17 1417 08/09/17 2248   BP: 134/64 136/77 133/62 130/62   BP Location: Left arm Left arm Left arm    Pulse: 92 98 94 94   Resp: 16 16 16 16   Temp: 98.4  F (36.9  C) 99.2  F (37.3  C) 96.2  F (35.7  C) 98.7  F (37.1  C)   TempSrc: Oral Axillary Oral Oral   SpO2: 91% 91% 93% 90%   Weight:       Height:            I/O last 3 completed shifts:  In: 1850 [P.O.:30; I.V.:1820]  Out: 450 [Urine:200; Emesis/NG output:250]  24 hour: IN - 1820  IVF // OUT - not properly recorded UOP, but low/ 250 NG tube      General: NAD, appears comfortable in bed  CV: RRR, no m/r/g, port c/d/i  Resp: CTAB  Abdomen: diffuse firmness, non-tender, mildly distended due to mass effect from tumor similar to yesterday   Extremities: warm, well-perfused, nontender, Mild edema of RLE to mid thigh, unchanged since yesterday, SCDs in place     Lines/Drains: R chest port-a-cath, NGT      New labs/imaging:  none     CT A/P 8/7:       \"IMPRESSION: In this patient with history of metastatic ovarian cancer:  1. There has been significant progression of peritoneal carcinomatosis  with numerous new enlarging peritoneal and retroperitoneal nodules and  lymph nodes. Small volume of presumably malignant ascites.  2. Extensive new pleural metastatic disease. New moderate right and  small left, presumably malignant, pleural effusions.  3. Prominent dilated loops of small bowel " "concerning for small bowel  obstruction.   4. Unchanged right renal atrophy. Mild to moderate right  hydronephrosis, new since prior CT. Bilateral nephroureteral stents  remain in place.\"      Assessment:Margaux Guzmán is a 61 year old with recurrent Stage IIIB primary peritoneal carcinoma, HD#10 admitted for poor pain control, constipation (now CT concerning for SBO). considering transitioning to home hospice. Awaiting palliative Gtube placement.       Plan:  Dz: Recurrent stage IIIB primary peritoneal cancer. S/p dx lsc converted to XL, BSO, GUSTAVO, omentectomy, staging biopsies, PPALND, washings. 12/13-3/14 IV/IP Taxol/CDDPx6. 8/2015 CT w/peritoneal and reteroperitoneal, L common iliac, small bowel mesenteric mets. Bilateral ureteral stents placed 8/2015. 9/15-7/16 Carbo/Doxil/Avastin x 12. 9/16-11/16 Gemzar x 3. Tesaro/Quadra niraparib study x3 cycles.. Weekly taxol (since 5/2017, last 7/17). Last CT w/ pelvic peritoneal nodules, suspicious pulmonary/thoracic LNs-> CT 8/7 with worsening disease. S/p palliative consult. Coordinating hospice.     FEN: D5NS at 75 ml/h. NG tube. NPO. K 3.3. ERP  Pain:  D/c'd buprenorphine patch. Palliative recs- d/c dilaudid (2-4 mg sublingual), cont. morphine and valium. Continue Methadone 2.5mg BID, gapatentin 300 qhs, scheduled tylenol, volteran  Heme: Chronic anemia, Hgb 9.1. Hx of R IJ DVT, on ppx lovenox. Dopplers neg for DVT  8/7  CV: HTN, home amlodipine. Hx HLD.   Pulm: O2 sats low 90s, CT with pleural disease and new likely malignant moderate/small bilateral effusions.   GI: AXR (7/31): stool burden, nonobstructive. Constipation. S/p pink lady enema. Per palliative, ativan PRN for nausea and avoid zofran. GERD: Home Zantac prn. Emesis x2 (8/3) > AXR (8/4): possible adynamic ileus ->NPO with NGT. CT A/P 8/7 with SBO and worsening carcinomatosis, involving anterior peritoneum. 1 L drained during therapeutic para 8/9. G tube placed 8/10  : B/l ureteral stents in place for " severe hydro, last exchange 7/24/17. Chronic flank pain/irritation, home tamsulosin. Home tolterodine held per Urology for constipation. Cr 0.59>0.73>0.62 (essentially solitary kidney, R doesn't function).  AXR w/ new renal collecting system stones (2, 3 mm), stable 1 cm R stone; NTD per urology.   ID: UA contaminated, UCx 10-50K mixed wilton. AF, WBC 7.7.  Endo: NI   Psych/Neuro: Anxiety, ativan. Home melatonin, ritalin.   MSK: RLE swelling, dopplers neg for DVT on 8/8.   PPX: SCDs, home lovenox (held pre-procedure)  Consults: palliative care, urology (signed off), GI  Drains/Lines: R port  Dispo: [facility] will consult at home after d/c        Dionna Guajardo MD  OB/GYN Resident PGY2  Pgr [phone]       Pt seen with team 8/9/2016 (note delayed).  I agree with the above findings and plan.    Durga Bailey

## 2017-08-10 NOTE — ANESTHESIA CARE TRANSFER NOTE
Patient: Margaux Guzmán    Procedure(s):  Percutraneous Gastrostomy Tube Placement  - Wound Class: I-Clean    Diagnosis: Malnutrition, Bowel Obstruction   Diagnosis Additional Information: No value filed.    Anesthesia Type:   No value filed.     Note:  Airway :Face Mask  Patient transferred to:PACU  Comments: Vss, report to RN   SR in (pacu       Vitals: (Last set prior to Anesthesia Care Transfer)    CRNA VITALS  8/10/2017 1113 - 8/10/2017 1150      8/10/2017             Pulse: 90    Ht Rate: 90    SpO2: 95 %    Resp Rate (set): 10                Electronically Signed By: JUSTIN De La Torre CRNA  August 10, 2017  11:50 AM

## 2017-08-10 NOTE — ANESTHESIA PREPROCEDURE EVALUATION
Anesthesia Evaluation     . Pt has had prior anesthetic. Type: General and MAC           ROS/MED HX    ENT/Pulmonary:  - neg pulmonary ROS     Neurologic:  - neg neurologic ROS     Cardiovascular:  - neg cardiovascular ROS       METS/Exercise Tolerance:     Hematologic:     (+) Anemia, History of Transfusion no previous transfusion reaction -      Musculoskeletal:         GI/Hepatic:  - neg GI/hepatic ROS       Renal/Genitourinary:         Endo:  - neg endo ROS       Psychiatric:  - neg psychiatric ROS       Infectious Disease:         Malignancy:   (+) Malignancy History of Other  Other CA DNR/DNI Active status post         Other:                     Physical Exam  Normal systems: cardiovascular, pulmonary and dental    Airway   Mallampati: II  TM distance: >3 FB  Neck ROM: full    Dental     Cardiovascular       Pulmonary                     Anesthesia Plan      History & Physical Review  History and physical reviewed and following examination; no interval change.    ASA Status:  4 .    NPO Status:  > 8 hours    Plan for General, RSI and ETT with Propofol induction. Maintenance will be Balanced.    PONV prophylaxis:  Ondansetron (or other 5HT-3) and Dexamethasone or Solumedrol  Revokes DNR/DNI for procedure      Postoperative Care  Postoperative pain management:  IV analgesics.      Consents  Anesthetic plan, risks, benefits and alternatives discussed with:  Patient.  Use of blood products discussed: Yes.   Use of blood products discussed with Patient.  Consented to blood products.  .                          .

## 2017-08-10 NOTE — PLAN OF CARE
Problem: Goal Outcome Summary  Goal: Goal Outcome Summary  Outcome: No Change  AVSS. Pain managed with IV morphine. Denied nausea overnight. NG to LIS with green/brown output. NPO. Port infusing IVMF. Replaced potassium overnight. Up with SBA due to weakness, pt been NPO for over a week. No gas, no BM this shift. G-tube placement today. Pt will be picked up for surgery at 0930 this morning for an 1100am start. Pre-op shower and guillermo wipes done as well as pre-op checklist, linen and gown change. Continue with plan.

## 2017-08-10 NOTE — BRIEF OP NOTE
[facility], [place]    Brief Operative Note    Pre-operative diagnosis: Malnutrition, Bowel Obstruction   Post-operative diagnosis * No post-op diagnosis entered *  Procedure: Procedure(s):  Percutraneous Gastrostomy Tube Placement  - Wound Class: I-Clean  Surgeon: Surgeon(s) and Role:     * Guru Reilly Valderrama MD - Primary  Anesthesia: General   Estimated blood loss: Minimal  Drains: None  Specimens: * No specimens in log *  Findings:   Uncomplicated placement of 24 Fr gastrostomy tube with T fastener gastropexy.  Complications: None.  Implants: None.    Toy Ruiz  Gastroenterology Fellow

## 2017-08-11 NOTE — PROGRESS NOTES
Social Work Services Progress Note    Hospital Day: 11  Date of Initial Social Work Evaluation:  08/04/17  Collaborated with:  Pt, [facility] ([facility]: [phone]), medical team (roger), d/c pharmacy.     Data:  Margaux is a 61 year old female diagnosed with and receiving treatment for recurrent stage IIIB primary peritoneal cancer.    Intervention:  GARRY spoke with Promise from [facility] who inquired about covering pt's d/c meds and setting up hospital bed at pt's home.     GARRY spoke with  d/c pharmacy ([facility]) who stated that [facility] could contact the billing office re: cover d/c meds ([phone]).    SW spoke with pt who stated that she hasn't fully decided about which hospice agency she would like to use and does not want [facility] to come to her house tomorrow. Pt expressed anxiety about getting enough teaching for G tube before she d/c's. Pt said her dtr is able to pick her up after she has teaching for G-tube and is ready to go.     GARRY spoke with bedside RN (Janette) re: d/c plan and pt's anxiety. Janette stated that she will set up pt with [facility] before she d/c's.    Addendum (11:05 AM): GARRY spoke w pt's dtr Mayela who expressed concern about pt's pain control and d/c'ing soon after having the G-tube placed. Let Mayela know that pt will go to [facility] today to get education on G-tube. Mayela asked that GynOnc team call to discuss pain control. Discussed setting up hospice. Mayela asked SW to fax referral to [facility] (f: [phone]) and that they will choose a hospice agency once her mother is home. SW faxed referral to [facility] at 11:15 AM.     Per GynOnc resident request, pt faxed absence excuse letter to Mayela's work at [phone].     GARRY met with pt and discussed d/c plan and PLC appt. GARRY consulted with Palliative LICSW (Pauline) re: pt's anxiety around d/c. Pauline was on her way in to see pt.    Assessment:  Pt feeling a little  anxious about discharing with G-tube and requesting teaching about managing tube before d/c.    Plan:    Anticipated Disposition:  Home with services (pt will eventually set up hospice).     Barriers to d/c plan:  Medical stability    Follow Up:  SW will continue to follow to assist with d/c planning.    EDGARD Vargas, LGSW  7C Surgical Oncology Unit  Phone: [phone]  Pager: [phone]

## 2017-08-11 NOTE — PROGRESS NOTES
"Palliative Care Inpatient Clinical Social Work Visit    Patient Information:  Margaux is a 61 year old female diagnosed with and receiving treatment for recurrent stage IIIB primary peritoneal cancer. She is known to me from a previous hospitalization. Palliative Care is following to assist with symptom mangement, goals of care and to offer additional patient support. I met with Margaux this afternoon in her room along with EDGARD Thibodeaux, [facility] palliative care clinical social work fellow.      Relevant Symptoms/Concerns  - New information since last visit   Physical:  Margaux reported increased pain related to G tube placement.    Psychological/Emotional/Existential:  Margaux expressed feeling disappointed with the G tube that was plced, noting that she was hoping to be able to eat food again and feel like \"a new person.\"   Family/Social/Caregiver:    Reflected on her concerns related to her daughters and complicated family dynamics.    Developmental:      Mental Health:      End of Life:   Expressed difficulty in coping with/adjusting to prognostic information she was given. She reflected that she was hoping to live until the fall (3 months), but has been informed that she could die as soon as 1 month. She remains distressed about dying and stated that she has several things she hopes to accomplish in the time she has.    Cultural/Anglican/Spiritual:   Expressed her continued fear about what happens after death.    Grief/Loss:   Attempting to adjust to the information she has been given regarding the likelihood that she may need to maintain a liquid diet. She is saddened by the news she received regarding prognosis.    Concurrent Stressors:        Comments:       Strengths - New information since last visit    Physical: Reported that aside from new pain related to the G tube placement, some of her pain has lessened. She also reviewed reasoning behind getting the G tube placed.    Psychological/Emotional/Existential:  " Margaux was receptive to a visit this afternoon and expressed her appreciation for the opportunity to verbally process thoughts and feelings. She was receptive to some informal psychoeducation regarding communication and family dynamics.   Family/Social/Caregiver:  Margaux reported continued closeness with daughter, Mayela. She also plans to visit with her daughter Manda for the first time in years. She reviewed some discussion she wishes to have with them.  Developmental:      Mental Health:      End of Life:      Cultural/Episcopalian/Spiritual:      Grief/Loss:         Comments:       Goals/Decision Making/Advance Care Planning - New information since last visit   Preferences:      Concerns:      Documents:      Decision Making Issues:        Comments:       Resource Needs     Discharge Planning:  Per Unit/Program  and/or Care Coordinator   Other:      Comments:       Intervention (Check all that apply)    X   Assessment of palliative specific issues          Introduction of Palliative clinical social work interventions    X   Adjustment to illness counseling        Advanced care planning        Attended/participated in care conference        Behavioral interventions for symptom management    X   Facilitation of processing of thoughts/feelings        Family communication facilitated        Grief counseling        Goals of care discussion/facilitation        Life legacy work        Life review facilitation        Psychoeducation        Re-framing        Resource referral            Other     Comments:  I met with Margaux this morning in her room. The above interventions were provided. She was tearful, yet engaged, verbal, and receptive to continued support.       Plan:  I will continue with assessment and intervention as indicated. Margaux will likely discharge out of the hospital sometime this afternoon or over the weekend. I do not have specific plans to follow up with Margaux at this time. I am available to  become re-involved should needs arise while she remains inpatient.         EDGARD Vivar, [facility]  Palliative Care   Pager: [phone]

## 2017-08-11 NOTE — PLAN OF CARE
Problem: Goal Outcome Summary  Goal: Goal Outcome Summary  Outcome: Improving  G-tube open to gravity, denies nausea, c/o discomfort when drinking liquids, tolerated ice chips. Voiding spont, faint bowel sounds, Ambulated in halls with sba, Toradol and Morphine Sulfate liquid with good pain relief.

## 2017-08-11 NOTE — PROGRESS NOTES
8/11/2017 Gastroenterology Brief Note    Chart reviewed  Patient seen and evaluated at 0915. Patient reports pain around tube, no nausea or vomiting. Tube to gravity. Discussed with patient how to use tube.    Exam:  Area around gastrostomy appears CDI, no drainage, erythema or warmth  T fastener has already fallen off, no need to remove    Recs:  Vent G tube prn  Call GI will questions    Discussed with primary team    Above in collaboration with Dr. Lavelle Howe PA-C  Advanced Endoscopy/Pancreaticobiliary Service  Pager *[phone]

## 2017-08-11 NOTE — PROGRESS NOTES
Reason for Assessment:  Received consult form CNP for Nutrition education regarding G-Tube diet (appropriate foods)  Diet History: Pt with hx of recurrent Stage III B primary peritoneal cancer admitted for poor pain control. CT on 8/7 showing worsening disease.  Pt had palliative consult and was placed on hospice.  Pt now wanting to go home and has not fully decided on a hospice agency. Visited with pt and pt was very anxious and emotional about her Dx/prognosis/hospice/new G tube. Provided education on full liquid and blended/pureed/soft diet.    Nutrition Diagnosis:  Food- and nutrition-related knowledge deficit r/t no previous knowledge of soft/full liquid diet for venting G tube AEB pt s/p venting G tube placement on 8/10/17   Interventions:  Provided instruction on RD gave emotional support and discussed foods such as full liquids and soft blended foods and encouraged pt to still choose low fiber foods to use in the blended recipes. Gave tips and ideas. Encouraged small frequent meals. Advised pt to use protein modular such as Beneprotein (unflavored) to add to savory foods and use ONS such as Ensure Plus or Boost to use in fruit smoothies  Provided handouts: Blended recipes/tips/ideas, Protein sources.   Goals:   Patient will verbalize understanding of above education provided  Follow-up:    Patient to ask any further nutrition-related questions before discharge.  In addition, pt may request outpatient RD appointment.    Meño Haney RD LD  Weekday pager: [phone]  Weekend pager - [phone]

## 2017-08-11 NOTE — PLAN OF CARE
"Problem: Discharge Planning  Goal: Discharge Planning (Adult, OB, Behavioral, Peds)  8/11/17 I went to the patient's(pt) room for PLC G-tube site care and drainage education.Pt and daughter had 6/18/17 PLC PNT education.Pt and daughter correctly returned G-tube site care,anchoring the tube,connecting the gravity bag for drainage,cleaning the bag,water flushes using PLC supplies.Pt and daughter asking questions.Daughter was able to answer and demonstrate all \"teach back.\"Pt needed a few reminders and commented she\"was foggy\" and glad her daughter was here to hear all the information.Pt's daughter will be living with her.Pt will need two drainage bags,two 60 ml syringes,and a flexi track anchoring piece added to secure her G-tube before discharge.The pt stated she has spilt dressings at home.Above information discussed with the pt's nurse.Also see education flow sheet.  Written material given and reviewed today: Caring For Your G-tube,[place] Bag Cares          "

## 2017-08-11 NOTE — PLAN OF CARE
Problem: Goal Outcome Summary  Goal: Goal Outcome Summary  Outcome: Adequate for Discharge Date Met:  08/11/17  HD 11 admitted with N&V and uncontrolled pain. A&O x4, VSS on room air. CLD. Denies nausea, Gtube to gravity. PLC visited at bedside. Pt. Very upset regarding diet and life expectancy. Port de-accessed, Plan is to discharge home and daughter will be moving in with her. Daughter will contact [facility] likely tomorrow.

## 2017-08-11 NOTE — PROGRESS NOTES
"Gynecologic Oncology Progress Note  8/11/2017     HD#11 admitted for poor pain control, constipation       Disease: Recurrent stage IIIB primary peritoneal cancer         24 hour events:  - got G-tube placed    S:   Pain: overall well controlled, refused pain meds  PO: Has tolerated sips with open G-tube  Voiding: reports no problems.   Bowel fxn: none  Amb: minimally       Denies Denies dizziness, lightheadedness, chest pain, SOB.    O:  Vitals:    08/10/17 1625 08/10/17 1748 08/10/17 1956 08/10/17 2253   BP: 128/65 127/55 126/66 122/55   BP Location: Left arm   Left arm   Pulse: 91 91 89    Resp: 14 16 16 16   Temp: 97.5  F (36.4  C)  97.7  F (36.5  C) 97.5  F (36.4  C)   TempSrc: Oral  Oral Oral   SpO2: 97% 94% 94% 93%   Weight:       Height:            I/O last 3 completed shifts:  In: 1546.25 [I.V.:1486.25; NG/GT:60]  Out: 1480 [Urine:825; Emesis/NG output:650; Blood:5]  24 hour: IN - 1400 IVF //  UOP, 250 NG tube      General: NAD, appears comfortable in bed  CV: RRR, no m/r/g, port c/d/i  Resp: CTAB  Abdomen: diffuse firmness, non-tender, mildly distended due to mass effect from tumor similar to yesterday, slightly improved with G-tube. Gtube draining greenish bile   Extremities: warm, well-perfused, nontender, Mild edema of RLE to mid thigh, a little worse since yesterday.   Lines/Drains: R chest port-a-cath, NGT      New labs/imaging:  none     CT A/P 8/7:       \"IMPRESSION: In this patient with history of metastatic ovarian cancer:  1. There has been significant progression of peritoneal carcinomatosis with numerous new enlarging peritoneal and retroperitoneal nodules and lymph nodes. Small volume of presumably malignant ascites.  2. Extensive new pleural metastatic disease. New moderate right and small left, presumably malignant, pleural effusions.  3. Prominent dilated loops of small bowel concerning for small bowel obstruction.   4. Unchanged right renal atrophy. Mild to moderate right " "hydronephrosis, new since prior CT. Bilateral nephroureteral stents remain in place.\"      Assessment:Margaux Guzmán is a 61 year old with recurrent Stage IIIB primary peritoneal carcinoma, HD#11 admitted for poor pain control, constipation (now CT concerning for SBO). considering transitioning to home hospice. Awaiting palliative G-tube placement.       Plan:  Dz: Recurrent stage IIIB primary peritoneal cancer. S/p dx lsc converted to XL, BSO, GUSTAVO, omentectomy, staging biopsies, PPALND, washings. 12/13-3/14 IV/IP Taxol/CDDPx6. 8/2015 CT w/peritoneal and reteroperitoneal, L common iliac, small bowel mesenteric mets. Bilateral ureteral stents placed 8/2015. 9/15-7/16 Carbo/Doxil/Avastin x 12. 9/16-11/16 Gemzar x 3. Tesaro/Quadra niraparib study x3 cycles.. Weekly taxol (since 5/2017, last 7/17). Last CT w/ pelvic peritoneal nodules, suspicious pulmonary/thoracic LNs-> CT 8/7 with worsening disease. S/p palliative consult. Now with palliative G-tube. Has transitioned to hospice.     FEN: D5NS at 75 ml/h. NG tube. NPO. K 3.3. ERP  Pain:  D/c'd buprenorphine patch. Palliative recs- d/c dilaudid (2-4 mg sublingual), cont. morphine and valium. Continue Methadone 2.5mg BID, gapatentin 300 qhs, scheduled tylenol, volteran  Heme: Chronic anemia, Hgb 9.1. Hx of R IJ DVT, on ppx lovenox. Dopplers neg for DVT  8/7  CV: HTN, home amlodipine. Hx HLD.   Pulm: O2 sats low 90s, CT with pleural disease and new likely malignant moderate/small bilateral effusions.   GI: AXR (7/31): stool burden, nonobstructive. Constipation. S/p pink lady enema. Per palliative, ativan PRN for nausea and avoid zofran. GERD: Home Zantac prn. Emesis x2 (8/3) > AXR (8/4): possible adynamic ileus ->NPO with NGT. CT A/P 8/7 with SBO and worsening carcinomatosis, involving anterior peritoneum. 1 L drained during therapeutic para 8/9. G tube placed 8/10.          - Continue to encourage PO intake,   : B/l ureteral stents in place for severe hydro, last " exchange 7/24/17. Chronic flank pain/irritation, home tamsulosin. Home tolterodine held per Urology for constipation. Cr 0.59>0.73>0.62 (essentially solitary kidney, R doesn't function).  AXR w/ new renal collecting system stones (2, 3 mm), stable 1 cm R stone; NTD per urology.   ID: UA contaminated, UCx 10-50K mixed [place]. AF, WBC 7.7.  Endo: NI   Psych/Neuro: Anxiety, ativan. Home melatonin, ritalin.   MSK: RLE swelling, dopplers neg for DVT on 8/8.   PPX: SCDs, home lovenox resumed post procedure  Consults: palliative care, urology (signed off), GI  Drains/Lines: R port  Dispo: [facility] will consult at home after d/c. Possible DC home today.       Dionna Guajardo MD  OB/GYN Resident PGY2  Pgr [phone]      I have examined this patient with our resident who acted as as scribe and agree with the above findings and plan.    Durga Bailey

## 2017-08-11 NOTE — PLAN OF CARE
"Problem: Goal Outcome Summary  Goal: Goal Outcome Summary  Outcome: No Change  /55 (BP Location: Left arm)  Pulse 89  Temp 97.5  F (36.4  C) (Oral)  Resp 16  Ht 1.676 m (5' 6\")  Wt 53.8 kg (118 lb 8 oz)  SpO2 93%  BMI 19.13 kg/m2   Afebrile,reports minimal abd discomfort, declined pain meds, G tube is intact, open to gravity, 180cc clear green output, tolerating diet, repositioned q2h, faint BS. Cont with POC      "

## 2017-08-14 NOTE — PROGRESS NOTES
Patient was Discharged to Hospice Care so no Post Discharge Follow Up call is needed            Hospice RN to remove T-tag suture in 2 weeks.

## 2017-08-15 NOTE — PROGRESS NOTES
Angela Kaplan RN Geller, Melissa Ann, MD; Jessica Galvin, MK Keenan RN from hospice called letting office know pt was enrolled in hospice as of 8/12/17              Jessica TIMMONS RN, OCN  Care Coordinator   Gynecologic Cancer   Office [phone]  Pager [phone] #[phone]

## 2017-08-18 NOTE — TELEPHONE ENCOUNTER
[facility]patient calling for refill of ritaline for the patient  Last visit with MD 7/26/17  Last order date    methylphenidate (RITALIN) 5 MG tablet (Discontinued) 30 tablet 0 7/26/2017 8/1/2017 --   Sig: Take 2.5mg (1/2 tablet) in the morning x3-5 days, then take 2.5mg (1/2 tablet) in the morning and midday.         Please advise on refills for patient

## 2017-08-18 NOTE — PROGRESS NOTES
Care Coordinator Note  Patient is under the care of [facility] at [phone]   Requesting refill of Lovenox and Ritalin.  Hospice does not cover either meds and we need to get a refill on both.   Jessica TIMMONS RN, OCN  Care Coordinator   Gynecologic Cancer   Office [phone]  Pager [phone] #[phone]

## 2017-11-06 NOTE — ANESTHESIA POSTPROCEDURE EVALUATION
Patient: Margaux Guzmán    Procedure(s):  Percutraneous Gastrostomy Tube Placement  - Wound Class: I-Clean    Diagnosis:Malnutrition, Bowel Obstruction   Diagnosis Additional Information: No value filed.    Anesthesia Type:  No value filed.    Note:  Anesthesia Post Evaluation    Patient location during evaluation: PACU  Patient participation: Able to fully participate in evaluation  Level of consciousness: awake and alert  Pain management: satisfactory to patient  Airway patency: patent  Cardiovascular status: blood pressure returned to baseline and hemodynamically stable  Respiratory status: nasal cannula  Hydration status: euvolemic  PONV: controlled     Anesthetic complications: None          Last vitals:  Vitals:    08/10/17 1956 08/10/17 2253 08/11/17 0700   BP: 126/66 122/55 125/65   Pulse: 89     Resp: 16 16 16   Temp: 36.5  C (97.7  F) 36.4  C (97.5  F) 36.5  C (97.7  F)   SpO2: 94% 93% 94%         Electronically Signed By: Oliver Carrero MD  November 6, 2017  12:08 PM

## 2017-12-26 NOTE — PROGRESS NOTES
[facility] ambulatory encounter  ORTHOPEDIC POSTOP VISIT      DATE OF SURGERY: 11/30/2017  SURGERY PERFORMED:  Left total knee arthroplasty.    SUBJECTIVE:  Nila Tyler is a 56 year old female who is here status post left total knee arthroplasty on 11/30/2017.   This was complicated secondary to morbid obesity.  Nila had a fall last week, she felt weak and her knees buckled and she passed out, hitting her head.  She now is getting better.  She was very happy until her spill.       COMPONENTS USED:  Cemented Biomet Vanguard size 67.5 left femur, 71 tibia, 12 mm PS insert with size asymmetric 34 mm patella.    PAST HISTORIES:  I have reviewed the past medical history, family history, social history, medications and allergies listed in the medical record as obtained by my nursing staff and support staff and agree with their documentation.    OBJECTIVE:  Physical Examination:  Vitals: There were no vitals taken for this visit.  Constitutional:  Well-developed, well-nourished, in no acute distress.  Skin: Warm, dry, intact without rash or lesion.  Neurologic:  Alert and oriented x3.  Musculoskeletal:  LEFT KNEE:   The wound looks excellent.  Alignment normal.  Motion  Is 3-110.  Homans negative.      IMAGING STUDIES:  Images taken in the clinic today were reviewed.  Images of the left knee look good with satisfactory alignment.  This was compared to previous films.      Assessment:  Status post left total knee arthroplasty 11/30/2017    PLAN:  Her motion is coming along beautifully.  Strength and endurance should improve for a year or two and she is reassured.    She does have spina bifida, I recommend she have a bone density test.  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  I described to her that her bone was very soft and I put extra glue in.    There are four things the patient should be aware of post surgery:  Swelling, warmth, numbness and clicking/clunking sounds.  These are all normal post surgery.    Questions were  Gynecologic Oncology Daily Progress Note  1/29/17    HD#3  Dz: Primary peritoneal cancer    24 hour events:   -urology recommended no stent exchange this hospitalization  -Colonoscopy showed external compression and possible tumor eroding into bowel, biopsy taken  -No further rectal bleeding.    Subjective: Patient reports that she is feeling ok this morning, continues to have loose stools from bowel prep. No further bleeding per rectum. New palpable tender nodule on left abdomen, lateral to umbilicus. Has some pain, taking Tylenol. Reports that she has worked extensively with palliative care and no opioids work and so she is just managing with Tylenol. She does want to consider a Fentanyl patch. She states she has little support system, just her daughter, and would like to remain functional as much as possible. She knows her cancer is progressing and has questions re: continuing trial vs surgery vs MATCH trial.    Objective:  Filed Vitals:    01/28/17 1300 01/28/17 1557 01/28/17 2328 01/28/17 2332   BP: 155/64 132/70 163/71 138/59   Pulse:       Temp: 97.4  F (36.3  C) 98.6  F (37  C) 99.4  F (37.4  C)    TempSrc: Oral Oral Oral    Resp: 16 16 16    Height:       Weight:       SpO2: 97% 98% 98%        Gen: awake, alert, answering questions appropriately, in no acute distress  Cardio: regular rate and rhythm  Resp: Lungs clear to auscultation bilaterally  Abdomen: Soft, slightly tender lower quadrants with fixed nodule left lateral of umbilicus, slightly tender without erythema.  Extremities: bilateral lower extremities with trace edema    I&O:  I/O last 3 completed shifts:  In: 6206.25 [P.O.:4000; I.V.:2206.25]  Out: 400 [Urine:400]    Colonoscopy results under procedures.    Labs:  None      Assessment: 61 year old HD#3 with primary peritoneal cancer with recurrence on Tesaro trial who presents with RLE edema after blood transfusion and rectal bleeding, with colonoscopy showing no source for bleeding except  "internal and external hemorrhoids.      Active Problem List:  1) recurrent primary peritoneal cancer  2) acute blood loss anemia  3) right IJ DVT  4) severe right hydronephrosis  5) retinal hemorrhage lasered  6)Concer for GI bleed    Plan:  Dz: Recurrent primary peritoneal cancer - needs to discuss further treatment options with Dr. Mccollum. Will hold niraparab until patient sees Dr. Mccollum. If rectal bleeding recurs, then likely due to Lovenox and not study drug.  FEN: Regular diet.   Pain: Tylenol PRN  Heme: Acute blood loss anemia, s/p transfusion of 2u PRBC, hemoglobin now stable.  No further rectal bleeding seen. May consider restarting Lovenox today.  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 Patient asymptomatic. RLE negative duplex  CV: NI  Pulm: NI  GI:  Zofran and compazine ordered PRN. CT abd/pelvis with extensive colonic stool and progression of disease, colonoscopy with possible tumor erosion into bowel.  : Bilateral ureteral stents in place. CT 1/27 showed severe right hydronephrosis. Continue  home flomax and detrol. S/p uro consult, changed recommendation to leaving stents in place.  ID: NI- no leukocytosis.  Endocrine: NI  Psych/Neuro: Blurry vision S/p \"retinal hemorrhage laser on 1/25\" per patient. Will follow up with ophtho as outpatient. Memory loss, confusion likely due to chemobrain with superimposed effects of anemia, Brain MRI negative for metastases or bleeding.   PPX: Lovenox 40mg daily held due to bleeding risk currently, will discuss restarting today. SCDs in bed. Ambulation  Consults: GI, urology  Dispo:  Home today, following re-starting of lovenox.    Magaly Bee MD  Obstetrics and Gynecology PGY-4   " answered.    She is still on coumadin, she should be on this for 2 more weeks.  Yesterday's H&H was 12 and 40.      Return to clinic in 2 months for a range of motion check.      No orders of the defined types were placed in this encounter.      No Follow-up on file.    Instructions provided as documented in the after visit summary.    The patient indicated understanding of the diagnosis and agreed with the plan of care.    This note is transcribed by Lamar Anna acting as medical scribe for Ralph Zhao MD.  This note represents evaluation of this patient by Ralph Zhao MD.      Cc:  Leti Villeda MD    The documentation recorded by the scribe accurately and completely reflects the service(s) I personally performed and the decisions made by me.
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
## 2021-01-01 NOTE — PROGRESS NOTES
Type and Cross 2 units of Blood Packed Red Blood Cells and transfuse.   Non Radiated   Will be done in [facility] Carol Murcia NP.   Chikis   Patient states no history

## 2021-06-01 NOTE — LETTER
"2/28/2017       RE: Margaux Guzmán  [address] [facility] [address]     Dear Colleague,    Thank you for referring your patient, Margaux Guzmán, to the [facility]. Please see a copy of my visit note below.    Gyn Oncology Follow-up Visit    Date of Visit: 02/28/2017    CC: Margaux Guzmán is a 61 year old female with a history of primary peritoneal cancer presenting today for hospital follow-up, on Tesaro trial currently being held.    HPI:    Marshall comes to the clinic not feeling well today. She was supine on the exam table for our appointment as she explains she is having some severe abdominal pain, as well as some intermittent nausea. She was admitted to the hospital in [place] 2/25/17 due to severe flank pain and vomiting. Had stent replacement at that time and was given levoflaxacin for possible UTI and 2 mg dilaudid.  She has not vomited today. She has not been taking antiemetics because she does not like that it curbs her appetite. Margaux inquired about and appetite stimulant. We discussed starting megace but I am unsure if she is able to take that while on trial. Denies fever or chills. No chest pain or SOB. No vision changes. Continues to take miralax which allows her to have daily bowel movements. She has not had a bowel movement yet today, but she has not eaten much the past few days xiomy to nausea. She is drinking well. Denies vaginal bleeding or abnormal discharge. Has been to palliative care for pain control. She was given .5 mg of morphine which she explains did not alleviate her pain or discomfort and she is requesting a higher dose of narcotics. I emphasized that a higher dose will cause her more constipation which she wanted to avoid. Margaux also reports that she feels her heart \"skipping beats\" when she is standing up. We discussed most recent CT scan which I explained showed stable disease. Margaux is taking Ativan every night for sleep and today she starts Cycle " Medication/Dose: Levothyroxine 75 MCG  Patient last seen by PCP: 8-24-20  Next office visit with PCP: not scheduled  Last Lab:5-15-21    Above information requires contacting patient: No    Prescription does not require PDMP check    Sent to provider to approve or deny   #3 Day 1 of Tesaro/Quadra for Niraparib study.       Brief Oncology History:  The patient is a 58 year old  female who initially presented for evaluation of pelvic and abdominal pain. On exam, she had mild right adnexal tenderness and slightly enlarged right ovary freely mobile and smooth. This prompted an abdominal ultrasound, which was normal, and a pelvic US that showed anechoic right ovarian cyst measuring 4.3 x 3.8 x 4.0 cm. Her  was elevated at 74 on 10/8/13. We reviewed the possible diagnosis including ovarian cancer versus benign ovarian cyst. Approach to surgery, alternatives to surgery and plan for possible extended open surgical staging based on the operative findings was discussed. In light of the size of the ovary we are offering an initial approach with minimally invasive surgery. After discussion of risks and benefits, consent was obtained.  11/7/13 Diagnostic laparoscopy converted to exploratory laparotomy, bilateral salpingo-oophorectomy, total abdominal hysterectomy, omentectomy, staging biopsies, bilateral pelvic and periaortic lymph node dissection and washings. Stage IIIB  12/4/13: Cycle #1 IV/IP  1/2/14: Cycle #2 IV/IP PCP.  - 14  1/23/14: Cycle 3 IV/IP.  - 7  2/11/14:  - 7. CT C/A/P Impression:  1. Multiple bilateral pulmonary nodules as described in full report measuring up to 3 mm along the right major fissure. These are indeterminate given lack of comparison and followup is recommended.  2. No definite evidence for recurrent or metastatic disease in the abdomen or the pelvis. There is a rounded hypodense focus abutting the left external iliac artery and the adjacent sigmoid colon which measures 1 cm, this could represent a fluid-filled sigmoid diverticulum or a hypodense node, further attention to this area on subsequent examinations is recommended.  3. There is a 7 mm nonobstructing stone in the inferior collecting system of the right kidney.  2/12/14-3/26/14:  Cycle #4-6 IV/IP CA-125 7, 7, 7.  4/21/14: CT C/A/P Impression:   1. Unchanged indeterminate pulmonary nodules. Recommend continued surveillance.   2. No definite evidence of metastatic ovarian cancer in the abdomen or pelvis. Small amount of subtle increased thickening and fluid is noted along the right lymph node dissection, nonspecific, but possibly a tiny developing lymphocele.   3. 6 mm nonobstructing stone in the right kidney.  Plan surveillance for ovarian cancer every 3 months with physical and .   Indeterminate pulmonary nodule: These were present before the surgery and there was not change with the chemotherapy. Highly unlikely to be a metastatic disease. Will repeat a CT in 3 months to see any change     5/22/14:  6. JOSEMANUEL.  5/17/14; ED visit [facility]. CT abd/pel noted possible chronic partial small bowel obstruction. Colonscopy scheduled for June 6, 2014 locally. Abdominal pain started 5/17/14 and noted pressure without bowel movement and went to ED that night due to increasing pain. In patient 2 day hospital with clear liquids/IV. Mild nausea without vomiting. BM subsequently occurred and pt was discharged. No pain since. Continues with fullness in abdomen. Diet is bland for the most part.  8/25/14:  -5. Notes increased abdominal girth and bloating x 2-3 weeks with urine urgency. Worried about recurrence. Is just starting to return to normal exercise and activities. No constipation, diarrhea, pain, vaginal or rectal bleeding, cough or dyspnea. CT to follow indeterminate pulmonary nodules today.   CT cap IMPRESSION:   1. No CT scan findings of the chest, abdomen, or pelvis to indicate metastatic disease.  2. 2-5 mm pulmonary nodules, stable since 2/11/2014.  3. Nonobstructing 6 mm stone in the right kidney.  12/3/14:  8.. Pt started zoloft for anxiety. Worries about cancer recurrence.   3/2/15: CA 34  3/5/15: CT C/A/P: Impression:  1. Multiple new small  "peritoneal and retroperitoneal nodules are suspicious for metastases. Suspicious new left common iliac and central small bowel mesenteric nodes. No abdominal ascites.  2. Multiple pulmonary nodules are stable with no new nodules.  3. 9 mm nonobstructing right lower pole renal calculus.    5/20/15: CT c/a/p showed nodularity throughout the abdomen and pelvis worrisome for malignancy which has increased in size     5/28/15: CT abdomen and pelvis showed stable inumerabble peritoneal nodules scattered throughout the abdomen and pelvis consistent with metastases.     8/4/15 (approximatly): Left ureteral stent was placed.    8/12/15:  from [place] 303    8/18/15: CT chest Impression showed slight increase in mild, subtle nodularity along the diaphragm which is nonspecific but may represent metastatic disease.   8/18/15: CT abdomen and pelvis Impression showed interval progression of peritoneal metastatic disease.     8/25/15:  on 3/2/15 of 34 prompted CT c/a/p, which showed multiple new small peritoneal and retroperitoneal nodules are suspicious for metastases. Suspicious new left common iliac and central small bowel mesenteric nodes. She participated in [facility] study involving treatment with on clinical trial with vaccine [id number]. She is here today because the [place] trail failed and the pt pregressed. Her most recent CT c/a/p on 8/25 showed progression of peritoneal metastatic disease. And her most recent  from [place] on 8/12/15 was 303.    Plan: Carbo/Doxil x 6 cycles.with imaging after 3 cycles.     9/3/15: Cycle #1 Carbo/Doxil.  244.  9/24/15: right IJ thrombus; started on Lovenox.   9/26/15: Present to [place] ED. \"presented to the ED this morning with what appears to be a mild drug rash after administering her lovenox this morning. This writer discussed situation with Gyn Onc Fellow Jeanne Moon as well as [facility] service. Per Farzad, a rash of this nature is extremely uncommon with " "administration of Lovenox. Given the mild nature of this rash and acute need for anticoagulation, recommended the patient continue with Lovenox injections and treat with Benadryl as needed. Advised patient be given precautions to return to the ED immediately if she develops reactive respiratory symptoms. Alternatively patient could be switched to alternative formulation of low molecular weight heparin if she was strongly resistant to continuation of Lovenox.\"    10/1/15: Cycle #2 Carbo/Doxil.  175.    10/28/15: gyn onc visit: pt complains of worsening constipation and tenderness around her right ribs. She is taking senna for her constipation with minimal improvement. She admits that the swelling around her neck after her blood clot has decreased. She also admits to feeling a nodules on her left abdomen. She states her appetite is good but she has not been eating fruits and vegetables. She denies any chemo side effects besides fatigue.     10/28/15:  158  11/23/2015:  133  CT c/a/p  IMPRESSION: In this patient with a clinical history of ovarian cancer  there is mixed response to therapy:   1. Stable to slightly decreased peritoneal nodularity since  8/18/2015.  2. No significant change regarding the large subdiaphragmatic  peritoneal deposit since 9/24/2015.  3. Enlarging soft tissue nodule overlying the abdominal musculature  concerning for metastasis since March of 2015.   4. Unchanged, indeterminate hypodensity near the gallbladder fossa  since 8/18/2015, however progressively increased in size since  3/5/2015.  5. Bilateral nephroureteral stents. No significant hydronephrosis.  6. Bilateral pulmonary nodules. Continued followup recommended.    12/23/15: Cycle 5 carboplatin/Doxil/Avastin.  96. To receive Avastin today despite proteinuria per Dr. Aguilar and nephrology.  1/21/16: Cycle 6 carboplatin/Doxil/Avastin, delayed one week secondary to neutropenia.  62.  1/28/16:  " 63.    2/2/16: Patient had right upper extremity doppler u/s done in [place] due to swollen glands and pain. 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 Pt is currently on lovenox.    2/2/16: US soft tissue neck  Conclusion:  1. Morphologically normal not pathologically enlarged two right carotid chain lymph nodes.  2. Chronic right jugular vein thrombosis, described in detail on  venous doppler exam.     2/2/16: Thyroid Ultrasound  Right lobe: 5.5 x 1.9 x 1.2 cm  Left lobe: 5.0 x 1.5 . 0.9 cm  Both lobes have normal background echotexture.    Conclusion:  1. 3 benign - appearing subcentimeter hypoechoic right mid thyroid nodules.  2. Borderline thyromegaly.    2/2/16: Right upper extremity venous duplex doppler evaluation  Conclusion:  1.) chronic non compressible echogenic right jugular vein thrombus, now 4.3 cm in length.     2/22/16:   IMPRESSION:    1. Ovarian cancer with peritoneal carcinomatosis.  2. Given the increased sensitivity of PET/CT, comparison with prior CT examinations is difficult. In general the abdominal/pelvic metastatic lesions appear to be decreased in size.  3. Persistent right internal jugular DVT, as evidenced by 5 cm filling  defect with inferior border at the central venous catheter.  4. Bilateral hydronephrosis without overt caliectasis. Some distal  migration of the bilateral double-J ureteral stents, although the  proximal coiled portions continue to be in the renal pelves.    2/24/16: C7 carboplatin/Doxil/Avastin.  56.  3/9/16: Hgb 6.6, worked up for transfusion reaction (negative). Bleed possibly secondary to   3/23/16: C8 carboplatin/Doxil/Avastin.  44.  4/2016: Hospitalized in [place] x2 weeks for hematuria leading to anemia after ureteral stent exchange  4/20/16: C9D1 carboplatin/Doxil/Avastin. Deferred one week secondary to acute UTI.   35.  4/28/16: C9D1 deferred due to continued bacteruria and ANC 1.3.  5/4/16: C9D1 carboplatin/Doxil/Avastin. Breast lump noted, diagnostic mammogram ordered.  6/1/16: C10D1 carboplatin/Doxil/Avastin.  50.  6/28/16: C11D1 carboplatin/Doxil/Avastin. Avastin held due to bleeding. Carbo/Doxil deferred one week secondary to neutropenia.  43.  7/7/16: C12D1 carboplatin/Doxil. Avastin held due to bleeding.  7/18/16: Bilateral ureteral stent exchange  7/20/16-7/29/16: Hospitalized with urosepsis and blood loss anemia, started on Zosyn and discharged on amoxicillin  9/29/16: C1D1 Gemzar.  85.  10/21/16: C2D1 Gemzar.  106.   11/11/16: C3D1 Gemzar.  pending.    12/12/16: CT CAP  IMPRESSION: In this patient with a history of metastatic ovarian  cancer:  1. Progression of disease as evidenced by a slowly enlarging  mesenteric and omental soft tissue foci and slowly enlarging  periesophageal and pericardiophrenic lymph nodes, as detailed above.  2. Stable appearance of bilateral ureteral stents without  hydronephrosis.  3. Patient was premedicated for a pre-existing IV contrast allergy.  Despite this, she had itchiness on her face poststudy. She should no  longer receive IV contrast in the future.      CT AP 1/10/2017: multiple dilated fluid filled small bowel loops with decompressed distal small bowel loops, consistent with obstruction. Transition point is suspected in the pelvis. No pneumatosis or free intraperitoneal gas. Bilateral ureteral stents appear appropriately positioned. There is mild dilation of the bilateral renal collecting systems, left greater than right.    1/23/17: admitted to clinic for dizziness and heart palpitations   1/27/17: ED admission - leg swelling and GI bleeding     1/27/17: US lower extremity   IMPRESSION:  1.  No evidence of right lower extremity deep venous thrombosis.  2.  Dampened waveforms in the right lower extremity, similar to  7/25/2016, though a more central  occlusion cannot be excluded.  1/27/17: MRI Brain  IMPRESSION:  Normal brain MRI  1/27/17: CT AP  IMPRESSION: This patient with a history of metastatic ovarian cancer:  1. No bowel obstruction. Oral contrast progressed to the colon.  Extensive colonic stool.  2. Severe right hydronephrosis, new from 12/12/2016, may represent  obstruction of the right ureteral stent. No left-sided hydronephrosis.  3. Slight interval progression of diffuse peritoneal metastatic  disease.  4. Mildly nodular areas of increased attenuation within the pelvic  bowel which may represent loculated malignant ascites or peritoneal  implants similar in appearance to 12/12/2016.    1/28/17: colonoscopy- benign     Treatment: Tesaro/Quadra Niraparib study  1/5/17: Cycle #1 Day 1    2/2/17: Cycle #2 Day 1   2/28/17: Cycle #3 Day 1     2/28/17: CT [facility] - [facility]   IMPRESSION:     IMPRESSION: In this patient with a history of ovarian cancer, there is  mild disease progression as follows:  1. Increase in size and number of multiple subcentimeter  nodules/intrafissural lymph nodes along the right major and minor  fissures and also bilateral pulmonary nodules, predominantly along the  diaphragmatic pleura.   2. Slight increase in size of small lymph nodes in the right internal  mammary region and bilateral anterior cardiophrenic region.  3. Stable to slight increase in size of deposits in the perihepatic  region. No significant change in the omental deposits in the left  upper quadrant of the abdomen.  4. Stable to slight increase in size of mesenteric deposits/lymph  nodes.  5. Bilateral nephroureteric stent in place. Atrophy of the right  kidney with interval resolution of right hydronephrosis. Unchanged  mild left hydronephrosis. Air within the collecting systems and  bladder likely related to prior intervention.  6. Dilation of proximal small bowel loops with interspersed areas of  narrowing. No progression of oral contrast into the ileum.  Moderate  amount of fecal material in the colon. Partial/early small bowel  obstruction is possible.         Date Value Ref Range Status   02/02/2017 243 (H) 0 - 30 U/mL Final     Comment:     Assay Method:  Chemiluminescence using Siemens Centaur XP   01/05/2017 165 (H) 0 - 30 U/mL Final     Comment:     Assay Method:  Chemiluminescence using Siemens Centaur XP   12/15/2016 145 (H) 0 - 30 U/mL Final     Comment:     Assay Method:  Chemiluminescence using Siemens Centaur XP   12/12/2016 132 (H) 0 - 30 U/mL Final     Comment:     Assay Method:  Chemiluminescence using Siemens Centaur XP   11/11/2016 111 (H) 0 - 30 U/mL Final     Comment:     Assay Method:  Chemiluminescence using Siemens Centaur XP   10/21/2016 106 (H) 0 - 30 U/mL Final     Comment:     Assay Method:  Chemiluminescence using Siemens Centaur XP   09/29/2016 85 (H) 0 - 30 U/mL Final     Comment:     Assay Method:  Chemiluminescence using Siemens Centaur XP   09/15/2016 70 (H) 0 - 30 U/mL Final     Comment:     Assay Method:  Chemiluminescence using Siemens Centaur XP   08/12/2016 38 (H) 0 - 30 U/mL Final     Comment:     Assay Method:  Chemiluminescence using Siemens Centaur XP   06/28/2016 43 (H) 0 - 30 U/mL Final     Answers for HPI/ROS submitted by the patient on 2/27/2017   General Symptoms: Yes  Skin Symptoms: No  HENT Symptoms: No  EYE SYMPTOMS: No  HEART SYMPTOMS: Yes  LUNG SYMPTOMS: No  INTESTINAL SYMPTOMS: No  URINARY SYMPTOMS: Yes  GYNECOLOGIC SYMPTOMS: Yes  BREAST SYMPTOMS: No  SKELETAL SYMPTOMS: No  BLOOD SYMPTOMS: Yes  NERVOUS SYSTEM SYMPTOMS: No  MENTAL HEALTH SYMPTOMS: No  Fever: No  Loss of appetite: Yes  Weight loss: Yes  Weight gain: No  Fatigue: Yes  Night sweats: No  Chills: No  Increased stress: No  Excessive hunger: No  Excessive thirst: No  Feeling hot or cold when others believe the temperature is normal: No  Loss of height: No  Post-operative complications: No  Surgical site pain: No  Hallucinations: No  Change in or Loss  of Energy: No  Hyperactivity: No  Confusion: No  Chest pain or pressure: No  Fast or irregular heartbeat: Yes  Pain in legs with walking: No  Swelling in feet or ankles: No  Trouble breathing while lying down: No  Fingers or Toes appear blue: No  High blood pressure: Yes  Low blood pressure: No  Fainting: No  Murmurs: No  Chest pain on exertion: No  Chest pain at rest: No  Cramping pain in leg during exercise: No  Pacemaker: No  Varicose veins: No  Edema or swelling: No  Fast heart beat: Yes  Wake up at night with shortness of breath: No  Heart flutters: No  Light-headedness: No  Exercise intolerance: No  Trouble holding urine or incontinence: No  Pain or burning: No  Trouble starting or stopping: No  Increased frequency of urination: Yes  Blood in urine: Yes  Decreased frequency of urination: No  Frequent nighttime urination: No  Flank pain: Yes  Difficulty emptying bladder: No  Anemia: Yes  Swollen glands: No  Easy bleeding or bruising: No  Bleeding or spotting between periods: No  Heavy or painful periods: No  Irregular periods: No  Vaginal discharge: No  Hot flashes: No  Vaginal dryness: Yes  Genital ulcers: No  Reduced libido: No  Painful intercourse: No  Difficulty with sexual arousal: No  Post-menopausal bleeding: No      I have reviewed and addressed the patient's review of symptoms for today's visit.           Past Medical History   Diagnosis Date     Anemia      History of blood transfusion      MULTIPLE     Hydronephrosis      Hyperlipidemia      Jugular vein thrombosis, right      Persistent right internal jugular DVT     Livedo annularis      Osteoarthritis      Osteopenia      Ovarian cancer (H)      Polymyalgia rheumatica (H)      PONV (postoperative nausea and vomiting)      Primary cancer of peritoneum (H)      Past Surgical History   Procedure Laterality Date     Appendectomy        section       Open reduction internal fixation toe(s)  9/3/13     Fifth digit, left foot, with screw  fixation      Lymph node biopsy  2008     Left posterior chain, beign     Breast surgery       biopsy negative     Laparoscopic salpingo-oophorectomy  11/7/2013     Procedure: LAPAROSCOPIC SALPINGO-OOPHORECTOMY;  Diagnostic Laparoscopy, Exploratory Laparotomy, Bilateral Salpingo-Oophorectomy,  Hysterectomy, Omentectomy, Pelvic Washings, Peritoneal staging and biopsies, Pelvic and Periaortic Lymph node Dissection;  Surgeon: Cheri Aguilar MD;  Location: [facility] OR     Hysterectomy total abdominal, bilateral salpingo-oophorectomy, node dissection, combined  11/7/2013     Procedure: COMBINED HYSTERECTOMY TOTAL ABDOMINAL, SALPINGO-OOPHORECTOMY, NODE DISSECTION;;  Surgeon: Cheri Aguilar MD;  Location: [facility] OR     Laparotomy, staging, combined  11/7/2013     Procedure: COMBINED LAPAROTOMY, STAGING;;  Surgeon: Cheri Aguilar MD;  Location: [facility] OR     Remove port peritoneal  5/5/2014     Procedure: REMOVE PORT PERITONEAL;  Surgeon: Cheri Aguilar MD;  Location: [facility] OR     Combined cystoscopy, retrogrades, exchange stent ureter(s) Bilateral 7/18/2016     Procedure: COMBINED CYSTOSCOPY, RETROGRADES, EXCHANGE STENT URETER(S);  Surgeon: Carmela Alvarez MD;  Location: [facility] OR     Combined cystoscopy, retrogrades, exchange stent ureter(s)  4/2016     @ [facility]     Combined cystoscopy, retrogrades, exchange stent ureter(s) Bilateral 10/17/2016     Procedure: COMBINED CYSTOSCOPY, RETROGRADES, EXCHANGE STENT URETER(S);  Surgeon: Carmela Alvarez MD;  Location: [facility] OR     Combined cystoscopy, retrogrades, exchange stent ureter(s) Bilateral 12/7/2016     Procedure: COMBINED CYSTOSCOPY, RETROGRADES, EXCHANGE STENT URETER(S);  Surgeon: Carmela Alvarez MD;  Location: [facility] OR     Colonoscopy N/A 1/28/2017     Procedure: COLONOSCOPY;  Surgeon: Luis Richardson MD;  Location: [facility] GI     Esophagoscopy, gastroscopy, duodenoscopy (egd), combined N/A 1/28/2017     Procedure: COMBINED ESOPHAGOSCOPY, GASTROSCOPY,  DUODENOSCOPY (EGD);  Surgeon: Luis Richardson MD;  Location:  GI     Colonoscopy N/A 1/28/2017     Procedure: COMBINED COLONOSCOPY, SINGLE OR MULTIPLE BIOPSY/POLYPECTOMY BY BIOPSY;  Surgeon: Luis Richardson MD;  Location:  GI     Family History   Problem Relation Age of Onset     Arthritis Father      Myocardial Infarction Father      secondary to anesthesia     Colon Cancer Father      DIABETES Other      Uncle     Hypertension Mother      Other - See Comments Mother      cognitive decline     Skin Cancer Mother      Hypertension Sister      HEART DISEASE Other      unknown valve replacement     Bladder Cancer Sister      Skin Cancer Brother      Social History     Social History     Marital Status:      Spouse Name: N/A     Number of Children: N/A     Years of Education: N/A     Social History Main Topics     Smoking status: Former Smoker     Smokeless tobacco: Never Used      Comment: Quit over 20 years ago     Alcohol Use: No     Drug Use: No     Sexual Activity: No     Other Topics Concern     Not on file     Social History Narrative         Current Outpatient Prescriptions   Medication     promethazine (PHENERGAN) 25 MG Suppository     traMADol (ULTRAM) 50 MG tablet     oxyCODONE (ROXICODONE) 5 MG IR tablet     ondansetron (ZOFRAN-ODT) 4 MG ODT tab     levofloxacin (LEVAQUIN) 500 MG tablet     HYDROmorphone (DILAUDID) 2 MG tablet     ferrous sulfate (IRON) 325 (65 FE) MG tablet     morphine 10 MG/5ML solution     study - niraparib (IDS# 4759) 100 mg capsule     enoxaparin (LOVENOX) 40 MG/0.4ML injection     study - niraparib (IDS# 4759) 100 mg capsule     tamsulosin (FLOMAX) 0.4 MG capsule     LORazepam (ATIVAN) 1 MG tablet     tolterodine (DETROL LA) 4 MG 24 hr capsule     ACETAMINOPHEN PO     Multiple Vitamins-Minerals (ONE DAILY MULTIVITAMIN WOMEN) TABS     polyethylene glycol (MIRALAX/GLYCOLAX) powder     cycloSPORINE (RESTASIS) 0.05 % ophthalmic emulsion     Ranitidine HCl (ZANTAC PO)      "MELATONIN PO     EPINEPHrine (EPIPEN) 0.3 MG/0.3ML injection     senna-docusate (SENOKOT-S;PERICOLACE) 8.6-50 MG per tablet     Pyridoxine HCl (VITAMIN B6 PO)     prochlorperazine (COMPAZINE) 10 MG tablet     No current facility-administered medications for this visit.      Facility-Administered Medications Ordered in Other Visits   Medication     acetaminophen (TYLENOL) tablet 975 mg     gabapentin (NEURONTIN) capsule 300 mg     lidocaine 1 % 1 mL     lidocaine (LMX4) kit     sodium chloride (PF) 0.9% PF flush 3 mL     sodium chloride (PF) 0.9% PF flush 3 mL     lactated ringers infusion     acetaminophen (TYLENOL) tablet 975 mg     lidocaine 1 % 1 mL     lidocaine (LMX4) kit     sodium chloride (PF) 0.9% PF flush 3 mL     sodium chloride (PF) 0.9% PF flush 3 mL     0.9% sodium chloride infusion     clindamycin (CLEOCIN) infusion 900 mg     clindamycin (CLEOCIN) infusion 900 mg        Allergies   Allergen Reactions     Contrast Dye Itching and Swelling     Itching/tingling of mouth and nose with sensation of swelling after receiving IV contrast for CT.  This occurred despite steroid premedication. Therefore the patient should not receive intravenous contrast in the future.      Benadryl Allergy Other (See Comments)     Stay awake, restless, \"uncomfortable\", \"feel like I need to run away\"      Enoxaparin Hives     3/23/16: Okay to receive heparin flushes in port, tolerates well. Patricia Cortez FNP-C     Enoxaparin Sodium Other (See Comments)     Pt is taking this now.     Amoxicillin Rash     Diphenhydramine Anxiety     No Clinical Screening - See Comments Rash     Pollen Extract Rash     Sulfa Drugs Rash       Physical Exam:    /83  Pulse 88  Temp 98.2  F (36.8  C) (Oral)  Resp 16  Ht 1.676 m (5' 6\")  Wt 56.5 kg (124 lb 9.6 oz)  SpO2 98%  BMI 20.11 kg/m2     General Appearance: Lying down on table with emesis bag in hand, appears ill     HEENT:  no thyromegaly, no palpable nodules or masses "        Cardiovascular: regular rate and rhythm, S1S2, grade II murmur, consistent with prior exams     Respiratory: lungs clear, no rales, rhonchi or wheezes, normal diaphragmatic excursion    Musculoskeletal: extremities non tender and without edema    Skin: no lesions or rashes, scar right lower extremity due to previous gangrene infection    Neurological: normal gait, no gross defects     Psychiatric: appropriate mood and affect                               Hematological: normal cervical, supraclavicular and inguinal lymph nodes     Gastrointestinal:       abdomen soft, mildly tender, minimally distended, no organomegaly or masses, + BS x 4 quadrants.    Genitourinary: Deferred    Performance Status -1    Assessment/Plan:  1) Primary peritoneal cancer: Patient to continue (parp-inhibitor)-niraparib today. Potassium was 2.9 today, will be placed on IV potassium and zofram today.     2) Reviewed signs and symptoms for when she should contact the clinic or seek additional care, including but not limited to fever, chills, inability to keep down food or fluids, nausea and vomiting not controlled with antiemetics, and diarrhea leading to dehydration.      3) Nausea: Related to pain vs chemotherapy vs disease. Controlled with antiemetics, has improved after hospital visit.       Constipation: Improved with daily miralax.  Instructed to monitor for signs of obstruction. Continue low residue diet.    4) Severe hydronephrosis, bilateral stents in place with ongoing irritation/flank pain: had stent exchange which caused severe pain and nausea/emesis.     5) Anxiety: continue ativan.    6) Genetic counseling: Has been done, negative for mutations in the following: BRCA1, BRCA2, EPCAM, MLH1, MSH2, MSH6, PMS2, PTEN and TP53.     7) Levofloxacin: Patient was place on levofloxacin on 2/25/17 at outside clinic. Patient was taken off of this medication today as the urine culture came back negative from clinic. We were unaware of  this happening.     8) Health maintenance issues discussed include to follow up with PCP for non-gynecologic issues.  Continue on prophylactic lovenox at 40 SC daily due to history of prior bleeding on higher dose.    9) Appetite: Consider Megace for appetite stimulant      10) Marshall verbalizes understanding of and agreement with plan.    Of a 60 minute appointment, more than 50% was spent in counseling the patient.    ADDENDUM: Patient's films reviewed, partial SBO noted, with findings and nausea currently will admit to the hospital for NGT decompression.     Jia Mccollum MD    Department of Ob/Gyn and Women's Health  Division of Gynecologic Oncology  [facility]  [phone]    I have reviewed the above note and agree with the scribe's notation as written.    I, Kolton Spencer, am serving as a scribe to document services personally performed by Jia Mccollum MD, based upon my observations and the provider's statements to me. All documentation has been reviewed by the aforementioned doctor prior to being entered into the official medical record.    Again, thank you for allowing me to participate in the care of your patient.      Sincerely,    Jia Mccollum MD

## 2021-07-31 NOTE — PROGRESS NOTES
"Care Coordinator Note  \"I guess I am really runned down, I run out of steam so quickly\".  My bowels are  moving in fact a little on the loose size. Appetite is fair denies nausea   Offered suggestions for saving her energy.  Also complaining of back pain the Dilaudid does help but it does make me to sleepy.  Heat helps will look into acupuncture.  Does see pt  Palliative Care.       Pt verbalized back understanding of the above information discussed.   Jessica TIMMONS RN, OCN  Care Coordinator   Gynecologic Cancer   Office [phone]  Pager [phone]  " Herb Carreno is a 40 y.o. male here for a non-provider visit for PPD reading -- Step 1 of 2.      1.  Resulted in Epic under enter/edit results? Yes   2.  TB evaluation questionnaire scanned into chart and original given to patient?Yes      3. Was induration greater than 0 mm? No.      Routed to PCP? No

## 2022-04-27 NOTE — TELEPHONE ENCOUNTER
[place] Emergency Department Lab result notification:    [place] ED lab result protocol used  Urine Culture    Reason for call  Notify of lab results, assess symptoms,  review ED providers recommendations/discharge instructions (if necessary) and advise per ED lab result f/u protocol    Lab Result  Final urine culture on 3/18/17 shows the presence of bacteria(s): 10,000 to 50,000 colonies/mL Enterococcus faecalis  [place] ED discharge antibiotic: None  As per  ED lab result protocol, treat per Urine culture protocol.    Information table from ED Provider visit on 3/15/17  Symptoms reported at ED visit (Chief complaint, HPI) Margaux Guzmán is a 61 year old female with a history of primary peritoneal adenocarcinoma s/p TAHBSO (2013), appendectomy (1980), multiple prior C-sections, history of SBO (recent admission for SBO 2/28-3/3), hyperlipidemia, osteoarthritis and polymyalgia rheumatica who presents to the emergency department today with complaints of feelings of constipation and decreased BMs.      Patient reports a sense of abdominal fullness and distention for about the past week. She also reports constipation over the past week and states her last normal bowel movement was 3 days ago. She reports that these symptoms started after starting again on oral Dilaudid and oral Zofran for her ongoing cancer-related abdominal pain. Her cancer related abdominal pain is unchanged, but now her abdomen feels more bloated/full, like when constipated in the past.      She states she has been taking Miralax, Senna, Colace and suppositories without improvement in her symptoms. She does report passing a small amount of stool this morning along with some flatus but denies having passed any flatus since. She denies any noted blood in her stool.       Patient also endorses nausea and a poor appetite but denies any vomiting. Additionally, patient reports some abdominal pain, however, thinks this may be related  to her chronic bilateral ureteral stents. She reports taking Dilaudid and Zofran for her symptoms without significant relief. She otherwise denies fevers, chest pain or shortness of breath. She reports ongoing hematuria related to her chronic stents but denies any new urinary symptoms. Of note, patient s last colonoscopy was attempted in February, however, this was not completed because of strictures.    ED providers Impression and Plan (applicable information) IMPRESSION: 61-year-old female with a PMH notable for ovarian cancer as well as peritoneal adenocarcinoma (debated by her usual teams which was primary), who was had previous SBO that was medically managed, some ongoing nausea and vomiting, dehydration, previous pyelonephritis but also sterile pyuria who has ureteral stents in place because of her cancer/peritoneal disease, presenting today with slowly progressive increasing abdominal discomfort, nausea without vomiting and difficulty with BMs as described further above in HPI/ROS. Patient arrives with some mild tachycardia but vitals otherwise grossly WNL. She is thin but nontoxic, NAD and actually looks quite good and appearance considering her known cancer. Her abdomen is soft, ND, no significant tenderness to palpation.       DDx includes but is not limited to constipation (which would be fairly likely given that she is on ongoing narcotic pain medications and zofran) and the symptoms began getting worse when she took the narcotics and the antinausea medicine, she's also had SBO's and she could either have a bowel obstruction or partial door exam is not frankly consistent with that, could be consistent with a partial SBO based upon her history. Other things to consider would be abdominal discomfort related to hydronephrosis, UTI, ureteral stents, etc. Cells though her pain is not any worse but if she were to have a new UTI, etc. perhaps I could contribute to her symptoms. Likewise some other type of  intra-abdominal cause cause ileus may be consistent with this.      PLAN: Laboratory studies, urine studies, CT abdomen/pelvis (noncontrast, has allergy), symptom management. If no obstruction we will work to augment her bowel regimen. I'm thinking about some other medications such as methylnaltrexone as her symptoms do seem to be strongly tied to her use of narcotics, but I will discuss this with her usual team and our ED pharmacist before we would try the medication.   Miscellaneous information History of positive UA's and negative UC's and due to urinary stents per Margaux's report.      Cr. Clearance: 53      RN Assessment (Patient s current Symptoms), include time called.  [Insert Left message here if message left]  Msg left at 11:20am.  Upon return phone call, does report symptoms as on previous day.  Nothing new.  Marshall on trial drug of Niraprib for cancer, and is concerned as she was given a list of meds that are contraindicated with the trial drug.  Marshall unable to locate at home, unable to locate in EPIC.  Did consult with Gyn/onc fellow Dale who does give consent to proceed with Fosfomycin for treatment.  Marshall updated, med ordered.    Please Contact your PCP clinic or return to the Emergency department if your:    Symptoms return.    Symptoms do not improve after 3 days on antibiotic.    Symptoms do not resolve after completing antibiotic.    Symptoms worsen or other concerning symptom's.    Tamanna Nayak RN    [facility] RN  Lung Nodule and ED Lab Results F/U RN  Epic pool (ED late result f/u RN) : P [id number]   # [phone]    Copy of Lab result   Order   Urine Culture Aerobic Bacterial [[id number]] (Order [id number])   Exam Information   Exam Date Exam Time Accession # Results    3/15/17  4:58 PM [id number]    Component Results   Component Collected Lab   Specimen Description 03/15/2017  4:58 PM 51   Midstream Urine   Special Requests 03/15/2017  4:58 PM 75   Specimen received in  preservative   Culture Micro (Abnormal) 03/15/2017  4:58 PM 75   10,000 to 50,000 colonies/mL Enterococcus faecalis   Micro Report Status 03/15/2017  4:58 PM 75   FINAL 03/17/2017   Organism: 03/15/2017  4:58 PM 75   10,000 to 50,000 colonies/mL Enterococcus faecalis   Culture & Susceptibility   10,000 TO 50,000 COLONIES/ML ENTEROCOCCUS FAECALIS (ADRIÁN)   Antibiotic Sensitivity Unit Status   AMPICILLIN <=2 Susceptible ug/mL Final   Gentamicin Screen Susceptible  No high level gentamicin resistance found - If no high level gentamicin   resistance is found, combination therapy with an aminoglycoside may be   indicated for serious enterococcal infections such as bacteremia and   endocarditis.  Final   NITROFURANTOIN <=16 Susceptible ug/mL Final   PENICILLIN 2 Susceptible ug/mL Final   VANCOMYCIN 1 Susceptible ug/mL Final             [Follow-Up] : a follow-up visit [Asthma] : asthma [Sleep Apnea] : sleep apnea [Obesity] : obesity [TextBox_44] : Covid 19 infection

## 2023-04-03 NOTE — PROGRESS NOTES
"Gyn Oncology Follow-up Visit    Date of Visit: 02/28/2017    CC: Margaux Guzmán is a 61 year old female with a history of primary peritoneal cancer presenting today for hospital follow-up, on Tesaro trial currently being held.    HPI:    Marshall comes to the clinic not feeling well today. She was supine on the exam table for our appointment as she explains she is having some severe abdominal pain, as well as some intermittent nausea. She was admitted to the hospital in [place] 2/25/17 due to severe flank pain and vomiting. Had stent replacement at that time and was given levoflaxacin for possible UTI and 2 mg dilaudid.  She has not vomited today. She has not been taking antiemetics because she does not like that it curbs her appetite. Margaux inquired about and appetite stimulant. We discussed starting megace but I am unsure if she is able to take that while on trial. Denies fever or chills. No chest pain or SOB. No vision changes. Continues to take miralax which allows her to have daily bowel movements. She has not had a bowel movement yet today, but she has not eaten much the past few days xiomy to nausea. She is drinking well. Denies vaginal bleeding or abnormal discharge. Has been to palliative care for pain control. She was given .5 mg of morphine which she explains did not alleviate her pain or discomfort and she is requesting a higher dose of narcotics. I emphasized that a higher dose will cause her more constipation which she wanted to avoid. Margaux also reports that she feels her heart \"skipping beats\" when she is standing up. We discussed most recent CT scan which I explained showed stable disease. Margaux is taking Ativan every night for sleep and today she starts Cycle #3 Day 1 of Tesaro/Quadra for Niraparib study.       Brief Oncology History:  The patient is a 58 year old  female who initially presented for evaluation of pelvic and abdominal pain. On exam, she had mild right adnexal tenderness and " slightly enlarged right ovary freely mobile and smooth. This prompted an abdominal ultrasound, which was normal, and a pelvic US that showed anechoic right ovarian cyst measuring 4.3 x 3.8 x 4.0 cm. Her  was elevated at 74 on 10/8/13. We reviewed the possible diagnosis including ovarian cancer versus benign ovarian cyst. Approach to surgery, alternatives to surgery and plan for possible extended open surgical staging based on the operative findings was discussed. In light of the size of the ovary we are offering an initial approach with minimally invasive surgery. After discussion of risks and benefits, consent was obtained.  11/7/13 Diagnostic laparoscopy converted to exploratory laparotomy, bilateral salpingo-oophorectomy, total abdominal hysterectomy, omentectomy, staging biopsies, bilateral pelvic and periaortic lymph node dissection and washings. Stage IIIB  12/4/13: Cycle #1 IV/IP  1/2/14: Cycle #2 IV/IP PCP.  - 14  1/23/14: Cycle 3 IV/IP.  - 7  2/11/14:  - 7. CT C/A/P Impression:  1. Multiple bilateral pulmonary nodules as described in full report measuring up to 3 mm along the right major fissure. These are indeterminate given lack of comparison and followup is recommended.  2. No definite evidence for recurrent or metastatic disease in the abdomen or the pelvis. There is a rounded hypodense focus abutting the left external iliac artery and the adjacent sigmoid colon which measures 1 cm, this could represent a fluid-filled sigmoid diverticulum or a hypodense node, further attention to this area on subsequent examinations is recommended.  3. There is a 7 mm nonobstructing stone in the inferior collecting system of the right kidney.  2/12/14-3/26/14: Cycle #4-6 IV/IP CA-125 7, 7, 7.  4/21/14: CT C/A/P Impression:   1. Unchanged indeterminate pulmonary nodules. Recommend continued surveillance.   2. No definite evidence of metastatic ovarian cancer in the abdomen or pelvis. Small amount  of subtle increased thickening and fluid is noted along the right lymph node dissection, nonspecific, but possibly a tiny developing lymphocele.   3. 6 mm nonobstructing stone in the right kidney.  Plan surveillance for ovarian cancer every 3 months with physical and .   Indeterminate pulmonary nodule: These were present before the surgery and there was not change with the chemotherapy. Highly unlikely to be a metastatic disease. Will repeat a CT in 3 months to see any change     5/22/14:  6. JOSEMANUEL.  5/17/14; ED visit [facility]. CT abd/pel noted possible chronic partial small bowel obstruction. Colonscopy scheduled for June 6, 2014 locally. Abdominal pain started 5/17/14 and noted pressure without bowel movement and went to ED that night due to increasing pain. In patient 2 day hospital with clear liquids/IV. Mild nausea without vomiting. BM subsequently occurred and pt was discharged. No pain since. Continues with fullness in abdomen. Diet is bland for the most part.  8/25/14:  -5. Notes increased abdominal girth and bloating x 2-3 weeks with urine urgency. Worried about recurrence. Is just starting to return to normal exercise and activities. No constipation, diarrhea, pain, vaginal or rectal bleeding, cough or dyspnea. CT to follow indeterminate pulmonary nodules today.   CT cap IMPRESSION:   1. No CT scan findings of the chest, abdomen, or pelvis to indicate metastatic disease.  2. 2-5 mm pulmonary nodules, stable since 2/11/2014.  3. Nonobstructing 6 mm stone in the right kidney.  12/3/14:  8.. Pt started zoloft for anxiety. Worries about cancer recurrence.   3/2/15: CA 34  3/5/15: CT C/A/P: Impression:  1. Multiple new small peritoneal and retroperitoneal nodules are suspicious for metastases. Suspicious new left common iliac and central small bowel mesenteric nodes. No abdominal ascites.  2. Multiple pulmonary nodules are stable with no new nodules.  3. 9 mm  "nonobstructing right lower pole renal calculus.    5/20/15: CT c/a/p showed nodularity throughout the abdomen and pelvis worrisome for malignancy which has increased in size     5/28/15: CT abdomen and pelvis showed stable inumerabble peritoneal nodules scattered throughout the abdomen and pelvis consistent with metastases.     8/4/15 (approximatly): Left ureteral stent was placed.    8/12/15:  from [place] 303    8/18/15: CT chest Impression showed slight increase in mild, subtle nodularity along the diaphragm which is nonspecific but may represent metastatic disease.   8/18/15: CT abdomen and pelvis Impression showed interval progression of peritoneal metastatic disease.     8/25/15:  on 3/2/15 of 34 prompted CT c/a/p, which showed multiple new small peritoneal and retroperitoneal nodules are suspicious for metastases. Suspicious new left common iliac and central small bowel mesenteric nodes. She participated in [facility] study involving treatment with on clinical trial with vaccine [id number]. She is here today because the [place] trail failed and the pt pregressed. Her most recent CT c/a/p on 8/25 showed progression of peritoneal metastatic disease. And her most recent  from [place] on 8/12/15 was 303.    Plan: Carbo/Doxil x 6 cycles.with imaging after 3 cycles.     9/3/15: Cycle #1 Carbo/Doxil.  244.  9/24/15: right IJ thrombus; started on Lovenox.   9/26/15: Present to [place] ED. \"presented to the ED this morning with what appears to be a mild drug rash after administering her lovenox this morning. This writer discussed situation with Gyn Onc Fellow Jeanne Moon as well as [facility] Heme/Onc service. Per Heme, a rash of this nature is extremely uncommon with administration of Lovenox. Given the mild nature of this rash and acute need for anticoagulation, recommended the patient continue with Lovenox injections and treat with Benadryl as needed. Advised patient be given precautions to return to " "the ED immediately if she develops reactive respiratory symptoms. Alternatively patient could be switched to alternative formulation of low molecular weight heparin if she was strongly resistant to continuation of Lovenox.\"    10/1/15: Cycle #2 Carbo/Doxil.  175.    10/28/15: gyn onc visit: pt complains of worsening constipation and tenderness around her right ribs. She is taking senna for her constipation with minimal improvement. She admits that the swelling around her neck after her blood clot has decreased. She also admits to feeling a nodules on her left abdomen. She states her appetite is good but she has not been eating fruits and vegetables. She denies any chemo side effects besides fatigue.     10/28/15:  158  11/23/2015:  133  CT c/a/p  IMPRESSION: In this patient with a clinical history of ovarian cancer  there is mixed response to therapy:   1. Stable to slightly decreased peritoneal nodularity since  8/18/2015.  2. No significant change regarding the large subdiaphragmatic  peritoneal deposit since 9/24/2015.  3. Enlarging soft tissue nodule overlying the abdominal musculature  concerning for metastasis since March of 2015.   4. Unchanged, indeterminate hypodensity near the gallbladder fossa  since 8/18/2015, however progressively increased in size since  3/5/2015.  5. Bilateral nephroureteral stents. No significant hydronephrosis.  6. Bilateral pulmonary nodules. Continued followup recommended.    12/23/15: Cycle 5 carboplatin/Doxil/Avastin.  96. To receive Avastin today despite proteinuria per Dr. Aguilar and nephrology.  1/21/16: Cycle 6 carboplatin/Doxil/Avastin, delayed one week secondary to neutropenia.  62.  1/28/16:  63.    2/2/16: Patient had right upper extremity doppler u/s done in [place] due to swollen glands and pain. 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 Pt is currently on lovenox.    2/2/16: US soft tissue neck  Conclusion:  1. Morphologically normal not pathologically enlarged two right carotid chain lymph nodes.  2. Chronic right jugular vein thrombosis, described in detail on  venous doppler exam.     2/2/16: Thyroid Ultrasound  Right lobe: 5.5 x 1.9 x 1.2 cm  Left lobe: 5.0 x 1.5 . 0.9 cm  Both lobes have normal background echotexture.    Conclusion:  1. 3 benign - appearing subcentimeter hypoechoic right mid thyroid nodules.  2. Borderline thyromegaly.    2/2/16: Right upper extremity venous duplex doppler evaluation  Conclusion:  1.) chronic non compressible echogenic right jugular vein thrombus, now 4.3 cm in length.     2/22/16:   IMPRESSION:    1. Ovarian cancer with peritoneal carcinomatosis.  2. Given the increased sensitivity of PET/CT, comparison with prior CT examinations is difficult. In general the abdominal/pelvic metastatic lesions appear to be decreased in size.  3. Persistent right internal jugular DVT, as evidenced by 5 cm filling  defect with inferior border at the central venous catheter.  4. Bilateral hydronephrosis without overt caliectasis. Some distal  migration of the bilateral double-J ureteral stents, although the  proximal coiled portions continue to be in the renal pelves.    2/24/16: C7 carboplatin/Doxil/Avastin.  56.  3/9/16: Hgb 6.6, worked up for transfusion reaction (negative). Bleed possibly secondary to   3/23/16: C8 carboplatin/Doxil/Avastin.  44.  4/2016: Hospitalized in [place] x2 weeks for hematuria leading to anemia after ureteral stent exchange  4/20/16: C9D1 carboplatin/Doxil/Avastin. Deferred one week secondary to acute UTI.  35.  4/28/16: C9D1 deferred due to continued bacteruria and ANC 1.3.  5/4/16: C9D1 carboplatin/Doxil/Avastin. Breast lump noted, diagnostic mammogram ordered.  6/1/16: C10D1 carboplatin/Doxil/Avastin.  50.  6/28/16: C11D1  carboplatin/Doxil/Avastin. Avastin held due to bleeding. Carbo/Doxil deferred one week secondary to neutropenia.  43.  7/7/16: C12D1 carboplatin/Doxil. Avastin held due to bleeding.  7/18/16: Bilateral ureteral stent exchange  7/20/16-7/29/16: Hospitalized with urosepsis and blood loss anemia, started on Zosyn and discharged on amoxicillin  9/29/16: C1D1 Gemzar.  85.  10/21/16: C2D1 Gemzar.  106.   11/11/16: C3D1 Gemzar.  pending.    12/12/16: CT CAP  IMPRESSION: In this patient with a history of metastatic ovarian  cancer:  1. Progression of disease as evidenced by a slowly enlarging  mesenteric and omental soft tissue foci and slowly enlarging  periesophageal and pericardiophrenic lymph nodes, as detailed above.  2. Stable appearance of bilateral ureteral stents without  hydronephrosis.  3. Patient was premedicated for a pre-existing IV contrast allergy.  Despite this, she had itchiness on her face poststudy. She should no  longer receive IV contrast in the future.      CT AP 1/10/2017: multiple dilated fluid filled small bowel loops with decompressed distal small bowel loops, consistent with obstruction. Transition point is suspected in the pelvis. No pneumatosis or free intraperitoneal gas. Bilateral ureteral stents appear appropriately positioned. There is mild dilation of the bilateral renal collecting systems, left greater than right.    1/23/17: admitted to clinic for dizziness and heart palpitations   1/27/17: ED admission - leg swelling and GI bleeding     1/27/17: US lower extremity   IMPRESSION:  1.  No evidence of right lower extremity deep venous thrombosis.  2.  Dampened waveforms in the right lower extremity, similar to  7/25/2016, though a more central occlusion cannot be excluded.  1/27/17: MRI Brain  IMPRESSION:  Normal brain MRI  1/27/17: CT AP  IMPRESSION: This patient with a history of metastatic ovarian cancer:  1. No bowel obstruction. Oral contrast progressed to the  colon.  Extensive colonic stool.  2. Severe right hydronephrosis, new from 12/12/2016, may represent  obstruction of the right ureteral stent. No left-sided hydronephrosis.  3. Slight interval progression of diffuse peritoneal metastatic  disease.  4. Mildly nodular areas of increased attenuation within the pelvic  bowel which may represent loculated malignant ascites or peritoneal  implants similar in appearance to 12/12/2016.    1/28/17: colonoscopy- benign     Treatment: Tesaro/Quadra Niraparib study  1/5/17: Cycle #1 Day 1    2/2/17: Cycle #2 Day 1   2/28/17: Cycle #3 Day 1     2/28/17: CT [facility] - [facility]   IMPRESSION:     IMPRESSION: In this patient with a history of ovarian cancer, there is  mild disease progression as follows:  1. Increase in size and number of multiple subcentimeter  nodules/intrafissural lymph nodes along the right major and minor  fissures and also bilateral pulmonary nodules, predominantly along the  diaphragmatic pleura.   2. Slight increase in size of small lymph nodes in the right internal  mammary region and bilateral anterior cardiophrenic region.  3. Stable to slight increase in size of deposits in the perihepatic  region. No significant change in the omental deposits in the left  upper quadrant of the abdomen.  4. Stable to slight increase in size of mesenteric deposits/lymph  nodes.  5. Bilateral nephroureteric stent in place. Atrophy of the right  kidney with interval resolution of right hydronephrosis. Unchanged  mild left hydronephrosis. Air within the collecting systems and  bladder likely related to prior intervention.  6. Dilation of proximal small bowel loops with interspersed areas of  narrowing. No progression of oral contrast into the ileum. Moderate  amount of fecal material in the colon. Partial/early small bowel  obstruction is possible.         Date Value Ref Range Status   02/02/2017 243 (H) 0 - 30 U/mL Final     Comment:     Assay Method:   Chemiluminescence using Siemens Centaur XP   01/05/2017 165 (H) 0 - 30 U/mL Final     Comment:     Assay Method:  Chemiluminescence using Siemens Centaur XP   12/15/2016 145 (H) 0 - 30 U/mL Final     Comment:     Assay Method:  Chemiluminescence using Siemens Centaur XP   12/12/2016 132 (H) 0 - 30 U/mL Final     Comment:     Assay Method:  Chemiluminescence using Siemens Centaur XP   11/11/2016 111 (H) 0 - 30 U/mL Final     Comment:     Assay Method:  Chemiluminescence using Siemens Centaur XP   10/21/2016 106 (H) 0 - 30 U/mL Final     Comment:     Assay Method:  Chemiluminescence using Siemens Centaur XP   09/29/2016 85 (H) 0 - 30 U/mL Final     Comment:     Assay Method:  Chemiluminescence using Siemens Centaur XP   09/15/2016 70 (H) 0 - 30 U/mL Final     Comment:     Assay Method:  Chemiluminescence using Siemens Centaur XP   08/12/2016 38 (H) 0 - 30 U/mL Final     Comment:     Assay Method:  Chemiluminescence using Siemens Centaur XP   06/28/2016 43 (H) 0 - 30 U/mL Final     Answers for HPI/ROS submitted by the patient on 2/27/2017   General Symptoms: Yes  Skin Symptoms: No  HENT Symptoms: No  EYE SYMPTOMS: No  HEART SYMPTOMS: Yes  LUNG SYMPTOMS: No  INTESTINAL SYMPTOMS: No  URINARY SYMPTOMS: Yes  GYNECOLOGIC SYMPTOMS: Yes  BREAST SYMPTOMS: No  SKELETAL SYMPTOMS: No  BLOOD SYMPTOMS: Yes  NERVOUS SYSTEM SYMPTOMS: No  MENTAL HEALTH SYMPTOMS: No  Fever: No  Loss of appetite: Yes  Weight loss: Yes  Weight gain: No  Fatigue: Yes  Night sweats: No  Chills: No  Increased stress: No  Excessive hunger: No  Excessive thirst: No  Feeling hot or cold when others believe the temperature is normal: No  Loss of height: No  Post-operative complications: No  Surgical site pain: No  Hallucinations: No  Change in or Loss of Energy: No  Hyperactivity: No  Confusion: No  Chest pain or pressure: No  Fast or irregular heartbeat: Yes  Pain in legs with walking: No  Swelling in feet or ankles: No  Trouble breathing while lying down:  No  Fingers or Toes appear blue: No  High blood pressure: Yes  Low blood pressure: No  Fainting: No  Murmurs: No  Chest pain on exertion: No  Chest pain at rest: No  Cramping pain in leg during exercise: No  Pacemaker: No  Varicose veins: No  Edema or swelling: No  Fast heart beat: Yes  Wake up at night with shortness of breath: No  Heart flutters: No  Light-headedness: No  Exercise intolerance: No  Trouble holding urine or incontinence: No  Pain or burning: No  Trouble starting or stopping: No  Increased frequency of urination: Yes  Blood in urine: Yes  Decreased frequency of urination: No  Frequent nighttime urination: No  Flank pain: Yes  Difficulty emptying bladder: No  Anemia: Yes  Swollen glands: No  Easy bleeding or bruising: No  Bleeding or spotting between periods: No  Heavy or painful periods: No  Irregular periods: No  Vaginal discharge: No  Hot flashes: No  Vaginal dryness: Yes  Genital ulcers: No  Reduced libido: No  Painful intercourse: No  Difficulty with sexual arousal: No  Post-menopausal bleeding: No      I have reviewed and addressed the patient's review of symptoms for today's visit.           Past Medical History   Diagnosis Date     Anemia      History of blood transfusion      MULTIPLE     Hydronephrosis      Hyperlipidemia      Jugular vein thrombosis, right      Persistent right internal jugular DVT     Livedo annularis      Osteoarthritis      Osteopenia      Ovarian cancer (H)      Polymyalgia rheumatica (H)      PONV (postoperative nausea and vomiting)      Primary cancer of peritoneum (H)      Past Surgical History   Procedure Laterality Date     Appendectomy  1980      section       Open reduction internal fixation toe(s)  9/3/13     Fifth digit, left foot, with screw fixation      Lymph node biopsy       Left posterior chain, beign     Breast surgery       biopsy negative     Laparoscopic salpingo-oophorectomy  2013     Procedure: LAPAROSCOPIC  SALPINGO-OOPHORECTOMY;  Diagnostic Laparoscopy, Exploratory Laparotomy, Bilateral Salpingo-Oophorectomy,  Hysterectomy, Omentectomy, Pelvic Washings, Peritoneal staging and biopsies, Pelvic and Periaortic Lymph node Dissection;  Surgeon: Cheri Aguilar MD;  Location: [facility] OR     Hysterectomy total abdominal, bilateral salpingo-oophorectomy, node dissection, combined  11/7/2013     Procedure: COMBINED HYSTERECTOMY TOTAL ABDOMINAL, SALPINGO-OOPHORECTOMY, NODE DISSECTION;;  Surgeon: Cheri Aguilar MD;  Location: [facility] OR     Laparotomy, staging, combined  11/7/2013     Procedure: COMBINED LAPAROTOMY, STAGING;;  Surgeon: Cheri Aguilar MD;  Location: [facility] OR     Remove port peritoneal  5/5/2014     Procedure: REMOVE PORT PERITONEAL;  Surgeon: Cheri Aguilar MD;  Location: [facility] OR     Combined cystoscopy, retrogrades, exchange stent ureter(s) Bilateral 7/18/2016     Procedure: COMBINED CYSTOSCOPY, RETROGRADES, EXCHANGE STENT URETER(S);  Surgeon: Carmela Alvarez MD;  Location: [facility] OR     Combined cystoscopy, retrogrades, exchange stent ureter(s)  4/2016     @ [facility]     Combined cystoscopy, retrogrades, exchange stent ureter(s) Bilateral 10/17/2016     Procedure: COMBINED CYSTOSCOPY, RETROGRADES, EXCHANGE STENT URETER(S);  Surgeon: Carmela Alvarez MD;  Location: [facility] OR     Combined cystoscopy, retrogrades, exchange stent ureter(s) Bilateral 12/7/2016     Procedure: COMBINED CYSTOSCOPY, RETROGRADES, EXCHANGE STENT URETER(S);  Surgeon: Carmela Alvarez MD;  Location: [facility] OR     Colonoscopy N/A 1/28/2017     Procedure: COLONOSCOPY;  Surgeon: Luis Richardson MD;  Location: [facility] GI     Esophagoscopy, gastroscopy, duodenoscopy (egd), combined N/A 1/28/2017     Procedure: COMBINED ESOPHAGOSCOPY, GASTROSCOPY, DUODENOSCOPY (EGD);  Surgeon: Luis Richardson MD;  Location: [facility] GI     Colonoscopy N/A 1/28/2017     Procedure: COMBINED COLONOSCOPY, SINGLE OR MULTIPLE BIOPSY/POLYPECTOMY BY BIOPSY;   Surgeon: Luis Richardson MD;  Location:  GI     Family History   Problem Relation Age of Onset     Arthritis Father      Myocardial Infarction Father      secondary to anesthesia     Colon Cancer Father      DIABETES Other      Uncle     Hypertension Mother      Other - See Comments Mother      cognitive decline     Skin Cancer Mother      Hypertension Sister      HEART DISEASE Other      unknown valve replacement     Bladder Cancer Sister      Skin Cancer Brother      Social History     Social History     Marital Status:      Spouse Name: N/A     Number of Children: N/A     Years of Education: N/A     Social History Main Topics     Smoking status: Former Smoker     Smokeless tobacco: Never Used      Comment: Quit over 20 years ago     Alcohol Use: No     Drug Use: No     Sexual Activity: No     Other Topics Concern     Not on file     Social History Narrative         Current Outpatient Prescriptions   Medication     promethazine (PHENERGAN) 25 MG Suppository     traMADol (ULTRAM) 50 MG tablet     oxyCODONE (ROXICODONE) 5 MG IR tablet     ondansetron (ZOFRAN-ODT) 4 MG ODT tab     levofloxacin (LEVAQUIN) 500 MG tablet     HYDROmorphone (DILAUDID) 2 MG tablet     ferrous sulfate (IRON) 325 (65 FE) MG tablet     morphine 10 MG/5ML solution     study - niraparib (IDS# 4759) 100 mg capsule     enoxaparin (LOVENOX) 40 MG/0.4ML injection     study - niraparib (IDS# 4759) 100 mg capsule     tamsulosin (FLOMAX) 0.4 MG capsule     LORazepam (ATIVAN) 1 MG tablet     tolterodine (DETROL LA) 4 MG 24 hr capsule     ACETAMINOPHEN PO     Multiple Vitamins-Minerals (ONE DAILY MULTIVITAMIN WOMEN) TABS     polyethylene glycol (MIRALAX/GLYCOLAX) powder     cycloSPORINE (RESTASIS) 0.05 % ophthalmic emulsion     Ranitidine HCl (ZANTAC PO)     MELATONIN PO     EPINEPHrine (EPIPEN) 0.3 MG/0.3ML injection     senna-docusate (SENOKOT-S;PERICOLACE) 8.6-50 MG per tablet     Pyridoxine HCl (VITAMIN B6 PO)     prochlorperazine  "(COMPAZINE) 10 MG tablet     No current facility-administered medications for this visit.      Facility-Administered Medications Ordered in Other Visits   Medication     acetaminophen (TYLENOL) tablet 975 mg     gabapentin (NEURONTIN) capsule 300 mg     lidocaine 1 % 1 mL     lidocaine (LMX4) kit     sodium chloride (PF) 0.9% PF flush 3 mL     sodium chloride (PF) 0.9% PF flush 3 mL     lactated ringers infusion     acetaminophen (TYLENOL) tablet 975 mg     lidocaine 1 % 1 mL     lidocaine (LMX4) kit     sodium chloride (PF) 0.9% PF flush 3 mL     sodium chloride (PF) 0.9% PF flush 3 mL     0.9% sodium chloride infusion     clindamycin (CLEOCIN) infusion 900 mg     clindamycin (CLEOCIN) infusion 900 mg        Allergies   Allergen Reactions     Contrast Dye Itching and Swelling     Itching/tingling of mouth and nose with sensation of swelling after receiving IV contrast for CT.  This occurred despite steroid premedication. Therefore the patient should not receive intravenous contrast in the future.      Benadryl Allergy Other (See Comments)     Stay awake, restless, \"uncomfortable\", \"feel like I need to run away\"      Enoxaparin Hives     3/23/16: Okay to receive heparin flushes in port, tolerates well. Patricia EDGARP-C     Enoxaparin Sodium Other (See Comments)     Pt is taking this now.     Amoxicillin Rash     Diphenhydramine Anxiety     No Clinical Screening - See Comments Rash     Pollen Extract Rash     Sulfa Drugs Rash       Physical Exam:    /83  Pulse 88  Temp 98.2  F (36.8  C) (Oral)  Resp 16  Ht 1.676 m (5' 6\")  Wt 56.5 kg (124 lb 9.6 oz)  SpO2 98%  BMI 20.11 kg/m2     General Appearance: Lying down on table with emesis bag in hand, appears ill     HEENT:  no thyromegaly, no palpable nodules or masses        Cardiovascular: regular rate and rhythm, S1S2, grade II murmur, consistent with prior exams     Respiratory: lungs clear, no rales, rhonchi or wheezes, normal diaphragmatic " excursion    Musculoskeletal: extremities non tender and without edema    Skin: no lesions or rashes, scar right lower extremity due to previous gangrene infection    Neurological: normal gait, no gross defects     Psychiatric: appropriate mood and affect                               Hematological: normal cervical, supraclavicular and inguinal lymph nodes     Gastrointestinal:       abdomen soft, mildly tender, minimally distended, no organomegaly or masses, + BS x 4 quadrants.    Genitourinary: Deferred    Performance Status -1    Assessment/Plan:  1) Primary peritoneal cancer: Patient to continue (parp-inhibitor)-niraparib today. Potassium was 2.9 today, will be placed on IV potassium and zofram today.     2) Reviewed signs and symptoms for when she should contact the clinic or seek additional care, including but not limited to fever, chills, inability to keep down food or fluids, nausea and vomiting not controlled with antiemetics, and diarrhea leading to dehydration.      3) Nausea: Related to pain vs chemotherapy vs disease. Controlled with antiemetics, has improved after hospital visit.       Constipation: Improved with daily miralax.  Instructed to monitor for signs of obstruction. Continue low residue diet.    4) Severe hydronephrosis, bilateral stents in place with ongoing irritation/flank pain: had stent exchange which caused severe pain and nausea/emesis.     5) Anxiety: continue ativan.    6) Genetic counseling: Has been done, negative for mutations in the following: BRCA1, BRCA2, EPCAM, MLH1, MSH2, MSH6, PMS2, PTEN and TP53.     7) Levofloxacin: Patient was place on levofloxacin on 2/25/17 at outside clinic. Patient was taken off of this medication today as the urine culture came back negative from clinic. We were unaware of this happening.     8) Health maintenance issues discussed include to follow up with PCP for non-gynecologic issues.  Continue on prophylactic lovenox at 40 SC daily due to history  of prior bleeding on higher dose.    9) Appetite: Consider Megace for appetite stimulant      10) Marshall verbalizes understanding of and agreement with plan.    Of a 60 minute appointment, more than 50% was spent in counseling the patient.    ADDENDUM: Patient's films reviewed, partial SBO noted, with findings and nausea currently will admit to the hospital for NGT decompression.     Jia Mccollum MD    Department of Ob/Gyn and Women's Health  Division of Gynecologic Oncology  [facility]  [phone]    I have reviewed the above note and agree with the scribe's notation as written.    I, Kolton Spencer, am serving as a scribe to document services personally performed by Jia Mccollum MD, based upon my observations and the provider's statements to me. All documentation has been reviewed by the aforementioned doctor prior to being entered into the official medical record.                           Consent (Scalp)/Introductory Paragraph: The rationale for Mohs was explained to the patient and consent was obtained. The risks, benefits and alternatives to therapy were discussed in detail. Specifically, the risks of changes in hair growth pattern secondary to repair, infection, scarring, bleeding, prolonged wound healing, incomplete removal, allergy to anesthesia, nerve injury and recurrence were addressed. Prior to the procedure, the treatment site was clearly identified and confirmed by the patient. All components of Universal Protocol/PAUSE Rule completed.

## 2024-09-23 NOTE — TELEPHONE ENCOUNTER
Patient is coming in to see  for hydronephrosis, no need for a call    74 y/o F with a h/o CAD (s/p PCI), HFpEF, HTN, HLD, COPD (on home O2 PRN), recent admission for chest pain s/p [facility] with nonobstructive CAD- discharged home 9/19, with:    # Acute hypercapnic respiratory failure  # Hyponatremia  # MADHURI  # Constipation    Transfer to MICU.    It is unclear at this time exactly why she decompensated. She does not appear to be bronchospastic, however does have an elevated right hemidiaphragm and received some extra alprazolam earlier. Suspect shallow hypoventilation as primary issue. Sent for urgent CT brain which does not reveal acute pathology.    - s/p emergent intubation  - actively titrating ventilator settings to maintain SpO2 > 92%  - f/u repeat ABG and adjust minute ventilation accordingly  - repeat CXR reveals very elevated right hemidiaphragm, no obvious infiltrate  - continue empiric ATC inhaled bronchodilators  - will hold off on steroids for now  - continue empiric Zosyn, leukocytosis is worsening, blood and urine cultures unremarkable thus far  - CT C/A/P and abdominal US from 9/22 reviewed, no obvious source of infection  - hyponatremia was believed to be hypovolemia related, s/p 2% saline with improving serum Na  - repeat Na 126, will allow self correction  - received 80mg IV furosemide empirically during Rapid Response, will follow labs closely to avoid possible rapid correction with diuresis  - MADHURI likely pre-renal, improving  - trend BUN/Cr, electrolytes, acid-base balance, insert rushing catheter to monitor hourly UOP  - no indication for urgent RRT at this time  - continue bowel regimen via OGT    Case discussed with MICU physician, Dr. Wynn.

## (undated) DEVICE — LINEN TOWEL PACK X5 5464

## (undated) DEVICE — PAD CHUX UNDERPAD 30X30"

## (undated) DEVICE — TAPE DURAPORE 3" SILK 1538-3

## (undated) DEVICE — GLOVE PROTEXIS W/NEU-THERA 6.5  2D73TE65

## (undated) DEVICE — ENDO BITE BLOCK ADULT OMNI-BLOC

## (undated) DEVICE — DRSG DRAIN 2X2" 7087

## (undated) DEVICE — SOL NACL 0.9% IRRIG 1000ML BOTTLE 2F7124

## (undated) DEVICE — TUBING IV EXT SET MICRO VOLUME MALE LL ADAPTER 36" 2N3345

## (undated) DEVICE — SOL NACL 0.9% IRRIG 3000ML BAG 2B7477

## (undated) DEVICE — SYR 10ML LL W/O NDL

## (undated) DEVICE — SOL WATER IRRIG 1000ML BOTTLE 2F7114

## (undated) DEVICE — CATH ANGIO TORCON BECAON TIP JB-1 5FRX65CM G11208

## (undated) DEVICE — GUIDEWIRE SENSOR DUAL FLEX STR 0.035"X150CM M0066703080

## (undated) DEVICE — BAG URINARY DRAIN LUBRISIL IC 4000ML LF 253509A

## (undated) DEVICE — KIT ENDO FIRST STEP DISINFECTANT 200ML W/POUCH EP-4

## (undated) DEVICE — CATH URETERAL OPEN END 05FR 70CM 020015

## (undated) DEVICE — PREP CHLORAPREP W/ORANGE TINT 10.5ML 260715

## (undated) DEVICE — PACK CYSTO CUSTOM ASC

## (undated) DEVICE — DRAPE SHEET MED 44X70" 9355

## (undated) DEVICE — SYR 05ML LL W/O NDL

## (undated) DEVICE — DRAPE GYN/UROLOGY FLUID POUCH TUR 29455

## (undated) DEVICE — NDL COUNTER 20CT 31142493

## (undated) DEVICE — NDL SPINAL 22GA 3.5" QUINCKE 405181

## (undated) DEVICE — CATH URETERAL DL 6FR FLEX-TIP 10FRX50CM G17323 AQ-022610

## (undated) DEVICE — SUCTION MANIFOLD NEPTUNE 2 SYS 4 PORT 0702-020-000

## (undated) DEVICE — NDL 18GAX1.5" 305185

## (undated) DEVICE — TUBE GASTROSTOMY PEG SET 24FR G22636 PEG-24-PULL-S

## (undated) DEVICE — TUBING SUCTION 10'X3/16" N510

## (undated) DEVICE — LABEL MEDICATION SYSTEM 3303-P

## (undated) DEVICE — SYR 50ML LL W/O NDL 309653

## (undated) DEVICE — DRSG GAUZE 4X4" 2187

## (undated) DEVICE — GOWN IMPERVIOUS BREATHABLE SMART XLG 89045

## (undated) DEVICE — DRAPE BACK TABLE  44X90" 8377

## (undated) DEVICE — DRAPE MAYO STAND 23X54 8337

## (undated) DEVICE — PREP CHLORAPREP 26ML TINTED ORANGE  260815

## (undated) DEVICE — NDL SPINAL 22GA 5" QUINCKE 405148

## (undated) DEVICE — GLOVE ESTEEM POWDER FREE SMT 7.0  2D72PT70

## (undated) DEVICE — NDL 25GA 1.5" 305127

## (undated) DEVICE — DRSG TEGADERM 4X4 3/4" 1626W

## (undated) DEVICE — DRAPE C-ARM W/STRAPS 42X72" 07-CA104

## (undated) DEVICE — SUCTION MANIFOLD DORNOCH ULTRA CART UL-CL500

## (undated) RX ORDER — DEXAMETHASONE SODIUM PHOSPHATE 4 MG/ML
INJECTION, SOLUTION INTRA-ARTICULAR; INTRALESIONAL; INTRAMUSCULAR; INTRAVENOUS; SOFT TISSUE
Status: DISPENSED
Start: 2017-01-01

## (undated) RX ORDER — FENTANYL CITRATE 50 UG/ML
INJECTION, SOLUTION INTRAMUSCULAR; INTRAVENOUS
Status: DISPENSED
Start: 2017-01-01

## (undated) RX ORDER — PROPOFOL 10 MG/ML
INJECTION, EMULSION INTRAVENOUS
Status: DISPENSED
Start: 2017-01-01

## (undated) RX ORDER — LACOSAMIDE 10 MG/ML
INJECTION, SOLUTION INTRAVENOUS
Status: DISPENSED
Start: 2017-01-01

## (undated) RX ORDER — SIMETHICONE 40MG/0.6ML
SUSPENSION, DROPS(FINAL DOSAGE FORM)(ML) ORAL
Status: DISPENSED
Start: 2017-01-01

## (undated) RX ORDER — DIPHENHYDRAMINE HYDROCHLORIDE 50 MG/ML
INJECTION INTRAMUSCULAR; INTRAVENOUS
Status: DISPENSED
Start: 2017-01-01

## (undated) RX ORDER — ONDANSETRON 2 MG/ML
INJECTION INTRAMUSCULAR; INTRAVENOUS
Status: DISPENSED
Start: 2017-01-01

## (undated) RX ORDER — CLINDAMYCIN PHOSPHATE 900 MG/50ML
INJECTION, SOLUTION INTRAVENOUS
Status: DISPENSED
Start: 2017-01-01

## (undated) RX ORDER — GLYCOPYRROLATE 0.2 MG/ML
INJECTION INTRAMUSCULAR; INTRAVENOUS
Status: DISPENSED
Start: 2017-01-01

## (undated) RX ORDER — KETAMINE HYDROCHLORIDE 10 MG/ML
INJECTION, SOLUTION INTRAMUSCULAR; INTRAVENOUS
Status: DISPENSED
Start: 2017-01-01

## (undated) RX ORDER — GABAPENTIN 300 MG/1
CAPSULE ORAL
Status: DISPENSED
Start: 2017-01-01

## (undated) RX ORDER — ACETAMINOPHEN 325 MG/1
TABLET ORAL
Status: DISPENSED
Start: 2017-01-01

## (undated) RX ORDER — CLINDAMYCIN PHOSPHATE 150 MG/ML
INJECTION, SOLUTION INTRAVENOUS
Status: DISPENSED
Start: 2017-01-01

## (undated) RX ORDER — ROCURONIUM BROMIDE 50 MG/5 ML
SYRINGE (ML) INTRAVENOUS
Status: DISPENSED
Start: 2017-01-01

## (undated) RX ORDER — LIDOCAINE HYDROCHLORIDE 20 MG/ML
INJECTION, SOLUTION EPIDURAL; INFILTRATION; INTRACAUDAL; PERINEURAL
Status: DISPENSED
Start: 2017-01-01

## (undated) RX ORDER — SCOLOPAMINE TRANSDERMAL SYSTEM 1 MG/1
PATCH, EXTENDED RELEASE TRANSDERMAL
Status: DISPENSED
Start: 2017-01-01

## (undated) RX ORDER — KETOROLAC TROMETHAMINE 30 MG/ML
INJECTION, SOLUTION INTRAMUSCULAR; INTRAVENOUS
Status: DISPENSED
Start: 2017-01-01